# Patient Record
Sex: FEMALE | Race: WHITE | NOT HISPANIC OR LATINO | Employment: UNEMPLOYED | URBAN - NONMETROPOLITAN AREA
[De-identification: names, ages, dates, MRNs, and addresses within clinical notes are randomized per-mention and may not be internally consistent; named-entity substitution may affect disease eponyms.]

---

## 2017-02-13 ENCOUNTER — TELEPHONE (OUTPATIENT)
Dept: URGENT CARE | Facility: CLINIC | Age: 47
End: 2017-02-13

## 2017-02-13 NOTE — TELEPHONE ENCOUNTER
Pt called office and spoke with Lillian.  Wondered where pill for rash was.  Emmanuelle Ayana did not intend to call in pill.  However Emmanuelle spoke with pt this am and she is noting some improvement with Lotrimin AF cream. Emmanuelle agreed to call in Lamisil 250mg QD x 14 days to assist.  Pt was informed that pill may require precert and we would not do that since cream is helping.  She has gyn appt in one week for recheck.  I did rx for Emmanuelle since she has no computer access this am.

## 2017-02-20 ENCOUNTER — OFFICE VISIT (OUTPATIENT)
Dept: OBSTETRICS AND GYNECOLOGY | Facility: CLINIC | Age: 47
End: 2017-02-20

## 2017-02-20 VITALS
DIASTOLIC BLOOD PRESSURE: 70 MMHG | HEIGHT: 59 IN | SYSTOLIC BLOOD PRESSURE: 100 MMHG | WEIGHT: 87 LBS | BODY MASS INDEX: 17.54 KG/M2

## 2017-02-20 DIAGNOSIS — L98.9 PSORIASIS/LIKE DISORDERS: ICD-10-CM

## 2017-02-20 DIAGNOSIS — Z13.820 SCREENING FOR OSTEOPOROSIS: ICD-10-CM

## 2017-02-20 DIAGNOSIS — F17.200 SMOKER: ICD-10-CM

## 2017-02-20 DIAGNOSIS — Z12.39 SCREENING FOR BREAST CANCER: ICD-10-CM

## 2017-02-20 DIAGNOSIS — Z01.419 VISIT FOR GYNECOLOGIC EXAMINATION: Primary | ICD-10-CM

## 2017-02-20 PROCEDURE — 99386 PREV VISIT NEW AGE 40-64: CPT | Performed by: NURSE PRACTITIONER

## 2017-02-20 PROCEDURE — G0123 SCREEN CERV/VAG THIN LAYER: HCPCS | Performed by: NURSE PRACTITIONER

## 2017-02-20 RX ORDER — CLOBETASOL PROPIONATE 0.5 MG/G
CREAM TOPICAL 2 TIMES DAILY
Qty: 60 G | Refills: 0 | Status: SHIPPED | OUTPATIENT
Start: 2017-02-20 | End: 2018-03-01

## 2017-02-20 NOTE — PATIENT INSTRUCTIONS
Bone Health  Bones protect organs, store calcium, and anchor muscles. Good health habits, such as eating nutritious foods and exercising regularly, are important for maintaining healthy bones. They can also help to prevent a condition that causes bones to lose density and become weak and brittle (osteoporosis).  WHY IS BONE MASS IMPORTANT?  Bone mass refers to the amount of bone tissue that you have. The higher your bone mass, the stronger your bones. An important step toward having healthy bones throughout life is to have strong and dense bones during childhood. A young adult who has a high bone mass is more likely to have a high bone mass later in life. Bone mass at its greatest it is called peak bone mass.  A large decline in bone mass occurs in older adults. In women, it occurs about the time of menopause. During this time, it is important to practice good health habits, because if more bone is lost than what is replaced, the bones will become less healthy and more likely to break (fracture). If you find that you have a low bone mass, you may be able to prevent osteoporosis or further bone loss by changing your diet and lifestyle.  HOW CAN I FIND OUT IF MY BONE MASS IS LOW?  Bone mass can be measured with an X-ray test that is called a bone mineral density (BMD) test. This test is recommended for all women who are age 65 or older. It may also be recommended for men who are age 70 or older, or for people who are more likely to develop osteoporosis due to:  · Having bones that break easily.  · Having a long-term disease that weakens bones, such as kidney disease or rheumatoid arthritis.  · Having menopause earlier than normal.  · Taking medicine that weakens bones, such as steroids, thyroid hormones, or hormone treatment for breast cancer or prostate cancer.  · Smoking.  · Drinking three or more alcoholic drinks each day.  WHAT ARE THE NUTRITIONAL RECOMMENDATIONS FOR HEALTHY BONES?  To have healthy bones, you need  to get enough of the right minerals and vitamins. Most nutrition experts recommend getting these nutrients from the foods that you eat. Nutritional recommendations vary from person to person. Ask your health care provider what is healthy for you. Here are some general guidelines.  Calcium Recommendations  Calcium is the most important (essential) mineral for bone health. Most people can get enough calcium from their diet, but supplements may be recommended for people who are at risk for osteoporosis. Good sources of calcium include:  · Dairy products, such as low-fat or nonfat milk, cheese, and yogurt.  · Dark green leafy vegetables, such as bok sindhu and broccoli.  · Calcium-fortified foods, such as orange juice, cereal, bread, soy beverages, and tofu products.  · Nuts, such as almonds.  Follow these recommended amounts for daily calcium intake:  · Children, age 1-3: 700 mg.  · Children, age 4-8: 1,000 mg.  · Children, age 9-13: 1,300 mg.  · Teens, age 14-18: 1,300 mg.  · Adults, age 19-50: 1,000 mg.  · Adults, age 51-70:    Men: 1,000 mg.    Women: 1,200 mg.  · Adults, age 71 or older: 1,200 mg.  · Pregnant and breastfeeding females:    Teens: 1,300 mg.    Adults: 1,000 mg.  Vitamin D Recommendations  Vitamin D is the most essential vitamin for bone health. It helps the body to absorb calcium. Sunlight stimulates the skin to make vitamin D, so be sure to get enough sunlight. If you live in a cold climate or you do not get outside often, your health care provider may recommend that you take vitamin D supplements. Good sources of vitamin D in your diet include:  · Egg yolks.  · Saltwater fish.  · Milk and cereal fortified with vitamin D.  Follow these recommended amounts for daily vitamin D intake:  · Children and teens, age 1-18: 600 international units.  · Adults, age 50 or younger: 400-800 international units.  · Adults, age 51 or older: 800-1,000 international units.  Other Nutrients  Other nutrients for bone  health include:  · Phosphorus. This mineral is found in meat, poultry, dairy foods, nuts, and legumes. The recommended daily intake for adult men and adult women is 700 mg.  · Magnesium. This mineral is found in seeds, nuts, dark green vegetables, and legumes. The recommended daily intake for adult men is 400-420 mg. For adult women, it is 310-320 mg.  · Vitamin K. This vitamin is found in green leafy vegetables. The recommended daily intake is 120 mg for adult men and 90 mg for adult women.  WHAT TYPE OF PHYSICAL ACTIVITY IS BEST FOR BUILDING AND MAINTAINING HEALTHY BONES?  Weight-bearing and strength-building activities are important for building and maintaining peak bone mass. Weight-bearing activities cause muscles and bones to work against gravity. Strength-building activities increases muscle strength that supports bones. Weight-bearing and muscle-building activities include:  · Walking and hiking.  · Jogging and running.  · Dancing.  · Gym exercises.  · Lifting weights.  · Tennis and racquetball.  · Climbing stairs.  · Aerobics.  Adults should get at least 30 minutes of moderate physical activity on most days. Children should get at least 60 minutes of moderate physical activity on most days. Ask your health care provide what type of exercise is best for you.  WHERE CAN I FIND MORE INFORMATION?  For more information, check out the following websites:  · National Osteoporosis Foundation: http://nof.org/learn/basics  · National Institutes of Health: http://www.niams.nih.gov/Health_Info/Bone/Bone_Health/bone_health_for_life.asp     This information is not intended to replace advice given to you by your health care provider. Make sure you discuss any questions you have with your health care provider.     Document Released: 03/09/2005 Document Revised: 05/03/2016 Document Reviewed: 12/23/2015  [employer] Patient Education ©2016 [employer]

## 2017-02-20 NOTE — PROGRESS NOTES
Subjective   Adelita Jaeger is a 46 y.o. female is here today as a self referral.    HPI Comments: I'm here for a pap and physical. I also need to have a genital rash that extends up to my crack evaluated.    Gynecologic Exam   The patient's pertinent negatives include no pelvic pain or vaginal discharge. Associated symptoms include rash (genital area extending to top of my crack in the back). Pertinent negatives include no abdominal pain, back pain, chills, constipation, diarrhea, flank pain, headaches, nausea, sore throat or vomiting.       The following portions of the patient's history were reviewed and updated as appropriate: allergies, current medications, past family history, past social history, past surgical history and problem list.    Review of Systems   Constitutional: Negative for activity change, appetite change, chills and fatigue.   HENT: Negative for congestion, dental problem, ear pain, hearing loss, nosebleeds, rhinorrhea, sneezing, sore throat and voice change.    Eyes: Negative for discharge, redness, itching and visual disturbance.   Respiratory: Negative for apnea, cough, choking, shortness of breath and wheezing.    Cardiovascular: Negative for chest pain and palpitations.   Gastrointestinal: Negative for abdominal distention, abdominal pain, constipation, diarrhea, nausea and vomiting.   Endocrine: Negative for cold intolerance, heat intolerance and polyuria.   Genitourinary: Positive for dyspareunia (not very sexually active ). Negative for difficulty urinating, flank pain, genital sores, menstrual problem, pelvic pain, vaginal bleeding and vaginal discharge.   Musculoskeletal: Negative for arthralgias, back pain, myalgias and neck pain.   Skin: Positive for rash (genital area extending to top of my crack in the back). Negative for pallor.   Allergic/Immunologic: Negative for environmental allergies and food allergies.   Neurological: Negative for dizziness, tremors, seizures, numbness and  headaches.   Hematological: Negative for adenopathy. Does not bruise/bleed easily.   Psychiatric/Behavioral: Negative for agitation, decreased concentration, sleep disturbance and suicidal ideas. The patient is not nervous/anxious.        Objective   Physical Exam   Constitutional: She is oriented to person, place, and time. She appears well-developed and well-nourished. No distress.   HENT:   Head: Normocephalic and atraumatic.   Right Ear: External ear normal.   Left Ear: External ear normal.   Nose: Nose normal.   Mouth/Throat: Oropharynx is clear and moist.   Eyes: EOM are normal. Pupils are equal, round, and reactive to light.   Neck: Normal range of motion. No thyromegaly present.   Cardiovascular: Normal rate, regular rhythm and normal heart sounds.    No murmur heard.  Pulmonary/Chest: Effort normal and breath sounds normal. No respiratory distress. She has no wheezes. Right breast exhibits no inverted nipple and no mass. Left breast exhibits no inverted nipple and no mass.   Bilateral Implants   Abdominal: Soft. Bowel sounds are normal. There is no tenderness.   Genitourinary: Vagina normal and uterus normal. No breast tenderness. Pelvic exam was performed with patient supine. There is no rash or tenderness on the right labia. There is no rash or tenderness on the left labia. Cervix exhibits no motion tenderness. Right adnexum displays no mass and no tenderness. Left adnexum displays no mass and no tenderness. No bleeding in the vagina. No vaginal discharge found.   Genitourinary Comments: Urethra and urethral meatus normal  Bladder normal no prolapse  Perineum and rectum examined and intact without lesions  Bilateral groin area extending up to the top of her crack in the back is a psoriatic rash, itchy, red and scaling  Vagina orifice is shortened due to menopause  Bimanual elicits tenderness due to short vagina and orifice   Musculoskeletal: Normal range of motion.   Lymphadenopathy:        Right: No  inguinal adenopathy present.        Left: No inguinal adenopathy present.   Neurological: She is alert and oriented to person, place, and time. She has normal reflexes.   Skin: Skin is warm and dry.   Psychiatric: She has a normal mood and affect. Her behavior is normal. Judgment and thought content normal.   Nursing note and vitals reviewed.        Assessment/Plan   Adelita was seen today for gynecologic exam.    Diagnoses and all orders for this visit:    Visit for gynecologic examination    Normal gyn exam. Pt's PCP is Dr Rajan Rodriguez whom does her labs and other Rx's  -     Liquid-based Pap Smear, Screening    Screening for breast cancer  -     Mammo Screening Digital Tomosynthesis Bilateral With CAD; Future    Screening for osteoporosis  -     DEXA Bone Density Axial; Future    Smoker    Pt is encouraged to stop smoking but she is not ready to.    BMI less than 19,adult    Encouraged pt to eat more protein and to exercise to build muscle.    Psoriasis/like disorders    Pt has been having this rash now for 5 weeks. She has been given several antifungals that have not worked. She needs to see a dermatologist for evaluation.   -     clobetasol (TEMOVATE) 0.05 % cream; Apply  topically 2 (Two) Times a Day. Apply to affected areas  -     Ambulatory Referral to Dermatology

## 2017-02-22 LAB
GEN CATEG CVX/VAG CYTO-IMP: NORMAL
LAB AP CASE REPORT: NORMAL
LAB AP GYN ADDITIONAL INFORMATION: NORMAL
Lab: NORMAL
PATH INTERP SPEC-IMP: NORMAL
STAT OF ADQ CVX/VAG CYTO-IMP: NORMAL

## 2017-03-17 ENCOUNTER — HOSPITAL ENCOUNTER (OUTPATIENT)
Dept: BONE DENSITY | Facility: HOSPITAL | Age: 47
Discharge: HOME | End: 2017-03-17

## 2017-03-17 ENCOUNTER — TELEPHONE (OUTPATIENT)
Dept: OBSTETRICS AND GYNECOLOGY | Facility: CLINIC | Age: 47
End: 2017-03-17

## 2017-03-17 ENCOUNTER — HOSPITAL ENCOUNTER (OUTPATIENT)
Dept: MAMMOGRAPHY | Facility: HOSPITAL | Age: 47
Discharge: HOME | End: 2017-03-17
Admitting: NURSE PRACTITIONER

## 2017-03-17 DIAGNOSIS — Z13.820 SCREENING FOR OSTEOPOROSIS: ICD-10-CM

## 2017-03-17 DIAGNOSIS — Z12.39 SCREENING FOR BREAST CANCER: ICD-10-CM

## 2017-03-17 PROCEDURE — 77063 BREAST TOMOSYNTHESIS BI: CPT

## 2017-03-17 PROCEDURE — 77080 DXA BONE DENSITY AXIAL: CPT

## 2017-03-17 PROCEDURE — G0202 SCR MAMMO BI INCL CAD: HCPCS

## 2017-04-05 ENCOUNTER — TELEPHONE (OUTPATIENT)
Dept: OBSTETRICS AND GYNECOLOGY | Facility: CLINIC | Age: 47
End: 2017-04-05

## 2017-04-11 ENCOUNTER — TELEPHONE (OUTPATIENT)
Dept: OBSTETRICS AND GYNECOLOGY | Facility: CLINIC | Age: 47
End: 2017-04-11

## 2017-04-11 NOTE — TELEPHONE ENCOUNTER
----- Message from Mary R Carrell, APRN sent at 3/30/2017  5:47 PM CDT -----  Notify pt of her normal mammogram      Left message on patients voice mail to return my call    PP

## 2017-04-17 ENCOUNTER — TELEPHONE (OUTPATIENT)
Dept: OBSTETRICS AND GYNECOLOGY | Facility: CLINIC | Age: 47
End: 2017-04-17

## 2017-04-17 NOTE — TELEPHONE ENCOUNTER
----- Message from Mary R Carrell, APRN sent at 3/30/2017  5:47 PM CDT -----  Notify pt of her normal mammogram    Tried to reach patient, got a message that states mailbox is full and cannot accept messages at this time.  Will try again later.  PP

## 2017-04-18 ENCOUNTER — TELEPHONE (OUTPATIENT)
Dept: OBSTETRICS AND GYNECOLOGY | Facility: CLINIC | Age: 47
End: 2017-04-18

## 2017-04-18 NOTE — TELEPHONE ENCOUNTER
----- Message from Mary R Carrell, APRN sent at 3/30/2017  5:47 PM CDT -----  Notify pt of her normal mammogram      Tried to reach patient, got a message that states mailbox is full and cannot accept messages at this time.    PP

## 2017-04-19 ENCOUNTER — TELEPHONE (OUTPATIENT)
Dept: OBSTETRICS AND GYNECOLOGY | Facility: CLINIC | Age: 47
End: 2017-04-19

## 2017-04-25 ENCOUNTER — TELEPHONE (OUTPATIENT)
Dept: OBSTETRICS AND GYNECOLOGY | Facility: CLINIC | Age: 47
End: 2017-04-25

## 2018-03-01 ENCOUNTER — OFFICE VISIT (OUTPATIENT)
Dept: OBSTETRICS AND GYNECOLOGY | Facility: CLINIC | Age: 48
End: 2018-03-01

## 2018-03-01 VITALS
HEIGHT: 59 IN | DIASTOLIC BLOOD PRESSURE: 72 MMHG | BODY MASS INDEX: 18.35 KG/M2 | WEIGHT: 91 LBS | SYSTOLIC BLOOD PRESSURE: 104 MMHG

## 2018-03-01 DIAGNOSIS — Z12.39 SCREENING FOR MALIGNANT NEOPLASM OF BREAST: ICD-10-CM

## 2018-03-01 DIAGNOSIS — Z01.419 WELL WOMAN EXAM WITH ROUTINE GYNECOLOGICAL EXAM: Primary | ICD-10-CM

## 2018-03-01 DIAGNOSIS — Z12.4 CERVICAL CANCER SCREENING: ICD-10-CM

## 2018-03-01 PROCEDURE — 99396 PREV VISIT EST AGE 40-64: CPT | Performed by: NURSE PRACTITIONER

## 2018-03-01 PROCEDURE — G0123 SCREEN CERV/VAG THIN LAYER: HCPCS | Performed by: NURSE PRACTITIONER

## 2018-03-01 RX ORDER — POTASSIUM CHLORIDE 750 MG/1
20 TABLET, EXTENDED RELEASE ORAL 2 TIMES DAILY
Status: ON HOLD | COMMUNITY
Start: 2018-02-16 | End: 2019-07-10

## 2018-03-01 RX ORDER — ASPIRIN 81 MG/1
81 TABLET, CHEWABLE ORAL DAILY
COMMUNITY
End: 2019-07-13 | Stop reason: HOSPADM

## 2018-03-01 RX ORDER — GEMFIBROZIL 600 MG/1
TABLET, FILM COATED ORAL
COMMUNITY
Start: 2018-01-26 | End: 2019-03-04

## 2018-03-01 RX ORDER — AZELASTINE HYDROCHLORIDE 0.5 MG/ML
SOLUTION/ DROPS OPHTHALMIC
COMMUNITY
Start: 2018-02-16 | End: 2019-03-04

## 2018-03-01 NOTE — PROGRESS NOTES
Subjective   Adelita Jaeger is a 47 y.o. female  YOB: 1970    Est PT is here today for yearly visit.  Pt states that she is doing good with no c/o  Pt does need order for Mammo today as well      Chief Complaint   Patient presents with   • Gynecologic Exam     Here for annual exam, pap smear. LPS 2-20-17 WNL Needs mammogram for BIC. No gyn complaints.        HPI    The following portions of the patient's history were reviewed and updated as appropriate: allergies, current medications, past family history, past medical history, past social history, past surgical history and problem list.    Allergies   Allergen Reactions   • Penicillins Unknown (See Comments)     As a child       Past Medical History:   Diagnosis Date   • Acid reflux    • Hyperlipidemia    • Hypertension    • Potassium (K) deficiency        Family History   Problem Relation Age of Onset   • Breast cancer Maternal Aunt    • Breast cancer Maternal Aunt    • Breast cancer Maternal Aunt    • Breast cancer Cousin    • Ovarian cancer Neg Hx    • Uterine cancer Neg Hx    • Colon cancer Neg Hx        Social History     Social History   • Marital status: Single     Spouse name: N/A   • Number of children: N/A   • Years of education: N/A     Occupational History   • Not on file.     Social History Main Topics   • Smoking status: Current Every Day Smoker     Packs/day: 0.50     Types: Cigarettes   • Smokeless tobacco: Never Used   • Alcohol use Yes      Comment: occasional   • Drug use: No   • Sexual activity: Yes     Partners: Male     Birth control/ protection: Post-menopausal      Comment: 12 years     Other Topics Concern   • Not on file     Social History Narrative         Current Outpatient Prescriptions:   •  aspirin 81 MG chewable tablet, Chew 81 mg Daily., Disp: , Rfl:   •  azelastine (OPTIVAR) 0.05 % ophthalmic solution, , Disp: , Rfl:   •  gemfibrozil (LOPID) 600 MG tablet, , Disp: , Rfl:   •  hydrochlorothiazide (HYDRODIURIL) 25 MG  tablet, Take 25 mg by mouth Daily. Takes half of a pill daily, Disp: , Rfl:   •  KLOR-CON 10 MEQ CR tablet, , Disp: , Rfl:   •  KRILL OIL PO, Take 1 tablet/day by mouth Daily., Disp: , Rfl:   •  Lansoprazole (PREVACID PO), Take 1 tablet/day by mouth Daily., Disp: , Rfl:   •  Multiple Vitamins-Minerals (MULTIVITAMIN ADULT PO), Take 1 tablet/day by mouth Daily., Disp: , Rfl:   •  Wheat Dextrin (BENEFIBER PO), Take 1 tablet/day by mouth Daily., Disp: , Rfl:     Menstrual History:  OB History      Para Term  AB Living    2 1 1 0 1 1    SAB TAB Ectopic Multiple Live Births    1 0 0 0 1           No LMP recorded. Patient is postmenopausal.    Sexual History:         Could not be calculated    Past Surgical History:   Procedure Laterality Date   • AUGMENTATION MAMMAPLASTY     • CARPAL TUNNEL RELEASE     •  SECTION     • CHOLECYSTECTOMY     • DILATATION AND CURETTAGE     • ENDOMETRIAL BIOPSY         Review of Systems   Constitutional: Negative for activity change, appetite change, chills, diaphoresis, fatigue, fever and unexpected weight change.   HENT: Negative for congestion, dental problem, drooling, ear discharge, ear pain, facial swelling, hearing loss, mouth sores, nosebleeds, postnasal drip, rhinorrhea, sinus pain, sinus pressure, sneezing, sore throat, tinnitus, trouble swallowing and voice change.    Eyes: Negative for photophobia, pain, discharge, redness, itching and visual disturbance.   Respiratory: Negative for apnea, cough, choking, chest tightness, shortness of breath, wheezing and stridor.    Cardiovascular: Negative for chest pain, palpitations and leg swelling.   Gastrointestinal: Negative for abdominal distention, abdominal pain, anal bleeding, blood in stool, constipation, diarrhea, nausea, rectal pain and vomiting.   Endocrine: Negative for cold intolerance, heat intolerance, polydipsia, polyphagia and polyuria.   Genitourinary: Negative for decreased urine volume, difficulty  urinating, dyspareunia, dysuria, enuresis, flank pain, frequency, genital sores, hematuria, menstrual problem, pelvic pain, urgency, vaginal bleeding, vaginal discharge and vaginal pain.   Musculoskeletal: Negative for arthralgias, back pain, gait problem, joint swelling, myalgias, neck pain and neck stiffness.   Skin: Negative for color change, pallor, rash and wound.   Allergic/Immunologic: Negative for environmental allergies, food allergies and immunocompromised state.   Neurological: Negative for dizziness, tremors, seizures, syncope, facial asymmetry, speech difficulty, weakness, light-headedness, numbness and headaches.   Hematological: Negative for adenopathy. Does not bruise/bleed easily.   Psychiatric/Behavioral: Negative for agitation, behavioral problems, confusion, decreased concentration, dysphoric mood, hallucinations, self-injury, sleep disturbance and suicidal ideas. The patient is not nervous/anxious and is not hyperactive.        Objective   Physical Exam   Constitutional: She is oriented to person, place, and time. She appears well-developed and well-nourished. No distress.   HENT:   Head: Normocephalic.   Right Ear: External ear normal.   Left Ear: External ear normal.   Nose: Nose normal.   Mouth/Throat: Oropharynx is clear and moist.   Eyes: Conjunctivae are normal. Right eye exhibits no discharge. Left eye exhibits no discharge. No scleral icterus.   Neck: Normal range of motion. Neck supple. Carotid bruit is not present. No tracheal deviation present. No thyromegaly present.   Cardiovascular: Normal rate, regular rhythm, normal heart sounds and intact distal pulses.    No murmur heard.  Pulmonary/Chest: Effort normal and breath sounds normal. No respiratory distress. She has no wheezes. Right breast exhibits no inverted nipple, no mass, no nipple discharge, no skin change and no tenderness. Left breast exhibits no inverted nipple, no mass, no nipple discharge, no skin change and no  tenderness. Breasts are symmetrical. There is no breast swelling.   Abdominal: Soft. She exhibits no distension and no mass. There is no tenderness. There is no guarding. No hernia. Hernia confirmed negative in the right inguinal area and confirmed negative in the left inguinal area.   Genitourinary: Rectum normal, vagina normal and uterus normal. Rectal exam shows no mass. No breast tenderness, discharge or bleeding. Pelvic exam was performed with patient supine. There is no rash, tenderness, lesion or injury on the right labia. There is no rash, tenderness, lesion or injury on the left labia. Uterus is not enlarged, not fixed and not tender. Cervix exhibits no motion tenderness, no discharge and no friability. Right adnexum displays no mass, no tenderness and no fullness. Left adnexum displays no mass, no tenderness and no fullness. No erythema, tenderness or bleeding in the vagina. No foreign body in the vagina. No signs of injury around the vagina. No vaginal discharge found.   Genitourinary Comments:   BSU normal  Urethral meatus  Normal  Perineum  Normal   Musculoskeletal: Normal range of motion. She exhibits no edema or tenderness.   Lymphadenopathy:        Head (right side): No submental, no submandibular, no tonsillar, no preauricular, no posterior auricular and no occipital adenopathy present.        Head (left side): No submental, no submandibular, no tonsillar, no preauricular, no posterior auricular and no occipital adenopathy present.     She has no cervical adenopathy.        Right cervical: No superficial cervical, no deep cervical and no posterior cervical adenopathy present.       Left cervical: No superficial cervical, no deep cervical and no posterior cervical adenopathy present.     She has no axillary adenopathy.        Right: No inguinal adenopathy present.        Left: No inguinal adenopathy present.   Neurological: She is alert and oriented to person, place, and time. Coordination normal.  "  Skin: Skin is warm and dry. No bruising and no rash noted. She is not diaphoretic. No erythema.   Psychiatric: She has a normal mood and affect. Her behavior is normal. Judgment and thought content normal.   Nursing note and vitals reviewed.        Vitals:    03/01/18 1111   BP: 104/72   BP Location: Left arm   Patient Position: Sitting   Cuff Size: Adult   Weight: 41.3 kg (91 lb)   Height: 149.9 cm (59\")       Adelita was seen today for gynecologic exam.    Diagnoses and all orders for this visit:    Well woman exam with routine gynecological exam  Comments:  Normal well woman exam.  Thin prep done.  Mammogram ordered.    Orders:  -     Liquid-based Pap Smear, Screening    Cervical cancer screening  Comments:  Thin prep pap smear done.  Orders:  -     Liquid-based Pap Smear, Screening    Screening for malignant neoplasm of breast  Comments:  Mammogram ordered.    Orders:  -     Mammo Screening Digital Tomosynthesis Bilateral With CAD; Future          Body mass index is 18.38 kg/(m^2).     Non-Smoker    MyChart Instructions Given       "

## 2018-03-10 LAB
GEN CATEG CVX/VAG CYTO-IMP: NORMAL
LAB AP CASE REPORT: NORMAL
LAB AP GYN ADDITIONAL INFORMATION: NORMAL
LAB AP GYN OTHER FINDINGS: NORMAL
Lab: NORMAL
PATH INTERP SPEC-IMP: NORMAL
STAT OF ADQ CVX/VAG CYTO-IMP: NORMAL

## 2018-04-03 ENCOUNTER — HOSPITAL ENCOUNTER (OUTPATIENT)
Dept: MAMMOGRAPHY | Facility: HOSPITAL | Age: 48
Discharge: HOME | End: 2018-04-03
Admitting: NURSE PRACTITIONER

## 2018-04-03 DIAGNOSIS — R92.8 ABNORMALITY OF RIGHT BREAST ON SCREENING MAMMOGRAM: Primary | ICD-10-CM

## 2018-04-03 DIAGNOSIS — Z12.39 SCREENING FOR MALIGNANT NEOPLASM OF BREAST: ICD-10-CM

## 2018-04-03 PROCEDURE — 77067 SCR MAMMO BI INCL CAD: CPT

## 2018-04-03 PROCEDURE — 77063 BREAST TOMOSYNTHESIS BI: CPT

## 2018-04-05 ENCOUNTER — HOSPITAL ENCOUNTER (OUTPATIENT)
Dept: ULTRASOUND IMAGING | Facility: HOSPITAL | Age: 48
Discharge: HOME | End: 2018-04-05

## 2018-04-05 ENCOUNTER — HOSPITAL ENCOUNTER (OUTPATIENT)
Dept: MAMMOGRAPHY | Facility: HOSPITAL | Age: 48
Discharge: HOME | End: 2018-04-05
Admitting: NURSE PRACTITIONER

## 2018-04-05 DIAGNOSIS — R92.8 ABNORMALITY OF RIGHT BREAST ON SCREENING MAMMOGRAM: ICD-10-CM

## 2018-04-05 PROCEDURE — G0279 TOMOSYNTHESIS, MAMMO: HCPCS

## 2018-04-05 PROCEDURE — 77065 DX MAMMO INCL CAD UNI: CPT

## 2018-11-16 PROCEDURE — 88108 CYTOPATH CONCENTRATE TECH: CPT | Performed by: GENERAL PRACTICE

## 2018-11-19 ENCOUNTER — LAB REQUISITION (OUTPATIENT)
Dept: LAB | Facility: HOSPITAL | Age: 48
End: 2018-11-19

## 2018-11-19 DIAGNOSIS — J18.9 PNEUMONIA: ICD-10-CM

## 2018-11-20 PROCEDURE — 88305 TISSUE EXAM BY PATHOLOGIST: CPT | Performed by: INTERNAL MEDICINE

## 2018-11-20 PROCEDURE — 88104 CYTOPATH FL NONGYN SMEARS: CPT | Performed by: INTERNAL MEDICINE

## 2018-11-21 ENCOUNTER — LAB REQUISITION (OUTPATIENT)
Dept: LAB | Facility: HOSPITAL | Age: 48
End: 2018-11-21

## 2018-11-21 DIAGNOSIS — J18.9 PNEUMONIA: ICD-10-CM

## 2018-11-21 LAB
CYTO UR: NORMAL
LAB AP CASE REPORT: NORMAL
PATH REPORT.FINAL DX SPEC: NORMAL
PATH REPORT.GROSS SPEC: NORMAL

## 2018-12-18 PROCEDURE — 88305 TISSUE EXAM BY PATHOLOGIST: CPT | Performed by: GENERAL PRACTICE

## 2018-12-19 ENCOUNTER — LAB REQUISITION (OUTPATIENT)
Dept: LAB | Facility: HOSPITAL | Age: 48
End: 2018-12-19

## 2018-12-19 DIAGNOSIS — Z00.00 ENCOUNTER FOR GENERAL ADULT MEDICAL EXAMINATION WITHOUT ABNORMAL FINDINGS: ICD-10-CM

## 2018-12-23 LAB
CYTO UR: NORMAL
LAB AP CASE REPORT: NORMAL
LAB AP CLINICAL INFORMATION: NORMAL
PATH REPORT.FINAL DX SPEC: NORMAL
PATH REPORT.GROSS SPEC: NORMAL

## 2019-03-04 ENCOUNTER — OFFICE VISIT (OUTPATIENT)
Dept: OBSTETRICS AND GYNECOLOGY | Facility: CLINIC | Age: 49
End: 2019-03-04

## 2019-03-04 VITALS
HEIGHT: 59 IN | BODY MASS INDEX: 19.76 KG/M2 | SYSTOLIC BLOOD PRESSURE: 130 MMHG | WEIGHT: 98 LBS | DIASTOLIC BLOOD PRESSURE: 86 MMHG

## 2019-03-04 DIAGNOSIS — Z01.419 WELL WOMAN EXAM WITH ROUTINE GYNECOLOGICAL EXAM: Primary | ICD-10-CM

## 2019-03-04 DIAGNOSIS — Z12.4 CERVICAL CANCER SCREENING: ICD-10-CM

## 2019-03-04 DIAGNOSIS — Z12.39 ENCOUNTER FOR SCREENING FOR MALIGNANT NEOPLASM OF BREAST: ICD-10-CM

## 2019-03-04 PROBLEM — E88.09 HYPOALBUMINEMIA: Status: ACTIVE | Noted: 2018-11-29

## 2019-03-04 PROBLEM — E83.42 HYPOMAGNESEMIA: Status: ACTIVE | Noted: 2018-11-29

## 2019-03-04 PROBLEM — E87.6 HYPOKALEMIA: Status: ACTIVE | Noted: 2018-06-21

## 2019-03-04 PROBLEM — F17.200 SMOKES TOBACCO DAILY: Status: ACTIVE | Noted: 2018-11-29

## 2019-03-04 PROBLEM — K21.9 GASTROESOPHAGEAL REFLUX DISEASE: Status: ACTIVE | Noted: 2018-11-29

## 2019-03-04 PROCEDURE — 99396 PREV VISIT EST AGE 40-64: CPT | Performed by: NURSE PRACTITIONER

## 2019-03-04 PROCEDURE — G0123 SCREEN CERV/VAG THIN LAYER: HCPCS | Performed by: NURSE PRACTITIONER

## 2019-03-04 RX ORDER — THIAMINE HCL 100 MG
1 TABLET ORAL EVERY 12 HOURS SCHEDULED
COMMUNITY
End: 2021-02-10 | Stop reason: HOSPADM

## 2019-03-04 NOTE — PROGRESS NOTES
Subjective   Adelita Jaeger is a 48 y.o. female  YOB: 1970        Chief Complaint   Patient presents with   • Gynecologic Exam     Patient voices no complaints.       Gynecologic Exam   The patient's pertinent negatives include no genital itching, genital lesions, genital odor, genital rash, missed menses, pelvic pain, vaginal bleeding or vaginal discharge. Pertinent negatives include no abdominal pain, back pain, constipation, diarrhea, dysuria, fever, frequency, hematuria, nausea, rash, sore throat, urgency or vomiting.       The following portions of the patient's history were reviewed and updated as appropriate: allergies, current medications, past family history, past medical history, past social history, past surgical history and problem list.    Allergies   Allergen Reactions   • Penicillins Unknown (See Comments)     As a child       Past Medical History:   Diagnosis Date   • Hyperlipidemia    • Hypertension    • Potassium (K) deficiency        Family History   Problem Relation Age of Onset   • Breast cancer Maternal Aunt    • Breast cancer Maternal Aunt    • Breast cancer Maternal Aunt    • Breast cancer Cousin    • Ovarian cancer Neg Hx    • Uterine cancer Neg Hx    • Colon cancer Neg Hx        Social History     Socioeconomic History   • Marital status: Single     Spouse name: Not on file   • Number of children: Not on file   • Years of education: Not on file   • Highest education level: Not on file   Social Needs   • Financial resource strain: Not on file   • Food insecurity - worry: Not on file   • Food insecurity - inability: Not on file   • Transportation needs - medical: Not on file   • Transportation needs - non-medical: Not on file   Occupational History   • Not on file   Tobacco Use   • Smoking status: Current Every Day Smoker     Packs/day: 0.50     Types: Cigarettes   • Smokeless tobacco: Never Used   Substance and Sexual Activity   • Alcohol use: Yes     Comment: occasional    • Drug use: No   • Sexual activity: Yes     Partners: Male     Birth control/protection: Post-menopausal     Comment: 12 years   Other Topics Concern   • Not on file   Social History Narrative   • Not on file         Current Outpatient Medications:   •  aspirin 81 MG chewable tablet, Chew 81 mg Daily., Disp: , Rfl:   •  Calcium Carb-Cholecalciferol (CALCIUM 600+D3) 600-200 MG-UNIT tablet, Every 12 (Twelve) Hours., Disp: , Rfl:   •  hydrochlorothiazide (HYDRODIURIL) 25 MG tablet, Take 25 mg by mouth Daily. Takes half of a pill daily, Disp: , Rfl:   •  KLOR-CON 10 MEQ CR tablet, , Disp: , Rfl:   •  Magnesium 500 MG tablet, Every 12 (Twelve) Hours., Disp: , Rfl:   •  Multiple Vitamins-Minerals (MULTIVITAMIN ADULT PO), Take 1 tablet/day by mouth Daily., Disp: , Rfl:     No LMP recorded. Patient is postmenopausal.    Sexual History:         Could not be calculated    Past Surgical History:   Procedure Laterality Date   • AUGMENTATION MAMMAPLASTY     • CARPAL TUNNEL RELEASE     •  SECTION     • CHOLECYSTECTOMY     • DILATATION AND CURETTAGE     • ENDOMETRIAL BIOPSY     • HERNIA REPAIR     • OTHER SURGICAL HISTORY      TIF procedure.        Review of Systems   Constitutional: Negative for activity change, appetite change, fatigue, fever, unexpected weight gain and unexpected weight loss.   HENT: Negative for congestion, ear pain, hearing loss, nosebleeds, rhinorrhea, sore throat, tinnitus and trouble swallowing.    Eyes: Negative for blurred vision, pain, discharge, itching and visual disturbance.   Respiratory: Negative for apnea, chest tightness, shortness of breath and wheezing.    Cardiovascular: Negative for chest pain and leg swelling.   Gastrointestinal: Negative for abdominal pain, blood in stool, constipation, diarrhea, nausea, vomiting and GERD.   Endocrine: Negative for heat intolerance, polydipsia and polyuria.   Genitourinary: Negative for urinary incontinence, decreased libido, difficulty  urinating, dyspareunia, dysuria, frequency, genital sores, hematuria, menstrual problem, missed menses, pelvic pain, urgency, vaginal discharge, vaginal pain and breast lump.   Musculoskeletal: Negative for arthralgias, back pain, joint swelling and myalgias.   Skin: Negative for color change, rash and skin lesions.   Allergic/Immunologic: Negative for environmental allergies, food allergies and immunocompromised state.   Neurological: Negative for dizziness, tremors, seizures, syncope, facial asymmetry, numbness and headache.   Hematological: Negative for adenopathy. Does not bruise/bleed easily.   Psychiatric/Behavioral: Negative for agitation, hallucinations, sleep disturbance, suicidal ideas and depressed mood. The patient is not nervous/anxious.        Objective   Physical Exam   Constitutional: She is oriented to person, place, and time. She appears well-developed and well-nourished. No distress.   HENT:   Head: Normocephalic.   Right Ear: External ear normal.   Left Ear: External ear normal.   Nose: Nose normal.   Mouth/Throat: Oropharynx is clear and moist.   Eyes: Conjunctivae are normal. Right eye exhibits no discharge. Left eye exhibits no discharge. No scleral icterus.   Neck: Normal range of motion. Neck supple. Carotid bruit is not present. No tracheal deviation present. No thyromegaly present.   Cardiovascular: Normal rate, regular rhythm, normal heart sounds and intact distal pulses.   No murmur heard.  Pulmonary/Chest: Effort normal and breath sounds normal. No respiratory distress. She has no wheezes. Right breast exhibits no inverted nipple, no mass, no nipple discharge, no skin change and no tenderness. Left breast exhibits no inverted nipple, no mass, no nipple discharge, no skin change and no tenderness. Breasts are symmetrical. There is no breast swelling.   Abdominal: Soft. She exhibits no distension and no mass. There is no tenderness. There is no guarding. No hernia. Hernia confirmed  negative in the right inguinal area and confirmed negative in the left inguinal area.   Genitourinary: Rectum normal, vagina normal and uterus normal. Rectal exam shows no mass. No breast tenderness, discharge or bleeding. Pelvic exam was performed with patient supine. There is no rash, tenderness, lesion or injury on the right labia. There is no rash, tenderness, lesion or injury on the left labia. Uterus is not enlarged, not fixed and not tender. Cervix exhibits no motion tenderness, no discharge and no friability. Right adnexum displays no mass, no tenderness and no fullness. Left adnexum displays no mass, no tenderness and no fullness. No erythema, tenderness or bleeding in the vagina. No foreign body in the vagina. No signs of injury around the vagina. No vaginal discharge found.   Genitourinary Comments:   BSU normal  Urethral meatus  Normal  Perineum  Normal   Musculoskeletal: Normal range of motion. She exhibits no edema or tenderness.   Lymphadenopathy:        Head (right side): No submental, no submandibular, no tonsillar, no preauricular, no posterior auricular and no occipital adenopathy present.        Head (left side): No submental, no submandibular, no tonsillar, no preauricular, no posterior auricular and no occipital adenopathy present.     She has no cervical adenopathy.        Right cervical: No superficial cervical, no deep cervical and no posterior cervical adenopathy present.       Left cervical: No superficial cervical, no deep cervical and no posterior cervical adenopathy present.     She has no axillary adenopathy.        Right: No inguinal adenopathy present.        Left: No inguinal adenopathy present.   Neurological: She is alert and oriented to person, place, and time. Coordination normal.   Skin: Skin is warm and dry. No bruising and no rash noted. She is not diaphoretic. No erythema.   Psychiatric: She has a normal mood and affect. Her behavior is normal. Judgment and thought content  "normal.   Nursing note and vitals reviewed.        Vitals:    03/04/19 1020   BP: 130/86   Weight: 44.5 kg (98 lb)   Height: 149.9 cm (59\")       Adelita was seen today for gynecologic exam.    Diagnoses and all orders for this visit:    Well woman exam with routine gynecological exam  Comments:  Normal well woman exam.  Thin prep pap smear done.  Mammogram ordered.     Cervical cancer screening  Comments:  Thin prep pap smear done.    Encounter for screening for malignant neoplasm of breast  Comments:  Mammogram ordered.   Orders:  -     Mammo Screening Digital Tomosynthesis Bilateral With CAD; Future          Patient's Body mass index is 19.79 kg/m². BMI is below normal parameters. Recommendations include: none (medical contraindication).             Non-Smoker    MyChart Instructions Given       "

## 2019-03-05 LAB
GEN CATEG CVX/VAG CYTO-IMP: NORMAL
LAB AP CASE REPORT: NORMAL
LAB AP GYN ADDITIONAL INFORMATION: NORMAL
LAB AP GYN OTHER FINDINGS: NORMAL
PATH INTERP SPEC-IMP: NORMAL
STAT OF ADQ CVX/VAG CYTO-IMP: NORMAL

## 2019-03-08 ENCOUNTER — HOSPITAL ENCOUNTER (OUTPATIENT)
Dept: MAMMOGRAPHY | Facility: HOSPITAL | Age: 49
Discharge: HOME | End: 2019-03-08
Admitting: NURSE PRACTITIONER

## 2019-03-08 DIAGNOSIS — Z12.39 ENCOUNTER FOR SCREENING FOR MALIGNANT NEOPLASM OF BREAST: ICD-10-CM

## 2019-03-08 PROCEDURE — 77063 BREAST TOMOSYNTHESIS BI: CPT

## 2019-03-08 PROCEDURE — 77067 SCR MAMMO BI INCL CAD: CPT

## 2019-03-11 DIAGNOSIS — R92.8 ABNORMALITY OF LEFT BREAST ON SCREENING MAMMOGRAM: Primary | ICD-10-CM

## 2019-03-15 ENCOUNTER — HOSPITAL ENCOUNTER (OUTPATIENT)
Dept: MAMMOGRAPHY | Facility: HOSPITAL | Age: 49
Discharge: HOME | End: 2019-03-15
Admitting: NURSE PRACTITIONER

## 2019-03-15 ENCOUNTER — HOSPITAL ENCOUNTER (OUTPATIENT)
Dept: ULTRASOUND IMAGING | Facility: HOSPITAL | Age: 49
End: 2019-03-15

## 2019-03-15 PROCEDURE — G0279 TOMOSYNTHESIS, MAMMO: HCPCS

## 2019-03-15 PROCEDURE — 77065 DX MAMMO INCL CAD UNI: CPT

## 2019-03-15 NOTE — PROGRESS NOTES
Please let patient know that her additional imaging was benign.  Repeat bilateral screening mammogram 3/2020

## 2019-06-17 ENCOUNTER — OFFICE VISIT (OUTPATIENT)
Dept: OTOLARYNGOLOGY | Facility: CLINIC | Age: 49
End: 2019-06-17

## 2019-06-17 VITALS
DIASTOLIC BLOOD PRESSURE: 82 MMHG | HEART RATE: 82 BPM | SYSTOLIC BLOOD PRESSURE: 122 MMHG | HEIGHT: 59 IN | TEMPERATURE: 99 F | BODY MASS INDEX: 18.62 KG/M2 | WEIGHT: 92.38 LBS

## 2019-06-17 DIAGNOSIS — J34.89 LESION OF NASAL SEPTUM: Primary | ICD-10-CM

## 2019-06-17 PROCEDURE — 99214 OFFICE O/P EST MOD 30 MIN: CPT | Performed by: NURSE PRACTITIONER

## 2019-06-17 RX ORDER — BUSPIRONE HYDROCHLORIDE 5 MG/1
5 TABLET ORAL 3 TIMES DAILY PRN
Refills: 1 | COMMUNITY
Start: 2019-04-25 | End: 2021-02-09

## 2019-06-17 RX ORDER — CITALOPRAM HYDROBROMIDE 20 MG
TABLET ORAL
Status: ON HOLD | COMMUNITY
End: 2019-07-10

## 2019-06-17 NOTE — PATIENT INSTRUCTIONS
Medical vs surgical options discussed  Possible granuloma vs papilloma vs malignancy  Followup to discuss excision if not resolved

## 2019-06-17 NOTE — PROGRESS NOTES
YOB: 1970  Location: [facility]  Location Address: [address] [place] [address], [facility], [address] [place] [address]  Location Phone: [phone]    Chief Complaint   Patient presents with   • nasal lesion       History of Present Illness  Adelita Jaeger is a 49 y.o. female.  Adelita Jaeger is here for follow up of ENT complaints. The patient has had problems with nasal lesion  The symptoms are not localized to a particular location. The patient has had moderate symptoms. The symptoms have been present for the last several months The symptoms are aggravated by  no identifiable factors. The symptoms are improved by no identifiable factors.       Past Medical History:   Diagnosis Date   • COPD (chronic obstructive pulmonary disease) (CMS/HCC)    • GERD (gastroesophageal reflux disease)    • Hyperlipidemia    • Hypertension    • Potassium (K) deficiency        Past Surgical History:   Procedure Laterality Date   • AUGMENTATION MAMMAPLASTY     • CARPAL TUNNEL RELEASE     •  SECTION     • CHOLECYSTECTOMY     • DILATATION AND CURETTAGE     • ENDOMETRIAL BIOPSY     • HERNIA REPAIR     • OTHER SURGICAL HISTORY      TIF procedure.        Outpatient Medications Marked as Taking for the 19 encounter (Office Visit) with Bianka Garcia APRN   Medication Sig Dispense Refill   • aspirin 81 MG chewable tablet Chew 81 mg Daily.     • busPIRone (BUSPAR) 5 MG tablet   1   • Calcium Carb-Cholecalciferol (CALCIUM 600+D3) 600-200 MG-UNIT tablet Every 12 (Twelve) Hours.     • citalopram (CELEXA) 20 MG tablet Celexa 20 mg tablet   Take 1 tablet every day by oral route.     • Flaxseed, Linseed, (FLAXSEED OIL PO) Take  by mouth.     • hydrochlorothiazide (HYDRODIURIL) 25 MG tablet Take 25 mg by mouth Daily. Takes half of a pill daily     • KLOR-CON 10 MEQ CR tablet      • Magnesium 500 MG tablet Every 12 (Twelve) Hours.     • Multiple Vitamins-Minerals (MULTIVITAMIN ADULT PO) Take 1 tablet/day by mouth Daily.          Penicillins    Family History   Problem Relation Age of Onset   • Breast cancer Maternal Aunt    • Breast cancer Maternal Aunt    • Breast cancer Maternal Aunt    • Breast cancer Cousin    • Ovarian cancer Neg Hx    • Uterine cancer Neg Hx    • Colon cancer Neg Hx        Social History     Socioeconomic History   • Marital status: Single     Spouse name: Not on file   • Number of children: Not on file   • Years of education: Not on file   • Highest education level: Not on file   Tobacco Use   • Smoking status: Current Every Day Smoker     Packs/day: 0.50     Types: Cigarettes   • Smokeless tobacco: Never Used   Substance and Sexual Activity   • Alcohol use: Yes     Comment: occasional   • Drug use: No   • Sexual activity: Yes     Partners: Male     Birth control/protection: Post-menopausal     Comment: 12 years       Review of Systems   Constitutional: Negative.    HENT:        SEE HPI   Eyes: Negative.    Respiratory: Negative.    Cardiovascular: Negative.    Gastrointestinal: Negative.    Endocrine: Negative.    Genitourinary: Negative.    Musculoskeletal: Negative.    Skin: Negative.    Allergic/Immunologic: Negative.    Neurological: Negative.    Hematological: Negative.    Psychiatric/Behavioral: Negative.        Vitals:    06/17/19 1445   BP: 122/82   Pulse: 82   Temp: 99 °F (37.2 °C)       Body mass index is 18.66 kg/m².    Objective     Physical Exam  CONSTITUTIONAL: well nourished, alert, oriented, in no acute distress     COMMUNICATION AND VOICE: able to communicate normally, normal voice quality    HEAD: normocephalic, no lesions, atraumatic, no tenderness, no masses     FACE: appearance normal, no lesions, no tenderness, no deformities, facial motion symmetric    SALIVARY GLANDS: parotid glands with no tenderness, no swelling, no masses, submandibular glands with normal size, nontender    EYES: ocular motility normal, eyelids normal, orbits normal, no proptosis, conjunctiva normal , pupils equal,  round     EARS:  Hearing: response to conversational voice normal bilaterally   External Ears: auricles without lesions  Otoscopic: tympanic membrane appearance normal, no lesions, no perforation, normal mobility, no fluid    NOSE:  External Nose: structure normal, no tenderness on palpation, no nasal discharge, no lesions, no evidence of trauma, nostrils patent   Intranasal Exam: nasal mucosa normal, vestibule within normal limits, inferior turbinate normal, nasal septum with lesion to anteriorly approx 3 mm beefy red    ORAL:  Lips: upper and lower lips without lesion   Teeth: dentition within normal limits for age   Gums: gingivae healthy   Oral Mucosa: oral mucosa normal, no mucosal lesions   Floor of Mouth: Warthin’s duct patent, mucosa normal  Tongue: lingual mucosa normal without lesions, normal tongue mobility   Palate: soft and hard palates with normal mucosa and structure  Oropharynx: oropharyngeal mucosa normal    NECK: neck appearance normal, no mass,  noted without erythema or tenderness    LYMPH NODES: no lymphadenopathy    CHEST/RESPIRATORY: respiratory effort normal    CARDIOVASCULAR:  extremities without cyanosis or edema      NEUROLOGIC/PSYCHIATRIC: oriented to time, place and person, mood normal, affect appropriate, CN II-XII intact grossly    Assessment/Plan   Adelita was seen today for nasal lesion.    Diagnoses and all orders for this visit:    Lesion of nasal septum    Other orders  -     mupirocin (BACTROBAN) 2 % ointment; Apply intranasally bid  -     mupirocin (BACTROBAN) 2 % nasal ointment; Apply intranasally twice daily      * Surgery not found *  No orders of the defined types were placed in this encounter.    Return in about 6 weeks (around 7/29/2019).       Patient Instructions   Medical vs surgical options discussed  Possible granuloma vs papilloma vs malignancy  Followup to discuss excision if not resolved

## 2019-07-09 ENCOUNTER — ANESTHESIA (OUTPATIENT)
Dept: PERIOP | Facility: HOSPITAL | Age: 49
End: 2019-07-09

## 2019-07-09 ENCOUNTER — APPOINTMENT (OUTPATIENT)
Dept: GENERAL RADIOLOGY | Facility: HOSPITAL | Age: 49
End: 2019-07-09

## 2019-07-09 ENCOUNTER — HOSPITAL ENCOUNTER (INPATIENT)
Facility: HOSPITAL | Age: 49
LOS: 4 days | Discharge: HOME HEALTH | End: 2019-07-13
Attending: EMERGENCY MEDICINE | Admitting: ORTHOPAEDIC SURGERY

## 2019-07-09 ENCOUNTER — ANESTHESIA EVENT (OUTPATIENT)
Dept: PERIOP | Facility: HOSPITAL | Age: 49
End: 2019-07-09

## 2019-07-09 DIAGNOSIS — Z78.9 DECREASED ACTIVITIES OF DAILY LIVING (ADL): ICD-10-CM

## 2019-07-09 DIAGNOSIS — Z74.09 IMPAIRED FUNCTIONAL MOBILITY, BALANCE, GAIT, AND ENDURANCE: ICD-10-CM

## 2019-07-09 DIAGNOSIS — S72.001A CLOSED FRACTURE OF RIGHT HIP, INITIAL ENCOUNTER (HCC): Primary | ICD-10-CM

## 2019-07-09 DIAGNOSIS — E87.6 HYPOKALEMIA: ICD-10-CM

## 2019-07-09 PROBLEM — E87.1 HYPONATREMIA: Status: ACTIVE | Noted: 2019-07-09

## 2019-07-09 PROBLEM — Z72.0 TOBACCO ABUSE: Status: ACTIVE | Noted: 2019-07-09

## 2019-07-09 PROBLEM — G89.11 ACUTE PAIN DUE TO INJURY: Status: ACTIVE | Noted: 2019-07-09

## 2019-07-09 LAB
ABO GROUP BLD: NORMAL
ALBUMIN SERPL-MCNC: 3.6 G/DL (ref 3.5–5)
ALBUMIN/GLOB SERPL: 1.2 G/DL (ref 1.1–2.5)
ALP SERPL-CCNC: 152 U/L (ref 24–120)
ALT SERPL W P-5'-P-CCNC: 36 U/L (ref 0–54)
ANION GAP SERPL CALCULATED.3IONS-SCNC: 9 MMOL/L (ref 4–13)
APTT PPP: 32 SECONDS (ref 24.1–35)
AST SERPL-CCNC: 77 U/L (ref 7–45)
BASOPHILS # BLD MANUAL: 0.13 10*3/MM3 (ref 0–0.2)
BASOPHILS NFR BLD AUTO: 1 % (ref 0–2)
BILIRUB SERPL-MCNC: 0.6 MG/DL (ref 0.1–1)
BLD GP AB SCN SERPL QL: NEGATIVE
BUN BLD-MCNC: 9 MG/DL (ref 5–21)
BUN/CREAT SERPL: 17 (ref 7–25)
CALCIUM SPEC-SCNC: 8.8 MG/DL (ref 8.4–10.4)
CHLORIDE SERPL-SCNC: 92 MMOL/L (ref 98–110)
CO2 SERPL-SCNC: 29 MMOL/L (ref 24–31)
CREAT BLD-MCNC: 0.53 MG/DL (ref 0.5–1.4)
DEPRECATED RDW RBC AUTO: 41.5 FL (ref 40–54)
EOSINOPHIL # BLD MANUAL: 0.13 10*3/MM3 (ref 0–0.7)
EOSINOPHIL NFR BLD MANUAL: 1 % (ref 0–4)
ERYTHROCYTE [DISTWIDTH] IN BLOOD BY AUTOMATED COUNT: 13.2 % (ref 12–15)
GFR SERPL CREATININE-BSD FRML MDRD: 122 ML/MIN/1.73
GLOBULIN UR ELPH-MCNC: 3 GM/DL
GLUCOSE BLD-MCNC: 92 MG/DL (ref 70–100)
HCT VFR BLD AUTO: 33.2 % (ref 37–47)
HGB BLD-MCNC: 11.9 G/DL (ref 12–16)
INR PPP: 0.96 (ref 0.91–1.09)
LYMPHOCYTES # BLD MANUAL: 1.79 10*3/MM3 (ref 0.72–4.86)
LYMPHOCYTES NFR BLD MANUAL: 13.3 % (ref 15–45)
LYMPHOCYTES NFR BLD MANUAL: 5.1 % (ref 4–12)
MCH RBC QN AUTO: 31.2 PG (ref 28–32)
MCHC RBC AUTO-ENTMCNC: 35.8 G/DL (ref 33–36)
MCV RBC AUTO: 86.9 FL (ref 82–98)
MONOCYTES # BLD AUTO: 0.69 10*3/MM3 (ref 0.19–1.3)
NEUTROPHILS # BLD AUTO: 10.74 10*3/MM3 (ref 1.87–8.4)
NEUTROPHILS NFR BLD MANUAL: 79.6 % (ref 39–78)
PLAT MORPH BLD: NORMAL
PLATELET # BLD AUTO: 325 10*3/MM3 (ref 130–400)
PMV BLD AUTO: 10.3 FL (ref 6–12)
POTASSIUM BLD-SCNC: 2.6 MMOL/L (ref 3.5–5.3)
PROT SERPL-MCNC: 6.6 G/DL (ref 6.3–8.7)
PROTHROMBIN TIME: 13.1 SECONDS (ref 11.9–14.6)
RBC # BLD AUTO: 3.82 10*6/MM3 (ref 4.2–5.4)
RBC MORPH BLD: NORMAL
RH BLD: POSITIVE
SODIUM BLD-SCNC: 130 MMOL/L (ref 135–145)
T&S EXPIRATION DATE: NORMAL
WBC MORPH BLD: NORMAL
WBC NRBC COR # BLD: 13.49 10*3/MM3 (ref 4.8–10.8)

## 2019-07-09 PROCEDURE — 86850 RBC ANTIBODY SCREEN: CPT | Performed by: EMERGENCY MEDICINE

## 2019-07-09 PROCEDURE — 71045 X-RAY EXAM CHEST 1 VIEW: CPT

## 2019-07-09 PROCEDURE — 85610 PROTHROMBIN TIME: CPT | Performed by: EMERGENCY MEDICINE

## 2019-07-09 PROCEDURE — 25010000003 POTASSIUM CHLORIDE 10 MEQ/100ML SOLUTION: Performed by: EMERGENCY MEDICINE

## 2019-07-09 PROCEDURE — 25810000003 SODIUM CHLORIDE 0.9 % WITH KCL 20 MEQ 20-0.9 MEQ/L-% SOLUTION: Performed by: NURSE PRACTITIONER

## 2019-07-09 PROCEDURE — 86901 BLOOD TYPING SEROLOGIC RH(D): CPT | Performed by: EMERGENCY MEDICINE

## 2019-07-09 PROCEDURE — 85007 BL SMEAR W/DIFF WBC COUNT: CPT | Performed by: EMERGENCY MEDICINE

## 2019-07-09 PROCEDURE — 93005 ELECTROCARDIOGRAM TRACING: CPT | Performed by: EMERGENCY MEDICINE

## 2019-07-09 PROCEDURE — 85730 THROMBOPLASTIN TIME PARTIAL: CPT | Performed by: EMERGENCY MEDICINE

## 2019-07-09 PROCEDURE — 25010000002 ONDANSETRON PER 1 MG: Performed by: EMERGENCY MEDICINE

## 2019-07-09 PROCEDURE — 94760 N-INVAS EAR/PLS OXIMETRY 1: CPT

## 2019-07-09 PROCEDURE — 93010 ELECTROCARDIOGRAM REPORT: CPT | Performed by: INTERNAL MEDICINE

## 2019-07-09 PROCEDURE — 86900 BLOOD TYPING SEROLOGIC ABO: CPT | Performed by: EMERGENCY MEDICINE

## 2019-07-09 PROCEDURE — 73502 X-RAY EXAM HIP UNI 2-3 VIEWS: CPT

## 2019-07-09 PROCEDURE — 99284 EMERGENCY DEPT VISIT MOD MDM: CPT

## 2019-07-09 PROCEDURE — 94640 AIRWAY INHALATION TREATMENT: CPT

## 2019-07-09 PROCEDURE — 94799 UNLISTED PULMONARY SVC/PX: CPT

## 2019-07-09 PROCEDURE — 80053 COMPREHEN METABOLIC PANEL: CPT | Performed by: EMERGENCY MEDICINE

## 2019-07-09 PROCEDURE — 85025 COMPLETE CBC W/AUTO DIFF WBC: CPT | Performed by: EMERGENCY MEDICINE

## 2019-07-09 PROCEDURE — 25010000002 FENTANYL CITRATE (PF) 100 MCG/2ML SOLUTION: Performed by: EMERGENCY MEDICINE

## 2019-07-09 RX ORDER — NALOXONE HCL 0.4 MG/ML
0.4 VIAL (ML) INJECTION
Status: DISCONTINUED | OUTPATIENT
Start: 2019-07-09 | End: 2019-07-13 | Stop reason: HOSPADM

## 2019-07-09 RX ORDER — HYDROMORPHONE HYDROCHLORIDE 1 MG/ML
0.5 INJECTION, SOLUTION INTRAMUSCULAR; INTRAVENOUS; SUBCUTANEOUS
Status: DISCONTINUED | OUTPATIENT
Start: 2019-07-09 | End: 2019-07-10 | Stop reason: SDUPTHER

## 2019-07-09 RX ORDER — NICOTINE 21 MG/24HR
1 PATCH, TRANSDERMAL 24 HOURS TRANSDERMAL
Status: DISCONTINUED | OUTPATIENT
Start: 2019-07-10 | End: 2019-07-13 | Stop reason: HOSPADM

## 2019-07-09 RX ORDER — CITALOPRAM 20 MG/1
20 TABLET ORAL DAILY
Status: DISCONTINUED | OUTPATIENT
Start: 2019-07-10 | End: 2019-07-13 | Stop reason: HOSPADM

## 2019-07-09 RX ORDER — LORAZEPAM 2 MG/ML
0.5 INJECTION INTRAMUSCULAR
Status: DISCONTINUED | OUTPATIENT
Start: 2019-07-09 | End: 2019-07-13 | Stop reason: HOSPADM

## 2019-07-09 RX ORDER — SODIUM CHLORIDE 0.9 % (FLUSH) 0.9 %
3 SYRINGE (ML) INJECTION EVERY 12 HOURS SCHEDULED
Status: DISCONTINUED | OUTPATIENT
Start: 2019-07-09 | End: 2019-07-10 | Stop reason: SDUPTHER

## 2019-07-09 RX ORDER — POTASSIUM CHLORIDE 750 MG/1
20 CAPSULE, EXTENDED RELEASE ORAL 2 TIMES DAILY WITH MEALS
Status: DISCONTINUED | OUTPATIENT
Start: 2019-07-09 | End: 2019-07-13 | Stop reason: HOSPADM

## 2019-07-09 RX ORDER — LORAZEPAM 2 MG/ML
1 INJECTION INTRAMUSCULAR
Status: DISCONTINUED | OUTPATIENT
Start: 2019-07-09 | End: 2019-07-13 | Stop reason: HOSPADM

## 2019-07-09 RX ORDER — FENTANYL CITRATE 50 UG/ML
25 INJECTION, SOLUTION INTRAMUSCULAR; INTRAVENOUS ONCE
Status: COMPLETED | OUTPATIENT
Start: 2019-07-09 | End: 2019-07-09

## 2019-07-09 RX ORDER — ACETAMINOPHEN 325 MG/1
650 TABLET ORAL EVERY 4 HOURS PRN
Status: DISCONTINUED | OUTPATIENT
Start: 2019-07-09 | End: 2019-07-10 | Stop reason: SDUPTHER

## 2019-07-09 RX ORDER — LORAZEPAM 0.5 MG/1
0.5 TABLET ORAL
Status: DISCONTINUED | OUTPATIENT
Start: 2019-07-09 | End: 2019-07-13 | Stop reason: HOSPADM

## 2019-07-09 RX ORDER — SODIUM CHLORIDE AND POTASSIUM CHLORIDE 150; 900 MG/100ML; MG/100ML
75 INJECTION, SOLUTION INTRAVENOUS CONTINUOUS
Status: DISCONTINUED | OUTPATIENT
Start: 2019-07-09 | End: 2019-07-12

## 2019-07-09 RX ORDER — ONDANSETRON 4 MG/1
4 TABLET, FILM COATED ORAL EVERY 6 HOURS PRN
Status: DISCONTINUED | OUTPATIENT
Start: 2019-07-09 | End: 2019-07-10 | Stop reason: SDUPTHER

## 2019-07-09 RX ORDER — POTASSIUM CHLORIDE 7.45 MG/ML
10 INJECTION INTRAVENOUS ONCE
Status: COMPLETED | OUTPATIENT
Start: 2019-07-09 | End: 2019-07-09

## 2019-07-09 RX ORDER — IPRATROPIUM BROMIDE AND ALBUTEROL SULFATE 2.5; .5 MG/3ML; MG/3ML
3 SOLUTION RESPIRATORY (INHALATION)
Status: DISCONTINUED | OUTPATIENT
Start: 2019-07-09 | End: 2019-07-12

## 2019-07-09 RX ORDER — SODIUM CHLORIDE 0.9 % (FLUSH) 0.9 %
3-10 SYRINGE (ML) INJECTION AS NEEDED
Status: DISCONTINUED | OUTPATIENT
Start: 2019-07-09 | End: 2019-07-10 | Stop reason: SDUPTHER

## 2019-07-09 RX ORDER — OXYCODONE AND ACETAMINOPHEN 10; 325 MG/1; MG/1
1 TABLET ORAL EVERY 4 HOURS PRN
Status: DISCONTINUED | OUTPATIENT
Start: 2019-07-09 | End: 2019-07-10 | Stop reason: SDUPTHER

## 2019-07-09 RX ORDER — ONDANSETRON 2 MG/ML
4 INJECTION INTRAMUSCULAR; INTRAVENOUS EVERY 6 HOURS PRN
Status: DISCONTINUED | OUTPATIENT
Start: 2019-07-09 | End: 2019-07-10 | Stop reason: SDUPTHER

## 2019-07-09 RX ORDER — BUSPIRONE HYDROCHLORIDE 5 MG/1
5 TABLET ORAL 3 TIMES DAILY
Status: DISCONTINUED | OUTPATIENT
Start: 2019-07-09 | End: 2019-07-13 | Stop reason: HOSPADM

## 2019-07-09 RX ORDER — LORAZEPAM 1 MG/1
1 TABLET ORAL
Status: DISCONTINUED | OUTPATIENT
Start: 2019-07-09 | End: 2019-07-13 | Stop reason: HOSPADM

## 2019-07-09 RX ORDER — POTASSIUM CHLORIDE 750 MG/1
40 CAPSULE, EXTENDED RELEASE ORAL
Status: COMPLETED | OUTPATIENT
Start: 2019-07-09 | End: 2019-07-10

## 2019-07-09 RX ORDER — ONDANSETRON 2 MG/ML
4 INJECTION INTRAMUSCULAR; INTRAVENOUS ONCE
Status: COMPLETED | OUTPATIENT
Start: 2019-07-09 | End: 2019-07-09

## 2019-07-09 RX ADMIN — OXYCODONE HYDROCHLORIDE AND ACETAMINOPHEN 1 TABLET: 10; 325 TABLET ORAL at 21:55

## 2019-07-09 RX ADMIN — POTASSIUM CHLORIDE 40 MEQ: 750 CAPSULE, EXTENDED RELEASE ORAL at 22:58

## 2019-07-09 RX ADMIN — POTASSIUM CHLORIDE 10 MEQ: 10 INJECTION, SOLUTION INTRAVENOUS at 19:16

## 2019-07-09 RX ADMIN — BUSPIRONE HYDROCHLORIDE 5 MG: 5 TABLET ORAL at 22:58

## 2019-07-09 RX ADMIN — ONDANSETRON HYDROCHLORIDE 4 MG: 2 SOLUTION INTRAMUSCULAR; INTRAVENOUS at 18:19

## 2019-07-09 RX ADMIN — MUPIROCIN: 20 OINTMENT TOPICAL at 22:59

## 2019-07-09 RX ADMIN — IPRATROPIUM BROMIDE AND ALBUTEROL SULFATE 3 ML: 2.5; .5 SOLUTION RESPIRATORY (INHALATION) at 23:27

## 2019-07-09 RX ADMIN — POTASSIUM CHLORIDE AND SODIUM CHLORIDE 100 ML/HR: 900; 150 INJECTION, SOLUTION INTRAVENOUS at 22:58

## 2019-07-09 RX ADMIN — FENTANYL CITRATE 25 MCG: 50 INJECTION, SOLUTION INTRAMUSCULAR; INTRAVENOUS at 18:19

## 2019-07-09 NOTE — ED PROVIDER NOTES
Subjective   And fell in the morning landing on her right hip has been ambulating on it went to the orthopedic clinic was told that she is got a fracture and she needs to go to the ER        Hip Injury   Location:  Rt  Severity:  Moderate  Onset quality:  Sudden  Timing:  Constant  Progression:  Worsening  Chronicity:  New  Associated symptoms: no abdominal pain, no chest pain, no congestion, no cough, no diarrhea, no fever, no headaches, no loss of consciousness, no myalgias, no rhinorrhea, no shortness of breath, no sore throat and no wheezing        Review of Systems   Constitutional: Negative.  Negative for fever.   HENT: Negative.  Negative for congestion, rhinorrhea and sore throat.    Eyes: Negative.    Respiratory: Negative.  Negative for cough, shortness of breath and wheezing.    Cardiovascular: Negative.  Negative for chest pain.   Gastrointestinal: Negative.  Negative for abdominal pain and diarrhea.   Musculoskeletal: Negative.  Negative for back pain, myalgias and neck pain.   Skin: Negative.    Neurological: Negative.  Negative for loss of consciousness and headaches.   All other systems reviewed and are negative.      Past Medical History:   Diagnosis Date   • COPD (chronic obstructive pulmonary disease) (CMS/HCC)    • GERD (gastroesophageal reflux disease)    • Hyperlipidemia    • Hypertension    • Potassium (K) deficiency        Allergies   Allergen Reactions   • Penicillins Unknown (See Comments)     As a child       Past Surgical History:   Procedure Laterality Date   • AUGMENTATION MAMMAPLASTY     • CARPAL TUNNEL RELEASE     •  SECTION     • CHOLECYSTECTOMY     • DILATATION AND CURETTAGE     • ENDOMETRIAL BIOPSY     • HERNIA REPAIR     • OTHER SURGICAL HISTORY      TIF procedure.        Family History   Problem Relation Age of Onset   • Breast cancer Maternal Aunt    • Breast cancer Maternal Aunt    • Breast cancer Maternal Aunt    • Breast cancer Cousin    • Ovarian cancer Neg Hx     • Uterine cancer Neg Hx    • Colon cancer Neg Hx        Social History     Socioeconomic History   • Marital status: Single     Spouse name: Not on file   • Number of children: Not on file   • Years of education: Not on file   • Highest education level: Not on file   Tobacco Use   • Smoking status: Current Every Day Smoker     Packs/day: 0.50     Types: Cigarettes   • Smokeless tobacco: Never Used   Substance and Sexual Activity   • Alcohol use: Yes     Comment: occasional   • Drug use: No   • Sexual activity: Yes     Partners: Male     Birth control/protection: Post-menopausal     Comment: 12 years           Objective   Physical Exam   Constitutional: She is oriented to person, place, and time. She appears well-developed and well-nourished.   HENT:   Head: Normocephalic and atraumatic.   Eyes: Conjunctivae and EOM are normal. Pupils are equal, round, and reactive to light.   Neck: Normal range of motion. Neck supple.   Cardiovascular: Normal rate, regular rhythm, normal heart sounds and intact distal pulses.   Pulmonary/Chest: Effort normal and breath sounds normal.   Abdominal: Soft. Bowel sounds are normal.   Musculoskeletal: Normal range of motion.        Right hip: She exhibits decreased strength and tenderness.   Neurological: She is alert and oriented to person, place, and time. She has normal reflexes.   Skin: Skin is warm and dry.   Psychiatric: She has a normal mood and affect.   Nursing note and vitals reviewed.      Procedures           ED Course  ED Course as of Jul 09 1818 Tue Jul 09, 2019 1817 Turned over to Evelyn   [TS]      ED Course User Index  [TS] Trent Parker MD                  [facility]      Final diagnoses:   Closed fracture of right hip, initial encounter (CMS/[facility], located within [facility])            Trent Parker MD  07/09/19 1818

## 2019-07-10 ENCOUNTER — APPOINTMENT (OUTPATIENT)
Dept: GENERAL RADIOLOGY | Facility: HOSPITAL | Age: 49
End: 2019-07-10
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
## 2019-07-10 LAB
ALBUMIN SERPL-MCNC: 2.8 G/DL (ref 3.5–5)
ALBUMIN/GLOB SERPL: 1 G/DL (ref 1.1–2.5)
ALP SERPL-CCNC: 123 U/L (ref 24–120)
ALT SERPL W P-5'-P-CCNC: 31 U/L (ref 0–54)
ANION GAP SERPL CALCULATED.3IONS-SCNC: 4 MMOL/L (ref 4–13)
ARTICHOKE IGE QN: <60 MG/DL (ref 0–99)
AST SERPL-CCNC: 52 U/L (ref 7–45)
BILIRUB SERPL-MCNC: 0.6 MG/DL (ref 0.1–1)
BILIRUB UR QL STRIP: NEGATIVE
BUN BLD-MCNC: 6 MG/DL (ref 5–21)
BUN/CREAT SERPL: 10.3 (ref 7–25)
CALCIUM SPEC-SCNC: 8.3 MG/DL (ref 8.4–10.4)
CHLORIDE SERPL-SCNC: 104 MMOL/L (ref 98–110)
CHOLEST SERPL-MCNC: 182 MG/DL (ref 130–200)
CLARITY UR: CLEAR
CO2 SERPL-SCNC: 27 MMOL/L (ref 24–31)
COLOR UR: YELLOW
CREAT BLD-MCNC: 0.58 MG/DL (ref 0.5–1.4)
DEPRECATED RDW RBC AUTO: 43.6 FL (ref 40–54)
ERYTHROCYTE [DISTWIDTH] IN BLOOD BY AUTOMATED COUNT: 13.2 % (ref 12–15)
GFR SERPL CREATININE-BSD FRML MDRD: 110 ML/MIN/1.73
GLOBULIN UR ELPH-MCNC: 2.7 GM/DL
GLUCOSE BLD-MCNC: 99 MG/DL (ref 70–100)
GLUCOSE UR STRIP-MCNC: NEGATIVE MG/DL
HBA1C MFR BLD: 5.8 %
HCT VFR BLD AUTO: 29.6 % (ref 37–47)
HDLC SERPL-MCNC: 37 MG/DL
HGB BLD-MCNC: 10.3 G/DL (ref 12–16)
HGB UR QL STRIP.AUTO: NEGATIVE
KETONES UR QL STRIP: NEGATIVE
LDLC/HDLC SERPL: ABNORMAL {RATIO}
LEUKOCYTE ESTERASE UR QL STRIP.AUTO: NEGATIVE
MCH RBC QN AUTO: 31.1 PG (ref 28–32)
MCHC RBC AUTO-ENTMCNC: 34.8 G/DL (ref 33–36)
MCV RBC AUTO: 89.4 FL (ref 82–98)
NITRITE UR QL STRIP: NEGATIVE
PH UR STRIP.AUTO: 6.5 [PH] (ref 5–8)
PLATELET # BLD AUTO: 284 10*3/MM3 (ref 130–400)
PMV BLD AUTO: 10.3 FL (ref 6–12)
POTASSIUM BLD-SCNC: 3.8 MMOL/L (ref 3.5–5.3)
PROT SERPL-MCNC: 5.5 G/DL (ref 6.3–8.7)
PROT UR QL STRIP: NEGATIVE
RBC # BLD AUTO: 3.31 10*6/MM3 (ref 4.2–5.4)
SODIUM BLD-SCNC: 135 MMOL/L (ref 135–145)
SP GR UR STRIP: 1.01 (ref 1–1.03)
TRIGL SERPL-MCNC: 841 MG/DL (ref 0–149)
UROBILINOGEN UR QL STRIP: NORMAL
WBC NRBC COR # BLD: 7.75 10*3/MM3 (ref 4.8–10.8)

## 2019-07-10 PROCEDURE — 80061 LIPID PANEL: CPT | Performed by: NURSE PRACTITIONER

## 2019-07-10 PROCEDURE — 25010000002 MIDAZOLAM PER 1 MG: Performed by: ANESTHESIOLOGY

## 2019-07-10 PROCEDURE — 73502 X-RAY EXAM HIP UNI 2-3 VIEWS: CPT

## 2019-07-10 PROCEDURE — 94799 UNLISTED PULMONARY SVC/PX: CPT

## 2019-07-10 PROCEDURE — C1713 ANCHOR/SCREW BN/BN,TIS/BN: HCPCS | Performed by: ORTHOPAEDIC SURGERY

## 2019-07-10 PROCEDURE — C1769 GUIDE WIRE: HCPCS | Performed by: ORTHOPAEDIC SURGERY

## 2019-07-10 PROCEDURE — 81003 URINALYSIS AUTO W/O SCOPE: CPT | Performed by: NURSE PRACTITIONER

## 2019-07-10 PROCEDURE — 25010000002 ONDANSETRON PER 1 MG: Performed by: NURSE ANESTHETIST, CERTIFIED REGISTERED

## 2019-07-10 PROCEDURE — 83036 HEMOGLOBIN GLYCOSYLATED A1C: CPT | Performed by: NURSE PRACTITIONER

## 2019-07-10 PROCEDURE — 85027 COMPLETE CBC AUTOMATED: CPT | Performed by: NURSE PRACTITIONER

## 2019-07-10 PROCEDURE — 25010000002 DEXAMETHASONE PER 1 MG: Performed by: ANESTHESIOLOGY

## 2019-07-10 PROCEDURE — 25010000002 FENTANYL CITRATE (PF) 100 MCG/2ML SOLUTION: Performed by: NURSE ANESTHETIST, CERTIFIED REGISTERED

## 2019-07-10 PROCEDURE — 25810000003 SODIUM CHLORIDE 0.9 % WITH KCL 20 MEQ 20-0.9 MEQ/L-% SOLUTION: Performed by: NURSE PRACTITIONER

## 2019-07-10 PROCEDURE — 25010000002 PROPOFOL 10 MG/ML EMULSION: Performed by: NURSE ANESTHETIST, CERTIFIED REGISTERED

## 2019-07-10 PROCEDURE — 76000 FLUOROSCOPY <1 HR PHYS/QHP: CPT

## 2019-07-10 PROCEDURE — 0QH634Z INSERTION OF INTERNAL FIXATION DEVICE INTO RIGHT UPPER FEMUR, PERCUTANEOUS APPROACH: ICD-10-PCS | Performed by: ORTHOPAEDIC SURGERY

## 2019-07-10 PROCEDURE — 80053 COMPREHEN METABOLIC PANEL: CPT | Performed by: NURSE PRACTITIONER

## 2019-07-10 PROCEDURE — 25010000002 ONDANSETRON PER 1 MG: Performed by: ORTHOPAEDIC SURGERY

## 2019-07-10 PROCEDURE — 25010000002 FENTANYL CITRATE (PF) 100 MCG/2ML SOLUTION: Performed by: ANESTHESIOLOGY

## 2019-07-10 PROCEDURE — 25010000002 SUCCINYLCHOLINE PER 20 MG: Performed by: NURSE ANESTHETIST, CERTIFIED REGISTERED

## 2019-07-10 PROCEDURE — 94760 N-INVAS EAR/PLS OXIMETRY 1: CPT

## 2019-07-10 PROCEDURE — 25010000002 NEOSTIGMINE PER 0.5 MG: Performed by: NURSE ANESTHETIST, CERTIFIED REGISTERED

## 2019-07-10 PROCEDURE — 25010000002 ROPIVACAINE PER 1 MG: Performed by: ANESTHESIOLOGY

## 2019-07-10 DEVICE — SCRW CANN 16THRD 6.5X75MM: Type: IMPLANTABLE DEVICE | Status: FUNCTIONAL

## 2019-07-10 DEVICE — SCRW CANN 16THRD 6.5X70MM: Type: IMPLANTABLE DEVICE | Status: FUNCTIONAL

## 2019-07-10 DEVICE — SCRW CANN 16THRD 6.5X80MM: Type: IMPLANTABLE DEVICE | Status: FUNCTIONAL

## 2019-07-10 RX ORDER — DOCUSATE SODIUM 100 MG/1
100 CAPSULE, LIQUID FILLED ORAL 2 TIMES DAILY PRN
Status: DISCONTINUED | OUTPATIENT
Start: 2019-07-10 | End: 2019-07-13 | Stop reason: HOSPADM

## 2019-07-10 RX ORDER — ACETAMINOPHEN 500 MG
1000 TABLET ORAL ONCE
Status: COMPLETED | OUTPATIENT
Start: 2019-07-10 | End: 2019-07-10

## 2019-07-10 RX ORDER — HYDROCHLOROTHIAZIDE 25 MG/1
12.5 TABLET ORAL DAILY
COMMUNITY
End: 2019-07-13 | Stop reason: HOSPADM

## 2019-07-10 RX ORDER — OXYCODONE AND ACETAMINOPHEN 10; 325 MG/1; MG/1
1 TABLET ORAL EVERY 4 HOURS PRN
Qty: 60 TABLET | Refills: 0 | Status: SHIPPED | OUTPATIENT
Start: 2019-07-10 | End: 2019-07-19

## 2019-07-10 RX ORDER — ROPIVACAINE HYDROCHLORIDE 2 MG/ML
INJECTION, SOLUTION EPIDURAL; INFILTRATION; PERINEURAL
Status: COMPLETED | OUTPATIENT
Start: 2019-07-10 | End: 2019-07-10

## 2019-07-10 RX ORDER — ACETAMINOPHEN 160 MG/5ML
650 SOLUTION ORAL EVERY 4 HOURS PRN
Status: DISCONTINUED | OUTPATIENT
Start: 2019-07-10 | End: 2019-07-13 | Stop reason: HOSPADM

## 2019-07-10 RX ORDER — LIDOCAINE HYDROCHLORIDE 20 MG/ML
INJECTION, SOLUTION INFILTRATION; PERINEURAL AS NEEDED
Status: DISCONTINUED | OUTPATIENT
Start: 2019-07-10 | End: 2019-07-10 | Stop reason: SURG

## 2019-07-10 RX ORDER — SODIUM CHLORIDE 0.9 % (FLUSH) 0.9 %
3 SYRINGE (ML) INJECTION EVERY 12 HOURS SCHEDULED
Status: DISCONTINUED | OUTPATIENT
Start: 2019-07-10 | End: 2019-07-10 | Stop reason: HOSPADM

## 2019-07-10 RX ORDER — MIDAZOLAM HYDROCHLORIDE 1 MG/ML
1 INJECTION INTRAMUSCULAR; INTRAVENOUS
Status: DISCONTINUED | OUTPATIENT
Start: 2019-07-10 | End: 2019-07-10 | Stop reason: HOSPADM

## 2019-07-10 RX ORDER — MORPHINE SULFATE 4 MG/ML
4 INJECTION, SOLUTION INTRAMUSCULAR; INTRAVENOUS
Status: DISCONTINUED | OUTPATIENT
Start: 2019-07-10 | End: 2019-07-12

## 2019-07-10 RX ORDER — OXYCODONE AND ACETAMINOPHEN 10; 325 MG/1; MG/1
1 TABLET ORAL ONCE AS NEEDED
Status: DISCONTINUED | OUTPATIENT
Start: 2019-07-10 | End: 2019-07-10 | Stop reason: HOSPADM

## 2019-07-10 RX ORDER — SODIUM CHLORIDE 0.9 % (FLUSH) 0.9 %
3-10 SYRINGE (ML) INJECTION AS NEEDED
Status: DISCONTINUED | OUTPATIENT
Start: 2019-07-10 | End: 2019-07-13 | Stop reason: HOSPADM

## 2019-07-10 RX ORDER — POTASSIUM CHLORIDE 750 MG/1
20 CAPSULE, EXTENDED RELEASE ORAL 2 TIMES DAILY WITH MEALS
Status: DISCONTINUED | OUTPATIENT
Start: 2019-07-10 | End: 2019-07-10

## 2019-07-10 RX ORDER — CLINDAMYCIN PHOSPHATE 900 MG/50ML
900 INJECTION INTRAVENOUS ONCE
Status: COMPLETED | OUTPATIENT
Start: 2019-07-10 | End: 2019-07-10

## 2019-07-10 RX ORDER — DIPHENHYDRAMINE HCL 25 MG
25 CAPSULE ORAL EVERY 6 HOURS PRN
Status: DISCONTINUED | OUTPATIENT
Start: 2019-07-10 | End: 2019-07-13 | Stop reason: HOSPADM

## 2019-07-10 RX ORDER — DEXAMETHASONE SODIUM PHOSPHATE 4 MG/ML
4 INJECTION, SOLUTION INTRA-ARTICULAR; INTRALESIONAL; INTRAMUSCULAR; INTRAVENOUS; SOFT TISSUE ONCE AS NEEDED
Status: COMPLETED | OUTPATIENT
Start: 2019-07-10 | End: 2019-07-10

## 2019-07-10 RX ORDER — METOCLOPRAMIDE HYDROCHLORIDE 5 MG/ML
5 INJECTION INTRAMUSCULAR; INTRAVENOUS
Status: DISCONTINUED | OUTPATIENT
Start: 2019-07-10 | End: 2019-07-10 | Stop reason: HOSPADM

## 2019-07-10 RX ORDER — GLYCOPYRROLATE 0.2 MG/ML
INJECTION INTRAMUSCULAR; INTRAVENOUS AS NEEDED
Status: DISCONTINUED | OUTPATIENT
Start: 2019-07-10 | End: 2019-07-10 | Stop reason: SURG

## 2019-07-10 RX ORDER — PROPOFOL 10 MG/ML
VIAL (ML) INTRAVENOUS AS NEEDED
Status: DISCONTINUED | OUTPATIENT
Start: 2019-07-10 | End: 2019-07-10 | Stop reason: SURG

## 2019-07-10 RX ORDER — BISACODYL 5 MG/1
10 TABLET, DELAYED RELEASE ORAL DAILY PRN
Status: DISCONTINUED | OUTPATIENT
Start: 2019-07-10 | End: 2019-07-13 | Stop reason: HOSPADM

## 2019-07-10 RX ORDER — ACETAMINOPHEN 325 MG/1
650 TABLET ORAL EVERY 4 HOURS PRN
Status: DISCONTINUED | OUTPATIENT
Start: 2019-07-10 | End: 2019-07-13 | Stop reason: HOSPADM

## 2019-07-10 RX ORDER — FENTANYL CITRATE 50 UG/ML
INJECTION, SOLUTION INTRAMUSCULAR; INTRAVENOUS AS NEEDED
Status: DISCONTINUED | OUTPATIENT
Start: 2019-07-10 | End: 2019-07-10 | Stop reason: SURG

## 2019-07-10 RX ORDER — FLUMAZENIL 0.1 MG/ML
0.2 INJECTION INTRAVENOUS AS NEEDED
Status: DISCONTINUED | OUTPATIENT
Start: 2019-07-10 | End: 2019-07-10 | Stop reason: HOSPADM

## 2019-07-10 RX ORDER — MIDAZOLAM HYDROCHLORIDE 1 MG/ML
2 INJECTION INTRAMUSCULAR; INTRAVENOUS
Status: DISCONTINUED | OUTPATIENT
Start: 2019-07-10 | End: 2019-07-10 | Stop reason: HOSPADM

## 2019-07-10 RX ORDER — MORPHINE SULFATE 2 MG/ML
2 INJECTION, SOLUTION INTRAMUSCULAR; INTRAVENOUS
Status: DISCONTINUED | OUTPATIENT
Start: 2019-07-10 | End: 2019-07-10 | Stop reason: HOSPADM

## 2019-07-10 RX ORDER — ACETAMINOPHEN 650 MG/1
650 SUPPOSITORY RECTAL EVERY 4 HOURS PRN
Status: DISCONTINUED | OUTPATIENT
Start: 2019-07-10 | End: 2019-07-13 | Stop reason: HOSPADM

## 2019-07-10 RX ORDER — POTASSIUM CHLORIDE 20 MEQ/1
20 TABLET, EXTENDED RELEASE ORAL 2 TIMES DAILY
Status: ON HOLD | COMMUNITY
End: 2021-02-10 | Stop reason: SDUPTHER

## 2019-07-10 RX ORDER — DIPHENHYDRAMINE HYDROCHLORIDE 50 MG/ML
25 INJECTION INTRAMUSCULAR; INTRAVENOUS EVERY 6 HOURS PRN
Status: DISCONTINUED | OUTPATIENT
Start: 2019-07-10 | End: 2019-07-13 | Stop reason: HOSPADM

## 2019-07-10 RX ORDER — SODIUM CHLORIDE 0.9 % (FLUSH) 0.9 %
3 SYRINGE (ML) INJECTION EVERY 12 HOURS SCHEDULED
Status: DISCONTINUED | OUTPATIENT
Start: 2019-07-10 | End: 2019-07-13 | Stop reason: HOSPADM

## 2019-07-10 RX ORDER — CALCIUM CARBONATE 200(500)MG
2 TABLET,CHEWABLE ORAL ONCE
Status: COMPLETED | OUTPATIENT
Start: 2019-07-10 | End: 2019-07-10

## 2019-07-10 RX ORDER — LABETALOL HYDROCHLORIDE 5 MG/ML
5 INJECTION, SOLUTION INTRAVENOUS
Status: DISCONTINUED | OUTPATIENT
Start: 2019-07-10 | End: 2019-07-10 | Stop reason: HOSPADM

## 2019-07-10 RX ORDER — NALOXONE HCL 0.4 MG/ML
0.1 VIAL (ML) INJECTION
Status: DISCONTINUED | OUTPATIENT
Start: 2019-07-10 | End: 2019-07-10 | Stop reason: SDUPTHER

## 2019-07-10 RX ORDER — ROCURONIUM BROMIDE 10 MG/ML
INJECTION, SOLUTION INTRAVENOUS AS NEEDED
Status: DISCONTINUED | OUTPATIENT
Start: 2019-07-10 | End: 2019-07-10 | Stop reason: SURG

## 2019-07-10 RX ORDER — OXYCODONE HYDROCHLORIDE 5 MG/1
10 TABLET ORAL EVERY 4 HOURS PRN
Status: DISCONTINUED | OUTPATIENT
Start: 2019-07-10 | End: 2019-07-13 | Stop reason: HOSPADM

## 2019-07-10 RX ORDER — FENTANYL CITRATE 50 UG/ML
25 INJECTION, SOLUTION INTRAMUSCULAR; INTRAVENOUS AS NEEDED
Status: DISCONTINUED | OUTPATIENT
Start: 2019-07-10 | End: 2019-07-10 | Stop reason: HOSPADM

## 2019-07-10 RX ORDER — ONDANSETRON 2 MG/ML
4 INJECTION INTRAMUSCULAR; INTRAVENOUS AS NEEDED
Status: DISCONTINUED | OUTPATIENT
Start: 2019-07-10 | End: 2019-07-10 | Stop reason: HOSPADM

## 2019-07-10 RX ORDER — NALOXONE HCL 0.4 MG/ML
0.04 VIAL (ML) INJECTION AS NEEDED
Status: DISCONTINUED | OUTPATIENT
Start: 2019-07-10 | End: 2019-07-10 | Stop reason: HOSPADM

## 2019-07-10 RX ORDER — ONDANSETRON 4 MG/1
4 TABLET, FILM COATED ORAL EVERY 6 HOURS PRN
Status: DISCONTINUED | OUTPATIENT
Start: 2019-07-10 | End: 2019-07-13 | Stop reason: HOSPADM

## 2019-07-10 RX ORDER — NALOXONE HCL 0.4 MG/ML
0.4 VIAL (ML) INJECTION
Status: DISCONTINUED | OUTPATIENT
Start: 2019-07-10 | End: 2019-07-10 | Stop reason: SDUPTHER

## 2019-07-10 RX ORDER — DOCUSATE SODIUM 100 MG/1
100 CAPSULE, LIQUID FILLED ORAL 2 TIMES DAILY
Qty: 60 CAPSULE | Refills: 1 | Status: SHIPPED | OUTPATIENT
Start: 2019-07-10 | End: 2020-06-09

## 2019-07-10 RX ORDER — ROPIVACAINE HYDROCHLORIDE 5 MG/ML
INJECTION, SOLUTION EPIDURAL; INFILTRATION; PERINEURAL
Status: COMPLETED | OUTPATIENT
Start: 2019-07-10 | End: 2019-07-10

## 2019-07-10 RX ORDER — ONDANSETRON 2 MG/ML
INJECTION INTRAMUSCULAR; INTRAVENOUS AS NEEDED
Status: DISCONTINUED | OUTPATIENT
Start: 2019-07-10 | End: 2019-07-10 | Stop reason: SURG

## 2019-07-10 RX ORDER — FAMOTIDINE 20 MG/1
20 TABLET, FILM COATED ORAL
Status: DISCONTINUED | OUTPATIENT
Start: 2019-07-10 | End: 2019-07-13 | Stop reason: HOSPADM

## 2019-07-10 RX ORDER — HYDROMORPHONE HYDROCHLORIDE 1 MG/ML
0.5 INJECTION, SOLUTION INTRAMUSCULAR; INTRAVENOUS; SUBCUTANEOUS
Status: DISCONTINUED | OUTPATIENT
Start: 2019-07-10 | End: 2019-07-12

## 2019-07-10 RX ORDER — BISACODYL 10 MG
10 SUPPOSITORY, RECTAL RECTAL DAILY PRN
Status: DISCONTINUED | OUTPATIENT
Start: 2019-07-10 | End: 2019-07-13 | Stop reason: HOSPADM

## 2019-07-10 RX ORDER — MAGNESIUM HYDROXIDE 1200 MG/15ML
LIQUID ORAL AS NEEDED
Status: DISCONTINUED | OUTPATIENT
Start: 2019-07-10 | End: 2019-07-10 | Stop reason: HOSPADM

## 2019-07-10 RX ORDER — ONDANSETRON 4 MG/1
4 TABLET, FILM COATED ORAL EVERY 8 HOURS PRN
Qty: 20 TABLET | Refills: 0 | Status: SHIPPED | OUTPATIENT
Start: 2019-07-10 | End: 2019-10-31

## 2019-07-10 RX ORDER — SODIUM CHLORIDE, SODIUM LACTATE, POTASSIUM CHLORIDE, CALCIUM CHLORIDE 600; 310; 30; 20 MG/100ML; MG/100ML; MG/100ML; MG/100ML
100 INJECTION, SOLUTION INTRAVENOUS CONTINUOUS
Status: DISCONTINUED | OUTPATIENT
Start: 2019-07-10 | End: 2019-07-10 | Stop reason: SDUPTHER

## 2019-07-10 RX ORDER — CITALOPRAM 20 MG/1
20 TABLET ORAL DAILY
Status: ON HOLD | COMMUNITY
End: 2021-02-10

## 2019-07-10 RX ORDER — FENTANYL CITRATE 50 UG/ML
100 INJECTION, SOLUTION INTRAMUSCULAR; INTRAVENOUS ONCE
Status: COMPLETED | OUTPATIENT
Start: 2019-07-10 | End: 2019-07-10

## 2019-07-10 RX ORDER — HYDRALAZINE HYDROCHLORIDE 20 MG/ML
5 INJECTION INTRAMUSCULAR; INTRAVENOUS
Status: DISCONTINUED | OUTPATIENT
Start: 2019-07-10 | End: 2019-07-10 | Stop reason: HOSPADM

## 2019-07-10 RX ORDER — SUCCINYLCHOLINE CHLORIDE 20 MG/ML
INJECTION INTRAMUSCULAR; INTRAVENOUS AS NEEDED
Status: DISCONTINUED | OUTPATIENT
Start: 2019-07-10 | End: 2019-07-10 | Stop reason: SURG

## 2019-07-10 RX ORDER — SODIUM CHLORIDE 0.9 % (FLUSH) 0.9 %
1-10 SYRINGE (ML) INJECTION AS NEEDED
Status: DISCONTINUED | OUTPATIENT
Start: 2019-07-10 | End: 2019-07-10 | Stop reason: HOSPADM

## 2019-07-10 RX ORDER — IPRATROPIUM BROMIDE AND ALBUTEROL SULFATE 2.5; .5 MG/3ML; MG/3ML
3 SOLUTION RESPIRATORY (INHALATION) ONCE AS NEEDED
Status: DISCONTINUED | OUTPATIENT
Start: 2019-07-10 | End: 2019-07-10 | Stop reason: HOSPADM

## 2019-07-10 RX ORDER — CLINDAMYCIN PHOSPHATE 900 MG/50ML
900 INJECTION INTRAVENOUS EVERY 8 HOURS
Status: COMPLETED | OUTPATIENT
Start: 2019-07-10 | End: 2019-07-11

## 2019-07-10 RX ORDER — ONDANSETRON 2 MG/ML
4 INJECTION INTRAMUSCULAR; INTRAVENOUS EVERY 6 HOURS PRN
Status: DISCONTINUED | OUTPATIENT
Start: 2019-07-10 | End: 2019-07-13 | Stop reason: HOSPADM

## 2019-07-10 RX ADMIN — CLINDAMYCIN IN 5 PERCENT DEXTROSE 900 MG: 18 INJECTION, SOLUTION INTRAVENOUS at 20:02

## 2019-07-10 RX ADMIN — MUPIROCIN: 20 OINTMENT TOPICAL at 20:07

## 2019-07-10 RX ADMIN — SODIUM CHLORIDE, POTASSIUM CHLORIDE, SODIUM LACTATE AND CALCIUM CHLORIDE 100 ML/HR: 600; 310; 30; 20 INJECTION, SOLUTION INTRAVENOUS at 11:18

## 2019-07-10 RX ADMIN — FENTANYL CITRATE 100 MCG: 50 INJECTION, SOLUTION INTRAMUSCULAR; INTRAVENOUS at 11:20

## 2019-07-10 RX ADMIN — FAMOTIDINE 20 MG: 20 TABLET, FILM COATED ORAL at 20:45

## 2019-07-10 RX ADMIN — IPRATROPIUM BROMIDE AND ALBUTEROL SULFATE 3 ML: 2.5; .5 SOLUTION RESPIRATORY (INHALATION) at 06:21

## 2019-07-10 RX ADMIN — MIDAZOLAM 2 MG: 1 INJECTION INTRAMUSCULAR; INTRAVENOUS at 11:20

## 2019-07-10 RX ADMIN — IPRATROPIUM BROMIDE AND ALBUTEROL SULFATE 3 ML: 2.5; .5 SOLUTION RESPIRATORY (INHALATION) at 14:35

## 2019-07-10 RX ADMIN — GLYCOPYRROLATE 0.4 MG: 0.2 INJECTION, SOLUTION INTRAMUSCULAR; INTRAVENOUS at 12:57

## 2019-07-10 RX ADMIN — CALCIUM CARBONATE 2 TABLET: 500 TABLET, CHEWABLE ORAL at 20:45

## 2019-07-10 RX ADMIN — POTASSIUM CHLORIDE 20 MEQ: 750 CAPSULE, EXTENDED RELEASE ORAL at 08:56

## 2019-07-10 RX ADMIN — DEXAMETHASONE SODIUM PHOSPHATE 4 MG: 4 INJECTION, SOLUTION INTRAMUSCULAR; INTRAVENOUS at 11:18

## 2019-07-10 RX ADMIN — Medication 3 MG: at 12:57

## 2019-07-10 RX ADMIN — ROCURONIUM BROMIDE 25 MG: 10 INJECTION INTRAVENOUS at 12:17

## 2019-07-10 RX ADMIN — POTASSIUM CHLORIDE 40 MEQ: 750 CAPSULE, EXTENDED RELEASE ORAL at 03:03

## 2019-07-10 RX ADMIN — IPRATROPIUM BROMIDE AND ALBUTEROL SULFATE 3 ML: 2.5; .5 SOLUTION RESPIRATORY (INHALATION) at 10:36

## 2019-07-10 RX ADMIN — ONDANSETRON 4 MG: 2 INJECTION INTRAMUSCULAR; INTRAVENOUS at 17:10

## 2019-07-10 RX ADMIN — OXYCODONE HYDROCHLORIDE AND ACETAMINOPHEN 1 TABLET: 10; 325 TABLET ORAL at 03:02

## 2019-07-10 RX ADMIN — SUCCINYLCHOLINE CHLORIDE 100 MG: 20 INJECTION, SOLUTION INTRAMUSCULAR; INTRAVENOUS at 12:12

## 2019-07-10 RX ADMIN — FENTANYL CITRATE 100 MCG: 50 INJECTION, SOLUTION INTRAMUSCULAR; INTRAVENOUS at 12:12

## 2019-07-10 RX ADMIN — LIDOCAINE HYDROCHLORIDE 50 MG: 20 INJECTION, SOLUTION INFILTRATION; PERINEURAL at 12:12

## 2019-07-10 RX ADMIN — POTASSIUM CHLORIDE 20 MEQ: 750 CAPSULE, EXTENDED RELEASE ORAL at 18:33

## 2019-07-10 RX ADMIN — SODIUM CHLORIDE, PRESERVATIVE FREE 3 ML: 5 INJECTION INTRAVENOUS at 08:58

## 2019-07-10 RX ADMIN — ROPIVACAINE HYDROCHLORIDE 20 ML: 2 INJECTION, SOLUTION EPIDURAL; INFILTRATION at 11:24

## 2019-07-10 RX ADMIN — CLINDAMYCIN PHOSPHATE 900 MG: 18 INJECTION, SOLUTION INTRAVENOUS at 12:16

## 2019-07-10 RX ADMIN — ONDANSETRON HYDROCHLORIDE 4 MG: 2 SOLUTION INTRAMUSCULAR; INTRAVENOUS at 12:49

## 2019-07-10 RX ADMIN — ACETAMINOPHEN 1000 MG: 500 TABLET, FILM COATED ORAL at 11:18

## 2019-07-10 RX ADMIN — NICOTINE 1 PATCH: 14 PATCH, EXTENDED RELEASE TRANSDERMAL at 15:34

## 2019-07-10 RX ADMIN — POTASSIUM CHLORIDE AND SODIUM CHLORIDE 75 ML/HR: 900; 150 INJECTION, SOLUTION INTRAVENOUS at 15:35

## 2019-07-10 RX ADMIN — MUPIROCIN: 20 OINTMENT TOPICAL at 08:56

## 2019-07-10 RX ADMIN — IPRATROPIUM BROMIDE AND ALBUTEROL SULFATE 3 ML: 2.5; .5 SOLUTION RESPIRATORY (INHALATION) at 20:11

## 2019-07-10 RX ADMIN — BUSPIRONE HYDROCHLORIDE 5 MG: 5 TABLET ORAL at 15:34

## 2019-07-10 RX ADMIN — OXYCODONE HYDROCHLORIDE AND ACETAMINOPHEN 1 TABLET: 10; 325 TABLET ORAL at 07:38

## 2019-07-10 RX ADMIN — BUSPIRONE HYDROCHLORIDE 5 MG: 5 TABLET ORAL at 20:45

## 2019-07-10 RX ADMIN — ROPIVACAINE HYDROCHLORIDE 20 ML: 5 INJECTION, SOLUTION EPIDURAL; INFILTRATION; PERINEURAL at 11:24

## 2019-07-10 RX ADMIN — PROPOFOL 80 MG: 10 INJECTION, EMULSION INTRAVENOUS at 12:12

## 2019-07-10 RX ADMIN — SUGAMMADEX 100 MG: 100 INJECTION, SOLUTION INTRAVENOUS at 13:05

## 2019-07-10 RX ADMIN — ROCURONIUM BROMIDE 5 MG: 10 INJECTION INTRAVENOUS at 12:12

## 2019-07-10 NOTE — ANESTHESIA PROCEDURE NOTES
Peripheral Block    Pre-sedation assessment completed: 7/10/2019 11:19 AM    Patient reassessed immediately prior to procedure    Patient location during procedure: holding area  Start time: 7/10/2019 11:23 AM  Stop time: 7/10/2019 11:25 AM  Reason for block: procedure for pain, at surgeon's request, post-op pain management and Requested by Dr. Navarro  Performed by  Anesthesiologist: David Moreno MD  Preanesthetic Checklist  Completed: patient identified, site marked, surgical consent, pre-op evaluation, timeout performed, IV checked, risks and benefits discussed and monitors and equipment checked  Prep:  Pt Position: supine  Sterile barriers:gloves  Prep: ChloraPrep  Patient monitoring: blood pressure monitoring, continuous pulse oximetry and EKG  Procedure  Sedation:yes  Performed under: MAC  Guidance:ultrasound guided and fascial plane identified and local anesthetic infiltrated in iliaca compartment  ULTRASOUND INTERPRETATION. Using ultrasound guidance a 20 G gauge needle was placed in close proximity to the nerve, at which point, under ultrasound guidance anesthetic was injected in the area of the nerve and spread of the anesthesia was seen on ultrasound in close proximity thereto.  There were no abnormalities seen on ultrasound; a digital image was taken; and the patient tolerated the procedure with no complications. Images:still images obtained (picture printed and placed in patients chart)    Laterality:right  Block Type:fascia iliaca compartment  Injection Technique:single-shot  Needle Type:echogenic  Needle Gauge:20 G  Resistance on Injection: none    Medications Used: ropivacaine (NAROPIN) injection 0.5 %, 20 mL  ropivacaine (NAROPIN) 0.2% injection, 20 mL  Med admintered at 7/10/2019 11:24 AM      Post Assessment  Injection Assessment: negative aspiration for heme, no paresthesia on injection and incremental injection  Patient Tolerance:comfortable throughout block  Complications:no

## 2019-07-10 NOTE — PLAN OF CARE
Problem: Fall Risk (Adult)  Goal: Absence of Fall  Outcome: Ongoing (interventions implemented as appropriate)      Problem: Fracture Orthopaedic (Adult)  Goal: Signs and Symptoms of Listed Potential Problems Will be Absent, Minimized or Managed (Fracture Orthopaedic)  Outcome: Ongoing (interventions implemented as appropriate)      Problem: Patient Care Overview  Goal: Plan of Care Review  Outcome: Ongoing (interventions implemented as appropriate)   07/10/19 2627   Coping/Psychosocial   Plan of Care Reviewed With patient   Plan of Care Review   Progress improving   OTHER   Outcome Summary Pt Alert & O x4, VSS, BP running on low side, continuing to monitor. No complaints of pain post op, 1 incidence of nausea and vomiting, prn medication given, desired results achieved. Safety maintained, will continue to monitor.       Problem: Skin Injury Risk (Adult)  Goal: Skin Health and Integrity  Outcome: Ongoing (interventions implemented as appropriate)

## 2019-07-10 NOTE — PAYOR COMM NOTE
"FROM: UMAIR BENSON  PHONE: [phone]  FAX: [phone]    PENDING: [id number]    Adelita Jaeger (49 y.o. Female)     Date of Birth Social Security Number Address Home Phone MRN    1970  [address]  [place] [address] [phone] [id number]    Tenriism Marital Status          [facility] Single       Admission Date Admission Type Admitting Provider Attending Provider Department, Room/Bed    7/9/19 Emergency James Haynes MD Truong, Khai C, MD [facility] 3A, 359/1    Discharge Date Discharge Disposition Discharge Destination                       Attending Provider:  James Haynes MD    Allergies:  Penicillins    Isolation:  None   Infection:  None   Code Status:  CPR    Ht:  149.9 cm (59\")   Wt:  42.7 kg (94 lb 1.6 oz)    Admission Cmt:  None   Principal Problem:  Closed fracture of right hip (CMS/[facility]) [S72.001A]                 Active Insurance as of 7/9/2019     Primary Coverage     Payor Plan Insurance Group Employer/Plan Group    [facility] BL[employer] CR[employer] EMPL[employer]EE [id number]     Payor Plan Address Payor Plan Phone Number Payor Plan Fax Number Effective Dates    [address] [phone]  1/1/2015 - None Entered    [place] [id number]       Subscriber Name Subscriber Birth Date Member ID       ADELITA JAEGER 1970 [id number]                 Emergency Contacts      (Rel.) Home Phone Work Phone Mobile Phone    May,Howard (Significant Other) [phone] -- --               History & Physical      Chris Sequeira DO at 7/9/2019  7:45 PM              [facility] Medicine Services  HISTORY AND PHYSICAL    Date of Admission: 7/9/2019  Primary Care Physician: Rajan Rodriguez MD    Subjective     Chief Complaint: Severe right hip pain    History of Present Illness  Adelita Del Cid is a 49-year-old female with a past medical history of COPD with ongoing tobacco use, chronic hypokalemia, hypertension, hyperlipidemia and " gastroesophageal reflux disease.  Patient states today she tripped landing on her right side causing severe pain however she was still able to walk.  Due to pain she did seek evaluation at [facility] however they instructed her to seek evaluation at [facility] emergency department.  ER evaluation reveals acute nondisplaced right femoral fracture.  Dr. Navarro will follow.  Other findings sodium 130, potassium 2.6, mild transaminitis.  Patient states she does have a mixed drink daily.  She also reports chronic smoker's cough.  She has no chest pain or shortness of breathing.  She will reports pain is scored 25 on a scale of 1-10 with movement and consistently 6 or 7 on a scale of 1-10 at rest.  She has no other complaints.  Home medication review reveals chronic HCTZ use, will hold for now to hopefully improve potassium and sodium.  She is admitted for further evaluation treatment.    Review of Systems   A 10 point review of systems was completed, all negative except for those discussed in HPI    Past Medical History:   Past Medical History:   Diagnosis Date   • COPD (chronic obstructive pulmonary disease) (CMS/[facility])    • GERD (gastroesophageal reflux disease)    • Hyperlipidemia    • Hypertension    • Potassium (K) deficiency        Past Surgical History:   Past Surgical History:   Procedure Laterality Date   • AUGMENTATION MAMMAPLASTY     • CARPAL TUNNEL RELEASE     •  SECTION     • CHOLECYSTECTOMY     • DILATATION AND CURETTAGE     • ENDOMETRIAL BIOPSY     • HERNIA REPAIR     • OTHER SURGICAL HISTORY      TIF procedure.        Family History: family history includes Breast cancer in her cousin, maternal aunt, maternal aunt, and maternal aunt; Diabetes in her mother; Hypertension in her father and mother; Hypothyroidism in her mother.    Social History:  reports that she has been smoking cigarettes.  She has been smoking about 0.50 packs per day. She has never used smokeless tobacco.  "She reports that she drinks alcohol. She reports that she does not use drugs.    Code Status: Full, if unable speak for herself her  will speak for her      Allergies:  Allergies   Allergen Reactions   • Penicillins Unknown (See Comments)     As a child       Medications:  Prior to Admission medications    Medication Sig Start Date End Date Taking? Authorizing Provider   aspirin 81 MG chewable tablet Chew 81 mg Daily.    Marline Arguelles MD   busPIRone (BUSPAR) 5 MG tablet  4/25/19   Marline Arguelles MD   Calcium Carb-Cholecalciferol (CALCIUM 600+D3) 600-200 MG-UNIT tablet Every 12 (Twelve) Hours.    Marline Arguelles MD   citalopram (CELEXA) 20 MG tablet Celexa 20 mg tablet   Take 1 tablet every day by oral route.    Marline Arguelles MD   Flaxseed, Linseed, (FLAXSEED OIL PO) Take  by mouth.    Marline Arguelles MD   hydrochlorothiazide (HYDRODIURIL) 25 MG tablet Take 25 mg by mouth Daily. Takes half of a pill daily    Emergency, Nurse Epic, RN   VELASQUEZOR-CON 10 MEQ CR tablet  2/16/18   Marline Arguelles MD   Magnesium 500 MG tablet Every 12 (Twelve) Hours.    Marline Arguelles MD   Multiple Vitamins-Minerals (MULTIVITAMIN ADULT PO) Take 1 tablet/day by mouth Daily.    Marline Arguelles MD   mupirocin (BACTROBAN) 2 % nasal ointment Apply intranasally twice daily 6/17/19   Bianka Garcia APRN   mupirocin (BACTROBAN) 2 % ointment Apply intranasally bid 6/17/19   Bianka Garcia APRN       Objective     /98   Pulse 89   Temp 99.2 °F (37.3 °C) (Oral)   Resp 15   Ht 149.9 cm (59\")   Wt 40.8 kg (90 lb)   SpO2 100%   BMI 18.18 kg/m²    Physical Exam   Constitutional: She is oriented to person, place, and time. She appears well-developed and well-nourished. No distress.   HENT:   Head: Normocephalic and atraumatic.   Eyes: Conjunctivae and EOM are normal. Pupils are equal, round, and reactive to light. No scleral icterus.   Neck: Normal range of motion. Neck supple. No JVD " present. No tracheal deviation present.   Cardiovascular: Normal rate, regular rhythm, normal heart sounds and intact distal pulses. Exam reveals no gallop.   No murmur heard.  Pulmonary/Chest: Effort normal and breath sounds normal. No respiratory distress. She has no wheezes. She has no rales.   Abdominal: Soft. Bowel sounds are normal. She exhibits no distension. There is no tenderness. There is no guarding.   Musculoskeletal: She exhibits no edema.   Severe pain with movement   Neurological: She is alert and oriented to person, place, and time.   No obvious deficits noted.   Skin: Skin is warm and dry. No rash noted. She is not diaphoretic. No erythema. No pallor.   Psychiatric: She has a normal mood and affect. Her behavior is normal.   Vitals reviewed.      Pertinent Data:   Lab Results (last 72 hours)     Procedure Component Value Units Date/Time    Manual Differential [[id number]]  (Abnormal) Collected:  07/09/19 1816    Specimen:  Blood Updated:  07/09/19 1902     Neutrophil % 79.6 %      Lymphocyte % 13.3 %      Monocyte % 5.1 %      Eosinophil % 1.0 %      Basophil % 1.0 %      Neutrophils Absolute 10.74 10*3/mm3      Lymphocytes Absolute 1.79 10*3/mm3      Monocytes Absolute 0.69 10*3/mm3      Eosinophils Absolute 0.13 10*3/mm3      Basophils Absolute 0.13 10*3/mm3      RBC Morphology Normal     WBC Morphology Normal     Platelet Morphology Normal    CBC Auto Differential [[id number]]  (Abnormal) Collected:  07/09/19 1816    Specimen:  Blood Updated:  07/09/19 1902     WBC 13.49 10*3/mm3      RBC 3.82 10*6/mm3      Hemoglobin 11.9 g/dL      Hematocrit 33.2 %      MCV 86.9 fL      MCH 31.2 pg      MCHC 35.8 g/dL      RDW 13.2 %      RDW-SD 41.5 fl      MPV 10.3 fL      Platelets 325 10*3/mm3     Comprehensive Metabolic Panel [[id number]]  (Abnormal) Collected:  07/09/19 1816    Specimen:  Blood Updated:  07/09/19 1849     Glucose 92 mg/dL      BUN 9 mg/dL      Creatinine 0.53 mg/dL      Sodium 130 mmol/L       Potassium 2.6 mmol/L      Chloride 92 mmol/L      CO2 29.0 mmol/L      Calcium 8.8 mg/dL      Total Protein 6.6 g/dL      Albumin 3.60 g/dL      ALT (SGPT) 36 U/L      AST (SGOT) 77 U/L      Alkaline Phosphatase 152 U/L      Total Bilirubin 0.6 mg/dL      eGFR Non African Amer 122 mL/min/1.73      Globulin 3.0 gm/dL      A/G Ratio 1.2 g/dL      BUN/Creatinine Ratio 17.0     Anion Gap 9.0 mmol/L     Narrative:       GFR Normal >60  Chronic Kidney Disease <60  Kidney Failure <15    Protime-INR [[id number]]  (Normal) Collected:  07/09/19 1816    Specimen:  Blood Updated:  07/09/19 1841     Protime 13.1 Seconds      INR 0.96    aPTT [[id number]]  (Normal) Collected:  07/09/19 1816    Specimen:  Blood Updated:  07/09/19 1841     PTT 32.0 seconds         Imaging Results (last 24 hours)     Procedure Component Value Units Date/Time    XR Hip With or Without Pelvis 2 - 3 View Right [[id number]] Collected:  07/09/19 1819     Updated:  07/09/19 1823    Narrative:       EXAMINATION:  XR HIP W OR WO PELVIS 2-3 VIEW RIGHT-  7/9/2019 5:58 PM  CDT     HISTORY: The patient fell.     COMPARISON: No comparison study.     TECHNIQUE: AP and crosstable lateral views of the right hip were  supplemented with an AP image of the pelvis.     FINDINGS:  There is an acute subcapital fracture of the right femoral  neck with no significant displacement or angulation. The hip joint  spaces are well-maintained. No pelvic fracture is seen.       Impression:       Acute nondisplaced subcapital right femoral neck fracture  without displacement or angulation.              This report was finalized on 07/09/2019 18:20 by Dr. Marcos Boles MD.    XR Chest 1 View [[id number]] Collected:  07/09/19 1818     Updated:  07/09/19 1822    Narrative:       EXAMINATION:  XR CHEST 1 VW-  7/9/2019 5:57 PM CDT     HISTORY: Preoperative chest x-ray.     COMPARISON: No comparison study.     FINDINGS:  The lungs are expanded bilaterally and clear. There is  "old  granulomatous disease. The pleural spaces are clear. Heart size is  normal.  No acute bony abnormality is seen.       Impression:       No active disease is seen.        This report was finalized on 07/09/2019 18:18 by Dr. Marcos Boles MD.          I have personally reviewed and interpreted the radiology studies and ECG obtained at time of admission.     Assessment / Plan     Assessment:   Active Hospital Problems    Diagnosis   • Closed fracture of right hip (CMS/HCC)   • Hyponatremia   • Tobacco abuse   • Acute pain due to injury   • Hypokalemia       Plan:   1.  Admit as inpatient  2.  Dr. Navarro with orthopedics will follow, probable OR oral  3.  PRN CIWA protocol  4.  Home medications reviewed and restarted as appropriate  5.  Replace potassium  6.  Pain control  7.  Pulmonary toileting in preparation for surgery  8.  Labs in a.m.  9.  DVT prophylaxis with SCDs    I discussed the patient's findings and my recommendations with: Chris Sequeira DO  Time spent 40 minutes      GENEVIEVE Desai  07/09/19   9:00 PM     I personally evaluated and examined the patient in conjunction with GENEVIEVE Luna and agree with the assessment, treatment plan, and disposition of the patient as recorded by her. My history, exam, and further recommendations are:     Seen and discussed with her \"partner\" who might as well be her  because they have been together for so long.    She got up to go to the bathroom in the night and then went to throw something away in the trash can.  She got her foot stuck and fell backwards.  She immediately had discomfort in her right hip.  However, she did not believe that she had fractured her hip and continued to ambulate on this despite severe pain.  She finally decided to go to the [facility] and had plain films confirming a fracture.  They sent her to the emergency department to be admitted.    Dr. Navarro is a signed her case.  He plans to take her to the " operative suite tomorrow for surgical intervention.  Nothing by mouth after midnight.  Pain control in the interim.    She incidentally is found to be hyponatremic and hypokalemic.  She does take hydrochlorothiazide.  We will stop this medication and follow.    She has been educated on tobacco cessation.    Chris Sequeira DO  07/09/19  10:13 PM      Electronically signed by Chris Sequeira DO at 7/9/2019 10:15 PM          Emergency Department Notes      Trent Parker MD at 7/9/2019  5:45 PM          Subjective   And fell in the morning landing on her right hip has been ambulating on it went to the orthopedic clinic was told that she is got a fracture and she needs to go to the ER        Hip Injury   Location:  Rt  Severity:  Moderate  Onset quality:  Sudden  Timing:  Constant  Progression:  Worsening  Chronicity:  New  Associated symptoms: no abdominal pain, no chest pain, no congestion, no cough, no diarrhea, no fever, no headaches, no loss of consciousness, no myalgias, no rhinorrhea, no shortness of breath, no sore throat and no wheezing        Review of Systems   Constitutional: Negative.  Negative for fever.   HENT: Negative.  Negative for congestion, rhinorrhea and sore throat.    Eyes: Negative.    Respiratory: Negative.  Negative for cough, shortness of breath and wheezing.    Cardiovascular: Negative.  Negative for chest pain.   Gastrointestinal: Negative.  Negative for abdominal pain and diarrhea.   Musculoskeletal: Negative.  Negative for back pain, myalgias and neck pain.   Skin: Negative.    Neurological: Negative.  Negative for loss of consciousness and headaches.   All other systems reviewed and are negative.      Past Medical History:   Diagnosis Date   • COPD (chronic obstructive pulmonary disease) (CMS/[facility])    • GERD (gastroesophageal reflux disease)    • Hyperlipidemia    • Hypertension    • Potassium (K) deficiency        Allergies   Allergen Reactions   • Penicillins Unknown (See Comments)      As a child       Past Surgical History:   Procedure Laterality Date   • AUGMENTATION MAMMAPLASTY     • CARPAL TUNNEL RELEASE     •  SECTION     • CHOLECYSTECTOMY     • DILATATION AND CURETTAGE     • ENDOMETRIAL BIOPSY     • HERNIA REPAIR     • OTHER SURGICAL HISTORY      TIF procedure.        Family History   Problem Relation Age of Onset   • Breast cancer Maternal Aunt    • Breast cancer Maternal Aunt    • Breast cancer Maternal Aunt    • Breast cancer Cousin    • Ovarian cancer Neg Hx    • Uterine cancer Neg Hx    • Colon cancer Neg Hx        Social History     Socioeconomic History   • Marital status: Single     Spouse name: Not on file   • Number of children: Not on file   • Years of education: Not on file   • Highest education level: Not on file   Tobacco Use   • Smoking status: Current Every Day Smoker     Packs/day: 0.50     Types: Cigarettes   • Smokeless tobacco: Never Used   Substance and Sexual Activity   • Alcohol use: Yes     Comment: occasional   • Drug use: No   • Sexual activity: Yes     Partners: Male     Birth control/protection: Post-menopausal     Comment: 12 years           Objective   Physical Exam   Constitutional: She is oriented to person, place, and time. She appears well-developed and well-nourished.   HENT:   Head: Normocephalic and atraumatic.   Eyes: Conjunctivae and EOM are normal. Pupils are equal, round, and reactive to light.   Neck: Normal range of motion. Neck supple.   Cardiovascular: Normal rate, regular rhythm, normal heart sounds and intact distal pulses.   Pulmonary/Chest: Effort normal and breath sounds normal.   Abdominal: Soft. Bowel sounds are normal.   Musculoskeletal: Normal range of motion.        Right hip: She exhibits decreased strength and tenderness.   Neurological: She is alert and oriented to person, place, and time. She has normal reflexes.   Skin: Skin is warm and dry.   Psychiatric: She has a normal mood and affect.   Nursing note and vitals  reviewed.      Procedures          ED Course  ED Course as of  Turned over to Evelyn   [TS]      ED Course User Index  [TS] Trent Parker MD                  [facility]      Final diagnoses:   Closed fracture of right hip, initial encounter (CMS/[facility])            Trent Parker MD  19      Electronically signed by Trent Parker MD at 2019  6:18 PM     Nimesh Rivas MD at 2019  7:04 PM      Procedure Orders    1. ECG 12 Lead [[id number]] ordered by Nimesh Rivas MD at 19 191                Subjective   Patient was endorsed to me at shift change. Please see primary providers full note for H and P.       Patient with a mechanical fall around 0230 stating she was walking to her bathroom and her big toe got caught on a carpet causing her to fall to her right side. She states while she had pain at that time she still continued to walk on the leg. She then presented to the [facility] and was asked to come to the hospital for evaluation. She denies numbness, tingling, loss of sensation, head trauma or LOC, fevers or chills. She arrives in [facility].         Family, social and past history reviewed as below, prior documentation of H and Ps and other documentation are reviewed:    Past Medical History:  No date: COPD (chronic obstructive pulmonary disease) (CMS/[facility])  No date: GERD (gastroesophageal reflux disease)  No date: Hyperlipidemia  No date: Hypertension  No date: Potassium (K) deficiency    Past Surgical History:  : AUGMENTATION MAMMAPLASTY  No date: CARPAL TUNNEL RELEASE  No date:  SECTION  No date: CHOLECYSTECTOMY  No date: DILATATION AND CURETTAGE  No date: ENDOMETRIAL BIOPSY  No date: HERNIA REPAIR  No date: OTHER SURGICAL HISTORY      Comment:  TIF procedure.     Social History    Socioeconomic History      Marital status: Single      Spouse name: Not on file      Number of children: Not on file      Years of education: Not on file       Highest education level: Not on file    Tobacco Use      Smoking status: Current Every Day Smoker        Packs/day: 0.50        Types: Cigarettes      Smokeless tobacco: Never Used    Substance and Sexual Activity      Alcohol use: Yes        Comment: occasional      Drug use: No      Sexual activity: Yes        Partners: Male        Birth control/protection: Post-menopausal        Comment: 12 years      Family history: reviewed and noncontributory            Review of Systems    Past Medical History:   Diagnosis Date   • COPD (chronic obstructive pulmonary disease) (CMS/HCC)    • GERD (gastroesophageal reflux disease)    • Hyperlipidemia    • Hypertension    • Potassium (K) deficiency        Allergies   Allergen Reactions   • Penicillins Unknown (See Comments)     As a child       Past Surgical History:   Procedure Laterality Date   • AUGMENTATION MAMMAPLASTY     • CARPAL TUNNEL RELEASE     •  SECTION     • CHOLECYSTECTOMY     • DILATATION AND CURETTAGE     • ENDOMETRIAL BIOPSY     • HERNIA REPAIR     • OTHER SURGICAL HISTORY      TIF procedure.        Family History   Problem Relation Age of Onset   • Breast cancer Maternal Aunt    • Breast cancer Maternal Aunt    • Breast cancer Maternal Aunt    • Breast cancer Cousin    • Ovarian cancer Neg Hx    • Uterine cancer Neg Hx    • Colon cancer Neg Hx        Social History     Socioeconomic History   • Marital status: Single     Spouse name: Not on file   • Number of children: Not on file   • Years of education: Not on file   • Highest education level: Not on file   Tobacco Use   • Smoking status: Current Every Day Smoker     Packs/day: 0.50     Types: Cigarettes   • Smokeless tobacco: Never Used   Substance and Sexual Activity   • Alcohol use: Yes     Comment: occasional   • Drug use: No   • Sexual activity: Yes     Partners: Male     Birth control/protection: Post-menopausal     Comment: 12 years           Objective   Physical Exam    ECG 12  "Lead    Date/Time: 7/9/2019 5:58 PM  Performed by: Nimesh Rivas MD  Authorized by: Nimesh Rivas MD   Interpreted by physician  Rhythm: sinus rhythm  Rate: normal  BPM: 89  QRS axis: normal                  ED Course    Dr. Navarro aware of patient.       ED Course as of Jul 09 1944   Tue Jul 09, 2019   1817 Turned over to Evelyn   [TS]      ED Course User Index  [TS] Trent Parker MD                  [facility]      Final diagnoses:   Closed fracture of right hip, initial encounter (CMS/[facility])   Hypokalemia            Nimesh Rivas MD  07/09/19 1944       Nimesh Rivas MD  07/09/19 1958      Electronically signed by Nimesh Rivas MD at 7/9/2019  7:58 PM       ICU Vital Signs     Row Name 07/10/19 0829 07/10/19 0626 07/10/19 0621 07/10/19 0452 07/10/19 0029       Vitals    Temp  98.2 °F (36.8 °C)  --  --  98.7 °F (37.1 °C)  99.4 °F (37.4 °C)    Temp src  Oral  --  --  Oral  Oral    Pulse  84  82  85  87  92    Heart Rate Source  Monitor  Monitor  Monitor  Monitor  Monitor    Resp  17  16  16  16  16    Resp Rate Source  Visual  Visual  Visual  Visual  Visual    BP  96/59  --  --  131/78  130/93    Noninvasive MAP (mmHg)  68  --  --  --  --    BP Location  Right arm  --  --  Right arm  Right arm    BP Method  Automatic  --  --  Automatic  Automatic    Patient Position  Lying  --  --  Lying  Lying       Oxygen Therapy    SpO2  93 %  --  94 %  91 %  92 %    Pulse Oximetry Type  Intermittent  --  Intermittent  Intermittent  Intermittent    Device (Oxygen Therapy)  room air  room air  room air  room air  room air    Row Name 07/09/19 2335 07/09/19 2327 07/09/19 2100 07/09/19 1947 07/09/19 1816       Height and Weight    Height  --  --  149.9 cm (59\")  --  --    Weight  --  --  42.7 kg (94 lb 1.6 oz)  --  --    Weight Method  --  --  Bed scale  --  --    Ideal Body Weight (IBW) (kg)  --  --  43.57  --  --    BSA (Calculated - sq m)  --  --  1.34 sq meters  --  --    BMI (Calculated)  --  -- " " 19  --  --    Weight in (lb) to have BMI = 25  --  --  123.5  --  --       Vitals    Temp  --  --  99.5 °F (37.5 °C)  --  --    Temp src  --  --  Oral  --  --    Pulse  97  95  89  89  --    Heart Rate Source  Monitor  Monitor  Monitor  --  --    Resp  16  16  16  --  --    Resp Rate Source  Visual  Visual  Visual  --  --    BP  --  --  140/93  134/98  135/81    BP Location  --  --  Right arm  --  --    BP Method  --  --  Automatic  --  --    Patient Position  --  --  Lying  --  --       Oxygen Therapy    SpO2  95 %  96 %  98 %  100 %  100 %    Pulse Oximetry Type  Intermittent  Intermittent  Intermittent  --  --    Device (Oxygen Therapy)  room air  room air  room air  --  --    Row Name 07/09/19 1815 07/09/19 1814 07/09/19 1716             Height and Weight    Height  --  --  149.9 cm (59\")      Weight  --  --  40.8 kg (90 lb)      Ideal Body Weight (IBW) (kg)  --  --  43.57      BSA (Calculated - sq m)  --  --  1.31 sq meters      BMI (Calculated)  --  --  18.2      Weight in (lb) to have BMI = 25  --  --  123.5         Vitals    Temp  --  --  99.2 °F (37.3 °C)      Temp src  --  --  Oral      Pulse  --  --  90      Resp  --  --  15      BP  --  137/84  132/83         Oxygen Therapy    SpO2  100 %  100 %  96 %      Device (Oxygen Therapy)  --  --  room air          Hospital Medications (all)       Dose Frequency Start End    acetaminophen (TYLENOL) tablet 650 mg 650 mg Every 4 Hours PRN 7/9/2019     Sig - Route: Take 2 tablets by mouth Every 4 (Four) Hours As Needed for Mild Pain . - Oral    busPIRone (BUSPAR) tablet 5 mg 5 mg 3 Times Daily 7/9/2019     Sig - Route: Take 1 tablet by mouth 3 (Three) Times a Day. - Oral    citalopram (CeleXA) tablet 20 mg 20 mg Daily 7/10/2019     Sig - Route: Take 1 tablet by mouth Daily. - Oral    fentaNYL citrate (PF) (SUBLIMAZE) injection 25 mcg 25 mcg Once 7/9/2019 7/9/2019    Sig - Route: Infuse 0.5 mL into a venous catheter 1 (One) Time. - Intravenous    HYDROmorphone " "(DILAUDID) injection 0.5 mg 0.5 mg Every 2 Hours PRN 7/9/2019 7/19/2019    Sig - Route: Infuse 0.5 mL into a venous catheter Every 2 (Two) Hours As Needed for Severe Pain . - Intravenous    Linked Group 1:  \"And\" Linked Group Details        ipratropium-albuterol (DUO-NEB) nebulizer solution 3 mL 3 mL 4 Times Daily - RT 7/9/2019     Sig - Route: Take 3 mL by nebulization 4 (Four) Times a Day. - Nebulization    LORazepam (ATIVAN) injection 0.5 mg 0.5 mg Every 2 Hours PRN 7/9/2019 7/19/2019    Sig - Route: Infuse 0.25 mL into a venous catheter Every 2 (Two) Hours As Needed for Withdrawal (For CIWA-Ar 8-10). - Intravenous    Linked Group 2:  \"Or\" Linked Group Details        LORazepam (ATIVAN) injection 1 mg 1 mg Every 1 Hour PRN 7/9/2019 7/19/2019    Sig - Route: Infuse 0.5 mL into a venous catheter Every 1 (One) Hour As Needed for Withdrawal (For CIWA-Ar 11-15). - Intravenous    Linked Group 2:  \"Or\" Linked Group Details        LORazepam (ATIVAN) injection 1 mg 1 mg Every 15 Minutes PRN 7/9/2019 7/19/2019    Sig - Route: Infuse 0.5 mL into a venous catheter Every 15 (Fifteen) Minutes As Needed for Anxiety (For CIWA-Ar Greater Than 15.  Repeat Dose in 15 Minutes if CIWA-Ar Does Not Decrease). - Intravenous    Linked Group 2:  \"Or\" Linked Group Details        LORazepam (ATIVAN) injection 1 mg 1 mg Every 15 Minutes PRN 7/9/2019 7/19/2019    Sig - Route: Inject 0.5 mL into the appropriate muscle as directed by prescriber Every 15 (Fifteen) Minutes As Needed for Withdrawal (If Unable to Administer IV - For CIWA-Ar Greater Than 15.  Repeat Dose in 15 Minutes if CIWA-Ar Does Not Decrease). - Intramuscular    Linked Group 2:  \"Or\" Linked Group Details        LORazepam (ATIVAN) tablet 0.5 mg 0.5 mg Every 2 Hours PRN 7/9/2019 7/19/2019    Sig - Route: Take 1 tablet by mouth Every 2 (Two) Hours As Needed for Withdrawal (For CIWA-Ar 8-10). - Oral    Linked Group 2:  \"Or\" Linked Group Details        LORazepam (ATIVAN) tablet 1 mg 1 " "mg Every 1 Hour PRN 7/9/2019 7/19/2019    Sig - Route: Take 1 tablet by mouth Every 1 (One) Hour As Needed for Withdrawal (For CIWA-Ar 11-15). - Oral    Linked Group 2:  \"Or\" Linked Group Details        mupirocin (BACTROBAN) 2 % ointment  Every 12 Hours Scheduled 7/9/2019     Sig - Route: Apply  topically to the appropriate area as directed Every 12 (Twelve) Hours. - Topical    naloxone (NARCAN) injection 0.4 mg 0.4 mg Every 5 Minutes PRN 7/9/2019     Sig - Route: Infuse 1 mL into a venous catheter Every 5 (Five) Minutes As Needed for Respiratory Depression. - Intravenous    Linked Group 1:  \"And\" Linked Group Details        nicotine (NICODERM CQ) 14 MG/24HR patch 1 patch 1 patch Every 24 Hours Scheduled 7/10/2019     Sig - Route: Place 1 patch on the skin as directed by provider Daily. - Transdermal    ondansetron (ZOFRAN) injection 4 mg 4 mg Once 7/9/2019 7/9/2019    Sig - Route: Infuse 2 mL into a venous catheter 1 (One) Time. - Intravenous    ondansetron (ZOFRAN) injection 4 mg 4 mg Every 6 Hours PRN 7/9/2019     Sig - Route: Infuse 2 mL into a venous catheter Every 6 (Six) Hours As Needed for Nausea or Vomiting. - Intravenous    Linked Group 3:  \"Or\" Linked Group Details        ondansetron (ZOFRAN) tablet 4 mg 4 mg Every 6 Hours PRN 7/9/2019     Sig - Route: Take 1 tablet by mouth Every 6 (Six) Hours As Needed for Nausea or Vomiting. - Oral    Linked Group 3:  \"Or\" Linked Group Details        oxyCODONE-acetaminophen (PERCOCET)  MG per tablet 1 tablet 1 tablet Every 4 Hours PRN 7/9/2019 7/19/2019    Sig - Route: Take 1 tablet by mouth Every 4 (Four) Hours As Needed for Severe Pain . - Oral    potassium chloride (MICRO-K) CR capsule 20 mEq 20 mEq 2 Times Daily With Meals 7/9/2019     Sig - Route: Take 2 capsules by mouth 2 (Two) Times a Day With Meals. - Oral    potassium chloride (MICRO-K) CR capsule 40 mEq 40 mEq Every 3 Hours 7/9/2019 7/10/2019    Sig - Route: Take 4 capsules by mouth Every 3 (Three) " Hours. - Oral    potassium chloride 10 mEq in 100 mL IVPB 10 mEq Once 7/9/2019 7/9/2019    Sig - Route: Infuse 100 mL into a venous catheter 1 (One) Time. - Intravenous    sodium chloride 0.9 % flush 3 mL 3 mL Every 12 Hours Scheduled 7/9/2019     Sig - Route: Infuse 3 mL into a venous catheter Every 12 (Twelve) Hours. - Intravenous    sodium chloride 0.9 % flush 3-10 mL 3-10 mL As Needed 7/9/2019     Sig - Route: Infuse 3-10 mL into a venous catheter As Needed for Line Care. - Intravenous    sodium chloride 0.9 % with KCl 20 mEq/L infusion 75 mL/hr Continuous 7/9/2019     Sig - Route: Infuse 75 mL/hr into a venous catheter Continuous. - Intravenous    potassium chloride (MICRO-K) CR capsule 20 mEq (Discontinued) 20 mEq 2 Times Daily With Meals 7/10/2019 7/10/2019    Sig - Route: Take 2 capsules by mouth 2 (Two) Times a Day With Meals. - Oral            Lab Results (last 24 hours)     Procedure Component Value Units Date/Time    Lipid Panel [[id number]]  (Abnormal) Collected:  07/10/19 0531    Specimen:  Blood Updated:  07/10/19 0643     Total Cholesterol 182 mg/dL      Triglycerides 841 mg/dL      HDL Cholesterol 37 mg/dL      LDL Cholesterol  <30 mg/dL      LDL/HDL Ratio --     Comment: Unable to calculate       Hemoglobin A1c [[id number]] Collected:  07/10/19 0531    Specimen:  Blood Updated:  07/10/19 0626     Hemoglobin A1C 5.8 %     Narrative:       Less than 6.0           Non-Diabetic Range  6.0-7.0                 ADA Therapeutic Target  Greater than 7.0        Action Suggested    Comprehensive Metabolic Panel [[id number]]  (Abnormal) Collected:  07/10/19 0531    Specimen:  Blood Updated:  07/10/19 0611     Glucose 99 mg/dL      BUN 6 mg/dL      Creatinine 0.58 mg/dL      Sodium 135 mmol/L      Potassium 3.8 mmol/L      Chloride 104 mmol/L      CO2 27.0 mmol/L      Calcium 8.3 mg/dL      Total Protein 5.5 g/dL      Albumin 2.80 g/dL      ALT (SGPT) 31 U/L      AST (SGOT) 52 U/L      Alkaline Phosphatase 123 U/L       Total Bilirubin 0.6 mg/dL      eGFR Non African Amer 110 mL/min/1.73      Globulin 2.7 gm/dL      A/G Ratio 1.0 g/dL      BUN/Creatinine Ratio 10.3     Anion Gap 4.0 mmol/L     Narrative:       GFR Normal >60  Chronic Kidney Disease <60  Kidney Failure <15    CBC (No Diff) [[id number]]  (Abnormal) Collected:  07/10/19 0531    Specimen:  Blood Updated:  07/10/19 0548     WBC 7.75 10*3/mm3      RBC 3.31 10*6/mm3      Hemoglobin 10.3 g/dL      Hematocrit 29.6 %      MCV 89.4 fL      MCH 31.1 pg      MCHC 34.8 g/dL      RDW 13.2 %      RDW-SD 43.6 fl      MPV 10.3 fL      Platelets 284 10*3/mm3     Urinalysis With Microscopic If Indicated (No Culture) - Urine, Clean Catch [[id number]]  (Normal) Collected:  07/10/19 0253    Specimen:  Urine, Clean Catch Updated:  07/10/19 0314     Color, UA Yellow     Appearance, UA Clear     pH, UA 6.5     Specific Gravity, UA 1.012     Glucose, UA Negative     Ketones, UA Negative     Bilirubin, UA Negative     Blood, UA Negative     Protein, UA Negative     Leuk Esterase, UA Negative     Nitrite, UA Negative     Urobilinogen, UA 0.2 E.U./dL    Narrative:       Urine microscopic not indicated.    Manual Differential [[id number]]  (Abnormal) Collected:  07/09/19 1816    Specimen:  Blood Updated:  07/09/19 1902     Neutrophil % 79.6 %      Lymphocyte % 13.3 %      Monocyte % 5.1 %      Eosinophil % 1.0 %      Basophil % 1.0 %      Neutrophils Absolute 10.74 10*3/mm3      Lymphocytes Absolute 1.79 10*3/mm3      Monocytes Absolute 0.69 10*3/mm3      Eosinophils Absolute 0.13 10*3/mm3      Basophils Absolute 0.13 10*3/mm3      RBC Morphology Normal     WBC Morphology Normal     Platelet Morphology Normal    CBC & Differential [[id number]] Collected:  07/09/19 1816    Specimen:  Blood Updated:  07/09/19 1902    Narrative:       The following orders were created for panel order CBC & Differential.  Procedure                               Abnormality         Status                      ---------                               -----------         ------                     CBC Auto Differential[[id number]]        Abnormal            Final result                 Please view results for these tests on the individual orders.    CBC Auto Differential [[id number]]  (Abnormal) Collected:  07/09/19 1816    Specimen:  Blood Updated:  07/09/19 1902     WBC 13.49 10*3/mm3      RBC 3.82 10*6/mm3      Hemoglobin 11.9 g/dL      Hematocrit 33.2 %      MCV 86.9 fL      MCH 31.2 pg      MCHC 35.8 g/dL      RDW 13.2 %      RDW-SD 41.5 fl      MPV 10.3 fL      Platelets 325 10*3/mm3     Comprehensive Metabolic Panel [[id number]]  (Abnormal) Collected:  07/09/19 1816    Specimen:  Blood Updated:  07/09/19 1849     Glucose 92 mg/dL      BUN 9 mg/dL      Creatinine 0.53 mg/dL      Sodium 130 mmol/L      Potassium 2.6 mmol/L      Chloride 92 mmol/L      CO2 29.0 mmol/L      Calcium 8.8 mg/dL      Total Protein 6.6 g/dL      Albumin 3.60 g/dL      ALT (SGPT) 36 U/L      AST (SGOT) 77 U/L      Alkaline Phosphatase 152 U/L      Total Bilirubin 0.6 mg/dL      eGFR Non African Amer 122 mL/min/1.73      Globulin 3.0 gm/dL      A/G Ratio 1.2 g/dL      BUN/Creatinine Ratio 17.0     Anion Gap 9.0 mmol/L     Narrative:       GFR Normal >60  Chronic Kidney Disease <60  Kidney Failure <15    Protime-INR [[id number]]  (Normal) Collected:  07/09/19 1816    Specimen:  Blood Updated:  07/09/19 1841     Protime 13.1 Seconds      INR 0.96    aPTT [[id number]]  (Normal) Collected:  07/09/19 1816    Specimen:  Blood Updated:  07/09/19 1841     PTT 32.0 seconds         Imaging Results (last 24 hours)     Procedure Component Value Units Date/Time    XR Hip With or Without Pelvis 2 - 3 View Right [[id number]] Collected:  07/09/19 1819     Updated:  07/09/19 1823    Narrative:       EXAMINATION:  XR HIP W OR WO PELVIS 2-3 VIEW RIGHT-  7/9/2019 5:58 PM  CDT     HISTORY: The patient fell.     COMPARISON: No comparison study.     TECHNIQUE: AP and  crosstable lateral views of the right hip were  supplemented with an AP image of the pelvis.     FINDINGS:  There is an acute subcapital fracture of the right femoral  neck with no significant displacement or angulation. The hip joint  spaces are well-maintained. No pelvic fracture is seen.       Impression:       Acute nondisplaced subcapital right femoral neck fracture  without displacement or angulation.              This report was finalized on 07/09/2019 18:20 by Dr. Marcos Boles MD.    XR Chest 1 View [[id number]] Collected:  07/09/19 1818     Updated:  07/09/19 1822    Narrative:       EXAMINATION:  XR CHEST 1 VW-  7/9/2019 5:57 PM CDT     HISTORY: Preoperative chest x-ray.     COMPARISON: No comparison study.     FINDINGS:  The lungs are expanded bilaterally and clear. There is old  granulomatous disease. The pleural spaces are clear. Heart size is  normal.  No acute bony abnormality is seen.       Impression:       No active disease is seen.        This report was finalized on 07/09/2019 18:18 by Dr. Marcos Boles MD.        ECG/EMG Results (last 24 hours)     Procedure Component Value Units Date/Time    ECG 12 Lead [[id number]] Collected:  07/09/19 1758     Updated:  07/09/19 1758    ECG 12 Lead [[id number]] Resulted:  07/09/19 1958     Updated:  07/09/19 1958    Narrative:       Subjective Patient was endorsed to me at shift change. Please see primary   providers full note for H and P.       Patient with a mechanical fall around 0230 stating she was walking to her   bathroom and her big toe got caught on a carpet causing her to fall to her   right side. She states while she had pain at that time she still continued   to walk on the leg. She then presented to the [facility] and was asked to come to   the hospital for evaluation. She denies numbness, tingling, loss of   sensation, head trauma or LOC, fevers or chills. She arrives in NAD.         Family, social and past history reviewed as below, prior documentation  of   H and Ps and other documentation are reviewed:    Past Medical History:  No date: COPD (chronic obstructive pulmonary disease) (CMS/HCC)  No date: GERD (gastroesophageal reflux disease)  No date: Hyperlipidemia  No date: Hypertension  No date: Potassium (K) deficiency    Past Surgical History:  : AUGMENTATION MAMMAPLASTY  No date: CARPAL TUNNEL RELEASE  No date:  SECTION  No date: CHOLECYSTECTOMY  No date: DILATATION AND CURETTAGE  No date: ENDOMETRIAL BIOPSY  No date: HERNIA REPAIR  No date: OTHER SURGICAL HISTORY      Comment:  TIF procedure.     Social History    Socioeconomic History      Marital status: Single      Spouse name: Not on file      Number of children: Not on file      Years of education: Not on file      Highest education level: Not on file    Tobacco Use      Smoking status: Current Every Day Smoker        Packs/day: 0.50        Types: Cigarettes      Smokeless tobacco: Never Used    Substance and Sexual Activity      Alcohol use: Yes        Comment: occasional      Drug use: No      Sexual activity: Yes        Partners: Male        Birth control/protection: Post-menopausal        Comment: 12 years      Family history: reviewed and noncontributory            Review of Systems    Past Medical History:   Diagnosis Date   • COPD (chronic obstructive pulmonary disease) (CMS/HCC)    • GERD (gastroesophageal reflux disease)    • Hyperlipidemia    • Hypertension    • Potassium (K) deficiency        Allergies   Allergen Reactions   • Penicillins Unknown (See Comments)     As a child       Past Surgical History:   Procedure Laterality Date   • AUGMENTATION MAMMAPLASTY     • CARPAL TUNNEL RELEASE     •  SECTION     • CHOLECYSTECTOMY     • DILATATION AND CURETTAGE     • ENDOMETRIAL BIOPSY     • HERNIA REPAIR     • OTHER SURGICAL HISTORY      TIF procedure.        Family History   Problem Relation Age of Onset   • Breast cancer Maternal Aunt    • Breast cancer Maternal Aunt    •  Breast cancer Maternal Aunt    • Breast cancer Cousin    • Ovarian cancer Neg Hx    • Uterine cancer Neg Hx    • Colon cancer Neg Hx        Social History     Socioeconomic History   • Marital status: Single     Spouse name: Not on file   • Number of children: Not on file   • Years of education: Not on file   • Highest education level: Not on file   Tobacco Use   • Smoking status: Current Every Day Smoker     Packs/day: 0.50     Types: Cigarettes   • Smokeless tobacco: Never Used   Substance and Sexual Activity   • Alcohol use: Yes     Comment: occasional   • Drug use: No   • Sexual activity: Yes     Partners: Male     Birth control/protection: Post-menopausal     Comment: 12 years           Objective Physical Exam    ECG 12 Lead    Date/Time: 7/9/2019 5:58 PM  Performed by: Nimesh Rivas MD  Authorized by: Nimseh Rivas MD   Interpreted by physician  Rhythm: sinus rhythm  Rate: normal  BPM: 89  QRS axis: normal                   ED Course    Dr. Navarro aware of patient.       ED Course as of Jul 09 1944   Tue Jul 09, 2019   1817 Turned over to Evelyn   [TS]      ED Course User Index  [TS] Trent Parker MD              [facility]      Final diagnoses:   Closed fracture of right hip, initial encounter (CMS/[facility])   Hypokalemia            Nimesh Rivas MD  07/09/19 1944       Nimesh Rivas MD  07/09/19 1958      Impression:           SCANNED EKG [[id number]] Resulted:  07/09/19      Updated:  07/10/19 0716          Orders (last 24 hrs)     Start     Ordered    07/10/19 1018  XR Hip With or Without Pelvis 2 - 3 View Right  1 Time Imaging      07/10/19 1017    07/10/19 1018  FL C Arm During Surgery  1 Time Imaging      07/10/19 1018    07/10/19 0900  citalopram (CeleXA) tablet 20 mg  Daily      07/09/19 2035    07/10/19 0900  nicotine (NICODERM CQ) 14 MG/24HR patch 1 patch  Every 24 Hours Scheduled      07/09/19 2036    07/10/19 0830  potassium chloride (MICRO-K) CR capsule 20 mEq  2 Times Daily  With Meals,   Status:  Discontinued      07/10/19 0738    07/10/19 0600  Comprehensive Metabolic Panel  Morning Draw      07/09/19 2035    07/10/19 0600  CBC (No Diff)  Morning Draw      07/09/19 2035    07/10/19 0600  Hemoglobin A1c  Morning Draw      07/09/19 2035    07/10/19 0600  Lipid Panel  Morning Draw      07/09/19 2035    07/10/19 0302  Inpatient Case Management  Consult  Once     Provider:  (Not yet assigned)    07/10/19 0301    07/10/19 0001  NPO Diet  Diet Effective Midnight      07/09/19 2035    07/10/19 0000  Vital Signs  Every 4 Hours,   Status:  Canceled      07/09/19 2035 07/09/19 2244  Urinalysis With Microscopic If Indicated (No Culture) - Urine, Clean Catch  Once      07/09/19 2244 07/09/19 2200  Incentive Spirometry  Every 2 Hours While Awake      07/09/19 2035 07/09/19 2146  Insert Indwelling Urinary Catheter  Once      07/09/19 2147 07/09/19 2146  Assess Need for Indwelling Urinary Catheter - Follow Removal Protocol  Continuous     Comments:  Indwelling Urinary Catheter Removal Criteria  Discontinue Indwelling Urinary Catheter Unless One of the Following is Present  Urinary Retention or Obstruction  Chronic Urinary Catheter Use  End of Life  Critical Illness with Strict I/O   Tract or Abdominal Surgery  Stage 3/4 Sacral / Perineal Wound  Required Activity Restriction: Trauma  Required Activity Restriction: Spine Surgery  If Patient is Being Followed by Urology Contact Them PRIOR to Removal  Do Not Remove Indwelling Urinary Catheter Order is Present with a CLINICAL REASON to Maintain the Catheter. Provider is Required to Include a Clinical Reason to Maintain a Urinary Catheter    Chronic Urinary Catheter Use (Present on Admission)  Assess for Continued Need & Document Medical Necessity  If Infection is Suspected, Contact the Provider        07/09/19 2147 07/09/19 2146  Catheter Care  Every Shift      07/09/19 2147 07/09/19 2130  busPIRone (BUSPAR) tablet 5 mg   3 Times Daily      07/09/19 2035 07/09/19 2130  potassium chloride (MICRO-K) CR capsule 20 mEq  2 Times Daily With Meals      07/09/19 2035 07/09/19 2130  mupirocin (BACTROBAN) 2 % ointment  Every 12 Hours Scheduled      07/09/19 2035 07/09/19 2130  sodium chloride 0.9 % flush 3 mL  Every 12 Hours Scheduled      07/09/19 2035 07/09/19 2130  potassium chloride (MICRO-K) CR capsule 40 mEq  Every 3 Hours      07/09/19 2035 07/09/19 2130  sodium chloride 0.9 % with KCl 20 mEq/L infusion  Continuous      07/09/19 2035 07/09/19 2130  ipratropium-albuterol (DUO-NEB) nebulizer solution 3 mL  4 Times Daily - RT      07/09/19 2036 07/09/19 2036  LORazepam (ATIVAN) tablet 0.5 mg  Every 2 Hours PRN      07/09/19 2036 07/09/19 2036  LORazepam (ATIVAN) injection 0.5 mg  Every 2 Hours PRN      07/09/19 2036 07/09/19 2036  LORazepam (ATIVAN) tablet 1 mg  Every 1 Hour PRN      07/09/19 2036 07/09/19 2036  LORazepam (ATIVAN) injection 1 mg  Every 1 Hour PRN      07/09/19 2036 07/09/19 2036  LORazepam (ATIVAN) injection 1 mg  Every 15 Minutes PRN      07/09/19 2036 07/09/19 2036  LORazepam (ATIVAN) injection 1 mg  Every 15 Minutes PRN      07/09/19 2036 07/09/19 2036  Safety Precautions  Continuous      07/09/19 2036 07/09/19 2036  Continuous Pulse Oximetry  Continuous      07/09/19 2036 07/09/19 2036  Obtain Baseline Clinical [place] Withdrawal Assessment - Ar, Sedation Scale & Vital Signs  Once     Comments:  Follow CIWA Scoring Reference Guide    07/09/19 2036 07/09/19 2036  Clinical [place] Withdrawal Assessment (CIWA-Ar)  Per Hospital Policy      07/09/19 2036 07/09/19 2036  If CIWA Score Less Than 8 Monitor Every 4 Hours & Then Discontinue Assessment - Restart Scoring Assessment & Protocol If Symptoms Reappear  Continuous      07/09/19 2036 07/09/19 2036  Obtain Pre & Post Sedation Scores With Every Lorazepam Dose - Hold For POSS Greater Than 2 or RASS of -3 or Less   Continuous      07/09/19 2036 07/09/19 2036  Seizure Precautions  Continuous      07/09/19 2036 07/09/19 2036  Notify Provider - Withdrawal  Until Discontinued      07/09/19 2036 07/09/19 2036  Notify Provider of Abnormal Lab Results  Until Discontinued      07/09/19 2036 07/09/19 2036  Notify Provider - Vitals  Until Discontinued      07/09/19 2036 07/09/19 2034  ondansetron (ZOFRAN) tablet 4 mg  Every 6 Hours PRN      07/09/19 2035 07/09/19 2034  ondansetron (ZOFRAN) injection 4 mg  Every 6 Hours PRN      07/09/19 2035 07/09/19 2034  HYDROmorphone (DILAUDID) injection 0.5 mg  Every 2 Hours PRN      07/09/19 2035 07/09/19 2034  naloxone (NARCAN) injection 0.4 mg  Every 5 Minutes PRN      07/09/19 2035 07/09/19 2033  oxyCODONE-acetaminophen (PERCOCET)  MG per tablet 1 tablet  Every 4 Hours PRN      07/09/19 2035 07/09/19 2033  acetaminophen (TYLENOL) tablet 650 mg  Every 4 Hours PRN      07/09/19 2035 07/09/19 2033  Diet Regular  Diet Effective Now,   Status:  Canceled      07/09/19 2035 07/09/19 2033  Inpatient Orthopedic Surgery Consult  Once     Specialty:  Orthopedic Surgery  Provider:  Aramis Navarro MD    07/09/19 2035 07/09/19 2032  Place Sequential Compression Device  Once      07/09/19 2035 07/09/19 2032  Maintain Sequential Compression Device  Continuous      07/09/19 2035 07/09/19 2032  Cardiac Monitoring  Continuous      07/09/19 2035 07/09/19 2031  Code Status and Medical Interventions:  Continuous      07/09/19 2035 07/09/19 2031  Intake & Output  Every Shift      07/09/19 2035 07/09/19 2031  Weigh Patient  Once      07/09/19 2035 07/09/19 2031  Oxygen Therapy- Nasal Cannula; Titrate for SPO2: 90% - 95%  Continuous      07/09/19 2035 07/09/19 2031  Insert Peripheral IV  Once      07/09/19 2035 07/09/19 2031  Saline Lock & Maintain IV Access  Continuous      07/09/19 2035 07/09/19 2030  sodium chloride 0.9 % flush 3-10 mL  As  Needed      07/09/19 2035 07/09/19 1944  Inpatient Admission  Once      07/09/19 1944    07/09/19 1912  ECG 12 Lead  Once      07/09/19 1911 07/09/19 1900  NPO Diet  Diet Effective Now,   Status:  Canceled      07/09/19 1859 07/09/19 1852  potassium chloride 10 mEq in 100 mL IVPB  Once      07/09/19 1850    07/09/19 1834  Manual Differential  Once      07/09/19 1833    07/09/19 1750  ondansetron (ZOFRAN) injection 4 mg  Once      07/09/19 1748    07/09/19 1749  fentaNYL citrate (PF) (SUBLIMAZE) injection 25 mcg  Once      07/09/19 1748    07/09/19 1748  XR Chest 1 View  1 Time Imaging      07/09/19 1748    07/09/19 1748  XR Hip With or Without Pelvis 2 - 3 View Right  1 Time Imaging      07/09/19 1748    07/09/19 1747  CBC & Differential  Once      07/09/19 1748    07/09/19 1747  Comprehensive Metabolic Panel  Once      07/09/19 1748    07/09/19 1747  Protime-INR  Once      07/09/19 1748    07/09/19 1747  aPTT  Once      07/09/19 1748    07/09/19 1747  Type & Screen  Once      07/09/19 1748    07/09/19 1747  ECG 12 Lead  Once      07/09/19 1748    07/09/19 1747  CBC Auto Differential  PROCEDURE ONCE      07/09/19 1748    Unscheduled  Up With Assistance  As Needed      07/09/19 2035    --  SCANNED - TELEMETRY        07/09/19 0000    --  citalopram (CeleXA) 20 MG tablet  Daily      07/10/19 0259    --  hydrochlorothiazide (HYDRODIURIL) 12.5 MG tablet  Daily      07/10/19 0259    --  potassium chloride (K-DUR,KLOR-CON) 20 MEQ CR tablet  2 Times Daily      07/10/19 0259    --  SCANNED EKG      07/09/19 0000          Operative/Procedure Notes (last 24 hours) (Notes from 7/9/2019 10:35 AM through 7/10/2019 10:35 AM)     No notes of this type exist for this encounter.           Physician Progress Notes (last 24 hours) (Notes from 7/9/2019 10:35 AM through 7/10/2019 10:35 AM)      Olimpia Ramos APRN at 7/10/2019  7:28 AM              [facility] Medicine Services  INPATIENT  PROGRESS NOTE    Length of Stay: 1  Date of Admission: 7/9/2019  Primary Care Physician: Rajan Rodriguez MD    Subjective   Chief Complaint: right hip pain after fall  HPI   Lying in bed.  She got up to go to the bathroom at 230 yesterday morning and her big toe on her right foot got caught in the carpet and she fell onto the carpet landing on her right side.  She was able to get up and continue to walk.  However, she continued to have worsening pain throughout the day and went to [facility] and was advised to present to the emergency room.  X-ray noted nondisplaced right femoral neck fracture.    Lying in bed.  Dr. Navarro has seen this morning and plans for surgical intervention today.  Sodium 130 on admission and improved to 135.  She denies chest pain, palpitations or shortness of breath.  Reports right hip pain level 6 out of 10.  She denies nausea, vomiting or abdominal pain.  She does smoke half pack cigarettes per day.  Hold HCTZ due to hyponatremia.    Review of Systems   Constitutional: Positive for activity change (After fall). Negative for fatigue and fever.   HENT: Negative for congestion and trouble swallowing.    Eyes: Negative for photophobia and visual disturbance.   Respiratory: Negative for cough, shortness of breath and wheezing.    Cardiovascular: Negative for chest pain, palpitations and leg swelling.   Gastrointestinal: Negative for constipation, diarrhea, nausea and vomiting.   Endocrine: Negative for cold intolerance, heat intolerance and polyuria.   Genitourinary: Negative for dysuria and urgency.   Musculoskeletal: Positive for gait problem (After fall).   Skin: Negative for wound.   Allergic/Immunologic: Negative for immunocompromised state.   Neurological: Negative for weakness.   Hematological: Negative for adenopathy. Does not bruise/bleed easily.   Psychiatric/Behavioral: Negative for agitation, behavioral problems and confusion.      All pertinent negatives and  positives are as above. All other systems have been reviewed and are negative unless otherwise stated.     Objective    Temp:  [98.7 °F (37.1 °C)-99.5 °F (37.5 °C)] 98.7 °F (37.1 °C)  Heart Rate:  [82-97] 82  Resp:  [15-16] 16  BP: (130-140)/(78-98) 131/78  Physical Exam   Constitutional: She is oriented to person, place, and time. She appears well-developed and well-nourished.   Lying in bed no acute distress.   HENT:   Head: Normocephalic and atraumatic.   Eyes: EOM are normal. Pupils are equal, round, and reactive to light.   Neck: Normal range of motion. Neck supple.   Cardiovascular: Normal rate, regular rhythm, normal heart sounds and intact distal pulses. Exam reveals no gallop and no friction rub.   No murmur heard.  Normal sinus rhythm  on telemetry   Pulmonary/Chest: Effort normal and breath sounds normal. No respiratory distress. She has no wheezes. She has no rales.   No oxygen in use.   Abdominal: Soft. Bowel sounds are normal. She exhibits no distension. There is no tenderness.   Genitourinary:   Genitourinary Comments: Mcdowell catheter in place with clear urine return   Musculoskeletal: She exhibits tenderness (Right hip).   Right lower extremity mild external rotation   Neurological: She is alert and oriented to person, place, and time.   Skin: Skin is warm and dry.   Psychiatric: She has a normal mood and affect. Her behavior is normal. Judgment and thought content normal.     Results Review:  I have reviewed the labs, radiology results, and diagnostic studies.    Laboratory Data:   Results from last 7 days   Lab Units 07/10/19  0531 07/09/19  1816   WBC 10*3/mm3 7.75 13.49*   HEMOGLOBIN g/dL 10.3* 11.9*   HEMATOCRIT % 29.6* 33.2*   PLATELETS 10*3/mm3 284 325        Results from last 7 days   Lab Units 07/10/19  0531 07/09/19  1816   SODIUM mmol/L 135 130*   POTASSIUM mmol/L 3.8 2.6*   CHLORIDE mmol/L 104 92*   CO2 mmol/L 27.0 29.0   BUN mg/dL 6 9   CREATININE mg/dL 0.58 0.53   CALCIUM mg/dL  8.3* 8.8   BILIRUBIN mg/dL 0.6 0.6   ALK PHOS U/L 123* 152*   ALT (SGPT) U/L 31 36   AST (SGOT) U/L 52* 77*   GLUCOSE mg/dL 99 92        Imaging Results (all)     Procedure Component Value Units Date/Time    XR Hip With or Without Pelvis 2 - 3 View Right [[id number]] Collected:  07/09/19 1819     Updated:  07/09/19 1823    Narrative:       EXAMINATION:  XR HIP W OR WO PELVIS 2-3 VIEW RIGHT-  7/9/2019 5:58 PM  CDT     HISTORY: The patient fell.     COMPARISON: No comparison study.     TECHNIQUE: AP and crosstable lateral views of the right hip were  supplemented with an AP image of the pelvis.     FINDINGS:  There is an acute subcapital fracture of the right femoral  neck with no significant displacement or angulation. The hip joint  spaces are well-maintained. No pelvic fracture is seen.       Impression:       Acute nondisplaced subcapital right femoral neck fracture  without displacement or angulation.              This report was finalized on 07/09/2019 18:20 by Dr. Marcos Boles MD.    XR Chest 1 View [[id number]] Collected:  07/09/19 1818     Updated:  07/09/19 1822    Narrative:       EXAMINATION:  XR CHEST 1 VW-  7/9/2019 5:57 PM CDT     HISTORY: Preoperative chest x-ray.     COMPARISON: No comparison study.     FINDINGS:  The lungs are expanded bilaterally and clear. There is old  granulomatous disease. The pleural spaces are clear. Heart size is  normal.  No acute bony abnormality is seen.       Impression:       No active disease is seen.        This report was finalized on 07/09/2019 18:18 by Dr. Marcos Boles MD.        Intake/Output    Intake/Output Summary (Last 24 hours) at 7/10/2019 0741  Last data filed at 7/9/2019 2327  Gross per 24 hour   Intake 120 ml   Output --   Net 120 ml       Scheduled Meds    busPIRone 5 mg Oral TID   citalopram 20 mg Oral Daily   ipratropium-albuterol 3 mL Nebulization 4x Daily - RT   mupirocin  Topical Q12H   nicotine 1 patch Transdermal Q24H   potassium chloride 20 mEq  Oral BID With Meals   sodium chloride 3 mL Intravenous Q12H       I have reviewed the patient current medications.     Assessment/Plan     Active Hospital Problems    Diagnosis   • **Closed fracture of right hip (CMS/HCC)   • Fall   • Hyponatremia   • Tobacco abuse   • Acute pain due to injury   • Gastroesophageal reflux disease   • Hypokalemia     Plan:  1.  Acute nondisplaced subcapital right femoral neck fracture  2.  Nothing by mouth  3.  Orthopedic consult.  Dr. Navarro has seen this morning and plans for surgery today.  4.  IV fluids at 75 mL/h.  Sodium 130 on admission and improved to 135.  5.  Hold hydrochlorothiazide likely contributing to hyponatremia  6.  Physical therapy and Occupational Therapy consults postop.  7.  Social service consult for discharge planning.  Will need SNF.  8.  Incentive spirometry every 2-4 hours while awake  9.  SCD for deep vein thrombosis prophylaxis preop   10.  Percocet orally for pain.  Dilaudid IV for severe pain  11.  CBC, basic metabolic panel tomorrow to monitor hyponatremia    Her daughter Barbara Vinson will act as her surrogate decision-maker.  The above documentation resulted from a face-to-face encounter by me Olimpia VALLEJO, [facility].    Discharge Planning: I expect patient to be discharged to SNF in 2 days.    GENEVIEVE Timmons   07/10/19   7:41 AM        Electronically signed by Olimpia Ramos APRN at 7/10/2019  7:43 AM          Consult Notes (last 24 hours) (Notes from 2019 10:35 AM through 7/10/2019 10:35 AM)      Aramis Navarro MD at 7/10/2019  7:18 AM      Consult Orders    1. Inpatient Orthopedic Surgery Consult [[id number]] ordered by Geno Padilla APRN at 19                Orthopaedic Inpatient Consultation    NAME:  Adelita Jaeger   : 1970  MRN: [id number]    2019  5:26 PM    Requesting Physician: No ref. provider found    CHIEF COMPLAINT:  right hip pain    HISTORY OF PRESENT ILLNESS:   The patient is a 49 y.o.  female who experienced mechanical fall onto her right side yesterday morning and has had persistent right hip pain with difficulty weightbearing since that time.  She initially was evaluated a local urgent care facility where x-rays were obtained demonstrating a valgus impacted right femoral neck fracture.  As a result, she was sent to our emergency department for evaluation.  Pain is located in the right hip/groin, rated a 3/5, dull and constant, worse with movement, better with rest and medication.  There are no associated symptoms.      Past Medical History:    Past Medical History:   Diagnosis Date   • COPD (chronic obstructive pulmonary disease) (CMS/[facility])    • GERD (gastroesophageal reflux disease)    • Hyperlipidemia    • Hypertension    • Potassium (K) deficiency        Past Surgical History:    Past Surgical History:   Procedure Laterality Date   • AUGMENTATION MAMMAPLASTY     • CARPAL TUNNEL RELEASE     •  SECTION     • CHOLECYSTECTOMY     • DILATATION AND CURETTAGE     • ENDOMETRIAL BIOPSY     • HERNIA REPAIR     • OTHER SURGICAL HISTORY      TIF procedure.        Current Medications:   Prior to Admission medications    Medication Sig Start Date End Date Taking? Authorizing Provider   aspirin 81 MG chewable tablet Chew 81 mg Daily.   Yes Marline Arguelles MD   busPIRone (BUSPAR) 5 MG tablet 5 mg Every Night. 19  Yes Marline Arguelles MD   Calcium Carb-Cholecalciferol (CALCIUM 600+D3) 600-200 MG-UNIT tablet Take 1 tablet by mouth Every 12 (Twelve) Hours.   Yes Marline Arguelles MD   citalopram (CeleXA) 20 MG tablet Take 20 mg by mouth Daily.   Yes Marline Arguelles MD   Flaxseed, Linseed, (FLAXSEED OIL PO) Take 1,000 mg by mouth 2 (Two) Times a Day.   Yes Marline Arguelles MD   hydrochlorothiazide (HYDRODIURIL) 12.5 MG tablet Take 12.5 mg by mouth Daily.   Yes Marline Arguelles MD   Magnesium 500 MG tablet Take 1 tablet by mouth Every 12 (Twelve) Hours.   Yes  ProviderMarline MD   Multiple Vitamins-Minerals (MULTIVITAMIN ADULT PO) Take 1 tablet/day by mouth Daily.   Yes Marline Arguelles MD   mupirocin (BACTROBAN) 2 % nasal ointment Apply intranasally twice daily  Patient taking differently: 1 application into the nostril(s) as directed by provider 2 (Two) Times a Day. Apply intranasally twice daily 6/17/19  Yes Bianka Garcia APRN   potassium chloride (K-DUR,KLOR-CON) 20 MEQ CR tablet Take 20 mEq by mouth 2 (Two) Times a Day.   Yes Marline Arguelles MD   citalopram (CELEXA) 20 MG tablet Celexa 20 mg tablet   Take 1 tablet every day by oral route.  7/10/19  Marline Arguelles MD   hydrochlorothiazide (HYDRODIURIL) 25 MG tablet Take 25 mg by mouth Daily. Takes half of a pill daily  7/10/19  Emergency, Nurse MATTIE Busby 10 MEQ CR tablet 20 mEq 2 (Two) Times a Day. 2/16/18 7/10/19  Marline Arguelles MD   mupirocin (BACTROBAN) 2 % ointment Apply intranasally bid 6/17/19 7/10/19  Bianka Garcia APRN       Allergies:  Penicillins    Social History:   Social History     Socioeconomic History   • Marital status: Single     Spouse name: Not on file   • Number of children: Not on file   • Years of education: Not on file   • Highest education level: Not on file   Tobacco Use   • Smoking status: Current Every Day Smoker     Packs/day: 0.50     Types: Cigarettes   • Smokeless tobacco: Never Used   Substance and Sexual Activity   • Alcohol use: Yes     Comment: occasional   • Drug use: No   • Sexual activity: Yes     Partners: Male     Birth control/protection: Post-menopausal     Comment: 12 years       Family History:   Family History   Problem Relation Age of Onset   • Breast cancer Maternal Aunt    • Breast cancer Maternal Aunt    • Breast cancer Maternal Aunt    • Breast cancer Cousin    • Hypertension Father    • Diabetes Mother    • Hypertension Mother    • Hypothyroidism Mother    • Ovarian cancer Neg Hx    • Uterine cancer Neg Hx    • Colon cancer Neg  Hx        REVIEW OF SYSTEMS:  14 point review of systems has been reviewed from the patient's emergency room visit, reviewed with the patient on today's date with no new changes.    PHYSICAL EXAM:      Physical Examination:  Vitals:   Vitals:    07/10/19 0029 07/10/19 0452 07/10/19 0621 07/10/19 0626   BP: 130/93 131/78     BP Location: Right arm Right arm     Patient Position: Lying Lying     Pulse: 92 87 85 82   Resp: 16 16 16 16   Temp: 99.4 °F (37.4 °C) 98.7 °F (37.1 °C)     TempSrc: Oral Oral     SpO2: 92% 91% 94%    Weight:       Height:         General:  Appears stated age, no distress.  Orientation:  Alert and oriented to time, place, and person.  Mood and Affect:  Cooperative and pleasant.  Gait:  Resting comfortably in bed.  Cardiovascular:  Symmetric 1-2 plus pulses in upper and lower extremities.  Lymph:  No cervical or inguinal lymphadenopathy noted.  Sensation:  Grossly intact to light touch.  DTR:  Normal, no pathologic reflexes.  Coordination/balance:  Normal    Musculoskeletal:  Right upper extremity exam:  There is no tenderness to palpation about the shoulder, elbow, wrist or hand.  Unrestricted full function motion is present.  Stability is normal with provocative tests, 5/5 strength, and skin is normal.      Left upper extremity exam:  There is no tenderness to palpation about the shoulder, elbow, wrist or hand.  Unrestricted full function motion is present.  Stability is normal with provocative tests, 5/5 strength, and skin is normal.     Right lower extremity exam:  There is no tenderness to palpation about the knee, ankle or foot, but the patient does have discomfort about the right hip.  She tolerates limited active and passive range of motion through the right hip.  The overlying skin is intact and the surrounding muscle compartments are soft and compressible.  She does have discomfort with supine logroll, however.       Left lower extremity exam:  There is no tenderness to palpation about  the hip, knee, ankle or foot.  Unrestricted full function motion is present.  Stability is normal with provocative tests, 5/5 strength, and skin is normal.      DATA:    CBC with Differential:    Lab Results   Component Value Date    WBC 7.75 07/10/2019    RBC 3.31 (L) 07/10/2019    HGB 10.3 (L) 07/10/2019    HCT 29.6 (L) 07/10/2019     07/10/2019    MCV 89.4 07/10/2019    MCH 31.1 07/10/2019    MCHC 34.8 07/10/2019    RDW 13.2 07/10/2019    LYMPHOPCT 13.3 (L) 07/09/2019    MONOPCT 5.1 07/09/2019    EOSPCT 1.0 07/09/2019    BASOPCT 1.0 07/09/2019    EOSABS 0.13 07/09/2019    BASOSABS 0.13 07/09/2019     CMP:    Lab Results   Component Value Date     07/10/2019    K 3.8 07/10/2019     07/10/2019    CO2 27.0 07/10/2019    BUN 6 07/10/2019    CREATININE 0.58 07/10/2019    AGRATIO 1.0 (L) 07/10/2019    GLUCOSE 99 07/10/2019    CALCIUM 8.3 (L) 07/10/2019    BILITOT 0.6 07/10/2019    ALKPHOS 123 (H) 07/10/2019    AST 52 (H) 07/10/2019    ALT 31 07/10/2019     BMP:    Lab Results   Component Value Date     07/10/2019    K 3.8 07/10/2019     07/10/2019    CO2 27.0 07/10/2019    BUN 6 07/10/2019    CREATININE 0.58 07/10/2019    CALCIUM 8.3 (L) 07/10/2019    GLUCOSE 99 07/10/2019       ----------------------------------------------------------------------------------------------------------------------  I have reviewed the radiology images above and agree with the findings dictated below    Radiology:   Imaging Results (last 24 hours)     Procedure Component Value Units Date/Time    XR Hip With or Without Pelvis 2 - 3 View Right [[id number]] Collected:  07/09/19 1819     Updated:  07/09/19 1823    Narrative:       EXAMINATION:  XR HIP W OR WO PELVIS 2-3 VIEW RIGHT-  7/9/2019 5:58 PM  CDT     HISTORY: The patient fell.     COMPARISON: No comparison study.     TECHNIQUE: AP and crosstable lateral views of the right hip were  supplemented with an AP image of the pelvis.     FINDINGS:  There is an  acute subcapital fracture of the right femoral  neck with no significant displacement or angulation. The hip joint  spaces are well-maintained. No pelvic fracture is seen.       Impression:       Acute nondisplaced subcapital right femoral neck fracture  without displacement or angulation.              This report was finalized on 07/09/2019 18:20 by Dr. Marcos Boles MD.    XR Chest 1 View [[id number]] Collected:  07/09/19 1818     Updated:  07/09/19 1822    Narrative:       EXAMINATION:  XR CHEST 1 VW-  7/9/2019 5:57 PM CDT     HISTORY: Preoperative chest x-ray.     COMPARISON: No comparison study.     FINDINGS:  The lungs are expanded bilaterally and clear. There is old  granulomatous disease. The pleural spaces are clear. Heart size is  normal.  No acute bony abnormality is seen.       Impression:       No active disease is seen.        This report was finalized on 07/09/2019 18:18 by Dr. Marcos Boles MD.          ----------------------------------------------------------------------------------------------------------------------  Assessment: Valgus impacted right femoral neck fracture    Plan:  1) to OR for percutaneous screw fixation of right valgus impacted femoral neck fracture - we discussed the risks, benefits, and alternatives and the patient wishes to proceed  2) Admit postop for pain control, PT/OT  3) n.p.o. and strict bedrest presently    Electronically signed by Aramis Navarro MD on 7/10/2019 at 7:18 AM      Electronically signed by Aramis Navarro MD at 7/10/2019  7:21 AM

## 2019-07-10 NOTE — ED PROVIDER NOTES
Subjective   Patient was endorsed to me at shift change. Please see primary providers full note for H and P.       Patient with a mechanical fall around 0230 stating she was walking to her bathroom and her big toe got caught on a carpet causing her to fall to her right side. She states while she had pain at that time she still continued to walk on the leg. She then presented to the [facility] and was asked to come to the hospital for evaluation. She denies numbness, tingling, loss of sensation, head trauma or LOC, fevers or chills. She arrives in NAD.         Family, social and past history reviewed as below, prior documentation of H and Ps and other documentation are reviewed:    Past Medical History:  No date: COPD (chronic obstructive pulmonary disease) (CMS/[facility])  No date: GERD (gastroesophageal reflux disease)  No date: Hyperlipidemia  No date: Hypertension  No date: Potassium (K) deficiency    Past Surgical History:  : AUGMENTATION MAMMAPLASTY  No date: CARPAL TUNNEL RELEASE  No date:  SECTION  No date: CHOLECYSTECTOMY  No date: DILATATION AND CURETTAGE  No date: ENDOMETRIAL BIOPSY  No date: HERNIA REPAIR  No date: OTHER SURGICAL HISTORY      Comment:  TIF procedure.     Social History    Socioeconomic History      Marital status: Single      Spouse name: Not on file      Number of children: Not on file      Years of education: Not on file      Highest education level: Not on file    Tobacco Use      Smoking status: Current Every Day Smoker        Packs/day: 0.50        Types: Cigarettes      Smokeless tobacco: Never Used    Substance and Sexual Activity      Alcohol use: Yes        Comment: occasional      Drug use: No      Sexual activity: Yes        Partners: Male        Birth control/protection: Post-menopausal        Comment: 12 years      Family history: reviewed and noncontributory            Review of Systems    Past Medical History:   Diagnosis Date   • COPD (chronic obstructive pulmonary disease)  (CMS/[facility])    • GERD (gastroesophageal reflux disease)    • Hyperlipidemia    • Hypertension    • Potassium (K) deficiency        Allergies   Allergen Reactions   • Penicillins Unknown (See Comments)     As a child       Past Surgical History:   Procedure Laterality Date   • AUGMENTATION MAMMAPLASTY     • CARPAL TUNNEL RELEASE     •  SECTION     • CHOLECYSTECTOMY     • DILATATION AND CURETTAGE     • ENDOMETRIAL BIOPSY     • HERNIA REPAIR     • OTHER SURGICAL HISTORY      TIF procedure.        Family History   Problem Relation Age of Onset   • Breast cancer Maternal Aunt    • Breast cancer Maternal Aunt    • Breast cancer Maternal Aunt    • Breast cancer Cousin    • Ovarian cancer Neg Hx    • Uterine cancer Neg Hx    • Colon cancer Neg Hx        Social History     Socioeconomic History   • Marital status: Single     Spouse name: Not on file   • Number of children: Not on file   • Years of education: Not on file   • Highest education level: Not on file   Tobacco Use   • Smoking status: Current Every Day Smoker     Packs/day: 0.50     Types: Cigarettes   • Smokeless tobacco: Never Used   Substance and Sexual Activity   • Alcohol use: Yes     Comment: occasional   • Drug use: No   • Sexual activity: Yes     Partners: Male     Birth control/protection: Post-menopausal     Comment: 12 years           Objective   Physical Exam    ECG 12 Lead    Date/Time: 2019 5:58 PM  Performed by: Nimesh Rivas MD  Authorized by: Nimesh Rivas MD   Interpreted by physician  Rhythm: sinus rhythm  Rate: normal  BPM: 89  QRS axis: normal                   ED Course    Dr. Navarro aware of patient.       ED Course as of  Turned over to Evelyn   [TS]      ED Course User Index  [TS] Trent Parker MD                  [facility]      Final diagnoses:   Closed fracture of right hip, initial encounter (CMS/[facility])   Hypokalemia            Nimesh Rivas MD  19        Nimesh Rivas MD  07/09/19 1958

## 2019-07-10 NOTE — BRIEF OP NOTE
HIP CANNULATED SCREW PLACEMENT  Progress Note    Adelita Jaeger  7/10/2019    Pre-op Diagnosis:   Stable valgus impacted right femoral neck fracture       Post-Op Diagnosis Codes:   Stable valgus impacted right femoral neck fracture    Procedure/CPT® Codes:      Procedure(s):  RIGHT HIP CANNULATED SCREW PLACEMENT    Surgeon(s):  Aramis Navarro MD    Anesthesia: General    Staff:   Circulator: Zuleyka Fuentes RN  Scrub Person: Chris Wallace; Radha Florez  Vendor Representative: Wilmer Thurman    Estimated Blood Loss: < 20 mL    Urine Voided: * No values recorded between 7/10/2019 12:05 PM and 7/10/2019 12:54 PM *    Specimens:                None          Drains:   Urethral Catheter (Active)   Daily Indications Required activity restriction from trauma, surgery, (e.g. unstable spine, fracture, hemodynamics) 7/10/2019  3:00 AM   Site Assessment Clean;Skin intact 7/10/2019  3:00 AM   Collection Container Standard drainage bag 7/10/2019  3:00 AM   Securement Method Securing device 7/10/2019  3:00 AM   Catheter care complete Yes 7/10/2019  8:00 AM       Findings: Stable valgus impacted right femoral neck fracture    Complications: None      Aramis Navarro MD     Date: 7/10/2019  Time: 12:54 PM

## 2019-07-10 NOTE — PLAN OF CARE
Problem: Fall Risk (Adult)  Goal: Identify Related Risk Factors and Signs and Symptoms  Outcome: Outcome(s) achieved Date Met: 07/10/19    Goal: Absence of Fall  Outcome: Ongoing (interventions implemented as appropriate)      Problem: Fracture Orthopaedic (Adult)  Goal: Signs and Symptoms of Listed Potential Problems Will be Absent, Minimized or Managed (Fracture Orthopaedic)  Outcome: Ongoing (interventions implemented as appropriate)      Problem: Patient Care Overview  Goal: Plan of Care Review  Outcome: Ongoing (interventions implemented as appropriate)   07/10/19 0523   Coping/Psychosocial   Plan of Care Reviewed With patient   Plan of Care Review   Progress no change   OTHER   Outcome Summary Pt. admitted with right femur fx, Percocet given for pain with relief, VSS, F/C in place, potassium 2.6 and replaced with IV and PO K+, sx scheduled for 1630.        Problem: Skin Injury Risk (Adult)  Goal: Identify Related Risk Factors and Signs and Symptoms  Outcome: Outcome(s) achieved Date Met: 07/10/19    Goal: Skin Health and Integrity  Outcome: Ongoing (interventions implemented as appropriate)

## 2019-07-10 NOTE — ANESTHESIA PREPROCEDURE EVALUATION
Anesthesia Evaluation     Patient summary reviewed   no history of anesthetic complications:  NPO Solid Status: > 8 hours  NPO Liquid Status: > 8 hours           Airway   Mallampati: I  TM distance: >3 FB  Neck ROM: full  Dental          Pulmonary    (+) a smoker (0.5-1 ppd) Current Abstained day of surgery, COPD, decreased breath sounds (globally decreased),   (-) asthma, recent URI, sleep apnea  Cardiovascular - normal exam  Exercise tolerance: good (4-7 METS)    ECG reviewed  Rhythm: regular  Rate: normal    (+) hypertension, hyperlipidemia,   (-) pacemaker, past MI, angina, cardiac stents, CABG      Neuro/Psych  (-) seizures, TIA, CVA  GI/Hepatic/Renal/Endo    (+)  GERD,    (-) liver disease, no renal disease, diabetes, hypothyroidism, hyperthyroidism    Musculoskeletal     Abdominal    Substance History   (+) alcohol use (reports 1-2 mixed drinks daily),      OB/GYN          Other                        Anesthesia Plan    ASA 3     general with block     intravenous induction   Anesthetic plan, all risks, benefits, and alternatives have been provided, discussed and informed consent has been obtained with: patient.

## 2019-07-10 NOTE — CONSULTS
Orthopaedic Inpatient Consultation    NAME:  Adelita Jeager   : 1970  MRN: [id number]    2019  5:26 PM    Requesting Physician: No ref. provider found    CHIEF COMPLAINT:  right hip pain    HISTORY OF PRESENT ILLNESS:   The patient is a 49 y.o. female who experienced mechanical fall onto her right side yesterday morning and has had persistent right hip pain with difficulty weightbearing since that time.  She initially was evaluated a local urgent care facility where x-rays were obtained demonstrating a valgus impacted right femoral neck fracture.  As a result, she was sent to our emergency department for evaluation.  Pain is located in the right hip/groin, rated a 3/5, dull and constant, worse with movement, better with rest and medication.  There are no associated symptoms.      Past Medical History:    Past Medical History:   Diagnosis Date   • COPD (chronic obstructive pulmonary disease) (CMS/[facility])    • GERD (gastroesophageal reflux disease)    • Hyperlipidemia    • Hypertension    • Potassium (K) deficiency        Past Surgical History:    Past Surgical History:   Procedure Laterality Date   • AUGMENTATION MAMMAPLASTY     • CARPAL TUNNEL RELEASE     •  SECTION     • CHOLECYSTECTOMY     • DILATATION AND CURETTAGE     • ENDOMETRIAL BIOPSY     • HERNIA REPAIR     • OTHER SURGICAL HISTORY      TIF procedure.        Current Medications:   Prior to Admission medications    Medication Sig Start Date End Date Taking? Authorizing Provider   aspirin 81 MG chewable tablet Chew 81 mg Daily.   Yes Marline Arguelles MD   busPIRone (BUSPAR) 5 MG tablet 5 mg Every Night. 19  Yes Marline Arguelles MD   Calcium Carb-Cholecalciferol (CALCIUM 600+D3) 600-200 MG-UNIT tablet Take 1 tablet by mouth Every 12 (Twelve) Hours.   Yes Marline Arguelles MD   citalopram (CeleXA) 20 MG tablet Take 20 mg by mouth Daily.   Yes Marline Arguelles MD   Flaxseed, Linseed, (FLAXSEED OIL PO) Take 1,000 mg by  mouth 2 (Two) Times a Day.   Yes Marline Arguelles MD   hydrochlorothiazide (HYDRODIURIL) 12.5 MG tablet Take 12.5 mg by mouth Daily.   Yes Marline Arguelles MD   Magnesium 500 MG tablet Take 1 tablet by mouth Every 12 (Twelve) Hours.   Yes Marline Arguelles MD   Multiple Vitamins-Minerals (MULTIVITAMIN ADULT PO) Take 1 tablet/day by mouth Daily.   Yes Marline Arguelles MD   mupirocin (BACTROBAN) 2 % nasal ointment Apply intranasally twice daily  Patient taking differently: 1 application into the nostril(s) as directed by provider 2 (Two) Times a Day. Apply intranasally twice daily 6/17/19  Yes Bianka Garcia APRN   potassium chloride (K-DUR,KLOR-CON) 20 MEQ CR tablet Take 20 mEq by mouth 2 (Two) Times a Day.   Yes Marline Arguelles MD   citalopram (CELEXA) 20 MG tablet Celexa 20 mg tablet   Take 1 tablet every day by oral route.  7/10/19  Marline Arguelles MD   hydrochlorothiazide (HYDRODIURIL) 25 MG tablet Take 25 mg by mouth Daily. Takes half of a pill daily  7/10/19  Emergency, Nurse Kehinde, RN   MANUEL-CON 10 MEQ CR tablet 20 mEq 2 (Two) Times a Day. 2/16/18 7/10/19  Marline Arguelles MD   mupirocin (BACTROBAN) 2 % ointment Apply intranasally bid 6/17/19 7/10/19  Bianka Garcia APRN       Allergies:  Penicillins    Social History:   Social History     Socioeconomic History   • Marital status: Single     Spouse name: Not on file   • Number of children: Not on file   • Years of education: Not on file   • Highest education level: Not on file   Tobacco Use   • Smoking status: Current Every Day Smoker     Packs/day: 0.50     Types: Cigarettes   • Smokeless tobacco: Never Used   Substance and Sexual Activity   • Alcohol use: Yes     Comment: occasional   • Drug use: No   • Sexual activity: Yes     Partners: Male     Birth control/protection: Post-menopausal     Comment: 12 years       Family History:   Family History   Problem Relation Age of Onset   • Breast cancer Maternal Aunt    • Breast  cancer Maternal Aunt    • Breast cancer Maternal Aunt    • Breast cancer Cousin    • Hypertension Father    • Diabetes Mother    • Hypertension Mother    • Hypothyroidism Mother    • Ovarian cancer Neg Hx    • Uterine cancer Neg Hx    • Colon cancer Neg Hx        REVIEW OF SYSTEMS:  14 point review of systems has been reviewed from the patient's emergency room visit, reviewed with the patient on today's date with no new changes.    PHYSICAL EXAM:      Physical Examination:  Vitals:   Vitals:    07/10/19 0029 07/10/19 0452 07/10/19 0621 07/10/19 0626   BP: 130/93 131/78     BP Location: Right arm Right arm     Patient Position: Lying Lying     Pulse: 92 87 85 82   Resp: 16 16 16 16   Temp: 99.4 °F (37.4 °C) 98.7 °F (37.1 °C)     TempSrc: Oral Oral     SpO2: 92% 91% 94%    Weight:       Height:         General:  Appears stated age, no distress.  Orientation:  Alert and oriented to time, place, and person.  Mood and Affect:  Cooperative and pleasant.  Gait:  Resting comfortably in bed.  Cardiovascular:  Symmetric 1-2 plus pulses in upper and lower extremities.  Lymph:  No cervical or inguinal lymphadenopathy noted.  Sensation:  Grossly intact to light touch.  DTR:  Normal, no pathologic reflexes.  Coordination/balance:  Normal    Musculoskeletal:  Right upper extremity exam:  There is no tenderness to palpation about the shoulder, elbow, wrist or hand.  Unrestricted full function motion is present.  Stability is normal with provocative tests, 5/5 strength, and skin is normal.      Left upper extremity exam:  There is no tenderness to palpation about the shoulder, elbow, wrist or hand.  Unrestricted full function motion is present.  Stability is normal with provocative tests, 5/5 strength, and skin is normal.     Right lower extremity exam:  There is no tenderness to palpation about the knee, ankle or foot, but the patient does have discomfort about the right hip.  She tolerates limited active and passive range of  motion through the right hip.  The overlying skin is intact and the surrounding muscle compartments are soft and compressible.  She does have discomfort with supine logroll, however.       Left lower extremity exam:  There is no tenderness to palpation about the hip, knee, ankle or foot.  Unrestricted full function motion is present.  Stability is normal with provocative tests, 5/5 strength, and skin is normal.      DATA:    CBC with Differential:    Lab Results   Component Value Date    WBC 7.75 07/10/2019    RBC 3.31 (L) 07/10/2019    HGB 10.3 (L) 07/10/2019    HCT 29.6 (L) 07/10/2019     07/10/2019    MCV 89.4 07/10/2019    MCH 31.1 07/10/2019    MCHC 34.8 07/10/2019    RDW 13.2 07/10/2019    LYMPHOPCT 13.3 (L) 07/09/2019    MONOPCT 5.1 07/09/2019    EOSPCT 1.0 07/09/2019    BASOPCT 1.0 07/09/2019    EOSABS 0.13 07/09/2019    BASOSABS 0.13 07/09/2019     CMP:    Lab Results   Component Value Date     07/10/2019    K 3.8 07/10/2019     07/10/2019    CO2 27.0 07/10/2019    BUN 6 07/10/2019    CREATININE 0.58 07/10/2019    AGRATIO 1.0 (L) 07/10/2019    GLUCOSE 99 07/10/2019    CALCIUM 8.3 (L) 07/10/2019    BILITOT 0.6 07/10/2019    ALKPHOS 123 (H) 07/10/2019    AST 52 (H) 07/10/2019    ALT 31 07/10/2019     BMP:    Lab Results   Component Value Date     07/10/2019    K 3.8 07/10/2019     07/10/2019    CO2 27.0 07/10/2019    BUN 6 07/10/2019    CREATININE 0.58 07/10/2019    CALCIUM 8.3 (L) 07/10/2019    GLUCOSE 99 07/10/2019       ----------------------------------------------------------------------------------------------------------------------  I have reviewed the radiology images above and agree with the findings dictated below    Radiology:   Imaging Results (last 24 hours)     Procedure Component Value Units Date/Time    XR Hip With or Without Pelvis 2 - 3 View Right [[id number]] Collected:  07/09/19 1819     Updated:  07/09/19 1823    Narrative:       EXAMINATION:  XR HIP W OR WO  PELVIS 2-3 VIEW RIGHT-  7/9/2019 5:58 PM  CDT     HISTORY: The patient fell.     COMPARISON: No comparison study.     TECHNIQUE: AP and crosstable lateral views of the right hip were  supplemented with an AP image of the pelvis.     FINDINGS:  There is an acute subcapital fracture of the right femoral  neck with no significant displacement or angulation. The hip joint  spaces are well-maintained. No pelvic fracture is seen.       Impression:       Acute nondisplaced subcapital right femoral neck fracture  without displacement or angulation.              This report was finalized on 07/09/2019 18:20 by Dr. Marcos Boles MD.    XR Chest 1 View [[id number]] Collected:  07/09/19 1818     Updated:  07/09/19 1822    Narrative:       EXAMINATION:  XR CHEST 1 VW-  7/9/2019 5:57 PM CDT     HISTORY: Preoperative chest x-ray.     COMPARISON: No comparison study.     FINDINGS:  The lungs are expanded bilaterally and clear. There is old  granulomatous disease. The pleural spaces are clear. Heart size is  normal.  No acute bony abnormality is seen.       Impression:       No active disease is seen.        This report was finalized on 07/09/2019 18:18 by Dr. Marcos Boles MD.          ----------------------------------------------------------------------------------------------------------------------  Assessment: Valgus impacted right femoral neck fracture    Plan:  1) to OR for percutaneous screw fixation of right valgus impacted femoral neck fracture - we discussed the risks, benefits, and alternatives and the patient wishes to proceed  2) Admit postop for pain control, PT/OT  3) n.p.o. and strict bedrest presently    Electronically signed by Aramis Navarro MD on 7/10/2019 at 7:18 AM

## 2019-07-10 NOTE — PROGRESS NOTES
Discharge Planning Assessment  [facility]     Patient Name: Adelita Jaeger  MRN: [id number]  Today's Date: 7/10/2019    Admit Date: 7/9/2019    Discharge Needs Assessment     Row Name 07/10/19 1051       Living Environment    Lives With  significant other    Name(s) of Who Lives With Patient  ELISHA    Current Living Arrangements  home/apartment/condo    Primary Care Provided by  self    Provides Primary Care For  no one    Family Caregiver if Needed  significant other    Family Caregiver Names  ELISHA    Quality of Family Relationships  helpful;involved    Able to Return to Prior Arrangements  other (see comments)    Living Arrangement Comments  PT IS UNSURE OF DC PLAN. PT IS GOING FOR SURGERY LATER TODAY       Resource/Environmental Concerns    Resource/Environmental Concerns  none    Transportation Concerns  car, none       Transition Planning    Patient/Family Anticipates Transition to  home with family;home with help/services;inpatient rehabilitation facility;long term care facility    Patient/Family Anticipated Services at Transition  home health care;skilled nursing;rehabilitation services    Transportation Anticipated  family or friend will provide       Discharge Needs Assessment    Readmission Within the Last 30 Days  no previous admission in last 30 days    Concerns to be Addressed  discharge planning    Equipment Currently Used at Home  none    Anticipated Changes Related to Illness  inability to care for self    Equipment Needed After Discharge  other (see comments) UNKNOWN AT PRESENT TIME. PT GOING FOR SURGERY TODAY    Outpatient/Agency/Support Group Needs  homecare agency;skilled nursing facility;inpatient rehabilitation facility    Discharge Facility/Level of Care Needs  nursing facility, skilled;home with home health;rehabilitation facility    Discharge Coordination/Progress  SPOKE TO PT AND HER SIGNIFICANT OTHER (ELISHA) IN ROOM REGARDING DC PLANNING. PT STATES SHE IS GOING FOR SURGERY LATER TODAY AND IS  UNSURE WHAT SHE WILL NEED POST OP. BRIEFLY DISCUSSED REHAB/HH. PT WILL NEED POST OP FOLLOW UP AFTER THERAPY EVALUATES TO HELP DETERMINE APPROPRIATE DC PLAN. PT INSURANCE WILL REQUIRE A PREAUTH IF PLACEMENT DESIRED.         Discharge Plan    No documentation.       Destination      No service coordination in this encounter.      Durable Medical Equipment      No service coordination in this encounter.      Dialysis/Infusion      No service coordination in this encounter.      Home Medical Care      No service coordination in this encounter.      Therapy      No service coordination in this encounter.      Community Resources      No service coordination in this encounter.          Demographic Summary    No documentation.       Functional Status    No documentation.       Psychosocial    No documentation.       Abuse/Neglect    No documentation.       Legal    No documentation.       Substance Abuse    No documentation.       Patient Forms    No documentation.           ENA Mauro

## 2019-07-10 NOTE — DISCHARGE INSTRUCTIONS
Lower Extremity Post-op Instructions  Dr. BONNER      POST-OP CARE: Please follow these instructions closely!    IMPORTANT PHONE NUMBERS:  • For emergencies, please call 911  • You may reach Dr. Bonner or his medical assistant Di Cuevas or Barbara Denny at [phone], M-F 8:0am-5:00pm  • After 5pm or on the weekends, please call the answering service which can be reached from the number above   • Call immediately if you have any of the following symptoms:  - Elevated temperature above 101.5 degrees for more than 48 hours after surgery  - Persistent drainage  from wound  - Severe pain around surgical site  - Calf pain    Weight Bearing:   __X___ Touch-Toe Weight-bearing     Bathing:  If stated below, it is ok to remove your postop dressing and shower.  DO NOT SOAK the incision in water.  Pat the incision site dry after surgery  _X__ You may remove your dressing on the seventh day following surgery and shower on the 4th day following surgery; if you shower before this, please cover your incision and dressing thoroughly    Dressings: Do NOT remove dressing/splint unless unless told to do so. SOME DRAINAGE IS NORMAL!  • If you have a splint or cast, do NOT get wet!!    • DO NOT touch, remove, or apply ointment to the incision and/or steri strips  • Steri strips may fall off on their own  • Signs of infection that warrant a phone call to our clinical line:  o Excessive drainage or redness  o Red streaking coming away from the incision  o Increased pain  o Increased temperature above 101 degrees    Sutures:  If your physician uses sutures in your knee or ankle, they will dissolve on their own and will not need to be removed.  Black sutures occasionally used will need to be removed 10-14 days after surgery.    Elevate: Place 2 pillows under your ankle to get the incision area above the level of the heart to help in swelling (a recliner is not elevated!!!)    Ice: Ice your surgery site 5-6 times per day for 20 minutes  at a time with dressing in place. You should wait at least 30 minutes before icing again to avoid ice irritation. It may be difficult at first to ice the surgery area due to the amount of dressing, but continue to be diligent with icing.  Your dressings will be taken down at your first post-op appointment.     Range of motion:   - For the knee- It is important to gain gain full extension (knee straight) as soon as possible following surgery.         Ice to decrease swelling --> Knee fully straight --> Walk without a limp!!!  - NO PILLOWS UNDER THE KNEE, ONLY under the ankle  - For the foot/ankle- range of motion restrictions will be given to you at your first post-op appointment. Until that time, avoid any unnecessary range o f motion.  - Physical therapy- Your physical therapy status will be discussed with you at your first post-op appointment.   **Achieving range of motion goals and decreasing swelling/inflammation are the primary focus for the first two (2) weeks following surgery. **    Medications: You will be discharged with the appropriate medications following your surgery. Fill these at the pharmacy and take them as directed on the label.   Possible medications that will be prescribed are below.  You may or may not receive all of these. Occasionally, additional medications may be given with specific instructions.  Percocet/Lortab (oxycodone/hydrocodone with tylenol) - Pain Medication.  o Take one tablet every 4-6 hours. DO NOT EXCEED 4,000mg of Tylenol in 24 hours.        **Itching is not an allergy - take benadryl or an over the counter allergy medication (claritin, Zyrtec) if needed  **DO NOT MIX WITH ALCOHOL, DRIVE WHILE TAKING, OR TAKE EXTRA TYLENOL*   Zofran - Anti-nausea medication to help prevent nausea and vomiting after surgery.     Xarelto 10 mg - all patients with lower extremity surgery should take one 10 mg tablet daily for 21 days after surgery; after Xarelto, take one aspirin 325 mg tablet  every 12 hours (twice daily) for additional 3 weeks    DO NOT TAKE NSAID'S (ex. IBUPROFEN, MOTRIN, ALEVE, ETC) AFTER A BROKEN BONE HAS BEEN REPAIRED OR AFTER ACL SURGERY - THESE MEDICATIONS WILL SLOW THE HEALING PROCESS!!!    **If you are running low on pain medications, please notify us if you need a refill 24-48 hours prior to when you run out, so we can make arrangements to refill the prescription for you if we determine it is necessary**

## 2019-07-10 NOTE — OP NOTE
Patient Name: Connor  MRN: [id number]  : 1970    DATE of SURGERY: 7/10/2019    SURGEON: Aramis Navarro MD    ASSISTANT: NONE    PREOPERATIVE DIAGNOSIS: Acute traumatic nondisplaced subcapital fracture of the neck of the right femur, initial encounter for closed fracture    POSTOPERATIVE DIAGNOSIS: SAME    PROCEDURE PERFORMED: Percutaneous screw fixation of right minimally displaced subcapital femoral neck fracture      IMPLANTS: Synthes 6.5 cannulated screws    ANESTHESIA USED: General endotrachial anesthesia    OPERATIVE INDICATIONS: 49 y.o. female status post fall onto her right side with difficulty weightbearing thereafter with x-rays demonstrating a valgus impacted right femoral neck fracture.  Surgical indications include fracture displacement, stabilization of fracture, and mobilization of the patient.  Risks include, but are not limited to, anesthesia, bleeding, infection, pain, damage to local structures, need for further surgery, malunion, nonunion, fracture displacement, failure of hardware, intraoperative death.  Risks, benefits, and alternative were discussed and the patient wishes to proceed with surgery.    ESTIMATED BLOOD LOSS: < 20 mL    DRAINS: none     COMPLICATIONS: none    SPECIMENS: none    FINDINGS: see op note    PROCEDURE in DETAIL:  The patient was seen in the preoperative holding room, the informed consent was reviewed and signed, and the correct operative extremity marked with the patient’s agreement.  The patient was transported to the operating room, where a timeout was performed identifying the correct patient and operative site.  Perioperative antibiotics were administered prior to incision. C-arm images were utilized noting the fracture to remain minimally displaced and therefore perc screws were chosen.    After a sterile prep and drape, threaded 3.2 mm k-wires were placed in strategic fashion from the lateral aspect of the proximal femur into the femoral head in an  inverted triangle fashion with the inferior screw being placed centrally and the superior screws being placed anteriorly and posteriorly.  K-wires were measured and the path of the screws were drilled.  The appropriately sized partially threaded 6.5 cannulated screws were inserted gaining excellent purchase.    C-arm images verified all implants were well contained within the femoral head as an approach-withdraw technique was utilized.      The incisions were irrigated, closed in layers and the skin was sealed with a Dermabond adhesive skin dressing.  A sterile dressing was placed, the patient awakened from anesthesia, transported to the PACU in stable condition.      POSTOPERATIVE PLAN:  1) TTWB RLE x 6 weeks  2) DVT prophylaxis x 6 weeks  3) PT/OT    Electronically signed by Aramis Navarro MD on 7/10/2019 at 7:24 AM

## 2019-07-10 NOTE — PROGRESS NOTES
[facility] Medicine Services  INPATIENT PROGRESS NOTE    Length of Stay: 1  Date of Admission: 7/9/2019  Primary Care Physician: Rajan Rodriguez MD    Subjective   Chief Complaint: right hip pain after fall  HPI   Lying in bed.  She got up to go to the bathroom at 230 yesterday morning and her big toe on her right foot got caught in the carpet and she fell onto the carpet landing on her right side.  She was able to get up and continue to walk.  However, she continued to have worsening pain throughout the day and went to [facility] and was advised to present to the emergency room.  X-ray noted nondisplaced right femoral neck fracture.    Lying in bed.  Dr. Navarro has seen this morning and plans for surgical intervention today.  Sodium 130 on admission and improved to 135.  She denies chest pain, palpitations or shortness of breath.  Reports right hip pain level 6 out of 10.  She denies nausea, vomiting or abdominal pain.  She does smoke half pack cigarettes per day.  Hold HCTZ due to hyponatremia.    Review of Systems   Constitutional: Positive for activity change (After fall). Negative for fatigue and fever.   HENT: Negative for congestion and trouble swallowing.    Eyes: Negative for photophobia and visual disturbance.   Respiratory: Negative for cough, shortness of breath and wheezing.    Cardiovascular: Negative for chest pain, palpitations and leg swelling.   Gastrointestinal: Negative for constipation, diarrhea, nausea and vomiting.   Endocrine: Negative for cold intolerance, heat intolerance and polyuria.   Genitourinary: Negative for dysuria and urgency.   Musculoskeletal: Positive for gait problem (After fall).   Skin: Negative for wound.   Allergic/Immunologic: Negative for immunocompromised state.   Neurological: Negative for weakness.   Hematological: Negative for adenopathy. Does not bruise/bleed easily.   Psychiatric/Behavioral: Negative for agitation,  behavioral problems and confusion.      All pertinent negatives and positives are as above. All other systems have been reviewed and are negative unless otherwise stated.     Objective    Temp:  [98.7 °F (37.1 °C)-99.5 °F (37.5 °C)] 98.7 °F (37.1 °C)  Heart Rate:  [82-97] 82  Resp:  [15-16] 16  BP: (130-140)/(78-98) 131/78  Physical Exam   Constitutional: She is oriented to person, place, and time. She appears well-developed and well-nourished.   Lying in bed no acute distress.   HENT:   Head: Normocephalic and atraumatic.   Eyes: EOM are normal. Pupils are equal, round, and reactive to light.   Neck: Normal range of motion. Neck supple.   Cardiovascular: Normal rate, regular rhythm, normal heart sounds and intact distal pulses. Exam reveals no gallop and no friction rub.   No murmur heard.  Normal sinus rhythm  on telemetry   Pulmonary/Chest: Effort normal and breath sounds normal. No respiratory distress. She has no wheezes. She has no rales.   No oxygen in use.   Abdominal: Soft. Bowel sounds are normal. She exhibits no distension. There is no tenderness.   Genitourinary:   Genitourinary Comments: Mcdowell catheter in place with clear urine return   Musculoskeletal: She exhibits tenderness (Right hip).   Right lower extremity mild external rotation   Neurological: She is alert and oriented to person, place, and time.   Skin: Skin is warm and dry.   Psychiatric: She has a normal mood and affect. Her behavior is normal. Judgment and thought content normal.     Results Review:  I have reviewed the labs, radiology results, and diagnostic studies.    Laboratory Data:   Results from last 7 days   Lab Units 07/10/19  0531 07/09/19  1816   WBC 10*3/mm3 7.75 13.49*   HEMOGLOBIN g/dL 10.3* 11.9*   HEMATOCRIT % 29.6* 33.2*   PLATELETS 10*3/mm3 284 325        Results from last 7 days   Lab Units 07/10/19  0531 07/09/19  1816   SODIUM mmol/L 135 130*   POTASSIUM mmol/L 3.8 2.6*   CHLORIDE mmol/L 104 92*   CO2 mmol/L 27.0  29.0   BUN mg/dL 6 9   CREATININE mg/dL 0.58 0.53   CALCIUM mg/dL 8.3* 8.8   BILIRUBIN mg/dL 0.6 0.6   ALK PHOS U/L 123* 152*   ALT (SGPT) U/L 31 36   AST (SGOT) U/L 52* 77*   GLUCOSE mg/dL 99 92        Imaging Results (all)     Procedure Component Value Units Date/Time    XR Hip With or Without Pelvis 2 - 3 View Right [[id number]] Collected:  07/09/19 1819     Updated:  07/09/19 1823    Narrative:       EXAMINATION:  XR HIP W OR WO PELVIS 2-3 VIEW RIGHT-  7/9/2019 5:58 PM  CDT     HISTORY: The patient fell.     COMPARISON: No comparison study.     TECHNIQUE: AP and crosstable lateral views of the right hip were  supplemented with an AP image of the pelvis.     FINDINGS:  There is an acute subcapital fracture of the right femoral  neck with no significant displacement or angulation. The hip joint  spaces are well-maintained. No pelvic fracture is seen.       Impression:       Acute nondisplaced subcapital right femoral neck fracture  without displacement or angulation.              This report was finalized on 07/09/2019 18:20 by Dr. Marcos Boles MD.    XR Chest 1 View [[id number]] Collected:  07/09/19 1818     Updated:  07/09/19 1822    Narrative:       EXAMINATION:  XR CHEST 1 VW-  7/9/2019 5:57 PM CDT     HISTORY: Preoperative chest x-ray.     COMPARISON: No comparison study.     FINDINGS:  The lungs are expanded bilaterally and clear. There is old  granulomatous disease. The pleural spaces are clear. Heart size is  normal.  No acute bony abnormality is seen.       Impression:       No active disease is seen.        This report was finalized on 07/09/2019 18:18 by Dr. Marcos Boles MD.        Intake/Output    Intake/Output Summary (Last 24 hours) at 7/10/2019 0741  Last data filed at 7/9/2019 232  Gross per 24 hour   Intake 120 ml   Output --   Net 120 ml       Scheduled Meds    busPIRone 5 mg Oral TID   citalopram 20 mg Oral Daily   ipratropium-albuterol 3 mL Nebulization 4x Daily - RT   mupirocin  Topical  Q12H   nicotine 1 patch Transdermal Q24H   potassium chloride 20 mEq Oral BID With Meals   sodium chloride 3 mL Intravenous Q12H       I have reviewed the patient current medications.     Assessment/Plan     Active Hospital Problems    Diagnosis   • **Closed fracture of right hip (CMS/HCC)   • Fall   • Hyponatremia   • Tobacco abuse   • Acute pain due to injury   • Gastroesophageal reflux disease   • Hypokalemia     Plan:  1.  Acute nondisplaced subcapital right femoral neck fracture  2.  Nothing by mouth  3.  Orthopedic consult.  Dr. Navarro has seen this morning and plans for surgery today.  4.  IV fluids at 75 mL/h.  Sodium 130 on admission and improved to 135.  5.  Hold hydrochlorothiazide likely contributing to hyponatremia  6.  Physical therapy and Occupational Therapy consults postop.  7.  Social service consult for discharge planning.  Will need SNF.  8.  Incentive spirometry every 2-4 hours while awake  9.  SCD for deep vein thrombosis prophylaxis preop   10.  Percocet orally for pain.  Dilaudid IV for severe pain  11.  CBC, basic metabolic panel tomorrow to monitor hyponatremia    Her daughter Barbara Vinson will act as her surrogate decision-maker.  The above documentation resulted from a face-to-face encounter by me Olimpia VALLEJO, [facility].    Discharge Planning: I expect patient to be discharged to SNF in 2 days.    GENEVIEVE Timmons   07/10/19   7:41 AM    I personally evaluated and examined the patient in conjunction with GENEVIEVE Haynes and agree with the assessment, treatment plan, and disposition of the patient as recorded by her. My history, exam, and further recommendations are: I have reviewed and agree with the plans. Surjit.

## 2019-07-10 NOTE — H&P
[facility] Medicine Services  HISTORY AND PHYSICAL    Date of Admission: 2019  Primary Care Physician: Rajan Rodriguez MD    Subjective     Chief Complaint: Severe right hip pain    History of Present Illness  Adelita Del Cid is a 49-year-old female with a past medical history of COPD with ongoing tobacco use, chronic hypokalemia, hypertension, hyperlipidemia and gastroesophageal reflux disease.  Patient states today she tripped landing on her right side causing severe pain however she was still able to walk.  Due to pain she did seek evaluation at orthopedic [place] however they instructed her to seek evaluation at [facility] emergency department.  ER evaluation reveals acute nondisplaced right femoral fracture.  Dr. Navarro will follow.  Other findings sodium 130, potassium 2.6, mild transaminitis.  Patient states she does have a mixed drink daily.  She also reports chronic smoker's cough.  She has no chest pain or shortness of breathing.  She will reports pain is scored 25 on a scale of 1-10 with movement and consistently 6 or 7 on a scale of 1-10 at rest.  She has no other complaints.  Home medication review reveals chronic HCTZ use, will hold for now to hopefully improve potassium and sodium.  She is admitted for further evaluation treatment.    Review of Systems   A 10 point review of systems was completed, all negative except for those discussed in HPI    Past Medical History:   Past Medical History:   Diagnosis Date   • COPD (chronic obstructive pulmonary disease) (CMS/[facility])    • GERD (gastroesophageal reflux disease)    • Hyperlipidemia    • Hypertension    • Potassium (K) deficiency        Past Surgical History:   Past Surgical History:   Procedure Laterality Date   • AUGMENTATION MAMMAPLASTY     • CARPAL TUNNEL RELEASE     •  SECTION     • CHOLECYSTECTOMY     • DILATATION AND CURETTAGE     • ENDOMETRIAL BIOPSY     • HERNIA REPAIR     •  OTHER SURGICAL HISTORY      TIF procedure.        Family History: family history includes Breast cancer in her cousin, maternal aunt, maternal aunt, and maternal aunt; Diabetes in her mother; Hypertension in her father and mother; Hypothyroidism in her mother.    Social History:  reports that she has been smoking cigarettes.  She has been smoking about 0.50 packs per day. She has never used smokeless tobacco. She reports that she drinks alcohol. She reports that she does not use drugs.    Code Status: Full, if unable speak for herself her  will speak for her      Allergies:  Allergies   Allergen Reactions   • Penicillins Unknown (See Comments)     As a child       Medications:  Prior to Admission medications    Medication Sig Start Date End Date Taking? Authorizing Provider   aspirin 81 MG chewable tablet Chew 81 mg Daily.    Marline Arguelles MD   busPIRone (BUSPAR) 5 MG tablet  4/25/19   Marline Arguelles MD   Calcium Carb-Cholecalciferol (CALCIUM 600+D3) 600-200 MG-UNIT tablet Every 12 (Twelve) Hours.    Marline Arguelles MD   citalopram (CELEXA) 20 MG tablet Celexa 20 mg tablet   Take 1 tablet every day by oral route.    Marline Arguelles MD   Flaxseed, Linseed, (FLAXSEED OIL PO) Take  by mouth.    Marline Arguelles MD   hydrochlorothiazide (HYDRODIURIL) 25 MG tablet Take 25 mg by mouth Daily. Takes half of a pill daily    Emergency, Nurse Kehinde, MATTIE JACKSON-TANJA 10 MEQ CR tablet  2/16/18   Marline Arguelles MD   Magnesium 500 MG tablet Every 12 (Twelve) Hours.    Marline Arguelles MD   Multiple Vitamins-Minerals (MULTIVITAMIN ADULT PO) Take 1 tablet/day by mouth Daily.    Marline Arguelles MD   mupirocin (BACTROBAN) 2 % nasal ointment Apply intranasally twice daily 6/17/19   Bianka Garcia APRN   mupirocin (BACTROBAN) 2 % ointment Apply intranasally bid 6/17/19   Bianka Garcia APRN       Objective     /98   Pulse 89   Temp 99.2 °F (37.3 °C) (Oral)   Resp 15   Ht 149.9  "cm (59\")   Wt 40.8 kg (90 lb)   SpO2 100%   BMI 18.18 kg/m²   Physical Exam   Constitutional: She is oriented to person, place, and time. She appears well-developed and well-nourished. No distress.   HENT:   Head: Normocephalic and atraumatic.   Eyes: Conjunctivae and EOM are normal. Pupils are equal, round, and reactive to light. No scleral icterus.   Neck: Normal range of motion. Neck supple. No JVD present. No tracheal deviation present.   Cardiovascular: Normal rate, regular rhythm, normal heart sounds and intact distal pulses. Exam reveals no gallop.   No murmur heard.  Pulmonary/Chest: Effort normal and breath sounds normal. No respiratory distress. She has no wheezes. She has no rales.   Abdominal: Soft. Bowel sounds are normal. She exhibits no distension. There is no tenderness. There is no guarding.   Musculoskeletal: She exhibits no edema.   Severe pain with movement   Neurological: She is alert and oriented to person, place, and time.   No obvious deficits noted.   Skin: Skin is warm and dry. No rash noted. She is not diaphoretic. No erythema. No pallor.   Psychiatric: She has a normal mood and affect. Her behavior is normal.   Vitals reviewed.      Pertinent Data:   Lab Results (last 72 hours)     Procedure Component Value Units Date/Time    Manual Differential [[id number]]  (Abnormal) Collected:  07/09/19 1816    Specimen:  Blood Updated:  07/09/19 1902     Neutrophil % 79.6 %      Lymphocyte % 13.3 %      Monocyte % 5.1 %      Eosinophil % 1.0 %      Basophil % 1.0 %      Neutrophils Absolute 10.74 10*3/mm3      Lymphocytes Absolute 1.79 10*3/mm3      Monocytes Absolute 0.69 10*3/mm3      Eosinophils Absolute 0.13 10*3/mm3      Basophils Absolute 0.13 10*3/mm3      RBC Morphology Normal     WBC Morphology Normal     Platelet Morphology Normal    CBC Auto Differential [[id number]]  (Abnormal) Collected:  07/09/19 1816    Specimen:  Blood Updated:  07/09/19 1902     WBC 13.49 10*3/mm3      RBC 3.82 " 10*6/mm3      Hemoglobin 11.9 g/dL      Hematocrit 33.2 %      MCV 86.9 fL      MCH 31.2 pg      MCHC 35.8 g/dL      RDW 13.2 %      RDW-SD 41.5 fl      MPV 10.3 fL      Platelets 325 10*3/mm3     Comprehensive Metabolic Panel [[id number]]  (Abnormal) Collected:  07/09/19 1816    Specimen:  Blood Updated:  07/09/19 1849     Glucose 92 mg/dL      BUN 9 mg/dL      Creatinine 0.53 mg/dL      Sodium 130 mmol/L      Potassium 2.6 mmol/L      Chloride 92 mmol/L      CO2 29.0 mmol/L      Calcium 8.8 mg/dL      Total Protein 6.6 g/dL      Albumin 3.60 g/dL      ALT (SGPT) 36 U/L      AST (SGOT) 77 U/L      Alkaline Phosphatase 152 U/L      Total Bilirubin 0.6 mg/dL      eGFR Non African Amer 122 mL/min/1.73      Globulin 3.0 gm/dL      A/G Ratio 1.2 g/dL      BUN/Creatinine Ratio 17.0     Anion Gap 9.0 mmol/L     Narrative:       GFR Normal >60  Chronic Kidney Disease <60  Kidney Failure <15    Protime-INR [[id number]]  (Normal) Collected:  07/09/19 1816    Specimen:  Blood Updated:  07/09/19 1841     Protime 13.1 Seconds      INR 0.96    aPTT [[id number]]  (Normal) Collected:  07/09/19 1816    Specimen:  Blood Updated:  07/09/19 1841     PTT 32.0 seconds         Imaging Results (last 24 hours)     Procedure Component Value Units Date/Time    XR Hip With or Without Pelvis 2 - 3 View Right [[id number]] Collected:  07/09/19 1819     Updated:  07/09/19 1823    Narrative:       EXAMINATION:  XR HIP W OR WO PELVIS 2-3 VIEW RIGHT-  7/9/2019 5:58 PM  CDT     HISTORY: The patient fell.     COMPARISON: No comparison study.     TECHNIQUE: AP and crosstable lateral views of the right hip were  supplemented with an AP image of the pelvis.     FINDINGS:  There is an acute subcapital fracture of the right femoral  neck with no significant displacement or angulation. The hip joint  spaces are well-maintained. No pelvic fracture is seen.       Impression:       Acute nondisplaced subcapital right femoral neck fracture  without displacement or  "angulation.              This report was finalized on 07/09/2019 18:20 by Dr. Marcos Boles MD.    XR Chest 1 View [[id number]] Collected:  07/09/19 1818     Updated:  07/09/19 1822    Narrative:       EXAMINATION:  XR CHEST 1 VW-  7/9/2019 5:57 PM CDT     HISTORY: Preoperative chest x-ray.     COMPARISON: No comparison study.     FINDINGS:  The lungs are expanded bilaterally and clear. There is old  granulomatous disease. The pleural spaces are clear. Heart size is  normal.  No acute bony abnormality is seen.       Impression:       No active disease is seen.        This report was finalized on 07/09/2019 18:18 by Dr. Marcos Boles MD.          I have personally reviewed and interpreted the radiology studies and ECG obtained at time of admission.     Assessment / Plan     Assessment:   Active Hospital Problems    Diagnosis   • Closed fracture of right hip (CMS/HCC)   • Hyponatremia   • Tobacco abuse   • Acute pain due to injury   • Hypokalemia       Plan:   1.  Admit as inpatient  2.  Dr. Navarro with orthopedics will follow, probable OR oral  3.  PRN CIWA protocol  4.  Home medications reviewed and restarted as appropriate  5.  Replace potassium  6.  Pain control  7.  Pulmonary toileting in preparation for surgery  8.  Labs in a.m.  9.  DVT prophylaxis with SCDs    I discussed the patient's findings and my recommendations with: Chris Sequeira DO  Time spent 40 minutes      GENEVIEVE Desai  07/09/19   9:00 PM     I personally evaluated and examined the patient in conjunction with GENEVIEVE Luna and agree with the assessment, treatment plan, and disposition of the patient as recorded by her. My history, exam, and further recommendations are:     Seen and discussed with her \"partner\" who might as well be her  because they have been together for so long.    She got up to go to the bathroom in the night and then went to throw something away in the trash can.  She got her foot stuck and " fell backwards.  She immediately had discomfort in her right hip.  However, she did not believe that she had fractured her hip and continued to ambulate on this despite severe pain.  She finally decided to go to the [facility] and had plain films confirming a fracture.  They sent her to the emergency department to be admitted.    Dr. Navarro is a signed her case.  He plans to take her to the operative suite tomorrow for surgical intervention.  Nothing by mouth after midnight.  Pain control in the interim.    She incidentally is found to be hyponatremic and hypokalemic.  She does take hydrochlorothiazide.  We will stop this medication and follow.    She has been educated on tobacco cessation.    Chris Sequeira,   07/09/19  10:13 PM

## 2019-07-10 NOTE — ANESTHESIA PROCEDURE NOTES
Airway  Urgency: elective    Airway not difficult    General Information and Staff    Patient location during procedure: OR  CRNA: Eye, Gladis, CRNA    Indications and Patient Condition  Indications for airway management: airway protection    Preoxygenated: yes  Mask difficulty assessment: 1 - vent by mask    Final Airway Details  Final airway type: endotracheal airway      Successful airway: ETT  Cuffed: yes   Successful intubation technique: direct laryngoscopy  Facilitating devices/methods: intubating stylet  Endotracheal tube insertion site: oral  Blade: Geovanna  Blade size: 3.5.  ETT size (mm): 7.0  Cormack-Lehane Classification: grade I - full view of glottis  Placement verified by: chest auscultation and capnometry   Cuff volume (mL): 5  Measured from: lips  ETT to lips (cm): 21  Number of attempts at approach: 1

## 2019-07-10 NOTE — ANESTHESIA POSTPROCEDURE EVALUATION
Patient: Adelita Jaeger    Procedure Summary     Date:  07/10/19 Room / Location:   [facility] OR  /  [facility]    Anesthesia Start:  1208 Anesthesia Stop:  1312    Procedure:  RIGHT HIP CANNULATED SCREW PLACEMENT (Right Hip) Diagnosis:      Surgeon:  Aramis Navarro MD Provider:  Gladis Huber CRNA    Anesthesia Type:  general with block ASA Status:  3          Anesthesia Type: general with block  Last vitals  BP   101/63 (07/10/19 1359)   Temp   97.5 °F (36.4 °C) (07/10/19 1359)   Pulse   86 (07/10/19 1440)   Resp   16 (07/10/19 1440)     SpO2   98 % (07/10/19 1435)     Post Anesthesia Care and Evaluation    Patient location during evaluation: PACU  Level of consciousness: awake  Pain management: adequate  Airway patency: patent  Anesthetic complications: No anesthetic complications  PONV Status: none  Cardiovascular status: acceptable  Respiratory status: acceptable  Hydration status: acceptable

## 2019-07-11 LAB
ANION GAP SERPL CALCULATED.3IONS-SCNC: 4 MMOL/L (ref 4–13)
BUN BLD-MCNC: 4 MG/DL (ref 5–21)
BUN/CREAT SERPL: 7.5 (ref 7–25)
CALCIUM SPEC-SCNC: 9.1 MG/DL (ref 8.4–10.4)
CHLORIDE SERPL-SCNC: 104 MMOL/L (ref 98–110)
CO2 SERPL-SCNC: 27 MMOL/L (ref 24–31)
CREAT BLD-MCNC: 0.53 MG/DL (ref 0.5–1.4)
GFR SERPL CREATININE-BSD FRML MDRD: 122 ML/MIN/1.73
GLUCOSE BLD-MCNC: 121 MG/DL (ref 70–100)
HCT VFR BLD AUTO: 29.1 % (ref 37–47)
HGB BLD-MCNC: 9.8 G/DL (ref 12–16)
POTASSIUM BLD-SCNC: 4.7 MMOL/L (ref 3.5–5.3)
SODIUM BLD-SCNC: 135 MMOL/L (ref 135–145)

## 2019-07-11 PROCEDURE — 85014 HEMATOCRIT: CPT | Performed by: ORTHOPAEDIC SURGERY

## 2019-07-11 PROCEDURE — 97161 PT EVAL LOW COMPLEX 20 MIN: CPT

## 2019-07-11 PROCEDURE — 94760 N-INVAS EAR/PLS OXIMETRY 1: CPT

## 2019-07-11 PROCEDURE — 94799 UNLISTED PULMONARY SVC/PX: CPT

## 2019-07-11 PROCEDURE — 97165 OT EVAL LOW COMPLEX 30 MIN: CPT

## 2019-07-11 PROCEDURE — 80048 BASIC METABOLIC PNL TOTAL CA: CPT | Performed by: ORTHOPAEDIC SURGERY

## 2019-07-11 PROCEDURE — 25810000003 SODIUM CHLORIDE 0.9 % WITH KCL 20 MEQ 20-0.9 MEQ/L-% SOLUTION: Performed by: NURSE PRACTITIONER

## 2019-07-11 PROCEDURE — 85018 HEMOGLOBIN: CPT | Performed by: ORTHOPAEDIC SURGERY

## 2019-07-11 RX ORDER — RANITIDINE 150 MG/1
150 TABLET ORAL DAILY PRN
COMMUNITY
End: 2020-06-09

## 2019-07-11 RX ADMIN — RIVAROXABAN 10 MG: 10 TABLET, FILM COATED ORAL at 08:01

## 2019-07-11 RX ADMIN — IPRATROPIUM BROMIDE AND ALBUTEROL SULFATE 3 ML: 2.5; .5 SOLUTION RESPIRATORY (INHALATION) at 06:33

## 2019-07-11 RX ADMIN — FAMOTIDINE 20 MG: 20 TABLET, FILM COATED ORAL at 16:34

## 2019-07-11 RX ADMIN — OXYCODONE HYDROCHLORIDE 10 MG: 5 TABLET ORAL at 06:36

## 2019-07-11 RX ADMIN — CLINDAMYCIN IN 5 PERCENT DEXTROSE 900 MG: 18 INJECTION, SOLUTION INTRAVENOUS at 03:36

## 2019-07-11 RX ADMIN — POTASSIUM CHLORIDE 20 MEQ: 750 CAPSULE, EXTENDED RELEASE ORAL at 07:59

## 2019-07-11 RX ADMIN — POTASSIUM CHLORIDE 20 MEQ: 750 CAPSULE, EXTENDED RELEASE ORAL at 17:26

## 2019-07-11 RX ADMIN — POTASSIUM CHLORIDE AND SODIUM CHLORIDE 75 ML/HR: 900; 150 INJECTION, SOLUTION INTRAVENOUS at 21:31

## 2019-07-11 RX ADMIN — POTASSIUM CHLORIDE AND SODIUM CHLORIDE 75 ML/HR: 900; 150 INJECTION, SOLUTION INTRAVENOUS at 07:59

## 2019-07-11 RX ADMIN — OXYCODONE HYDROCHLORIDE 10 MG: 5 TABLET ORAL at 15:46

## 2019-07-11 RX ADMIN — IPRATROPIUM BROMIDE AND ALBUTEROL SULFATE 3 ML: 2.5; .5 SOLUTION RESPIRATORY (INHALATION) at 14:23

## 2019-07-11 RX ADMIN — MUPIROCIN: 20 OINTMENT TOPICAL at 21:58

## 2019-07-11 RX ADMIN — OXYCODONE HYDROCHLORIDE 10 MG: 5 TABLET ORAL at 01:34

## 2019-07-11 RX ADMIN — BUSPIRONE HYDROCHLORIDE 5 MG: 5 TABLET ORAL at 16:34

## 2019-07-11 RX ADMIN — OXYCODONE HYDROCHLORIDE 10 MG: 5 TABLET ORAL at 21:30

## 2019-07-11 RX ADMIN — BUSPIRONE HYDROCHLORIDE 5 MG: 5 TABLET ORAL at 21:31

## 2019-07-11 RX ADMIN — CITALOPRAM 20 MG: 20 TABLET, FILM COATED ORAL at 08:01

## 2019-07-11 RX ADMIN — SODIUM CHLORIDE, PRESERVATIVE FREE 3 ML: 5 INJECTION INTRAVENOUS at 08:04

## 2019-07-11 RX ADMIN — NICOTINE 1 PATCH: 14 PATCH, EXTENDED RELEASE TRANSDERMAL at 08:02

## 2019-07-11 RX ADMIN — FAMOTIDINE 20 MG: 20 TABLET, FILM COATED ORAL at 06:36

## 2019-07-11 RX ADMIN — MUPIROCIN: 20 OINTMENT TOPICAL at 08:03

## 2019-07-11 RX ADMIN — BUSPIRONE HYDROCHLORIDE 5 MG: 5 TABLET ORAL at 08:01

## 2019-07-11 NOTE — THERAPY EVALUATION
Acute Care - Occupational Therapy Initial Evaluation  [facility]     Patient Name: Adelita Jaeger  : 1970  MRN: [id number]  Today's Date: 2019  Onset of Illness/Injury or Date of Surgery: (P) 07/10/19  Date of Referral to OT: 07/10/19  Referring Physician: (P) Aramis Navarro MD    Admit Date: 2019       ICD-10-CM ICD-9-CM   1. Closed fracture of right hip, initial encounter (CMS/[facility]) S72.001A 820.8   2. Hypokalemia E87.6 276.8   3. Decreased activities of daily living (ADL) R68.89 780.99     Patient Active Problem List   Diagnosis   • Gastroesophageal reflux disease   • Hypokalemia   • Hypomagnesemia   • Hypoalbuminemia   • Smokes tobacco daily   • Closed fracture of right hip (CMS/[facility])   • Hyponatremia   • Tobacco abuse   • Acute pain due to injury   • Fall     Past Medical History:   Diagnosis Date   • COPD (chronic obstructive pulmonary disease) (CMS/[facility])    • GERD (gastroesophageal reflux disease)    • Hyperlipidemia    • Hypertension    • Potassium (K) deficiency      Past Surgical History:   Procedure Laterality Date   • AUGMENTATION MAMMAPLASTY     • CARPAL TUNNEL RELEASE     •  SECTION     • CHOLECYSTECTOMY     • DILATATION AND CURETTAGE     • ENDOMETRIAL BIOPSY     • HERNIA REPAIR     • HIP CANNULATED SCREW PLACEMENT Right 7/10/2019    Procedure: RIGHT HIP CANNULATED SCREW PLACEMENT;  Surgeon: Aramis Navarro MD;  Location: [facility];  Service: Orthopedics   • OTHER SURGICAL HISTORY      TIF procedure.           OT ASSESSMENT FLOWSHEET (last 12 hours)      Occupational Therapy Evaluation     Row Name 19 0815                   OT Evaluation Time/Intention    Subjective Information  complains of;pain;fatigue  -AC        Document Type  evaluation  -AC        Mode of Treatment  occupational therapy  -AC        Patient Effort  excellent  -AC           General Information    Patient Profile Reviewed?  yes  -AC        Onset of Illness/Injury or Date of Surgery  19  -AC         Referring Physician  Dr. Navarro  -        Patient Observations  alert;cooperative;agree to therapy  -        Patient/Family Observations  no family present  -        General Observations of Patient  lying in fowlers in bed, no apparent distress, pt very shaky with activity  -AC        Prior Level of Function  independent:;all household mobility;community mobility;gait;transfer;ADL's;home management;cooking;cleaning;driving;shopping  -        Equipment Currently Used at Home  grab bar;raised toilet  -        Pertinent History of Current Functional Problem  mechanical fall at home with R hip pain, tripped on carpet; Dx: acute traumatic nondisplaced subcapital fx on neck of R femur s/p percutaneous screw fixation of R femoral neck fx on 7/10/19  -        Existing Precautions/Restrictions  fall;partial weight bearing  (Significant)  TTWB RLE  -        Risks Reviewed  patient:;LOB;nausea/vomiting;dizziness;increased discomfort;change in vital signs;increased drainage;lines disloged  -        Benefits Reviewed  patient:;improve function;increase independence;increase strength;increase balance;decrease pain;decrease risk of DVT;improve skin integrity;increase knowledge  -        Barriers to Rehab  none identified  -           Relationship/Environment    Lives With  significant other;child(andrew), dependent  -           Resource/Environmental Concerns    Current Living Arrangements  home/apartment/condo  -           Home Main Entrance    Number of Stairs, Main Entrance  four  -AC        Stair Railings, Main Entrance  railings on both sides of stairs  -           Cognitive Assessment/Interventions    Additional Documentation  Cognitive Assessment/Intervention (Group)  -           Cognitive Assessment/Intervention- PT/OT    Orientation Status (Cognition)  oriented x 4  -AC        Follows Commands (Cognition)  WNL  -        Personal Safety Interventions  fall prevention program maintained;gait  belt;muscle strengthening facilitated;nonskid shoes/slippers when out of bed;supervised activity;toileting scheduled  -           Safety Issues, Functional Mobility    Impairments Affecting Function (Mobility)  balance;endurance/activity tolerance;pain  -           Mobility Assessment/Treatment    Extremity Weight-bearing Status  right lower extremity  (Significant)   -        Right Lower Extremity (Weight-bearing Status)  toe touch weight-bearing (TTWB)  (Significant)   -           Bed Mobility Assessment/Treatment    Bed Mobility Assessment/Treatment  scooting/bridging;supine-sit  -        Scooting/Bridging [place] (Bed Mobility)  conditional independence  -        Supine-Sit [place] (Bed Mobility)  conditional independence  -        Assistive Device (Bed Mobility)  head of bed elevated  -           Functional Mobility    Functional Mobility- Ind. Level  contact guard assist  -        Functional Mobility- Device  rolling walker  -        Functional Mobility-Maintain WBing  able to maintain weight bearing status  -        Functional Mobility- Comment  to door, back to chair, unsteady and shaky once standing and ambulating but no LOB  -           Transfer Assessment/Treatment    Transfer Assessment/Treatment  sit-stand transfer;stand-sit transfer  -        Maintains Weight-bearing Status (Transfers)  able to maintain  -           Sit-Stand Transfer    Sit-Stand Clinton (Transfers)  contact guard;verbal cues  -        Assistive Device (Sit-Stand Transfers)  walker, front-wheeled  -AC           Stand-Sit Transfer    Stand-Sit [place] (Transfers)  contact guard;verbal cues  -        Assistive Device (Stand-Sit Transfers)  walker, front-wheeled  -AC           ADL Assessment/Intervention    BADL Assessment/Intervention  lower body dressing  -           Lower Body Dressing Assessment/Training    Lower Body Dressing Clinton Level   don;shoes/slippers;socks;undergarment;conditional independence  -AC        Lower Body Dressing Position  edge of bed sitting  -AC           BADL Safety/Performance    Impairments, BADL Safety/Performance  balance;endurance/activity tolerance;pain  -AC           General ROM    GENERAL ROM COMMENTS  WFL AROM BUE  -AC           MMT (Manual Muscle Testing)    General MMT Comments  5/5 BUE  -           Motor Assessment/Interventions    Additional Documentation  Balance (Group)  -           Balance    Balance  static sitting balance;static standing balance;dynamic sitting balance;dynamic standing balance  -AC           Static Sitting Balance    Level of Immaculata (Unsupported Sitting, Static Balance)  independent  -AC        Sitting Position (Unsupported Sitting, Static Balance)  sitting on edge of bed  -AC           Dynamic Sitting Balance    Level of Immaculata, Reaches Outside Midline (Sitting, Dynamic Balance)  independent  -AC        Sitting Position, Reaches Outside Midline (Sitting, Dynamic Balance)  sitting on edge of bed  -AC           Static Standing Balance    Level of Immaculata (Supported Standing, Static Balance)  contact guard assist  -AC        Assistive Device Utilized (Supported Standing, Static Balance)  walker, rolling  -AC           Dynamic Standing Balance    Level of Immaculata, Reaches Outside Midline (Standing, Dynamic Balance)  contact guard assist  -AC        Assistive Device Utilized (Supported Standing, Dynamic Balance)  walker, rolling  -AC           Sensory Assessment/Intervention    Sensory General Assessment  no sensation deficits identified  -           Positioning and Restraints    Pre-Treatment Position  in bed  -AC        Post Treatment Position  chair  -AC        In Chair  reclined;call light within reach;encouraged to call for assist;legs elevated  -AC           Pain Assessment    Additional Documentation  Pain Scale: Numbers Pre/Post-Treatment (Group)  -AC            Pain Scale: Numbers Pre/Post-Treatment    Pain Scale: Numbers, Pretreatment  4/10  -AC        Pain Location - Side  Right  -AC        Pain Location  hip  -AC        Pain Intervention(s)  Medication (See MAR);Repositioned;Ambulation/increased activity  -AC           Wound 07/10/19 1232 Right hip incision    Wound - Properties Group Date first assessed: 07/10/19  -RM Time first assessed: 1232  -RM Side: Right  -RM Location: hip  -RM Type: incision  -RM       Plan of Care Review    Plan of Care Reviewed With  patient  -AC           Clinical Impression (OT)    Date of Referral to OT  07/10/19  -AC        OT Diagnosis  decreased adl  -AC        Prognosis (OT Eval)  good  -AC        Criteria for Skilled Therapeutic Interventions Met (OT Eval)  yes;treatment indicated  -AC        Rehab Potential (OT Eval)  good, to achieve stated therapy goals  -AC        Therapy Frequency (OT Eval)  5 times/wk  -AC        Predicted Duration of Therapy Intervention (Therapy Eval)  10 days  -AC        Care Plan Review (OT)  evaluation/treatment results reviewed;care plan/treatment goals reviewed;risks/benefits reviewed;current/potential barriers reviewed;patient/other agree to care plan  -AC        Anticipated Discharge Disposition (OT)  home with assist;home with home health  -AC           Planned OT Interventions    Planned Therapy Interventions (OT Eval)  BADL retraining;functional balance retraining;occupation/activity based interventions;patient/caregiver education/training;transfer/mobility retraining  -AC           OT Goals    Transfer Goal Selection (OT)  transfer, OT goal 1  -AC        Bathing Goal Selection (OT)  bathing, OT goal 1  -AC        Dressing Goal Selection (OT)  dressing, OT goal 1  -AC           Transfer Goal 1 (OT)    Activity/Assistive Device (Transfer Goal 1, OT)  tub;tub bench  -AC        Pollock Level/Cues Needed (Transfer Goal 1, OT)  conditional independence  -AC        Time Frame (Transfer Goal 1, OT)   long term goal (LTG);10 days  -AC        Progress/Outcome (Transfer Goal 1, OT)  goal ongoing  -AC           Bathing Goal 1 (OT)    Activity/Assistive Device (Bathing Goal 1, OT)  bathing skills, all;tub bench  -AC        Jay Level/Cues Needed (Bathing Goal 1, OT)  conditional independence  -AC        Time Frame (Bathing Goal 1, OT)  long term goal (LTG);10 days  -AC        Progress/Outcomes (Bathing Goal 1, OT)  goal ongoing  -AC           Dressing Goal 1 (OT)    Activity/Assistive Device (Dressing Goal 1, OT)  lower body dressing  -AC        Jay/Cues Needed (Dressing Goal 1, OT)  conditional independence  -AC        Time Frame (Dressing Goal 1, OT)  long term goal (LTG);10 days  -AC        Progress/Outcome (Dressing Goal 1, OT)  goal ongoing  -AC           Patient Education Goal (OT)    Activity (Patient Education Goal, OT)  home safety, DME recs  -AC        Jay/Cues/Accuracy (Memory Goal 2, OT)  demonstrates adequately;independent;verbalizes understanding  -AC        Time Frame (Patient Education Goal, OT)  long term goal (LTG);10 days  -AC        Progress/Outcome (Patient Education Goal, OT)  goal ongoing  -AC           Living Environment    Home Accessibility  stairs to enter home;tub/shower is not walk in glass doors on tub  -AC          User Key  (r) = Recorded By, (t) = Taken By, (c) = Cosigned By    Initials Name Effective Dates    Bill Barkley, OTR/L, CNT 04/09/19 -     Zuleyka Wright, RN 11/08/16 -          Occupational Therapy Education     Title: PT OT SLP Therapies (Done)     Topic: Occupational Therapy (Done)     Point: ADL training (Done)     Description: Instruct learner(s) on proper safety adaptation and remediation techniques during self care or transfers.   Instruct in proper use of assistive devices.    Learning Progress Summary           Patient Acceptance, E,TB, VU by  at 7/11/2019  9:16 AM    Comment:  DME recs, TTWB status, benefits of activity, home  safety                   Point: Precautions (Done)     Description: Instruct learner(s) on prescribed precautions during self-care and functional transfers.    Learning Progress Summary           Patient Acceptance, E,TB, VU by  at 7/11/2019  9:16 AM    Comment:  DME recs, TTWB status, benefits of activity, home safety                               User Key     Initials Effective Dates Name Provider Type Discipline     04/09/19 -  Bill Oliveira, OTR/L, CNT Occupational Therapist OT                  OT Recommendation and Plan  Outcome Summary/Treatment Plan (OT)  Anticipated Discharge Disposition (OT): home with assist, home with home health  Planned Therapy Interventions (OT Eval): BADL retraining, functional balance retraining, occupation/activity based interventions, patient/caregiver education/training, transfer/mobility retraining  Therapy Frequency (OT Eval): 5 times/wk  Plan of Care Review  Plan of Care Reviewed With: patient  Plan of Care Reviewed With: patient  Outcome Summary: OT eval completed.  Pt awake and alert.  Moving around well in bed.  Came to EOB Marsha with HOB elevated.  Transfers with CGA and rw.  She was shaky/tremulous immediately upon standing, stating she thought it was from the breathing txs.  Pt ambulated to door and back to chair.  Maintained TTWB status very well during eval.  Donned socks and underwear at EOB remaining seated throughout task.  Shifted weight R and L during dressing without difficulty.  Pt was educated on home safety and DME recs for home.  She has shower doors on her tub shower and will need to remove these and purchase/borrow tub bench in order to safely transfer and bathe in tub.  Pt would benefit from skilled OT to address knowledge deficit, balance, strength and overall independence with ADL prior to discharge home.  Anticipate discharge home with assist.  HH may be appropriate for discharge.    Outcome Measures     Row Name 07/11/19 0815             How much  help from another is currently needed...    Putting on and taking off regular lower body clothing?  3  -AC      Bathing (including washing, rinsing, and drying)  3  -AC      Toileting (which includes using toilet bed pan or urinal)  3  -AC      Putting on and taking off regular upper body clothing  4  -AC      Taking care of personal grooming (such as brushing teeth)  4  -AC      Eating meals  4  -AC      AM-PAC 6 Clicks Score (OT)  21  -AC         Functional Assessment    Outcome Measure Options  AM-PAC 6 Clicks Daily Activity (OT)  -AC        User Key  (r) = Recorded By, (t) = Taken By, (c) = Cosigned By    Initials Name Provider Type    Bill Barkley OTR/L, DENISSE Occupational Therapist          Time Calculation:   Time Calculation- OT     Row Name 07/11/19 0916             Time Calculation- OT    OT Start Time  0815  -      OT Stop Time  0917  -      OT Time Calculation (min)  62 min  -      OT Received On  07/11/19  -      OT Goal Re-Cert Due Date  07/21/19  -        User Key  (r) = Recorded By, (t) = Taken By, (c) = Cosigned By    Initials Name Provider Type    Bill Barkley OTR/L, DENISSE Occupational Therapist        Therapy Charges for Today     Code Description Service Date Service Provider Modifiers Qty    [id number] HC OT EVAL LOW COMPLEXITY 4 7/11/2019 Bill Oliveira OTR/L, DENISSE GO 1               BELIA Francis/L, CNT  7/11/2019

## 2019-07-11 NOTE — THERAPY EVALUATION
Acute Care - Physical Therapy Initial Evaluation  [facility]     Patient Name: Adelita Jaeger  : 1970  MRN: [id number]  Today's Date: 2019   Onset of Illness/Injury or Date of Surgery: 19  Date of Referral to PT: 07/10/19  Referring Physician: Aramis Navarro MD      Admit Date: 2019    Visit Dx:     ICD-10-CM ICD-9-CM   1. Closed fracture of right hip, initial encounter (CMS/[facility]) S72.001A 820.8   2. Hypokalemia E87.6 276.8   3. Decreased activities of daily living (ADL) R68.89 780.99   4. Impaired functional mobility, balance, gait, and endurance Z74.09 V49.89     Patient Active Problem List   Diagnosis   • Gastroesophageal reflux disease   • Hypokalemia   • Hypomagnesemia   • Hypoalbuminemia   • Smokes tobacco daily   • Closed fracture of right hip (CMS/[facility])   • Hyponatremia   • Tobacco abuse   • Acute pain due to injury   • Fall     Past Medical History:   Diagnosis Date   • COPD (chronic obstructive pulmonary disease) (CMS/[facility])    • GERD (gastroesophageal reflux disease)    • Hyperlipidemia    • Hypertension    • Potassium (K) deficiency      Past Surgical History:   Procedure Laterality Date   • AUGMENTATION MAMMAPLASTY     • CARPAL TUNNEL RELEASE     •  SECTION     • CHOLECYSTECTOMY     • DILATATION AND CURETTAGE     • ENDOMETRIAL BIOPSY     • HERNIA REPAIR     • HIP CANNULATED SCREW PLACEMENT Right 7/10/2019    Procedure: RIGHT HIP CANNULATED SCREW PLACEMENT;  Surgeon: Aramis Navarro MD;  Location: [facility];  Service: Orthopedics   • OTHER SURGICAL HISTORY      TIF procedure.         PT ASSESSMENT (last 12 hours)      Physical Therapy Evaluation     Row Name 19 09          PT Evaluation Time/Intention    Subjective Information  complains of;pain;fatigue  -MS (r) AL (t) MS (c)     Document Type  evaluation  -MS (r) AL (t) MS (c)     Mode of Treatment  concurrent therapy;physical therapy  -MS (r) AL (t) MS (c)     Row Name 19 09          General  Information    Patient Profile Reviewed?  yes  -MS (r) AL (t) MS (c)     Onset of Illness/Injury or Date of Surgery  07/09/19  -MS (r) AL (t) MS (c)     Referring Physician  Aramis Navarro MD  -MS (r) AL (t) MS (c)     Patient Observations  alert;cooperative;agree to therapy  -MS (r) AL (t) MS (c)     Patient/Family Observations  no family present  -MS (r) AL (t) MS (c)     General Observations of Patient  Pt laying in [place]'s position in bed on RA in no apparant distress  -MS (r) AL (t) MS (c)     Prior Level of Function  independent:;gait;transfer;bed mobility;driving;all household mobility;community mobility  -MS (r) AL (t) MS (c)     Equipment Currently Used at Home  grab bar;raised toilet  -MS (r) AL (t) MS (c)     Pertinent History of Current Functional Problem  mechanical fall at home with R hip pain, tripped on carpet; Dx: acute traumatic nondisplaced subcapital fx on neck of R femur s/p percutaneous screw fixation of R femoral neck fx on 7/10/19  -MS (r) AL (t) MS (c)     Existing Precautions/Restrictions  fall;right;partial weight bearing  -MS (r) AL (t) MS (c)     Risks Reviewed  patient:;LOB;nausea/vomiting;dizziness  -MS (r) AL (t) MS (c)     Benefits Reviewed  patient:;improve function;increase independence;increase balance;decrease pain;decrease risk of DVT;increase knowledge  -MS (r) AL (t) MS (c)     Barriers to Rehab  none identified  -MS (r) AL (t) MS (c)     Row Name 07/11/19 0900          Relationship/Environment    Primary Source of Support/Comfort  significant other  -MS (r) AL (t) MS (c)     Lives With  significant other;child(andrew), dependent  -MS (r) AL (t) MS (c)     Family Caregiver if Needed  significant other  -MS (r) AL (t) MS (c)     Row Name 07/11/19 0900          Resource/Environmental Concerns    Current Living Arrangements  home/apartment/condo  -MS (r) AL (t) MS (c)     Row Name 07/11/19 0900          Home Main Entrance    Number of Stairs, Main Entrance  four  -MS (r) AL (t)  MS (c)     Stair Railings, Main Entrance  railings on both sides of stairs  -MS (r) AL (t) MS (c)     Row Name 07/11/19 0900          Cognitive Assessment/Interventions    Additional Documentation  Cognitive Assessment/Intervention (Group)  -MS (r) AL (t) MS (c)     Row Name 07/11/19 0900          Cognitive Assessment/Intervention- PT/OT    Affect/Mental Status (Cognitive)  WNL  -MS (r) AL (t) MS (c)     Orientation Status (Cognition)  oriented x 4  -MS (r) AL (t) MS (c)     Follows Commands (Cognition)  WNL  -MS (r) AL (t) MS (c)     Cognitive Function (Cognitive)  WNL  -MS (r) AL (t) MS (c)     Personal Safety Interventions  fall prevention program maintained;gait belt;nonskid shoes/slippers when out of bed;supervised activity  -MS (r) AL (t) MS (c)     Row Name 07/11/19 0900          Safety Issues, Functional Mobility    Impairments Affecting Function (Mobility)  balance;endurance/activity tolerance;strength;pain  -MS (r) AL (t) MS (c)     Row Name 07/11/19 0900          Mobility Assessment/Treatment    Extremity Weight-bearing Status  right lower extremity  -MS (r) AL (t) MS (c)     Right Lower Extremity (Weight-bearing Status)  toe touch weight-bearing (TTWB)  -MS (r) AL (t) MS (c)     Row Name 07/11/19 0900          Bed Mobility Assessment/Treatment    Bed Mobility Assessment/Treatment  supine-sit  -MS (r) AL (t) MS (c)     Supine-Sit [place] (Bed Mobility)  conditional independence  -MS (r) AL (t) MS (c)     Assistive Device (Bed Mobility)  head of bed elevated;bed rails  -MS (r) AL (t) MS (c)     Row Name 07/11/19 0900          Transfer Assessment/Treatment    Transfer Assessment/Treatment  sit-stand transfer;stand-sit transfer  -MS (r) AL (t) MS (c)     Maintains Weight-bearing Status (Transfers)  able to maintain  -MS (r) AL (t) MS (c)     Sit-Stand [place] (Transfers)  contact guard;verbal cues  -MS (r) AL (t) MS (c)     Stand-Sit [place] (Transfers)  contact guard;verbal cues  -MS (r) AL  (t) MS (c)     Row Name 07/11/19 0900          Sit-Stand Transfer    Assistive Device (Sit-Stand Transfers)  walker, front-wheeled  -MS (r) AL (t) MS (c)     Row Name 07/11/19 0900          Stand-Sit Transfer    Assistive Device (Stand-Sit Transfers)  walker, front-wheeled  -MS (r) AL (t) MS (c)     Row Name 07/11/19 0900          Gait/Stairs Assessment/Training    [place] Level (Gait)  contact guard  -MS (r) AL (t) MS (c)     Assistive Device (Gait)  walker, front-wheeled  -MS (r) AL (t) MS (c)     Distance in Feet (Gait)  Pt ambulated 20ft in room with turning able to maintain R TTWB status with slow movement and shaky UE due to breathing treatment pt stated.   -MS (r) AL (t) MS (c)     Row Name 07/11/19 0900          General ROM    GENERAL ROM COMMENTS  B UE and L LE AROM WFL. R LE AROM limited due to pain but normal ankle DF  -MS (r) AL (t) MS (c)     Row Name 07/11/19 0900          MMT (Manual Muscle Testing)    General MMT Comments  B UE 5/5 and L LE 5/5. Did not resist R LE due to precautions but patient was able to move 50% AROM  -MS (r) AL (t) MS (c)     Row Name 07/11/19 0900          Motor Assessment/Intervention    Additional Documentation  Balance (Group)  -MS (r) AL (t) MS (c)     Row Name 07/11/19 0900          Balance    Balance  static sitting balance;static standing balance;dynamic sitting balance  -MS (r) AL (t) MS (c)     Row Name 07/11/19 0900          Static Sitting Balance    Level of [place] (Unsupported Sitting, Static Balance)  independent  -MS (r) AL (t) MS (c)     Sitting Position (Unsupported Sitting, Static Balance)  sitting on edge of bed  -MS (r) AL (t) MS (c)     Row Name 07/11/19 0900          Dynamic Sitting Balance    Level of [place], Reaches Outside Midline (Sitting, Dynamic Balance)  independent  -MS (r) AL (t) MS (c)     Sitting Position, Reaches Outside Midline (Sitting, Dynamic Balance)  sitting on edge of bed  -MS (r) AL (t) MS (c)     Row Name 07/11/19 0900           Static Standing Balance    Level of [place] (Supported Standing, Static Balance)  contact guard assist  -MS (r) AL (t) MS (c)     Time Able to Maintain Position (Supported Standing, Static Balance)  1 to 2 minutes  -MS (r) AL (t) MS (c)     Assistive Device Utilized (Supported Standing, Static Balance)  walker, rolling  -MS (r) AL (t) MS (c)     Comment (Supported Standing, Static Balance)  Pt presented with mike PADRON UE   -MS (r) AL (t) MS (c)     Row Name 07/11/19 0900          Sensory Assessment/Intervention    Sensory General Assessment  no sensation deficits identified  -MS (r) AL (t) MS (c)     [facility] Name 07/11/19 0900          Pain Assessment    Additional Documentation  Pain Scale: Numbers Pre/Post-Treatment (Group)  -MS (r) AL (t) MS (c)     Row Name 07/11/19 0900          Pain Scale: Numbers Pre/Post-Treatment    Pain Scale: Numbers, Pretreatment  4/10  -MS (r) AL (t) MS (c)     Pain Location - Side  Right  -MS (r) AL (t) MS (c)     Pain Location  hip  -MS (r) AL (t) MS (c)     Pain Intervention(s)  Medication (See MAR);Repositioned;Ambulation/increased activity;Rest  -MS (r) AL (t) MS (c)     Row Name             Wound 07/10/19 1232 Right hip incision    Wound - Properties Group Date first assessed: 07/10/19  -RM Time first assessed: 1232  -RM Side: Right  -RM Location: hip  -RM Type: incision  -RM    Row Name 07/11/19 0900          Plan of Care Review    Plan of Care Reviewed With  patient  -MS (r) AL (t) MS (c)     Row Name 07/11/19 0900          Physical Therapy Clinical Impression    Date of Referral to PT  07/10/19  -MS (r) AL (t) MS (c)     Patient/Family Goals Statement (PT Clinical Impression)  Pt stated to go home and take a shower  -MS (r) AL (t) MS (c)     Criteria for Skilled Interventions Met (PT Clinical Impression)  yes  -MS (r) AL (t) MS (c)     Pathology/Pathophysiology Noted (Describe Specifically for Each System)  musculoskeletal  -MS (r) AL (t) MS (c)     Impairments Found  (describe specific impairments)  aerobic capacity/endurance;gait, locomotion, and balance;muscle performance  -MS (r) AL (t) MS (c)     Rehab Potential (PT Clinical Summary)  good, to achieve stated therapy goals  -MS (r) AL (t) MS (c)     Predicted Duration of Therapy (PT)  until d/c  -MS (r) AL (t) MS (c)     Care Plan Review (PT)  evaluation/treatment results reviewed;risks/benefits reviewed;care plan/treatment goals reviewed;patient/other agree to care plan  -MS (r) AL (t) MS (c)     Row Name 07/11/19 0900          Physical Therapy Goals    Transfer Goal Selection (PT)  transfer, PT goal 1  -MS (r) AL (t) MS (c)     Gait Training Goal Selection (PT)  gait training, PT goal 1  -MS (r) AL (t) MS (c)     Stairs Goal Selection (PT)  stairs, PT goal 1  -MS (r) AL (t) MS (c)     Additional Documentation  Stairs Goal Selection (PT) (Row)  -MS (r) AL (t) MS (c)     Row Name 07/11/19 0900          Transfer Goal 1 (PT)    Activity/Assistive Device (Transfer Goal 1, PT)  sit-to-stand/stand-to-sit;bed-to-chair/chair-to-bed;walker, rolling  -MS (r) AL (t) MS (c)     [place] Level/Cues Needed (Transfer Goal 1, PT)  conditional independence  -MS (r) AL (t) MS (c)     Time Frame (Transfer Goal 1, PT)  long term goal (LTG);by discharge  -MS (r) AL (t) MS (c)     Progress/Outcome (Transfer Goal 1, PT)  goal ongoing  -MS (r) AL (t) MS (c)     Row Name 07/11/19 0900          Gait Training Goal 1 (PT)    Activity/Assistive Device (Gait Training Goal 1, PT)  gait (walking locomotion);assistive device use;backward stepping;decrease fall risk;forward stepping;improve balance and speed;increase endurance/gait distance;maintain weight bearing status;turning, right;turning, left;walker, rolling  -MS (r) AL (t) MS (c)     [place] Level (Gait Training Goal 1, PT)  supervision required  -MS (r) AL (t) MS (c)     Distance (Gait Goal 1, PT)  Pt will ambulated 50ft on even surfaces   -MS (r) AL (t) MS (c)     Time Frame (Gait Training  Goal 1, PT)  long term goal (LTG);by discharge  -MS (r) AL (t) MS (c)     Progress/Outcome (Gait Training Goal 1, PT)  goal ongoing  -MS (r) AL (t) MS (c)     Row Name 07/11/19 0900          Stairs Goal 1 (PT)    Activity/Assistive Device (Stairs Goal 1, PT)  stairs, all skills;ascending stairs;descending stairs;using handrail, right;using handrail, left;step-to-step;decrease fall risk;maintain weight bearing status  -MS (r) AL (t) MS (c)     [place] Level/Cues Needed (Stairs Goal 1, PT)  minimum assist (75% or more patient effort)  -MS (r) AL (t) MS (c)     Number of Stairs (Stairs Goal 1, PT)  4  -MS (r) AL (t) MS (c)     Time Frame (Stairs Goal 1, PT)  long term goal (LTG);by discharge  -MS (r) AL (t) MS (c)     Progress/Outcome (Stairs Goal 1, PT)  goal ongoing  -MS (r) AL (t) MS (c)     Row Name 07/11/19 0900          Positioning and Restraints    Pre-Treatment Position  in bed  -MS (r) AL (t) MS (c)     Post Treatment Position  chair  -MS (r) AL (t) MS (c)     In Chair  reclined;call light within reach;encouraged to call for assist;legs elevated  -MS (r) AL (t) MS (c)     Row Name 07/11/19 0900          Living Environment    Home Accessibility  stairs to enter home;tub/shower is not walk in  -MS (r) AL (t) MS (c)       User Key  (r) = Recorded By, (t) = Taken By, (c) = Cosigned By    Initials Name Provider Type    Tabitha Faust, PT, DPT, NCS Physical Therapist    Zuleyka Wright, RN Registered Nurse    Kalpesh Henriquez, PT Student PT Student        Physical Therapy Education     Title: PT OT SLP Therapies (In Progress)     Topic: Physical Therapy (In Progress)     Point: Mobility training (Done)     Learning Progress Summary           Patient Acceptance, E, VU by AL at 7/11/2019 10:37 AM    Comment:  PT educated patient on how to transfer with RW and manage R LE wbing precautions. Also educated on use of RW during gait and maintaining  R TTWB precautions                   Point: Precautions (Done)      Learning Progress Summary           Patient Acceptance, MARY VU by AL at 7/11/2019 10:36 AM    Comment:  PT educated patient on fall risk, R TTWB precautions and needing assist to get up                               User Key     Initials Effective Dates Name Provider Type Discipline    AL 04/24/19 -  Kalpesh Young, PT Student PT Student PT              PT Recommendation and Plan  Anticipated Discharge Disposition (PT): home with assist, home with home health  Planned Therapy Interventions (PT Eval): balance training, gait training, strengthening, stair training, transfer training, home exercise program  Therapy Frequency (PT Clinical Impression): 2 times/day  Outcome Summary/Treatment Plan (PT)  Anticipated Discharge Disposition (PT): home with assist, home with home health  Plan of Care Reviewed With: patient  Progress: improving  Outcome Summary: PT eval completed. Pt was Ox4 in no apparant distress in bed. She performed bed mobility independently and OOB mobility with CGA. She presented with shaky UE in standing with use of RW which she reported is from the breathing treatment possibly. She presented with decreased WBing on R LE, increased pain, decreased strength, and impaired balance in standing. She would benefit with continued skilled therapy to work on R LE strengthening and overall strengthening to reduce assist needed during mobility, to work on on balance in standing to increase confidence in standing and to reduce fall risk, and to work on endurance with gait and maintaining WBing precautions working on gait distance. PT recommends home with assist and home health for d/c pending she can ascend/descend 4 steps.    Outcome Measures     Row Name 07/11/19 0900 07/11/19 0815          How much help from another person do you currently need...    Turning from your back to your side while in flat bed without using bedrails?  4  -MS (r) AL (t) MS (c)  --     Moving from lying on back to sitting on the side of a flat  bed without bedrails?  4  -MS (r) AL (t) MS (c)  --     Moving to and from a bed to a chair (including a wheelchair)?  3  -MS (r) AL (t) MS (c)  --     Standing up from a chair using your arms (e.g., wheelchair, bedside chair)?  3  -MS (r) AL (t) MS (c)  --     Climbing 3-5 steps with a railing?  2  -MS (r) AL (t) MS (c)  --     To walk in hospital room?  3  -MS (r) AL (t) MS (c)  --     AM-PAC 6 Clicks Score (PT)  19  -MS (r) AL (t)  --        How much help from another is currently needed...    Putting on and taking off regular lower body clothing?  --  3  -AC     Bathing (including washing, rinsing, and drying)  --  3  -AC     Toileting (which includes using toilet bed pan or urinal)  --  3  -AC     Putting on and taking off regular upper body clothing  --  4  -AC     Taking care of personal grooming (such as brushing teeth)  --  4  -AC     Eating meals  --  4  -AC     AM-PAC 6 Clicks Score (OT)  --  21  -AC        Functional Assessment    Outcome Measure Options  AM-PAC 6 Clicks Basic Mobility (PT)  -MS (r) AL (t) MS (c)  AM-PAC 6 Clicks Daily Activity (OT)  -AC       User Key  (r) = Recorded By, (t) = Taken By, (c) = Cosigned By    Initials Name Provider Type    Bill Barkley, OTR/L, CNT Occupational Therapist    Tabitha Faust, PT, DPT, NCS Physical Therapist    Kalpesh Henriquez, PT Student PT Student         Time Calculation:   PT Charges     Row Name 07/11/19 1038             Time Calculation    Start Time  0812 includes chart review time  -MS (r) AL (t) MS (c)      Stop Time  0855  -MS (r) AL (t) MS (c)      Time Calculation (min)  43 min  -MS (r) AL (t)      PT Received On  07/11/19  -MS (r) AL (t) MS (c)      PT Goal Re-Cert Due Date  07/21/19  -MS (r) AL (t) MS (c)        User Key  (r) = Recorded By, (t) = Taken By, (c) = Cosigned By    Initials Name Provider Type    Tabitha Faust, PT, DPT, NCS Physical Therapist    Kalpesh Henriquez, PT Student PT Student        Therapy Charges for Today     Code  Description Service Date Service Provider Modifiers Qty    [id number] HC PT EVAL LOW COMPLEXITY 3 7/11/2019 Kalpesh Young, PT Student GP 1          PT G-Codes  Outcome Measure Options: AM-PAC 6 Clicks Basic Mobility (PT)  AM-PAC 6 Clicks Score (PT): 19  AM-PAC 6 Clicks Score (OT): 21      KALPESH Young, PT Student  7/11/2019

## 2019-07-11 NOTE — PLAN OF CARE
Problem: Fall Risk (Adult)  Goal: Absence of Fall  Outcome: Ongoing (interventions implemented as appropriate)      Problem: Fracture Orthopaedic (Adult)  Goal: Signs and Symptoms of Listed Potential Problems Will be Absent, Minimized or Managed (Fracture Orthopaedic)  Outcome: Ongoing (interventions implemented as appropriate)      Problem: Patient Care Overview  Goal: Plan of Care Review  Outcome: Ongoing (interventions implemented as appropriate)   07/11/19 1612   Coping/Psychosocial   Plan of Care Reviewed With patient   Plan of Care Review   Progress improving   OTHER   Outcome Summary Pt A & O x4, VSS, complains of pain relieved by prn pain medication, unaware of safety precautions and tries to be independent. Will continue to monitor       Problem: Skin Injury Risk (Adult)  Goal: Skin Health and Integrity  Outcome: Ongoing (interventions implemented as appropriate)

## 2019-07-11 NOTE — PROGRESS NOTES
Orthopedic Surgery Progress Note    Adelita Jaeger  7/11/2019      Subjective:     Systemic or Specific Complaints: Patient feels much better this a.m.  No complaints presently.  Patient does not want to go to a nursing facility.    Objective:     Patient Vitals for the past 24 hrs:   BP Temp Temp src Pulse Resp SpO2   07/11/19 0641 -- -- -- 99 16 --   07/11/19 0633 -- -- -- 107 16 95 %   07/11/19 0352 121/75 99.5 °F (37.5 °C) Oral 103 16 97 %   07/11/19 0012 117/74 99 °F (37.2 °C) Oral 107 16 97 %   07/10/19 2016 -- -- -- 106 -- 98 %   07/10/19 2011 -- -- -- 99 20 98 %   07/10/19 1932 117/71 98.7 °F (37.1 °C) Oral 109 16 97 %   07/10/19 1539 97/63 98.3 °F (36.8 °C) Oral 104 15 98 %   07/10/19 1440 -- -- -- 86 16 --   07/10/19 1435 -- -- -- 90 16 98 %   07/10/19 1359 101/63 97.5 °F (36.4 °C) Axillary 92 -- 94 %   07/10/19 1345 103/71 97.7 °F (36.5 °C) Temporal 91 16 96 %   07/10/19 1335 105/68 -- -- 92 16 96 %   07/10/19 1325 117/74 -- -- 99 16 95 %   07/10/19 1320 120/74 -- -- 97 16 98 %   07/10/19 1315 121/86 -- -- 103 16 100 %   07/10/19 1310 -- -- -- 99 16 100 %   07/10/19 1308 128/73 97.9 °F (36.6 °C) Temporal 102 14 100 %   07/10/19 1131 93/58 -- -- 93 20 98 %   07/10/19 1130 93/58 -- -- 91 20 98 %   07/10/19 1128 (!) 89/59 -- -- 91 20 --   07/10/19 1108 -- -- -- 92 16 95 %   07/10/19 1041 -- -- -- 86 16 --   07/10/19 1036 -- -- -- 85 16 95 %   07/10/19 0829 96/59 98.2 °F (36.8 °C) Oral 84 17 93 %       right lower  General: alert, appears stated age and cooperative   Wound: clean, dry, intact             Dressing: clean, dry, intact   Extremity: Distal NVI           DVT Exam: No evidence of DVT seen on physical exam.                   Data Review:  Lab Results (last 24 hours)     Procedure Component Value Units Date/Time    Basic Metabolic Panel [[id number]]  (Abnormal) Collected:  07/11/19 0606    Specimen:  Blood Updated:  07/11/19 0651     Glucose 121 mg/dL      BUN 4 mg/dL      Creatinine 0.53 mg/dL       Sodium 135 mmol/L      Potassium 4.7 mmol/L      Chloride 104 mmol/L      CO2 27.0 mmol/L      Calcium 9.1 mg/dL      eGFR Non African Amer 122 mL/min/1.73      BUN/Creatinine Ratio 7.5     Anion Gap 4.0 mmol/L     Narrative:       GFR Normal >60  Chronic Kidney Disease <60  Kidney Failure <15    Hemoglobin & Hematocrit, Blood [[id number]]  (Abnormal) Collected:  07/11/19 0606    Specimen:  Blood Updated:  07/11/19 0635     Hemoglobin 9.8 g/dL      Hematocrit 29.1 %     Lipid Panel [[id number]]  (Abnormal) Collected:  07/10/19 0531    Specimen:  Blood Updated:  07/10/19 1042     Total Cholesterol 182 mg/dL      Triglycerides 841 mg/dL      HDL Cholesterol 37 mg/dL      LDL Cholesterol  <60 mg/dL      Comment: Corrected result. Previous result was <30 mg/dL on 7/10/2019 at 0643 CDT.        LDL/HDL Ratio --     Comment: Unable to calculate           Imaging Results (last 24 hours)     Procedure Component Value Units Date/Time    XR Hip With or Without Pelvis 2 - 3 View Right [[id number]] Collected:  07/10/19 1338     Updated:  07/10/19 1342    Narrative:       EXAMINATION: XR HIP W OR WO PELVIS 2-3 VIEW RIGHT- 7/10/2019 1:38 PM CDT     HISTORY: Cannulated screw     FLUOROSCOPY TIME:  0.44 seconds     AIR KERMA: 2.37mGy     NUMBER OF IMAGES: 2     FINDINGS:  Multiple fluoroscopic images are submitted from the OR. A radiologist  was not present for the acquisition or intraoperative interpretation of  these images. Images demonstrate multiple screws through the femoral  head and neck. Please see the operative report for details pertaining to  this exam.  This report was finalized on 07/10/2019 13:39 by Dr. Corey Santos MD.    FL C Arm During Surgery [[id number]] Updated:  07/10/19 1257          Assessment:     POD# 1 status post percutaneous screw stabilization of a right valgus impacted femoral neck fracture    Plan:      1:  DVT prophylaxis, ICE, elevate  2:  Pain control  3:  Physical therapy/Occupational therapy  4:   Anticipate discharge today if pain well controlled  5:  Toe-touch weightbearing right lower extremity x6 weeks      Aramis Navarro MD

## 2019-07-11 NOTE — PROGRESS NOTES
[facility]  INPATIENT PROGRESS NOTE    Patient Name: Adelita Jaeger  Date of Admission: 7/9/2019  Today's Date: 07/11/19  Length of Stay: 2  Primary Care Physician: Rajan Rodriguez MD    Subjective   Chief Complaint: hip pain    HPI   Currently sitting up in bed this morning.  She is doing well.  She states the pain is tolerable right now.  She is eager to be able to be discharged home.  She does not want to go to a skilled nursing facility for rehab.  She is fixing to work with physical therapy now this morning and is hopeful that she will perform well.     Review of Systems   Constitutional: Positive for activity change and fatigue. Negative for appetite change, chills and fever.   HENT: Negative for hearing loss, nosebleeds, tinnitus and trouble swallowing.    Eyes: Negative for visual disturbance.   Respiratory: Negative for cough, chest tightness, shortness of breath and wheezing.    Cardiovascular: Negative for chest pain, palpitations and leg swelling.   Gastrointestinal: Negative for abdominal distention, abdominal pain, blood in stool, constipation, diarrhea, nausea and vomiting.   Endocrine: Negative for cold intolerance, heat intolerance, polydipsia, polyphagia and polyuria.   Genitourinary: Negative for decreased urine volume, difficulty urinating, dysuria, flank pain, frequency and hematuria.   Musculoskeletal: Positive for gait problem and myalgias. Negative for arthralgias and joint swelling.   Skin: Negative for rash.   Allergic/Immunologic: Negative for immunocompromised state.   Neurological: Positive for weakness. Negative for dizziness, syncope, light-headedness and headaches.   Hematological: Negative for adenopathy. Does not bruise/bleed easily.   Psychiatric/Behavioral: Negative for confusion and sleep disturbance. The patient is not nervous/anxious.       All pertinent negatives and positives are as above. All other systems have  been reviewed and are negative unless otherwise stated.     Objective    Temp:  [97.5 °F (36.4 °C)-99.5 °F (37.5 °C)] 99 °F (37.2 °C)  Heart Rate:  [] 102  Resp:  [14-20] 16  BP: ()/(58-86) 92/61     Intake/Output Summary (Last 24 hours) at 7/11/2019 1028  Last data filed at 7/11/2019 1013  Gross per 24 hour   Intake 3361 ml   Output 1750 ml   Net 1611 ml     Physical Exam   Constitutional: She is oriented to person, place, and time. She appears well-developed and well-nourished.   Sitting up in bed.  NAD.     HENT:   Head: Normocephalic and atraumatic.   Eyes: Conjunctivae and EOM are normal. Pupils are equal, round, and reactive to light.   Neck: Neck supple. No JVD present. No thyromegaly present.   Cardiovascular: Normal rate, regular rhythm, normal heart sounds and intact distal pulses. Exam reveals no gallop and no friction rub.   No murmur heard.  Sinus/sinus tach    Pulmonary/Chest: Effort normal and breath sounds normal. No respiratory distress. She has no wheezes. She has no rales. She exhibits no tenderness.   Room air.    Abdominal: Soft. Bowel sounds are normal. She exhibits no distension. There is no tenderness. There is no rebound and no guarding.   Genitourinary:   Genitourinary Comments: Voiding.    Musculoskeletal: Normal range of motion. She exhibits no edema, tenderness or deformity.   Lymphadenopathy:     She has no cervical adenopathy.   Neurological: She is alert and oriented to person, place, and time.   Skin: Skin is warm and dry. No rash noted.   Psychiatric: She has a normal mood and affect. Her behavior is normal. Judgment and thought content normal.   Nursing note and vitals reviewed.    Results Review:  I have reviewed the labs, radiology results, and diagnostic studies.    Laboratory Data:   Results from last 7 days   Lab Units 07/11/19  0606 07/10/19  0531 07/09/19  1816   WBC 10*3/mm3  --  7.75 13.49*   HEMOGLOBIN g/dL 9.8* 10.3* 11.9*   HEMATOCRIT % 29.1* 29.6*  33.2*   PLATELETS 10*3/mm3  --  284 325      Results from last 7 days   Lab Units 07/11/19  0606 07/10/19  0531 07/09/19  1816   SODIUM mmol/L 135 135 130*   POTASSIUM mmol/L 4.7 3.8 2.6*   CHLORIDE mmol/L 104 104 92*   CO2 mmol/L 27.0 27.0 29.0   BUN mg/dL 4* 6 9   CREATININE mg/dL 0.53 0.58 0.53   CALCIUM mg/dL 9.1 8.3* 8.8   BILIRUBIN mg/dL  --  0.6 0.6   ALK PHOS U/L  --  123* 152*   ALT (SGPT) U/L  --  31 36   AST (SGOT) U/L  --  52* 77*   GLUCOSE mg/dL 121* 99 92     Radiology Data:   Imaging Results (last 24 hours)     Procedure Component Value Units Date/Time    XR Hip With or Without Pelvis 2 - 3 View Right [[id number]] Collected:  07/10/19 1338     Updated:  07/11/19 0722    Narrative:       EXAMINATION: XR HIP W OR WO PELVIS 2-3 VIEW RIGHT- 7/10/2019 1:38 PM CDT     HISTORY: Cannulated screw     FLUOROSCOPY TIME:  0.44 seconds     AIR KERMA: 2.37mGy     NUMBER OF IMAGES: 2     FINDINGS:  Multiple fluoroscopic images are submitted from the OR. A radiologist  was not present for the acquisition or intraoperative interpretation of  these images. Images demonstrate multiple screws through the femoral  head and neck. Please see the operative report for details pertaining to  this exam.  This report was finalized on 07/10/2019 13:39 by Dr. Corey Santos MD.    FL C Arm During Surgery [[id number]] Collected:  07/10/19 1338     Updated:  07/11/19 0722    Narrative:       EXAMINATION: XR HIP W OR WO PELVIS 2-3 VIEW RIGHT- 7/10/2019 1:38 PM CDT     HISTORY: Cannulated screw     FLUOROSCOPY TIME:  0.44 seconds     AIR KERMA: 2.37mGy     NUMBER OF IMAGES: 2     FINDINGS:  Multiple fluoroscopic images are submitted from the OR. A radiologist  was not present for the acquisition or intraoperative interpretation of  these images. Images demonstrate multiple screws through the femoral  head and neck. Please see the operative report for details pertaining to  this exam.  This report was finalized on 07/10/2019 13:39 by   Corey Santos MD.        I have reviewed the patient's current medications.     Assessment/Plan     Active Hospital Problems    Diagnosis   • **Closed fracture of right hip (CMS/HCC)   • Fall   • Hyponatremia   • Tobacco abuse   • Acute pain due to injury   • Gastroesophageal reflux disease   • Hypokalemia     Plan:  1.  POD #1 status post percutaneous screw stabilization of a right valgus impacted femoral neck fracture   2.  PT/OT  3.  Toe-touch weight bearing RLE for 6 weeks  4.  Pain control with Oxycodone   5.  DVT prophylaxis with Xarelto   6.  CBC and BMP in AM  7.  Hemoglobin 9.8    Discharge Planning: I expect the patient to be discharged to home with home health in 1-2 days.    GENEVIEVE Mckeon   07/11/19   10:26 AM    I personally evaluated and examined the patient in conjunction with GENEVIEVE Dumont and agree with the assessment, treatment plan, and disposition of the patient as recorded by her. My history, exam, and further recommendations are: I have reviewed and agree with the plans. Surjit.

## 2019-07-11 NOTE — PLAN OF CARE
Problem: Patient Care Overview  Goal: Plan of Care Review  Outcome: Ongoing (interventions implemented as appropriate)  =   07/11/19 0917   Coping/Psychosocial   Plan of Care Reviewed With patient   Plan of Care Review   Progress no change   OTHER   Outcome Summary OT eval completed. Pt awake and alert. Moving around well in bed. Came to EOB Marsha with HOB elevated. Transfers with CGA and rw. She was shaky/tremulous immediately upon standing, stating she thought it was from the breathing txs. Pt ambulated to door and back to chair. Maintained TTWB status very well during eval. Donned socks and underwear at EOB remaining seated throughout task. Shifted weight R and L during dressing without difficulty. Pt was educated on home safety and DME recs for home. She has shower doors on her tub shower and will need to remove these and purchase/borrow tub bench in order to safely transfer and bathe in tub. Pt would benefit from skilled OT to address knowledge deficit, balance, strength and overall independence with ADL prior to discharge home. Anticipate discharge home with assist. HH may be appropriate for discharge.

## 2019-07-11 NOTE — PROGRESS NOTES
Continued Stay Note   Darrell     Patient Name: Adelita Jaeger  MRN: [id number]  Today's Date: 7/11/2019    Admit Date: 7/9/2019    Discharge Plan     Row Name 07/11/19 1547       Plan    Plan  Home    Patient/Family in Agreement with Plan  yes    Plan Comments  SW spoke to patient re home health care. SW explained benefits of home health as well. Patient says that she is not sure if she wants home health at this time. Patient would like to speak to Dr. Navarro in AM and will make decision tomorrow 7/12.                   RASHEED EspinosaW

## 2019-07-11 NOTE — PLAN OF CARE
Problem: Patient Care Overview  Goal: Plan of Care Review   07/11/19 0945   Coping/Psychosocial   Plan of Care Reviewed With patient   Plan of Care Review   Progress improving   OTHER   Outcome Summary PT john completed. Pt was Ox4 in no apparant distress in bed. She performed bed mobility independently and OOB mobility with CGA. She presented with shaky UE in standing with use of RW which she reported is from the breathing treatment possibly. She presented with decreased WBing on R LE, increased pain, decreased strength, and impaired balance in standing. She would benefit with continued skilled therapy to work on R LE strengthening and overall strengthening to reduce assist needed during mobility, to work on on balance in standing to increase confidence in standing and to reduce fall risk, and to work on endurance with gait and maintaining WBing precautions working on gait distance. PT recommends home with assist and home health for d/c pending she can ascend/descend 4 steps.

## 2019-07-11 NOTE — PLAN OF CARE
Problem: Fall Risk (Adult)  Goal: Absence of Fall  Outcome: Ongoing (interventions implemented as appropriate)      Problem: Fracture Orthopaedic (Adult)  Goal: Signs and Symptoms of Listed Potential Problems Will be Absent, Minimized or Managed (Fracture Orthopaedic)  Outcome: Ongoing (interventions implemented as appropriate)      Problem: Patient Care Overview  Goal: Plan of Care Review  Outcome: Ongoing (interventions implemented as appropriate)   07/11/19 0531   Coping/Psychosocial   Plan of Care Reviewed With patient   Plan of Care Review   Progress improving   OTHER   Outcome Summary A & O, c/o pain to R hip, prn PO pain meds given with relief, mepilex dressing to R hip dry and intact, c/o heartburn, notified Dr King, tums given and pepcid started, has not been up but will be TTWB when OOB, dhaliwal catheter in place to be D/C'd this am, no c/o numbness/tingling, on room air with continuous pulse ox in place, SCD to L leg       Problem: Skin Injury Risk (Adult)  Goal: Skin Health and Integrity  Outcome: Ongoing (interventions implemented as appropriate)

## 2019-07-12 LAB
ANION GAP SERPL CALCULATED.3IONS-SCNC: 2 MMOL/L (ref 4–13)
BUN BLD-MCNC: 3 MG/DL (ref 5–21)
BUN/CREAT SERPL: 5.3 (ref 7–25)
CALCIUM SPEC-SCNC: 8.8 MG/DL (ref 8.4–10.4)
CHLORIDE SERPL-SCNC: 105 MMOL/L (ref 98–110)
CO2 SERPL-SCNC: 28 MMOL/L (ref 24–31)
CREAT BLD-MCNC: 0.57 MG/DL (ref 0.5–1.4)
DEPRECATED RDW RBC AUTO: 46.9 FL (ref 40–54)
ERYTHROCYTE [DISTWIDTH] IN BLOOD BY AUTOMATED COUNT: 14 % (ref 12–15)
GFR SERPL CREATININE-BSD FRML MDRD: 112 ML/MIN/1.73
GLUCOSE BLD-MCNC: 97 MG/DL (ref 70–100)
HCT VFR BLD AUTO: 30.3 % (ref 37–47)
HGB BLD-MCNC: 10.1 G/DL (ref 12–16)
MCH RBC QN AUTO: 30.7 PG (ref 28–32)
MCHC RBC AUTO-ENTMCNC: 33.3 G/DL (ref 33–36)
MCV RBC AUTO: 92.1 FL (ref 82–98)
PLATELET # BLD AUTO: 259 10*3/MM3 (ref 130–400)
PMV BLD AUTO: 10.8 FL (ref 6–12)
POTASSIUM BLD-SCNC: 4.7 MMOL/L (ref 3.5–5.3)
RBC # BLD AUTO: 3.29 10*6/MM3 (ref 4.2–5.4)
SODIUM BLD-SCNC: 135 MMOL/L (ref 135–145)
WBC NRBC COR # BLD: 9.41 10*3/MM3 (ref 4.8–10.8)

## 2019-07-12 PROCEDURE — 80048 BASIC METABOLIC PNL TOTAL CA: CPT | Performed by: NURSE PRACTITIONER

## 2019-07-12 PROCEDURE — 85027 COMPLETE CBC AUTOMATED: CPT | Performed by: NURSE PRACTITIONER

## 2019-07-12 PROCEDURE — 97116 GAIT TRAINING THERAPY: CPT

## 2019-07-12 PROCEDURE — 97530 THERAPEUTIC ACTIVITIES: CPT

## 2019-07-12 PROCEDURE — 97110 THERAPEUTIC EXERCISES: CPT

## 2019-07-12 PROCEDURE — 97535 SELF CARE MNGMENT TRAINING: CPT

## 2019-07-12 RX ORDER — HYDROCODONE BITARTRATE AND ACETAMINOPHEN 7.5; 325 MG/1; MG/1
1 TABLET ORAL EVERY 4 HOURS PRN
Status: DISCONTINUED | OUTPATIENT
Start: 2019-07-12 | End: 2019-07-13 | Stop reason: HOSPADM

## 2019-07-12 RX ADMIN — DOCUSATE SODIUM 100 MG: 100 CAPSULE ORAL at 09:58

## 2019-07-12 RX ADMIN — NICOTINE 1 PATCH: 14 PATCH, EXTENDED RELEASE TRANSDERMAL at 09:59

## 2019-07-12 RX ADMIN — FAMOTIDINE 20 MG: 20 TABLET, FILM COATED ORAL at 17:18

## 2019-07-12 RX ADMIN — BUSPIRONE HYDROCHLORIDE 5 MG: 5 TABLET ORAL at 21:38

## 2019-07-12 RX ADMIN — OXYCODONE HYDROCHLORIDE 10 MG: 5 TABLET ORAL at 10:03

## 2019-07-12 RX ADMIN — HYDROCODONE BITARTRATE AND ACETAMINOPHEN 1 TABLET: 7.5; 325 TABLET ORAL at 18:17

## 2019-07-12 RX ADMIN — POTASSIUM CHLORIDE 20 MEQ: 750 CAPSULE, EXTENDED RELEASE ORAL at 17:18

## 2019-07-12 RX ADMIN — SODIUM CHLORIDE, PRESERVATIVE FREE 3 ML: 5 INJECTION INTRAVENOUS at 21:39

## 2019-07-12 RX ADMIN — MUPIROCIN: 20 OINTMENT TOPICAL at 21:38

## 2019-07-12 RX ADMIN — BUSPIRONE HYDROCHLORIDE 5 MG: 5 TABLET ORAL at 09:58

## 2019-07-12 RX ADMIN — POTASSIUM CHLORIDE 20 MEQ: 750 CAPSULE, EXTENDED RELEASE ORAL at 09:58

## 2019-07-12 RX ADMIN — SODIUM CHLORIDE, PRESERVATIVE FREE 3 ML: 5 INJECTION INTRAVENOUS at 09:59

## 2019-07-12 RX ADMIN — CITALOPRAM 20 MG: 20 TABLET, FILM COATED ORAL at 09:58

## 2019-07-12 RX ADMIN — FAMOTIDINE 20 MG: 20 TABLET, FILM COATED ORAL at 06:37

## 2019-07-12 RX ADMIN — BUSPIRONE HYDROCHLORIDE 5 MG: 5 TABLET ORAL at 17:18

## 2019-07-12 RX ADMIN — MUPIROCIN: 20 OINTMENT TOPICAL at 09:58

## 2019-07-12 RX ADMIN — RIVAROXABAN 10 MG: 10 TABLET, FILM COATED ORAL at 09:58

## 2019-07-12 NOTE — PLAN OF CARE
Problem: Patient Care Overview  Goal: Plan of Care Review   07/12/19 2676   OTHER   Outcome Summary PT tx completed. Pt ambulated a range of distances from 20-50ft on even surfaces with CGA. She attempted stairs this AM but was unable to perform standing up. The PM session worked on floor transfers and bumping up stairs so when she goes home she can get it. She performed bumping up stairs with supervision and floor to sitting with min A. PT discussed with patient that she will need a chair at the bottom and top of the stairs to sit on when transitioning from standing to floor and vice versa and having someone with her to supervise and possible help when going from floor to chair. She needed min A to get up into chair but stated she has a lower chair at home and her S/O Howard will be with her when she arrives home. PT recommends home with home health and assist for d/c if she has Howard with her when going home.

## 2019-07-12 NOTE — PROGRESS NOTES
Continued Stay Note   Darrell     Patient Name: Adelita Jaeger  MRN: [id number]  Today's Date: 7/12/2019    Admit Date: 7/9/2019    Discharge Plan     Row Name 07/12/19 0855       Plan    Plan Comments  Pt is planning on discharging home with boyfriend, Howard. PT recommends home with HH. Pt wishes to speak with MD in regards to needing HH. SW will follow and await decision.         Discharge Codes    No documentation.             Kaley Giles

## 2019-07-12 NOTE — THERAPY TREATMENT NOTE
Acute Care - Occupational Therapy Treatment Note  [facility]     Patient Name: Adelita Jaeger  : 1970  MRN: [id number]  Today's Date: 2019  Onset of Illness/Injury or Date of Surgery: 19  Date of Referral to OT: 07/10/19  Referring Physician: Aramis Navarro MD    Admit Date: 2019       ICD-10-CM ICD-9-CM   1. Closed fracture of right hip, initial encounter (CMS/Formerly [facility]) S72.001A 820.8   2. Hypokalemia E87.6 276.8   3. Decreased activities of daily living (ADL) R68.89 780.99   4. Impaired functional mobility, balance, gait, and endurance Z74.09 V49.89     Patient Active Problem List   Diagnosis   • Gastroesophageal reflux disease   • Hypokalemia   • Hypomagnesemia   • Hypoalbuminemia   • Smokes tobacco daily   • Closed fracture of right hip (CMS/Formerly [facility])   • Hyponatremia   • Tobacco abuse   • Acute pain due to injury   • Fall     Past Medical History:   Diagnosis Date   • COPD (chronic obstructive pulmonary disease) (CMS/Formerly [facility])    • GERD (gastroesophageal reflux disease)    • Hyperlipidemia    • Hypertension    • Potassium (K) deficiency      Past Surgical History:   Procedure Laterality Date   • AUGMENTATION MAMMAPLASTY     • CARPAL TUNNEL RELEASE     •  SECTION     • CHOLECYSTECTOMY     • DILATATION AND CURETTAGE     • ENDOMETRIAL BIOPSY     • HERNIA REPAIR     • HIP CANNULATED SCREW PLACEMENT Right 7/10/2019    Procedure: RIGHT HIP CANNULATED SCREW PLACEMENT;  Surgeon: Aramis Navarro MD;  Location: [facility];  Service: Orthopedics   • OTHER SURGICAL HISTORY      TIF procedure.        Therapy Treatment    Rehabilitation Treatment Summary     Row Name 19 0840 19 0813          Treatment Time/Intention    Discipline  occupational therapist  -AC  physical therapist  (Pended)   -AL     Patient/Family Observations  --  none present  (Pended)   -AL     Care Plan Review  evaluation/treatment results reviewed;care plan/treatment goals reviewed;risks/benefits  reviewed;current/potential barriers reviewed;patient/other agree to care plan  -AC  --     Patient Effort  good  -  --     Existing Precautions/Restrictions  fall;right;partial weight bearing TTWB RLE  -AC2  fall;right;partial weight bearing  (Pended)   -AL     Recorded by [AC] Bill Oliveira, OTR/L, [facility] 07/12/19 0857  [AC2] Bill Oliveira, OTR/L, [facility] 07/12/19 0932 [AL] Kalpesh Young, PT Student 07/12/19 0813     Row Name 07/12/19 0813             Cognitive Assessment/Intervention    Additional Documentation  Cognitive Assessment/Intervention (Group)  (Pended)   -AL      Recorded by [AL] Kalpesh oYung, PT Student 07/12/19 0813      Row Name 07/12/19 0840 07/12/19 0813          Cognitive Assessment/Intervention- PT/OT    Affect/Mental Status (Cognitive)  --  WNL  (Pended)   -AL     Orientation Status (Cognition)  --  oriented x 4  (Pended)   -AL     Follows Commands (Cognition)  --  WNL  (Pended)   -AL     Cognitive Function (Cognitive)  --  WNL  (Pended)   -AL     Personal Safety Interventions  fall prevention program maintained;gait belt;muscle strengthening facilitated;nonskid shoes/slippers when out of bed;supervised activity  -  fall prevention program maintained;gait belt;nonskid shoes/slippers when out of bed;supervised activity  (Pended)   -AL     Recorded by [AC] Bill Oliveira, OTR/L, [facility] 07/12/19 0932 [AL] Kalpesh Young, PT Student 07/12/19 0815     Row Name 07/12/19 0840 07/12/19 0813          Bed Mobility Assessment/Treatment    Comment (Bed Mobility)  up in chair  -  Pt in chair at start  (Pended)   -AL     Recorded by [AC] Bill Oliveira OTR/L, [facility] 07/12/19 0932 [AL] Kalpesh Young, PT Student 07/12/19 0815     Row Name 07/12/19 0840             Functional Mobility    Functional Mobility- Ind. Level  contact guard assist  -AC      Functional Mobility- Device  rolling walker  -      Functional Mobility-Maintain WBing  able to maintain weight bearing status  -      Functional Mobility- Comment  to BR back to  chair  -AC      Recorded by [AC] Bill Oliveira, OTR/L, [facility] 07/12/19 0932      Row Name 07/12/19 0840 07/12/19 0813          Transfer Assessment/Treatment    Transfer Assessment/Treatment  sit-stand transfer;stand-sit transfer;shower transfer;toilet transfer  -AC  sit-stand transfer;stand-sit transfer  (Pended)   -AL     Maintains Weight-bearing Status (Transfers)  able to maintain  -AC  able to maintain  (Pended)   -AL     Comment (Transfers)  --  Pt. attempted to use one hand on RW and one hand to push up from chair but needed min A. When she pushed off with both hands from armrest, she needed CGA  (Pended)   -AL     Recorded by [AC] Bill Oliveira, OTR/L, [facility] 07/12/19 0932 [AL] Kalpesh Young, PT Student 07/12/19 0815     Row Name 07/12/19 0840 07/12/19 0813          Sit-Stand Transfer    Sit-Stand Carson (Transfers)  contact guard;verbal cues  -AC  contact guard;1 person assist  (Pended)   -AL     Assistive Device (Sit-Stand Transfers)  walker, front-wheeled  -AC  walker, front-wheeled  (Pended)   -AL     Recorded by [AC] Bill Oliveira OTR/L, [facility] 07/12/19 0932 [AL] Kalpesh Young, PT Student 07/12/19 0815     Row Name 07/12/19 0840 07/12/19 0813          Stand-Sit Transfer    Stand-Sit [place] (Transfers)  contact guard;verbal cues  -AC  verbal cues;contact guard  (Pended)   -AL     Assistive Device (Stand-Sit Transfers)  walker, front-wheeled  -AC  walker, front-wheeled  (Pended)   -AL     Recorded by [AC] Bill Oliveira OTR/L, [facility] 07/12/19 0932 [AL] Kalpesh Young, PT Student 07/12/19 0815     Row Name 07/12/19 0813             Gait/Stairs Assessment/Training    Carson Level (Gait)  supervision;contact guard  (Pended)   -AL      Assistive Device (Gait)  walker, front-wheeled  (Pended)   -AL      Distance in Feet (Gait)  Pt. ambulated 4x20ft with decreased gait speed with reports of fatigue and breaks. Patient reported not getting breathing treatment and appeared less shaky today with gait.   (Pended)    -AL2      Pattern (Gait)  step-to  (Pended)   -AL2      Comment (Gait/Stairs)  Pt attempted stairs but was unable to ascend one step with B HR due to weakness and not being able to maintain WBing status.   (Pended)   -AL3      Recorded by [AL] Kalpesh Young, PT Student 07/12/19 0815  [AL2] Kalpesh Young, PT Student 07/12/19 0819  [AL3] Kalpesh Young, PT Student 07/12/19 0823      Row Name 07/12/19 0813             Motor Skills Assessment/Interventions    Additional Documentation  Therapeutic Exercise Interventions (Group);Therapeutic Exercise (Group)  (Pended)   -AL      Recorded by [AL] Kalpesh Young, PT Student 07/12/19 0823      Row Name 07/12/19 0813             Therapeutic Exercise    Lower Extremity (Therapeutic Exercise)  LAQ (long arc quad), right;marching while seated  (Pended)   -AL      Comment (Therapeutic Exercise)  Pt performed 2x10 bouts of R LAQ, marching, and hip abduction in sitting. Educated on trying to push self up with B UE on armrest to left butt from chair to help increase strength on UE to improve use of RW and help with stairs.   (Pended)   -AL      Recorded by [AL] Kalpesh Young PT Student 07/12/19 0825      Row Name 07/12/19 0813             Static Sitting Balance    Level of Wilkes (Unsupported Sitting, Static Balance)  independent  (Pended)   -AL      Sitting Position (Unsupported Sitting, Static Balance)  sitting edge of mat  (Pended)   -AL      Recorded by [AL] Kalpesh Young PT Student 07/12/19 0823      Row Name 07/12/19 0813             Static Standing Balance    Level of Wilkes (Supported Standing, Static Balance)  supervision  (Pended)   -AL      Time Able to Maintain Position (Supported Standing, Static Balance)  45 to 60 seconds  (Pended)   -AL      Assistive Device Utilized (Supported Standing, Static Balance)  walker, rolling  (Pended)   -AL      Recorded by [AL] Kalpesh Young PT Student 07/12/19 0823      Row Name 07/12/19 0813             Dynamic Standing Balance    Level of Wilkes, Reaches  Outside Midline (Standing, Dynamic Balance)  contact guard assist  (Pended)   -AL      Assistive Device Utilized (Supported Standing, Dynamic Balance)  walker, rolling  (Pended)   -AL      Recorded by [AL] Kalpesh Young, PT Student 07/12/19 0823      Row Name 07/12/19 0813             Positioning and Restraints    Pre-Treatment Position  sitting in chair/recliner  (Pended)   -AL      Post Treatment Position  chair  (Pended)   -AL      In Chair  sitting;call light within reach;encouraged to call for assist;legs elevated  (Pended)   -AL      Recorded by [AL] Kalpesh Young, PT Student 07/12/19 0823      Row Name 07/12/19 0840 07/12/19 0813          Pain Assessment    Additional Documentation  Pain Scale: Numbers Pre/Post-Treatment (Group)  -AC  Pain Scale: Numbers Pre/Post-Treatment (Group)  (Pended)   -AL     Recorded by [AC] Bill Oliveira, OTR/L, [facility] 07/12/19 0857 [AL] Kalpesh Young, PT Student 07/12/19 0825     Row Name 07/12/19 0840 07/12/19 0813          Pain Scale: Numbers Pre/Post-Treatment    Pain Scale: Numbers, Pretreatment  5/10  -AC  5/10  (Pended)   -AL     Pain Scale: Numbers, Post-Treatment  --  5/10  (Pended)   -AL     Pain Location - Side  Right  -AC  Right  (Pended)   -AL     Pain Location  hip  -AC  hip  (Pended)   -AL     Pain Intervention(s)  Medication (See MAR);Repositioned;Ambulation/increased activity  -AC  Repositioned;Ambulation/increased activity;Rest  (Pended)   -AL     Recorded by [AC] Bill Oliveira, OTR/L, [facility] 07/12/19 0857 [AL] Kalpesh Young, PT Student 07/12/19 0826     Row Name                Wound 07/10/19 1232 Right hip incision    Wound - Properties Group Date first assessed: 07/10/19 [RM] Time first assessed: 1232 [RM] Side: Right [RM] Location: hip [RM] Type: incision [RM] Recorded by:  [RM] Zuleyka Fuentes RN 07/10/19 1232    Row Name 07/12/19 0840 07/12/19 0813          Plan of Care Review    Plan of Care Reviewed With  patient  -AC  patient  (Pended)   -AL     Recorded by [AC] Bill Oliveira,  OTR/L, [facility] 07/12/19 0947 [AL] Kalpesh Young, PT Student 07/12/19 0826     Row Name 07/12/19 0840             Outcome Summary/Treatment Plan (OT)    Daily Summary of Progress (OT)  progress toward functional goals is good  -AC      Anticipated Equipment Needs at Discharge (OT)  tub bench;bedside commode  -AC      Anticipated Discharge Disposition (OT)  home with home health  -AC      Recorded by [AC] Bill Oliveira, OTR/L, [facility] 07/12/19 0947      Row Name 07/12/19 0813             Outcome Summary/Treatment Plan (PT)    Daily Summary of Progress (PT)  progress towards functional goals is fair  (Pended)   -AL      Anticipated Discharge Disposition (PT)  home with assist;home with home health  (Pended)   -AL      Recorded by [AL] Kalpesh Young, PT Student 07/12/19 0826        User Key  (r) = Recorded By, (t) = Taken By, (c) = Cosigned By    Initials Name Effective Dates Discipline    AC Bill Oliveira, OTR/L, [facility] 04/09/19 -  OT    RM Zuleyka Fuentes, RN 11/08/16 -  Nurse    Kalpesh Henriquez, PT Student 04/24/19 -  PT        Wound 07/10/19 1232 Right hip incision (Active)   Dressing Appearance dry;intact 7/11/2019  8:00 PM   Base dressing in place, unable to visualize 7/11/2019  8:00 PM   Drainage Amount none 7/11/2019  8:00 PM   Dressing Care, Wound foam 7/11/2019  8:00 PM       Occupational Therapy Education     Title: PT OT SLP Therapies (Done)     Topic: Occupational Therapy (Done)     Point: ADL training (Done)     Description: Instruct learner(s) on proper safety adaptation and remediation techniques during self care or transfers.   Instruct in proper use of assistive devices.    Learning Progress Summary           Patient Acceptance, E,TB, VU by  at 7/12/2019  9:47 AM    Comment:  safe transfers, WB status, DME recs    Acceptance, E,TB, VU by  at 7/11/2019  9:16 AM    Comment:  DME recs, TTWB status, benefits of activity, home safety                   Point: Precautions (Done)     Description: Instruct learner(s) on  prescribed precautions during self-care and functional transfers.    Learning Progress Summary           Patient Acceptance, E,TB, VU by  at 7/12/2019  9:47 AM    Comment:  safe transfers, WB status, DME recs    Acceptance, E,TB, VU by  at 7/11/2019  9:16 AM    Comment:  DME recs, TTWB status, benefits of activity, home safety                               User Key     Initials Effective Dates Name Provider Type Discipline     04/09/19 -  Bill Oliveira, OTR/L, CNT Occupational Therapist OT                OT Recommendation and Plan  Outcome Summary/Treatment Plan (OT)  Daily Summary of Progress (OT): progress toward functional goals is good  Anticipated Equipment Needs at Discharge (OT): tub bench, bedside commode  Anticipated Discharge Disposition (OT): home with home health  Planned Therapy Interventions (OT Eval): BADL retraining, functional balance retraining, occupation/activity based interventions, patient/caregiver education/training, transfer/mobility retraining  Therapy Frequency (OT Eval): 5 times/wk  Daily Summary of Progress (OT): progress toward functional goals is good  Plan of Care Review  Plan of Care Reviewed With: patient  Plan of Care Reviewed With: patient  Outcome Summary: OT tx completed.  Pt progressing well with OT goals.  SBA to CGA for all transfers and ambulation.  Jose bathing with tub bench, HH showerhead and grab bars, S to CGA with LB dressing and toileting, and CGA for standing grooming task at side sink.  Pt was educated on home safety and DME recs.  She is ready for discharge home with HH when stable.  Outcome Measures     Row Name 07/12/19 0947 07/12/19 0800 07/11/19 0900       How much help from another person do you currently need...    Turning from your back to your side while in flat bed without using bedrails?  --  4  (Pended)   -AL  4  -MS (r) AL (t) MS (c)    Moving from lying on back to sitting on the side of a flat bed without bedrails?  --  4  (Pended)   -AL  4   -MS (r) AL (t) MS (c)    Moving to and from a bed to a chair (including a wheelchair)?  --  3  (Pended)   -AL  3  -MS (r) AL (t) MS (c)    Standing up from a chair using your arms (e.g., wheelchair, bedside chair)?  --  3  (Pended)   -AL  3  -MS (r) AL (t) MS (c)    Climbing 3-5 steps with a railing?  --  2  (Pended)   -AL  2  -MS (r) AL (t) MS (c)    To walk in hospital room?  --  3  (Pended)   -AL  3  -MS (r) AL (t) MS (c)    AM-PAC 6 Clicks Score (PT)  --  19  (Pended)   -AC (r) AL (t)  19  -MS (r) AL (t)       How much help from another is currently needed...    Putting on and taking off regular lower body clothing?  3  -AC  --  --    Bathing (including washing, rinsing, and drying)  4  -AC  --  --    Toileting (which includes using toilet bed pan or urinal)  3  -AC  --  --    Putting on and taking off regular upper body clothing  4  -AC  --  --    Taking care of personal grooming (such as brushing teeth)  4  -AC  --  --    Eating meals  4  -AC  --  --    AM-PAC 6 Clicks Score (OT)  22  -AC  --  --       Functional Assessment    Outcome Measure Options  --  AM-PAC 6 Clicks Basic Mobility (PT)  (Pended)   -AL  AM-PAC 6 Clicks Basic Mobility (PT)  -MS (r) AL (t) MS (c)    Row Name 07/11/19 0815             How much help from another is currently needed...    Putting on and taking off regular lower body clothing?  3  -AC      Bathing (including washing, rinsing, and drying)  3  -AC      Toileting (which includes using toilet bed pan or urinal)  3  -AC      Putting on and taking off regular upper body clothing  4  -AC      Taking care of personal grooming (such as brushing teeth)  4  -AC      Eating meals  4  -AC      AM-PAC 6 Clicks Score (OT)  21  -AC         Functional Assessment    Outcome Measure Options  AM-PAC 6 Clicks Daily Activity (OT)  -AC        User Key  (r) = Recorded By, (t) = Taken By, (c) = Cosigned By    Initials Name Provider Type    Bill Barkley, OTR/L, CNT Occupational Therapist    MS  Tabitha Jennings, PT, DPT, NCS Physical Therapist    Kalpesh Henriquez, PT Student PT Student           Time Calculation:   Time Calculation- OT     Row Name 07/12/19 0948 07/12/19 0829          Time Calculation- OT    OT Start Time  0840  -AC  --     OT Stop Time  0948  -AC  --     OT Time Calculation (min)  68 min  -AC  --     Total Timed Code Minutes- OT  68 minute(s)  -AC  --     OT Received On  07/12/19  -  --        Timed Charges    25192 - Gait Training Minutes   --  17  (Pended)   -AL       User Key  (r) = Recorded By, (t) = Taken By, (c) = Cosigned By    Initials Name Provider Type    AC Bill Oliveira, OTR/L, CNT Occupational Therapist    Kalpesh Henriquez, PT Student PT Student        Therapy Charges for Today     Code Description Service Date Service Provider Modifiers Qty    67366065890 HC OT EVAL LOW COMPLEXITY 4 7/11/2019 Bill Oliveira OTR/L, CNT GO 1    69069728554 HC OT SELF CARE/MGMT/TRAIN EA 15 MIN 7/12/2019 Bill Oliveira OTR/L, CNT GO 5               Bill Oliveira OTR/L, CNT  7/12/2019

## 2019-07-12 NOTE — THERAPY TREATMENT NOTE
Acute Care - Physical Therapy Treatment Note  [facility]     Patient Name: Adelita Jaegre  : 1970  MRN: [id number]  Today's Date: 2019  Onset of Illness/Injury or Date of Surgery: 19  Date of Referral to PT: 07/10/19  Referring Physician: Aramis Navarro MD    Admit Date: 2019    Visit Dx:    ICD-10-CM ICD-9-CM   1. Closed fracture of right hip, initial encounter (CMS/[facility]) S72.001A 820.8   2. Hypokalemia E87.6 276.8   3. Decreased activities of daily living (ADL) R68.89 780.99   4. Impaired functional mobility, balance, gait, and endurance Z74.09 V49.89     Patient Active Problem List   Diagnosis   • Gastroesophageal reflux disease   • Hypokalemia   • Hypomagnesemia   • Hypoalbuminemia   • Smokes tobacco daily   • Closed fracture of right hip (CMS/[facility])   • Hyponatremia   • Tobacco abuse   • Acute pain due to injury   • Fall       Therapy Treatment    Rehabilitation Treatment Summary     Row Name 19 1300 19 0840 19 0813       Treatment Time/Intention    Discipline  physical therapist  -MS,AL,MS2  occupational therapist  -AC  physical therapist  -MS,AL,MS2    Patient/Family Observations  no family present  -MS,AL,MS2  --  none present  -MS,AL,MS2    Care Plan Review  --  evaluation/treatment results reviewed;care plan/treatment goals reviewed;risks/benefits reviewed;current/potential barriers reviewed;patient/other agree to care plan  -AC  --    Patient Effort  --  good  -AC  --    Existing Precautions/Restrictions  right;partial weight bearing;fall  -MS,AL,MS2  fall;right;partial weight bearing TTWB RLE  -AC2  fall;right;partial weight bearing  -MS,AL,MS2    Recorded by [MS,AL,MS2] Tabitha Jennings, PT, DPT, NCS (r) Kalpesh Young PT Student (t) Tabitha Jennings, PT, DPT, NCS (c) 19 1442 [AC] Bill Oliveira, OTR/L, CNT 19 0857  [AC2] Bill Oliveira, OTR/L, CNT 19 0932 [MS,AL,MS2] Tabitha Jennings, PT, DPT, NCS (r) Kalpesh Young, PT Student (t) Tabitha Jennings PT,  DPT, NCS (c) 07/12/19 1442    Row Name 07/12/19 1300 07/12/19 0813          Cognitive Assessment/Intervention    Additional Documentation  Cognitive Assessment/Intervention (Group)  -MS,AL,MS2  Cognitive Assessment/Intervention (Group)  -MS,AL,MS2     Recorded by [MS,AL,MS2] Tabitha Jennings R, PT, DPT, NCS (r) Kalpesh Young, PT Student (t) Tabitha Jennings R, PT, DPT, NCS (c) 07/12/19 1442 [MS,AL,MS2] Tabitha Jennings R, PT, DPT, NCS (r) Kalpesh Young, PT Student (t) Tabitha Jennings, PT, DPT, NCS (c) 07/12/19 1442     Row Name 07/12/19 1300 07/12/19 0840 07/12/19 0813       Cognitive Assessment/Intervention- PT/OT    Affect/Mental Status (Cognitive)  WNL  -MS,AL,MS2  --  WNL  -MS,AL,MS2    Orientation Status (Cognition)  oriented x 4  -MS,AL,MS2  --  oriented x 4  -MS,AL,MS2    Follows Commands (Cognition)  WNL  -MS,AL,MS2  --  WNL  -MS,AL,MS2    Cognitive Function (Cognitive)  WNL  -MS,AL,MS2  --  WNL  -MS,AL,MS2    Personal Safety Interventions  fall prevention program maintained;gait belt;nonskid shoes/slippers when out of bed;supervised activity  -MS,AL,MS2  fall prevention program maintained;gait belt;muscle strengthening facilitated;nonskid shoes/slippers when out of bed;supervised activity  -AC  fall prevention program maintained;gait belt;nonskid shoes/slippers when out of bed;supervised activity  -MS,AL,MS2    Recorded by [MS,AL,MS2] Tabitha Jennings R, PT, DPT, NCS (r) Kalpesh Young, PT Student (t) Tabitha Jennings, PT, DPT, NCS (c) 07/12/19 1442 [AC] Bill Oliveira, OTR/L, CNT 07/12/19 0932 [MS,AL,MS2] Tabitha Jennings R, PT, DPT, NCS (r) Kalpesh Young, PT Student (t) Tabitha Jennings, PT, DPT, NCS (c) 07/12/19 1442    Row Name 07/12/19 1300             Safety Issues, Functional Mobility    Impairments Affecting Function (Mobility)  endurance/activity tolerance;strength  -MS,AL,MS2      Recorded by [MS,AL,MS2] Tabitha Jennings, PT, DPT, NCS (r) Kalpesh Young, PT Student (t) Tabitha Jennings, PT, DPT, NCS (c) 07/12/19 1442      Row Name  07/12/19 0840 07/12/19 0813          Bed Mobility Assessment/Treatment    Comment (Bed Mobility)  up in chair  -AC  Pt in chair at start  -MS,AL,MS2     Recorded by [AC] Bill Oliveira, OTR/L, CNT 07/12/19 0932 [MS,AL,MS2] Tabitha Jennings, PT, DPT, NCS (r) Kalpesh Young, PT Student (t) Tabitha Jennings, PT, DPT, NCS (c) 07/12/19 1442     Row Name 07/12/19 0840             Functional Mobility    Functional Mobility- Ind. Level  contact guard assist  -AC      Functional Mobility- Device  rolling walker  -AC      Functional Mobility-Maintain WBing  able to maintain weight bearing status  -AC      Functional Mobility- Comment  to BR back to chair  -AC      Recorded by [AC] Bill Oliveira, OTR/L, [facility] 07/12/19 0932      Row Name 07/12/19 1300 07/12/19 0840 07/12/19 0813       Transfer Assessment/Treatment    Transfer Assessment/Treatment  sit-stand transfer;stand-sit transfer;toilet transfer;floor transfer  -MS,AL,MS2  sit-stand transfer;stand-sit transfer;shower transfer;toilet transfer  -AC  sit-stand transfer;stand-sit transfer  -MS,AL,MS2    Maintains Weight-bearing Status (Transfers)  --  able to maintain  -AC  able to maintain  -MS,AL,MS2    Comment (Transfers)  --  --  Pt. attempted to use one hand on RW and one hand to push up from chair but needed min A. When she pushed off with both hands from armrest, she needed CGA  -MS,AL,MS2    Recorded by [MS,AL,MS2] Tabitha Jennings, PT, DPT, NCS (r) Kalpesh Young, PT Student (t) Tabitha Jennings, PT, DPT, NCS (c) 07/12/19 1442 [AC] Bill Oliveira, OTR/L, CNT 07/12/19 0932 [MS,AL,MS2] Tabitha Jennings, PT, DPT, NCS (r) Kalpesh Young, PT Student (t) Tabitha Jennings, PT, DPT, NCS (c) 07/12/19 1442    Row Name 07/12/19 1300 07/12/19 0840 07/12/19 0813       Sit-Stand Transfer    Sit-Stand [place] (Transfers)  verbal cues;supervision  -MS,AL,MS2  contact guard;verbal cues  -AC  contact guard;1 person assist  -MS,AL,MS2    Assistive Device (Sit-Stand Transfers)  walker,  front-wheeled  -MS,AL,MS2  walker, front-wheeled  -AC  walker, front-wheeled  -MS,AL,MS2    Recorded by [MS,AL,MS2] Tabitha Jennings, PT, DPT, NCS (r) Kalpesh Young, PT Student (t) Tabitha Jennings, PT, DPT, NCS (c) 07/12/19 1442 [AC] Bill Oliveira N, OTR/L, CNT 07/12/19 0932 [MS,AL,MS2] Tabitha Jennings, PT, DPT, NCS (r) Kalpesh Young, PT Student (t) Tabitha Jennings, PT, DPT, NCS (c) 07/12/19 1442    Row Name 07/12/19 1300 07/12/19 0840 07/12/19 0813       Stand-Sit Transfer    Stand-Sit [place] (Transfers)  supervision;verbal cues  -MS,AL,MS2  contact guard;verbal cues  -AC  verbal cues;contact guard  -MS,AL,MS2    Assistive Device (Stand-Sit Transfers)  walker, front-wheeled  -MS,AL,MS2  walker, front-wheeled  -AC  walker, front-wheeled  -MS,AL,MS2    Recorded by [MS,AL,MS2] Tabitha Jennings, PT, DPT, NCS (r) Kalpesh Young, PT Student (t) Tabitha Jennings PT, DPT, NCS (c) 07/12/19 1442 [AC] Bill Oliveira, OTR/L, CNT 07/12/19 0932 [MS,AL,MS2] Tabitha Jennings, PT, DPT, NCS (r) Kalpesh Young, PT Student (t) Tabitha Jennings, PT, DPT, NCS (c) 07/12/19 1442    Row Name 07/12/19 1300             Toilet Transfer    Type (Toilet Transfer)  stand-sit;sit-stand  -MS,AL,MS2      [place] Level (Toilet Transfer)  supervision  -MS,AL,MS2      Assistive Device (Toilet Transfer)  walker, front-wheeled;grab bars/safety frame  -MS,AL,MS2      Recorded by [MS,AL,MS2] Tabitha Jennings, PT, DPT, NCS (r) Kalpesh Young, PT Student (t) Tabitha Jennings, PT, DPT, NCS (c) 07/12/19 1442      Row Name 07/12/19 1300             Floor Transfer    Type (Floor Transfer)  Bumping up to chair  -MS,AL,MS2      [place] Level (Floor Transfer)  minimum assist (75% patient effort)  -MS,AL,MS2      Recorded by [MS,AL,MS2] Tabitha Jennings, PT, DPT, NCS (r) Kalpesh Young, PT Student (t) Tabitha Jennings, PT, DPT, NCS (c) 07/12/19 1442      Row Name 07/12/19 1300 07/12/19 0813          Gait/Stairs Assessment/Training    [place] Level (Gait)  supervision   -MS,AL,MS2  supervision;contact guard  -MS,AL,MS2     Assistive Device (Gait)  walker, front-wheeled  -MS,AL,MS2  walker, front-wheeled  -MS,AL,MS2     Distance in Feet (Gait)  Pt ambulated 20, 35, and 50ft with increased pace compared to this morning but still decreased gait speed.   -MS,AL,MS2  Pt. ambulated 4x20ft with decreased gait speed with reports of fatigue and breaks. Patient reported not getting breathing treatment and appeared less shaky today with gait.   -MS,AL,MS2     Pattern (Gait)  --  step-to  -MS,AL,MS2     Negotiation (Stairs)  stairs independence  -MS,AL,MS2  --     [place] Level (Stairs)  supervision;verbal cues  -MS,AL,MS2  --     Handrail Location (Stairs)  none  -MS,AL,MS2  --     Number of Steps (Stairs)  3  -MS,AL,MS2  --     Ascending Technique (Stairs)  bumping  -MS,AL,MS2  --     Descending Technique (Stairs)  bumping  -MS,AL,MS2  --     Comment (Gait/Stairs)  Pt. performed bumping up steps keeping R LE elevated with ascending/descending.   -MS,AL,MS2  Pt attempted stairs but was unable to ascend one step with B HR due to weakness and not being able to maintain WBing status.   -MS,AL,MS2     Recorded by [MS,AL,MS2] Tabitha Jennings, PT, DPT, NCS (r) Kalpesh Young, PT Student (t) Tabitha Jnenings, PT, DPT, NCS (c) 07/12/19 1442 [MS,AL,MS2] Tabitha Jennings R, PT, DPT, NCS (r) Kalpesh Young, PT Student (t) Tabitha Jennings, PT, DPT, NCS (c) 07/12/19 1442     Row Name 07/12/19 0813             Motor Skills Assessment/Interventions    Additional Documentation  Therapeutic Exercise Interventions (Group);Therapeutic Exercise (Group)  -MS,AL,MS2      Recorded by [MS,AL,MS2] Tabitha Jennings R, PT, DPT, NCS (r) Kalpesh Young, PT Student (t) Tabitha Jennings, PT, DPT, NCS (c) 07/12/19 1442      Row Name 07/12/19 0813             Therapeutic Exercise    Lower Extremity (Therapeutic Exercise)  LAQ (long arc quad), right;marching while seated  -MS,AL,MS2      Comment (Therapeutic Exercise)  Pt performed 2x10 bouts  of R LAQ, marching, and hip abduction in sitting. Educated on trying to push self up with B UE on armrest to left butt from chair to help increase strength on UE to improve use of RW and help with stairs.   -MS,AL,MS2      Recorded by [MS,AL,MS2] Tabitha Jennings, PT, DPT, NCS (r) Kalpesh Young, PT Student (t) Tabitha Jennings, PT, DPT, NCS (c) 07/12/19 1442      Row Name 07/12/19 0813             Static Sitting Balance    Level of [place] (Unsupported Sitting, Static Balance)  independent  -MS,AL,MS2      Sitting Position (Unsupported Sitting, Static Balance)  sitting edge of mat  -MS,AL,MS2      Recorded by [MS,AL,MS2] Tabitha Jennings, PT, DPT, NCS (r) Kalpesh Young, PT Student (t) Tabitha Jennings, PT, DPT, NCS (c) 07/12/19 1442      Row Name 07/12/19 0813             Static Standing Balance    Level of [place] (Supported Standing, Static Balance)  supervision  -MS,AL,MS2      Time Able to Maintain Position (Supported Standing, Static Balance)  45 to 60 seconds  -MS,AL,MS2      Assistive Device Utilized (Supported Standing, Static Balance)  walker, rolling  -MS,AL,MS2      Recorded by [MS,AL,MS2] Tabitha Jennings, PT, DPT, NCS (r) Kalpesh Young, PT Student (t) Tabitha Jennings, PT, DPT, NCS (c) 07/12/19 1442      Row Name 07/12/19 0813             Dynamic Standing Balance    Level of [place], Reaches Outside Midline (Standing, Dynamic Balance)  contact guard assist  -MS,AL,MS2      Assistive Device Utilized (Supported Standing, Dynamic Balance)  walker, rolling  -MS,AL,MS2      Recorded by [MS,AL,MS2] Tabitha Jennings, PT, DPT, NCS (r) Kalpesh Young, PT Student (t) Tabitha Jennings, PT, DPT, NCS (c) 07/12/19 1442      Row Name 07/12/19 1300 07/12/19 0813          Positioning and Restraints    Pre-Treatment Position  sitting in chair/recliner  -MS,AL,MS2  sitting in chair/recliner  -MS,AL,MS2     Post Treatment Position  chair  -MS,AL,MS2  chair  -MS,AL,MS2     In Chair  sitting;call light within reach;encouraged to call  for assist;legs elevated  -MS,AL,MS2  sitting;call light within reach;encouraged to call for assist;legs elevated  -MS,AL,MS2     Recorded by [MS,AL,MS2] Tabitha Jennings, PT, DPT, NCS (r) Kalpesh Young, PT Student (t) Tabitha Jennings, PT, DPT, NCS (c) 07/12/19 1442 [MS,AL,MS2] Tabitha Jennings, PT, DPT, NCS (r) Kalpesh Young, PT Student (t) Tabitha Jennings, PT, DPT, NCS (c) 07/12/19 1442     Row Name 07/12/19 1300 07/12/19 0840 07/12/19 0813       Pain Assessment    Additional Documentation  Pain Scale: Numbers Pre/Post-Treatment (Group)  -MS,AL,MS2  Pain Scale: Numbers Pre/Post-Treatment (Group)  -AC  Pain Scale: Numbers Pre/Post-Treatment (Group)  -MS,AL,MS2    Recorded by [MS,AL,MS2] Tabitha Jennings, PT, DPT, NCS (r) Kalpesh Young, PT Student (t) Tabitha Jennings, PT, DPT, NCS (c) 07/12/19 1442 [AC] Bill Oliveira, OTR/L, CNT 07/12/19 0857 [MS,AL,MS2] Tabitha Jennings, PT, DPT, NCS (r) Kalpesh Young, PT Student (t) Tabitha Jennings, PT, DPT, NCS (c) 07/12/19 1442    Row Name 07/12/19 1300 07/12/19 0840 07/12/19 0813       Pain Scale: Numbers Pre/Post-Treatment    Pain Scale: Numbers, Pretreatment  0/10 - no pain  -MS,AL,MS2  5/10  -AC  5/10  -MS,AL,MS2    Pain Scale: Numbers, Post-Treatment  --  --  5/10  -MS,AL,MS2    Pain Location - Side  --  Right  -AC  Right  -MS,AL,MS2    Pain Location  --  hip  -AC  hip  -MS,AL,MS2    Pain Intervention(s)  --  Medication (See MAR);Repositioned;Ambulation/increased activity  -AC  Repositioned;Ambulation/increased activity;Rest  -MS,AL,MS2    Recorded by [MS,AL,MS2] Tabitha Jennings, PT, DPT, NCS (r) Kalpesh Young, PT Student (t) Tabitha Jennings, PT, DPT, NCS (c) 07/12/19 1442 [AC] Bill Oliveira, OTR/L, CNT 07/12/19 0857 [MS,AL,MS2] Tabitha Jennings, PT, DPT, NCS (r) Kalpesh Young, PT Student (t) Tabitha Jennings, PT, DPT, NCS (c) 07/12/19 1442    Row Name                Wound 07/10/19 1232 Right hip incision    Wound - Properties Group Date first assessed: 07/10/19 [RM] Time first assessed: 1232 [RM]  Side: Right [RM] Location: hip [RM] Type: incision [RM] Recorded by:  [RM] Zuleyka Fuentes RN 07/10/19 1232    Row Name 07/12/19 1300 07/12/19 0840 07/12/19 0813       Plan of Care Review    Plan of Care Reviewed With  patient  -MS,AL,MS2  patient  -AC  patient  -MS,AL,MS2    Recorded by [MS,AL,MS2] Tabitha Jennings, PT, DPT, NCS (r) Kalpesh Young, PT Student (t) Tabitha Jennings, PT, DPT, NCS (c) 07/12/19 1442 [AC] Bill Oliveira, OTR/L, CNT 07/12/19 0947 [MS,AL,MS2] Tabitha Jennings, PT, DPT, NCS (r) Kalpesh Young, PT Student (t) Tabitha Jennings, PT, DPT, NCS (c) 07/12/19 1442    Row Name 07/12/19 0840             Outcome Summary/Treatment Plan (OT)    Daily Summary of Progress (OT)  progress toward functional goals is good  -AC      Anticipated Equipment Needs at Discharge (OT)  tub bench;bedside commode  -AC      Anticipated Discharge Disposition (OT)  home with home health  -AC      Recorded by [AC] Bill Oliveira, OTR/L, CNT 07/12/19 0947      Row Name 07/12/19 1300 07/12/19 0813          Outcome Summary/Treatment Plan (PT)    Daily Summary of Progress (PT)  progress towards functional goals is fair  -MS,AL,MS2  progress towards functional goals is fair  -MS,AL,MS2     Anticipated Equipment Needs at Discharge (PT)  front wheeled walker  -MS,AL,MS2  front wheeled walker  -MS,AL,MS2     Anticipated Discharge Disposition (PT)  home with assist;home with home health  -MS,AL,MS2  home with assist;home with home health  -MS,AL,MS2     Recorded by [MS,AL,MS2] Tabitha Jennings, PT, DPT, NCS (r) Kalpesh Young, PT Student (t) Tabitha Jennings, PT, DPT, NCS (c) 07/12/19 1442 [MS,AL,MS2] Tabitha Jennings, PT, DPT, NCS (r) Kalpesh Young, PT Student (t) Tabitha Jennings, PT, DPT, NCS (c) 07/12/19 1442       User Key  (r) = Recorded By, (t) = Taken By, (c) = Cosigned By    Initials Name Effective Dates Discipline    AC Bill Oliveira, OTR/L, CNT 04/09/19 -  OT    MS Tabitha Jennings, PT, DPT, NCS 06/19/18 -  PT    Zuleyka Wright, RN  11/08/16 -  Nurse    Kalpesh Henriquez, PT Student 04/24/19 -  PT          Wound 07/10/19 1232 Right hip incision (Active)   Dressing Appearance dry;intact 7/12/2019  7:55 AM   Closure FRANCINE 7/12/2019  7:55 AM   Base dressing in place, unable to visualize 7/12/2019  7:55 AM   Drainage Amount none 7/12/2019  7:55 AM   Dressing Care, Wound foam 7/12/2019  7:55 AM           Physical Therapy Education     Title: PT OT SLP Therapies (Done)     Topic: Physical Therapy (Done)     Point: Mobility training (Done)     Learning Progress Summary           Patient Acceptance, E,TB,D, DELMY,DU by AL at 7/12/2019  2:00 PM    Comment:  PT educated patient on floor to standing transfer with bumping up to chair while maintaining R LE TTWB status. PT also educated patient on ascending/descending stairs by bumping.    MARY Crowder VU by AL at 7/12/2019  8:28 AM    Comment:  PT educated how to maneuver stairs and talked about alternative ways like scooting up backwards or using help to get into house    MARY Crowder VU by AL at 7/11/2019 10:37 AM    Comment:  PT educated patient on how to transfer with RW and manage R LE wbing precautions. Also educated on use of RW during gait and maintaining  R TTWB precautions                   Point: Home exercise program (Done)     Learning Progress Summary           Patient Acceptance, EDELMY by AL at 7/12/2019  8:27 AM    Comment:  PT educate patient in exercises to perform during the day and when she goes home to help strengthen L LE while TTWB status is active                   Point: Body mechanics (Done)     Learning Progress Summary           Patient Acceptance, E, VU by GERTRUDE at 7/12/2019  5:49 AM                   Point: Precautions (Done)     Learning Progress Summary           Patient Acceptance, EDELMY by AL at 7/11/2019 10:36 AM    Comment:  PT educated patient on fall risk, R TTWB precautions and needing assist to get up                               User Key     Initials Effective Dates Name  Provider Type Discipline     08/02/16 -  Hector Matos, RN Registered Nurse Nurse    AL 04/24/19 -  Kalpesh Young, PT Student PT Student PT                PT Recommendation and Plan  Anticipated Discharge Disposition (PT): home with assist, home with home health  Planned Therapy Interventions (PT Eval): balance training, gait training, strengthening, stair training, transfer training, home exercise program  Therapy Frequency (PT Clinical Impression): 2 times/day  Outcome Summary/Treatment Plan (PT)  Daily Summary of Progress (PT): progress towards functional goals is fair  Anticipated Equipment Needs at Discharge (PT): front wheeled walker  Anticipated Discharge Disposition (PT): home with assist, home with home health  Plan of Care Reviewed With: patient  Progress: improving  Outcome Summary: PT tx completed. Pt ambulated a range of distances from 20-50ft on even surfaces with CGA. She attempted stairs this AM but was unable to perform standing up. The PM session worked on floor transfers and bumping up stairs so when she goes home she can get it. She performed bumping up stairs with supervision and floor to sitting with min A. PT discussed with patient that she will need a chair at the bottom and top of the stairs to sit on when transitioning from standing to floor and vice versa and having someone with her to supervise and possible help when going from floor to chair. She needed min A to get up into chair but stated she has a lower chair at home and her S/O Howard will be with her when she arrives home. PT recommends home with home health and assist for d/c if she has Howard with her when going home.   Outcome Measures     Row Name 07/12/19 1300 07/12/19 0947 07/12/19 0800       How much help from another person do you currently need...    Turning from your back to your side while in flat bed without using bedrails?  4  -MS (r) AL (t) MS (c)  --  4  -MS (r) AL (t) MS (c)    Moving from lying on back to sitting on the  side of a flat bed without bedrails?  4  -MS (r) AL (t) MS (c)  --  4  -MS (r) AL (t) MS (c)    Moving to and from a bed to a chair (including a wheelchair)?  3  -MS (r) AL (t) MS (c)  --  3  -MS (r) AL (t) MS (c)    Standing up from a chair using your arms (e.g., wheelchair, bedside chair)?  3  -MS (r) AL (t) MS (c)  --  3  -MS (r) AL (t) MS (c)    Climbing 3-5 steps with a railing?  3  -MS (r) AL (t) MS (c)  --  2  -MS (r) AL (t) MS (c)    To walk in hospital room?  3  -MS (r) AL (t) MS (c)  --  3  -MS (r) AL (t) MS (c)    AM-[facility] 6 Clicks Score (PT)  20  -MS (r) AL (t)  --  19  -MS (r) AL (t)       How much help from another is currently needed...    Putting on and taking off regular lower body clothing?  --  3  -AC  --    Bathing (including washing, rinsing, and drying)  --  4  -AC  --    Toileting (which includes using toilet bed pan or urinal)  --  3  -AC  --    Putting on and taking off regular upper body clothing  --  4  -AC  --    Taking care of personal grooming (such as brushing teeth)  --  4  -AC  --    Eating meals  --  4  -AC  --    AM-PAC 6 Clicks Score (OT)  --  22  -AC  --       Functional Assessment    Outcome Measure Options  AM-PAC 6 Clicks Basic Mobility (PT)  -MS (r) AL (t) MS (c)  --  AM-PAC 6 Clicks Basic Mobility (PT)  -MS (r) AL (t) MS (c)    Row Name 07/11/19 0900 07/11/19 0815          How much help from another person do you currently need...    Turning from your back to your side while in flat bed without using bedrails?  4  -MS (r) AL (t) MS (c)  --     Moving from lying on back to sitting on the side of a flat bed without bedrails?  4  -MS (r) AL (t) MS (c)  --     Moving to and from a bed to a chair (including a wheelchair)?  3  -MS (r) AL (t) MS (c)  --     Standing up from a chair using your arms (e.g., wheelchair, bedside chair)?  3  -MS (r) AL (t) MS (c)  --     Climbing 3-5 steps with a railing?  2  -MS (r) AL (t) MS (c)  --     To walk in hospital room?  3  -MS (r) AL (t) MS (c)   --     AM-PAC 6 Clicks Score (PT)  19  -MS (r) AL (t)  --        How much help from another is currently needed...    Putting on and taking off regular lower body clothing?  --  3  -AC     Bathing (including washing, rinsing, and drying)  --  3  -AC     Toileting (which includes using toilet bed pan or urinal)  --  3  -AC     Putting on and taking off regular upper body clothing  --  4  -AC     Taking care of personal grooming (such as brushing teeth)  --  4  -AC     Eating meals  --  4  -AC     AM-PAC 6 Clicks Score (OT)  --  21  -AC        Functional Assessment    Outcome Measure Options  AM-PAC 6 Clicks Basic Mobility (PT)  -MS (r) AL (t) MS (c)  AM-PAC 6 Clicks Daily Activity (OT)  -AC       User Key  (r) = Recorded By, (t) = Taken By, (c) = Cosigned By    Initials Name Provider Type    AC Bill Oliveira, OTR/L, CNT Occupational Therapist    Tabitha Faust, PT, DPT, NCS Physical Therapist    Kalpesh Henriquez, PT Student PT Student         Time Calculation:   PT Charges     Row Name 07/12/19 1401 07/12/19 0829          Time Calculation    Start Time  1318  -MS (r) AL (t) MS (c)  0744  -MS (r) AL (t) MS (c)     Stop Time  1347  -MS (r) AL (t) MS (c)  0811  -MS (r) AL (t) MS (c)     Time Calculation (min)  29 min  -MS (r) AL (t)  27 min  -MS (r) AL (t)     PT Received On  07/12/19  -MS (r) AL (t) MS (c)  --     PT Goal Re-Cert Due Date  07/22/19  -MS (r) AL (t) MS (c)  --        Time Calculation- PT    Total Timed Code Minutes- PT  29 minute(s)  -MS (r) AL (t) MS (c)  27 minute(s)  -MS (r) AL (t) MS (c)        Timed Charges    06589 - PT Therapeutic Exercise Minutes  --  10  -MS (r) AL (t) MS (c)     59229 - Gait Training Minutes   13  -MS (r) AL (t) MS (c)  17  -MS (r) AL (t) MS (c)     28041 - PT Therapeutic Activity Minutes  16  -MS (r) AL (t) MS (c)  --       User Key  (r) = Recorded By, (t) = Taken By, (c) = Cosigned By    Initials Name Provider Type    Tabitha Faust R, PT, DPT, NCS Physical Therapist     Kalpesh Henriquez, PT Student PT Student        Therapy Charges for Today     Code Description Service Date Service Provider Modifiers Qty    03056638018 HC PT EVAL LOW COMPLEXITY 3 7/11/2019 Kalpesh Young, PT Student GP 1    26829876783 HC PT THER PROC EA 15 MIN 7/12/2019 Kalpesh Young, PT Student GP 1    36268477546 HC GAIT TRAINING EA 15 MIN 7/12/2019 Kalpesh Young, PT Student GP 1    [id number] HC GAIT TRAINING EA 15 MIN 7/12/2019 Kalpesh Young, PT Student GP 1    [id number] HC PT THERAPEUTIC ACT EA 15 MIN 7/12/2019 Kalpesh Young, PT Student GP 1          PT G-Codes  Outcome Measure Options: AM-PAC 6 Clicks Basic Mobility (PT)  AM-PAC 6 Clicks Score (PT): 20  AM-PAC 6 Clicks Score (OT): 22    KALPESH Young PT Student  7/12/2019

## 2019-07-12 NOTE — PLAN OF CARE
Problem: Fall Risk (Adult)  Goal: Absence of Fall  Outcome: Ongoing (interventions implemented as appropriate)      Problem: Fracture Orthopaedic (Adult)  Goal: Signs and Symptoms of Listed Potential Problems Will be Absent, Minimized or Managed (Fracture Orthopaedic)  Outcome: Ongoing (interventions implemented as appropriate)      Problem: Patient Care Overview  Goal: Plan of Care Review  Outcome: Ongoing (interventions implemented as appropriate)   07/12/19 0550   Coping/Psychosocial   Plan of Care Reviewed With patient   Plan of Care Review   Progress improving   OTHER   Outcome Summary A & O, c/o pain to R hip, prn PO pain meds given with relief, mepilex dressing to R hip dry and intact, up with assistance and wx, TTWB when OOB, safety maintained, urinating without difficulty, no c/o numbness/tingling, SCDs in place, does not want to go to SNF at D/C, possible home with home health soon       Problem: Skin Injury Risk (Adult)  Goal: Skin Health and Integrity  Outcome: Ongoing (interventions implemented as appropriate)

## 2019-07-12 NOTE — PLAN OF CARE
Problem: Fall Risk (Adult)  Goal: Absence of Fall  Outcome: Ongoing (interventions implemented as appropriate)      Problem: Fracture Orthopaedic (Adult)  Goal: Signs and Symptoms of Listed Potential Problems Will be Absent, Minimized or Managed (Fracture Orthopaedic)  Outcome: Ongoing (interventions implemented as appropriate)      Problem: Patient Care Overview  Goal: Plan of Care Review  Outcome: Ongoing (interventions implemented as appropriate)   07/12/19 1255   Coping/Psychosocial   Plan of Care Reviewed With patient   Plan of Care Review   Progress improving   OTHER   Outcome Summary I began caring for this patient around 1500. A&O X4. Right hip HERBIE. PPP. No c/o pain while I have cared for the patient. Will continue to monitor.      Goal: Individualization and Mutuality  Outcome: Ongoing (interventions implemented as appropriate)    Goal: Discharge Needs Assessment  Outcome: Ongoing (interventions implemented as appropriate)    Goal: Interprofessional Rounds/Family Conf  Outcome: Ongoing (interventions implemented as appropriate)      Problem: Skin Injury Risk (Adult)  Goal: Skin Health and Integrity  Outcome: Ongoing (interventions implemented as appropriate)

## 2019-07-12 NOTE — PROGRESS NOTES
[facility]  INPATIENT PROGRESS NOTE    Patient Name: Adelita Jaeger  Date of Admission: 7/9/2019  Today's Date: 07/12/19  Length of Stay: 3  Primary Care Physician: Rajan Rodriguez MD    Subjective   Chief Complaint: hip pain    HPI   Currently sitting up in the chair doing physical therapy this morning.  Doing well maintaining her toe-touch weightbearing status.  States her pain is controlled and trying to go longer without waiting too long for pain medication.  She is adamant to go home with home health states that her boyfriend and her daughter will be there to help her.    Review of Systems   Constitutional: Positive for activity change. Negative for appetite change.   HENT: Negative for hearing loss, nosebleeds, tinnitus and trouble swallowing.    Eyes: Negative for visual disturbance.   Respiratory: Negative for chest tightness, shortness of breath and wheezing.    Cardiovascular: Negative for palpitations and leg swelling.   Gastrointestinal: Negative for abdominal distention, blood in stool, constipation and diarrhea.   Endocrine: Negative for cold intolerance, heat intolerance, polydipsia, polyphagia and polyuria.   Genitourinary: Negative for decreased urine volume, difficulty urinating, dysuria, flank pain, frequency and hematuria.   Musculoskeletal: Positive for gait problem.   Allergic/Immunologic: Negative for immunocompromised state.   Neurological: Negative for dizziness, syncope and light-headedness.   Hematological: Negative for adenopathy. Does not bruise/bleed easily.   Psychiatric/Behavioral: Negative for confusion and sleep disturbance. The patient is not nervous/anxious.       All pertinent negatives and positives are as above. All other systems have been reviewed and are negative unless otherwise stated.     Objective    Temp:  [99.4 °F (37.4 °C)-99.6 °F (37.6 °C)] 99.6 °F (37.6 °C)  Heart Rate:  [] 105  Resp:  [16] 16  BP:  (101-107)/(66-71) 105/71     Intake/Output Summary (Last 24 hours) at 7/12/2019 1410  Last data filed at 7/12/2019 0600  Gross per 24 hour   Intake 1000 ml   Output 1600 ml   Net -600 ml     Physical Exam   Constitutional: She is oriented to person, place, and time. She appears well-developed and well-nourished.   Sitting up in chair.  NAD.     HENT:   Head: Normocephalic and atraumatic.   Eyes: Conjunctivae and EOM are normal. Pupils are equal, round, and reactive to light.   Neck: Neck supple. No JVD present. No thyromegaly present.   Cardiovascular: Normal rate, regular rhythm, normal heart sounds and intact distal pulses. Exam reveals no gallop and no friction rub.   No murmur heard.  Sinus/sinus tach    Pulmonary/Chest: Effort normal and breath sounds normal. No respiratory distress. She has no wheezes. She has no rales. She exhibits no tenderness.   Room air.    Abdominal: Soft. Bowel sounds are normal. She exhibits no distension. There is no tenderness. There is no rebound and no guarding.   Genitourinary:   Genitourinary Comments: Voiding.    Musculoskeletal: Normal range of motion. She exhibits no edema, tenderness or deformity.   Lymphadenopathy:     She has no cervical adenopathy.   Neurological: She is alert and oriented to person, place, and time.   Skin: Skin is warm and dry. No rash noted.   Psychiatric: She has a normal mood and affect. Her behavior is normal. Judgment and thought content normal.   Nursing note and vitals reviewed.    Results Review:  I have reviewed the labs, radiology results, and diagnostic studies.    Laboratory Data:   Results from last 7 days   Lab Units 07/12/19  0554 07/11/19  0606 07/10/19  0531 07/09/19  1816   WBC 10*3/mm3 9.41  --  7.75 13.49*   HEMOGLOBIN g/dL 10.1* 9.8* 10.3* 11.9*   HEMATOCRIT % 30.3* 29.1* 29.6* 33.2*   PLATELETS 10*3/mm3 259  --  284 325      Results from last 7 days   Lab Units 07/12/19  0554 07/11/19  0606 07/10/19  0531 07/09/19  1816    SODIUM mmol/L 135 135 135 130*   POTASSIUM mmol/L 4.7 4.7 3.8 2.6*   CHLORIDE mmol/L 105 104 104 92*   CO2 mmol/L 28.0 27.0 27.0 29.0   BUN mg/dL 3* 4* 6 9   CREATININE mg/dL 0.57 0.53 0.58 0.53   CALCIUM mg/dL 8.8 9.1 8.3* 8.8   BILIRUBIN mg/dL  --   --  0.6 0.6   ALK PHOS U/L  --   --  123* 152*   ALT (SGPT) U/L  --   --  31 36   AST (SGOT) U/L  --   --  52* 77*   GLUCOSE mg/dL 97 121* 99 92     Radiology Data:   Imaging Results (last 24 hours)     ** No results found for the last 24 hours. **        I have reviewed the patient's current medications.     Assessment/Plan     Active Hospital Problems    Diagnosis   • **Closed fracture of right hip (CMS/HCC)   • Fall   • Hyponatremia   • Tobacco abuse   • Acute pain due to injury   • Gastroesophageal reflux disease   • Hypokalemia     Plan:  1.  POD #2 status post percutaneous screw stabilization of a right valgus impacted femoral neck fracture   2.  PT/OT  3.  Toe-touch weight bearing RLE for 6 weeks  4.  Pain control, discontinued IV morphine and Dilaudid.  Added oral Norco for moderate pain with addition to already prescribed oxycodone by orthopedics.  5.  DVT prophylaxis with Xarelto   6.  CBC and BMP in AM  7.  Hemoglobin 10.1, stable since surgery    Discharge Planning: I expect the patient to be discharged to home with home health in 1 day.    GENEVIEVE Mckeon   07/12/19   2:10 PM      I personally evaluated and examined the patient in conjunction with GENEVIEVE Dumont and agree with the assessment, treatment plan, and disposition of the patient as recorded by her. My history, exam, and further recommendations are: I have reviewed and agree with the plans. Surjit.

## 2019-07-12 NOTE — PLAN OF CARE
Problem: Patient Care Overview  Goal: Plan of Care Review  Outcome: Ongoing (interventions implemented as appropriate)   07/12/19 2733   Coping/Psychosocial   Plan of Care Reviewed With patient   Plan of Care Review   Progress no change   OTHER   Outcome Summary OT tx completed. Pt progressing well with OT goals. SBA to CGA for all transfers and ambulation. Jose bathing with tub bench, HH showerhead and grab bars, S to CGA with LB dressing and toileting, and CGA for standing grooming task at side sink. Pt was educated on home safety and DME recs. She is ready for discharge home with HH when stable.

## 2019-07-13 VITALS
WEIGHT: 94.1 LBS | HEART RATE: 95 BPM | HEIGHT: 59 IN | TEMPERATURE: 98.4 F | RESPIRATION RATE: 15 BRPM | SYSTOLIC BLOOD PRESSURE: 114 MMHG | BODY MASS INDEX: 18.97 KG/M2 | OXYGEN SATURATION: 98 % | DIASTOLIC BLOOD PRESSURE: 67 MMHG

## 2019-07-13 LAB
HCT VFR BLD AUTO: 35.2 % (ref 37–47)
HGB BLD-MCNC: 11.8 G/DL (ref 12–16)

## 2019-07-13 PROCEDURE — 97116 GAIT TRAINING THERAPY: CPT

## 2019-07-13 PROCEDURE — 85018 HEMOGLOBIN: CPT | Performed by: ORTHOPAEDIC SURGERY

## 2019-07-13 PROCEDURE — 85014 HEMATOCRIT: CPT | Performed by: ORTHOPAEDIC SURGERY

## 2019-07-13 RX ADMIN — FAMOTIDINE 20 MG: 20 TABLET, FILM COATED ORAL at 06:42

## 2019-07-13 RX ADMIN — MUPIROCIN: 20 OINTMENT TOPICAL at 08:55

## 2019-07-13 RX ADMIN — HYDROCODONE BITARTRATE AND ACETAMINOPHEN 1 TABLET: 7.5; 325 TABLET ORAL at 01:49

## 2019-07-13 RX ADMIN — NICOTINE 1 PATCH: 14 PATCH, EXTENDED RELEASE TRANSDERMAL at 08:59

## 2019-07-13 RX ADMIN — BUSPIRONE HYDROCHLORIDE 5 MG: 5 TABLET ORAL at 08:58

## 2019-07-13 RX ADMIN — HYDROCODONE BITARTRATE AND ACETAMINOPHEN 1 TABLET: 7.5; 325 TABLET ORAL at 08:58

## 2019-07-13 RX ADMIN — POTASSIUM CHLORIDE 20 MEQ: 750 CAPSULE, EXTENDED RELEASE ORAL at 08:59

## 2019-07-13 RX ADMIN — CITALOPRAM 20 MG: 20 TABLET, FILM COATED ORAL at 08:58

## 2019-07-13 RX ADMIN — SODIUM CHLORIDE, PRESERVATIVE FREE 3 ML: 5 INJECTION INTRAVENOUS at 08:59

## 2019-07-13 RX ADMIN — RIVAROXABAN 10 MG: 10 TABLET, FILM COATED ORAL at 08:58

## 2019-07-13 NOTE — PLAN OF CARE
Problem: Patient Care Overview  Goal: Plan of Care Review  Outcome: Ongoing (interventions implemented as appropriate)   07/13/19 1140   Coping/Psychosocial   Plan of Care Reviewed With patient   OTHER   Outcome Summary Pt. up in chair upon arival. She requires supervision for sit to stand. She is careful to follow her TTWB RLE while amb in the [place] 40' x2 w/ RWX requiring only supervision. In the rehab room, pt. practiced stairs. She was able to maintain weight bearing precautions, distributing her weight through her B UE. Pt. asc/desc 3 steps w/ SBA/CGA. Pt. is D/C today to home w/ home health. She will benefit from continued strengthening and gait training.

## 2019-07-13 NOTE — THERAPY TREATMENT NOTE
Acute Care - Physical Therapy Treatment Note  [facility]     Patient Name: Adelita Jaeger  : 1970  MRN: [id number]  Today's Date: 2019  Onset of Illness/Injury or Date of Surgery: 19  Date of Referral to PT: 07/10/19  Referring Physician: Aramis Navarro MD    Admit Date: 2019    Visit Dx:    ICD-10-CM ICD-9-CM   1. Closed fracture of right hip, initial encounter (CMS/[facility]) S72.001A 820.8   2. Hypokalemia E87.6 276.8   3. Decreased activities of daily living (ADL) R68.89 780.99   4. Impaired functional mobility, balance, gait, and endurance Z74.09 V49.89     Patient Active Problem List   Diagnosis   • Gastroesophageal reflux disease   • Hypokalemia   • Hypomagnesemia   • Hypoalbuminemia   • Smokes tobacco daily   • Closed fracture of right hip (CMS/[facility])   • Hyponatremia   • Tobacco abuse   • Acute pain due to injury   • Fall       Therapy Treatment    Rehabilitation Treatment Summary     Row Name 19 1140             Treatment Time/Intention    Discipline  physical therapy assistant  -AF      Document Type  therapy note (daily note)  -AF      Subjective Information  no complaints  -AF2      Mode of Treatment  physical therapy  -AF3      Existing Precautions/Restrictions  right;partial weight bearing;fall  -AF      Recorded by [AF] Sarahi Perez, PTA 19 1140  [AF2] Sarahi Perez, PTA 19 1213  [AF3] Sarahi Perez, PTA 19 1300      Row Name 19 1140             Bed Mobility Assessment/Treatment    Comment (Bed Mobility)  pt. up in chair  -AF      Recorded by [AF] Sarahi Perez, PTA 19 1213      Row Name 19 1140             Sit-Stand Transfer    Sit-Stand [place] (Transfers)  verbal cues;supervision  -AF      Assistive Device (Sit-Stand Transfers)  walker, front-wheeled  -AF      Recorded by [AF] Sarahi Perez, PTA 19 1213      Row Name 19 1140             Stand-Sit Transfer    Stand-Sit [place] (Transfers)  verbal  cues;supervision  -AF      Assistive Device (Stand-Sit Transfers)  walker, front-wheeled  -AF      Recorded by [AF] Sarahi Perez, PTA 07/13/19 1213      Row Name 07/13/19 1140             Gait/Stairs Assessment/Training    [place]arlan Level (Gait)  supervision  -AF      Assistive Device (Gait)  walker, front-wheeled  -AF      Distance in Feet (Gait)  40 x 2  -AF      [place] Level (Stairs)  verbal cues;contact guard;stand by assist  -AF      Handrail Location (Stairs)  both sides  -AF      Number of Steps (Stairs)  3  -AF      Ascending Technique (Stairs)  step-to-step  -AF      Descending Technique (Stairs)  step-to-step  -AF      Recorded by [AF] Sarahi Perez, PTA 07/13/19 1216      Row Name 07/13/19 1140             Positioning and Restraints    Pre-Treatment Position  sitting in chair/recliner  -AF      Post Treatment Position  chair  -AF      In Chair  sitting;call light within reach  -AF      Recorded by [AF] Sarahi Perez, LOVE 07/13/19 1216      Row Name 07/13/19 1140             Pain Scale: Numbers Pre/Post-Treatment    Pain Scale: Numbers, Pretreatment  0/10 - no pain  -AF      Pain Scale: Numbers, Post-Treatment  0/10 - no pain  -AF      Recorded by [AF] Sarahi Perez, PTA 07/13/19 1216      Row Name                Wound 07/10/19 1232 Right hip incision    Wound - Properties Group Date first assessed: 07/10/19 [RM] Time first assessed: 1232 [RM] Side: Right [RM] Location: hip [RM] Type: incision [RM] Recorded by:  [RM] Zuleyka Fuentes RN 07/10/19 1232      User Key  (r) = Recorded By, (t) = Taken By, (c) = Cosigned By    Initials Name Effective Dates Discipline    Zuleyka Wright RN 11/08/16 -  Nurse    AF Sarahi Perez, [facility] 02/11/19 -  PT          Wound 07/10/19 1232 Right hip incision (Active)   Dressing Appearance open to air 7/13/2019  8:55 AM   Drainage Amount none 7/13/2019  8:55 AM   Dressing Care, Wound transparent film 7/12/2019  8:16 PM           Physical Therapy  Education     Title: PT OT SLP Therapies (Done)     Topic: Physical Therapy (Done)     Point: Mobility training (Done)     Learning Progress Summary           Patient Acceptance, E, NR,DU by CHRIS at 7/13/2019 11:40 AM    Comment:  stair training    Acceptance, E,TB,DELMY GOYAL DU by AL at 7/12/2019  2:00 PM    Comment:  PT educated patient on floor to standing transfer with bumping up to chair while maintaining R LE TTWB status. PT also educated patient on ascending/descending stairs by bumping.    MARY Crowder VU by AL at 7/12/2019  8:28 AM    Comment:  PT educated how to maneuver stairs and talked about alternative ways like scooting up backwards or using help to get into house    MARY Crowder VU by AL at 7/11/2019 10:37 AM    Comment:  PT educated patient on how to transfer with RW and manage R LE wbing precautions. Also educated on use of RW during gait and maintaining  R TTWB precautions                   Point: Home exercise program (Done)     Learning Progress Summary           Patient AcceptanceMARY VU by AL at 7/12/2019  8:27 AM    Comment:  PT educate patient in exercises to perform during the day and when she goes home to help strengthen L LE while TTWB status is active                   Point: Body mechanics (Done)     Learning Progress Summary           Patient Acceptance, EDELMY by GERTRUDE at 7/12/2019  5:49 AM                   Point: Precautions (Done)     Learning Progress Summary           Patient Acceptance, EDELMY by AL at 7/11/2019 10:36 AM    Comment:  PT educated patient on fall risk, R TTWB precautions and needing assist to get up                               User Key     Initials Effective Dates Name Provider Type Discipline    GERTRUDE 08/02/16 -  Hector Matos RN Registered Nurse Nurse    CHRIS 02/11/19 -  Sarahi Perez PTA Physical Therapy Assistant PT    AL 04/24/19 -  aKlpesh Young, PT Student PT Student PT                PT Recommendation and Plan     Plan of Care Reviewed With: patient  Outcome  Summary: Pt. up in chair upon arival. She requires supervision for sit to stand. She is careful to follow her TTWB RLE while amb in the [place] 40' x2 w/ RWX requiring only supervision. In the rehab room, pt. practiced stairs. She was able to maintain weight bearing precautions, distributing her weight through her B UE. Pt. asc/desc 3 steps w/ SBA/CGA. Pt. is D/C today to home w/ home health. She will benefit from continued strengthening and gait training.   Outcome Measures     Row Name 07/13/19 1140 07/12/19 1300 07/12/19 0947       How much help from another person do you currently need...    Turning from your back to your side while in flat bed without using bedrails?  4  -AF  4  -MS (r) AL (t) MS (c)  --    Moving from lying on back to sitting on the side of a flat bed without bedrails?  4  -AF  4  -MS (r) AL (t) MS (c)  --    Moving to and from a bed to a chair (including a wheelchair)?  4  -AF  3  -MS (r) AL (t) MS (c)  --    Standing up from a chair using your arms (e.g., wheelchair, bedside chair)?  4  -AF  3  -MS (r) AL (t) MS (c)  --    Climbing 3-5 steps with a railing?  3  -AF  3  -MS (r) AL (t) MS (c)  --    To walk in hospital room?  4  -AF  3  -MS (r) AL (t) MS (c)  --    AM-PAC 6 Clicks Score (PT)  23  -AF  20  -MS (r) AL (t)  --       How much help from another is currently needed...    Putting on and taking off regular lower body clothing?  --  --  3  -AC    Bathing (including washing, rinsing, and drying)  --  --  4  -AC    Toileting (which includes using toilet bed pan or urinal)  --  --  3  -AC    Putting on and taking off regular upper body clothing  --  --  4  -AC    Taking care of personal grooming (such as brushing teeth)  --  --  4  -AC    Eating meals  --  --  4  -AC    AM-PAC 6 Clicks Score (OT)  --  --  22  -AC       Functional Assessment    Outcome Measure Options  AM-PAC 6 Clicks Basic Mobility (PT)  -AF  AM-PAC 6 Clicks Basic Mobility (PT)  -MS (r) AL (t) MS (aries)  --    Row Name 07/12/19  0800 07/11/19 0900 07/11/19 0815       How much help from another person do you currently need...    Turning from your back to your side while in flat bed without using bedrails?  4  -MS (r) AL (t) MS (c)  4  -MS (r) AL (t) MS (c)  --    Moving from lying on back to sitting on the side of a flat bed without bedrails?  4  -MS (r) AL (t) MS (c)  4  -MS (r) AL (t) MS (c)  --    Moving to and from a bed to a chair (including a wheelchair)?  3  -MS (r) AL (t) MS (c)  3  -MS (r) AL (t) MS (c)  --    Standing up from a chair using your arms (e.g., wheelchair, bedside chair)?  3  -MS (r) AL (t) MS (c)  3  -MS (r) AL (t) MS (c)  --    Climbing 3-5 steps with a railing?  2  -MS (r) AL (t) MS (c)  2  -MS (r) AL (t) MS (c)  --    To walk in hospital room?  3  -MS (r) AL (t) MS (c)  3  -MS (r) AL (t) MS (c)  --    AM-[facility] 6 Clicks Score (PT)  19  -MS (r) AL (t)  19  -MS (r) AL (t)  --       How much help from another is currently needed...    Putting on and taking off regular lower body clothing?  --  --  3  -AC    Bathing (including washing, rinsing, and drying)  --  --  3  -AC    Toileting (which includes using toilet bed pan or urinal)  --  --  3  -AC    Putting on and taking off regular upper body clothing  --  --  4  -AC    Taking care of personal grooming (such as brushing teeth)  --  --  4  -AC    Eating meals  --  --  4  -AC    AM-PAC 6 Clicks Score (OT)  --  --  21  -AC       Functional Assessment    Outcome Measure Options  AM-PAC 6 Clicks Basic Mobility (PT)  -MS (r) AL (t) MS (c)  AM-PAC 6 Clicks Basic Mobility (PT)  -MS (r) AL (t) MS (c)  AM-PAC 6 Clicks Daily Activity (OT)  -AC      User Key  (r) = Recorded By, (t) = Taken By, (c) = Cosigned By    Initials Name Provider Type    AC Bill Oliveira, OTR/L, CNT Occupational Therapist    Tabitha Faust, PT, DPT, NCS Physical Therapist    Sarahi Smart, PTA Physical Therapy Assistant    AL Hector, Aj, PT Student PT Student         Time Calculation:   PT Charges      Row Name 07/13/19 1308             Time Calculation    Start Time  1140  -AF      Stop Time  1205  -AF      Time Calculation (min)  25 min  -AF      PT Received On  07/13/19  -AF      PT Goal Re-Cert Due Date  07/22/19  -AF         Timed Charges    79266 - Gait Training Minutes   25  -AF        User Key  (r) = Recorded By, (t) = Taken By, (c) = Cosigned By    Initials Name Provider Type    AF Snyder, Sarahi, PTA Physical Therapy Assistant        Therapy Charges for Today     Code Description Service Date Service Provider Modifiers Qty    [id number] HC GAIT TRAINING EA 15 MIN 7/13/2019 Sarahi Perez, PTA GP 2          PT G-Codes  Outcome Measure Options: AM-PAC 6 Clicks Basic Mobility (PT)  AM-PAC 6 Clicks Score (PT): 23  AM-PAC 6 Clicks Score (OT): 22    Sarahi Ana, PTA  7/13/2019

## 2019-07-13 NOTE — PLAN OF CARE
Problem: Fall Risk (Adult)  Goal: Absence of Fall  Outcome: Ongoing (interventions implemented as appropriate)      Problem: Fracture Orthopaedic (Adult)  Goal: Signs and Symptoms of Listed Potential Problems Will be Absent, Minimized or Managed (Fracture Orthopaedic)  Outcome: Ongoing (interventions implemented as appropriate)      Problem: Patient Care Overview  Goal: Plan of Care Review  Outcome: Ongoing (interventions implemented as appropriate)   07/13/19 1300   Coping/Psychosocial   Plan of Care Reviewed With patient   Plan of Care Review   Progress improving   OTHER   Outcome Summary Pt up in chair this shift. AAO x4. Minimal c/o pain, pain well controlled with prn meds. Up with assist x1 et walker. TTWB RLE. Incision healing well. No s/sx infection. VSS. Will cont to monitor.        Problem: Skin Injury Risk (Adult)  Goal: Skin Health and Integrity  Outcome: Ongoing (interventions implemented as appropriate)

## 2019-07-13 NOTE — PAYOR COMM NOTE
"[id number]  [place] 7-13-19      Adelita Jaeger (49 y.o. Female)     Date of Birth Social Security Number Address Home Phone MRN    1970  [address]  [place] [address] [phone] [id number]    [facility] [facility] Status          [facility] Single       Admission Date Admission Type Admitting Provider Attending Provider Department, Room/Bed    7/9/19 Emergency James Haynes MD  [facility] 3A, 359/1    Discharge Date Discharge Disposition Discharge Destination        7/13/2019 Home or Self Care              Attending Provider:  (none)   Allergies:  Penicillins    Isolation:  None   Infection:  None   Code Status:  CPR    Ht:  149.9 cm (59\")   Wt:  42.7 kg (94 lb 1.6 oz)    Admission Cmt:  None   Principal Problem:  Closed fracture of right hip (CMS/HCC) [S72.001A]                 Active Insurance as of 7/9/2019     Primary Coverage     Payor Plan Insurance Group Employer/Plan Group    [facility] BLUE [employer] [id number]     Payor Plan Address Payor Plan Phone Number Payor Plan Fax Number Effective Dates    [address] [phone]  1/1/2015 - None Entered    [facility] [id number]       Subscriber Name Subscriber Birth Date Member ID       ADELITA JAEGER 1970 [id number]                 Emergency Contacts      (Rel.) Home Phone Work Phone Mobile Phone    MayHoward (Significant Other) [phone] -- --               Discharge Summary      Nain Casillas APRN at 7/13/2019  9:37 AM              [facility]  DISCHARGE SUMMARY       Date of Admission: 7/9/2019  Date of Discharge:  7/13/2019  Primary Care Physician: Rajan Rodriguez MD    Presenting Problem/History of Present Illness:  Closed fracture of right hip, initial encounter (CMS/HCC) [S72.001A]     Final Discharge Diagnoses:  Active Hospital Problems    Diagnosis   • **Closed fracture of right hip (CMS/HCC)   • Fall   • Hyponatremia   • " Tobacco abuse   • Acute pain due to injury   • Gastroesophageal reflux disease   • Hypokalemia     Consults:   1.  Dr. Navarro, Orthopedics    Procedures Performed:   1.  Right hip cannulated screw placement per Dr. Navarro on 7/10/19    Pertinent Test Results:   Lab Results (last 72 hours)     Procedure Component Value Units Date/Time    Hemoglobin & Hematocrit, Blood [[id number]]  (Abnormal) Collected:  07/13/19 0702    Specimen:  Blood Updated:  07/13/19 0726     Hemoglobin 11.8 g/dL      Hematocrit 35.2 %     Basic Metabolic Panel [[id number]]  (Abnormal) Collected:  07/12/19 0554    Specimen:  Blood Updated:  07/12/19 0702     Glucose 97 mg/dL      BUN 3 mg/dL      Creatinine 0.57 mg/dL      Sodium 135 mmol/L      Potassium 4.7 mmol/L      Chloride 105 mmol/L      CO2 28.0 mmol/L      Calcium 8.8 mg/dL      eGFR Non African Amer 112 mL/min/1.73      BUN/Creatinine Ratio 5.3     Anion Gap 2.0 mmol/L     Narrative:       GFR Normal >60  Chronic Kidney Disease <60  Kidney Failure <15    CBC (No Diff) [[id number]]  (Abnormal) Collected:  07/12/19 0554    Specimen:  Blood Updated:  07/12/19 0643     WBC 9.41 10*3/mm3      RBC 3.29 10*6/mm3      Hemoglobin 10.1 g/dL      Hematocrit 30.3 %      MCV 92.1 fL      MCH 30.7 pg      MCHC 33.3 g/dL      RDW 14.0 %      RDW-SD 46.9 fl      MPV 10.8 fL      Platelets 259 10*3/mm3     Basic Metabolic Panel [[id number]]  (Abnormal) Collected:  07/11/19 0606    Specimen:  Blood Updated:  07/11/19 0651     Glucose 121 mg/dL      BUN 4 mg/dL      Creatinine 0.53 mg/dL      Sodium 135 mmol/L      Potassium 4.7 mmol/L      Chloride 104 mmol/L      CO2 27.0 mmol/L      Calcium 9.1 mg/dL      eGFR Non African Amer 122 mL/min/1.73      BUN/Creatinine Ratio 7.5     Anion Gap 4.0 mmol/L     Narrative:       GFR Normal >60  Chronic Kidney Disease <60  Kidney Failure <15    Hemoglobin & Hematocrit, Blood [[id number]]  (Abnormal) Collected:  07/11/19 0606    Specimen:  Blood Updated:  07/11/19 0635      Hemoglobin 9.8 g/dL      Hematocrit 29.1 %         Imaging Results (all)     Procedure Component Value Units Date/Time    XR Hip With or Without Pelvis 2 - 3 View Right [[id number]] Collected:  07/10/19 1338     Updated:  07/11/19 0722    Narrative:       EXAMINATION: XR HIP W OR WO PELVIS 2-3 VIEW RIGHT- 7/10/2019 1:38 PM CDT     HISTORY: Cannulated screw     FLUOROSCOPY TIME:  0.44 seconds     AIR KERMA: 2.37mGy     NUMBER OF IMAGES: 2     FINDINGS:  Multiple fluoroscopic images are submitted from the OR. A radiologist  was not present for the acquisition or intraoperative interpretation of  these images. Images demonstrate multiple screws through the femoral  head and neck. Please see the operative report for details pertaining to  this exam.  This report was finalized on 07/10/2019 13:39 by Dr. Coery Santos MD.    FL C Arm During Surgery [[id number]] Collected:  07/10/19 1338     Updated:  07/11/19 0722    Narrative:       EXAMINATION: XR HIP W OR WO PELVIS 2-3 VIEW RIGHT- 7/10/2019 1:38 PM CDT     HISTORY: Cannulated screw     FLUOROSCOPY TIME:  0.44 seconds     AIR KERMA: 2.37mGy     NUMBER OF IMAGES: 2     FINDINGS:  Multiple fluoroscopic images are submitted from the OR. A radiologist  was not present for the acquisition or intraoperative interpretation of  these images. Images demonstrate multiple screws through the femoral  head and neck. Please see the operative report for details pertaining to  this exam.  This report was finalized on 07/10/2019 13:39 by Dr. Corey Santos MD.    XR Hip With or Without Pelvis 2 - 3 View Right [[id number]] Collected:  07/09/19 1819     Updated:  07/09/19 1823    Narrative:       EXAMINATION:  XR HIP W OR WO PELVIS 2-3 VIEW RIGHT-  7/9/2019 5:58 PM  CDT     HISTORY: The patient fell.     COMPARISON: No comparison study.     TECHNIQUE: AP and crosstable lateral views of the right hip were  supplemented with an AP image of the pelvis.     FINDINGS:  There is an acute  subcapital fracture of the right femoral  neck with no significant displacement or angulation. The hip joint  spaces are well-maintained. No pelvic fracture is seen.       Impression:       Acute nondisplaced subcapital right femoral neck fracture  without displacement or angulation.              This report was finalized on 07/09/2019 18:20 by Dr. Marcos Boles MD.    XR Chest 1 View [[id number]] Collected:  07/09/19 1818     Updated:  07/09/19 1822    Narrative:       EXAMINATION:  XR CHEST 1 VW-  7/9/2019 5:57 PM CDT     HISTORY: Preoperative chest x-ray.     COMPARISON: No comparison study.     FINDINGS:  The lungs are expanded bilaterally and clear. There is old  granulomatous disease. The pleural spaces are clear. Heart size is  normal.  No acute bony abnormality is seen.       Impression:       No active disease is seen.        This report was finalized on 07/09/2019 18:18 by Dr. Marcos Boles MD.        History of Present Illness on Day of Discharge:   Currently sitting up in the chair today.  She states that she is doing well and is still wanting to go home with home health.  She denies any complaints.      Hospital Course:  The patient is a 49 y.o. female who presented to [facility] with severe right hip pain.  The patient has a past medical history significant for COPD with ongoing tobacco abuse, chronic hypokalemia, hypertension, hyperlipidemia and GERD.  She stated on admission that she tripped and landed on her right side causing her to have severe pain.  She was then unable to walk so she presented to our facility for evaluation.  In the emergency department she was found to have an acute nondisplaced right femoral fracture.  She was admitted for further evaluations.  Orthopedics were consulted and took the patient to surgery on 7/10/19 for a right cannulated screw placement.  She also was found to have hyponatremia on chronic HCTZ this was held during her admission and her blood pressure  "is still stable without it.  She had participated with physical and occupational therapy and has done really well.  They felt that she was safe for discharge home with home health.  She is doing well and will be discharged home today with home health.  She also will be prescribed a walker as well.  She denies any complaints.  Doing well on the Norco.  She will follow up with orthopedics in 2 weeks and remain toe touch weightbearing for the next 6 weeks.     Condition on Discharge:  Stable    Physical Exam on Discharge:  /67 (BP Location: Left arm, Patient Position: Sitting)   Pulse 95   Temp 98.4 °F (36.9 °C) (Oral)   Resp 15   Ht 149.9 cm (59\")   Wt 42.7 kg (94 lb 1.6 oz)   SpO2 98%   Breastfeeding? No   BMI 19.01 kg/m²    Physical Exam   Constitutional: She is oriented to person, place, and time. She appears well-developed and well-nourished.   Sitting up in the chair.  NAD.    HENT:   Head: Normocephalic and atraumatic.   Eyes: Conjunctivae and EOM are normal. Pupils are equal, round, and reactive to light.   Neck: Neck supple. No JVD present. No thyromegaly present.   Cardiovascular: Normal rate, regular rhythm, normal heart sounds and intact distal pulses. Exam reveals no gallop and no friction rub.   No murmur heard.  Pulmonary/Chest: Effort normal and breath sounds normal. No respiratory distress. She has no wheezes. She has no rales. She exhibits no tenderness.   Abdominal: Soft. Bowel sounds are normal. She exhibits no distension. There is no tenderness. There is no rebound and no guarding.   Genitourinary:   Genitourinary Comments: Voiding.   Musculoskeletal: Normal range of motion. She exhibits no edema, tenderness or deformity.   Incision doing well    Lymphadenopathy:     She has no cervical adenopathy.   Neurological: She is alert and oriented to person, place, and time.   Skin: Skin is warm and dry. No rash noted.   Psychiatric: She has a normal mood and affect. Her behavior is normal. " Judgment and thought content normal.   Nursing note and vitals reviewed.    Discharge Disposition:  Home or Self Care    Discharge Medications:     Discharge Medications      New Medications      Instructions Start Date   docusate sodium 100 MG capsule  Commonly known as:  COLACE   100 mg, Oral, 2 Times Daily      ondansetron 4 MG tablet  Commonly known as:  ZOFRAN   4 mg, Oral, Every 8 Hours PRN      oxyCODONE-acetaminophen  MG per tablet  Commonly known as:  PERCOCET   1 tablet, Oral, Every 4 Hours PRN      rivaroxaban 10 MG tablet  Commonly known as:  XARELTO   10 mg, Oral, Daily         Continue These Medications      Instructions Start Date   busPIRone 5 MG tablet  Commonly known as:  BUSPAR   5 mg, Oral, 3 Times Daily PRN      CALCIUM 600+D3 600-200 MG-UNIT tablet  Generic drug:  Calcium Carb-Cholecalciferol   1 tablet, Oral, Every 12 Hours Scheduled      citalopram 20 MG tablet  Commonly known as:  CeleXA   20 mg, Oral, Daily      FLAXSEED OIL PO   1,000 mg, Oral, 2 Times Daily      Magnesium 500 MG tablet   1 tablet, Oral, Every 12 Hours Scheduled      MULTIVITAMIN ADULT PO   1 tablet, Oral, Daily      mupirocin 2 % ointment  Commonly known as:  BACTROBAN   1 application, Topical, 2 Times Daily, Apply intranasally.       potassium chloride 20 MEQ CR tablet  Commonly known as:  K-DUR,KLOR-CON   20 mEq, Oral, 2 Times Daily      raNITIdine 150 MG tablet  Commonly known as:  ZANTAC   150 mg, Oral, Daily PRN         Stop These Medications    aspirin 81 MG chewable tablet     hydrochlorothiazide 25 MG tablet  Commonly known as:  HYDRODIURIL          Discharge Diet:   Diet Instructions     Diet: Regular, Cardiac      Discharge Diet:   Regular  Cardiac           Activity at Discharge:   Activity Instructions     Up WIth Assist      Toe touch weight bearing    Additional Activity Instructions:      Toe touch weight bearing for 6 weeks.  Use walker when out of bed.                  Discharge Care  Plan/Instructions:   1.  Follow up with Ortho in 2 weeks     Follow-up Appointments:   Future Appointments   Date Time Provider Department Center   7/30/2019  9:45 AM Warren Gan MD [facility] None     Test Results Pending at Discharge: None    GENEVIEVE Mckeon  07/13/19  12:20 PM    Time: 35 minutes           Electronically signed by Nain Casillas APRN at 7/13/2019 12:25 PM

## 2019-07-13 NOTE — DISCHARGE PLACEMENT REQUEST
"Adelita Jaeger (49 y.o. Female)     Date of Birth Social Security Number Address Home Phone MRN    1970  [address]  [place] [address] [phone] [id number]    Rastafarian Marital Status          [facility] Single       Admission Date Admission Type Admitting Provider Attending Provider Department, Room/Bed    7/9/19 Emergency James Haynes MD Truong, Khai C, MD [facility] 3A, 359/1    Discharge Date Discharge Disposition Discharge Destination                       Attending Provider:  James Haynes MD    Allergies:  Penicillins    Isolation:  None   Infection:  None   Code Status:  CPR    Ht:  149.9 cm (59\")   Wt:  42.7 kg (94 lb 1.6 oz)    Admission Cmt:  None   Principal Problem:  Closed fracture of right hip (CMS/HCC) [S72.001A]                 Active Insurance as of 7/9/2019     Primary Coverage     Payor Plan Insurance Group Employer/Plan Group    [facility] [employer] [employer] EMPL[employer] [id number]     Payor Plan Address Payor Plan Phone Number Payor Plan Fax Number Effective Dates    [address] [phone]  1/1/2015 - None Entered    [facility] [id number]       Subscriber Name Subscriber Birth Date Member ID       ADELITA JAEGER 1970 [id number]                 Emergency Contacts      (Rel.) Home Phone Work Phone Mobile Phone    Howard Szymanski (Significant Other) [phone] -- --               History & Physical      Chris Sequeira DO at 7/9/2019  7:45 PM              [facility] Medicine Services  HISTORY AND PHYSICAL    Date of Admission: 7/9/2019  Primary Care Physician: Rajan Rodriguez MD    Subjective     Chief Complaint: Severe right hip pain    History of Present Illness  Adelita Del Cid is a 49-year-old female with a past medical history of COPD with ongoing tobacco use, chronic hypokalemia, hypertension, hyperlipidemia and gastroesophageal reflux disease.  Patient states today she tripped landing on her right " Noted     side causing severe pain however she was still able to walk.  Due to pain she did seek evaluation at [facility] [place] however they instructed her to seek evaluation at [facility] emergency department.  ER evaluation reveals acute nondisplaced right femoral fracture.  Dr. Navarro will follow.  Other findings sodium 130, potassium 2.6, mild transaminitis.  Patient states she does have a mixed drink daily.  She also reports chronic smoker's cough.  She has no chest pain or shortness of breathing.  She will reports pain is scored 25 on a scale of 1-10 with movement and consistently 6 or 7 on a scale of 1-10 at rest.  She has no other complaints.  Home medication review reveals chronic HCTZ use, will hold for now to hopefully improve potassium and sodium.  She is admitted for further evaluation treatment.    Review of Systems   A 10 point review of systems was completed, all negative except for those discussed in HPI    Past Medical History:   Past Medical History:   Diagnosis Date   • COPD (chronic obstructive pulmonary disease) (CMS/HCC)    • GERD (gastroesophageal reflux disease)    • Hyperlipidemia    • Hypertension    • Potassium (K) deficiency        Past Surgical History:   Past Surgical History:   Procedure Laterality Date   • AUGMENTATION MAMMAPLASTY     • CARPAL TUNNEL RELEASE     •  SECTION     • CHOLECYSTECTOMY     • DILATATION AND CURETTAGE     • ENDOMETRIAL BIOPSY     • HERNIA REPAIR     • OTHER SURGICAL HISTORY      TIF procedure.        Family History: family history includes Breast cancer in her cousin, maternal aunt, maternal aunt, and maternal aunt; Diabetes in her mother; Hypertension in her father and mother; Hypothyroidism in her mother.    Social History:  reports that she has been smoking cigarettes.  She has been smoking about 0.50 packs per day. She has never used smokeless tobacco. She reports that she drinks alcohol. She reports that she does not use drugs.    Code  "Status: Full, if unable speak for herself her  will speak for her      Allergies:  Allergies   Allergen Reactions   • Penicillins Unknown (See Comments)     As a child       Medications:  Prior to Admission medications    Medication Sig Start Date End Date Taking? Authorizing Provider   aspirin 81 MG chewable tablet Chew 81 mg Daily.    Marline Arguelles MD   busPIRone (BUSPAR) 5 MG tablet  4/25/19   Marline Arguelles MD   Calcium Carb-Cholecalciferol (CALCIUM 600+D3) 600-200 MG-UNIT tablet Every 12 (Twelve) Hours.    Marline Arguelles MD   citalopram (CELEXA) 20 MG tablet Celexa 20 mg tablet   Take 1 tablet every day by oral route.    Marline Arguelles MD   Flaxseed, Linseed, (FLAXSEED OIL PO) Take  by mouth.    Marline Arguelles MD   hydrochlorothiazide (HYDRODIURIL) 25 MG tablet Take 25 mg by mouth Daily. Takes half of a pill daily    Emergency, Nurse Epic, RN   VELASQUEZOR-CON 10 MEQ CR tablet  2/16/18   Marline Arguelles MD   Magnesium 500 MG tablet Every 12 (Twelve) Hours.    Marline Arguelles MD   Multiple Vitamins-Minerals (MULTIVITAMIN ADULT PO) Take 1 tablet/day by mouth Daily.    Marline Arguelles MD   mupirocin (BACTROBAN) 2 % nasal ointment Apply intranasally twice daily 6/17/19   Bianka Garcia APRN   mupirocin (BACTROBAN) 2 % ointment Apply intranasally bid 6/17/19   Bianka Garcia APRN       Objective     /98   Pulse 89   Temp 99.2 °F (37.3 °C) (Oral)   Resp 15   Ht 149.9 cm (59\")   Wt 40.8 kg (90 lb)   SpO2 100%   BMI 18.18 kg/m²    Physical Exam   Constitutional: She is oriented to person, place, and time. She appears well-developed and well-nourished. No distress.   HENT:   Head: Normocephalic and atraumatic.   Eyes: Conjunctivae and EOM are normal. Pupils are equal, round, and reactive to light. No scleral icterus.   Neck: Normal range of motion. Neck supple. No JVD present. No tracheal deviation present.   Cardiovascular: Normal rate, regular rhythm, " normal heart sounds and intact distal pulses. Exam reveals no gallop.   No murmur heard.  Pulmonary/Chest: Effort normal and breath sounds normal. No respiratory distress. She has no wheezes. She has no rales.   Abdominal: Soft. Bowel sounds are normal. She exhibits no distension. There is no tenderness. There is no guarding.   Musculoskeletal: She exhibits no edema.   Severe pain with movement   Neurological: She is alert and oriented to person, place, and time.   No obvious deficits noted.   Skin: Skin is warm and dry. No rash noted. She is not diaphoretic. No erythema. No pallor.   Psychiatric: She has a normal mood and affect. Her behavior is normal.   Vitals reviewed.      Pertinent Data:   Lab Results (last 72 hours)     Procedure Component Value Units Date/Time    Manual Differential [[id number]]  (Abnormal) Collected:  07/09/19 1816    Specimen:  Blood Updated:  07/09/19 1902     Neutrophil % 79.6 %      Lymphocyte % 13.3 %      Monocyte % 5.1 %      Eosinophil % 1.0 %      Basophil % 1.0 %      Neutrophils Absolute 10.74 10*3/mm3      Lymphocytes Absolute 1.79 10*3/mm3      Monocytes Absolute 0.69 10*3/mm3      Eosinophils Absolute 0.13 10*3/mm3      Basophils Absolute 0.13 10*3/mm3      RBC Morphology Normal     WBC Morphology Normal     Platelet Morphology Normal    CBC Auto Differential [[id number]]  (Abnormal) Collected:  07/09/19 1816    Specimen:  Blood Updated:  07/09/19 1902     WBC 13.49 10*3/mm3      RBC 3.82 10*6/mm3      Hemoglobin 11.9 g/dL      Hematocrit 33.2 %      MCV 86.9 fL      MCH 31.2 pg      MCHC 35.8 g/dL      RDW 13.2 %      RDW-SD 41.5 fl      MPV 10.3 fL      Platelets 325 10*3/mm3     Comprehensive Metabolic Panel [[id number]]  (Abnormal) Collected:  07/09/19 1816    Specimen:  Blood Updated:  07/09/19 1849     Glucose 92 mg/dL      BUN 9 mg/dL      Creatinine 0.53 mg/dL      Sodium 130 mmol/L      Potassium 2.6 mmol/L      Chloride 92 mmol/L      CO2 29.0 mmol/L      Calcium 8.8  mg/dL      Total Protein 6.6 g/dL      Albumin 3.60 g/dL      ALT (SGPT) 36 U/L      AST (SGOT) 77 U/L      Alkaline Phosphatase 152 U/L      Total Bilirubin 0.6 mg/dL      eGFR Non African Amer 122 mL/min/1.73      Globulin 3.0 gm/dL      A/G Ratio 1.2 g/dL      BUN/Creatinine Ratio 17.0     Anion Gap 9.0 mmol/L     Narrative:       GFR Normal >60  Chronic Kidney Disease <60  Kidney Failure <15    Protime-INR [[id number]]  (Normal) Collected:  07/09/19 1816    Specimen:  Blood Updated:  07/09/19 1841     Protime 13.1 Seconds      INR 0.96    aPTT [[id number]]  (Normal) Collected:  07/09/19 1816    Specimen:  Blood Updated:  07/09/19 1841     PTT 32.0 seconds         Imaging Results (last 24 hours)     Procedure Component Value Units Date/Time    XR Hip With or Without Pelvis 2 - 3 View Right [[id number]] Collected:  07/09/19 1819     Updated:  07/09/19 1823    Narrative:       EXAMINATION:  XR HIP W OR WO PELVIS 2-3 VIEW RIGHT-  7/9/2019 5:58 PM  CDT     HISTORY: The patient fell.     COMPARISON: No comparison study.     TECHNIQUE: AP and crosstable lateral views of the right hip were  supplemented with an AP image of the pelvis.     FINDINGS:  There is an acute subcapital fracture of the right femoral  neck with no significant displacement or angulation. The hip joint  spaces are well-maintained. No pelvic fracture is seen.       Impression:       Acute nondisplaced subcapital right femoral neck fracture  without displacement or angulation.              This report was finalized on 07/09/2019 18:20 by Dr. Marcos Boles MD.    XR Chest 1 View [[id number]] Collected:  07/09/19 1818     Updated:  07/09/19 1822    Narrative:       EXAMINATION:  XR CHEST 1 VW-  7/9/2019 5:57 PM CDT     HISTORY: Preoperative chest x-ray.     COMPARISON: No comparison study.     FINDINGS:  The lungs are expanded bilaterally and clear. There is old  granulomatous disease. The pleural spaces are clear. Heart size is  normal.  No acute bony  "abnormality is seen.       Impression:       No active disease is seen.        This report was finalized on 07/09/2019 18:18 by Dr. Marcos Boles MD.          I have personally reviewed and interpreted the radiology studies and ECG obtained at time of admission.     Assessment / Plan     Assessment:   Active Hospital Problems    Diagnosis   • Closed fracture of right hip (CMS/HCC)   • Hyponatremia   • Tobacco abuse   • Acute pain due to injury   • Hypokalemia       Plan:   1.  Admit as inpatient  2.  Dr. Navarro with orthopedics will follow, probable OR oral  3.  PRN CIWA protocol  4.  Home medications reviewed and restarted as appropriate  5.  Replace potassium  6.  Pain control  7.  Pulmonary toileting in preparation for surgery  8.  Labs in a.m.  9.  DVT prophylaxis with SCDs    I discussed the patient's findings and my recommendations with: Chris Sequeira,   Time spent 40 minutes      GENEVIEVE Desai  07/09/19   9:00 PM     I personally evaluated and examined the patient in conjunction with GENEVIEVE Luna and agree with the assessment, treatment plan, and disposition of the patient as recorded by her. My history, exam, and further recommendations are:     Seen and discussed with her \"partner\" who might as well be her  because they have been together for so long.    She got up to go to the bathroom in the night and then went to throw something away in the trash can.  She got her foot stuck and fell backwards.  She immediately had discomfort in her right hip.  However, she did not believe that she had fractured her hip and continued to ambulate on this despite severe pain.  She finally decided to go to the [facility] and had plain films confirming a fracture.  They sent her to the emergency department to be admitted.    Dr. Navarro is a signed her case.  He plans to take her to the operative suite tomorrow for surgical intervention.  Nothing by mouth after midnight.  Pain " control in the interim.    She incidentally is found to be hyponatremic and hypokalemic.  She does take hydrochlorothiazide.  We will stop this medication and follow.    She has been educated on tobacco cessation.    Chris Sequeira DO  07/09/19  10:13 PM      Electronically signed by Chris Sequeira DO at 7/9/2019 10:15 PM          Physician Progress Notes (most recent note)      James Haynes MD at 7/12/2019  2:09 PM              [facility] Medicine Services  INPATIENT PROGRESS NOTE    Patient Name: Adelita Jaeger  Date of Admission: 7/9/2019  Today's Date: 07/12/19  Length of Stay: 3  Primary Care Physician: Rajan Rodriguez MD    Subjective   Chief Complaint: hip pain    HPI   Currently sitting up in the chair doing physical therapy this morning.  Doing well maintaining her toe-touch weightbearing status.  States her pain is controlled and trying to go longer without waiting too long for pain medication.  She is adamant to go home with home health states that her boyfriend and her daughter will be there to help her.    Review of Systems   Constitutional: Positive for activity change. Negative for appetite change.   HENT: Negative for hearing loss, nosebleeds, tinnitus and trouble swallowing.    Eyes: Negative for visual disturbance.   Respiratory: Negative for chest tightness, shortness of breath and wheezing.    Cardiovascular: Negative for palpitations and leg swelling.   Gastrointestinal: Negative for abdominal distention, blood in stool, constipation and diarrhea.   Endocrine: Negative for cold intolerance, heat intolerance, polydipsia, polyphagia and polyuria.   Genitourinary: Negative for decreased urine volume, difficulty urinating, dysuria, flank pain, frequency and hematuria.   Musculoskeletal: Positive for gait problem.   Allergic/Immunologic: Negative for immunocompromised state.   Neurological: Negative for dizziness, syncope and light-headedness.    Hematological: Negative for adenopathy. Does not bruise/bleed easily.   Psychiatric/Behavioral: Negative for confusion and sleep disturbance. The patient is not nervous/anxious.       All pertinent negatives and positives are as above. All other systems have been reviewed and are negative unless otherwise stated.     Objective    Temp:  [99.4 °F (37.4 °C)-99.6 °F (37.6 °C)] 99.6 °F (37.6 °C)  Heart Rate:  [] 105  Resp:  [16] 16  BP: (101-107)/(66-71) 105/71     Intake/Output Summary (Last 24 hours) at 7/12/2019 1410  Last data filed at 7/12/2019 0600  Gross per 24 hour   Intake 1000 ml   Output 1600 ml   Net -600 ml     Physical Exam   Constitutional: She is oriented to person, place, and time. She appears well-developed and well-nourished.   Sitting up in chair.  NAD.     HENT:   Head: Normocephalic and atraumatic.   Eyes: Conjunctivae and EOM are normal. Pupils are equal, round, and reactive to light.   Neck: Neck supple. No JVD present. No thyromegaly present.   Cardiovascular: Normal rate, regular rhythm, normal heart sounds and intact distal pulses. Exam reveals no gallop and no friction rub.   No murmur heard.  Sinus/sinus tach    Pulmonary/Chest: Effort normal and breath sounds normal. No respiratory distress. She has no wheezes. She has no rales. She exhibits no tenderness.   Room air.    Abdominal: Soft. Bowel sounds are normal. She exhibits no distension. There is no tenderness. There is no rebound and no guarding.   Genitourinary:   Genitourinary Comments: Voiding.    Musculoskeletal: Normal range of motion. She exhibits no edema, tenderness or deformity.   Lymphadenopathy:     She has no cervical adenopathy.   Neurological: She is alert and oriented to person, place, and time.   Skin: Skin is warm and dry. No rash noted.   Psychiatric: She has a normal mood and affect. Her behavior is normal. Judgment and thought content normal.   Nursing note and vitals reviewed.    Results Review:  I have  reviewed the labs, radiology results, and diagnostic studies.    Laboratory Data:   Results from last 7 days   Lab Units 07/12/19  0554 07/11/19  0606 07/10/19  0531 07/09/19  1816   WBC 10*3/mm3 9.41  --  7.75 13.49*   HEMOGLOBIN g/dL 10.1* 9.8* 10.3* 11.9*   HEMATOCRIT % 30.3* 29.1* 29.6* 33.2*   PLATELETS 10*3/mm3 259  --  284 325      Results from last 7 days   Lab Units 07/12/19  0554 07/11/19  0606 07/10/19  0531 07/09/19  1816   SODIUM mmol/L 135 135 135 130*   POTASSIUM mmol/L 4.7 4.7 3.8 2.6*   CHLORIDE mmol/L 105 104 104 92*   CO2 mmol/L 28.0 27.0 27.0 29.0   BUN mg/dL 3* 4* 6 9   CREATININE mg/dL 0.57 0.53 0.58 0.53   CALCIUM mg/dL 8.8 9.1 8.3* 8.8   BILIRUBIN mg/dL  --   --  0.6 0.6   ALK PHOS U/L  --   --  123* 152*   ALT (SGPT) U/L  --   --  31 36   AST (SGOT) U/L  --   --  52* 77*   GLUCOSE mg/dL 97 121* 99 92     Radiology Data:   Imaging Results (last 24 hours)     ** No results found for the last 24 hours. **        I have reviewed the patient's current medications.     Assessment/Plan     Active Hospital Problems    Diagnosis   • **Closed fracture of right hip (CMS/HCC)   • Fall   • Hyponatremia   • Tobacco abuse   • Acute pain due to injury   • Gastroesophageal reflux disease   • Hypokalemia     Plan:  1.  POD #2 status post percutaneous screw stabilization of a right valgus impacted femoral neck fracture   2.  PT/OT  3.  Toe-touch weight bearing RLE for 6 weeks  4.  Pain control, discontinued IV morphine and Dilaudid.  Added oral Norco for moderate pain with addition to already prescribed oxycodone by orthopedics.  5.  DVT prophylaxis with Xarelto   6.  CBC and BMP in AM  7.  Hemoglobin 10.1, stable since surgery    Discharge Planning: I expect the patient to be discharged to home with home health in 1 day.    GENEVIEVE Mckeon   07/12/19   2:10 PM      I personally evaluated and examined the patient in conjunction with GENEVIEVE Dumont and agree with the assessment, treatment plan, and  disposition of the patient as recorded by her. My history, exam, and further recommendations are: I have reviewed and agree with the plans. Kt.       Electronically signed by James Haynes MD at 2019  4:21 PM          Consult Notes (most recent note)      Aramis aNvarro MD at 7/10/2019  7:18 AM      Consult Orders    1. Inpatient Orthopedic Surgery Consult [[id number]] ordered by Geno Padilla APRN at 19                Orthopaedic Inpatient Consultation    NAME:  Adelita Jaeger   : 1970  MRN: [id number]    2019  5:26 PM    Requesting Physician: No ref. provider found    CHIEF COMPLAINT:  right hip pain    HISTORY OF PRESENT ILLNESS:   The patient is a 49 y.o. female who experienced mechanical fall onto her right side yesterday morning and has had persistent right hip pain with difficulty weightbearing since that time.  She initially was evaluated a local urgent care facility where x-rays were obtained demonstrating a valgus impacted right femoral neck fracture.  As a result, she was sent to our emergency department for evaluation.  Pain is located in the right hip/groin, rated a 3/5, dull and constant, worse with movement, better with rest and medication.  There are no associated symptoms.      Past Medical History:    Past Medical History:   Diagnosis Date   • COPD (chronic obstructive pulmonary disease) (CMS/[facility])    • GERD (gastroesophageal reflux disease)    • Hyperlipidemia    • Hypertension    • Potassium (K) deficiency        Past Surgical History:    Past Surgical History:   Procedure Laterality Date   • AUGMENTATION MAMMAPLASTY     • CARPAL TUNNEL RELEASE     •  SECTION     • CHOLECYSTECTOMY     • DILATATION AND CURETTAGE     • ENDOMETRIAL BIOPSY     • HERNIA REPAIR     • OTHER SURGICAL HISTORY      TIF procedure.        Current Medications:   Prior to Admission medications    Medication Sig Start Date End Date Taking? Authorizing Provider   aspirin 81 MG  chewable tablet Chew 81 mg Daily.   Yes Marline Arguelles MD   busPIRone (BUSPAR) 5 MG tablet 5 mg Every Night. 4/25/19  Yes Marline Arguelles MD   Calcium Carb-Cholecalciferol (CALCIUM 600+D3) 600-200 MG-UNIT tablet Take 1 tablet by mouth Every 12 (Twelve) Hours.   Yes Marline Arguelles MD   citalopram (CeleXA) 20 MG tablet Take 20 mg by mouth Daily.   Yes Marline Arguelles MD   Flaxseed, Linseed, (FLAXSEED OIL PO) Take 1,000 mg by mouth 2 (Two) Times a Day.   Yes Marline Arguelles MD   hydrochlorothiazide (HYDRODIURIL) 12.5 MG tablet Take 12.5 mg by mouth Daily.   Yes Marline Arguelles MD   Magnesium 500 MG tablet Take 1 tablet by mouth Every 12 (Twelve) Hours.   Yes Marline Arguelles MD   Multiple Vitamins-Minerals (MULTIVITAMIN ADULT PO) Take 1 tablet/day by mouth Daily.   Yes Marline Arguelles MD   mupirocin (BACTROBAN) 2 % nasal ointment Apply intranasally twice daily  Patient taking differently: 1 application into the nostril(s) as directed by provider 2 (Two) Times a Day. Apply intranasally twice daily 6/17/19  Yes Bianka Garcia APRN   potassium chloride (K-DUR,KLOR-CON) 20 MEQ CR tablet Take 20 mEq by mouth 2 (Two) Times a Day.   Yes Marline Arguelles MD   citalopram (CELEXA) 20 MG tablet Celexa 20 mg tablet   Take 1 tablet every day by oral route.  7/10/19  Marline Arguelles MD   hydrochlorothiazide (HYDRODIURIL) 25 MG tablet Take 25 mg by mouth Daily. Takes half of a pill daily  7/10/19  Emergency, Nurse Kehinde, RN   VELASQUEZOR-CON 10 MEQ CR tablet 20 mEq 2 (Two) Times a Day. 2/16/18 7/10/19  Marline Arguelles MD   mupirocin (BACTROBAN) 2 % ointment Apply intranasally bid 6/17/19 7/10/19  Bianka Garcia APRN       Allergies:  Penicillins    Social History:   Social History     Socioeconomic History   • Marital status: Single     Spouse name: Not on file   • Number of children: Not on file   • Years of education: Not on file   • Highest education level: Not on file    Tobacco Use   • Smoking status: Current Every Day Smoker     Packs/day: 0.50     Types: Cigarettes   • Smokeless tobacco: Never Used   Substance and Sexual Activity   • Alcohol use: Yes     Comment: occasional   • Drug use: No   • Sexual activity: Yes     Partners: Male     Birth control/protection: Post-menopausal     Comment: 12 years       Family History:   Family History   Problem Relation Age of Onset   • Breast cancer Maternal Aunt    • Breast cancer Maternal Aunt    • Breast cancer Maternal Aunt    • Breast cancer Cousin    • Hypertension Father    • Diabetes Mother    • Hypertension Mother    • Hypothyroidism Mother    • Ovarian cancer Neg Hx    • Uterine cancer Neg Hx    • Colon cancer Neg Hx        REVIEW OF SYSTEMS:  14 point review of systems has been reviewed from the patient's emergency room visit, reviewed with the patient on today's date with no new changes.    PHYSICAL EXAM:      Physical Examination:  Vitals:   Vitals:    07/10/19 0029 07/10/19 0452 07/10/19 0621 07/10/19 0626   BP: 130/93 131/78     BP Location: Right arm Right arm     Patient Position: Lying Lying     Pulse: 92 87 85 82   Resp: 16 16 16 16   Temp: 99.4 °F (37.4 °C) 98.7 °F (37.1 °C)     TempSrc: Oral Oral     SpO2: 92% 91% 94%    Weight:       Height:         General:  Appears stated age, no distress.  Orientation:  Alert and oriented to time, place, and person.  Mood and Affect:  Cooperative and pleasant.  Gait:  Resting comfortably in bed.  Cardiovascular:  Symmetric 1-2 plus pulses in upper and lower extremities.  Lymph:  No cervical or inguinal lymphadenopathy noted.  Sensation:  Grossly intact to light touch.  DTR:  Normal, no pathologic reflexes.  Coordination/balance:  Normal    Musculoskeletal:  Right upper extremity exam:  There is no tenderness to palpation about the shoulder, elbow, wrist or hand.  Unrestricted full function motion is present.  Stability is normal with provocative tests, 5/5 strength, and skin is  normal.      Left upper extremity exam:  There is no tenderness to palpation about the shoulder, elbow, wrist or hand.  Unrestricted full function motion is present.  Stability is normal with provocative tests, 5/5 strength, and skin is normal.     Right lower extremity exam:  There is no tenderness to palpation about the knee, ankle or foot, but the patient does have discomfort about the right hip.  She tolerates limited active and passive range of motion through the right hip.  The overlying skin is intact and the surrounding muscle compartments are soft and compressible.  She does have discomfort with supine logroll, however.       Left lower extremity exam:  There is no tenderness to palpation about the hip, knee, ankle or foot.  Unrestricted full function motion is present.  Stability is normal with provocative tests, 5/5 strength, and skin is normal.      DATA:    CBC with Differential:    Lab Results   Component Value Date    WBC 7.75 07/10/2019    RBC 3.31 (L) 07/10/2019    HGB 10.3 (L) 07/10/2019    HCT 29.6 (L) 07/10/2019     07/10/2019    MCV 89.4 07/10/2019    MCH 31.1 07/10/2019    MCHC 34.8 07/10/2019    RDW 13.2 07/10/2019    LYMPHOPCT 13.3 (L) 07/09/2019    MONOPCT 5.1 07/09/2019    EOSPCT 1.0 07/09/2019    BASOPCT 1.0 07/09/2019    EOSABS 0.13 07/09/2019    BASOSABS 0.13 07/09/2019     CMP:    Lab Results   Component Value Date     07/10/2019    K 3.8 07/10/2019     07/10/2019    CO2 27.0 07/10/2019    BUN 6 07/10/2019    CREATININE 0.58 07/10/2019    AGRATIO 1.0 (L) 07/10/2019    GLUCOSE 99 07/10/2019    CALCIUM 8.3 (L) 07/10/2019    BILITOT 0.6 07/10/2019    ALKPHOS 123 (H) 07/10/2019    AST 52 (H) 07/10/2019    ALT 31 07/10/2019     BMP:    Lab Results   Component Value Date     07/10/2019    K 3.8 07/10/2019     07/10/2019    CO2 27.0 07/10/2019    BUN 6 07/10/2019    CREATININE 0.58 07/10/2019    CALCIUM 8.3 (L) 07/10/2019    GLUCOSE 99 07/10/2019        ----------------------------------------------------------------------------------------------------------------------  I have reviewed the radiology images above and agree with the findings dictated below    Radiology:   Imaging Results (last 24 hours)     Procedure Component Value Units Date/Time    XR Hip With or Without Pelvis 2 - 3 View Right [[id number]] Collected:  07/09/19 1819     Updated:  07/09/19 1823    Narrative:       EXAMINATION:  XR HIP W OR WO PELVIS 2-3 VIEW RIGHT-  7/9/2019 5:58 PM  CDT     HISTORY: The patient fell.     COMPARISON: No comparison study.     TECHNIQUE: AP and crosstable lateral views of the right hip were  supplemented with an AP image of the pelvis.     FINDINGS:  There is an acute subcapital fracture of the right femoral  neck with no significant displacement or angulation. The hip joint  spaces are well-maintained. No pelvic fracture is seen.       Impression:       Acute nondisplaced subcapital right femoral neck fracture  without displacement or angulation.              This report was finalized on 07/09/2019 18:20 by Dr. Marcos Boles MD.    XR Chest 1 View [[id number]] Collected:  07/09/19 1818     Updated:  07/09/19 1822    Narrative:       EXAMINATION:  XR CHEST 1 VW-  7/9/2019 5:57 PM CDT     HISTORY: Preoperative chest x-ray.     COMPARISON: No comparison study.     FINDINGS:  The lungs are expanded bilaterally and clear. There is old  granulomatous disease. The pleural spaces are clear. Heart size is  normal.  No acute bony abnormality is seen.       Impression:       No active disease is seen.        This report was finalized on 07/09/2019 18:18 by Dr. Marcos Boles MD.          ----------------------------------------------------------------------------------------------------------------------  Assessment: Valgus impacted right femoral neck fracture    Plan:  1) to OR for percutaneous screw fixation of right valgus impacted femoral neck fracture - we discussed  the risks, benefits, and alternatives and the patient wishes to proceed  2) Admit postop for pain control, PT/OT  3) n.p.o. and strict bedrest presently    Electronically signed by Aramis Navarro MD on 7/10/2019 at 7:18 AM      Electronically signed by Aramis Navarro MD at 7/10/2019  7:21 AM          Physical Therapy Notes (most recent note)      Kalpesh Young, PT Student at 2019  2:43 PM  Version 1 of 1         Acute Care - Physical Therapy Treatment Note   [place]     Patient Name: Adelita Jaeger  : 1970  MRN: [id number]  Today's Date: 2019  Onset of Illness/Injury or Date of Surgery: 19  Date of Referral to PT: 07/10/19  Referring Physician: Aramis Navarro MD    Admit Date: 2019    Visit Dx:    ICD-10-CM ICD-9-CM   1. Closed fracture of right hip, initial encounter (CMS/[facility]) S72.001A 820.8   2. Hypokalemia E87.6 276.8   3. Decreased activities of daily living (ADL) R68.89 780.99   4. Impaired functional mobility, balance, gait, and endurance Z74.09 V49.89     Patient Active Problem List   Diagnosis   • Gastroesophageal reflux disease   • Hypokalemia   • Hypomagnesemia   • Hypoalbuminemia   • Smokes tobacco daily   • Closed fracture of right hip (CMS/[facility])   • Hyponatremia   • Tobacco abuse   • Acute pain due to injury   • Fall       Therapy Treatment    Rehabilitation Treatment Summary     Row Name 19 1300 19 0840 19 0813       Treatment Time/Intention    Discipline  physical therapist  -MS,AL,MS2  occupational therapist  -AC  physical therapist  -MS,AL,MS2    Patient/Family Observations  no family present  -MS,AL,MS2  --  none present  -MS,AL,MS2    Care Plan Review  --  evaluation/treatment results reviewed;care plan/treatment goals reviewed;risks/benefits reviewed;current/potential barriers reviewed;patient/other agree to care plan  -AC  --    Patient Effort  --  good  -AC  --    Existing Precautions/Restrictions  right;partial weight bearing;fall  -MS,AL,MS2   fall;right;partial weight bearing TTWB RLE  -AC2  fall;right;partial weight bearing  -MS,AL,MS2    Recorded by [MS,AL,MS2] Tabitha Jennings, PT, DPT, NCS (r) Kalpesh Young, PT Student (t) Tabitha Jennings, PT, DPT, NCS (c) 07/12/19 1442 [AC] Bill Oliveira, OTR/L, CNT 07/12/19 0857  [AC2] Bill Oliveira, OTR/L, CNT 07/12/19 0932 [MS,AL,MS2] Tabitha Jennings, PT, DPT, NCS (r) Kalpesh Young, PT Student (t) Tabitha Jennings, PT, DPT, NCS (c) 07/12/19 1442    Row Name 07/12/19 1300 07/12/19 0813          Cognitive Assessment/Intervention    Additional Documentation  Cognitive Assessment/Intervention (Group)  -MS,AL,MS2  Cognitive Assessment/Intervention (Group)  -MS,AL,MS2     Recorded by [MS,AL,MS2] Tabitha Jennings, PT, DPT, NCS (r) Kalpesh Young, PT Student (t) Tabitha Jennings, PT, DPT, NCS (c) 07/12/19 1442 [MS,AL,MS2] Tabitha Jennings, PT, DPT, NCS (r) Kalpesh Young, PT Student (t) Tabitha Jennings, PT, DPT, NCS (c) 07/12/19 1442     Row Name 07/12/19 1300 07/12/19 0840 07/12/19 0813       Cognitive Assessment/Intervention- PT/OT    Affect/Mental Status (Cognitive)  WNL  -MS,AL,MS2  --  WNL  -MS,AL,MS2    Orientation Status (Cognition)  oriented x 4  -MS,AL,MS2  --  oriented x 4  -MS,AL,MS2    Follows Commands (Cognition)  WNL  -MS,AL,MS2  --  WNL  -MS,AL,MS2    Cognitive Function (Cognitive)  WNL  -MS,AL,MS2  --  WNL  -MS,AL,MS2    Personal Safety Interventions  fall prevention program maintained;gait belt;nonskid shoes/slippers when out of bed;supervised activity  -MS,AL,MS2  fall prevention program maintained;gait belt;muscle strengthening facilitated;nonskid shoes/slippers when out of bed;supervised activity  -AC  fall prevention program maintained;gait belt;nonskid shoes/slippers when out of bed;supervised activity  -MS,AL,MS2    Recorded by [MS,AL,MS2] Tabitha Jennings, PT, DPT, NCS (r) Kalpesh Young, PT Student (t) Tabitha Jennings, PT, DPT, NCS (c) 07/12/19 1442 [AC] Bill Oliveira, OTR/L, CNT 07/12/19 0932 [MS,AL,MS2] Dick  Tabitha GRAY, PT, DPT, NCS (r) Kalpesh Young, PT Student (t) Tabitha Jennings, PT, DPT, NCS (c) 07/12/19 1442    Row Name 07/12/19 1300             Safety Issues, Functional Mobility    Impairments Affecting Function (Mobility)  endurance/activity tolerance;strength  -MS,AL,MS2      Recorded by [MS,AL,MS2] Tabitha Jennings, PT, DPT, NCS (r) Kalpesh Young, PT Student (t) Tabitha Jennings, PT, DPT, NCS (c) 07/12/19 1442      Row Name 07/12/19 0840 07/12/19 0813          Bed Mobility Assessment/Treatment    Comment (Bed Mobility)  up in chair  -AC  Pt in chair at start  -MS,AL,MS2     Recorded by [AC] Bill Oliveira, OTR/L, [facility] 07/12/19 0932 [MS,AL,MS2] Tabitha Jennings, PT, DPT, NCS (r) Kalpesh Young, PT Student (t) Tabitha Jennings, PT, DPT, NCS (c) 07/12/19 1442     Row Name 07/12/19 0840             Functional Mobility    Functional Mobility- Ind. Level  contact guard assist  -AC      Functional Mobility- Device  rolling walker  -AC      Functional Mobility-Maintain WBing  able to maintain weight bearing status  -AC      Functional Mobility- Comment  to BR back to chair  -AC      Recorded by [AC] Bill Oliveira, OTR/L, CNT 07/12/19 0932      Row Name 07/12/19 1300 07/12/19 0840 07/12/19 0813       Transfer Assessment/Treatment    Transfer Assessment/Treatment  sit-stand transfer;stand-sit transfer;toilet transfer;floor transfer  -MS,AL,MS2  sit-stand transfer;stand-sit transfer;shower transfer;toilet transfer  -AC  sit-stand transfer;stand-sit transfer  -MS,AL,MS2    Maintains Weight-bearing Status (Transfers)  --  able to maintain  -AC  able to maintain  -MS,AL,MS2    Comment (Transfers)  --  --  Pt. attempted to use one hand on RW and one hand to push up from chair but needed min A. When she pushed off with both hands from armrest, she needed CGA  -MS,AL,MS2    Recorded by [MS,AL,MS2] Tabitha Jennings, PT, DPT, NCS (r) Kalpesh Young, PT Student (t) Tabitha Jennings, PT, DPT, NCS (c) 07/12/19 1442 [AC] Bill Oliveira, OTR/L, CNT  07/12/19 0932 [MS,AL,MS2] aTbitha Jennings, PT, DPT, NCS (r) Kalpesh Young, PT Student (t) Tabitha Jennings, PT, DPT, NCS (c) 07/12/19 1442    Row Name 07/12/19 1300 07/12/19 0840 07/12/19 0813       Sit-Stand Transfer    Sit-Stand [place] (Transfers)  verbal cues;supervision  -MS,AL,MS2  contact guard;verbal cues  -AC  contact guard;1 person assist  -MS,AL,MS2    Assistive Device (Sit-Stand Transfers)  walker, front-wheeled  -MS,AL,MS2  walker, front-wheeled  -AC  walker, front-wheeled  -MS,AL,MS2    Recorded by [MS,AL,MS2] Tabitha Jennings, PT, DPT, NCS (r) Kalpesh Young, PT Student (t) Tabitha Jennings, PT, DPT, NCS (c) 07/12/19 1442 [AC] Bill Oliveira, OTR/L, CNT 07/12/19 0932 [MS,AL,MS2] Tabitha Jennings, PT, DPT, NCS (r) Kalpesh Young, PT Student (t) Tabitha Jennings, PT, DPT, NCS (c) 07/12/19 1442    Row Name 07/12/19 1300 07/12/19 0840 07/12/19 0813       Stand-Sit Transfer    Stand-Sit [place] (Transfers)  supervision;verbal cues  -MS,AL,MS2  contact guard;verbal cues  -AC  verbal cues;contact guard  -MS,AL,MS2    Assistive Device (Stand-Sit Transfers)  walker, front-wheeled  -MS,AL,MS2  walker, front-wheeled  -AC  walker, front-wheeled  -MS,AL,MS2    Recorded by [MS,AL,MS2] Tabitha Jennings, PT, DPT, NCS (r) Kalpesh Young, PT Student (t) Tabitha Jennings, PT, DPT, NCS (c) 07/12/19 1442 [AC] Bill Oliveira, OTR/L, CNT 07/12/19 0932 [MS,AL,MS2] Tabitha Jennings, PT, DPT, NCS (r) Kalpesh Young, PT Student (t) Tabitha Jennings, PT, DPT, NCS (c) 07/12/19 1442    Row Name 07/12/19 1300             Toilet Transfer    Type (Toilet Transfer)  stand-sit;sit-stand  -MS,AL,MS2      [place] Level (Toilet Transfer)  supervision  -MS,AL,MS2      Assistive Device (Toilet Transfer)  walker, front-wheeled;grab bars/safety frame  -MS,AL,MS2      Recorded by [MS,AL,MS2] Tabitha Jennings, PT, DPT, NCS (r) Kalpesh Young, PT Student (t) Tabitha Jennings, PT, DPT, NCS (c) 07/12/19 1442      Row Name 07/12/19 1300             Floor Transfer     Type (Floor Transfer)  Bumping up to chair  -MS,AL,MS2      [place] Level (Floor Transfer)  minimum assist (75% patient effort)  -MS,AL,MS2      Recorded by [MS,AL,MS2] Tabitha Jennings, PT, DPT, NCS (r) Kalpesh Young PT Student (t) Tabitha Jennnigs, PT, DPT, NCS (c) 07/12/19 1442      Row Name 07/12/19 1300 07/12/19 0813          Gait/Stairs Assessment/Training    [place] Level (Gait)  supervision  -MS,AL,MS2  supervision;contact guard  -MS,AL,MS2     Assistive Device (Gait)  walker, front-wheeled  -MS,AL,MS2  walker, front-wheeled  -MS,AL,MS2     Distance in Feet (Gait)  Pt ambulated 20, 35, and 50ft with increased pace compared to this morning but still decreased gait speed.   -MS,AL,MS2  Pt. ambulated 4x20ft with decreased gait speed with reports of fatigue and breaks. Patient reported not getting breathing treatment and appeared less shaky today with gait.   -MS,AL,MS2     Pattern (Gait)  --  step-to  -MS,AL,MS2     Negotiation (Stairs)  stairs independence  -MS,AL,MS2  --     [place] Level (Stairs)  supervision;verbal cues  -MS,AL,MS2  --     Handrail Location (Stairs)  none  -MS,AL,MS2  --     Number of Steps (Stairs)  3  -MS,AL,MS2  --     Ascending Technique (Stairs)  bumping  -MS,AL,MS2  --     Descending Technique (Stairs)  bumping  -MS,AL,MS2  --     Comment (Gait/Stairs)  Pt. performed bumping up steps keeping R LE elevated with ascending/descending.   -MS,AL,MS2  Pt attempted stairs but was unable to ascend one step with B HR due to weakness and not being able to maintain WBing status.   -MS,AL,MS2     Recorded by [MS,AL,MS2] Tabitha Jennings, PT, DPT, NCS (r) Kalpesh Young, PT Student (t) Tabitha Jennings, PT, DPT, NCS (c) 07/12/19 1442 [MS,AL,MS2] Tabitha Jennings, PT, DPT, NCS (r) Hector, Aj, PT Student (t) Tabitha Jennings, PT, DPT, NCS (c) 07/12/19 1442     Row Name 07/12/19 0813             Motor Skills Assessment/Interventions    Additional Documentation  Therapeutic Exercise Interventions  (Group);Therapeutic Exercise (Group)  -MS,AL,MS2      Recorded by [MS,AL,MS2] Tabitha Jennings, PT, DPT, NCS (r) Kalpesh Young, PT Student (t) Tabitha Jennings, PT, DPT, NCS (c) 07/12/19 1442      Row Name 07/12/19 0813             Therapeutic Exercise    Lower Extremity (Therapeutic Exercise)  LAQ (long arc quad), right;marching while seated  -MS,AL,MS2      Comment (Therapeutic Exercise)  Pt performed 2x10 bouts of R LAQ, marching, and hip abduction in sitting. Educated on trying to push self up with B UE on armrest to left butt from chair to help increase strength on UE to improve use of RW and help with stairs.   -MS,AL,MS2      Recorded by [MS,AL,MS2] Tabitha Jennings, PT, DPT, NCS (r) Kalpesh Young, PT Student (t) Tabitha Jennings PT, DPT, NCS (c) 07/12/19 1442      Row Name 07/12/19 0813             Static Sitting Balance    Level of [place] (Unsupported Sitting, Static Balance)  independent  -MS,AL,MS2      Sitting Position (Unsupported Sitting, Static Balance)  sitting edge of mat  -MS,AL,MS2      Recorded by [MS,AL,MS2] Tabitha Jennings, PT, DPT, NCS (r) Kalpesh Young PT Student (t) Tabitha Jennings PT, DPT, NCS (c) 07/12/19 1442      Row Name 07/12/19 0813             Static Standing Balance    Level of [place] (Supported Standing, Static Balance)  supervision  -MS,AL,MS2      Time Able to Maintain Position (Supported Standing, Static Balance)  45 to 60 seconds  -MS,AL,MS2      Assistive Device Utilized (Supported Standing, Static Balance)  walker, rolling  -MS,AL,MS2      Recorded by [MS,AL,MS2] Tabitha Jennings, PT, DPT, NCS (r) Kalpesh Young PT Student (t) Tabitha Jennings PT, DPT, NCS (c) 07/12/19 1442      Row Name 07/12/19 0813             Dynamic Standing Balance    Level of [place], Reaches Outside Midline (Standing, Dynamic Balance)  contact guard assist  -MS,AL,MS2      Assistive Device Utilized (Supported Standing, Dynamic Balance)  walker, rolling  -TALHA VICK,MS2      Recorded by [TALHA VICK,MS2] Dick,  Tabitha GRAY, PT, DPT, NCS (r) Kalpesh Young, PT Student (t) Tabitha Jennings, PT, DPT, NCS (c) 07/12/19 1442      Row Name 07/12/19 1300 07/12/19 0813          Positioning and Restraints    Pre-Treatment Position  sitting in chair/recliner  -MS,AL,MS2  sitting in chair/recliner  -MS,AL,MS2     Post Treatment Position  chair  -MS,AL,MS2  chair  -MS,AL,MS2     In Chair  sitting;call light within reach;encouraged to call for assist;legs elevated  -MS,AL,MS2  sitting;call light within reach;encouraged to call for assist;legs elevated  -MS,AL,MS2     Recorded by [MS,AL,MS2] Tabitha Jennings, PT, DPT, NCS (r) Kalpesh Young, PT Student (t) Tabitha Jennings PT, DPT, NCS (c) 07/12/19 1442 [MS,AL,MS2] Tabitha Jennings, PT, DPT, NCS (r) Kalpesh Young, PT Student (t) Tabitha Jennings, PT, DPT, NCS (c) 07/12/19 1442     Row Name 07/12/19 1300 07/12/19 0840 07/12/19 0813       Pain Assessment    Additional Documentation  Pain Scale: Numbers Pre/Post-Treatment (Group)  -MS,AL,MS2  Pain Scale: Numbers Pre/Post-Treatment (Group)  -AC  Pain Scale: Numbers Pre/Post-Treatment (Group)  -MS,AL,MS2    Recorded by [MS,AL,MS2] Tabitha Jennings, PT, DPT, NCS (r) Kalpesh Young, PT Student (t) Tabitha Jennings, PT, DPT, NCS (c) 07/12/19 1442 [AC] Bill Oliveira N, OTR/L, CNT 07/12/19 0857 [MS,AL,MS2] Tabitha Jennings, PT, DPT, NCS (r) Kalpesh Young, PT Student (t) Tabitha Jennings, PT, DPT, NCS (c) 07/12/19 1442    Row Name 07/12/19 1300 07/12/19 0840 07/12/19 0813       Pain Scale: Numbers Pre/Post-Treatment    Pain Scale: Numbers, Pretreatment  0/10 - no pain  -MS,AL,MS2  5/10  -AC  5/10  -MS,AL,MS2    Pain Scale: Numbers, Post-Treatment  --  --  5/10  -MS,AL,MS2    Pain Location - Side  --  Right  -AC  Right  -MS,AL,MS2    Pain Location  --  hip  -AC  hip  -MS,AL,MS2    Pain Intervention(s)  --  Medication (See MAR);Repositioned;Ambulation/increased activity  -AC  Repositioned;Ambulation/increased activity;Rest  -MS,AL,MS2    Recorded by [MS,AL,MS2] Tabitha Jennings, PT,  DPT, NCS (r) Kalpesh Young, PT Student (t) Tabitha Jennings, PT, DPT, NCS (c) 07/12/19 1442 [AC] Bill Oliveira, OTR/L, CNT 07/12/19 0857 [MS,AL,MS2] Tabitha Jennings, PT, DPT, NCS (r) Kalpesh Young, PT Student (t) Tabitha Jennings, PT, DPT, NCS (c) 07/12/19 1442    Row Name                Wound 07/10/19 1232 Right hip incision    Wound - Properties Group Date first assessed: 07/10/19 [RM] Time first assessed: 1232 [RM] Side: Right [RM] Location: hip [RM] Type: incision [RM] Recorded by:  [] Zuleyka Fuentes RN 07/10/19 1232    Row Name 07/12/19 1300 07/12/19 0840 07/12/19 0813       Plan of Care Review    Plan of Care Reviewed With  patient  -MS,AL,MS2  patient  -AC  patient  -MS,AL,MS2    Recorded by [MS,AL,MS2] Tabitha Jennings, PT, DPT, NCS (r) Kalpesh Young, PT Student (t) Tabitha Jennings, PT, DPT, NCS (c) 07/12/19 1442 [AC] Bill Oliveira, OTR/L, CNT 07/12/19 0947 [MS,AL,MS2] Tabitha Jennings, PT, DPT, NCS (r) Kalpesh Young, PT Student (t) Tabitha Jennings, PT, DPT, NCS (c) 07/12/19 1442    Row Name 07/12/19 0840             Outcome Summary/Treatment Plan (OT)    Daily Summary of Progress (OT)  progress toward functional goals is good  -AC      Anticipated Equipment Needs at Discharge (OT)  tub bench;bedside commode  -AC      Anticipated Discharge Disposition (OT)  home with home health  -AC      Recorded by [AC] Bill Oliveira, OTR/L, CNT 07/12/19 0947      Row Name 07/12/19 1300 07/12/19 0813          Outcome Summary/Treatment Plan (PT)    Daily Summary of Progress (PT)  progress towards functional goals is fair  -MS,AL,MS2  progress towards functional goals is fair  -MS,AL,MS2     Anticipated Equipment Needs at Discharge (PT)  front wheeled walker  -MS,AL,MS2  front wheeled walker  -MS,AL,MS2     Anticipated Discharge Disposition (PT)  home with assist;home with home health  -MS,AL,MS2  home with assist;home with home health  -MS,AL,MS2     Recorded by [MS,AL,MS2] Tabitha Jennings, PT, DPT, NCS (r) Kalpesh Young, PT Student (t)  Tabitha Jennings, PT, DPT, NCS (c) 07/12/19 1442 [MS,AL,MS2] Tabitha Jennings, PT, DPT, NCS (r) Kalpesh Young, PT Student (t) Tabitha Jennings, PT, DPT, NCS (c) 07/12/19 1442       User Key  (r) = Recorded By, (t) = Taken By, (c) = Cosigned By    Initials Name Effective Dates Discipline    AC Bill Oliveira, OTR/L, CNT 04/09/19 -  OT    MS Tabitha Jennings, PT, DPT, NCS 06/19/18 -  PT    Zuleyka Wrihgt RN 11/08/16 -  Nurse    Kalpesh Henriquez, PT Student 04/24/19 -  PT          Wound 07/10/19 1232 Right hip incision (Active)   Dressing Appearance dry;intact 7/12/2019  7:55 AM   Closure FRANCINE 7/12/2019  7:55 AM   Base dressing in place, unable to visualize 7/12/2019  7:55 AM   Drainage Amount none 7/12/2019  7:55 AM   Dressing Care, Wound foam 7/12/2019  7:55 AM           Physical Therapy Education     Title: PT OT SLP Therapies (Done)     Topic: Physical Therapy (Done)     Point: Mobility training (Done)     Learning Progress Summary           Patient AcceptanceMARY,JAY JAY SHAFFER VU, DU by AL at 7/12/2019  2:00 PM    Comment:  PT educated patient on floor to standing transfer with bumping up to chair while maintaining R LE TTWB status. PT also educated patient on ascending/descending stairs by bumping.    MARY Crowder VU by AL at 7/12/2019  8:28 AM    Comment:  PT educated how to maneuver stairs and talked about alternative ways like scooting up backwards or using help to get into house    MARY Crowder VU by AL at 7/11/2019 10:37 AM    Comment:  PT educated patient on how to transfer with RW and manage R LE wbing precautions. Also educated on use of RW during gait and maintaining  R TTWB precautions                   Point: Home exercise program (Done)     Learning Progress Summary           Patient AcceptanceMARY VU by AL at 7/12/2019  8:27 AM    Comment:  PT educate patient in exercises to perform during the day and when she goes home to help strengthen L LE while TTWB status is active                   Point: Body mechanics  (Done)     Learning Progress Summary           Patient Acceptance, E, DELMY by GERTRUDE at 7/12/2019  5:49 AM                   Point: Precautions (Done)     Learning Progress Summary           Patient Acceptance, E, VU by AL at 7/11/2019 10:36 AM    Comment:  PT educated patient on fall risk, R TTWB precautions and needing assist to get up                               User Key     Initials Effective Dates Name Provider Type Discipline     08/02/16 -  Hector Matos, RN Registered Nurse Nurse    AL 04/24/19 -  Kalpesh Young, PT Student PT Student PT                PT Recommendation and Plan  Anticipated Discharge Disposition (PT): home with assist, home with home health  Planned Therapy Interventions (PT Eval): balance training, gait training, strengthening, stair training, transfer training, home exercise program  Therapy Frequency (PT Clinical Impression): 2 times/day  Outcome Summary/Treatment Plan (PT)  Daily Summary of Progress (PT): progress towards functional goals is fair  Anticipated Equipment Needs at Discharge (PT): front wheeled walker  Anticipated Discharge Disposition (PT): home with assist, home with home health  Plan of Care Reviewed With: patient  Progress: improving  Outcome Summary: PT tx completed. Pt ambulated a range of distances from 20-50ft on even surfaces with CGA. She attempted stairs this AM but was unable to perform standing up. The PM session worked on floor transfers and bumping up stairs so when she goes home she can get it. She performed bumping up stairs with supervision and floor to sitting with min A. PT discussed with patient that she will need a chair at the bottom and top of the stairs to sit on when transitioning from standing to floor and vice versa and having someone with her to supervise and possible help when going from floor to chair. She needed min A to get up into chair but stated she has a lower chair at home and her S/O Howard will be with her when she arrives home. PT recommends  home with home health and assist for d/c if she has Howard with her when going home.   Outcome Measures     Row Name 07/12/19 1300 07/12/19 0947 07/12/19 0800       How much help from another person do you currently need...    Turning from your back to your side while in flat bed without using bedrails?  4  -MS (r) AL (t) MS (c)  --  4  -MS (r) AL (t) MS (c)    Moving from lying on back to sitting on the side of a flat bed without bedrails?  4  -MS (r) AL (t) MS (c)  --  4  -MS (r) AL (t) MS (c)    Moving to and from a bed to a chair (including a wheelchair)?  3  -MS (r) AL (t) MS (c)  --  3  -MS (r) AL (t) MS (c)    Standing up from a chair using your arms (e.g., wheelchair, bedside chair)?  3  -MS (r) AL (t) MS (c)  --  3  -MS (r) AL (t) MS (c)    Climbing 3-5 steps with a railing?  3  -MS (r) AL (t) MS (c)  --  2  -MS (r) AL (t) MS (c)    To walk in hospital room?  3  -MS (r) AL (t) MS (c)  --  3  -MS (r) AL (t) MS (c)    AM-[facility] 6 Clicks Score (PT)  20  -MS (r) AL (t)  --  19  -MS (r) AL (t)       How much help from another is currently needed...    Putting on and taking off regular lower body clothing?  --  3  -AC  --    Bathing (including washing, rinsing, and drying)  --  4  -AC  --    Toileting (which includes using toilet bed pan or urinal)  --  3  -AC  --    Putting on and taking off regular upper body clothing  --  4  -AC  --    Taking care of personal grooming (such as brushing teeth)  --  4  -AC  --    Eating meals  --  4  -AC  --    AM-PAC 6 Clicks Score (OT)  --  22  -AC  --       Functional Assessment    Outcome Measure Options  AM-PAC 6 Clicks Basic Mobility (PT)  -MS (r) AL (t) MS (c)  --  AM-PAC 6 Clicks Basic Mobility (PT)  -MS (r) AL (t) MS (c)    Row Name 07/11/19 0900 07/11/19 0815          How much help from another person do you currently need...    Turning from your back to your side while in flat bed without using bedrails?  4  -MS (r) AL (t) MS (c)  --     Moving from lying on back to  sitting on the side of a flat bed without bedrails?  4  -MS (r) AL (t) MS (c)  --     Moving to and from a bed to a chair (including a wheelchair)?  3  -MS (r) AL (t) MS (c)  --     Standing up from a chair using your arms (e.g., wheelchair, bedside chair)?  3  -MS (r) AL (t) MS (c)  --     Climbing 3-5 steps with a railing?  2  -MS (r) AL (t) MS (c)  --     To walk in hospital room?  3  -MS (r) AL (t) MS (c)  --     AM-PAC 6 Clicks Score (PT)  19  -MS (r) AL (t)  --        How much help from another is currently needed...    Putting on and taking off regular lower body clothing?  --  3  -AC     Bathing (including washing, rinsing, and drying)  --  3  -AC     Toileting (which includes using toilet bed pan or urinal)  --  3  -AC     Putting on and taking off regular upper body clothing  --  4  -AC     Taking care of personal grooming (such as brushing teeth)  --  4  -AC     Eating meals  --  4  -AC     AM-PAC 6 Clicks Score (OT)  --  21  -AC        Functional Assessment    Outcome Measure Options  AM-PAC 6 Clicks Basic Mobility (PT)  -MS (r) AL (t) MS (c)  AM-PAC 6 Clicks Daily Activity (OT)  -AC       User Key  (r) = Recorded By, (t) = Taken By, (c) = Cosigned By    Initials Name Provider Type    AC Bill Oliveira, OTR/L, CNT Occupational Therapist    Tabitha Faust, PT, DPT, NCS Physical Therapist    Kalpesh Henriquez, PT Student PT Student         Time Calculation:   PT Charges     Row Name 07/12/19 1401 07/12/19 0829          Time Calculation    Start Time  1318  -MS (r) AL (t) MS (c)  0744  -MS (r) AL (t) MS (c)     Stop Time  1347  -MS (r) AL (t) MS (c)  0811  -MS (r) AL (t) MS (c)     Time Calculation (min)  29 min  -MS (r) AL (t)  27 min  -MS (r) AL (t)     PT Received On  07/12/19  -MS (r) AL (t) MS (c)  --     PT Goal Re-Cert Due Date  07/22/19  -MS (r) AL (t) MS (c)  --        Time Calculation- PT    Total Timed Code Minutes- PT  29 minute(s)  -MS (r) AL (t) MS (c)  27 minute(s)  -MS (r) AL (t) MS (c)         Timed Charges    09740 - PT Therapeutic Exercise Minutes  --  10  -MS (r) AL (t) MS (c)     26330 - Gait Training Minutes   13  -MS (r) AL (t) MS (c)  17  -MS (r) AL (t) MS (c)     22208 - PT Therapeutic Activity Minutes  16  -MS (r) AL (t) MS (c)  --       User Key  (r) = Recorded By, (t) = Taken By, (c) = Cosigned By    Initials Name Provider Type    MS Tabitha Jennings, PT, DPT, NCS Physical Therapist    AL Kalpesh Young, PT Student PT Student        Therapy Charges for Today     Code Description Service Date Service Provider Modifiers Qty    88130001578 HC PT EVAL LOW COMPLEXITY 3 2019 Kalpesh Young, PT Student GP 1    81831985328 HC PT THER PROC EA 15 MIN 2019 Kalpesh Young, PT Student GP 1    12894997299 HC GAIT TRAINING EA 15 MIN 2019 Kalpesh Young, PT Student GP 1    55998896914 HC GAIT TRAINING EA 15 MIN 2019 Kalpesh Young, PT Student GP 1    63522776987 HC PT THERAPEUTIC ACT EA 15 MIN 2019 Kalpesh Young, PT Student GP 1          PT G-Codes  Outcome Measure Options: AM-PAC 6 Clicks Basic Mobility (PT)  AM-PAC 6 Clicks Score (PT): 20  AM-PAC 6 Clicks Score (OT): 22    AJ Hector PT Student  2019         Electronically signed by Tabitha Jennings, PT, DPT, NCS at 2019  2:44 PM          Occupational Therapy Notes (most recent note)      Bill Oliveira, OTR/L, CNT at 2019  9:50 AM          Acute Care - Occupational Therapy Treatment Note  [facility]     Patient Name: Adelita Jaeger  : 1970  MRN: [id number]  Today's Date: 2019  Onset of Illness/Injury or Date of Surgery: 19  Date of Referral to OT: 07/10/19  Referring Physician: Aramis Navarro MD    Admit Date: 2019       ICD-10-CM ICD-9-CM   1. Closed fracture of right hip, initial encounter (CMS/[facility]) S72.001A 820.8   2. Hypokalemia E87.6 276.8   3. Decreased activities of daily living (ADL) R68.89 780.99   4. Impaired functional mobility, balance, gait, and endurance Z74.09 V49.89     Patient Active Problem List   Diagnosis   •  Gastroesophageal reflux disease   • Hypokalemia   • Hypomagnesemia   • Hypoalbuminemia   • Smokes tobacco daily   • Closed fracture of right hip (CMS/HCC)   • Hyponatremia   • Tobacco abuse   • Acute pain due to injury   • Fall     Past Medical History:   Diagnosis Date   • COPD (chronic obstructive pulmonary disease) (CMS/HCC)    • GERD (gastroesophageal reflux disease)    • Hyperlipidemia    • Hypertension    • Potassium (K) deficiency      Past Surgical History:   Procedure Laterality Date   • AUGMENTATION MAMMAPLASTY     • CARPAL TUNNEL RELEASE     •  SECTION     • CHOLECYSTECTOMY     • DILATATION AND CURETTAGE     • ENDOMETRIAL BIOPSY     • HERNIA REPAIR     • HIP CANNULATED SCREW PLACEMENT Right 7/10/2019    Procedure: RIGHT HIP CANNULATED SCREW PLACEMENT;  Surgeon: Aramis Navarro MD;  Location: [facility];  Service: Orthopedics   • OTHER SURGICAL HISTORY      TIF procedure.        Therapy Treatment    Rehabilitation Treatment Summary     Row Name 19          Treatment Time/Intention    Discipline  occupational therapist  -AC  physical therapist  (Pended)   -AL     Patient/Family Observations  --  none present  (Pended)   -AL     Care Plan Review  evaluation/treatment results reviewed;care plan/treatment goals reviewed;risks/benefits reviewed;current/potential barriers reviewed;patient/other agree to care plan  -AC  --     Patient Effort  good  -AC  --     Existing Precautions/Restrictions  fall;right;partial weight bearing TTWB RLE  -AC2  fall;right;partial weight bearing  (Pended)   -AL     Recorded by [AC] Bill Oliveira, OTR/L, [facility] 19 0857  [AC2] Bill Oliveira, OTR/L, CNT 19 0932 [AL] Kalpesh Young, PT Student 19     Row Name 19             Cognitive Assessment/Intervention    Additional Documentation  Cognitive Assessment/Intervention (Group)  (Pended)   -AL      Recorded by [AL] Kalpesh Young, PT Student 19      Row Name  07/12/19 0840 07/12/19 0813          Cognitive Assessment/Intervention- PT/OT    Affect/Mental Status (Cognitive)  --  WNL  (Pended)   -AL     Orientation Status (Cognition)  --  oriented x 4  (Pended)   -AL     Follows Commands (Cognition)  --  WNL  (Pended)   -AL     Cognitive Function (Cognitive)  --  WNL  (Pended)   -AL     Personal Safety Interventions  fall prevention program maintained;gait belt;muscle strengthening facilitated;nonskid shoes/slippers when out of bed;supervised activity  -AC  fall prevention program maintained;gait belt;nonskid shoes/slippers when out of bed;supervised activity  (Pended)   -AL     Recorded by [AC] Bill Oliveira OTR/L, [facility] 07/12/19 0932 [AL] Kalpesh Young, PT Student 07/12/19 0815     Row Name 07/12/19 0840 07/12/19 0813          Bed Mobility Assessment/Treatment    Comment (Bed Mobility)  up in chair  -AC  Pt in chair at start  (Pended)   -AL     Recorded by [AC] Bill Oliveira OTR/L, [facility] 07/12/19 0932 [AL] Kalpesh Young, PT Student 07/12/19 0815     Row Name 07/12/19 0840             Functional Mobility    Functional Mobility- Ind. Level  contact guard assist  -AC      Functional Mobility- Device  rolling walker  -AC      Functional Mobility-Maintain WBing  able to maintain weight bearing status  -AC      Functional Mobility- Comment  to BR back to chair  -AC      Recorded by [AC] Bill Oliveira OTR/L, [facility] 07/12/19 0932      Row Name 07/12/19 0840 07/12/19 0813          Transfer Assessment/Treatment    Transfer Assessment/Treatment  sit-stand transfer;stand-sit transfer;shower transfer;toilet transfer  -AC  sit-stand transfer;stand-sit transfer  (Pended)   -AL     Maintains Weight-bearing Status (Transfers)  able to maintain  -AC  able to maintain  (Pended)   -AL     Comment (Transfers)  --  Pt. attempted to use one hand on RW and one hand to push up from chair but needed min A. When she pushed off with both hands from armrest, she needed CGA  (Pended)   -AL     Recorded by  [AC] Bill Oliveira OTR/L, [facility] 07/12/19 0932 [AL] Kalpesh Young, PT Student 07/12/19 0815     Row Name 07/12/19 0840 07/12/19 0813          Sit-Stand Transfer    Sit-Stand [place] (Transfers)  contact guard;verbal cues  -AC  contact guard;1 person assist  (Pended)   -AL     Assistive Device (Sit-Stand Transfers)  walker, front-wheeled  -AC  walker, front-wheeled  (Pended)   -AL     Recorded by [AC] Bill Oliveira, OTR/L, [facility] 07/12/19 0932 [AL] Kalpesh Young, PT Student 07/12/19 0815     Row Name 07/12/19 0840 07/12/19 0813          Stand-Sit Transfer    Stand-Sit [place] (Transfers)  contact guard;verbal cues  -AC  verbal cues;contact guard  (Pended)   -AL     Assistive Device (Stand-Sit Transfers)  walker, front-wheeled  -AC  walker, front-wheeled  (Pended)   -AL     Recorded by [AC] Bill Oliveira, OTR/L, [facility] 07/12/19 0932 [AL] Kalpesh Young, PT Student 07/12/19 0815     Row Name 07/12/19 0813             Gait/Stairs Assessment/Training    [place] Level (Gait)  supervision;contact guard  (Pended)   -AL      Assistive Device (Gait)  walker, front-wheeled  (Pended)   -AL      Distance in Feet (Gait)  Pt. ambulated 4x20ft with decreased gait speed with reports of fatigue and breaks. Patient reported not getting breathing treatment and appeared less shaky today with gait.   (Pended)   -AL2      Pattern (Gait)  step-to  (Pended)   -AL2      Comment (Gait/Stairs)  Pt attempted stairs but was unable to ascend one step with B HR due to weakness and not being able to maintain WBing status.   (Pended)   -AL3      Recorded by [AL] Kalpesh Young, PT Student 07/12/19 0815  [AL2] Kalpesh Young, PT Student 07/12/19 0819  [AL3] Kalpesh Young, PT Student 07/12/19 0823      Row Name 07/12/19 0813             Motor Skills Assessment/Interventions    Additional Documentation  Therapeutic Exercise Interventions (Group);Therapeutic Exercise (Group)  (Pended)   -AL      Recorded by [AL] Kalpesh Young, PT Student 07/12/19 0823      Row Name 07/12/19 0813              Therapeutic Exercise    Lower Extremity (Therapeutic Exercise)  LAQ (long arc quad), right;marching while seated  (Pended)   -AL      Comment (Therapeutic Exercise)  Pt performed 2x10 bouts of R LAQ, marching, and hip abduction in sitting. Educated on trying to push self up with B UE on armrest to left butt from chair to help increase strength on UE to improve use of RW and help with stairs.   (Pended)   -AL      Recorded by [AL] Kalpesh Young, PT Student 07/12/19 0825      Row Name 07/12/19 0813             Static Sitting Balance    Level of [place] (Unsupported Sitting, Static Balance)  independent  (Pended)   -AL      Sitting Position (Unsupported Sitting, Static Balance)  sitting edge of mat  (Pended)   -AL      Recorded by [AL] Kalpesh Young, PT Student 07/12/19 0823      Row Name 07/12/19 0813             Static Standing Balance    Level of [place] (Supported Standing, Static Balance)  supervision  (Pended)   -AL      Time Able to Maintain Position (Supported Standing, Static Balance)  45 to 60 seconds  (Pended)   -AL      Assistive Device Utilized (Supported Standing, Static Balance)  walker, rolling  (Pended)   -AL      Recorded by [AL] Kalpesh Young, PT Student 07/12/19 0823      Row Name 07/12/19 0813             Dynamic Standing Balance    Level of [place], Reaches Outside Midline (Standing, Dynamic Balance)  contact guard assist  (Pended)   -AL      Assistive Device Utilized (Supported Standing, Dynamic Balance)  walker, rolling  (Pended)   -AL      Recorded by [AL] Kalpesh Young, PT Student 07/12/19 0823      Row Name 07/12/19 0813             Positioning and Restraints    Pre-Treatment Position  sitting in chair/recliner  (Pended)   -AL      Post Treatment Position  chair  (Pended)   -AL      In Chair  sitting;call light within reach;encouraged to call for assist;legs elevated  (Pended)   -AL      Recorded by [AL] Kalpesh Young, PT Student 07/12/19 0823      Row Name 07/12/19 0840 07/12/19 0813          Pain  Assessment    Additional Documentation  Pain Scale: Numbers Pre/Post-Treatment (Group)  -AC  Pain Scale: Numbers Pre/Post-Treatment (Group)  (Pended)   -AL     Recorded by [AC] Bill Oliveira OTR/L, [facility] 07/12/19 0857 [AL] Kalpesh Young, PT Student 07/12/19 0825     Row Name 07/12/19 0840 07/12/19 0813          Pain Scale: Numbers Pre/Post-Treatment    Pain Scale: Numbers, Pretreatment  5/10  -AC  5/10  (Pended)   -AL     Pain Scale: Numbers, Post-Treatment  --  5/10  (Pended)   -AL     Pain Location - Side  Right  -AC  Right  (Pended)   -AL     Pain Location  hip  -AC  hip  (Pended)   -AL     Pain Intervention(s)  Medication (See MAR);Repositioned;Ambulation/increased activity  -AC  Repositioned;Ambulation/increased activity;Rest  (Pended)   -AL     Recorded by [AC] Bill Oliveira OTR/L, [facility] 07/12/19 0857 [AL] Kalpesh Young, PT Student 07/12/19 0826     Row Name                Wound 07/10/19 1232 Right hip incision    Wound - Properties Group Date first assessed: 07/10/19 [RM] Time first assessed: 1232 [RM] Side: Right [RM] Location: hip [RM] Type: incision [RM] Recorded by:  [RM] Zuleyka Fuentes RN 07/10/19 1232    Row Name 07/12/19 0840 07/12/19 0813          Plan of Care Review    Plan of Care Reviewed With  patient  -AC  patient  (Pended)   -AL     Recorded by [AC] iBll Oliveira OTR/L, [facility] 07/12/19 0947 [AL] Kalpesh Young, PT Student 07/12/19 0826     Row Name 07/12/19 0840             Outcome Summary/Treatment Plan (OT)    Daily Summary of Progress (OT)  progress toward functional goals is good  -AC      Anticipated Equipment Needs at Discharge (OT)  tub bench;bedside commode  -AC      Anticipated Discharge Disposition (OT)  home with home health  -AC      Recorded by [AC] Bill Oliveira, OTR/L, [facility] 07/12/19 0947      Row Name 07/12/19 0813             Outcome Summary/Treatment Plan (PT)    Daily Summary of Progress (PT)  progress towards functional goals is fair  (Pended)   -AL      Anticipated Discharge  Disposition (PT)  home with assist;home with home health  (Pended)   -AL      Recorded by [AL] Kalpesh Young, PT Student 07/12/19 0859        User Key  (r) = Recorded By, (t) = Taken By, (c) = Cosigned By    Initials Name Effective Dates Discipline     Bill Oliveira OTR/L, DENISSE 04/09/19 -  OT    Zuleyka Wright RN 11/08/16 -  Nurse    Kalpesh Henriquez, PT Student 04/24/19 -  PT        Wound 07/10/19 1232 Right hip incision (Active)   Dressing Appearance dry;intact 7/11/2019  8:00 PM   Base dressing in place, unable to visualize 7/11/2019  8:00 PM   Drainage Amount none 7/11/2019  8:00 PM   Dressing Care, Wound foam 7/11/2019  8:00 PM       Occupational Therapy Education     Title: PT OT SLP Therapies (Done)     Topic: Occupational Therapy (Done)     Point: ADL training (Done)     Description: Instruct learner(s) on proper safety adaptation and remediation techniques during self care or transfers.   Instruct in proper use of assistive devices.    Learning Progress Summary           Patient Acceptance, E,TB, VU by  at 7/12/2019  9:47 AM    Comment:  safe transfers, WB status, DME recs    Acceptance, E,TB, VU by  at 7/11/2019  9:16 AM    Comment:  DME recs, TTWB status, benefits of activity, home safety                   Point: Precautions (Done)     Description: Instruct learner(s) on prescribed precautions during self-care and functional transfers.    Learning Progress Summary           Patient Acceptance, E,TB, VU by  at 7/12/2019  9:47 AM    Comment:  safe transfers, WB status, DME recs    Acceptance, E,TB, VU by  at 7/11/2019  9:16 AM    Comment:  DME recs, TTWB status, benefits of activity, home safety                               User Key     Initials Effective Dates Name Provider Type Discipline     04/09/19 -  Bill Oliveira OTR/L, DENISSE Occupational Therapist OT                OT Recommendation and Plan  Outcome Summary/Treatment Plan (OT)  Daily Summary of Progress (OT): progress toward functional  goals is good  Anticipated Equipment Needs at Discharge (OT): tub bench, bedside commode  Anticipated Discharge Disposition (OT): home with home health  Planned Therapy Interventions (OT Eval): BADL retraining, functional balance retraining, occupation/activity based interventions, patient/caregiver education/training, transfer/mobility retraining  Therapy Frequency (OT Eval): 5 times/wk  Daily Summary of Progress (OT): progress toward functional goals is good  Plan of Care Review  Plan of Care Reviewed With: patient  Plan of Care Reviewed With: patient  Outcome Summary: OT tx completed.  Pt progressing well with OT goals.  SBA to CGA for all transfers and ambulation.  Jose bathing with tub bench, HH showerhead and grab bars, S to CGA with LB dressing and toileting, and CGA for standing grooming task at side sink.  Pt was educated on home safety and DME recs.  She is ready for discharge home with HH when stable.  Outcome Measures     Row Name 07/12/19 0947 07/12/19 0800 07/11/19 0900       How much help from another person do you currently need...    Turning from your back to your side while in flat bed without using bedrails?  --  4  (Pended)   -AL  4  -MS (r) AL (t) MS (c)    Moving from lying on back to sitting on the side of a flat bed without bedrails?  --  4  (Pended)   -AL  4  -MS (r) AL (t) MS (c)    Moving to and from a bed to a chair (including a wheelchair)?  --  3  (Pended)   -AL  3  -MS (r) AL (t) MS (c)    Standing up from a chair using your arms (e.g., wheelchair, bedside chair)?  --  3  (Pended)   -AL  3  -MS (r) AL (t) MS (c)    Climbing 3-5 steps with a railing?  --  2  (Pended)   -AL  2  -MS (r) AL (t) MS (c)    To walk in hospital room?  --  3  (Pended)   -AL  3  -MS (r) AL (t) MS (c)    AM-PAC 6 Clicks Score (PT)  --  19  (Pended)   -AC (r) AL (t)  19  -MS (r) AL (t)       How much help from another is currently needed...    Putting on and taking off regular lower body clothing?  3  -AC  --  --     Bathing (including washing, rinsing, and drying)  4  -AC  --  --    Toileting (which includes using toilet bed pan or urinal)  3  -AC  --  --    Putting on and taking off regular upper body clothing  4  -AC  --  --    Taking care of personal grooming (such as brushing teeth)  4  -AC  --  --    Eating meals  4  -AC  --  --    AM-PAC 6 Clicks Score (OT)  22  -AC  --  --       Functional Assessment    Outcome Measure Options  --  AM-PAC 6 Clicks Basic Mobility (PT)  (Pended)   -AL  AM-PAC 6 Clicks Basic Mobility (PT)  -MS (r) AL (t) MS (c)    Row Name 07/11/19 0815             How much help from another is currently needed...    Putting on and taking off regular lower body clothing?  3  -AC      Bathing (including washing, rinsing, and drying)  3  -AC      Toileting (which includes using toilet bed pan or urinal)  3  -AC      Putting on and taking off regular upper body clothing  4  -AC      Taking care of personal grooming (such as brushing teeth)  4  -AC      Eating meals  4  -AC      AM-PAC 6 Clicks Score (OT)  21  -AC         Functional Assessment    Outcome Measure Options  AM-PAC 6 Clicks Daily Activity (OT)  -AC        User Key  (r) = Recorded By, (t) = Taken By, (c) = Cosigned By    Initials Name Provider Type    Bill Barkley, OTR/L, DENISSE Occupational Therapist    Tabitha Faust, PT, DPT, NCS Physical Therapist    Kalpesh Henriquez, PT Student PT Student           Time Calculation:   Time Calculation- OT     Row Name 07/12/19 0948 07/12/19 0829          Time Calculation- OT    OT Start Time  0840  -  --     OT Stop Time  0948  -  --     OT Time Calculation (min)  68 min  -  --     Total Timed Code Minutes- OT  68 minute(s)  -  --     OT Received On  07/12/19  -  --        Timed Charges    15146 - Gait Training Minutes   --  17  (Pended)   -AL       User Key  (r) = Recorded By, (t) = Taken By, (c) = Cosigned By    Initials Name Provider Type    Bill Barkley, OTR/L, DENISSE Occupational  Therapist    Kalpesh Henriquez, PT Student PT Student        Therapy Charges for Today     Code Description Service Date Service Provider Modifiers Qty    76744135300 HC OT EVAL LOW COMPLEXITY 4 2019 Bill Oliveira OTR/L, DENISSE GO 1    87995942385 HC OT SELF CARE/MGMT/TRAIN EA 15 MIN 2019 Bill Oliveira OTR/LDENISSE GO 5               Bill N. Roxanne, OTR/L, CNT  2019    Electronically signed by Bill Oliveira OTR/L, CNT at 2019  9:50 AM           [facility]  [address] [place] [id number]  Phone:  [phone]  Fax:   Date: 2019      Ambulatory Referral to Home Health     Patient:  Adelita Jaeger MRN:  [id number]    [address]  [facility] [address] :  1970  SSN:    Phone: [phone] Sex:  F      INS[employer]ANCE [employer] # SUBSCRIBER ID   Primary:    OLGA SAN [id number]      Referring Provider Information:  GURINDER ELLIS Phone: [phone] Fax:       Referral Information:   # Visits:  1 Referral Type: Home Health [42]   Urgency:  Routine Referral Reason: Specialty Services Required   Start Date: 2019 End Date:  To be determined by Insurer   Diagnosis: Closed fracture of right hip, initial encounter (CMS/[facility]) (S72.001A [ICD-10-CM] 820.8 [ICD-9-CM])  Decreased activities of daily living (ADL) (R68.89 [ICD-10-CM] 780.99 [ICD-9-CM])  Impaired functional mobility, balance, gait, and endurance (Z74.09 [ICD-10-CM] V49.89 [ICD-9-CM])      Refer to Dept:   Refer to Provider:   Refer to Facility:       Face to Face Visit Date: 2019  Follow-up Provider for Plan of Care? I treated the patient in an acute care facility and will not continue treatment after discharge.  Follow-up Provider: INA BRYSON [[id number]]  Reason/Clinical Findings: hip fx  Describe mobility limitations that make leaving home difficult: hip fx  Nursing/Therapeutic Services Requested: Physical Therapy  Nursing/Therapeutic  Services Requested: Occupational Therapy  PT orders: Strengthening  Occupational orders: Strengthening  Occupational orders: Activities of daily living  Frequency: 1 Week 1     This document serves as a request of services and does not constitute Insurance authorization or approval of services.  To determine eligibility, please contact the members Insurance carrier to verify and review coverage.     If you have medical questions regarding this request for services. Please contact [address] at [phone] between the hours of 8:00am - 5:00pm (Mon-Fri).       Authorizing Provider:Nain Casillas APRN  Authorizing Provider's NPI: [id number]  Order Entered By: Nain Casillas APRN 7/12/2019  4:09 PM     Electronically signed by: Nain Casillas APRN 7/12/2019  4:09 PM

## 2019-07-13 NOTE — DISCHARGE SUMMARY
[facility] Medicine Services  DISCHARGE SUMMARY       Date of Admission: 7/9/2019  Date of Discharge:  7/13/2019  Primary Care Physician: Rajan Rodriguez MD    Presenting Problem/History of Present Illness:  Closed fracture of right hip, initial encounter (CMS/[facility]) [S72.001A]     Final Discharge Diagnoses:  Active Hospital Problems    Diagnosis   • **Closed fracture of right hip (CMS/[facility])   • Fall   • Hyponatremia   • Tobacco abuse   • Acute pain due to injury   • Gastroesophageal reflux disease   • Hypokalemia     Consults:   1.  Dr. Navarro, Orthopedics    Procedures Performed:   1.  Right hip cannulated screw placement per Dr. Naavrro on 7/10/19    Pertinent Test Results:   Lab Results (last 72 hours)     Procedure Component Value Units Date/Time    Hemoglobin & Hematocrit, Blood [[id number]]  (Abnormal) Collected:  07/13/19 0702    Specimen:  Blood Updated:  07/13/19 0726     Hemoglobin 11.8 g/dL      Hematocrit 35.2 %     Basic Metabolic Panel [[id number]]  (Abnormal) Collected:  07/12/19 0554    Specimen:  Blood Updated:  07/12/19 0702     Glucose 97 mg/dL      BUN 3 mg/dL      Creatinine 0.57 mg/dL      Sodium 135 mmol/L      Potassium 4.7 mmol/L      Chloride 105 mmol/L      CO2 28.0 mmol/L      Calcium 8.8 mg/dL      eGFR Non African Amer 112 mL/min/1.73      BUN/Creatinine Ratio 5.3     Anion Gap 2.0 mmol/L     Narrative:       GFR Normal >60  Chronic Kidney Disease <60  Kidney Failure <15    CBC (No Diff) [[id number]]  (Abnormal) Collected:  07/12/19 0554    Specimen:  Blood Updated:  07/12/19 0643     WBC 9.41 10*3/mm3      RBC 3.29 10*6/mm3      Hemoglobin 10.1 g/dL      Hematocrit 30.3 %      MCV 92.1 fL      MCH 30.7 pg      MCHC 33.3 g/dL      RDW 14.0 %      RDW-SD 46.9 fl      MPV 10.8 fL      Platelets 259 10*3/mm3     Basic Metabolic Panel [[id number]]  (Abnormal) Collected:  07/11/19 0606    Specimen:  Blood Updated:  07/11/19 0651     Glucose 121 mg/dL      BUN  4 mg/dL      Creatinine 0.53 mg/dL      Sodium 135 mmol/L      Potassium 4.7 mmol/L      Chloride 104 mmol/L      CO2 27.0 mmol/L      Calcium 9.1 mg/dL      eGFR Non African Amer 122 mL/min/1.73      BUN/Creatinine Ratio 7.5     Anion Gap 4.0 mmol/L     Narrative:       GFR Normal >60  Chronic Kidney Disease <60  Kidney Failure <15    Hemoglobin & Hematocrit, Blood [[id number]]  (Abnormal) Collected:  07/11/19 0606    Specimen:  Blood Updated:  07/11/19 0635     Hemoglobin 9.8 g/dL      Hematocrit 29.1 %         Imaging Results (all)     Procedure Component Value Units Date/Time    XR Hip With or Without Pelvis 2 - 3 View Right [[id number]] Collected:  07/10/19 1338     Updated:  07/11/19 0722    Narrative:       EXAMINATION: XR HIP W OR WO PELVIS 2-3 VIEW RIGHT- 7/10/2019 1:38 PM CDT     HISTORY: Cannulated screw     FLUOROSCOPY TIME:  0.44 seconds     AIR KERMA: 2.37mGy     NUMBER OF IMAGES: 2     FINDINGS:  Multiple fluoroscopic images are submitted from the OR. A radiologist  was not present for the acquisition or intraoperative interpretation of  these images. Images demonstrate multiple screws through the femoral  head and neck. Please see the operative report for details pertaining to  this exam.  This report was finalized on 07/10/2019 13:39 by Dr. Corey Santos MD.    FL C Arm During Surgery [[id number]] Collected:  07/10/19 1338     Updated:  07/11/19 0722    Narrative:       EXAMINATION: XR HIP W OR WO PELVIS 2-3 VIEW RIGHT- 7/10/2019 1:38 PM CDT     HISTORY: Cannulated screw     FLUOROSCOPY TIME:  0.44 seconds     AIR KERMA: 2.37mGy     NUMBER OF IMAGES: 2     FINDINGS:  Multiple fluoroscopic images are submitted from the OR. A radiologist  was not present for the acquisition or intraoperative interpretation of  these images. Images demonstrate multiple screws through the femoral  head and neck. Please see the operative report for details pertaining to  this exam.  This report was finalized on 07/10/2019  13:39 by Dr. Corey Santos MD.    XR Hip With or Without Pelvis 2 - 3 View Right [[id number]] Collected:  07/09/19 1819     Updated:  07/09/19 1823    Narrative:       EXAMINATION:  XR HIP W OR WO PELVIS 2-3 VIEW RIGHT-  7/9/2019 5:58 PM  CDT     HISTORY: The patient fell.     COMPARISON: No comparison study.     TECHNIQUE: AP and crosstable lateral views of the right hip were  supplemented with an AP image of the pelvis.     FINDINGS:  There is an acute subcapital fracture of the right femoral  neck with no significant displacement or angulation. The hip joint  spaces are well-maintained. No pelvic fracture is seen.       Impression:       Acute nondisplaced subcapital right femoral neck fracture  without displacement or angulation.              This report was finalized on 07/09/2019 18:20 by Dr. Marcos Boles MD.    XR Chest 1 View [[id number]] Collected:  07/09/19 1818     Updated:  07/09/19 1822    Narrative:       EXAMINATION:  XR CHEST 1 VW-  7/9/2019 5:57 PM CDT     HISTORY: Preoperative chest x-ray.     COMPARISON: No comparison study.     FINDINGS:  The lungs are expanded bilaterally and clear. There is old  granulomatous disease. The pleural spaces are clear. Heart size is  normal.  No acute bony abnormality is seen.       Impression:       No active disease is seen.        This report was finalized on 07/09/2019 18:18 by Dr. Marcos Boles MD.        History of Present Illness on Day of Discharge:   Currently sitting up in the chair today.  She states that she is doing well and is still wanting to go home with home health.  She denies any complaints.      Hospital Course:  The patient is a 49 y.o. female who presented to [facility] with severe right hip pain.  The patient has a past medical history significant for COPD with ongoing tobacco abuse, chronic hypokalemia, hypertension, hyperlipidemia and GERD.  She stated on admission that she tripped and landed on her right side causing her to  "have severe pain.  She was then unable to walk so she presented to our facility for evaluation.  In the emergency department she was found to have an acute nondisplaced right femoral fracture.  She was admitted for further evaluations.  Orthopedics were consulted and took the patient to surgery on 7/10/19 for a right cannulated screw placement.  She also was found to have hyponatremia on chronic HCTZ this was held during her admission and her blood pressure is still stable without it.  She had participated with physical and occupational therapy and has done really well.  They felt that she was safe for discharge home with home health.  She is doing well and will be discharged home today with home health.  She also will be prescribed a walker as well.  She denies any complaints.  Doing well on the Norco.  She will follow up with orthopedics in 2 weeks and remain toe touch weightbearing for the next 6 weeks.     Condition on Discharge:  Stable    Physical Exam on Discharge:  /67 (BP Location: Left arm, Patient Position: Sitting)   Pulse 95   Temp 98.4 °F (36.9 °C) (Oral)   Resp 15   Ht 149.9 cm (59\")   Wt 42.7 kg (94 lb 1.6 oz)   SpO2 98%   Breastfeeding? No   BMI 19.01 kg/m²   Physical Exam   Constitutional: She is oriented to person, place, and time. She appears well-developed and well-nourished.   Sitting up in the chair.  NAD.    HENT:   Head: Normocephalic and atraumatic.   Eyes: Conjunctivae and EOM are normal. Pupils are equal, round, and reactive to light.   Neck: Neck supple. No JVD present. No thyromegaly present.   Cardiovascular: Normal rate, regular rhythm, normal heart sounds and intact distal pulses. Exam reveals no gallop and no friction rub.   No murmur heard.  Pulmonary/Chest: Effort normal and breath sounds normal. No respiratory distress. She has no wheezes. She has no rales. She exhibits no tenderness.   Abdominal: Soft. Bowel sounds are normal. She exhibits no distension. There is " no tenderness. There is no rebound and no guarding.   Genitourinary:   Genitourinary Comments: Voiding.   Musculoskeletal: Normal range of motion. She exhibits no edema, tenderness or deformity.   Incision doing well    Lymphadenopathy:     She has no cervical adenopathy.   Neurological: She is alert and oriented to person, place, and time.   Skin: Skin is warm and dry. No rash noted.   Psychiatric: She has a normal mood and affect. Her behavior is normal. Judgment and thought content normal.   Nursing note and vitals reviewed.    Discharge Disposition:  Home or Self Care    Discharge Medications:     Discharge Medications      New Medications      Instructions Start Date   docusate sodium 100 MG capsule  Commonly known as:  COLACE   100 mg, Oral, 2 Times Daily      ondansetron 4 MG tablet  Commonly known as:  ZOFRAN   4 mg, Oral, Every 8 Hours PRN      oxyCODONE-acetaminophen  MG per tablet  Commonly known as:  PERCOCET   1 tablet, Oral, Every 4 Hours PRN      rivaroxaban 10 MG tablet  Commonly known as:  XARELTO   10 mg, Oral, Daily         Continue These Medications      Instructions Start Date   busPIRone 5 MG tablet  Commonly known as:  BUSPAR   5 mg, Oral, 3 Times Daily PRN      CALCIUM 600+D3 600-200 MG-UNIT tablet  Generic drug:  Calcium Carb-Cholecalciferol   1 tablet, Oral, Every 12 Hours Scheduled      citalopram 20 MG tablet  Commonly known as:  CeleXA   20 mg, Oral, Daily      FLAXSEED OIL PO   1,000 mg, Oral, 2 Times Daily      Magnesium 500 MG tablet   1 tablet, Oral, Every 12 Hours Scheduled      MULTIVITAMIN ADULT PO   1 tablet, Oral, Daily      mupirocin 2 % ointment  Commonly known as:  BACTROBAN   1 application, Topical, 2 Times Daily, Apply intranasally.       potassium chloride 20 MEQ CR tablet  Commonly known as:  K-DUR,KLOR-CON   20 mEq, Oral, 2 Times Daily      raNITIdine 150 MG tablet  Commonly known as:  ZANTAC   150 mg, Oral, Daily PRN         Stop These Medications    aspirin 81 MG  chewable tablet     hydrochlorothiazide 25 MG tablet  Commonly known as:  HYDRODIURIL          Discharge Diet:   Diet Instructions     Diet: Regular, Cardiac      Discharge Diet:   Regular  Cardiac           Activity at Discharge:   Activity Instructions     Up WIth Assist      Toe touch weight bearing    Additional Activity Instructions:      Toe touch weight bearing for 6 weeks.  Use walker when out of bed.                  Discharge Care Plan/Instructions:   1.  Follow up with Ortho in 2 weeks     Follow-up Appointments:   Future Appointments   Date Time Provider Department Center   7/30/2019  9:45 AM Warren Gan MD [facility] None     Test Results Pending at Discharge: None    GENEVIEVE Mckeon  07/13/19  12:20 PM    Time: 35 minutes     I personally evaluated and examined the patient in conjunction with GENEVIEVE Dumont and agree with the assessment, treatment plan, and disposition of the patient as recorded by her. My history, exam, and further recommendations are: I have reviewed and agree with the plans. Ozzy Haynes MD  07/13/19  3:37 PM

## 2019-07-13 NOTE — PLAN OF CARE
Problem: Fall Risk (Adult)  Goal: Absence of Fall  Outcome: Ongoing (interventions implemented as appropriate)      Problem: Fracture Orthopaedic (Adult)  Goal: Signs and Symptoms of Listed Potential Problems Will be Absent, Minimized or Managed (Fracture Orthopaedic)  Outcome: Ongoing (interventions implemented as appropriate)      Problem: Patient Care Overview  Goal: Plan of Care Review  Outcome: Ongoing (interventions implemented as appropriate)   07/13/19 0522   Coping/Psychosocial   Plan of Care Reviewed With patient   Plan of Care Review   Progress improving   OTHER   Outcome Summary Medicated pt for c/o R hip pain with prn pain med, relief noted. Incision HERBIE, C/D/I. VSS. PPP Up x 1 assist,use of walker. Patient wants a walker for home before she is discharge from hospital. Safety maintained.      Goal: Individualization and Mutuality  Outcome: Ongoing (interventions implemented as appropriate)      Problem: Skin Injury Risk (Adult)  Goal: Skin Health and Integrity  Outcome: Ongoing (interventions implemented as appropriate)

## 2019-07-13 NOTE — THERAPY DISCHARGE NOTE
Acute Care - Occupational Therapy Discharge Summary  [facility]     Patient Name: Adelita Jaeger  : 1970  MRN: [id number]    Today's Date: 2019  Onset of Illness/Injury or Date of Surgery: 19    Date of Referral to OT: 07/10/19  Referring Physician: Aramis Navarro MD      Admit Date: 2019        OT Recommendation and Plan    Visit Dx:    ICD-10-CM ICD-9-CM   1. Closed fracture of right hip, initial encounter (CMS/[facility]) S72.001A 820.8   2. Hypokalemia E87.6 276.8   3. Decreased activities of daily living (ADL) R68.89 780.99   4. Impaired functional mobility, balance, gait, and endurance Z74.09 V49.89         Time Calculation- OT     Row Name 19 1308             Timed Charges    51557 - Gait Training Minutes   25  -AF        User Key  (r) = Recorded By, (t) = Taken By, (c) = Cosigned By    Initials Name Provider Type    AF Mercer, Sarahi, PTA Physical Therapy Assistant            Rehab Goal Summary     Row Name 19 1600             Occupational Therapy Goals    Transfer Goal Selection (OT)  transfer, OT goal 1  -TR      Bathing Goal Selection (OT)  bathing, OT goal 1  -TR      Dressing Goal Selection (OT)  dressing, OT goal 1  -TR         Transfer Goal 1 (OT)    Activity/Assistive Device (Transfer Goal 1, OT)  tub;tub bench  -TR      [place] Level/Cues Needed (Transfer Goal 1, OT)  conditional independence  -TR      Time Frame (Transfer Goal 1, OT)  long term goal (LTG);10 days  -TR      Progress/Outcome (Transfer Goal 1, OT)  goal not met  -TR         Bathing Goal 1 (OT)    Activity/Assistive Device (Bathing Goal 1, OT)  bathing skills, all;tub bench  -TR      [place] Level/Cues Needed (Bathing Goal 1, OT)  conditional independence  -TR      Time Frame (Bathing Goal 1, OT)  long term goal (LTG);10 days  -TR      Progress/Outcomes (Bathing Goal 1, OT)  goal not met  -TR         Dressing Goal 1 (OT)    Activity/Assistive Device (Dressing Goal 1, OT)  lower body dressing   -TR      [place]/Cues Needed (Dressing Goal 1, OT)  conditional independence  -TR      Time Frame (Dressing Goal 1, OT)  long term goal (LTG);10 days  -TR      Progress/Outcome (Dressing Goal 1, OT)  goal not met  -TR         Patient Education Goal (OT)    Activity (Patient Education Goal, OT)  home safety, DME recs  -TR      [place]/Cues/Accuracy (Memory Goal 2, OT)  demonstrates adequately;independent;verbalizes understanding  -TR      Time Frame (Patient Education Goal, OT)  long term goal (LTG);10 days  -TR      Progress/Outcome (Patient Education Goal, OT)  goal not met  -TR        User Key  (r) = Recorded By, (t) = Taken By, (c) = Cosigned By    Initials Name Provider Type Discipline    TR Gladis Bowling, OTR/L Occupational Therapist OT          Outcome Measures     Row Name 07/13/19 1140 07/12/19 1300 07/12/19 0947       How much help from another person do you currently need...    Turning from your back to your side while in flat bed without using bedrails?  4  -AF  4  -MS (r) AL (t) MS (c)  --    Moving from lying on back to sitting on the side of a flat bed without bedrails?  4  -AF  4  -MS (r) AL (t) MS (c)  --    Moving to and from a bed to a chair (including a wheelchair)?  4  -AF  3  -MS (r) AL (t) MS (c)  --    Standing up from a chair using your arms (e.g., wheelchair, bedside chair)?  4  -AF  3  -MS (r) AL (t) MS (c)  --    Climbing 3-5 steps with a railing?  3  -AF  3  -MS (r) AL (t) MS (c)  --    To walk in hospital room?  4  -AF  3  -MS (r) AL (t) MS (c)  --    AM-PAC 6 Clicks Score (PT)  23  -AF  20  -MS (r) AL (t)  --       How much help from another is currently needed...    Putting on and taking off regular lower body clothing?  --  --  3  -AC    Bathing (including washing, rinsing, and drying)  --  --  4  -AC    Toileting (which includes using toilet bed pan or urinal)  --  --  3  -AC    Putting on and taking off regular upper body clothing  --  --  4  -AC    Taking care of  personal grooming (such as brushing teeth)  --  --  4  -AC    Eating meals  --  --  4  -AC    AM-PAC 6 Clicks Score (OT)  --  --  22  -AC       Functional Assessment    Outcome Measure Options  AM-PAC 6 Clicks Basic Mobility (PT)  -AF  AM-[facility] 6 Clicks Basic Mobility (PT)  -MS (r) AL (t) MS (c)  --    Row Name 07/12/19 0800 07/11/19 0900 07/11/19 0815       How much help from another person do you currently need...    Turning from your back to your side while in flat bed without using bedrails?  4  -MS (r) AL (t) MS (c)  4  -MS (r) AL (t) MS (c)  --    Moving from lying on back to sitting on the side of a flat bed without bedrails?  4  -MS (r) AL (t) MS (c)  4  -MS (r) AL (t) MS (c)  --    Moving to and from a bed to a chair (including a wheelchair)?  3  -MS (r) AL (t) MS (c)  3  -MS (r) AL (t) MS (c)  --    Standing up from a chair using your arms (e.g., wheelchair, bedside chair)?  3  -MS (r) AL (t) MS (c)  3  -MS (r) AL (t) MS (c)  --    Climbing 3-5 steps with a railing?  2  -MS (r) AL (t) MS (c)  2  -MS (r) AL (t) MS (c)  --    To walk in hospital room?  3  -MS (r) AL (t) MS (c)  3  -MS (r) AL (t) MS (c)  --    AM-PAC 6 Clicks Score (PT)  19  -MS (r) AL (t)  19  -MS (r) AL (t)  --       How much help from another is currently needed...    Putting on and taking off regular lower body clothing?  --  --  3  -AC    Bathing (including washing, rinsing, and drying)  --  --  3  -AC    Toileting (which includes using toilet bed pan or urinal)  --  --  3  -AC    Putting on and taking off regular upper body clothing  --  --  4  -AC    Taking care of personal grooming (such as brushing teeth)  --  --  4  -AC    Eating meals  --  --  4  -AC    AM-PAC 6 Clicks Score (OT)  --  --  21  -AC       Functional Assessment    Outcome Measure Options  AM-PAC 6 Clicks Basic Mobility (PT)  -MS (r) AL (t) MS (c)  AM-PAC 6 Clicks Basic Mobility (PT)  -MS (r) AL (t) MS (c)  AM-PAC 6 Clicks Daily Activity (OT)  -AC      User Key  (r) =  Recorded By, (t) = Taken By, (c) = Cosigned By    Initials Name Provider Type    Bill Barkley, OTR/L, CNT Occupational Therapist    Tabitha Faust, PT, DPT, NCS Physical Therapist    Sarahi Smart, PTA Physical Therapy Assistant    TALHA Young, Kalpesh, PT Student PT Student          Therapy Suggested Charges     Code   Minutes Charges    None                 OT Discharge Summary  Anticipated Discharge Disposition (OT): home  Reason for Discharge: Discharge from facility  Outcomes Achieved: Refer to plan of care for updates on goals achieved  Discharge Destination: Home      Gladis Bowling, OTR/L  7/13/2019

## 2019-07-13 NOTE — PROGRESS NOTES
Continued Stay Note  [facility]     Patient Name: Adelita Jaeger  MRN: [id number]  Today's Date: 7/13/2019    Admit Date: 7/9/2019    Discharge Plan     Row Name 07/13/19 0919       Plan    Final Discharge Disposition Code  06 - home with home health care    Row Name 07/13/19 0916       Plan    Final Note  Pt is being discharged home today with  and walker orders. Pt has chosen [facility] and [facility] to deliver walker. [facility] states that they will deliver walker this AM. PATTIE spoke with Pawel from [facility] in regards to referral and she is aware. PATTIE has faxed appropriate information to both providers. SW will follow.          Discharge Codes    No documentation.             Kaley Giles

## 2019-07-14 ENCOUNTER — READMISSION MANAGEMENT (OUTPATIENT)
Dept: CALL CENTER | Facility: HOSPITAL | Age: 49
End: 2019-07-14

## 2019-07-14 NOTE — OUTREACH NOTE
Prep Survey      Responses   Facility patient discharged from?  [place]   Is patient eligible?  Yes   Discharge diagnosis  Closed fracture of right hip, fall , hyponatremia, tobacco abuse, GERD, Hypokalemia, s/p right hip cannulated screw placement   Does the patient have one of the following disease processes/diagnoses(primary or secondary)?  General Surgery   Does the patient have Home health ordered?  Yes   What is the Home health agency?   [facility]    Is there a DME ordered?  Yes   What DME was ordered?  [facility] for walker   Comments regarding appointments  Pt to schedule follow up appointments   Prep survey completed?  Yes          Ivette Park RN

## 2019-07-15 ENCOUNTER — READMISSION MANAGEMENT (OUTPATIENT)
Dept: CALL CENTER | Facility: HOSPITAL | Age: 49
End: 2019-07-15

## 2019-07-15 NOTE — THERAPY DISCHARGE NOTE
Acute Care - Physical Therapy Discharge Summary  [facility]       Patient Name: Adelita Jaeger  : 1970  MRN: [id number]    Today's Date: 7/15/2019  Onset of Illness/Injury or Date of Surgery: 19    Date of Referral to PT: 07/10/19  Referring Physician: Aramis Navarro MD      Admit Date: 2019      PT Recommendation and Plan    Visit Dx:    ICD-10-CM ICD-9-CM   1. Closed fracture of right hip, initial encounter (CMS/[facility]) S72.001A 820.8   2. Hypokalemia E87.6 276.8   3. Decreased activities of daily living (ADL) R68.89 780.99   4. Impaired functional mobility, balance, gait, and endurance Z74.09 V49.89       Outcome Measures     Row Name 19 1140             How much help from another person do you currently need...    Turning from your back to your side while in flat bed without using bedrails?  4  -AF      Moving from lying on back to sitting on the side of a flat bed without bedrails?  4  -AF      Moving to and from a bed to a chair (including a wheelchair)?  4  -AF      Standing up from a chair using your arms (e.g., wheelchair, bedside chair)?  4  -AF      Climbing 3-5 steps with a railing?  3  -AF      To walk in hospital room?  4  -AF      AM-PAC 6 Clicks Score (PT)  23  -AF         Functional Assessment    Outcome Measure Options  AM-PAC 6 Clicks Basic Mobility (PT)  -AF        User Key  (r) = Recorded By, (t) = Taken By, (c) = Cosigned By    Initials Name Provider Type    AF Cord, Sarahi, PTA Physical Therapy Assistant              Rehab Goal Summary     Row Name 07/15/19 1601             Physical Therapy Goals    Transfer Goal Selection (PT)  transfer, PT goal 1  -KJ      Gait Training Goal Selection (PT)  gait training, PT goal 1  -KJ      Stairs Goal Selection (PT)  stairs, PT goal 1  -KJ         Transfer Goal 1 (PT)    Activity/Assistive Device (Transfer Goal 1, PT)  sit-to-stand/stand-to-sit;bed-to-chair/chair-to-bed;walker, rolling  -KJ      [place] Level/Cues Needed  (Transfer Goal 1, PT)  conditional independence  -KJ      Time Frame (Transfer Goal 1, PT)  long term goal (LTG);by discharge  -KJ      Progress/Outcome (Transfer Goal 1, PT)  goal not met  -KJ         Gait Training Goal 1 (PT)    Activity/Assistive Device (Gait Training Goal 1, PT)  gait (walking locomotion);assistive device use;backward stepping;decrease fall risk;forward stepping;improve balance and speed;increase endurance/gait distance;maintain weight bearing status;turning, right;turning, left;walker, rolling  -KJ      [place] Level (Gait Training Goal 1, PT)  supervision required  -KJ      Distance (Gait Goal 1, PT)  Pt will ambulated 50ft on even surfaces   -KJ      Time Frame (Gait Training Goal 1, PT)  long term goal (LTG);by discharge  -KJ      Progress/Outcome (Gait Training Goal 1, PT)  goal not met  -KJ         Stairs Goal 1 (PT)    Activity/Assistive Device (Stairs Goal 1, PT)  stairs, all skills;ascending stairs;descending stairs;using handrail, right;using handrail, left;step-to-step;decrease fall risk;maintain weight bearing status  -KJ      [place] Level/Cues Needed (Stairs Goal 1, PT)  minimum assist (75% or more patient effort)  -KJ      Number of Stairs (Stairs Goal 1, PT)  4  -KJ      Time Frame (Stairs Goal 1, PT)  long term goal (LTG);by discharge  -KJ      Progress/Outcome (Stairs Goal 1, PT)  goal met  -KJ        User Key  (r) = Recorded By, (t) = Taken By, (c) = Cosigned By    Initials Name Provider Type Discipline    Reyna Hercules PTA Physical Therapy Assistant PT              PT Discharge Summary  Anticipated Discharge Disposition (PT): home  Reason for Discharge: Discharge from facility  Outcomes Achieved: Refer to plan of care for updates on goals achieved  Discharge Destination: Home      Reyna Avelar PTA   7/15/2019

## 2019-07-15 NOTE — OUTREACH NOTE
General Surgery Week 1 Survey      Responses   Facility patient discharged from?  [facility]   Does the patient have one of the following disease processes/diagnoses(primary or secondary)?  General Surgery   Is there a successful TCM telephone encounter documented?  No   Week 1 attempt successful?  No   Unsuccessful attempts  Attempt 1          Ifeoma Fisher RN

## 2019-07-17 ENCOUNTER — READMISSION MANAGEMENT (OUTPATIENT)
Dept: CALL CENTER | Facility: HOSPITAL | Age: 49
End: 2019-07-17

## 2019-07-17 NOTE — OUTREACH NOTE
General Surgery Week 1 Survey      Responses   Facility patient discharged from?  [facility]   Does the patient have one of the following disease processes/diagnoses(primary or secondary)?  General Surgery   Is there a successful TCM telephone encounter documented?  No   Week 1 attempt successful?  No   Unsuccessful attempts  Attempt 2          Althea Keller RN

## 2019-07-21 ENCOUNTER — READMISSION MANAGEMENT (OUTPATIENT)
Dept: CALL CENTER | Facility: HOSPITAL | Age: 49
End: 2019-07-21

## 2019-07-21 NOTE — OUTREACH NOTE
General Surgery Week 2 Survey      Responses   Facility patient discharged from?  [facility]   Does the patient have one of the following disease processes/diagnoses(primary or secondary)?  General Surgery   Week 2 attempt successful?  No   Unsuccessful attempts  Attempt 1          Lora Edmondson RN

## 2019-07-23 ENCOUNTER — READMISSION MANAGEMENT (OUTPATIENT)
Dept: CALL CENTER | Facility: HOSPITAL | Age: 49
End: 2019-07-23

## 2019-07-23 NOTE — OUTREACH NOTE
General Surgery Week 2 Survey      Responses   Facility patient discharged from?  [facility]   Does the patient have one of the following disease processes/diagnoses(primary or secondary)?  General Surgery   Week 2 attempt successful?  Yes   Call start time  1006   Call end time  1010   Is patient permission given to speak with other caregiver?  Yes   List who call center can speak with  SO   Meds reviewed with patient/caregiver?  Yes   Is the patient having any side effects they believe may be caused by any medication additions or changes?  No   Does the patient have all medications related to this admission filled (includes all antibiotics, pain medications, etc.)  Yes   Is the patient taking all medications as directed (includes completed medication regime)?  Yes   Does the patient have a follow up appointment scheduled with their surgeon?  Yes   Has the patient kept scheduled appointments due by today?  N/A   What is the Home health agency?   [facility]  decided the pt didn't need  services, so she is doing therapy on own.    Has home health visited the patient within 72 hours of discharge?  Yes   DME comments  using walker.    Psychosocial issues?  No   Comments  using walker for ambulation appetite is slowly returning. Slept better  last night.   What is the patient's perception of their health status since discharge?  Improving   Nursing interventions  Nurse provided patient education   Is the patient /caregiver able to teach back basic post-op care?  Practice 'cough and deep breath', Drive as instructed by MD in discharge instructions, No tub bath, swimming, or hot tub until instructed by MD   Is the patient/caregiver able to teach back signs and symptoms of incisional infection?  Pus or odor from incision, Fever, Increased redness, swelling or pain at the incisonal site   Is the patient/caregiver able to teach back steps to recovery at home?  Rest and rebuild strength, gradually increase activity, Eat a  well-balance diet, Practice good oral hygiene   Is the patient/caregiver able to teach back the hierarchy of who to call/visit for symptoms/problems? PCP, Specialist, Home health nurse, Urgent Care, ED, 911  Yes   Week 2 call completed?  Yes          Jodi Hinojosa, RN

## 2019-07-29 NOTE — PROGRESS NOTES
YOB: 1970  Location: [facility]  Location Address: [address] [place] [address], [facility], [address] [place] [address]  Location Phone: [phone]    Chief Complaint   Patient presents with   • Follow-up     nasal lesion       History of Present Illness  Adelita Jaeger is a 49 y.o. female.  Adelita Jaeger is here for follow up of ENT complaints. The patient has had problems with left nasal lesion  The symptoms have been present for the last several months The symptoms are aggravated by  no identifiable factors. The symptoms are improved by no identifiable factors. Patient states she has occasional epistaxis which has been improved with the use of Bactroban. Patient also has nasal congestion, clear nasal drainage and postnasal drainage. She denies sinus pressure, fever, headache and nasal pain.   She is a lot better     Past Medical History:   Diagnosis Date   • COPD (chronic obstructive pulmonary disease) (CMS/HCC)    • GERD (gastroesophageal reflux disease)    • Hyperlipidemia    • Hypertension    • Potassium (K) deficiency        Past Surgical History:   Procedure Laterality Date   • AUGMENTATION MAMMAPLASTY     • CARPAL TUNNEL RELEASE     •  SECTION     • CHOLECYSTECTOMY     • DILATATION AND CURETTAGE     • ENDOMETRIAL BIOPSY     • HERNIA REPAIR     • HIP CANNULATED SCREW PLACEMENT Right 7/10/2019    Procedure: RIGHT HIP CANNULATED SCREW PLACEMENT;  Surgeon: Aramis Navarro MD;  Location: [facility];  Service: Orthopedics   • OTHER SURGICAL HISTORY      TIF procedure.        Outpatient Medications Marked as Taking for the 19 encounter (Office Visit) with Warren Gan MD   Medication Sig Dispense Refill   • busPIRone (BUSPAR) 5 MG tablet Take 5 mg by mouth 3 (Three) Times a Day As Needed (anxiety).  1   • Calcium Carb-Cholecalciferol (CALCIUM 600+D3) 600-200 MG-UNIT tablet Take 1 tablet by mouth Every 12 (Twelve) Hours.     • citalopram (CeleXA) 20 MG tablet Take 20 mg by mouth Daily.      • docusate sodium (COLACE) 100 MG capsule Take 1 capsule by mouth 2 (Two) Times a Day. 60 capsule 1   • Flaxseed, Linseed, (FLAXSEED OIL PO) Take 1,000 mg by mouth 2 (Two) Times a Day.     • Magnesium 500 MG tablet Take 1 tablet by mouth Every 12 (Twelve) Hours.     • Multiple Vitamins-Minerals (MULTIVITAMIN ADULT PO) Take 1 tablet by mouth Daily.     • mupirocin (BACTROBAN) 2 % ointment Apply 1 application topically to the appropriate area as directed 2 (Two) Times a Day. Apply intranasally.     • ondansetron (ZOFRAN) 4 MG tablet Take 1 tablet by mouth Every 8 (Eight) Hours As Needed for Nausea or Vomiting. 20 tablet 0   • oxyCODONE-acetaminophen (PERCOCET)  MG per tablet Take 1 tablet by mouth Every 6 (Six) Hours As Needed for Moderate Pain .     • potassium chloride (K-DUR,KLOR-CON) 20 MEQ CR tablet Take 20 mEq by mouth 2 (Two) Times a Day.     • raNITIdine (ZANTAC) 150 MG tablet Take 150 mg by mouth Daily As Needed for Heartburn or Indigestion.     • rivaroxaban (XARELTO) 10 MG tablet Take 1 tablet by mouth Daily for 21 days. 21 tablet 0       Penicillins    Family History   Problem Relation Age of Onset   • Breast cancer Maternal Aunt    • Breast cancer Maternal Aunt    • Breast cancer Maternal Aunt    • Breast cancer Cousin    • Hypertension Father    • Diabetes Mother    • Hypertension Mother    • Hypothyroidism Mother    • Ovarian cancer Neg Hx    • Uterine cancer Neg Hx    • Colon cancer Neg Hx        Social History     Socioeconomic History   • Marital status: Single     Spouse name: Not on file   • Number of children: Not on file   • Years of education: Not on file   • Highest education level: Not on file   Tobacco Use   • Smoking status: Current Every Day Smoker     Packs/day: 0.50     Types: Cigarettes   • Smokeless tobacco: Never Used   Substance and Sexual Activity   • Alcohol use: Yes     Comment: occasional   • Drug use: No   • Sexual activity: Yes     Partners: Male     Birth  control/protection: Post-menopausal     Comment: 12 years       Review of Systems   Constitutional: Negative.    HENT: Positive for congestion, postnasal drip and rhinorrhea.         Epistaxis, left nostril lesion   Eyes: Negative.    Respiratory: Negative.    Cardiovascular: Negative.    Gastrointestinal: Negative.    Endocrine: Negative.    Genitourinary: Negative.    Musculoskeletal: Negative.    Skin: Negative.    Allergic/Immunologic: Negative.    Neurological: Negative.    Hematological: Negative.    Psychiatric/Behavioral: Negative.        Vitals:    07/30/19 0958   Temp: 98.2 °F (36.8 °C)       Body mass index is 18.99 kg/m².    Objective     Physical Exam  CONSTITUTIONAL: Thin, alert, oriented, in no acute distress     COMMUNICATION AND VOICE: able to communicate normally, normal voice quality    HEAD: normocephalic, no lesions, atraumatic, no tenderness, no masses     FACE: appearance normal, no lesions, no tenderness, no deformities, facial motion symmetric    SALIVARY GLANDS: parotid glands with no tenderness, no swelling, no masses, submandibular glands with normal size, nontender    EYES: ocular motility normal, eyelids normal, orbits normal, no proptosis, conjunctiva normal , pupils equal, round     EARS:  Hearing: response to conversational voice normal bilaterally   External Ears: auricles without lesions  Otoscopic: tympanic membrane appearance normal, no lesions, no perforation, normal mobility, no fluid    NOSE:  External Nose: structure normal, no tenderness on palpation, no nasal discharge, no lesions, no evidence of trauma, nostrils patent   Intranasal Exam: nasal mucosa normal, vestibule within normal limits, inferior turbinate normal, nasal septum midline   Nasopharynx:   Rigid nasal endoscopy demonstrated a less than 1 mm area of granulation the left superior septum without any evidence of recent active bleeding.  Remainder of both nasal cavities were normal in appearance without lesion,  polyposis or other abnormalities.    ORAL:  Lips: upper and lower lips without lesion   Teeth: dentition within normal limits for age   Gums: gingivae healthy   Oral Mucosa: oral mucosa normal, no mucosal lesions   Floor of Mouth: Warthin’s duct patent, mucosa normal  Tongue: lingual mucosa normal without lesions, normal tongue mobility   Palate: soft and hard palates with normal mucosa and structure  Oropharynx: oropharyngeal mucosa normal    HYPOPHARYNX:   LARYNX:    NECK: neck appearance normal, no mass,  noted without erythema or tenderness    THYROID: no overt thyromegaly, no tenderness, nodules or mass present on palpation, position midline     LYMPH NODES: no lymphadenopathy    CHEST/RESPIRATORY: respiratory effort normal,     CARDIOVASCULAR:extremities without cyanosis or edema      NEUROLOGIC/PSYCHIATRIC: oriented to time, place and person, mood normal, affect appropriate, CN II-XII intact grossly    Assessment/Plan   Adelita was seen today for follow-up.    Diagnoses and all orders for this visit:    Anterior epistaxis    Tobacco abuse      * Surgery not found *  No orders of the defined types were placed in this encounter.    Return in about 3 months (around 10/30/2019).       Patient Instructions         IF YOU SMOKE OR USE TOBACCO PLEASE READ THE FOLLOWING:  Why is smoking bad for me?  Smoking increases the risk of heart disease, lung disease, vascular disease, stroke, and cancer. If you smoke, STOP!    If you would like more information on quitting smoking, please visit the [employer] website: [email or web address]   This link will provide additional resources including the QUIT line and the Beat the Pack support groups.     For more information:  Quit Now [place]  1-800-QUIT-NOW  https://[place].quitlogix.org/en-[place]/   Continue mupirocin daily  Call or return for problems  Follow-up in 2 to 3 months and consider silver nitrate cauterization for failure of  granulation to resolve

## 2019-07-30 ENCOUNTER — OFFICE VISIT (OUTPATIENT)
Dept: OTOLARYNGOLOGY | Facility: CLINIC | Age: 49
End: 2019-07-30

## 2019-07-30 ENCOUNTER — READMISSION MANAGEMENT (OUTPATIENT)
Dept: CALL CENTER | Facility: HOSPITAL | Age: 49
End: 2019-07-30

## 2019-07-30 VITALS — HEIGHT: 59 IN | TEMPERATURE: 98.2 F | WEIGHT: 94 LBS | BODY MASS INDEX: 18.95 KG/M2

## 2019-07-30 DIAGNOSIS — R04.0 ANTERIOR EPISTAXIS: Primary | ICD-10-CM

## 2019-07-30 DIAGNOSIS — Z72.0 TOBACCO ABUSE: ICD-10-CM

## 2019-07-30 PROCEDURE — 99213 OFFICE O/P EST LOW 20 MIN: CPT | Performed by: OTOLARYNGOLOGY

## 2019-07-30 RX ORDER — OXYCODONE AND ACETAMINOPHEN 10; 325 MG/1; MG/1
1 TABLET ORAL EVERY 6 HOURS PRN
COMMUNITY
End: 2020-06-09

## 2019-07-30 NOTE — OUTREACH NOTE
General Surgery Week 3 Survey      Responses   Facility patient discharged from?  [facility]   Does the patient have one of the following disease processes/diagnoses(primary or secondary)?  General Surgery   Week 3 attempt successful?  Yes   Call start time  1746   Call end time  1749   Discharge diagnosis  Closed fracture of right hip, fall , hyponatremia, tobacco abuse, GERD, Hypokalemia, s/p right hip cannulated screw placement   Meds reviewed with patient/caregiver?  Yes   Is the patient having any side effects they believe may be caused by any medication additions or changes?  No   Does the patient have all medications related to this admission filled (includes all antibiotics, pain medications, etc.)  Yes   Is the patient taking all medications as directed (includes completed medication regime)?  Yes   Does the patient have a follow up appointment scheduled with their surgeon?  Yes   Has the patient kept scheduled appointments due by today?  N/A   Psychosocial issues?  No   Did the patient receive a copy of their discharge instructions?  Yes   Nursing interventions  Reviewed instructions with patient   What is the patient's perception of their health status since discharge?  Improving   Nursing interventions  Nurse provided patient education   Is the patient /caregiver able to teach back basic post-op care?  Practice 'cough and deep breath', Drive as instructed by MD in discharge instructions, No tub bath, swimming, or hot tub until instructed by MD, Continue use of incentive spirometry at least 1 week post discharge   Is the patient/caregiver able to teach back signs and symptoms of incisional infection?  Increased redness, swelling or pain at the incisonal site, Increased drainage or bleeding, Incisional warmth, Pus or odor from incision, Fever   Is the patient/caregiver able to teach back steps to recovery at home?  Set small, achievable goals for return to baseline health, Rest and rebuild strength, gradually  increase activity, Weigh daily, Eat a well-balance diet, Make a list of questions for surgeon's appointment   Is the patient/caregiver able to teach back the hierarchy of who to call/visit for symptoms/problems? PCP, Specialist, Home health nurse, Urgent Care, ED, 911  Yes   Additional teach back comments  She is healed and doing well.  No issues at this time.   Week 3 call completed?  Yes   Revoked  No further contact(revokes)-requires comment          Gris Romo RN

## 2019-07-30 NOTE — PATIENT INSTRUCTIONS
IF YOU SMOKE OR USE TOBACCO PLEASE READ THE FOLLOWING:  Why is smoking bad for me?  Smoking increases the risk of heart disease, lung disease, vascular disease, stroke, and cancer. If you smoke, STOP!    If you would like more information on quitting smoking, please visit the [employer] website: [email or web address]   This link will provide additional resources including the QUIT line and the Beat the Pack support groups.     For more information:  Quit Now [place]  1-800-QUIT-NOW  https://[place].quitlogix.org/en-[place]/   Continue mupirocin daily  Call or return for problems  Follow-up in 2 to 3 months and consider silver nitrate cauterization for failure of granulation to resolve

## 2019-10-30 NOTE — PROGRESS NOTES
YOB: 1970  Location: [facility]  Location Address: [address] [place] [address], [facility], [address] [place] [address]  Location Phone: [phone]    Chief Complaint   Patient presents with   • Follow-up       History of Present Illness  Adelita Jaeger is a 49 y.o. female.  Adelita Jaeger is here for follow up of ENT complaints. The patient has had problems with epistaxis.  The symptoms are localized to the left side. The patient has had mild and variable symptoms. The symptoms have been present for the last several months. The symptoms are aggravated by  nasal dryness. The symptoms are improved by using bactroban in the nose.       Past Medical History:   Diagnosis Date   • COPD (chronic obstructive pulmonary disease) (CMS/HCC)    • GERD (gastroesophageal reflux disease)    • Hyperlipidemia    • Hypertension    • Potassium (K) deficiency        Past Surgical History:   Procedure Laterality Date   • AUGMENTATION MAMMAPLASTY     • CARPAL TUNNEL RELEASE     •  SECTION     • CHOLECYSTECTOMY     • DILATATION AND CURETTAGE     • ENDOMETRIAL BIOPSY     • HERNIA REPAIR     • HIP CANNULATED SCREW PLACEMENT Right 7/10/2019    Procedure: RIGHT HIP CANNULATED SCREW PLACEMENT;  Surgeon: Aramis Navarro MD;  Location: [facility];  Service: Orthopedics   • OTHER SURGICAL HISTORY      TIF procedure.        Outpatient Medications Marked as Taking for the 10/31/19 encounter (Office Visit) with Warren Gan MD   Medication Sig Dispense Refill   • aspirin 81 MG EC tablet Take 81 mg by mouth Daily.     • busPIRone (BUSPAR) 5 MG tablet Take 5 mg by mouth 3 (Three) Times a Day As Needed (anxiety).  1   • Calcium Carb-Cholecalciferol (CALCIUM 600+D3) 600-200 MG-UNIT tablet Take 1 tablet by mouth Every 12 (Twelve) Hours.     • citalopram (CeleXA) 20 MG tablet Take 20 mg by mouth Daily.     • Flaxseed, Linseed, (FLAXSEED OIL PO) Take 1,000 mg by mouth 2 (Two) Times a Day.     • hydroCHLOROthiazide (HYDRODIURIL) 25 MG  tablet TAKE 1 2 (ONE HALF) TABLET BY MOUTH ONCE DAILY  3   • Magnesium 500 MG tablet Take 1 tablet by mouth Every 12 (Twelve) Hours.     • Multiple Vitamins-Minerals (MULTIVITAMIN ADULT PO) Take 1 tablet by mouth Daily.     • mupirocin (BACTROBAN) 2 % ointment Apply 1 application topically to the appropriate area as directed 2 (Two) Times a Day. Apply intranasally.     • oxyCODONE-acetaminophen (PERCOCET)  MG per tablet Take 1 tablet by mouth Every 6 (Six) Hours As Needed for Moderate Pain .     • potassium chloride (K-DUR,KLOR-CON) 20 MEQ CR tablet Take 20 mEq by mouth 2 (Two) Times a Day.     • raNITIdine (ZANTAC) 150 MG tablet Take 150 mg by mouth Daily As Needed for Heartburn or Indigestion.         Penicillins    Family History   Problem Relation Age of Onset   • Breast cancer Maternal Aunt    • Breast cancer Maternal Aunt    • Breast cancer Maternal Aunt    • Breast cancer Cousin    • Hypertension Father    • Diabetes Mother    • Hypertension Mother    • Hypothyroidism Mother    • Ovarian cancer Neg Hx    • Uterine cancer Neg Hx    • Colon cancer Neg Hx        Social History     Socioeconomic History   • Marital status: Single     Spouse name: Not on file   • Number of children: Not on file   • Years of education: Not on file   • Highest education level: Not on file   Tobacco Use   • Smoking status: Current Every Day Smoker     Packs/day: 0.50     Types: Cigarettes   • Smokeless tobacco: Never Used   Substance and Sexual Activity   • Alcohol use: Yes     Comment: rarely   • Drug use: No   • Sexual activity: Yes     Partners: Male     Birth control/protection: Post-menopausal     Comment: 12 years       Review of Systems   Constitutional: Negative for activity change, appetite change, chills, diaphoresis, fatigue, fever and unexpected weight change.   HENT: Positive for nosebleeds. Negative for congestion, dental problem, drooling, ear discharge, ear pain, facial swelling, hearing loss, mouth sores,  postnasal drip, rhinorrhea, sinus pressure, sneezing, sore throat, tinnitus, trouble swallowing and voice change.    Eyes: Negative.    Respiratory: Negative.    Cardiovascular: Negative.    Gastrointestinal: Negative.    Endocrine: Negative.    Skin: Negative.    Allergic/Immunologic: Negative for environmental allergies, food allergies and immunocompromised state.   Neurological: Negative.    Hematological: Negative.    Psychiatric/Behavioral: Negative.        Vitals:    10/31/19 0954   BP: 108/67   Pulse: 92   Temp: 98.1 °F (36.7 °C)       Body mass index is 18.54 kg/m².    Objective     Physical Exam  CONSTITUTIONAL: well nourished, alert, oriented, in no acute distress     COMMUNICATION AND VOICE: able to communicate normally, normal voice quality    HEAD: normocephalic, no lesions, atraumatic, no tenderness, no masses     FACE: appearance normal, no lesions, no tenderness, no deformities, facial motion symmetric    EYES: ocular motility normal, eyelids normal, orbits normal, no proptosis, conjunctiva normal , pupils equal, round     EARS:  Hearing: response to conversational voice normal bilaterally   External Ears: auricles without lesions  Otoscopic: tympanic membrane appearance normal, no lesions, no perforation, normal mobility, no fluid    NOSE:  External Nose: structure normal, no tenderness on palpation, no nasal discharge, no lesions, no evidence of trauma, nostrils patent   Intranasal Exam: nasal mucosa normal, vestibule within normal limits, inferior turbinate normal, nasal septum midline with left superior/anterior 1 mm pyogenic granuloma     ORAL:  Lips: upper and lower lips without lesion   Teeth: dentition within normal limits for age   Gums: gingivae healthy   Oral Mucosa: oral mucosa normal, no mucosal lesions   Floor of Mouth: Warthin’s duct patent, mucosa normal  Tongue: lingual mucosa normal without lesions, normal tongue mobility   Palate: soft and hard palates with normal mucosa and  structure  Oropharynx: oropharyngeal mucosa normal    NECK: neck appearance normal    CHEST/RESPIRATORY: respiratory effort normal, normal breath sounds     CARDIOVASCULAR: rate and rhythm normal, extremities without cyanosis or edema      NEUROLOGIC/PSYCHIATRIC: oriented to time, place and person, mood normal, affect appropriate, CN II-XII intact grossly    Assessment/Plan   Adelita was seen today for follow-up.    Diagnoses and all orders for this visit:    Anterior epistaxis    Tobacco abuse      * Surgery not found *  No orders of the defined types were placed in this encounter.    Return if symptoms worsen or fail to improve.       Patient Instructions   If nosebleeds worsen call for appointment, otherwise continue nasal ointment and humidification.    Nosebleed Fact Sheet    Nosebleeds are a common problem that we see in our clinic and can occur in any age group.  They can range from spotty bleeding to episodes of uncontrolled profuse bleeding.  Multiple medical conditions can contribute to nosebleeds and fortunately most of these can be controlled to limit and/or eliminate these bleeding episodes.    Most commonly bleeding occurs in the anterior or front part of the nose on the septum, or center wall of the nose.  This is because this area has several blood vessels vulnerable to both the drying effect of breathing, and to finger trauma of nose picking.  When this area become dry, the lining of the nose in this area can crack and cause the blood vessels underneath to bleed.    The following condition can contribute to and cause nosebleeds:  1. High Blood Pressure - When the blood pressure is high, the increased pressure can cause blood to be more easily pushed through a damaged blood vessel.  If your blood pressure is uncontrolled over 140 systolic, you may need to consult your primary-care physician to help limit your nosebleeds.  2. Low Platelets and Blood Clotting Factors - Certain medical conditions such as  cancer and bleeding disorders can interfere with the body’s ability to form blood clots.  This interferes with the body’s own ability to stop nosebleeds.  3. Medications - Aspirin and aspirin type products such as nonsteroidal anti-inflammatory medications can interfere with body’s ability to form blood clots.  Nonsteroidal anti-inflammatory medications include medicines like ibuprofen (Motrin), naprosyn (Aleve), and Goody’s and BC powder.  Several cold and flu remedies also include aspirin.  If there’s a question, please ask you doctor or pharmacist.  Recently, it has been shown that some herbal medications, such as high doses of Vitamin E, can also interfere with the body’s ability to form clots.  Often times, these types of medications need to be discontinued to help with nosebleed prevention.  4. Dryness - The nose needs to stay nice and moist to try to prevent any kind of cracking or injury to the nasal lining and underlying blood vessels.  The worst time of year for nosebleeds is in the wintertime when the humidity is low.  Occasionally, a nasal deviation can cause abnormal airflow that dries out an area on the septum and cause a nosebleed.  5. Trauma -One of the most common reasons for nosebleeds is trauma caused by nose picking or external injury.  If an area in your nose is irritated, scratching or picking it can cause it to easily bleed.  Recommendations for Preventing Nosebleeds:  1. Keep well hydrated by drinking at least six to eight glasses of water a day.  2. Increase the moisture of the nose by placing Vaseline in the front of the nose twice a day and irrigating the nose with nasal saline spray often (every 1-2 hrs).  3. Hold on taking aspirin or aspirin type products.  4. If you have high blood pressure, make sure this is well controlled.  5. Avoid nose picking and other forms of nasal trauma.  What to Do if Your Nose Bleeds:  1. Spray Afrin or a similar 12 hr nasal decongestant into the side that  is bleeding.  2. With your thumb and forefinger, grasp the fleshy part of the nose and hold firm pressure.  If the bleeding is on the front part of the nose, it should make it stop.  Hold this pressure for 5 minutes on the clock then release.  If the nose is still bleeding, repeat.  If the nose is still bleeding after trying this 2-3 times, you may need to go to the emergency room for evaluation.  3. Call your doctor or go to the emergency room if you cannot get the bleeding to stop or if you feel dizzy or lightheaded after a large bleed.      For more information:  [employer]uit [employer] [place]  1-800-QUIT-NOW  https://[email or web address].[email or web address]/en-[place]/

## 2019-10-31 ENCOUNTER — OFFICE VISIT (OUTPATIENT)
Dept: OTOLARYNGOLOGY | Facility: CLINIC | Age: 49
End: 2019-10-31

## 2019-10-31 VITALS
SYSTOLIC BLOOD PRESSURE: 108 MMHG | BODY MASS INDEX: 18.51 KG/M2 | WEIGHT: 91.8 LBS | HEART RATE: 92 BPM | DIASTOLIC BLOOD PRESSURE: 67 MMHG | TEMPERATURE: 98.1 F | HEIGHT: 59 IN

## 2019-10-31 DIAGNOSIS — Z72.0 TOBACCO ABUSE: ICD-10-CM

## 2019-10-31 DIAGNOSIS — R04.0 ANTERIOR EPISTAXIS: Primary | ICD-10-CM

## 2019-10-31 PROCEDURE — 99213 OFFICE O/P EST LOW 20 MIN: CPT | Performed by: PHYSICIAN ASSISTANT

## 2019-10-31 RX ORDER — HYDROCHLOROTHIAZIDE 25 MG/1
TABLET ORAL
Refills: 3 | COMMUNITY
Start: 2019-08-27 | End: 2021-02-10 | Stop reason: HOSPADM

## 2019-10-31 RX ORDER — ASPIRIN 81 MG/1
81 TABLET ORAL DAILY
COMMUNITY
End: 2021-02-09

## 2019-10-31 NOTE — PATIENT INSTRUCTIONS
If nosebleeds worsen call for appointment, otherwise continue nasal ointment and humidification.    Nosebleed Fact Sheet    Nosebleeds are a common problem that we see in our clinic and can occur in any age group.  They can range from spotty bleeding to episodes of uncontrolled profuse bleeding.  Multiple medical conditions can contribute to nosebleeds and fortunately most of these can be controlled to limit and/or eliminate these bleeding episodes.    Most commonly bleeding occurs in the anterior or front part of the nose on the septum, or center wall of the nose.  This is because this area has several blood vessels vulnerable to both the drying effect of breathing, and to finger trauma of nose picking.  When this area become dry, the lining of the nose in this area can crack and cause the blood vessels underneath to bleed.    The following condition can contribute to and cause nosebleeds:  1. High Blood Pressure - When the blood pressure is high, the increased pressure can cause blood to be more easily pushed through a damaged blood vessel.  If your blood pressure is uncontrolled over 140 systolic, you may need to consult your primary-care physician to help limit your nosebleeds.  2. Low Platelets and Blood Clotting Factors - Certain medical conditions such as cancer and bleeding disorders can interfere with the body’s ability to form blood clots.  This interferes with the body’s own ability to stop nosebleeds.  3. Medications - Aspirin and aspirin type products such as nonsteroidal anti-inflammatory medications can interfere with body’s ability to form blood clots.  Nonsteroidal anti-inflammatory medications include medicines like ibuprofen (Motrin), naprosyn (Aleve), and Goody’s and BC powder.  Several cold and flu remedies also include aspirin.  If there’s a question, please ask you doctor or pharmacist.  Recently, it has been shown that some herbal medications, such as high doses of Vitamin E, can also  interfere with the body’s ability to form clots.  Often times, these types of medications need to be discontinued to help with nosebleed prevention.  4. Dryness - The nose needs to stay nice and moist to try to prevent any kind of cracking or injury to the nasal lining and underlying blood vessels.  The worst time of year for nosebleeds is in the wintertime when the humidity is low.  Occasionally, a nasal deviation can cause abnormal airflow that dries out an area on the septum and cause a nosebleed.  5. Trauma -One of the most common reasons for nosebleeds is trauma caused by nose picking or external injury.  If an area in your nose is irritated, scratching or picking it can cause it to easily bleed.  Recommendations for Preventing Nosebleeds:  1. Keep well hydrated by drinking at least six to eight glasses of water a day.  2. Increase the moisture of the nose by placing Vaseline in the front of the nose twice a day and irrigating the nose with nasal saline spray often (every 1-2 hrs).  3. Hold on taking aspirin or aspirin type products.  4. If you have high blood pressure, make sure this is well controlled.  5. Avoid nose picking and other forms of nasal trauma.  What to Do if Your Nose Bleeds:  1. Spray Afrin or a similar 12 hr nasal decongestant into the side that is bleeding.  2. With your thumb and forefinger, grasp the fleshy part of the nose and hold firm pressure.  If the bleeding is on the front part of the nose, it should make it stop.  Hold this pressure for 5 minutes on the clock then release.  If the nose is still bleeding, repeat.  If the nose is still bleeding after trying this 2-3 times, you may need to go to the emergency room for evaluation.  3. Call your doctor or go to the emergency room if you cannot get the bleeding to stop or if you feel dizzy or lightheaded after a large bleed.      For more information:  [facility]  1-800-QUIT-NOW  https://[email or web address].[email or web address]/en-[place]/

## 2020-03-27 ENCOUNTER — TELEPHONE (OUTPATIENT)
Dept: OBSTETRICS AND GYNECOLOGY | Facility: CLINIC | Age: 50
End: 2020-03-27

## 2020-03-27 DIAGNOSIS — Z12.31 ENCOUNTER FOR SCREENING MAMMOGRAM FOR MALIGNANT NEOPLASM OF BREAST: Primary | ICD-10-CM

## 2020-03-27 NOTE — TELEPHONE ENCOUNTER
Pt calls - has annual appt 6/9/2020.  Requesting mammogram order be placed to be done at [facility] in June.  Order entered.

## 2020-06-09 ENCOUNTER — OFFICE VISIT (OUTPATIENT)
Dept: OBSTETRICS AND GYNECOLOGY | Facility: CLINIC | Age: 50
End: 2020-06-09

## 2020-06-09 ENCOUNTER — HOSPITAL ENCOUNTER (OUTPATIENT)
Dept: MAMMOGRAPHY | Facility: HOSPITAL | Age: 50
Discharge: HOME | End: 2020-06-09
Admitting: NURSE PRACTITIONER

## 2020-06-09 VITALS
HEIGHT: 59 IN | BODY MASS INDEX: 18.55 KG/M2 | WEIGHT: 92 LBS | DIASTOLIC BLOOD PRESSURE: 68 MMHG | SYSTOLIC BLOOD PRESSURE: 102 MMHG

## 2020-06-09 DIAGNOSIS — N95.2 VAGINAL ATROPHY: ICD-10-CM

## 2020-06-09 DIAGNOSIS — Z80.3 FAMILY HISTORY OF BREAST CANCER: ICD-10-CM

## 2020-06-09 DIAGNOSIS — Z12.4 CERVICAL CANCER SCREENING: ICD-10-CM

## 2020-06-09 DIAGNOSIS — Z01.419 WELL WOMAN EXAM WITH ROUTINE GYNECOLOGICAL EXAM: Primary | ICD-10-CM

## 2020-06-09 DIAGNOSIS — M85.80 OSTEOPENIA, UNSPECIFIED LOCATION: ICD-10-CM

## 2020-06-09 PROCEDURE — 87624 HPV HI-RISK TYP POOLED RSLT: CPT | Performed by: NURSE PRACTITIONER

## 2020-06-09 PROCEDURE — 77063 BREAST TOMOSYNTHESIS BI: CPT

## 2020-06-09 PROCEDURE — 77067 SCR MAMMO BI INCL CAD: CPT

## 2020-06-09 PROCEDURE — G0123 SCREEN CERV/VAG THIN LAYER: HCPCS | Performed by: NURSE PRACTITIONER

## 2020-06-09 PROCEDURE — 99396 PREV VISIT EST AGE 40-64: CPT | Performed by: NURSE PRACTITIONER

## 2020-06-09 NOTE — PROGRESS NOTES
Subjective   Adelita Jaeger is a 50 y.o. female  YOB: 1970        Chief Complaint   Patient presents with   • Gynecologic Exam     Patient here for yearly exam. Last exam was [place] 03[place] and was normal.  Patient had mammo done 06/09/20 at [facility] and was normal. Patient has c/o of her legs and feet hurting all the time.        Gynecologic Exam   The patient's pertinent negatives include no pelvic pain. Pertinent negatives include no abdominal pain, back pain, constipation, diarrhea, dysuria, fever, frequency, hematuria, nausea, rash, sore throat, urgency or vomiting.       The following portions of the patient's history were reviewed and updated as appropriate: allergies, current medications, past family history, past medical history, past social history, past surgical history and problem list.    Allergies   Allergen Reactions   • Penicillins Unknown (See Comments)     Childhood reaction       Past Medical History:   Diagnosis Date   • GERD (gastroesophageal reflux disease)    • Hyperlipidemia    • Hypertension    • Potassium (K) deficiency        Family History   Problem Relation Age of Onset   • Breast cancer Maternal Aunt    • Breast cancer Maternal Aunt    • Breast cancer Maternal Aunt    • Breast cancer Cousin    • Hypertension Father    • Diabetes Mother    • Hypertension Mother    • Hypothyroidism Mother    • Ovarian cancer Neg Hx    • Uterine cancer Neg Hx    • Colon cancer Neg Hx        Social History     Socioeconomic History   • Marital status: Single     Spouse name: Not on file   • Number of children: Not on file   • Years of education: Not on file   • Highest education level: Not on file   Tobacco Use   • Smoking status: Current Every Day Smoker     Packs/day: 0.50     Types: Cigarettes   • Smokeless tobacco: Never Used   Substance and Sexual Activity   • Alcohol use: Yes     Comment: rarely   • Drug use: No   • Sexual activity: Yes     Partners: Male     Birth control/protection:  Post-menopausal     Comment: 12 years         Current Outpatient Medications:   •  aspirin 81 MG EC tablet, Take 81 mg by mouth Daily., Disp: , Rfl:   •  busPIRone (BUSPAR) 5 MG tablet, Take 5 mg by mouth 3 (Three) Times a Day As Needed (anxiety)., Disp: , Rfl: 1  •  Calcium Carb-Cholecalciferol (CALCIUM 600+D3) 600-200 MG-UNIT tablet, Take 1 tablet by mouth Every 12 (Twelve) Hours., Disp: , Rfl:   •  citalopram (CeleXA) 20 MG tablet, Take 20 mg by mouth Daily., Disp: , Rfl:   •  Flaxseed, Linseed, (FLAXSEED OIL PO), Take 1,000 mg by mouth 2 (Two) Times a Day., Disp: , Rfl:   •  hydroCHLOROthiazide (HYDRODIURIL) 25 MG tablet, TAKE 1 2 (ONE HALF) TABLET BY MOUTH ONCE DAILY, Disp: , Rfl: 3  •  Magnesium 500 MG tablet, Take 1 tablet by mouth Every 12 (Twelve) Hours., Disp: , Rfl:   •  Multiple Vitamins-Minerals (MULTIVITAMIN ADULT PO), Take 1 tablet by mouth Daily., Disp: , Rfl:   •  potassium chloride (K-DUR,KLOR-CON) 20 MEQ CR tablet, Take 20 mEq by mouth 2 (Two) Times a Day., Disp: , Rfl:     No LMP recorded. Patient is postmenopausal.    Sexual History:         Could not be calculated    Past Surgical History:   Procedure Laterality Date   • AUGMENTATION MAMMAPLASTY     • CARPAL TUNNEL RELEASE     •  SECTION     • CHOLECYSTECTOMY     • DILATATION AND CURETTAGE     • ENDOMETRIAL BIOPSY     • GYNECOLOGIC CRYOSURGERY     • HERNIA REPAIR     • HIP CANNULATED SCREW PLACEMENT Right 7/10/2019    Procedure: RIGHT HIP CANNULATED SCREW PLACEMENT;  Surgeon: Aramis Navarro MD;  Location: [facility];  Service: Orthopedics   • OTHER SURGICAL HISTORY      TIF procedure.        Review of Systems   Constitutional: Negative for activity change, appetite change, fatigue, fever, unexpected weight gain and unexpected weight loss.   HENT: Negative for congestion, ear pain, hearing loss, nosebleeds, rhinorrhea, sore throat, tinnitus and trouble swallowing.    Eyes: Negative for blurred vision, pain, discharge, itching and  visual disturbance.   Respiratory: Negative for apnea, chest tightness, shortness of breath and wheezing.    Cardiovascular: Negative for chest pain and leg swelling.   Gastrointestinal: Negative for abdominal pain, blood in stool, constipation, diarrhea, nausea, vomiting and GERD.   Endocrine: Negative for heat intolerance, polydipsia and polyuria.   Genitourinary: Positive for vaginal pain. Negative for breast lump, decreased libido, difficulty urinating, dyspareunia, dysuria, frequency, genital sores, hematuria, menstrual problem, pelvic pain, urgency and urinary incontinence.   Musculoskeletal: Negative for arthralgias, back pain, joint swelling and myalgias.   Skin: Negative for color change, rash and skin lesions.   Allergic/Immunologic: Negative for environmental allergies, food allergies and immunocompromised state.   Neurological: Negative for dizziness, tremors, seizures, syncope, facial asymmetry, numbness and headache.   Hematological: Negative for adenopathy. Does not bruise/bleed easily.   Psychiatric/Behavioral: Negative for agitation, hallucinations, sleep disturbance, suicidal ideas and depressed mood. The patient is not nervous/anxious.        Objective   Physical Exam   Constitutional: She is oriented to person, place, and time. She appears well-developed and well-nourished. No distress.   HENT:   Head: Normocephalic.   Right Ear: External ear normal.   Left Ear: External ear normal.   Nose: Nose normal.   Mouth/Throat: Oropharynx is clear and moist.   Eyes: Conjunctivae are normal. Right eye exhibits no discharge. Left eye exhibits no discharge. No scleral icterus.   Neck: Normal range of motion. Neck supple. Carotid bruit is not present. No tracheal deviation present. No thyromegaly present.   Cardiovascular: Normal rate, regular rhythm, normal heart sounds and intact distal pulses.   No murmur heard.  Pulmonary/Chest: Effort normal and breath sounds normal. No respiratory distress. She has no  wheezes. Right breast exhibits no inverted nipple, no mass, no nipple discharge, no skin change and no tenderness. Left breast exhibits no inverted nipple, no mass, no nipple discharge, no skin change and no tenderness. No breast swelling, tenderness, discharge or bleeding. Breasts are symmetrical.   Abdominal: Soft. She exhibits no distension and no mass. There is no tenderness. There is no guarding. No hernia. Hernia confirmed negative in the right inguinal area and confirmed negative in the left inguinal area.   Genitourinary: Rectum normal and uterus normal. Rectal exam shows no mass. No breast swelling, tenderness, discharge or bleeding. Pelvic exam was performed with patient supine. There is no rash, tenderness, lesion or injury on the right labia. There is no rash, tenderness, lesion or injury on the left labia. Uterus is not enlarged, not fixed and not tender. Cervix exhibits no motion tenderness, no discharge and no friability. Right adnexum displays no mass, no tenderness and no fullness. Left adnexum displays no mass, no tenderness and no fullness. There is tenderness in the vagina. No erythema or bleeding in the vagina. No foreign body in the vagina. No signs of injury around the vagina. No vaginal discharge found.   Genitourinary Comments:   BSU normal  Urethral meatus  Normal  Perineum  Normal   Musculoskeletal: Normal range of motion. She exhibits no edema or tenderness.   Lymphadenopathy:        Head (right side): No submental, no submandibular, no tonsillar, no preauricular, no posterior auricular and no occipital adenopathy present.        Head (left side): No submental, no submandibular, no tonsillar, no preauricular, no posterior auricular and no occipital adenopathy present.     She has no cervical adenopathy.        Right cervical: No superficial cervical, no deep cervical and no posterior cervical adenopathy present.       Left cervical: No superficial cervical, no deep cervical and no  "posterior cervical adenopathy present.     She has no axillary adenopathy.        Right: No inguinal adenopathy present.        Left: No inguinal adenopathy present.   Neurological: She is alert and oriented to person, place, and time. Coordination normal.   Skin: Skin is warm and dry. No bruising and no rash noted. She is not diaphoretic. No erythema.   Psychiatric: She has a normal mood and affect. Her behavior is normal. Judgment and thought content normal.   Nursing note and vitals reviewed.        Vitals:    06/09/20 1019   BP: 102/68   Weight: 41.7 kg (92 lb)   Height: 149.9 cm (59\")       Adelita was seen today for gynecologic exam.    Diagnoses and all orders for this visit:    Well woman exam with routine gynecological exam  Comments:  Normal well woman exam.  Thin prep pap smear done.  Mammogram done this morning and was normal.   Orders:  -     Liquid-based Pap Smear, Screening    Cervical cancer screening  Comments:  Thin prep pap smear done.   Orders:  -     Liquid-based Pap Smear, Screening    Family history of breast cancer  Comments:  Family history of breast cancer in 3 maternal aunts and 2 maternal first cousins.  Referral to genetics made.  Orders:  -     Ambulatory Referral to Genetics    Osteopenia, unspecified location  Comments:  Patient had a bone density done in 2017 that showed osteopenia.  Repeat bone density ordered.  Orders:  -     DEXA Bone Density Axial; Future    Vaginal atrophy  Comments:  Patient has significant vaginal atrophy.  Reports dryness and irritation.  Has not been sexually active in ~4 years.          Patient's Body mass index is 18.58 kg/m². BMI is within normal parameters. No follow-up required..             Non-Smoker    MyChart Instructions Given       "

## 2020-06-12 LAB
GEN CATEG CVX/VAG CYTO-IMP: NORMAL
HPV I/H RISK 4 DNA CVX QL PROBE+SIG AMP: NOT DETECTED
LAB AP CASE REPORT: NORMAL
LAB AP GYN ADDITIONAL INFORMATION: NORMAL
LAB AP GYN OTHER FINDINGS: NORMAL
PATH INTERP SPEC-IMP: NORMAL
STAT OF ADQ CVX/VAG CYTO-IMP: NORMAL

## 2020-06-18 ENCOUNTER — TRANSCRIBE ORDERS (OUTPATIENT)
Dept: ADMINISTRATIVE | Facility: HOSPITAL | Age: 50
End: 2020-06-18

## 2020-06-18 DIAGNOSIS — R74.8 ELEVATED LIVER ENZYMES: Primary | ICD-10-CM

## 2020-06-22 ENCOUNTER — HOSPITAL ENCOUNTER (OUTPATIENT)
Dept: BONE DENSITY | Facility: HOSPITAL | Age: 50
Discharge: HOME | End: 2020-06-22
Admitting: NURSE PRACTITIONER

## 2020-06-22 ENCOUNTER — HOSPITAL ENCOUNTER (OUTPATIENT)
Dept: ULTRASOUND IMAGING | Facility: HOSPITAL | Age: 50
Discharge: HOME | End: 2020-06-22

## 2020-06-22 DIAGNOSIS — M85.80 OSTEOPENIA, UNSPECIFIED LOCATION: ICD-10-CM

## 2020-06-22 PROCEDURE — 77080 DXA BONE DENSITY AXIAL: CPT

## 2020-06-22 PROCEDURE — 76705 ECHO EXAM OF ABDOMEN: CPT

## 2020-07-13 ENCOUNTER — TELEPHONE (OUTPATIENT)
Dept: OBSTETRICS AND GYNECOLOGY | Facility: CLINIC | Age: 50
End: 2020-07-13

## 2020-08-11 DIAGNOSIS — M81.0 OSTEOPOROSIS WITHOUT CURRENT PATHOLOGICAL FRACTURE, UNSPECIFIED OSTEOPOROSIS TYPE: Primary | ICD-10-CM

## 2020-08-11 DIAGNOSIS — M85.80 OSTEOPENIA, UNSPECIFIED LOCATION: ICD-10-CM

## 2021-02-09 ENCOUNTER — HOSPITAL ENCOUNTER (INPATIENT)
Facility: HOSPITAL | Age: 51
LOS: 1 days | Discharge: HOME | End: 2021-02-10
Attending: EMERGENCY MEDICINE | Admitting: INTERNAL MEDICINE

## 2021-02-09 DIAGNOSIS — R64 CACHEXIA (HCC): ICD-10-CM

## 2021-02-09 DIAGNOSIS — E87.6 HYPOKALEMIA: Primary | ICD-10-CM

## 2021-02-09 PROBLEM — H40.52X0: Status: ACTIVE | Noted: 2021-02-09

## 2021-02-09 PROBLEM — R63.4 WEIGHT LOSS: Status: ACTIVE | Noted: 2021-02-09

## 2021-02-09 LAB
ALBUMIN SERPL-MCNC: 3.1 G/DL (ref 3.5–5.2)
ALBUMIN/GLOB SERPL: 1 G/DL
ALP SERPL-CCNC: 162 U/L (ref 39–117)
ALT SERPL W P-5'-P-CCNC: 19 U/L (ref 1–33)
AMPHET+METHAMPHET UR QL: NEGATIVE
AMPHETAMINES UR QL: NEGATIVE
ANION GAP SERPL CALCULATED.3IONS-SCNC: 12 MMOL/L (ref 5–15)
AST SERPL-CCNC: 48 U/L (ref 1–32)
BACTERIA UR QL AUTO: ABNORMAL /HPF
BARBITURATES UR QL SCN: NEGATIVE
BASOPHILS # BLD AUTO: 0.04 10*3/MM3 (ref 0–0.2)
BASOPHILS NFR BLD AUTO: 0.5 % (ref 0–1.5)
BENZODIAZ UR QL SCN: NEGATIVE
BILIRUB SERPL-MCNC: 0.4 MG/DL (ref 0–1.2)
BILIRUB UR QL STRIP: NEGATIVE
BUN SERPL-MCNC: 8 MG/DL (ref 6–20)
BUN/CREAT SERPL: 12.9 (ref 7–25)
BUPRENORPHINE SERPL-MCNC: NEGATIVE NG/ML
CALCIUM SPEC-SCNC: 8.7 MG/DL (ref 8.6–10.5)
CANNABINOIDS SERPL QL: NEGATIVE
CHLORIDE SERPL-SCNC: 91 MMOL/L (ref 98–107)
CLARITY UR: CLEAR
CO2 SERPL-SCNC: 29 MMOL/L (ref 22–29)
COCAINE UR QL: NEGATIVE
COLOR UR: YELLOW
CREAT SERPL-MCNC: 0.62 MG/DL (ref 0.57–1)
DEPRECATED RDW RBC AUTO: 40 FL (ref 37–54)
EOSINOPHIL # BLD AUTO: 0.18 10*3/MM3 (ref 0–0.4)
EOSINOPHIL NFR BLD AUTO: 2.4 % (ref 0.3–6.2)
ERYTHROCYTE [DISTWIDTH] IN BLOOD BY AUTOMATED COUNT: 14.1 % (ref 12.3–15.4)
GFR SERPL CREATININE-BSD FRML MDRD: 101 ML/MIN/1.73
GLOBULIN UR ELPH-MCNC: 3 GM/DL
GLUCOSE SERPL-MCNC: 123 MG/DL (ref 65–99)
GLUCOSE UR STRIP-MCNC: NEGATIVE MG/DL
HCT VFR BLD AUTO: 39.3 % (ref 34–46.6)
HGB BLD-MCNC: 14.1 G/DL (ref 12–15.9)
HGB UR QL STRIP.AUTO: NEGATIVE
HYALINE CASTS UR QL AUTO: ABNORMAL /LPF
IMM GRANULOCYTES # BLD AUTO: 0.03 10*3/MM3 (ref 0–0.05)
IMM GRANULOCYTES NFR BLD AUTO: 0.4 % (ref 0–0.5)
KETONES UR QL STRIP: NEGATIVE
LEUKOCYTE ESTERASE UR QL STRIP.AUTO: ABNORMAL
LYMPHOCYTES # BLD AUTO: 1.83 10*3/MM3 (ref 0.7–3.1)
LYMPHOCYTES NFR BLD AUTO: 24.4 % (ref 19.6–45.3)
MAGNESIUM SERPL-MCNC: 1.7 MG/DL (ref 1.6–2.6)
MCH RBC QN AUTO: 29 PG (ref 26.6–33)
MCHC RBC AUTO-ENTMCNC: 35.9 G/DL (ref 31.5–35.7)
MCV RBC AUTO: 80.9 FL (ref 79–97)
METHADONE UR QL SCN: NEGATIVE
MONOCYTES # BLD AUTO: 0.75 10*3/MM3 (ref 0.1–0.9)
MONOCYTES NFR BLD AUTO: 10 % (ref 5–12)
NEUTROPHILS NFR BLD AUTO: 4.68 10*3/MM3 (ref 1.7–7)
NEUTROPHILS NFR BLD AUTO: 62.3 % (ref 42.7–76)
NITRITE UR QL STRIP: NEGATIVE
NRBC BLD AUTO-RTO: 0 /100 WBC (ref 0–0.2)
OPIATES UR QL: NEGATIVE
OXYCODONE UR QL SCN: NEGATIVE
PCP UR QL SCN: NEGATIVE
PH UR STRIP.AUTO: 8.5 [PH] (ref 5–8)
PLATELET # BLD AUTO: 308 10*3/MM3 (ref 140–450)
PMV BLD AUTO: 10.2 FL (ref 6–12)
POTASSIUM SERPL-SCNC: 1.9 MMOL/L (ref 3.5–5.2)
POTASSIUM SERPL-SCNC: 2.2 MMOL/L (ref 3.5–5.2)
PROPOXYPH UR QL: NEGATIVE
PROT SERPL-MCNC: 6.1 G/DL (ref 6–8.5)
PROT UR QL STRIP: NEGATIVE
RBC # BLD AUTO: 4.86 10*6/MM3 (ref 3.77–5.28)
RBC # UR: ABNORMAL /HPF
REF LAB TEST METHOD: ABNORMAL
SARS-COV-2 RDRP RESP QL NAA+PROBE: NORMAL
SODIUM SERPL-SCNC: 132 MMOL/L (ref 136–145)
SP GR UR STRIP: 1.01 (ref 1–1.03)
SQUAMOUS #/AREA URNS HPF: ABNORMAL /HPF
T4 FREE SERPL-MCNC: 1.09 NG/DL (ref 0.93–1.7)
TRICYCLICS UR QL SCN: NEGATIVE
TSH SERPL DL<=0.05 MIU/L-ACNC: 2.03 UIU/ML (ref 0.27–4.2)
UROBILINOGEN UR QL STRIP: ABNORMAL
WBC # BLD AUTO: 7.51 10*3/MM3 (ref 3.4–10.8)
WBC UR QL AUTO: ABNORMAL /HPF

## 2021-02-09 PROCEDURE — 87635 SARS-COV-2 COVID-19 AMP PRB: CPT | Performed by: EMERGENCY MEDICINE

## 2021-02-09 PROCEDURE — 80053 COMPREHEN METABOLIC PANEL: CPT | Performed by: EMERGENCY MEDICINE

## 2021-02-09 PROCEDURE — 25010000003 POTASSIUM CHLORIDE 10 MEQ/100ML SOLUTION: Performed by: INTERNAL MEDICINE

## 2021-02-09 PROCEDURE — 93010 ELECTROCARDIOGRAM REPORT: CPT | Performed by: EMERGENCY MEDICINE

## 2021-02-09 PROCEDURE — 25810000003 SODIUM CHLORIDE 0.9 % WITH KCL 20 MEQ 20-0.9 MEQ/L-% SOLUTION: Performed by: INTERNAL MEDICINE

## 2021-02-09 PROCEDURE — 80306 DRUG TEST PRSMV INSTRMNT: CPT | Performed by: EMERGENCY MEDICINE

## 2021-02-09 PROCEDURE — 84443 ASSAY THYROID STIM HORMONE: CPT | Performed by: INTERNAL MEDICINE

## 2021-02-09 PROCEDURE — 83735 ASSAY OF MAGNESIUM: CPT | Performed by: EMERGENCY MEDICINE

## 2021-02-09 PROCEDURE — 36415 COLL VENOUS BLD VENIPUNCTURE: CPT

## 2021-02-09 PROCEDURE — 84439 ASSAY OF FREE THYROXINE: CPT | Performed by: INTERNAL MEDICINE

## 2021-02-09 PROCEDURE — 85025 COMPLETE CBC W/AUTO DIFF WBC: CPT | Performed by: EMERGENCY MEDICINE

## 2021-02-09 PROCEDURE — 81001 URINALYSIS AUTO W/SCOPE: CPT | Performed by: EMERGENCY MEDICINE

## 2021-02-09 PROCEDURE — 99284 EMERGENCY DEPT VISIT MOD MDM: CPT

## 2021-02-09 PROCEDURE — 84132 ASSAY OF SERUM POTASSIUM: CPT | Performed by: INTERNAL MEDICINE

## 2021-02-09 PROCEDURE — 25010000003 POTASSIUM CHLORIDE 10 MEQ/100ML SOLUTION: Performed by: EMERGENCY MEDICINE

## 2021-02-09 PROCEDURE — 93005 ELECTROCARDIOGRAM TRACING: CPT | Performed by: EMERGENCY MEDICINE

## 2021-02-09 PROCEDURE — 25010000002 MAGNESIUM SULFATE 2 GM/50ML SOLUTION: Performed by: INTERNAL MEDICINE

## 2021-02-09 RX ORDER — ATROPINE SULFATE 10 MG/ML
1 SOLUTION/ DROPS OPHTHALMIC 3 TIMES DAILY
Status: ON HOLD | COMMUNITY
End: 2022-03-22

## 2021-02-09 RX ORDER — ACETAMINOPHEN 160 MG/5ML
650 SOLUTION ORAL EVERY 4 HOURS PRN
Status: DISCONTINUED | OUTPATIENT
Start: 2021-02-09 | End: 2021-02-10 | Stop reason: HOSPADM

## 2021-02-09 RX ORDER — ACETAZOLAMIDE 250 MG/1
250 TABLET ORAL 3 TIMES DAILY
Status: ON HOLD | COMMUNITY
End: 2022-03-22

## 2021-02-09 RX ORDER — ACETAZOLAMIDE 250 MG/1
250 TABLET ORAL 3 TIMES DAILY
Status: DISCONTINUED | OUTPATIENT
Start: 2021-02-09 | End: 2021-02-10 | Stop reason: HOSPADM

## 2021-02-09 RX ORDER — POLYMYXIN B SULFATE AND TRIMETHOPRIM 1; 10000 MG/ML; [USP'U]/ML
1 SOLUTION OPHTHALMIC EVERY 4 HOURS
Status: ON HOLD | COMMUNITY
End: 2022-03-22

## 2021-02-09 RX ORDER — ONDANSETRON 2 MG/ML
4 INJECTION INTRAMUSCULAR; INTRAVENOUS EVERY 6 HOURS PRN
Status: DISCONTINUED | OUTPATIENT
Start: 2021-02-09 | End: 2021-02-10 | Stop reason: HOSPADM

## 2021-02-09 RX ORDER — DOXYCYCLINE HYCLATE 100 MG/1
100 CAPSULE ORAL 2 TIMES DAILY
Status: ON HOLD | COMMUNITY
End: 2022-03-22

## 2021-02-09 RX ORDER — POTASSIUM CHLORIDE 750 MG/1
40 CAPSULE, EXTENDED RELEASE ORAL ONCE
Status: COMPLETED | OUTPATIENT
Start: 2021-02-09 | End: 2021-02-09

## 2021-02-09 RX ORDER — POTASSIUM CHLORIDE 750 MG/1
40 CAPSULE, EXTENDED RELEASE ORAL 2 TIMES DAILY WITH MEALS
Status: DISCONTINUED | OUTPATIENT
Start: 2021-02-09 | End: 2021-02-09

## 2021-02-09 RX ORDER — ACETAMINOPHEN 650 MG/1
650 SUPPOSITORY RECTAL EVERY 4 HOURS PRN
Status: DISCONTINUED | OUTPATIENT
Start: 2021-02-09 | End: 2021-02-10 | Stop reason: HOSPADM

## 2021-02-09 RX ORDER — MAGNESIUM SULFATE HEPTAHYDRATE 40 MG/ML
2 INJECTION, SOLUTION INTRAVENOUS ONCE
Status: COMPLETED | OUTPATIENT
Start: 2021-02-09 | End: 2021-02-09

## 2021-02-09 RX ORDER — SODIUM CHLORIDE AND POTASSIUM CHLORIDE 150; 900 MG/100ML; MG/100ML
75 INJECTION, SOLUTION INTRAVENOUS CONTINUOUS
Status: DISCONTINUED | OUTPATIENT
Start: 2021-02-09 | End: 2021-02-10 | Stop reason: HOSPADM

## 2021-02-09 RX ORDER — POTASSIUM CHLORIDE 7.45 MG/ML
10 INJECTION INTRAVENOUS
Status: COMPLETED | OUTPATIENT
Start: 2021-02-09 | End: 2021-02-09

## 2021-02-09 RX ORDER — POTASSIUM CHLORIDE 750 MG/1
40 CAPSULE, EXTENDED RELEASE ORAL 2 TIMES DAILY WITH MEALS
Status: DISCONTINUED | OUTPATIENT
Start: 2021-02-09 | End: 2021-02-10 | Stop reason: HOSPADM

## 2021-02-09 RX ORDER — CITALOPRAM 20 MG/1
20 TABLET ORAL DAILY
Status: DISCONTINUED | OUTPATIENT
Start: 2021-02-09 | End: 2021-02-09

## 2021-02-09 RX ORDER — SODIUM CHLORIDE 0.9 % (FLUSH) 0.9 %
10 SYRINGE (ML) INJECTION EVERY 12 HOURS SCHEDULED
Status: DISCONTINUED | OUTPATIENT
Start: 2021-02-09 | End: 2021-02-10 | Stop reason: HOSPADM

## 2021-02-09 RX ORDER — ACETAMINOPHEN 325 MG/1
650 TABLET ORAL EVERY 4 HOURS PRN
Status: DISCONTINUED | OUTPATIENT
Start: 2021-02-09 | End: 2021-02-10 | Stop reason: HOSPADM

## 2021-02-09 RX ORDER — SODIUM CHLORIDE 0.9 % (FLUSH) 0.9 %
10 SYRINGE (ML) INJECTION AS NEEDED
Status: DISCONTINUED | OUTPATIENT
Start: 2021-02-09 | End: 2021-02-10 | Stop reason: HOSPADM

## 2021-02-09 RX ORDER — POTASSIUM CHLORIDE 7.45 MG/ML
10 INJECTION INTRAVENOUS
Status: DISPENSED | OUTPATIENT
Start: 2021-02-09 | End: 2021-02-09

## 2021-02-09 RX ORDER — BRIMONIDINE TARTRATE AND TIMOLOL MALEATE 2; 5 MG/ML; MG/ML
1 SOLUTION OPHTHALMIC EVERY 12 HOURS
Status: ON HOLD | COMMUNITY
End: 2022-03-22

## 2021-02-09 RX ORDER — NICOTINE 21 MG/24HR
1 PATCH, TRANSDERMAL 24 HOURS TRANSDERMAL
Status: DISCONTINUED | OUTPATIENT
Start: 2021-02-09 | End: 2021-02-10 | Stop reason: HOSPADM

## 2021-02-09 RX ADMIN — POTASSIUM CHLORIDE 10 MEQ: 7.46 INJECTION, SOLUTION INTRAVENOUS at 12:12

## 2021-02-09 RX ADMIN — MAGNESIUM SULFATE HEPTAHYDRATE 2 G: 40 INJECTION, SOLUTION INTRAVENOUS at 13:01

## 2021-02-09 RX ADMIN — POTASSIUM CHLORIDE 10 MEQ: 7.46 INJECTION, SOLUTION INTRAVENOUS at 18:01

## 2021-02-09 RX ADMIN — MAGNESIUM GLUCONATE 500 MG ORAL TABLET 800 MG: 500 TABLET ORAL at 21:39

## 2021-02-09 RX ADMIN — POTASSIUM CHLORIDE AND SODIUM CHLORIDE 75 ML/HR: 900; 150 INJECTION, SOLUTION INTRAVENOUS at 14:58

## 2021-02-09 RX ADMIN — POTASSIUM CHLORIDE 10 MEQ: 7.46 INJECTION, SOLUTION INTRAVENOUS at 19:26

## 2021-02-09 RX ADMIN — POTASSIUM CHLORIDE 10 MEQ: 7.46 INJECTION, SOLUTION INTRAVENOUS at 21:39

## 2021-02-09 RX ADMIN — NICOTINE 1 PATCH: 14 PATCH, EXTENDED RELEASE TRANSDERMAL at 18:05

## 2021-02-09 RX ADMIN — POTASSIUM CHLORIDE AND SODIUM CHLORIDE 75 ML/HR: 900; 150 INJECTION, SOLUTION INTRAVENOUS at 13:10

## 2021-02-09 RX ADMIN — POTASSIUM CHLORIDE 40 MEQ: 750 CAPSULE, EXTENDED RELEASE ORAL at 18:01

## 2021-02-09 RX ADMIN — SODIUM CHLORIDE, PRESERVATIVE FREE 10 ML: 5 INJECTION INTRAVENOUS at 22:56

## 2021-02-09 RX ADMIN — POTASSIUM CHLORIDE 10 MEQ: 7.46 INJECTION, SOLUTION INTRAVENOUS at 22:56

## 2021-02-09 NOTE — H&P
[facility]  HISTORY AND PHYSICAL    Date of Admission: 2/9/2021  Primary Care Physician: Delta Dyson MD    Subjective     Chief Complaint: Abnormal labs.     History of Present Illness  This is a 50 year old female patient who lives in [place], [place].  She sees Dr. Delta Dyson for primary care.  She presents to the emergency department today after being notified by him that she had a low sodium and potassium on outpatient labs.  She has a history of recurrent issues with electrolytes.  She was in the hospital in January at [facility] secondary to hypokalemia that was worse after episodes of vomiting.  She has not had any vomiting recently.  No diarrhea.  She is chronically on HCTZ and has been on acetazolamide 3 times daily since December.  This is likely driving worsening of her electrolytes.    She states that she had some problems with scratching her left eye secondary to a foreign body sensation in late November after Thanksgiving.  She then developed drainage and redness of her left eye.  She was evaluated at an ophthalmology clinic in [facility].  She was diagnosed with a bacterial infection and has unfortunately developed secondary glaucoma due to the inflammation.  She is still taking several eyedrops each day and has also been on oral doxycycline.  She was started on acetazolamide in early December to try and help with pressure.    She has also been losing weight unintentionally.  She has always been a thin person.  However, she has had a poor appetite and has not been eating like she should recently.  She attributes this to her acetazolamide intake.  However, she also admits to problems with depression.  She lost her significant other last year secondary to alcoholism.  Her 15-year-old daughter has moved out in the last year to go live with her father instead of her.  She does state that she stopped her Celexa about 4 months ago and  feels that she is doing fine without it.  She claims that she is up-to-date on colonoscopy and mammogram.    Review of Systems   Otherwise complete ROS reviewed and negative except as mentioned in the HPI.    Past Medical History:   Past Medical History:   Diagnosis Date   • GERD (gastroesophageal reflux disease)    • Hyperlipidemia    • Hypertension    • Potassium (K) deficiency      Past Surgical History:  Past Surgical History:   Procedure Laterality Date   • AUGMENTATION MAMMAPLASTY     • CARPAL TUNNEL RELEASE     •  SECTION     • CHOLECYSTECTOMY     • DILATATION AND CURETTAGE     • ENDOMETRIAL BIOPSY     • GYNECOLOGIC CRYOSURGERY     • HERNIA REPAIR     • HIP CANNULATED SCREW PLACEMENT Right 7/10/2019    Procedure: RIGHT HIP CANNULATED SCREW PLACEMENT;  Surgeon: Aramis Navarro MD;  Location: [facility];  Service: Orthopedics   • OTHER SURGICAL HISTORY      TIF procedure.      Social History:  reports that she has been smoking cigarettes. She has been smoking about 0.50 packs per day. She has never used smokeless tobacco. She reports current alcohol use. She reports current drug use. Drug: Marijuana.    Family History: family history includes Breast cancer in her cousin, maternal aunt, maternal aunt, and maternal aunt; Diabetes in her mother; Hypertension in her father and mother; Hypothyroidism in her mother.     Allergies:  Allergies   Allergen Reactions   • Penicillins Unknown (See Comments)     Childhood reaction     Medications:  Prior to Admission medications    Medication Sig Start Date End Date Taking? Authorizing Provider   acetaZOLAMIDE (DIAMOX) 250 MG tablet Take 250 mg by mouth 3 (Three) Times a Day.   Yes Marline Arguelles MD   Calcium Carb-Cholecalciferol (CALCIUM 600+D3) 600-200 MG-UNIT tablet Take 1 tablet by mouth Every 12 (Twelve) Hours.   Yes Marline Arguelles MD   hydroCHLOROthiazide (HYDRODIURIL) 25 MG tablet TAKE 1 2 (ONE HALF) TABLET BY MOUTH ONCE DAILY 19  Yes  "Marline Arguelles MD   Magnesium 500 MG tablet Take 1 tablet by mouth Every 12 (Twelve) Hours.   Yes Marline Arguelles MD   Multiple Vitamins-Minerals (MULTIVITAMIN ADULT PO) Take 1 tablet by mouth Daily.   Yes Marline Arguelles MD   potassium chloride (K-DUR,KLOR-CON) 20 MEQ CR tablet Take 20 mEq by mouth 2 (Two) Times a Day.   Yes Marline Arguelles MD   citalopram (CeleXA) 20 MG tablet Take 20 mg by mouth Daily.    Marline Arguelles MD   aspirin 81 MG EC tablet Take 81 mg by mouth Daily.  2/9/21  Marline Arguelles MD   busPIRone (BUSPAR) 5 MG tablet Take 5 mg by mouth 3 (Three) Times a Day As Needed (anxiety). 4/25/19 2/9/21  Marline Arguelles MD   Flaxseed, Linseed, (FLAXSEED OIL PO) Take 1,000 mg by mouth 2 (Two) Times a Day.  2/9/21  Marline Arguelles MD     Objective     Vital Signs: /75   Pulse 87   Temp 98.2 °F (36.8 °C) (Oral)   Resp 15   Ht 149.9 cm (59\")   Wt 32.7 kg (72 lb)   SpO2 97%   BMI 14.54 kg/m²   Physical Exam  Constitutional:       Appearance: She is well-developed.      Comments: Up in bed.  No distress.  Discussed with her nurse, Wili.   HENT:      Head: Normocephalic and atraumatic.   Eyes:      Conjunctiva/sclera: Conjunctivae normal.      Comments: Left eye with anterior chamber changes with purulent changes. No drainage.    Neck:      Musculoskeletal: Neck supple.      Vascular: No JVD.   Cardiovascular:      Rate and Rhythm: Normal rate and regular rhythm.      Heart sounds: Normal heart sounds. No murmur. No friction rub. No gallop.    Pulmonary:      Effort: Pulmonary effort is normal. No respiratory distress.      Breath sounds: Normal breath sounds. No wheezing or rales.   Chest:      Chest wall: No tenderness.   Abdominal:      General: Bowel sounds are normal. There is no distension.      Palpations: Abdomen is soft.      Tenderness: There is no abdominal tenderness. There is no guarding or rebound.   Musculoskeletal: Normal range of " motion.         General: No tenderness or deformity.   Skin:     General: Skin is warm and dry.      Findings: No rash.   Neurological:      Mental Status: She is alert and oriented to person, place, and time.      Cranial Nerves: No cranial nerve deficit.      Motor: No abnormal muscle tone.      Deep Tendon Reflexes: Reflexes normal.   Psychiatric:         Behavior: Behavior normal.         Thought Content: Thought content normal.         Judgment: Judgment normal.        Results Reviewed:  Lab Results (last 24 hours)     Procedure Component Value Units Date/Time    Urine Drug Screen - Urine, Clean Catch [[id number]]  (Normal) Collected: 02/09/21 1124    Specimen: Urine, Clean Catch Updated: 02/09/21 1158     THC, Screen, Urine Negative     Phencyclidine (PCP), Urine Negative     Cocaine Screen, Urine Negative     Methamphetamine, Ur Negative     Opiate Screen Negative     Amphetamine Screen, Urine Negative     Benzodiazepine Screen, Urine Negative     Tricyclic Antidepressants Screen Negative     Methadone Screen, Urine Negative     Barbiturates Screen, Urine Negative     Oxycodone Screen, Urine Negative     Propoxyphene Screen Negative     Buprenorphine, Screen, Urine Negative    Narrative:      Cutoff For Drugs Screened:    Amphetamines               500 ng/ml  Barbiturates               200 ng/ml  Benzodiazepines            150 ng/ml  Cocaine                    150 ng/ml  Methadone                  200 ng/ml  Opiates                    100 ng/ml  Phencyclidine               25 ng/ml  THC                            50 ng/ml  Methamphetamine            500 ng/ml  Tricyclic Antidepressants  300 ng/ml  Oxycodone                  100 ng/ml  Propoxyphene               300 ng/ml  Buprenorphine               10 ng/ml    The normal value for all drugs tested is negative. This report includes unconfirmed screening results, with the cutoff values listed, to be used for medical treatment purposes only.  Unconfirmed results  must not be used for non-medical purposes such as employment or legal testing.  Clinical consideration should be applied to any drug of abuse test, particularly when unconfirmed results are used.      Urinalysis With Culture If Indicated - Urine, Clean Catch [[id number]]  (Abnormal) Collected: 02/09/21 1124    Specimen: Urine, Clean Catch Updated: 02/09/21 1153     Color, UA Yellow     Appearance, UA Clear     pH, UA 8.5     Specific Gravity, UA 1.008     Glucose, UA Negative     Ketones, UA Negative     Bilirubin, UA Negative     Blood, UA Negative     Protein, UA Negative     Leuk Esterase, UA Trace     Nitrite, UA Negative     Urobilinogen, UA 0.2 E.U./dL    Urinalysis, Microscopic Only - Urine, Clean Catch [[id number]]  (Abnormal) Collected: 02/09/21 1124    Specimen: Urine, Clean Catch Updated: 02/09/21 1153     RBC, UA 0-2 /HPF      WBC, UA 0-2 /HPF      Bacteria, UA None Seen /HPF      Squamous Epithelial Cells, UA 0-2 /HPF      Hyaline Casts, UA 0-2 /LPF      Methodology Automated Microscopy    COVID-19, [facility] IN-HOUSE,NASAL Swab (NO TRANSPORT MEDIA) 2 HR TAT - Swab, Nasopharynx [[id number]]  (Normal) Collected: 02/09/21 1026    Specimen: Swab from Nasopharynx Updated: 02/09/21 1118     COVID19 Presumptive Negative    Narrative:      Fact sheet for providers: https://www.fda.gov/media/516359/download     Fact sheet for patients: https://www.fda.gov/media/186880/download    Test performed by PCR.  If inconsistent with clinical signs and symptoms patient should be tested with different authorized molecular test.    Comprehensive Metabolic Panel [[id number]]  (Abnormal) Collected: 02/09/21 1025    Specimen: Blood Updated: 02/09/21 1111     Glucose 123 mg/dL      BUN 8 mg/dL      Creatinine 0.62 mg/dL      Sodium 132 mmol/L      Potassium 2.2 mmol/L      Chloride 91 mmol/L      CO2 29.0 mmol/L      Calcium 8.7 mg/dL      Total Protein 6.1 g/dL      Albumin 3.10 g/dL      ALT (SGPT) 19 U/L      AST (SGOT) 48 U/L       Alkaline Phosphatase 162 U/L      Total Bilirubin 0.4 mg/dL      eGFR Non African Amer 101 mL/min/1.73      Globulin 3.0 gm/dL      A/G Ratio 1.0 g/dL      BUN/Creatinine Ratio 12.9     Anion Gap 12.0 mmol/L     Narrative:      GFR Normal >60  Chronic Kidney Disease <60  Kidney Failure <15      Magnesium [[id number]]  (Normal) Collected: 02/09/21 1025    Specimen: Blood Updated: 02/09/21 1049     Magnesium 1.7 mg/dL     CBC & Differential [[id number]]  (Abnormal) Collected: 02/09/21 1025    Specimen: Blood Updated: 02/09/21 1037    Narrative:      The following orders were created for panel order CBC & Differential.  Procedure                               Abnormality         Status                     ---------                               -----------         ------                     CBC Auto Differential[[id number]]        Abnormal            Final result                 Please view results for these tests on the individual orders.    CBC Auto Differential [[id number]]  (Abnormal) Collected: 02/09/21 1025    Specimen: Blood Updated: 02/09/21 1037     WBC 7.51 10*3/mm3      RBC 4.86 10*6/mm3      Hemoglobin 14.1 g/dL      Hematocrit 39.3 %      MCV 80.9 fL      MCH 29.0 pg      MCHC 35.9 g/dL      RDW 14.1 %      RDW-SD 40.0 fl      MPV 10.2 fL      Platelets 308 10*3/mm3      Neutrophil % 62.3 %      Lymphocyte % 24.4 %      Monocyte % 10.0 %      Eosinophil % 2.4 %      Basophil % 0.5 %      Immature Grans % 0.4 %      Neutrophils, Absolute 4.68 10*3/mm3      Lymphocytes, Absolute 1.83 10*3/mm3      Monocytes, Absolute 0.75 10*3/mm3      Eosinophils, Absolute 0.18 10*3/mm3      Basophils, Absolute 0.04 10*3/mm3      Immature Grans, Absolute 0.03 10*3/mm3      nRBC 0.0 /100 WBC         I have personally reviewed and interpreted the radiology studies and ECG obtained at time of admission.     Assessment / Plan     Assessment:   Active Hospital Problems    Diagnosis   • **Hypokalemia   • Hyponatremia   • Body mass  index (BMI) less than 15   • Secondary glaucoma of left eye   • Weight loss   • Tobacco abuse     Plan:   The patient will be admitted to my service here at [facility].  She presents to the emergency department at the behest of her primary care provider.  She had recent outpatient labs that showed hyponatremia and hypokalemia.  She has had problems with these issues in the past as well.  Her potassium at this point time is 2.2.  She is being admitted for telemetry monitoring and aggressive replacement.    Discontinue HCTZ.  She has been on this chronically and has chronically had problems with hyponatremia and hypokalemia.  This certainly could contribute.  Her blood pressure does also not seem to need this either.  Unfortunately, she needs to continue her acetazolamide at this point for her glaucoma.  This could certainly be exacerbating her electrolyte abnormalities as well.  Continue oral potassium and magnesium supplementation.  Repeat potassium level at 1700.    Continue her eyedrops and chronic antibiotic therapy for her secondary glaucoma.  This is being managed by ophthalmology in [facility].    Defer unintentional weight loss issues to primary care.  Nutrition consult.  She did mention that she was previously on thyroid replacement.  Check TSH and free T4.    Offered a nicotine patch and counseled for tobacco cessation.    SCDs for DVT prophylaxis.    Code Status: Full.      I discussed the patient's findings and my recommendations with the patient.    Estimated length of stay is at least overnight.     Patient seen and examined by me on 02/09/21 at 15:01 CST.     Electronically signed by Chris Sequeira DO, 02/09/21, 16:16 CST.

## 2021-02-09 NOTE — ED PROVIDER NOTES
Subjective   Patient was sent by primary MD because electrolytes abnormal patient came in with a complaint of weakness.  She is cachectic she said that she may have lost some weight she is not sure how much.      Abnormal Lab  Location:  Normal labs  Severity:  Moderate  Onset quality:  Gradual  Timing:  Constant  Progression:  Worsening  Chronicity:  New  Associated symptoms: no abdominal pain, no chest pain, no congestion, no cough, no diarrhea, no fever, no headaches, no loss of consciousness, no myalgias, no rash, no sore throat and no vomiting        Review of Systems   Constitutional: Negative.  Negative for fever.   HENT: Negative.  Negative for congestion and sore throat.    Eyes: Negative.    Respiratory: Negative.  Negative for cough.    Cardiovascular: Negative.  Negative for chest pain.   Gastrointestinal: Negative.  Negative for abdominal pain, diarrhea and vomiting.   Musculoskeletal: Negative.  Negative for back pain, myalgias and neck pain.   Skin: Negative.  Negative for rash.   Neurological: Negative.  Negative for loss of consciousness and headaches.   All other systems reviewed and are negative.      Past Medical History:   Diagnosis Date   • GERD (gastroesophageal reflux disease)    • Hyperlipidemia    • Hypertension    • Potassium (K) deficiency        Allergies   Allergen Reactions   • Penicillins Unknown (See Comments)     Childhood reaction       Past Surgical History:   Procedure Laterality Date   • AUGMENTATION MAMMAPLASTY     • CARPAL TUNNEL RELEASE     •  SECTION     • CHOLECYSTECTOMY     • DILATATION AND CURETTAGE     • ENDOMETRIAL BIOPSY     • GYNECOLOGIC CRYOSURGERY     • HERNIA REPAIR     • HIP CANNULATED SCREW PLACEMENT Right 7/10/2019    Procedure: RIGHT HIP CANNULATED SCREW PLACEMENT;  Surgeon: Aramis Navarro MD;  Location: [facility] OR;  Service: Orthopedics   • OTHER SURGICAL HISTORY      TIF procedure.        Family History   Problem Relation Age of Onset   • Breast  cancer Maternal Aunt    • Breast cancer Maternal Aunt    • Breast cancer Maternal Aunt    • Breast cancer Cousin    • Hypertension Father    • Diabetes Mother    • Hypertension Mother    • Hypothyroidism Mother    • Ovarian cancer Neg Hx    • Uterine cancer Neg Hx    • Colon cancer Neg Hx        Social History     Socioeconomic History   • Marital status: Single     Spouse name: Not on file   • Number of children: Not on file   • Years of education: Not on file   • Highest education level: Not on file   Tobacco Use   • Smoking status: Current Every Day Smoker     Packs/day: 0.50     Types: Cigarettes   • Smokeless tobacco: Never Used   Substance and Sexual Activity   • Alcohol use: Yes     Comment: rarely   • Drug use: Yes     Types: Marijuana   • Sexual activity: Yes     Partners: Male     Birth control/protection: Post-menopausal     Comment: 12 years           Objective   Physical Exam  Vitals signs and nursing note reviewed. Exam conducted with a chaperone present.   Constitutional:       General: She is awake.      Appearance: She is underweight. She is not ill-appearing.   HENT:      Head: Normocephalic and atraumatic.   Eyes:      General: Lids are normal.      Conjunctiva/sclera: Conjunctivae normal.      Pupils: Pupils are equal, round, and reactive to light.   Neck:      Musculoskeletal: Full passive range of motion without pain, normal range of motion and neck supple.   Cardiovascular:      Rate and Rhythm: Normal rate and regular rhythm.      Chest Wall: PMI is not displaced.      Pulses: Normal pulses.      Heart sounds: Normal heart sounds.   Pulmonary:      Effort: Pulmonary effort is normal.      Breath sounds: Normal breath sounds. No decreased breath sounds.   Abdominal:      General: Abdomen is flat. Bowel sounds are normal.      Palpations: Abdomen is soft.      Tenderness: There is no abdominal tenderness.   Musculoskeletal: Normal range of motion.   Skin:     General: Skin is warm and dry.       Capillary Refill: Capillary refill takes less than 2 seconds.      Comments: Multiple skin lesions   Neurological:      General: No focal deficit present.      Mental Status: She is alert and oriented to person, place, and time.      Cranial Nerves: Cranial nerves are intact.      Motor: Motor function is intact.      Deep Tendon Reflexes: Reflexes are normal and symmetric.   Psychiatric:         Behavior: Behavior is cooperative.         Procedures           ED Course  ED Course as of Feb 09 1241   Tue Feb 09, 2021   1126 Moderate baseline artifact otherwise normal sinus rhythm    [TS]   1234 Patient with hypokalemia    [TS]   1234 IV potassium has been initiated will be admitted to the hospital service EKG does not show any acute changes    [TS]   1235 The EKG is not A. fib or atrial flutter there is a lot of baseline artifact and you can see P waves.  There is no evidence of any EKG changes of hypokalemia except for ST depression high lateral leads    [TS]   1235 We will repeat an EKG    [TS]      ED Course User Index  [TS] Trent Parker MD                                           [facility]    Final diagnoses:   Hypokalemia   Cachexia (CMS/HCC)            Trent Parker MD  02/09/21 1241

## 2021-02-10 VITALS
TEMPERATURE: 98.2 F | DIASTOLIC BLOOD PRESSURE: 59 MMHG | HEART RATE: 92 BPM | WEIGHT: 72.4 LBS | BODY MASS INDEX: 14.6 KG/M2 | RESPIRATION RATE: 18 BRPM | SYSTOLIC BLOOD PRESSURE: 83 MMHG | OXYGEN SATURATION: 100 % | HEIGHT: 59 IN

## 2021-02-10 PROBLEM — E83.39 HYPOPHOSPHATEMIA: Status: ACTIVE | Noted: 2021-02-10

## 2021-02-10 LAB
ANION GAP SERPL CALCULATED.3IONS-SCNC: 9 MMOL/L (ref 5–15)
BUN SERPL-MCNC: 7 MG/DL (ref 6–20)
BUN/CREAT SERPL: 13.2 (ref 7–25)
CALCIUM SPEC-SCNC: 7.6 MG/DL (ref 8.6–10.5)
CHLORIDE SERPL-SCNC: 106 MMOL/L (ref 98–107)
CO2 SERPL-SCNC: 19 MMOL/L (ref 22–29)
CREAT SERPL-MCNC: 0.53 MG/DL (ref 0.57–1)
GFR SERPL CREATININE-BSD FRML MDRD: 121 ML/MIN/1.73
GLUCOSE SERPL-MCNC: 107 MG/DL (ref 65–99)
MAGNESIUM SERPL-MCNC: 1.9 MG/DL (ref 1.6–2.6)
PHOSPHATE SERPL-MCNC: 1.9 MG/DL (ref 2.5–4.5)
PHOSPHATE SERPL-MCNC: 3.9 MG/DL (ref 2.5–4.5)
POTASSIUM SERPL-SCNC: 3.4 MMOL/L (ref 3.5–5.2)
POTASSIUM SERPL-SCNC: 4 MMOL/L (ref 3.5–5.2)
QT INTERVAL: 368 MS
QT INTERVAL: 380 MS
QTC INTERVAL: 437 MS
QTC INTERVAL: 454 MS
SODIUM SERPL-SCNC: 134 MMOL/L (ref 136–145)

## 2021-02-10 PROCEDURE — 84132 ASSAY OF SERUM POTASSIUM: CPT | Performed by: NURSE PRACTITIONER

## 2021-02-10 PROCEDURE — 25810000003 SODIUM CHLORIDE 0.9 % WITH KCL 20 MEQ 20-0.9 MEQ/L-% SOLUTION: Performed by: INTERNAL MEDICINE

## 2021-02-10 PROCEDURE — 36415 COLL VENOUS BLD VENIPUNCTURE: CPT | Performed by: INTERNAL MEDICINE

## 2021-02-10 PROCEDURE — 84100 ASSAY OF PHOSPHORUS: CPT | Performed by: NURSE PRACTITIONER

## 2021-02-10 PROCEDURE — 80048 BASIC METABOLIC PNL TOTAL CA: CPT | Performed by: INTERNAL MEDICINE

## 2021-02-10 PROCEDURE — 84100 ASSAY OF PHOSPHORUS: CPT | Performed by: INTERNAL MEDICINE

## 2021-02-10 PROCEDURE — 83735 ASSAY OF MAGNESIUM: CPT | Performed by: INTERNAL MEDICINE

## 2021-02-10 RX ORDER — POTASSIUM CHLORIDE 20 MEQ/1
40 TABLET, EXTENDED RELEASE ORAL 2 TIMES DAILY
Qty: 120 TABLET | Refills: 0 | Status: SHIPPED | OUTPATIENT
Start: 2021-02-10 | End: 2021-03-12

## 2021-02-10 RX ADMIN — NICOTINE 1 PATCH: 14 PATCH, EXTENDED RELEASE TRANSDERMAL at 08:58

## 2021-02-10 RX ADMIN — POTASSIUM CHLORIDE AND SODIUM CHLORIDE 75 ML/HR: 900; 150 INJECTION, SOLUTION INTRAVENOUS at 12:04

## 2021-02-10 RX ADMIN — ACETAZOLAMIDE 250 MG: 250 TABLET ORAL at 08:58

## 2021-02-10 RX ADMIN — POTASSIUM CHLORIDE AND SODIUM CHLORIDE 75 ML/HR: 900; 150 INJECTION, SOLUTION INTRAVENOUS at 00:12

## 2021-02-10 RX ADMIN — MAGNESIUM GLUCONATE 500 MG ORAL TABLET 800 MG: 500 TABLET ORAL at 08:58

## 2021-02-10 RX ADMIN — POTASSIUM PHOSPHATE, MONOBASIC AND POTASSIUM PHOSPHATE, DIBASIC 21 MMOL: 224; 236 INJECTION, SOLUTION, CONCENTRATE INTRAVENOUS at 08:54

## 2021-02-10 RX ADMIN — POTASSIUM CHLORIDE 40 MEQ: 750 CAPSULE, EXTENDED RELEASE ORAL at 08:58

## 2021-02-10 NOTE — PLAN OF CARE
Goal Outcome Evaluation:  Plan of Care Reviewed With: patient  Progress: no change  Outcome Summary: Admitted from ER with hypokalemia. Potassium on admit 2.2, redraw shows a potassium of 1.9. Currently replacing potassium. Safety maintained. Will cont to monitor and call MD with any changes.

## 2021-02-10 NOTE — DISCHARGE SUMMARY
[facility] Medicine Services  DISCHARGE SUMMARY       Date of Admission: 2/9/2021  Date of Discharge:  2/10/2021  Primary Care Physician: Delta Dyson MD    Presenting Problem/History of Present Illness:  Abnormal labs.     Final Discharge Diagnoses:  Active Hospital Problems    Diagnosis   • **Hypokalemia   • Hypophosphatemia   • Body mass index (BMI) less than 15   • Secondary glaucoma of left eye   • Weight loss   • Tobacco abuse   • Hyponatremia     Consults: None.    Procedures Performed: None.    Pertinent Test Results:   Lab Results (last 72 hours)     Procedure Component Value Units Date/Time    Basic Metabolic Panel [[id number]]  (Abnormal) Collected: 02/10/21 0459    Specimen: Blood Updated: 02/10/21 0552     Glucose 107 mg/dL      BUN 7 mg/dL      Creatinine 0.53 mg/dL      Sodium 134 mmol/L      Potassium 3.4 mmol/L      Comment: Slight hemolysis detected by analyzer. Results may be affected.        Chloride 106 mmol/L      CO2 19.0 mmol/L      Calcium 7.6 mg/dL      eGFR Non African Amer 121 mL/min/1.73      BUN/Creatinine Ratio 13.2     Anion Gap 9.0 mmol/L     Narrative:      GFR Normal >60  Chronic Kidney Disease <60  Kidney Failure <15      Phosphorus [[id number]]  (Abnormal) Collected: 02/10/21 0459    Specimen: Blood Updated: 02/10/21 0551     Phosphorus 1.9 mg/dL     Magnesium [[id number]]  (Normal) Collected: 02/10/21 0459    Specimen: Blood Updated: 02/10/21 0537     Magnesium 1.9 mg/dL     Potassium [[id number]]  (Abnormal) Collected: 02/09/21 1640    Specimen: Blood Updated: 02/09/21 1703     Potassium 1.9 mmol/L     T4, Free [[id number]]  (Normal) Collected: 02/09/21 1025    Specimen: Blood Updated: 02/09/21 1656     Free T4 1.09 ng/dL     Narrative:      Results may be falsely increased if patient taking Biotin.      TSH [[id number]]  (Normal) Collected: 02/09/21 1025    Specimen: Blood Updated: 02/09/21 1656     TSH 2.030 uIU/mL     Urine Drug Screen  - Urine, Clean Catch [[id number]]  (Normal) Collected: 02/09/21 1124    Specimen: Urine, Clean Catch Updated: 02/09/21 1158     THC, Screen, Urine Negative     Phencyclidine (PCP), Urine Negative     Cocaine Screen, Urine Negative     Methamphetamine, Ur Negative     Opiate Screen Negative     Amphetamine Screen, Urine Negative     Benzodiazepine Screen, Urine Negative     Tricyclic Antidepressants Screen Negative     Methadone Screen, Urine Negative     Barbiturates Screen, Urine Negative     Oxycodone Screen, Urine Negative     Propoxyphene Screen Negative     Buprenorphine, Screen, Urine Negative    Narrative:      Cutoff For Drugs Screened:    Amphetamines               500 ng/ml  Barbiturates               200 ng/ml  Benzodiazepines            150 ng/ml  Cocaine                    150 ng/ml  Methadone                  200 ng/ml  Opiates                    100 ng/ml  Phencyclidine               25 ng/ml  THC                            50 ng/ml  Methamphetamine            500 ng/ml  Tricyclic Antidepressants  300 ng/ml  Oxycodone                  100 ng/ml  Propoxyphene               300 ng/ml  Buprenorphine               10 ng/ml    The normal value for all drugs tested is negative. This report includes unconfirmed screening results, with the cutoff values listed, to be used for medical treatment purposes only.  Unconfirmed results must not be used for non-medical purposes such as employment or legal testing.  Clinical consideration should be applied to any drug of abuse test, particularly when unconfirmed results are used.      Urinalysis With Culture If Indicated - Urine, Clean Catch [[id number]]  (Abnormal) Collected: 02/09/21 1124    Specimen: Urine, Clean Catch Updated: 02/09/21 1153     Color, UA Yellow     Appearance, UA Clear     pH, UA 8.5     Specific Gravity, UA 1.008     Glucose, UA Negative     Ketones, UA Negative     Bilirubin, UA Negative     Blood, UA Negative     Protein, UA Negative     Leuk  Esterase, UA Trace     Nitrite, UA Negative     Urobilinogen, UA 0.2 E.U./dL    Urinalysis, Microscopic Only - Urine, Clean Catch [[id number]]  (Abnormal) Collected: 02/09/21 1124    Specimen: Urine, Clean Catch Updated: 02/09/21 1153     RBC, UA 0-2 /HPF      WBC, UA 0-2 /HPF      Bacteria, UA None Seen /HPF      Squamous Epithelial Cells, UA 0-2 /HPF      Hyaline Casts, UA 0-2 /LPF      Methodology Automated Microscopy    COVID-19, [facility]OTT IN-HOUSE,NASAL Swab (NO TRANSPORT MEDIA) 2 HR TAT - Swab, Nasopharynx [[id number]]  (Normal) Collected: 02/09/21 1026    Specimen: Swab from Nasopharynx Updated: 02/09/21 1118     COVID19 Presumptive Negative    Narrative:      Fact sheet for providers: https://www.fda.gov/media/842385/download     Fact sheet for patients: https://www.fda.gov/media/526179/download    Test performed by PCR.  If inconsistent with clinical signs and symptoms patient should be tested with different authorized molecular test.    Comprehensive Metabolic Panel [[id number]]  (Abnormal) Collected: 02/09/21 1025    Specimen: Blood Updated: 02/09/21 1111     Glucose 123 mg/dL      BUN 8 mg/dL      Creatinine 0.62 mg/dL      Sodium 132 mmol/L      Potassium 2.2 mmol/L      Chloride 91 mmol/L      CO2 29.0 mmol/L      Calcium 8.7 mg/dL      Total Protein 6.1 g/dL      Albumin 3.10 g/dL      ALT (SGPT) 19 U/L      AST (SGOT) 48 U/L      Alkaline Phosphatase 162 U/L      Total Bilirubin 0.4 mg/dL      eGFR Non African Amer 101 mL/min/1.73      Globulin 3.0 gm/dL      A/G Ratio 1.0 g/dL      BUN/Creatinine Ratio 12.9     Anion Gap 12.0 mmol/L     Narrative:      GFR Normal >60  Chronic Kidney Disease <60  Kidney Failure <15      Magnesium [[id number]]  (Normal) Collected: 02/09/21 1025    Specimen: Blood Updated: 02/09/21 1049     Magnesium 1.7 mg/dL     CBC & Differential [[id number]]  (Abnormal) Collected: 02/09/21 1025    Specimen: Blood Updated: 02/09/21 1037    Narrative:      The following orders were created  for panel order CBC & Differential.  Procedure                               Abnormality         Status                     ---------                               -----------         ------                     CBC Auto Differential[[id number]]        Abnormal            Final result                 Please view results for these tests on the individual orders.    CBC Auto Differential [[id number]]  (Abnormal) Collected: 02/09/21 1025    Specimen: Blood Updated: 02/09/21 1037     WBC 7.51 10*3/mm3      RBC 4.86 10*6/mm3      Hemoglobin 14.1 g/dL      Hematocrit 39.3 %      MCV 80.9 fL      MCH 29.0 pg      MCHC 35.9 g/dL      RDW 14.1 %      RDW-SD 40.0 fl      MPV 10.2 fL      Platelets 308 10*3/mm3      Neutrophil % 62.3 %      Lymphocyte % 24.4 %      Monocyte % 10.0 %      Eosinophil % 2.4 %      Basophil % 0.5 %      Immature Grans % 0.4 %      Neutrophils, Absolute 4.68 10*3/mm3      Lymphocytes, Absolute 1.83 10*3/mm3      Monocytes, Absolute 0.75 10*3/mm3      Eosinophils, Absolute 0.18 10*3/mm3      Basophils, Absolute 0.04 10*3/mm3      Immature Grans, Absolute 0.03 10*3/mm3      nRBC 0.0 /100 WBC         Chief Complaint on Day of Discharge: Overall, patient reports feeling well.  She denies shortness of breath, chest pain or pressure.  She is tolerating a diet.  She is eager for discharge home.  She is to follow up with Dr. Dyson within one week.  Will need repeat BMP on Monday 2/15.    Hospital Course:  The patient is a 50 y.o. female who presented to [facility] with abnormal labs.  She has a past medical history significant for essential hypertension, hyperlipidemia, and gastroesophageal reflux disease.  She follows with Dr. Delta Dyson for primary care.  Of note, she has a history of recurrent issues with electrolytes.  She was hospitalized in January at [facility] secondary to hypokalemia that was worse after episodes of vomiting.  She does take HCTZ chronically and has been on  acetazolamide 3 times daily since December.  Patient presented on 2/9 after being notified by Dr. Dyson that she had a low sodium and potassium on outpatient labs.  She denied any recent vomiting or diarrhea.  Patient also has had ongoing difficulty with her left eye since November after Thanksgiving.  She is being evaluated at ophthalmology clinic in [facility].  She was diagnosed with a bacterial infection and has unfortunately developed secondary glaucoma due to the inflammation.  She is still taking several eyedrops each day and has also been on oral doxycycline.  She was started on acetazolamide in early December to try and help with pressure.  Upon evaluation in the emergency department, potassium was noted to be 2.2 and sodium 132.  She was started on aggressive replacement therapy.  She was admitted to the hospitalist service for further evaluation and management.    She was started on gentle IV fluid.  Sodium has improved to 134.  Hypokalemia and hypophosphatemia have resolved.  She will be discharged with instructions to take 40 mEq of potassium chloride twice daily (previously on 20 mEq twice daily as outpatient.)  HCTZ has been discontinued as it is likely contributing to problems with hyponatremia and hypokalemia. Blood pressure stable.  She does need to be continued on acetazolamide at this point for her glaucoma.  Acetazolamide could be exacerbating her electrolyte abnormalities as well.  She has been continued on her eyedrops and chronic antibiotic therapy for secondary glaucoma.  She is to follow-up with ophthalmology in [facility].  She also notes unintentional weight loss.  Patient states she has had a poor appetite and has not been eating well.  She attributes this to her acetazolamide intake.  She also admits to depression.  She lost her significant other last year secondary to alcoholism.  Also reported that her 15-year-old daughter has moved out in the last year to live with  "her father instead.  Reports that she stopped taking her Celexa about 4 months ago and feels that she is doing fine without it.  She reports that she is up-to-date on age-appropriate screenings such as colonoscopy and mammogram.  She will need close follow-up with her primary care provider regarding unintentional weight loss.  TSH and free T4 within normal limits.  She was counseled on the importance of tobacco cessation.  Overall, patient reports feeling well.  She denies shortness of breath, chest pain or pressure.  She is tolerating a diet.  She is eager for discharge home.  She is to follow up with Dr. Dyson within one week.  Will need repeat BMP on Monday 2/15.    Condition on Discharge:  Medically stable.    Physical Exam on Discharge:  BP (!) 83/59 (BP Location: Right arm, Patient Position: Sitting) Comment: reported to nurse  Pulse 92   Temp 98.2 °F (36.8 °C) (Oral)   Resp 18   Ht 149.9 cm (59\")   Wt 32.8 kg (72 lb 6.4 oz)   SpO2 100%   BMI 14.62 kg/m²   Physical Exam  Constitutional:       Appearance: She is well-developed and underweight.      Comments: Sitting up in bed.  No acute distress.  Tolerating room air.  Discussed with her nurse, Radha.  Patient is keeping her left eye closed as she does have ongoing bacterial infection with secondary glaucoma.   HENT:      Head: Normocephalic and atraumatic.   Eyes:      General: No scleral icterus.     Conjunctiva/sclera: Conjunctivae normal.      Pupils: Pupils are equal, round, and reactive to light.   Neck:      Musculoskeletal: Neck supple.      Vascular: No JVD.   Cardiovascular:      Rate and Rhythm: Normal rate and regular rhythm.      Heart sounds: Normal heart sounds. No murmur. No friction rub. No gallop.       Comments: Sinus 82-94  Pulmonary:      Effort: Pulmonary effort is normal.      Breath sounds: Normal breath sounds. No wheezing or rales.   Abdominal:      General: Bowel sounds are normal. There is no distension.      Palpations: " Abdomen is soft. There is no mass.      Tenderness: There is no abdominal tenderness. There is no guarding or rebound.   Musculoskeletal: Normal range of motion.   Lymphadenopathy:      Cervical: No cervical adenopathy.   Skin:     General: Skin is warm and dry.   Neurological:      Mental Status: She is alert and oriented to person, place, and time.      Cranial Nerves: No cranial nerve deficit.   Psychiatric:         Behavior: Behavior normal.       Discharge Disposition:    Home    Discharge Medications:     Discharge Medications      New Medications      Instructions Start Date   magnesium oxide 400 (241.3 Mg) MG tablet tablet  Commonly known as: MAGOX   800 mg, Oral, 2 Times Daily         Changes to Medications      Instructions Start Date   potassium chloride 20 MEQ CR tablet  Commonly known as: K-DUR,KLOR-CON  What changed: how much to take   40 mEq, Oral, 2 Times Daily         Continue These Medications      Instructions Start Date   acetaZOLAMIDE 250 MG tablet  Commonly known as: DIAMOX   250 mg, Oral, 3 Times Daily      atropine 1 % ophthalmic solution   1 drop, Left Eye, 3 Times Daily      brimonidine-timolol 0.2-0.5 % ophthalmic solution  Commonly known as: COMBIGAN   1 drop, Left Eye, Every 12 Hours      Calcium 600+D3 600-200 MG-UNIT tablet  Generic drug: Calcium Carb-Cholecalciferol   1 tablet, Oral, Every 12 Hours Scheduled      doxycycline 100 MG capsule  Commonly known as: VIBRAMYCIN   100 mg, Oral, 2 Times Daily      multivitamin with minerals tablet tablet   1 tablet, Oral, Daily      trimethoprim-polymyxin b 51167-9.1 UNIT/ML-% ophthalmic solution  Commonly known as: POLYTRIM   1 drop, Left Eye, Every 4 Hours         Stop These Medications    hydroCHLOROthiazide 25 MG tablet  Commonly known as: HYDRODIURIL     Magnesium 500 MG tablet            Discharge Diet:   Diet Instructions     Diet: Regular      Discharge Diet: Regular          Activity at Discharge:   Activity Instructions     Activity  as Tolerated            Discharge Care Plan/Instructions:   1.  Follow up with Dr. Dyson within one week.  Will need repeat BMP on Monday 2/15.  2.  HCTZ has been discontinued as it is likely contributing to problems with hyponatremia and hypokalemia.  3.  She will need close follow-up with her primary care provider regarding unintentional weight loss.  4.  Tobacco cessation counseling.    Test Results Pending at Discharge: None.    Electronically signed by GENEVIEVE Ramires, 02/10/21, 13:04 CST.    Time: 35 minutes.    I personally evaluated and examined the patient in conjunction with GENEVIEVE Rabago and agree with the assessment, treatment plan, and disposition of the patient as recorded by her. My history, exam, and further recommendations are:     Discussed with her nurse, Radha.    The patient has had her electrolytes repleted.  We wish to continue her off HCTZ as her blood pressure does not need this and it is only likely exacerbating her electrolyte problems.  She does require staying on acetazolamide for now due to her glaucoma issues. She needs to follow-up with Dr. Dyson soon for this issue and also her problems with unintentional weight loss.    Electronically signed by Chris Sequeira DO, 2/10/2021, 13:57 CST.

## 2021-02-10 NOTE — PLAN OF CARE
Goal Outcome Evaluation:  Plan of Care Reviewed With: patient  Progress: no change  Outcome Summary: VSS, BP soft; no c/o of pain; 4 runs of IV potassium infused, awaiting AM labs to recheck; Ns w/ 20 K infusing @75 mL/hr; sinus 85-94 on tele; up adlib to bathroom; safety maintained

## 2021-02-10 NOTE — NURSING NOTE
Potassium 3.4 this Am, phosphorus critical at 1.9 this AM and BP in 70s systolic; MD paged; pt placed in trendelenburg position, BP increased to 92 systolic; she is asymptomatic; waiting on return call from MD for critical lab

## 2021-02-10 NOTE — PROGRESS NOTES
Malnutrition Severity Assessment    Patient Name:  Adelita Jaeger  YOB: 1970  MRN: [id number]  Admit Date:  2/9/2021    Patient meets criteria for : Severe Malnutrition    Comments:      Malnutrition Severity Assessment  Malnutrition Type: Chronic Disease - Related Malnutrition     Malnutrition Type (last 8 hours)      Malnutrition Severity Assessment     Row Name 02/10/21 1338       Malnutrition Severity Assessment    Malnutrition Type  Chronic Disease - Related Malnutrition    Row Name 02/10/21 1338       Insufficient Energy Intake     Insufficient Energy Intake Findings  Severe    Insufficient Energy Intake   < or equal to 50% of est. energy requirement for > or equal to 5d)    Row Name 02/10/21 1338       Unintentional Weight Loss     Unintentional Weight Loss Findings  Severe    Unintentional Weight Loss   Weight loss greater than 10% in six months 21.7% over 8 months    Row Name 02/10/21 1338       Criteria Met (Must meet criteria for severity in at least 2 of these categories: M Wasting, Fat Loss, Fluid, Secondary Signs, Wt. Status, Intake)    Patient meets criteria for   Severe Malnutrition          Electronically signed by:  HAMILTON Benitez RDN  02/10/21 13:41 CST

## 2022-03-21 ENCOUNTER — ANESTHESIA EVENT (OUTPATIENT)
Dept: PERIOP | Facility: HOSPITAL | Age: 52
End: 2022-03-21

## 2022-03-21 ENCOUNTER — HOSPITAL ENCOUNTER (INPATIENT)
Facility: HOSPITAL | Age: 52
LOS: 7 days | Discharge: HOME | End: 2022-03-28
Attending: FAMILY MEDICINE | Admitting: FAMILY MEDICINE

## 2022-03-21 ENCOUNTER — APPOINTMENT (OUTPATIENT)
Dept: GENERAL RADIOLOGY | Facility: HOSPITAL | Age: 52
End: 2022-03-21

## 2022-03-21 ENCOUNTER — ANESTHESIA (OUTPATIENT)
Dept: PERIOP | Facility: HOSPITAL | Age: 52
End: 2022-03-21

## 2022-03-21 DIAGNOSIS — S72.002A CLOSED FRACTURE OF LEFT HIP, INITIAL ENCOUNTER: Primary | ICD-10-CM

## 2022-03-21 DIAGNOSIS — M80.052A: Chronic | ICD-10-CM

## 2022-03-21 DIAGNOSIS — Z74.09 IMPAIRED MOBILITY: ICD-10-CM

## 2022-03-21 DIAGNOSIS — Z78.9 DECREASED ACTIVITIES OF DAILY LIVING (ADL): ICD-10-CM

## 2022-03-21 PROBLEM — S72.012A CLOSED SUBCAPITAL FRACTURE OF FEMUR, LEFT, INITIAL ENCOUNTER (HCC): Status: ACTIVE | Noted: 2022-03-21

## 2022-03-21 PROBLEM — M80.80XA OSTEOPOROSIS WITH FRACTURE: Chronic | Status: ACTIVE | Noted: 2022-03-21

## 2022-03-21 PROBLEM — S72.009A HIP FRACTURE: Status: ACTIVE | Noted: 2022-03-21

## 2022-03-21 LAB
ALBUMIN SERPL-MCNC: 3.2 G/DL (ref 3.5–5.2)
ALBUMIN/GLOB SERPL: 1.2 G/DL
ALP SERPL-CCNC: 178 U/L (ref 39–117)
ALT SERPL W P-5'-P-CCNC: 16 U/L (ref 1–33)
ANION GAP SERPL CALCULATED.3IONS-SCNC: 9 MMOL/L (ref 5–15)
AST SERPL-CCNC: 25 U/L (ref 1–32)
BASOPHILS # BLD AUTO: 0.03 10*3/MM3 (ref 0–0.2)
BASOPHILS NFR BLD AUTO: 0.3 % (ref 0–1.5)
BILIRUB SERPL-MCNC: 0.5 MG/DL (ref 0–1.2)
BUN SERPL-MCNC: 12 MG/DL (ref 6–20)
BUN/CREAT SERPL: 22.2 (ref 7–25)
CALCIUM SPEC-SCNC: 8.7 MG/DL (ref 8.6–10.5)
CHLORIDE SERPL-SCNC: 100 MMOL/L (ref 98–107)
CK SERPL-CCNC: 47 U/L (ref 20–180)
CO2 SERPL-SCNC: 27 MMOL/L (ref 22–29)
CREAT SERPL-MCNC: 0.54 MG/DL (ref 0.57–1)
DEPRECATED RDW RBC AUTO: 51.2 FL (ref 37–54)
EGFRCR SERPLBLD CKD-EPI 2021: 110.2 ML/MIN/1.73
EOSINOPHIL # BLD AUTO: 0.04 10*3/MM3 (ref 0–0.4)
EOSINOPHIL NFR BLD AUTO: 0.4 % (ref 0.3–6.2)
ERYTHROCYTE [DISTWIDTH] IN BLOOD BY AUTOMATED COUNT: 14.9 % (ref 12.3–15.4)
GLOBULIN UR ELPH-MCNC: 2.7 GM/DL
GLUCOSE SERPL-MCNC: 107 MG/DL (ref 65–99)
HCT VFR BLD AUTO: 44.2 % (ref 34–46.6)
HGB BLD-MCNC: 14 G/DL (ref 12–15.9)
IMM GRANULOCYTES # BLD AUTO: 0.05 10*3/MM3 (ref 0–0.05)
IMM GRANULOCYTES NFR BLD AUTO: 0.5 % (ref 0–0.5)
INR PPP: 0.96 (ref 0.91–1.09)
LYMPHOCYTES # BLD AUTO: 1.36 10*3/MM3 (ref 0.7–3.1)
LYMPHOCYTES NFR BLD AUTO: 13.8 % (ref 19.6–45.3)
MCH RBC QN AUTO: 30 PG (ref 26.6–33)
MCHC RBC AUTO-ENTMCNC: 31.7 G/DL (ref 31.5–35.7)
MCV RBC AUTO: 94.6 FL (ref 79–97)
MONOCYTES # BLD AUTO: 0.68 10*3/MM3 (ref 0.1–0.9)
MONOCYTES NFR BLD AUTO: 6.9 % (ref 5–12)
NEUTROPHILS NFR BLD AUTO: 7.73 10*3/MM3 (ref 1.7–7)
NEUTROPHILS NFR BLD AUTO: 78.1 % (ref 42.7–76)
NRBC BLD AUTO-RTO: 0 /100 WBC (ref 0–0.2)
PLATELET # BLD AUTO: 333 10*3/MM3 (ref 140–450)
PMV BLD AUTO: 10 FL (ref 6–12)
POTASSIUM SERPL-SCNC: 3.7 MMOL/L (ref 3.5–5.2)
PROT SERPL-MCNC: 5.9 G/DL (ref 6–8.5)
PROTHROMBIN TIME: 12.4 SECONDS (ref 11.9–14.6)
RBC # BLD AUTO: 4.67 10*6/MM3 (ref 3.77–5.28)
SARS-COV-2 RNA PNL SPEC NAA+PROBE: NOT DETECTED
SODIUM SERPL-SCNC: 136 MMOL/L (ref 136–145)
WBC NRBC COR # BLD: 9.89 10*3/MM3 (ref 3.4–10.8)

## 2022-03-21 PROCEDURE — 93005 ELECTROCARDIOGRAM TRACING: CPT | Performed by: NURSE PRACTITIONER

## 2022-03-21 PROCEDURE — C1713 ANCHOR/SCREW BN/BN,TIS/BN: HCPCS | Performed by: ORTHOPAEDIC SURGERY

## 2022-03-21 PROCEDURE — 85610 PROTHROMBIN TIME: CPT | Performed by: NURSE PRACTITIONER

## 2022-03-21 PROCEDURE — 0 HYDROMORPHONE 1 MG/ML SOLUTION: Performed by: NURSE PRACTITIONER

## 2022-03-21 PROCEDURE — 25010000002 PROPOFOL 10 MG/ML EMULSION: Performed by: NURSE ANESTHETIST, CERTIFIED REGISTERED

## 2022-03-21 PROCEDURE — C1769 GUIDE WIRE: HCPCS | Performed by: ORTHOPAEDIC SURGERY

## 2022-03-21 PROCEDURE — 99285 EMERGENCY DEPT VISIT HI MDM: CPT

## 2022-03-21 PROCEDURE — 73552 X-RAY EXAM OF FEMUR 2/>: CPT

## 2022-03-21 PROCEDURE — 0SHD34Z INSERTION OF INTERNAL FIXATION DEVICE INTO LEFT KNEE JOINT, PERCUTANEOUS APPROACH: ICD-10-PCS | Performed by: ORTHOPAEDIC SURGERY

## 2022-03-21 PROCEDURE — 25010000002 FENTANYL CITRATE (PF) 100 MCG/2ML SOLUTION: Performed by: NURSE ANESTHETIST, CERTIFIED REGISTERED

## 2022-03-21 PROCEDURE — 25010000002 ONDANSETRON PER 1 MG: Performed by: NURSE ANESTHETIST, CERTIFIED REGISTERED

## 2022-03-21 PROCEDURE — 93010 ELECTROCARDIOGRAM REPORT: CPT | Performed by: INTERNAL MEDICINE

## 2022-03-21 PROCEDURE — 73502 X-RAY EXAM HIP UNI 2-3 VIEWS: CPT

## 2022-03-21 PROCEDURE — 94799 UNLISTED PULMONARY SVC/PX: CPT

## 2022-03-21 PROCEDURE — 76000 FLUOROSCOPY <1 HR PHYS/QHP: CPT

## 2022-03-21 PROCEDURE — 80053 COMPREHEN METABOLIC PANEL: CPT | Performed by: NURSE PRACTITIONER

## 2022-03-21 PROCEDURE — 0 HYDROMORPHONE 1 MG/ML SOLUTION: Performed by: STUDENT IN AN ORGANIZED HEALTH CARE EDUCATION/TRAINING PROGRAM

## 2022-03-21 PROCEDURE — 82550 ASSAY OF CK (CPK): CPT | Performed by: NURSE PRACTITIONER

## 2022-03-21 PROCEDURE — 82607 VITAMIN B-12: CPT | Performed by: STUDENT IN AN ORGANIZED HEALTH CARE EDUCATION/TRAINING PROGRAM

## 2022-03-21 PROCEDURE — 25010000002 DEXAMETHASONE PER 1 MG: Performed by: NURSE ANESTHETIST, CERTIFIED REGISTERED

## 2022-03-21 PROCEDURE — 36415 COLL VENOUS BLD VENIPUNCTURE: CPT

## 2022-03-21 PROCEDURE — 82746 ASSAY OF FOLIC ACID SERUM: CPT | Performed by: STUDENT IN AN ORGANIZED HEALTH CARE EDUCATION/TRAINING PROGRAM

## 2022-03-21 PROCEDURE — 87635 SARS-COV-2 COVID-19 AMP PRB: CPT | Performed by: NURSE PRACTITIONER

## 2022-03-21 PROCEDURE — 85025 COMPLETE CBC W/AUTO DIFF WBC: CPT | Performed by: NURSE PRACTITIONER

## 2022-03-21 PROCEDURE — 25010000002 ONDANSETRON PER 1 MG: Performed by: NURSE PRACTITIONER

## 2022-03-21 PROCEDURE — 25010000002 ONDANSETRON PER 1 MG: Performed by: STUDENT IN AN ORGANIZED HEALTH CARE EDUCATION/TRAINING PROGRAM

## 2022-03-21 PROCEDURE — 71045 X-RAY EXAM CHEST 1 VIEW: CPT

## 2022-03-21 DEVICE — WASHR BONE 13MM: Type: IMPLANTABLE DEVICE | Site: HIP | Status: FUNCTIONAL

## 2022-03-21 DEVICE — SCRW CANN 16THRD 6.5X75MM: Type: IMPLANTABLE DEVICE | Site: HIP | Status: FUNCTIONAL

## 2022-03-21 DEVICE — IMPLANTABLE DEVICE: Type: IMPLANTABLE DEVICE | Site: HIP | Status: FUNCTIONAL

## 2022-03-21 RX ORDER — HYDROCODONE BITARTRATE AND ACETAMINOPHEN 7.5; 325 MG/1; MG/1
2 TABLET ORAL EVERY 4 HOURS PRN
Status: DISCONTINUED | OUTPATIENT
Start: 2022-03-21 | End: 2022-03-28 | Stop reason: HOSPADM

## 2022-03-21 RX ORDER — PHENYLEPHRINE HCL IN 0.9% NACL 1 MG/10 ML
SYRINGE (ML) INTRAVENOUS AS NEEDED
Status: DISCONTINUED | OUTPATIENT
Start: 2022-03-21 | End: 2022-03-21 | Stop reason: SURG

## 2022-03-21 RX ORDER — CLINDAMYCIN PHOSPHATE 900 MG/50ML
900 INJECTION INTRAVENOUS EVERY 8 HOURS
Status: COMPLETED | OUTPATIENT
Start: 2022-03-22 | End: 2022-03-22

## 2022-03-21 RX ORDER — SODIUM CHLORIDE, SODIUM LACTATE, POTASSIUM CHLORIDE, CALCIUM CHLORIDE 600; 310; 30; 20 MG/100ML; MG/100ML; MG/100ML; MG/100ML
100 INJECTION, SOLUTION INTRAVENOUS CONTINUOUS
Status: DISCONTINUED | OUTPATIENT
Start: 2022-03-21 | End: 2022-03-22 | Stop reason: HOSPADM

## 2022-03-21 RX ORDER — MAGNESIUM HYDROXIDE 1200 MG/15ML
LIQUID ORAL AS NEEDED
Status: DISCONTINUED | OUTPATIENT
Start: 2022-03-21 | End: 2022-03-21 | Stop reason: HOSPADM

## 2022-03-21 RX ORDER — DEXAMETHASONE SODIUM PHOSPHATE 4 MG/ML
INJECTION, SOLUTION INTRA-ARTICULAR; INTRALESIONAL; INTRAMUSCULAR; INTRAVENOUS; SOFT TISSUE AS NEEDED
Status: DISCONTINUED | OUTPATIENT
Start: 2022-03-21 | End: 2022-03-21 | Stop reason: SURG

## 2022-03-21 RX ORDER — DROPERIDOL 2.5 MG/ML
0.62 INJECTION, SOLUTION INTRAMUSCULAR; INTRAVENOUS ONCE AS NEEDED
Status: DISCONTINUED | OUTPATIENT
Start: 2022-03-21 | End: 2022-03-21 | Stop reason: HOSPADM

## 2022-03-21 RX ORDER — FENTANYL CITRATE 50 UG/ML
INJECTION, SOLUTION INTRAMUSCULAR; INTRAVENOUS AS NEEDED
Status: DISCONTINUED | OUTPATIENT
Start: 2022-03-21 | End: 2022-03-21 | Stop reason: SURG

## 2022-03-21 RX ORDER — PROPOFOL 10 MG/ML
VIAL (ML) INTRAVENOUS AS NEEDED
Status: DISCONTINUED | OUTPATIENT
Start: 2022-03-21 | End: 2022-03-21 | Stop reason: SURG

## 2022-03-21 RX ORDER — ROCURONIUM BROMIDE 10 MG/ML
INJECTION, SOLUTION INTRAVENOUS AS NEEDED
Status: DISCONTINUED | OUTPATIENT
Start: 2022-03-21 | End: 2022-03-21 | Stop reason: SURG

## 2022-03-21 RX ORDER — ONDANSETRON 2 MG/ML
4 INJECTION INTRAMUSCULAR; INTRAVENOUS EVERY 6 HOURS PRN
Status: DISCONTINUED | OUTPATIENT
Start: 2022-03-21 | End: 2022-03-28 | Stop reason: HOSPADM

## 2022-03-21 RX ORDER — DIAZEPAM 5 MG/1
5 TABLET ORAL EVERY 6 HOURS PRN
Status: DISCONTINUED | OUTPATIENT
Start: 2022-03-21 | End: 2022-03-28 | Stop reason: HOSPADM

## 2022-03-21 RX ORDER — ONDANSETRON 4 MG/1
4 TABLET, FILM COATED ORAL EVERY 6 HOURS PRN
Status: DISCONTINUED | OUTPATIENT
Start: 2022-03-21 | End: 2022-03-28 | Stop reason: HOSPADM

## 2022-03-21 RX ORDER — SODIUM CHLORIDE, SODIUM LACTATE, POTASSIUM CHLORIDE, CALCIUM CHLORIDE 600; 310; 30; 20 MG/100ML; MG/100ML; MG/100ML; MG/100ML
100 INJECTION, SOLUTION INTRAVENOUS CONTINUOUS
Status: DISCONTINUED | OUTPATIENT
Start: 2022-03-21 | End: 2022-03-26

## 2022-03-21 RX ORDER — FAMOTIDINE 20 MG/1
40 TABLET, FILM COATED ORAL DAILY
Status: DISCONTINUED | OUTPATIENT
Start: 2022-03-21 | End: 2022-03-28 | Stop reason: HOSPADM

## 2022-03-21 RX ORDER — ONDANSETRON 2 MG/ML
INJECTION INTRAMUSCULAR; INTRAVENOUS AS NEEDED
Status: DISCONTINUED | OUTPATIENT
Start: 2022-03-21 | End: 2022-03-21 | Stop reason: SURG

## 2022-03-21 RX ORDER — PROMETHAZINE HYDROCHLORIDE 25 MG/1
12.5 TABLET ORAL EVERY 6 HOURS PRN
Status: DISCONTINUED | OUTPATIENT
Start: 2022-03-21 | End: 2022-03-28 | Stop reason: HOSPADM

## 2022-03-21 RX ORDER — PROMETHAZINE HYDROCHLORIDE 12.5 MG/1
12.5 SUPPOSITORY RECTAL EVERY 6 HOURS PRN
Status: DISCONTINUED | OUTPATIENT
Start: 2022-03-21 | End: 2022-03-28 | Stop reason: HOSPADM

## 2022-03-21 RX ORDER — NALOXONE HCL 0.4 MG/ML
0.4 VIAL (ML) INJECTION
Status: DISCONTINUED | OUTPATIENT
Start: 2022-03-21 | End: 2022-03-28 | Stop reason: HOSPADM

## 2022-03-21 RX ORDER — SODIUM CHLORIDE 0.9 % (FLUSH) 0.9 %
3-10 SYRINGE (ML) INJECTION AS NEEDED
Status: DISCONTINUED | OUTPATIENT
Start: 2022-03-21 | End: 2022-03-21 | Stop reason: HOSPADM

## 2022-03-21 RX ORDER — ACETAMINOPHEN 325 MG/1
650 TABLET ORAL EVERY 4 HOURS PRN
Status: DISCONTINUED | OUTPATIENT
Start: 2022-03-21 | End: 2022-03-21 | Stop reason: SDUPTHER

## 2022-03-21 RX ORDER — DOCUSATE SODIUM 100 MG/1
100 CAPSULE, LIQUID FILLED ORAL 2 TIMES DAILY PRN
Status: DISCONTINUED | OUTPATIENT
Start: 2022-03-21 | End: 2022-03-28 | Stop reason: HOSPADM

## 2022-03-21 RX ORDER — HYDROMORPHONE HYDROCHLORIDE 1 MG/ML
0.5 INJECTION, SOLUTION INTRAMUSCULAR; INTRAVENOUS; SUBCUTANEOUS
Status: DISCONTINUED | OUTPATIENT
Start: 2022-03-21 | End: 2022-03-21 | Stop reason: HOSPADM

## 2022-03-21 RX ORDER — SODIUM CHLORIDE 0.9 % (FLUSH) 0.9 %
10 SYRINGE (ML) INJECTION EVERY 12 HOURS SCHEDULED
Status: DISCONTINUED | OUTPATIENT
Start: 2022-03-21 | End: 2022-03-28 | Stop reason: HOSPADM

## 2022-03-21 RX ORDER — ONDANSETRON 2 MG/ML
4 INJECTION INTRAMUSCULAR; INTRAVENOUS AS NEEDED
Status: DISCONTINUED | OUTPATIENT
Start: 2022-03-21 | End: 2022-03-21 | Stop reason: HOSPADM

## 2022-03-21 RX ORDER — ACETAMINOPHEN 650 MG/1
650 SUPPOSITORY RECTAL EVERY 4 HOURS PRN
Status: DISCONTINUED | OUTPATIENT
Start: 2022-03-21 | End: 2022-03-28 | Stop reason: HOSPADM

## 2022-03-21 RX ORDER — SODIUM CHLORIDE 0.9 % (FLUSH) 0.9 %
10 SYRINGE (ML) INJECTION AS NEEDED
Status: DISCONTINUED | OUTPATIENT
Start: 2022-03-21 | End: 2022-03-28 | Stop reason: HOSPADM

## 2022-03-21 RX ORDER — NALOXONE HCL 0.4 MG/ML
0.04 VIAL (ML) INJECTION AS NEEDED
Status: DISCONTINUED | OUTPATIENT
Start: 2022-03-21 | End: 2022-03-21 | Stop reason: HOSPADM

## 2022-03-21 RX ORDER — NALOXONE HCL 0.4 MG/ML
0.4 VIAL (ML) INJECTION
Status: DISCONTINUED | OUTPATIENT
Start: 2022-03-21 | End: 2022-03-27 | Stop reason: ALTCHOICE

## 2022-03-21 RX ORDER — CLINDAMYCIN PHOSPHATE 900 MG/50ML
900 INJECTION INTRAVENOUS
Status: COMPLETED | OUTPATIENT
Start: 2022-03-21 | End: 2022-03-21

## 2022-03-21 RX ORDER — ACETAMINOPHEN 325 MG/1
650 TABLET ORAL EVERY 4 HOURS PRN
Status: DISCONTINUED | OUTPATIENT
Start: 2022-03-21 | End: 2022-03-28 | Stop reason: HOSPADM

## 2022-03-21 RX ORDER — POLYETHYLENE GLYCOL 3350 17 G/17G
17 POWDER, FOR SOLUTION ORAL 2 TIMES DAILY
Status: DISCONTINUED | OUTPATIENT
Start: 2022-03-21 | End: 2022-03-28 | Stop reason: HOSPADM

## 2022-03-21 RX ORDER — SODIUM CHLORIDE 0.9 % (FLUSH) 0.9 %
1-10 SYRINGE (ML) INJECTION AS NEEDED
Status: DISCONTINUED | OUTPATIENT
Start: 2022-03-21 | End: 2022-03-28 | Stop reason: HOSPADM

## 2022-03-21 RX ORDER — NALOXONE HCL 0.4 MG/ML
0.1 VIAL (ML) INJECTION
Status: DISCONTINUED | OUTPATIENT
Start: 2022-03-21 | End: 2022-03-21 | Stop reason: SDUPTHER

## 2022-03-21 RX ORDER — OXYCODONE AND ACETAMINOPHEN 10; 325 MG/1; MG/1
1 TABLET ORAL ONCE AS NEEDED
Status: DISCONTINUED | OUTPATIENT
Start: 2022-03-21 | End: 2022-03-21 | Stop reason: HOSPADM

## 2022-03-21 RX ORDER — OXYCODONE HYDROCHLORIDE 5 MG/1
5 TABLET ORAL EVERY 6 HOURS PRN
Status: DISCONTINUED | OUTPATIENT
Start: 2022-03-21 | End: 2022-03-28 | Stop reason: HOSPADM

## 2022-03-21 RX ORDER — FLUMAZENIL 0.1 MG/ML
0.2 INJECTION INTRAVENOUS AS NEEDED
Status: DISCONTINUED | OUTPATIENT
Start: 2022-03-21 | End: 2022-03-21 | Stop reason: HOSPADM

## 2022-03-21 RX ORDER — SODIUM CHLORIDE 0.9 % (FLUSH) 0.9 %
3 SYRINGE (ML) INJECTION EVERY 12 HOURS SCHEDULED
Status: DISCONTINUED | OUTPATIENT
Start: 2022-03-21 | End: 2022-03-21 | Stop reason: HOSPADM

## 2022-03-21 RX ORDER — LIDOCAINE HYDROCHLORIDE 20 MG/ML
INJECTION, SOLUTION EPIDURAL; INFILTRATION; INTRACAUDAL; PERINEURAL AS NEEDED
Status: DISCONTINUED | OUTPATIENT
Start: 2022-03-21 | End: 2022-03-21 | Stop reason: SURG

## 2022-03-21 RX ORDER — ONDANSETRON 4 MG/1
4 TABLET, FILM COATED ORAL EVERY 6 HOURS PRN
Status: DISCONTINUED | OUTPATIENT
Start: 2022-03-21 | End: 2022-03-21

## 2022-03-21 RX ORDER — ACETAZOLAMIDE 250 MG/1
250 TABLET ORAL 3 TIMES DAILY
Status: DISCONTINUED | OUTPATIENT
Start: 2022-03-21 | End: 2022-03-28 | Stop reason: HOSPADM

## 2022-03-21 RX ORDER — FENTANYL CITRATE 50 UG/ML
25 INJECTION, SOLUTION INTRAMUSCULAR; INTRAVENOUS
Status: DISCONTINUED | OUTPATIENT
Start: 2022-03-21 | End: 2022-03-21 | Stop reason: HOSPADM

## 2022-03-21 RX ORDER — ONDANSETRON 2 MG/ML
4 INJECTION INTRAMUSCULAR; INTRAVENOUS ONCE
Status: COMPLETED | OUTPATIENT
Start: 2022-03-21 | End: 2022-03-21

## 2022-03-21 RX ORDER — LABETALOL HYDROCHLORIDE 5 MG/ML
5 INJECTION, SOLUTION INTRAVENOUS
Status: DISCONTINUED | OUTPATIENT
Start: 2022-03-21 | End: 2022-03-21 | Stop reason: HOSPADM

## 2022-03-21 RX ORDER — CALCIUM CARBONATE 200(500)MG
1 TABLET,CHEWABLE ORAL 2 TIMES DAILY PRN
Status: DISCONTINUED | OUTPATIENT
Start: 2022-03-21 | End: 2022-03-28 | Stop reason: HOSPADM

## 2022-03-21 RX ORDER — LANOLIN ALCOHOL/MO/W.PET/CERES
6 CREAM (GRAM) TOPICAL NIGHTLY PRN
Status: DISCONTINUED | OUTPATIENT
Start: 2022-03-21 | End: 2022-03-28 | Stop reason: HOSPADM

## 2022-03-21 RX ORDER — LIDOCAINE HYDROCHLORIDE 10 MG/ML
0.5 INJECTION, SOLUTION EPIDURAL; INFILTRATION; INTRACAUDAL; PERINEURAL ONCE AS NEEDED
Status: DISCONTINUED | OUTPATIENT
Start: 2022-03-21 | End: 2022-03-21 | Stop reason: HOSPADM

## 2022-03-21 RX ORDER — HYDROCODONE BITARTRATE AND ACETAMINOPHEN 7.5; 325 MG/1; MG/1
1 TABLET ORAL EVERY 4 HOURS PRN
Status: DISCONTINUED | OUTPATIENT
Start: 2022-03-21 | End: 2022-03-28 | Stop reason: HOSPADM

## 2022-03-21 RX ORDER — ONDANSETRON 2 MG/ML
4 INJECTION INTRAMUSCULAR; INTRAVENOUS EVERY 6 HOURS PRN
Status: DISCONTINUED | OUTPATIENT
Start: 2022-03-21 | End: 2022-03-21

## 2022-03-21 RX ADMIN — ONDANSETRON 4 MG: 2 INJECTION INTRAMUSCULAR; INTRAVENOUS at 18:05

## 2022-03-21 RX ADMIN — Medication 10 ML: at 19:58

## 2022-03-21 RX ADMIN — HYDROMORPHONE HYDROCHLORIDE 0.5 MG: 1 INJECTION, SOLUTION INTRAMUSCULAR; INTRAVENOUS; SUBCUTANEOUS at 14:29

## 2022-03-21 RX ADMIN — Medication 100 MCG: at 17:47

## 2022-03-21 RX ADMIN — ROCURONIUM BROMIDE 50 MG: 10 SOLUTION INTRAVENOUS at 17:42

## 2022-03-21 RX ADMIN — ONDANSETRON 4 MG: 2 INJECTION INTRAMUSCULAR; INTRAVENOUS at 08:32

## 2022-03-21 RX ADMIN — CLINDAMYCIN IN 5 PERCENT DEXTROSE 900 MG: 18 INJECTION, SOLUTION INTRAVENOUS at 17:45

## 2022-03-21 RX ADMIN — SODIUM CHLORIDE, POTASSIUM CHLORIDE, SODIUM LACTATE AND CALCIUM CHLORIDE 100 ML/HR: 600; 310; 30; 20 INJECTION, SOLUTION INTRAVENOUS at 17:08

## 2022-03-21 RX ADMIN — DEXAMETHASONE SODIUM PHOSPHATE 4 MG: 4 INJECTION, SOLUTION INTRA-ARTICULAR; INTRALESIONAL; INTRAMUSCULAR; INTRAVENOUS; SOFT TISSUE at 18:05

## 2022-03-21 RX ADMIN — PROPOFOL 150 MG: 10 INJECTION, EMULSION INTRAVENOUS at 17:42

## 2022-03-21 RX ADMIN — SODIUM CHLORIDE, POTASSIUM CHLORIDE, SODIUM LACTATE AND CALCIUM CHLORIDE 100 ML/HR: 600; 310; 30; 20 INJECTION, SOLUTION INTRAVENOUS at 19:58

## 2022-03-21 RX ADMIN — SODIUM CHLORIDE, POTASSIUM CHLORIDE, SODIUM LACTATE AND CALCIUM CHLORIDE 1000 ML: 600; 310; 30; 20 INJECTION, SOLUTION INTRAVENOUS at 08:34

## 2022-03-21 RX ADMIN — FAMOTIDINE 40 MG: 20 TABLET, FILM COATED ORAL at 20:27

## 2022-03-21 RX ADMIN — HYDROMORPHONE HYDROCHLORIDE 1 MG: 1 INJECTION, SOLUTION INTRAMUSCULAR; INTRAVENOUS; SUBCUTANEOUS at 08:32

## 2022-03-21 RX ADMIN — ONDANSETRON 4 MG: 2 INJECTION INTRAMUSCULAR; INTRAVENOUS at 14:29

## 2022-03-21 RX ADMIN — LIDOCAINE HYDROCHLORIDE 60 MG: 20 INJECTION, SOLUTION EPIDURAL; INFILTRATION; INTRACAUDAL; PERINEURAL at 17:42

## 2022-03-21 RX ADMIN — HYDROCODONE BITARTRATE AND ACETAMINOPHEN 1 TABLET: 7.5; 325 TABLET ORAL at 19:58

## 2022-03-21 RX ADMIN — SUGAMMADEX 200 MG: 100 INJECTION, SOLUTION INTRAVENOUS at 18:15

## 2022-03-21 RX ADMIN — ACETAZOLAMIDE 250 MG: 250 TABLET ORAL at 20:27

## 2022-03-21 RX ADMIN — FENTANYL CITRATE 100 MCG: 50 INJECTION, SOLUTION INTRAMUSCULAR; INTRAVENOUS at 17:42

## 2022-03-21 NOTE — ANESTHESIA PROCEDURE NOTES
Airway  Urgency: elective    Date/Time: 3/21/2022 5:44 PM  Airway not difficult    General Information and Staff    Patient location during procedure: OR  CRNA: Gladis Huber CRNA    Indications and Patient Condition  Indications for airway management: airway protection    Preoxygenated: yes  Mask difficulty assessment: 1 - vent by mask    Final Airway Details  Final airway type: endotracheal airway      Successful airway: ETT  Cuffed: yes   Successful intubation technique: direct laryngoscopy  Endotracheal tube insertion site: oral  Blade: Geovanna  Blade size: 3.5  ETT size (mm): 7.0  Cormack-Lehane Classification: grade I - full view of glottis  Placement verified by: chest auscultation and capnometry   Cuff volume (mL): 5  Measured from: lips  ETT/EBT  to lips (cm): 23  Number of attempts at approach: 1  Assessment: lips, teeth, and gum same as pre-op and atraumatic intubation

## 2022-03-21 NOTE — ANESTHESIA PREPROCEDURE EVALUATION
Anesthesia Evaluation     Patient summary reviewed   no history of anesthetic complications:  NPO Solid Status: > 8 hours  NPO Liquid Status: > 8 hours           Airway   Mallampati: II  TM distance: >3 FB  Neck ROM: full  Dental    (+) poor dentition and upper dentures        Pulmonary    (+) a smoker Current,   Cardiovascular     (+) hypertension, hyperlipidemia,       Neuro/Psych- negative ROS  GI/Hepatic/Renal/Endo    (+)  GERD,      Musculoskeletal (-) negative ROS    Abdominal    Substance History   (+) alcohol use,      OB/GYN          Other - negative ROS                       Anesthesia Plan    ASA 3     general     intravenous induction     Anesthetic plan, all risks, benefits, and alternatives have been provided, discussed and informed consent has been obtained with: patient.        CODE STATUS:    Code Status (Patient has no pulse and is not breathing): CPR (Attempt to Resuscitate)  Medical Interventions (Patient has pulse or is breathing): Full Support

## 2022-03-22 LAB
25(OH)D3 SERPL-MCNC: 43.6 NG/ML (ref 30–100)
ANION GAP SERPL CALCULATED.3IONS-SCNC: 8 MMOL/L (ref 5–15)
BUN SERPL-MCNC: 11 MG/DL (ref 6–20)
BUN/CREAT SERPL: 17.5 (ref 7–25)
CALCIUM SPEC-SCNC: 8.3 MG/DL (ref 8.6–10.5)
CHLORIDE SERPL-SCNC: 102 MMOL/L (ref 98–107)
CO2 SERPL-SCNC: 27 MMOL/L (ref 22–29)
CREAT SERPL-MCNC: 0.63 MG/DL (ref 0.57–1)
DEPRECATED RDW RBC AUTO: 55 FL (ref 37–54)
EGFRCR SERPLBLD CKD-EPI 2021: 106.2 ML/MIN/1.73
ERYTHROCYTE [DISTWIDTH] IN BLOOD BY AUTOMATED COUNT: 15 % (ref 12.3–15.4)
FOLATE SERPL-MCNC: 7.17 NG/ML (ref 4.78–24.2)
GLUCOSE SERPL-MCNC: 87 MG/DL (ref 65–99)
HCT VFR BLD AUTO: 40 % (ref 34–46.6)
HGB BLD-MCNC: 12.2 G/DL (ref 12–15.9)
IRON 24H UR-MRATE: 30 MCG/DL (ref 37–145)
IRON SATN MFR SERPL: 13 % (ref 20–50)
MCH RBC QN AUTO: 30.3 PG (ref 26.6–33)
MCHC RBC AUTO-ENTMCNC: 30.5 G/DL (ref 31.5–35.7)
MCV RBC AUTO: 99.3 FL (ref 79–97)
PLATELET # BLD AUTO: 266 10*3/MM3 (ref 140–450)
PMV BLD AUTO: 10.5 FL (ref 6–12)
POTASSIUM SERPL-SCNC: 3.5 MMOL/L (ref 3.5–5.2)
QT INTERVAL: 380 MS
QTC INTERVAL: 482 MS
RBC # BLD AUTO: 4.03 10*6/MM3 (ref 3.77–5.28)
SODIUM SERPL-SCNC: 137 MMOL/L (ref 136–145)
TIBC SERPL-MCNC: 238 MCG/DL (ref 298–536)
TRANSFERRIN SERPL-MCNC: 160 MG/DL (ref 200–360)
VIT B12 BLD-MCNC: 383 PG/ML (ref 211–946)
WBC NRBC COR # BLD: 9.14 10*3/MM3 (ref 3.4–10.8)

## 2022-03-22 PROCEDURE — 97161 PT EVAL LOW COMPLEX 20 MIN: CPT | Performed by: PHYSICAL THERAPIST

## 2022-03-22 PROCEDURE — 83540 ASSAY OF IRON: CPT | Performed by: STUDENT IN AN ORGANIZED HEALTH CARE EDUCATION/TRAINING PROGRAM

## 2022-03-22 PROCEDURE — 80048 BASIC METABOLIC PNL TOTAL CA: CPT | Performed by: STUDENT IN AN ORGANIZED HEALTH CARE EDUCATION/TRAINING PROGRAM

## 2022-03-22 PROCEDURE — 97165 OT EVAL LOW COMPLEX 30 MIN: CPT

## 2022-03-22 PROCEDURE — 97116 GAIT TRAINING THERAPY: CPT

## 2022-03-22 PROCEDURE — 85027 COMPLETE CBC AUTOMATED: CPT | Performed by: STUDENT IN AN ORGANIZED HEALTH CARE EDUCATION/TRAINING PROGRAM

## 2022-03-22 PROCEDURE — 84466 ASSAY OF TRANSFERRIN: CPT | Performed by: STUDENT IN AN ORGANIZED HEALTH CARE EDUCATION/TRAINING PROGRAM

## 2022-03-22 PROCEDURE — 25010000002 ENOXAPARIN PER 10 MG: Performed by: ORTHOPAEDIC SURGERY

## 2022-03-22 PROCEDURE — 82306 VITAMIN D 25 HYDROXY: CPT | Performed by: STUDENT IN AN ORGANIZED HEALTH CARE EDUCATION/TRAINING PROGRAM

## 2022-03-22 PROCEDURE — 97110 THERAPEUTIC EXERCISES: CPT

## 2022-03-22 RX ORDER — LATANOPROST 50 UG/ML
1 SOLUTION/ DROPS OPHTHALMIC NIGHTLY
COMMUNITY

## 2022-03-22 RX ORDER — TIMOLOL MALEATE 5 MG/ML
1 SOLUTION/ DROPS OPHTHALMIC 2 TIMES DAILY
COMMUNITY

## 2022-03-22 RX ORDER — ALENDRONATE SODIUM 10 MG/1
10 TABLET ORAL
COMMUNITY

## 2022-03-22 RX ORDER — ASPIRIN 81 MG/1
81 TABLET ORAL DAILY
COMMUNITY

## 2022-03-22 RX ORDER — CITALOPRAM 10 MG/1
10 TABLET ORAL DAILY
COMMUNITY

## 2022-03-22 RX ORDER — CITALOPRAM 20 MG/1
20 TABLET ORAL DAILY
COMMUNITY

## 2022-03-22 RX ORDER — POTASSIUM CHLORIDE 20 MEQ/1
20 TABLET, EXTENDED RELEASE ORAL 3 TIMES DAILY
COMMUNITY

## 2022-03-22 RX ORDER — BRIMONIDINE TARTRATE 0.15 %
1 DROPS OPHTHALMIC (EYE) 2 TIMES DAILY
COMMUNITY
End: 2022-03-28 | Stop reason: HOSPADM

## 2022-03-22 RX ORDER — MAGNESIUM GLUCONATE 27 MG(500)
27 TABLET ORAL 2 TIMES DAILY
COMMUNITY

## 2022-03-22 RX ADMIN — POLYETHYLENE GLYCOL 3350 17 G: 17 POWDER, FOR SOLUTION ORAL at 21:05

## 2022-03-22 RX ADMIN — POLYETHYLENE GLYCOL 3350 17 G: 17 POWDER, FOR SOLUTION ORAL at 00:35

## 2022-03-22 RX ADMIN — FAMOTIDINE 40 MG: 20 TABLET, FILM COATED ORAL at 10:05

## 2022-03-22 RX ADMIN — Medication 10 ML: at 21:05

## 2022-03-22 RX ADMIN — ACETAZOLAMIDE 250 MG: 250 TABLET ORAL at 10:05

## 2022-03-22 RX ADMIN — CLINDAMYCIN IN 5 PERCENT DEXTROSE 900 MG: 18 INJECTION, SOLUTION INTRAVENOUS at 00:36

## 2022-03-22 RX ADMIN — HYDROCODONE BITARTRATE AND ACETAMINOPHEN 1 TABLET: 7.5; 325 TABLET ORAL at 21:23

## 2022-03-22 RX ADMIN — HYDROCODONE BITARTRATE AND ACETAMINOPHEN 1 TABLET: 7.5; 325 TABLET ORAL at 10:15

## 2022-03-22 RX ADMIN — SODIUM CHLORIDE, POTASSIUM CHLORIDE, SODIUM LACTATE AND CALCIUM CHLORIDE 100 ML/HR: 600; 310; 30; 20 INJECTION, SOLUTION INTRAVENOUS at 05:43

## 2022-03-22 RX ADMIN — HYDROCODONE BITARTRATE AND ACETAMINOPHEN 1 TABLET: 7.5; 325 TABLET ORAL at 00:36

## 2022-03-22 RX ADMIN — ACETAZOLAMIDE 250 MG: 250 TABLET ORAL at 21:05

## 2022-03-22 RX ADMIN — CLINDAMYCIN IN 5 PERCENT DEXTROSE 900 MG: 18 INJECTION, SOLUTION INTRAVENOUS at 10:05

## 2022-03-22 RX ADMIN — ACETAZOLAMIDE 250 MG: 250 TABLET ORAL at 16:52

## 2022-03-22 RX ADMIN — POLYETHYLENE GLYCOL 3350 17 G: 17 POWDER, FOR SOLUTION ORAL at 10:05

## 2022-03-22 RX ADMIN — ENOXAPARIN SODIUM 40 MG: 40 INJECTION SUBCUTANEOUS at 10:05

## 2022-03-22 NOTE — ANESTHESIA POSTPROCEDURE EVALUATION
"Patient: Adelita Jaeger    Procedure Summary     Date: 03/21/22 Room / Location:  [facility] OR  /  [facility] OR    Anesthesia Start: 1738 Anesthesia Stop: 1831    Procedure: HIP CANNULATED SCREW PLACEMENT (Left Hip) Diagnosis:       Closed subcapital fracture of femur, left, initial encounter ([facility])      (Subcapital R FNFx)    Surgeons: Kushal Huerta MD Provider: Gladis Huber CRNA    Anesthesia Type: general ASA Status: 3          Anesthesia Type: general    Vitals  Vitals Value Taken Time   /90 03/21/22 1915   Temp 97.5 °F (36.4 °C) 03/21/22 1915   Pulse 100 03/21/22 1928   Resp 18 03/21/22 1925   SpO2 98 % 03/21/22 1928   Vitals shown include unvalidated device data.        Post Anesthesia Care and Evaluation    Patient location during evaluation: PACU  Patient participation: complete - patient participated  Level of consciousness: awake and alert  Pain management: adequate  Airway patency: patent  Anesthetic complications: No anesthetic complications  PONV Status: none  Cardiovascular status: acceptable and hemodynamically stable  Respiratory status: acceptable  Hydration status: acceptable    Comments: Blood pressure (!) 86/61, pulse 89, temperature 98.2 °F (36.8 °C), temperature source Oral, resp. rate 18, height 149.9 cm (59\"), weight 40.8 kg (90 lb), SpO2 97 %, not currently breastfeeding.    Patient discharged from PACU based upon Sanjeev score. Please see RN notes for further details      "

## 2022-03-23 PROCEDURE — 97116 GAIT TRAINING THERAPY: CPT

## 2022-03-23 PROCEDURE — 97535 SELF CARE MNGMENT TRAINING: CPT

## 2022-03-23 PROCEDURE — 25010000002 ONDANSETRON PER 1 MG: Performed by: ORTHOPAEDIC SURGERY

## 2022-03-23 PROCEDURE — 97530 THERAPEUTIC ACTIVITIES: CPT

## 2022-03-23 PROCEDURE — 25010000002 ENOXAPARIN PER 10 MG: Performed by: FAMILY MEDICINE

## 2022-03-23 PROCEDURE — 97110 THERAPEUTIC EXERCISES: CPT

## 2022-03-23 RX ORDER — PANTOPRAZOLE SODIUM 40 MG/1
40 TABLET, DELAYED RELEASE ORAL 2 TIMES DAILY PRN
Status: DISCONTINUED | OUTPATIENT
Start: 2022-03-23 | End: 2022-03-28 | Stop reason: HOSPADM

## 2022-03-23 RX ADMIN — ENOXAPARIN SODIUM 30 MG: 30 INJECTION SUBCUTANEOUS at 09:47

## 2022-03-23 RX ADMIN — POLYETHYLENE GLYCOL 3350 17 G: 17 POWDER, FOR SOLUTION ORAL at 21:37

## 2022-03-23 RX ADMIN — POLYETHYLENE GLYCOL 3350 17 G: 17 POWDER, FOR SOLUTION ORAL at 09:46

## 2022-03-23 RX ADMIN — Medication 10 ML: at 09:47

## 2022-03-23 RX ADMIN — PANTOPRAZOLE SODIUM 40 MG: 40 TABLET, DELAYED RELEASE ORAL at 16:58

## 2022-03-23 RX ADMIN — ONDANSETRON HYDROCHLORIDE 4 MG: 2 SOLUTION INTRAMUSCULAR; INTRAVENOUS at 11:57

## 2022-03-23 RX ADMIN — FAMOTIDINE 40 MG: 20 TABLET, FILM COATED ORAL at 09:46

## 2022-03-23 RX ADMIN — ACETAZOLAMIDE 250 MG: 250 TABLET ORAL at 09:46

## 2022-03-23 RX ADMIN — ACETAZOLAMIDE 250 MG: 250 TABLET ORAL at 21:36

## 2022-03-23 RX ADMIN — Medication 10 ML: at 21:36

## 2022-03-23 RX ADMIN — ACETAZOLAMIDE 250 MG: 250 TABLET ORAL at 16:09

## 2022-03-24 LAB — 25(OH)D3 SERPL-MCNC: 35.3 NG/ML (ref 30–100)

## 2022-03-24 PROCEDURE — 97535 SELF CARE MNGMENT TRAINING: CPT

## 2022-03-24 PROCEDURE — 97116 GAIT TRAINING THERAPY: CPT

## 2022-03-24 PROCEDURE — 82306 VITAMIN D 25 HYDROXY: CPT | Performed by: STUDENT IN AN ORGANIZED HEALTH CARE EDUCATION/TRAINING PROGRAM

## 2022-03-24 PROCEDURE — 25010000002 ENOXAPARIN PER 10 MG: Performed by: FAMILY MEDICINE

## 2022-03-24 PROCEDURE — 97110 THERAPEUTIC EXERCISES: CPT

## 2022-03-24 PROCEDURE — 25010000002 ONDANSETRON PER 1 MG: Performed by: ORTHOPAEDIC SURGERY

## 2022-03-24 RX ADMIN — Medication 10 ML: at 09:06

## 2022-03-24 RX ADMIN — ACETAZOLAMIDE 250 MG: 250 TABLET ORAL at 09:05

## 2022-03-24 RX ADMIN — POLYETHYLENE GLYCOL 3350 17 G: 17 POWDER, FOR SOLUTION ORAL at 09:05

## 2022-03-24 RX ADMIN — Medication 10 ML: at 09:05

## 2022-03-24 RX ADMIN — Medication 10 ML: at 22:33

## 2022-03-24 RX ADMIN — HYDROCODONE BITARTRATE AND ACETAMINOPHEN 1 TABLET: 7.5; 325 TABLET ORAL at 00:42

## 2022-03-24 RX ADMIN — ACETAZOLAMIDE 250 MG: 250 TABLET ORAL at 16:04

## 2022-03-24 RX ADMIN — ACETAZOLAMIDE 250 MG: 250 TABLET ORAL at 22:32

## 2022-03-24 RX ADMIN — FAMOTIDINE 40 MG: 20 TABLET, FILM COATED ORAL at 09:05

## 2022-03-24 RX ADMIN — ONDANSETRON HYDROCHLORIDE 4 MG: 2 SOLUTION INTRAMUSCULAR; INTRAVENOUS at 00:41

## 2022-03-24 RX ADMIN — ENOXAPARIN SODIUM 30 MG: 30 INJECTION SUBCUTANEOUS at 09:06

## 2022-03-25 ENCOUNTER — HOME HEALTH ADMISSION (OUTPATIENT)
Dept: HOME HEALTH SERVICES | Facility: HOME HEALTHCARE | Age: 52
End: 2022-03-25

## 2022-03-25 PROCEDURE — 97116 GAIT TRAINING THERAPY: CPT

## 2022-03-25 PROCEDURE — 25010000002 ENOXAPARIN PER 10 MG: Performed by: FAMILY MEDICINE

## 2022-03-25 RX ADMIN — ACETAZOLAMIDE 250 MG: 250 TABLET ORAL at 17:10

## 2022-03-25 RX ADMIN — ENOXAPARIN SODIUM 30 MG: 30 INJECTION SUBCUTANEOUS at 09:24

## 2022-03-25 RX ADMIN — Medication 10 ML: at 09:25

## 2022-03-25 RX ADMIN — Medication 10 ML: at 20:08

## 2022-03-25 RX ADMIN — FAMOTIDINE 40 MG: 20 TABLET, FILM COATED ORAL at 09:24

## 2022-03-25 RX ADMIN — ACETAZOLAMIDE 250 MG: 250 TABLET ORAL at 09:24

## 2022-03-25 RX ADMIN — ACETAZOLAMIDE 250 MG: 250 TABLET ORAL at 20:08

## 2022-03-26 PROCEDURE — 97116 GAIT TRAINING THERAPY: CPT

## 2022-03-26 PROCEDURE — 25010000002 ENOXAPARIN PER 10 MG: Performed by: FAMILY MEDICINE

## 2022-03-26 PROCEDURE — 25010000002 ONDANSETRON PER 1 MG: Performed by: ORTHOPAEDIC SURGERY

## 2022-03-26 RX ADMIN — ACETAZOLAMIDE 250 MG: 250 TABLET ORAL at 17:22

## 2022-03-26 RX ADMIN — Medication 10 ML: at 09:00

## 2022-03-26 RX ADMIN — HYDROCODONE BITARTRATE AND ACETAMINOPHEN 1 TABLET: 7.5; 325 TABLET ORAL at 08:30

## 2022-03-26 RX ADMIN — Medication 10 ML: at 21:48

## 2022-03-26 RX ADMIN — ACETAZOLAMIDE 250 MG: 250 TABLET ORAL at 08:19

## 2022-03-26 RX ADMIN — ONDANSETRON HYDROCHLORIDE 4 MG: 2 SOLUTION INTRAMUSCULAR; INTRAVENOUS at 07:11

## 2022-03-26 RX ADMIN — ACETAZOLAMIDE 250 MG: 250 TABLET ORAL at 21:48

## 2022-03-26 RX ADMIN — Medication 10 ML: at 08:18

## 2022-03-26 RX ADMIN — ENOXAPARIN SODIUM 30 MG: 30 INJECTION SUBCUTANEOUS at 08:18

## 2022-03-26 RX ADMIN — FAMOTIDINE 40 MG: 20 TABLET, FILM COATED ORAL at 08:19

## 2022-03-27 PROCEDURE — 97110 THERAPEUTIC EXERCISES: CPT

## 2022-03-27 PROCEDURE — 25010000002 ENOXAPARIN PER 10 MG: Performed by: FAMILY MEDICINE

## 2022-03-27 PROCEDURE — 97116 GAIT TRAINING THERAPY: CPT

## 2022-03-27 RX ADMIN — ACETAZOLAMIDE 250 MG: 250 TABLET ORAL at 09:04

## 2022-03-27 RX ADMIN — ENOXAPARIN SODIUM 30 MG: 30 INJECTION SUBCUTANEOUS at 09:04

## 2022-03-27 RX ADMIN — FAMOTIDINE 40 MG: 20 TABLET, FILM COATED ORAL at 09:04

## 2022-03-27 RX ADMIN — ACETAZOLAMIDE 250 MG: 250 TABLET ORAL at 15:21

## 2022-03-27 RX ADMIN — ACETAZOLAMIDE 250 MG: 250 TABLET ORAL at 20:08

## 2022-03-27 RX ADMIN — Medication 10 ML: at 09:05

## 2022-03-27 RX ADMIN — Medication 10 ML: at 20:08

## 2022-03-28 ENCOUNTER — READMISSION MANAGEMENT (OUTPATIENT)
Dept: CALL CENTER | Facility: HOSPITAL | Age: 52
End: 2022-03-28

## 2022-03-28 VITALS
DIASTOLIC BLOOD PRESSURE: 76 MMHG | OXYGEN SATURATION: 96 % | SYSTOLIC BLOOD PRESSURE: 92 MMHG | BODY MASS INDEX: 18.14 KG/M2 | TEMPERATURE: 98.9 F | HEIGHT: 59 IN | RESPIRATION RATE: 16 BRPM | HEART RATE: 112 BPM | WEIGHT: 90 LBS

## 2022-03-28 PROCEDURE — 97116 GAIT TRAINING THERAPY: CPT

## 2022-03-28 PROCEDURE — 25010000002 ENOXAPARIN PER 10 MG: Performed by: FAMILY MEDICINE

## 2022-03-28 RX ADMIN — FAMOTIDINE 40 MG: 20 TABLET, FILM COATED ORAL at 08:21

## 2022-03-28 RX ADMIN — ENOXAPARIN SODIUM 30 MG: 30 INJECTION SUBCUTANEOUS at 08:21

## 2022-03-28 RX ADMIN — Medication 10 ML: at 08:22

## 2022-03-29 NOTE — OUTREACH NOTE
Prep Survey    Flowsheet Row Responses   [facility] facility patient discharged from? [place]   Is LACE score < 7 ? No   Emergency Room discharge w/ pulse ox? No   Eligibility Readm Mgmt   Discharge diagnosis left hip pinning   Does the patient have one of the following disease processes/diagnoses(primary or secondary)? General Surgery   Does the patient have Home health ordered? No   Is there a DME ordered? No   Prep survey completed? Yes          HARMAN HOWARD - Registered Nurse

## 2022-03-31 ENCOUNTER — READMISSION MANAGEMENT (OUTPATIENT)
Dept: CALL CENTER | Facility: HOSPITAL | Age: 52
End: 2022-03-31

## 2022-03-31 NOTE — OUTREACH NOTE
General Surgery Week 1 Survey    Flowsheet Row Responses   [facility] facility patient discharged from? [place]   Does the patient have one of the following disease processes/diagnoses(primary or secondary)? General Surgery   Week 1 attempt successful? No  [Left message]   Unsuccessful attempts Attempt 1   Discharge diagnosis left hip pinning          JOSE HOWARD - Registered Nurse

## 2022-04-05 ENCOUNTER — READMISSION MANAGEMENT (OUTPATIENT)
Dept: CALL CENTER | Facility: HOSPITAL | Age: 52
End: 2022-04-05

## 2022-04-05 NOTE — OUTREACH NOTE
General Surgery Week 1 Survey    Flowsheet Row Responses   [facility] facility patient discharged from? [place]   Does the patient have one of the following disease processes/diagnoses(primary or secondary)? General Surgery   Week 1 attempt successful? No   Unsuccessful attempts Attempt 2          JOSE Ridley Registered Nurse

## 2022-11-22 ENCOUNTER — APPOINTMENT (OUTPATIENT)
Dept: GENERAL RADIOLOGY | Facility: HOSPITAL | Age: 52
End: 2022-11-22

## 2022-11-22 ENCOUNTER — HOSPITAL ENCOUNTER (EMERGENCY)
Facility: HOSPITAL | Age: 52
Discharge: HOME | End: 2022-11-22
Admitting: EMERGENCY MEDICINE

## 2022-11-22 VITALS
BODY MASS INDEX: 15.32 KG/M2 | WEIGHT: 76 LBS | RESPIRATION RATE: 17 BRPM | TEMPERATURE: 98.4 F | DIASTOLIC BLOOD PRESSURE: 66 MMHG | HEART RATE: 88 BPM | OXYGEN SATURATION: 97 % | HEIGHT: 59 IN | SYSTOLIC BLOOD PRESSURE: 114 MMHG
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
## 2022-11-22 DIAGNOSIS — S52.571A OTHER CLOSED INTRA-ARTICULAR FRACTURE OF DISTAL END OF RIGHT RADIUS, INITIAL ENCOUNTER: Primary | ICD-10-CM

## 2022-11-22 PROCEDURE — 73130 X-RAY EXAM OF HAND: CPT

## 2022-11-22 PROCEDURE — 73110 X-RAY EXAM OF WRIST: CPT

## 2022-11-22 PROCEDURE — 99283 EMERGENCY DEPT VISIT LOW MDM: CPT

## 2022-11-22 RX ORDER — ACETAMINOPHEN 500 MG
1000 TABLET ORAL ONCE
Status: COMPLETED | OUTPATIENT
Start: 2022-11-22 | End: 2022-11-22

## 2022-11-22 RX ADMIN — ACETAMINOPHEN 1000 MG: 500 TABLET ORAL at 14:09

## 2022-11-22 NOTE — DISCHARGE INSTRUCTIONS
Please rest, ice, and elevate your hand is much as possible over the night.  You will need to go to the orthopedic [place] tomorrow morning at 8:30 AM for further evaluation and treatment.  Should you develop any new or worsening symptoms please return to the ER for further evaluation.

## 2022-11-22 NOTE — ED PROVIDER NOTES
"Subjective   History of Present Illness    Patient is a 52-year-old female presenting to ED with right wrist pain.  PMH significant for right hand dominance, osteoporosis, hypertension, Frequent falls.  Patient states that she is currently working with her neurologist due to frequent falls and 10 days ago she was in her kitchen when she had a mechanical trip and fall.  Patient describes that she was trying to hold a dish that she did not want a break and because of this she fell landing on her right hand.  Patient states that that time she has had pain on her right scaphoid bone which radiates towards her right palmar wrist region and now she feels muscle tension going up her arm.  Patient denies use of any medications for her symptoms but describes that when she goes to move the arm she feels \"something is wiggling around in there.\"  Patient presents at this time for imaging and evaluation.  Patient reiterated that the fall was a normal fall for her with no preceding symptoms and no other injury sustained.    Records reviewed show patient was last seen in the ED on 3/21/2022 and admitted for closed fracture left hip, decreased ADLs, pathological fracture of the left hip due to osteoporosis.    Patient was recently seen outpatient at the neurology office on 11/11/2022 for recurrent falls.    Review of Systems   Constitutional: Negative.    HENT: Negative.    Eyes: Negative.    Respiratory: Negative.    Cardiovascular: Negative.    Gastrointestinal: Negative.    Genitourinary: Negative.    Musculoskeletal: Positive for arthralgias (right wrist) and joint swelling (right wrist). Negative for back pain, gait problem and neck pain.   Skin: Negative.  Negative for wound.   Neurological: Negative.  Negative for weakness and numbness.   Psychiatric/Behavioral: Negative.    All other systems reviewed and are negative.      Past Medical History:   Diagnosis Date   • GERD (gastroesophageal reflux disease)    • Hyperlipidemia  "   • Hypertension    • Potassium (K) deficiency        Allergies   Allergen Reactions   • Penicillins Unknown (See Comments)     Childhood reaction       Past Surgical History:   Procedure Laterality Date   • AUGMENTATION MAMMAPLASTY     • CARPAL TUNNEL RELEASE     •  SECTION     • CHOLECYSTECTOMY     • DILATATION AND CURETTAGE     • ENDOMETRIAL BIOPSY     • GYNECOLOGIC CRYOSURGERY     • HERNIA REPAIR     • HIP CANNULATED SCREW PLACEMENT Right 7/10/2019    Procedure: RIGHT HIP CANNULATED SCREW PLACEMENT;  Surgeon: Aramis Navarro MD;  Location:  [facility] OR;  Service: Orthopedics   • HIP CANNULATED SCREW PLACEMENT Left 3/21/2022    Procedure: HIP CANNULATED SCREW PLACEMENT;  Surgeon: Kushal Huerta MD;  Location:  [facility] OR;  Service: Orthopedics;  Laterality: Left;   • OTHER SURGICAL HISTORY      TIF procedure.        Family History   Problem Relation Age of Onset   • Breast cancer Maternal Aunt    • Breast cancer Maternal Aunt    • Breast cancer Maternal Aunt    • Breast cancer Cousin    • Hypertension Father    • Diabetes Mother    • Hypertension Mother    • Hypothyroidism Mother    • Ovarian cancer Neg Hx    • Uterine cancer Neg Hx    • Colon cancer Neg Hx        Social History     Socioeconomic History   • Marital status: Single   Tobacco Use   • Smoking status: Every Day     Packs/day: 0.50     Types: Cigarettes   • Smokeless tobacco: Never   Substance and Sexual Activity   • Alcohol use: Yes     Comment: rarely   • Drug use: Yes     Types: Marijuana   • Sexual activity: Yes     Partners: Male     Birth control/protection: Post-menopausal     Comment: 12 years           Objective   Physical Exam  Vitals and nursing note reviewed.   Constitutional:       General: She is not in acute distress.     Appearance: Normal appearance. She is well-developed and well-groomed. She is cachectic. She is ill-appearing (chronically ill appearing).   HENT:      Head: Normocephalic and atraumatic.       Mouth/Throat:      Mouth: Mucous membranes are moist.   Eyes:      Conjunctiva/sclera: Conjunctivae normal.      Pupils: Pupils are equal, round, and reactive to light.   Cardiovascular:      Rate and Rhythm: Regular rhythm. Tachycardia present.      Pulses:           Radial pulses are 2+ on the right side and 2+ on the left side.   Pulmonary:      Effort: Pulmonary effort is normal.      Breath sounds: Normal breath sounds.   Abdominal:      Palpations: Abdomen is soft.   Musculoskeletal:         General: Swelling and tenderness present.      Cervical back: Normal range of motion and neck supple.      Comments: Tenderness to palpitation of the right palmar wrist with swelling, tenderness overlying the right scaphoid bone, and decreased range of motion secondary to pain.  Distal to this there are no abnormalities on the metacarpals or fingers.  Proximal to the wrist there is no further bony tenderness in the forearm, elbow, or proximal upper extremity.  Bilateral extremities are neurovascular intact distally.  Numerous areas of healing ecchymosis scattered to the bilateral upper extremities.   Skin:     Findings: Bruising (as described in MSK section) present.   Neurological:      Mental Status: She is alert and oriented to person, place, and time.      Sensory: No sensory deficit.      Motor: No weakness.      Gait: Gait normal.   Psychiatric:         Mood and Affect: Mood normal.         Behavior: Behavior normal. Behavior is cooperative.         Procedures           ED Course                                           MDM  Number of Diagnoses or Management Options     Amount and/or Complexity of Data Reviewed  Tests in the radiology section of CPT®: reviewed and ordered  Tests in the medicine section of CPT®: ordered and reviewed  Decide to obtain previous medical records or to obtain history from someone other than the patient: yes  Review and summarize past medical records: yes    Patient Progress  Patient  progress: improved      Patient is a 52-year-old female presenting to ED with right wrist pain.  PMH significant for right hand dominance, osteoporosis, hypertension, Frequent falls. Right wrist x-ray showed: Acute closed slightly angulated transverse fracture of the distal right radial metaphysis with mild impaction, moderate circumferential soft tissue swelling, no carpal bone fracture, no ulnar fracture.  Case was discussed with sandra Hernandez, request patient be placed in a splint and follow-up in the orthopedic [place] office tomorrow morning at 8:30 AM.  Patient initially declined any medications however was amenable to a dose of Tylenol for which she had improvement of her pain.  Splint sugar-tong was placed to right upper extremity by [place] EMT after which her right upper extremity remained neurovascular intact distally.  Advised need to present to the orthopedic [place] tomorrow morning at 8:30 AM for further care and treatment.  Discussed RICE treatment, strict return precautions, need for immediate return to ED should she develop any new or worsening symptoms.  Patient is appreciative with no further questions concerns, needs at this time and is stable for discharge.    Final diagnoses:   Other closed intra-articular fracture of distal end of right radius, initial encounter   Fall, initial encounter       ED Disposition  ED Disposition     ED Disposition   Discharge    Condition   Stable    Comment   --             Karmen Salazar, APRN  [address] [address]  [phone]    Schedule an appointment as soon as possible for a visit in 2 days      Rk Mccauley MD  [address] [place] [address]  [phone]      PLEASE GO TO THE OFFICE TOMORROW MORNING AT 8:30 AM         Medication List      No changes were made to your prescriptions during this visit.          Antoni Whitley PA-C  11/22/22 3412

## 2023-06-06 ENCOUNTER — HOSPITAL ENCOUNTER (OUTPATIENT)
Dept: GENERAL RADIOLOGY | Facility: HOSPITAL | Age: 53
Discharge: HOME | End: 2023-06-06
Payer: MEDICAID

## 2023-06-06 PROCEDURE — 73110 X-RAY EXAM OF WRIST: CPT

## 2023-06-06 PROCEDURE — 73090 X-RAY EXAM OF FOREARM: CPT

## 2023-06-08 ENCOUNTER — HOSPITAL ENCOUNTER (EMERGENCY)
Facility: HOSPITAL | Age: 53
Discharge: HOME | End: 2023-06-08
Payer: MEDICAID

## 2023-06-08 ENCOUNTER — APPOINTMENT (OUTPATIENT)
Dept: CT IMAGING | Facility: HOSPITAL | Age: 53
End: 2023-06-08
Payer: MEDICAID

## 2023-06-08 VITALS
HEIGHT: 59 IN | DIASTOLIC BLOOD PRESSURE: 78 MMHG | OXYGEN SATURATION: 98 % | RESPIRATION RATE: 20 BRPM | TEMPERATURE: 98.1 F | HEART RATE: 92 BPM | BODY MASS INDEX: 15.47 KG/M2 | WEIGHT: 76.72 LBS | SYSTOLIC BLOOD PRESSURE: 132 MMHG

## 2023-06-08 DIAGNOSIS — M25.551 BILATERAL HIP PAIN: ICD-10-CM
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
## 2023-06-08 DIAGNOSIS — M25.552 BILATERAL HIP PAIN: ICD-10-CM

## 2023-06-08 PROCEDURE — 72192 CT PELVIS W/O DYE: CPT

## 2023-06-08 PROCEDURE — 72131 CT LUMBAR SPINE W/O DYE: CPT

## 2023-06-08 PROCEDURE — 99284 EMERGENCY DEPT VISIT MOD MDM: CPT

## 2023-06-08 RX ORDER — ACETAMINOPHEN 500 MG
1000 TABLET ORAL ONCE
Status: COMPLETED | OUTPATIENT
Start: 2023-06-08 | End: 2023-06-08

## 2023-06-08 RX ORDER — OXYCODONE AND ACETAMINOPHEN 7.5; 325 MG/1; MG/1
1 TABLET ORAL EVERY 6 HOURS PRN
Qty: 10 TABLET | Refills: 0 | Status: SHIPPED | OUTPATIENT
Start: 2023-06-08

## 2023-06-08 RX ADMIN — ACETAMINOPHEN 1000 MG: 500 TABLET, FILM COATED ORAL at 17:33

## 2023-06-08 NOTE — DISCHARGE INSTRUCTIONS
It was very nice to meet you, Adelita. Thank you for allowing us to take care of you today at [facility].    Your evaluation today did not show any emergent findings or have any emergent indications for admission to the hospital.     Please understand that an ER evaluation is just the start of your evaluation. We will do what we can, but we are often unable to fully figure out what is causing your symptoms from one evaluation. Thus, our primary goal is to determine whether you need to be evaluated in the hospital or if it is safe for you to go home and see other doctors such as a primary care physician or a specialist on an outpatient basis.     Like we discussed, it is VERY IMPORTANT that you follow up with your primary care doctor (call them to set up an appointment) within the next few days or as soon as possible so that you can be re-evaluated for improvement in your symptoms or for any other questions.     A copy of your results should be included in your paperwork. If you were prescribed any medications, please take them as directed or call us back with any questions.    Please return to the emergency room within 12-48 hours if you experience any new or worsening symptoms.

## 2023-06-08 NOTE — ED PROVIDER NOTES
Subjective   History of Present Illness  Patient is a 53-year-old female who presents emergency department after a fall.  She states that she fell when she was coming to the house last night.  She states that she was pulling her walker instead of pushing it and lost her balance and fell on her bottom.  She was trying to avoid falling on her arm, she states that she broke her left arm on Monday.  She states that she has an appointment with orthopedics tomorrow.  She denies hitting her head or losing consciousness.  She denies any loss of bowel or bladder.  She denies any loss of sensation when wiping.  She states that her bilateral hips, tailbone, lumbar area is hurting her.  States that it is hard for her to walk.    Review of Systems   Constitutional: Negative.    HENT: Negative.     Eyes: Negative.    Respiratory: Negative.     Cardiovascular: Negative.    Gastrointestinal: Negative.    Endocrine: Negative.    Genitourinary: Negative.    Musculoskeletal:  Positive for back pain.        Bilateral hip pain, tailbone pain, lower back pain   Skin: Negative.    Allergic/Immunologic: Negative.    Neurological: Negative.    Hematological: Negative.    Psychiatric/Behavioral: Negative.       Past Medical History:   Diagnosis Date    GERD (gastroesophageal reflux disease)     Glaucoma     Hyperlipidemia     Hypertension     Potassium (K) deficiency        Allergies   Allergen Reactions    Penicillins Unknown (See Comments)     Childhood reaction       Past Surgical History:   Procedure Laterality Date    AUGMENTATION MAMMAPLASTY      CARPAL TUNNEL RELEASE       SECTION      CHOLECYSTECTOMY      CORNEAL TRANSPLANT Left     DILATATION AND CURETTAGE      ENDOMETRIAL BIOPSY      GYNECOLOGIC CRYOSURGERY      HERNIA REPAIR      HIP CANNULATED SCREW PLACEMENT Right 07/10/2019    Procedure: RIGHT HIP CANNULATED SCREW PLACEMENT;  Surgeon: Aramis Navarro MD;  Location: [facility] OR;  Service: Orthopedics    HIP  CANNULATED SCREW PLACEMENT Left 03/21/2022    Procedure: HIP CANNULATED SCREW PLACEMENT;  Surgeon: Kushal Huerta MD;  Location: [facility] OR;  Service: Orthopedics;  Laterality: Left;    OTHER SURGICAL HISTORY      TIF procedure.        Family History   Problem Relation Age of Onset    Breast cancer Maternal Aunt     Breast cancer Maternal Aunt     Breast cancer Maternal Aunt     Breast cancer Cousin     Hypertension Father     Diabetes Mother     Hypertension Mother     Hypothyroidism Mother     Ovarian cancer Neg Hx     Uterine cancer Neg Hx     Colon cancer Neg Hx        Social History     Socioeconomic History    Marital status: Single   Tobacco Use    Smoking status: Every Day     Packs/day: 0.50     Types: Cigarettes    Smokeless tobacco: Never   Vaping Use    Vaping Use: Never used   Substance and Sexual Activity    Alcohol use: Yes     Comment: rarely    Drug use: Yes     Types: Marijuana    Sexual activity: Yes     Partners: Male     Birth control/protection: Post-menopausal     Comment: 12 years           Objective   Physical Exam  Vitals and nursing note reviewed.   Constitutional:       General: She is not in acute distress.     Appearance: Normal appearance. She is normal weight. She is not ill-appearing or toxic-appearing.   HENT:      Head: Normocephalic.      Nose: Nose normal.   Cardiovascular:      Rate and Rhythm: Normal rate and regular rhythm.      Pulses: Normal pulses.           Dorsalis pedis pulses are 2+ on the right side and 2+ on the left side.      Heart sounds: Normal heart sounds.   Pulmonary:      Effort: Pulmonary effort is normal.      Breath sounds: Normal breath sounds.   Abdominal:      General: Abdomen is flat. Bowel sounds are normal.      Palpations: Abdomen is soft.   Musculoskeletal:      Cervical back: Normal range of motion and neck supple. No tenderness.      Thoracic back: No tenderness.      Lumbar back: Tenderness present.      Right hip: Tenderness present.       Left hip: Tenderness present.   Skin:     General: Skin is warm and dry.      Findings: No bruising or erythema.   Neurological:      General: No focal deficit present.      Mental Status: She is alert and oriented to person, place, and time. Mental status is at baseline.      GCS: GCS eye subscore is 4. GCS verbal subscore is 5. GCS motor subscore is 6.      Sensory: Sensation is intact.      Comments:  strength strong bilaterally, dorsiflexion/plantarflexion strong bilaterally.   Psychiatric:         Mood and Affect: Mood normal.         Behavior: Behavior normal.         Thought Content: Thought content normal.         Judgment: Judgment normal.       Procedures           ED Course                                           Medical Decision Making  Patient is a 53-year-old female who presents emergency department after a fall.  She states that she fell when she was coming to the house last night.  She states that she was pulling her walker instead of pushing it and lost her balance and fell on her bottom.  She was trying to avoid falling on her arm, she states that she broke her left arm on Monday.  She states that she has an appointment with orthopedics tomorrow.  She denies hitting her head or losing consciousness.  She denies any loss of bowel or bladder.  She denies any loss of sensation when wiping.  She states that her bilateral hips, tailbone, lumbar area is hurting her.  States that it is hard for her to walk.    Patient was non-toxic and not-ill appearing on arrival. Vital signs stable.     Patient's presentation raises suspicion for differentials including, but not limited to, fracture, dislocation, sprain.    Given this, imaging studies were ordered including CT pelvis without contrast and CT lumbar spine without contrast.    Adelita was given Tylenol for symptomatic relief.    Imaging was reviewed by radiologist.  CT pelvis without contrast reveals questionable buckle type fracture of the  sacrococcygeal junction based on the sagittal reconstruction. No additional acute regional fracture identified of the pelvis or hips. Surgical screws within the bilateral femoral heads from repair of previous subcapital femoral neck fractures.  CT lumbar spine without contrast reveals mild superior endplate compression of L2 is of indeterminate age.    On re-evaluation, patient remained hemodynamically stable. I had a discussion regarding the workup, results so far, and my impression so far. I said that there could be a questionable buckle fracture on the CT scan.  I told her that there was no obvious hip or pelvis fracture on the CT scan that was read by radiologist. I also informed her that there could have been an old lumbar spine compression fracture, but nothing acutely seen on the CT scan per radiologist. She states that she has an Orthopedic [place] appointment tomorrow morning.  Encouraged her to keep this appointment.  Case was discussed with Dr. Parker.    I discussed all of the imaging results with the patient during this visit in the emergency department. I answered all the questions regarding the emergency department evaluation, diagnosis, and treatment plan. We talked about how crucial it is for the patient to follow up by calling her primary care provider as soon as possible to schedule an appointment (by calling their office) for within the next few days or as soon as possible so that the symptoms can be reassessed to see if they have improved or to answer any additional questions. I also provided the patient with advice on returning safely and urged the patient to visit the emergency department right away if any worsening or new symptoms appeared within 12 to 48 hours. The patient and accompanying family members confirmed verbally that they understood and agreed with the discharge instructions.     The patient verbalized understanding of the discharge instructions and agreed with them.  Dr. Parker  prescribed her Percocet for pain as needed.  LSO brace was placed before discharge. She has appointment with orthopedics tomorrow.    Adelita was discharged in stable condition.    Signed by:   GENEVIEVE Carrera 6/8/2023 20:08 CDT     Dragon disclaimer:  Part of this note may be an electronic transcription/translation of spoken language to printed text using the Dragon Dictation System.    Problems Addressed:  Bilateral hip pain: complicated acute illness or injury  Fall, initial encounter: complicated acute illness or injury    Amount and/or Complexity of Data Reviewed  Radiology: ordered.    Risk  OTC drugs.        Final diagnoses:   Fall, initial encounter   Bilateral hip pain       ED Disposition  ED Disposition       ED Disposition   Discharge    Condition   Stable    Comment   --               Provider, No Known  [place] [id number] [phone]    Schedule an appointment as soon as possible for a visit in 1 day      [facility] Emergency Department  [address] [place] [id number] [phone]  Go to   If symptoms worsen    [facility] [place]  Keep existing appointment for tomorrow.             Medication List        New Prescriptions      oxyCODONE-acetaminophen 7.5-325 MG per tablet  Commonly known as: PERCOCET  Take 1 tablet by mouth Every 6 (Six) Hours As Needed for Moderate Pain.               Where to Get Your Medications        These medications were sent to [facility] [address] [place] [address] - [address] - [phone] [facility] [phone]   [facility] [address]      Phone: [phone]   oxyCODONE-acetaminophen 7.5-325 MG per tablet            Teodora Tillman APRN  06/08/23 2019

## 2023-08-18 ENCOUNTER — OFFICE VISIT (OUTPATIENT)
Dept: INTERNAL MEDICINE | Age: 53
End: 2023-08-18

## 2023-08-18 VITALS
SYSTOLIC BLOOD PRESSURE: 90 MMHG | BODY MASS INDEX: 15.92 KG/M2 | DIASTOLIC BLOOD PRESSURE: 64 MMHG | OXYGEN SATURATION: 99 % | WEIGHT: 79 LBS | HEART RATE: 97 BPM | HEIGHT: 59 IN

## 2023-08-18 DIAGNOSIS — M19.90 ARTHRITIS: ICD-10-CM

## 2023-08-18 DIAGNOSIS — F32.89 OTHER DEPRESSION: ICD-10-CM

## 2023-08-18 DIAGNOSIS — E83.42 HYPOMAGNESEMIA: ICD-10-CM

## 2023-08-18 DIAGNOSIS — Z00.00 ROUTINE HEALTH MAINTENANCE: Primary | ICD-10-CM

## 2023-08-18 DIAGNOSIS — G47.411 CATAPLEXY: ICD-10-CM

## 2023-08-18 DIAGNOSIS — E07.9 THYROID DISEASE: ICD-10-CM

## 2023-08-18 DIAGNOSIS — Z11.59 NEED FOR HEPATITIS C SCREENING TEST: ICD-10-CM

## 2023-08-18 DIAGNOSIS — Z87.81 HISTORY OF FRACTURE: ICD-10-CM

## 2023-08-18 DIAGNOSIS — M25.50 ARTHRALGIA, UNSPECIFIED JOINT: ICD-10-CM

## 2023-08-18 DIAGNOSIS — L30.9 DERMATITIS: ICD-10-CM

## 2023-08-18 DIAGNOSIS — E55.9 VITAMIN D DEFICIENCY: ICD-10-CM

## 2023-08-18 DIAGNOSIS — R74.8 ELEVATED LIVER ENZYMES: ICD-10-CM

## 2023-08-18 DIAGNOSIS — Z11.4 SCREENING FOR HIV (HUMAN IMMUNODEFICIENCY VIRUS): ICD-10-CM

## 2023-08-18 DIAGNOSIS — Q78.0 OSTEOGENESIS IMPERFECTA: ICD-10-CM

## 2023-08-18 DIAGNOSIS — F41.9 ANXIETY DISORDER, UNSPECIFIED TYPE: ICD-10-CM

## 2023-08-18 DIAGNOSIS — E78.5 HYPERLIPIDEMIA, UNSPECIFIED HYPERLIPIDEMIA TYPE: ICD-10-CM

## 2023-08-18 DIAGNOSIS — L29.9 PRURITUS: ICD-10-CM

## 2023-08-18 DIAGNOSIS — R93.89 ABNORMAL MRI: ICD-10-CM

## 2023-08-18 DIAGNOSIS — R79.89 ABNORMAL SERUM THYROXINE (T4) LEVEL: Primary | ICD-10-CM

## 2023-08-18 LAB
25(OH)D3 SERPL-MCNC: 45.2 NG/ML
ALBUMIN SERPL-MCNC: 3 G/DL (ref 3.5–5.2)
ALP SERPL-CCNC: 158 U/L (ref 35–104)
ALT SERPL-CCNC: 10 U/L (ref 5–33)
ANION GAP SERPL CALCULATED.3IONS-SCNC: 10 MMOL/L (ref 7–19)
AST SERPL-CCNC: 21 U/L (ref 5–32)
BASOPHILS # BLD: 0.1 K/UL (ref 0–0.2)
BASOPHILS NFR BLD: 0.8 % (ref 0–1)
BILIRUB SERPL-MCNC: <0.2 MG/DL (ref 0.2–1.2)
BUN SERPL-MCNC: 15 MG/DL (ref 6–20)
CALCIUM SERPL-MCNC: 8.8 MG/DL (ref 8.6–10)
CHLORIDE SERPL-SCNC: 101 MMOL/L (ref 98–111)
CHOLEST SERPL-MCNC: 127 MG/DL (ref 160–199)
CO2 SERPL-SCNC: 28 MMOL/L (ref 22–29)
CREAT SERPL-MCNC: 0.7 MG/DL (ref 0.5–0.9)
CRP SERPL HS-MCNC: <0.3 MG/DL (ref 0–0.5)
EOSINOPHIL # BLD: 0.5 K/UL (ref 0–0.6)
EOSINOPHIL NFR BLD: 5.3 % (ref 0–5)
ERYTHROCYTE [DISTWIDTH] IN BLOOD BY AUTOMATED COUNT: 13.6 % (ref 11.5–14.5)
ERYTHROCYTE [SEDIMENTATION RATE] IN BLOOD BY WESTERGREN METHOD: 15 MM/HR (ref 0–25)
GLUCOSE SERPL-MCNC: 91 MG/DL (ref 74–109)
HBA1C MFR BLD: 5.5 % (ref 4–6)
HCT VFR BLD AUTO: 36.9 % (ref 37–47)
HCV AB SERPL QL IA: NORMAL
HDLC SERPL-MCNC: 69 MG/DL (ref 65–121)
HGB BLD-MCNC: 11.4 G/DL (ref 12–16)
HIV-1 P24 AG: NORMAL
HIV1+2 AB SERPLBLD QL IA.RAPID: NORMAL
IMM GRANULOCYTES # BLD: 0.1 K/UL
LDLC SERPL CALC-MCNC: 33 MG/DL
LYMPHOCYTES # BLD: 3.2 K/UL (ref 1.1–4.5)
LYMPHOCYTES NFR BLD: 31.4 % (ref 20–40)
MAGNESIUM SERPL-MCNC: 1.7 MG/DL (ref 1.6–2.6)
MCH RBC QN AUTO: 29.3 PG (ref 27–31)
MCHC RBC AUTO-ENTMCNC: 30.9 G/DL (ref 33–37)
MCV RBC AUTO: 94.9 FL (ref 81–99)
MONOCYTES # BLD: 0.8 K/UL (ref 0–0.9)
MONOCYTES NFR BLD: 8.2 % (ref 0–10)
NEUTROPHILS # BLD: 5.4 K/UL (ref 1.5–7.5)
NEUTS SEG NFR BLD: 53.6 % (ref 50–65)
PLATELET # BLD AUTO: 458 K/UL (ref 130–400)
PMV BLD AUTO: 10 FL (ref 9.4–12.3)
POTASSIUM SERPL-SCNC: 4.8 MMOL/L (ref 3.5–5)
PROT SERPL-MCNC: 6.1 G/DL (ref 6.6–8.7)
RBC # BLD AUTO: 3.89 M/UL (ref 4.2–5.4)
RHEUMATOID FACT SER NEPH-ACNC: <10 IU/ML
SODIUM SERPL-SCNC: 139 MMOL/L (ref 136–145)
T4 FREE SERPL-MCNC: 0.72 NG/DL (ref 0.93–1.7)
TRIGL SERPL-MCNC: 126 MG/DL (ref 0–149)
TSH SERPL DL<=0.005 MIU/L-ACNC: 1.57 UIU/ML (ref 0.27–4.2)
WBC # BLD AUTO: 10.1 K/UL (ref 4.8–10.8)

## 2023-08-18 RX ORDER — CITALOPRAM 40 MG/1
40 TABLET ORAL DAILY
Qty: 30 TABLET | Refills: 2 | Status: SHIPPED | OUTPATIENT
Start: 2023-08-18

## 2023-08-18 RX ORDER — THIAMINE HCL 100 MG
1 TABLET ORAL 2 TIMES DAILY
COMMUNITY

## 2023-08-18 RX ORDER — POTASSIUM CHLORIDE 1500 MG/1
20 TABLET, EXTENDED RELEASE ORAL 2 TIMES DAILY
Qty: 60 TABLET | Refills: 3 | Status: SHIPPED | OUTPATIENT
Start: 2023-08-18

## 2023-08-18 RX ORDER — PHENOL 1.4 %
1 AEROSOL, SPRAY (ML) MUCOUS MEMBRANE DAILY
COMMUNITY

## 2023-08-18 RX ORDER — CITALOPRAM 40 MG/1
40 TABLET ORAL DAILY
COMMUNITY
End: 2023-08-18 | Stop reason: SDUPTHER

## 2023-08-18 RX ORDER — BRIMONIDINE TARTRATE AND TIMOLOL MALEATE 2; 5 MG/ML; MG/ML
1 SOLUTION OPHTHALMIC DAILY
COMMUNITY

## 2023-08-18 RX ORDER — POTASSIUM CHLORIDE 1500 MG/1
20 TABLET, EXTENDED RELEASE ORAL 2 TIMES DAILY
COMMUNITY
End: 2023-08-18 | Stop reason: SDUPTHER

## 2023-08-18 RX ORDER — SULFACETAMIDE/PREDNISOLONE 10 %-0.2 %
1 SUSPENSION, DROPS(FINAL DOSAGE FORM)(ML) OPHTHALMIC (EYE) 2 TIMES DAILY
COMMUNITY

## 2023-08-18 SDOH — ECONOMIC STABILITY: FOOD INSECURITY: WITHIN THE PAST 12 MONTHS, THE FOOD YOU BOUGHT JUST DIDN'T LAST AND YOU DIDN'T HAVE MONEY TO GET MORE.: NEVER TRUE

## 2023-08-18 SDOH — ECONOMIC STABILITY: INCOME INSECURITY: HOW HARD IS IT FOR YOU TO PAY FOR THE VERY BASICS LIKE FOOD, HOUSING, MEDICAL CARE, AND HEATING?: NOT HARD AT ALL

## 2023-08-18 SDOH — ECONOMIC STABILITY: HOUSING INSECURITY
IN THE LAST 12 MONTHS, WAS THERE A TIME WHEN YOU DID NOT HAVE A STEADY PLACE TO SLEEP OR SLEPT IN A SHELTER (INCLUDING NOW)?: NO

## 2023-08-18 SDOH — ECONOMIC STABILITY: FOOD INSECURITY: WITHIN THE PAST 12 MONTHS, YOU WORRIED THAT YOUR FOOD WOULD RUN OUT BEFORE YOU GOT MONEY TO BUY MORE.: NEVER TRUE

## 2023-08-18 ASSESSMENT — PATIENT HEALTH QUESTIONNAIRE - PHQ9
SUM OF ALL RESPONSES TO PHQ QUESTIONS 1-9: 2
2. FEELING DOWN, DEPRESSED OR HOPELESS: 1
SUM OF ALL RESPONSES TO PHQ QUESTIONS 1-9: 2
SUM OF ALL RESPONSES TO PHQ9 QUESTIONS 1 & 2: 2
1. LITTLE INTEREST OR PLEASURE IN DOING THINGS: 1
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
## 2023-08-20 LAB — CCP IGG SERPL-ACNC: 9 UNITS (ref 0–19)

## 2023-08-21 DIAGNOSIS — R74.8 ELEVATED SERUM TRYPTASE: Primary | ICD-10-CM

## 2023-08-21 LAB — TRYPTASE SERPL-MCNC: 14.4 UG/L

## 2023-08-26 ENCOUNTER — TELEPHONE (OUTPATIENT)
Dept: INTERNAL MEDICINE | Age: 53
End: 2023-08-26

## 2023-08-26 ASSESSMENT — ENCOUNTER SYMPTOMS
SHORTNESS OF BREATH: 0
CHEST TIGHTNESS: 0
TROUBLE SWALLOWING: 0
ANAL BLEEDING: 0
BACK PAIN: 0
VOMITING: 0
RHINORRHEA: 0
ABDOMINAL PAIN: 0
CONSTIPATION: 0
NAUSEA: 0
CHOKING: 0
EYE ITCHING: 0
DIARRHEA: 0
PHOTOPHOBIA: 0
FACIAL SWELLING: 0
EYE REDNESS: 0
WHEEZING: 0
BLOOD IN STOOL: 0
COLOR CHANGE: 0
EYE DISCHARGE: 0
RECTAL PAIN: 0
ABDOMINAL DISTENTION: 0
EYE PAIN: 0
SINUS PRESSURE: 0
VOICE CHANGE: 0
COUGH: 0
SORE THROAT: 0

## 2023-08-26 NOTE — TELEPHONE ENCOUNTER
Get her mammogram, bone density from this at [facility] to her chart. She also thinks that she had a colonoscopy done in around 2018 or 2019. See if we can locate those results.

## 2023-09-10 ENCOUNTER — TELEPHONE (OUTPATIENT)
Dept: INTERNAL MEDICINE | Age: 53
End: 2023-09-10

## 2023-09-11 DIAGNOSIS — R74.8 ELEVATED LIVER ENZYMES: ICD-10-CM

## 2023-09-11 DIAGNOSIS — R79.89 ABNORMAL SERUM THYROXINE (T4) LEVEL: ICD-10-CM

## 2023-09-11 LAB
ALBUMIN SERPL-MCNC: 3 G/DL (ref 3.5–5.2)
ALP SERPL-CCNC: 201 U/L (ref 35–104)
ALT SERPL-CCNC: 9 U/L (ref 5–33)
ANION GAP SERPL CALCULATED.3IONS-SCNC: 11 MMOL/L (ref 7–19)
AST SERPL-CCNC: 20 U/L (ref 5–32)
BILIRUB SERPL-MCNC: <0.2 MG/DL (ref 0.2–1.2)
BUN SERPL-MCNC: 11 MG/DL (ref 6–20)
CALCIUM SERPL-MCNC: 9 MG/DL (ref 8.6–10)
CHLORIDE SERPL-SCNC: 102 MMOL/L (ref 98–111)
CO2 SERPL-SCNC: 26 MMOL/L (ref 22–29)
CREAT SERPL-MCNC: 0.5 MG/DL (ref 0.5–0.9)
GLUCOSE SERPL-MCNC: 105 MG/DL (ref 74–109)
POTASSIUM SERPL-SCNC: 4.9 MMOL/L (ref 3.5–5)
PROT SERPL-MCNC: 6 G/DL (ref 6.6–8.7)
SODIUM SERPL-SCNC: 139 MMOL/L (ref 136–145)
T4 FREE SERPL-MCNC: 0.79 NG/DL (ref 0.93–1.7)

## 2023-09-18 ENCOUNTER — OFFICE VISIT (OUTPATIENT)
Dept: INTERNAL MEDICINE | Age: 53
End: 2023-09-18
Payer: MEDICAID

## 2023-09-18 VITALS
WEIGHT: 80.4 LBS | HEIGHT: 59 IN | DIASTOLIC BLOOD PRESSURE: 68 MMHG | OXYGEN SATURATION: 98 % | BODY MASS INDEX: 16.21 KG/M2 | SYSTOLIC BLOOD PRESSURE: 118 MMHG | RESPIRATION RATE: 20 BRPM | HEART RATE: 112 BPM

## 2023-09-18 DIAGNOSIS — Z23 NEEDS FLU SHOT: ICD-10-CM

## 2023-09-18 DIAGNOSIS — M81.0 OSTEOPOROSIS, UNSPECIFIED OSTEOPOROSIS TYPE, UNSPECIFIED PATHOLOGICAL FRACTURE PRESENCE: Primary | ICD-10-CM

## 2023-09-18 DIAGNOSIS — Z23 NEED FOR SHINGLES VACCINE: ICD-10-CM

## 2023-09-18 DIAGNOSIS — R74.8 ELEVATED ALKALINE PHOSPHATASE LEVEL: ICD-10-CM

## 2023-09-18 DIAGNOSIS — L30.9 DERMATITIS: ICD-10-CM

## 2023-09-18 DIAGNOSIS — F32.89 OTHER DEPRESSION: ICD-10-CM

## 2023-09-18 DIAGNOSIS — Z12.31 ENCOUNTER FOR SCREENING MAMMOGRAM FOR MALIGNANT NEOPLASM OF BREAST: ICD-10-CM

## 2023-09-18 DIAGNOSIS — E55.9 VITAMIN D DEFICIENCY: ICD-10-CM

## 2023-09-18 DIAGNOSIS — Z23 NEED FOR VACCINATION AGAINST STREPTOCOCCUS PNEUMONIAE: ICD-10-CM

## 2023-09-18 PROCEDURE — 90471 IMMUNIZATION ADMIN: CPT | Performed by: INTERNAL MEDICINE

## 2023-09-18 PROCEDURE — 99214 OFFICE O/P EST MOD 30 MIN: CPT | Performed by: INTERNAL MEDICINE

## 2023-09-18 PROCEDURE — 90674 CCIIV4 VAC NO PRSV 0.5 ML IM: CPT | Performed by: INTERNAL MEDICINE

## 2023-09-18 PROCEDURE — 90472 IMMUNIZATION ADMIN EACH ADD: CPT | Performed by: INTERNAL MEDICINE

## 2023-09-18 PROCEDURE — 90677 PCV20 VACCINE IM: CPT | Performed by: INTERNAL MEDICINE

## 2023-09-18 RX ORDER — ZOSTER VACCINE RECOMBINANT, ADJUVANTED 50 MCG/0.5
0.5 KIT INTRAMUSCULAR SEE ADMIN INSTRUCTIONS
Qty: 0.5 ML | Refills: 1 | Status: SHIPPED | OUTPATIENT
Start: 2023-09-18 | End: 2024-03-16

## 2023-09-18 NOTE — PROGRESS NOTES
possibility of osteogenesis imperfecta. Rheumatology referral is currently pending. 2.  Prescription for shingles vaccine given to patient    3. Vitamin D deficiency: Continue vitamin D supplementation    4. Dermatitis: Improving    5. Flu and pneumonia vaccines given today    6. Elevated alkaline phosphatase: Repeat labs in about a month    7. Depression: Doing well with Celexa    8. Mammogram ordered    Augustine Chavira was seen today for 1 month follow-up. Diagnoses and all orders for this visit:    Osteoporosis, unspecified osteoporosis type, unspecified pathological fracture presence  -     External Referral To Orthopedic Surgery  -     Comprehensive Metabolic Panel; Future  -     DEXA BONE DENSITY AXIAL SKELETON; Future    Elevated alkaline phosphatase level  -     Comprehensive Metabolic Panel; Future    Encounter for screening mammogram for malignant neoplasm of breast  -     CHITRA DIGITAL SCREEN W OR WO CAD BILATERAL; Future    Need for shingles vaccine    Vitamin D deficiency    Dermatitis    Needs flu shot    Need for vaccination against Streptococcus pneumoniae    Other depression    Other orders  -     zoster recombinant adjuvanted vaccine (SHINGRIX) 50 MCG/0.5ML SUSR injection; Inject 0.5 mLs into the muscle See Admin Instructions 1 dose now and repeat in 2-6 months  -     Influenza, FLUCELVAX, (age 10 mo+), IM, Preservative Free, 0.5 mL  -     Pneumococcal, PCV20, PREVNAR 20, (age 25 yrs+), IM, PF          Return in about 6 weeks (around 10/30/2023). Orders Placed This Encounter   Procedures    CHITRA DIGITAL SCREEN W OR WO CAD BILATERAL     Standing Status:   Future     Standing Expiration Date:   11/18/2024     Order Specific Question:   Reason for exam:     Answer:   screening for breast cancer     Order Specific Question:   Does this patient have implants? Answer:   No     Order Specific Question:   Does this patient have a personal history of breast cancer?      Answer:   No     Order Specific

## 2023-09-23 ASSESSMENT — ENCOUNTER SYMPTOMS
FACIAL SWELLING: 0
DIARRHEA: 0
VOMITING: 0
COLOR CHANGE: 0
WHEEZING: 0
RECTAL PAIN: 0
COUGH: 0
EYE PAIN: 0
EYE REDNESS: 0
VOICE CHANGE: 0
BLOOD IN STOOL: 0
CHOKING: 0
ABDOMINAL PAIN: 0
CONSTIPATION: 0
EYE DISCHARGE: 0
EYE ITCHING: 0
SHORTNESS OF BREATH: 0
RHINORRHEA: 0
SINUS PRESSURE: 0
ABDOMINAL DISTENTION: 0
BACK PAIN: 0
ANAL BLEEDING: 0
TROUBLE SWALLOWING: 0
PHOTOPHOBIA: 0
SORE THROAT: 0
CHEST TIGHTNESS: 0
NAUSEA: 0

## 2023-10-12 ENCOUNTER — HOSPITAL ENCOUNTER (OUTPATIENT)
Dept: WOMENS IMAGING | Age: 53
Discharge: HOME | End: 2023-10-12
Attending: INTERNAL MEDICINE
Payer: MEDICAID

## 2023-10-12 DIAGNOSIS — M81.0 OSTEOPOROSIS, UNSPECIFIED OSTEOPOROSIS TYPE, UNSPECIFIED PATHOLOGICAL FRACTURE PRESENCE: ICD-10-CM

## 2023-10-12 DIAGNOSIS — Z12.31 ENCOUNTER FOR SCREENING MAMMOGRAM FOR MALIGNANT NEOPLASM OF BREAST: ICD-10-CM

## 2023-10-12 PROCEDURE — 77063 BREAST TOMOSYNTHESIS BI: CPT

## 2023-10-12 PROCEDURE — 77080 DXA BONE DENSITY AXIAL: CPT

## 2023-10-25 DIAGNOSIS — M81.0 OSTEOPOROSIS, UNSPECIFIED OSTEOPOROSIS TYPE, UNSPECIFIED PATHOLOGICAL FRACTURE PRESENCE: ICD-10-CM

## 2023-10-25 DIAGNOSIS — R74.8 ELEVATED ALKALINE PHOSPHATASE LEVEL: ICD-10-CM

## 2023-10-25 LAB
ALBUMIN SERPL-MCNC: 3 G/DL (ref 3.5–5.2)
ALP SERPL-CCNC: 255 U/L (ref 35–104)
ALT SERPL-CCNC: 9 U/L (ref 5–33)
ANION GAP SERPL CALCULATED.3IONS-SCNC: 8 MMOL/L (ref 7–19)
AST SERPL-CCNC: 21 U/L (ref 5–32)
BILIRUB SERPL-MCNC: <0.2 MG/DL (ref 0.2–1.2)
BUN SERPL-MCNC: 10 MG/DL (ref 6–20)
CALCIUM SERPL-MCNC: 9 MG/DL (ref 8.6–10)
CHLORIDE SERPL-SCNC: 101 MMOL/L (ref 98–111)
CO2 SERPL-SCNC: 29 MMOL/L (ref 22–29)
CREAT SERPL-MCNC: 0.6 MG/DL (ref 0.5–0.9)
GLUCOSE SERPL-MCNC: 96 MG/DL (ref 74–109)
POTASSIUM SERPL-SCNC: 4.2 MMOL/L (ref 3.5–5)
PROT SERPL-MCNC: 6.2 G/DL (ref 6.6–8.7)
SODIUM SERPL-SCNC: 138 MMOL/L (ref 136–145)

## 2023-11-02 ENCOUNTER — OFFICE VISIT (OUTPATIENT)
Dept: INTERNAL MEDICINE | Age: 53
End: 2023-11-02
Payer: MEDICAID

## 2023-11-02 VITALS
RESPIRATION RATE: 20 BRPM | OXYGEN SATURATION: 96 % | HEART RATE: 112 BPM | SYSTOLIC BLOOD PRESSURE: 110 MMHG | DIASTOLIC BLOOD PRESSURE: 70 MMHG | HEIGHT: 59 IN | WEIGHT: 84.2 LBS | BODY MASS INDEX: 16.97 KG/M2

## 2023-11-02 DIAGNOSIS — R89.9 ABNORMAL LABORATORY TEST: ICD-10-CM

## 2023-11-02 DIAGNOSIS — L30.9 DERMATITIS: ICD-10-CM

## 2023-11-02 DIAGNOSIS — R74.8 ELEVATED ALKALINE PHOSPHATASE LEVEL: Primary | ICD-10-CM

## 2023-11-02 DIAGNOSIS — E55.9 VITAMIN D DEFICIENCY: ICD-10-CM

## 2023-11-02 DIAGNOSIS — G47.411 CATAPLEXY: ICD-10-CM

## 2023-11-02 DIAGNOSIS — L97.929 ULCER OF LEFT LOWER EXTREMITY, UNSPECIFIED ULCER STAGE (HCC): ICD-10-CM

## 2023-11-02 PROCEDURE — 99214 OFFICE O/P EST MOD 30 MIN: CPT | Performed by: INTERNAL MEDICINE

## 2023-11-02 RX ORDER — DOXYCYCLINE HYCLATE 100 MG
100 TABLET ORAL 2 TIMES DAILY
Qty: 20 TABLET | Refills: 0 | Status: SHIPPED | OUTPATIENT
Start: 2023-11-02 | End: 2023-11-12

## 2023-11-02 NOTE — PROGRESS NOTES
Chief Complaint   Patient presents with    Follow-up       HPI: Sly Timmons is a 52 y.o. female is here for follow-up of an elevated alkaline phosphatase level. Her alkaline phosphatase remains elevated. She did agree to get an ultrasound of her liver today. Her Alkaline Phosphatase has increased from 201 to 255. She has had multiple fractures in the past.  She does have osteoporosis. However, she has had fractures almost all of her life. I am concerned about an underlying disorder such as osteogenesis imperfecta. The elevated alkaline phosphatase could be secondary to the multiple fractures that she has sustained. However, we do need to rule out underlying liver pathology. She states that at one point, she was told that she has something wrong with her parathyroids. We will check a PTH with her lab work today. We will also check a PTH related peptide for further evaluation of possible hyperparathyroidism. She states that she still tends to scratch at her skin at times. However the itching is about 75% better. She has not had any beba colored stools. She has not noticed any jaundice or abdominal pain. She has not had any nausea or vomiting. She does have an ulcer to the back part of her left leg. The ulcer is healing somewhat. However, it is still red and inflamed. She does have a history of depression, her depression is stable on her current dose of Celexa.     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    Osteoporosis     Patella fracture     Radial fracture     Shoulder fracture     x2      Past Surgical History:   Procedure Laterality Date    BREAST ENHANCEMENT SURGERY Bilateral      SECTION      CHOLECYSTECTOMY      CORNEAL TRANSPLANT      HERNIA REPAIR      HIP SURGERY

## 2023-11-06 DIAGNOSIS — R92.8 ABNORMAL SCREENING MAMMOGRAM: Primary | ICD-10-CM

## 2023-11-10 DIAGNOSIS — F32.89 OTHER DEPRESSION: Primary | ICD-10-CM

## 2023-11-10 RX ORDER — CITALOPRAM 40 MG/1
40 TABLET ORAL DAILY
Qty: 30 TABLET | Refills: 5 | Status: SHIPPED | OUTPATIENT
Start: 2023-11-10

## 2023-11-11 SDOH — ECONOMIC STABILITY: FOOD INSECURITY: WITHIN THE PAST 12 MONTHS, YOU WORRIED THAT YOUR FOOD WOULD RUN OUT BEFORE YOU GOT MONEY TO BUY MORE.: NEVER TRUE

## 2023-11-11 SDOH — ECONOMIC STABILITY: FOOD INSECURITY: WITHIN THE PAST 12 MONTHS, THE FOOD YOU BOUGHT JUST DIDN'T LAST AND YOU DIDN'T HAVE MONEY TO GET MORE.: NEVER TRUE

## 2023-11-11 SDOH — ECONOMIC STABILITY: INCOME INSECURITY: HOW HARD IS IT FOR YOU TO PAY FOR THE VERY BASICS LIKE FOOD, HOUSING, MEDICAL CARE, AND HEATING?: NOT HARD AT ALL

## 2023-11-11 ASSESSMENT — PATIENT HEALTH QUESTIONNAIRE - PHQ9
SUM OF ALL RESPONSES TO PHQ9 QUESTIONS 1 & 2: 0
SUM OF ALL RESPONSES TO PHQ QUESTIONS 1-9: 0
5. POOR APPETITE OR OVEREATING: 0
10. IF YOU CHECKED OFF ANY PROBLEMS, HOW DIFFICULT HAVE THESE PROBLEMS MADE IT FOR YOU TO DO YOUR WORK, TAKE CARE OF THINGS AT HOME, OR GET ALONG WITH OTHER PEOPLE: 0
1. LITTLE INTEREST OR PLEASURE IN DOING THINGS: 0
SUM OF ALL RESPONSES TO PHQ QUESTIONS 1-9: 0
7. TROUBLE CONCENTRATING ON THINGS, SUCH AS READING THE NEWSPAPER OR WATCHING TELEVISION: 0
SUM OF ALL RESPONSES TO PHQ QUESTIONS 1-9: 0
6. FEELING BAD ABOUT YOURSELF - OR THAT YOU ARE A FAILURE OR HAVE LET YOURSELF OR YOUR FAMILY DOWN: 0
SUM OF ALL RESPONSES TO PHQ QUESTIONS 1-9: 0
3. TROUBLE FALLING OR STAYING ASLEEP: 0
9. THOUGHTS THAT YOU WOULD BE BETTER OFF DEAD, OR OF HURTING YOURSELF: 0
8. MOVING OR SPEAKING SO SLOWLY THAT OTHER PEOPLE COULD HAVE NOTICED. OR THE OPPOSITE, BEING SO FIGETY OR RESTLESS THAT YOU HAVE BEEN MOVING AROUND A LOT MORE THAN USUAL: 0
4. FEELING TIRED OR HAVING LITTLE ENERGY: 0
2. FEELING DOWN, DEPRESSED OR HOPELESS: 0

## 2023-11-11 ASSESSMENT — ENCOUNTER SYMPTOMS
RHINORRHEA: 0
RECTAL PAIN: 0
VOMITING: 0
EYE DISCHARGE: 0
SORE THROAT: 0
COLOR CHANGE: 0
CONSTIPATION: 0
SINUS PRESSURE: 0
BLOOD IN STOOL: 0
EYE PAIN: 0
SHORTNESS OF BREATH: 0
ABDOMINAL DISTENTION: 0
NAUSEA: 0
FACIAL SWELLING: 0
CHOKING: 0
EYE REDNESS: 0
COUGH: 0
PHOTOPHOBIA: 0
EYE ITCHING: 0
DIARRHEA: 0
WHEEZING: 0
VOICE CHANGE: 0
ANAL BLEEDING: 0
TROUBLE SWALLOWING: 0
BACK PAIN: 0
CHEST TIGHTNESS: 0
ABDOMINAL PAIN: 0

## 2023-11-11 ASSESSMENT — COLUMBIA-SUICIDE SEVERITY RATING SCALE - C-SSRS
7. DID THIS OCCUR IN THE LAST THREE MONTHS: NO
5. HAVE YOU STARTED TO WORK OUT OR WORKED OUT THE DETAILS OF HOW TO KILL YOURSELF? DO YOU INTEND TO CARRY OUT THIS PLAN?: NO
4. HAVE YOU HAD THESE THOUGHTS AND HAD SOME INTENTION OF ACTING ON THEM?: NO
3. HAVE YOU BEEN THINKING ABOUT HOW YOU MIGHT KILL YOURSELF?: NO

## 2023-12-12 ENCOUNTER — OFFICE VISIT (OUTPATIENT)
Dept: NEUROLOGY | Age: 53
End: 2023-12-12
Payer: MEDICAID

## 2023-12-12 VITALS
WEIGHT: 89 LBS | SYSTOLIC BLOOD PRESSURE: 127 MMHG | HEIGHT: 59 IN | BODY MASS INDEX: 17.94 KG/M2 | DIASTOLIC BLOOD PRESSURE: 77 MMHG | HEART RATE: 94 BPM

## 2023-12-12 DIAGNOSIS — H54.3 VISUAL IMPAIRMENT IN BOTH EYES: ICD-10-CM

## 2023-12-12 DIAGNOSIS — G47.10 HYPERSOMNIA: Primary | ICD-10-CM

## 2023-12-12 DIAGNOSIS — R29.6 RECURRENT FALLS: ICD-10-CM

## 2023-12-12 DIAGNOSIS — R60.9 PERIPHERAL EDEMA: ICD-10-CM

## 2023-12-12 DIAGNOSIS — R20.0 NUMBNESS AND TINGLING: ICD-10-CM

## 2023-12-12 DIAGNOSIS — R20.2 NUMBNESS AND TINGLING: ICD-10-CM

## 2023-12-12 DIAGNOSIS — G47.419 SLEEP ATTACK: ICD-10-CM

## 2023-12-12 PROCEDURE — 99203 OFFICE O/P NEW LOW 30 MIN: CPT | Performed by: PSYCHIATRY & NEUROLOGY

## 2023-12-12 RX ORDER — GLUC/MSM/COLGN2/HYAL/ANTIARTH3 375-375-20
TABLET ORAL
COMMUNITY

## 2023-12-12 NOTE — PROGRESS NOTES
[facility]  [address] [address], [address] [facility]  Phone [phone]  [phone]     [facility] PATIENT VISIT        Patient: Roman Orta  :  1970  Age:  52 y.o. MRN:  [id number]  Account #:  [de-identified]  Today:  23    Referring Provider: Martha Wong MD  [address] [facility]  [address]  Consulting Provider: Dewey Elena M.D.    [address]:  Chief Complaint   Patient presents with    New Patient     Establishing care for cataplexy       History Source: History obtained from the patient. PCP: Martha Wong MD    HISTORY OF PRESENT ILLNESS:  Roman Orta is a 52y.o. year old woman with excessive daytime drowsiness who was referred for evaluation and treatment. She has difficulty initiating and maintaining sleep. She gets 8 hours of sleep a night and often more, up to 12. She snores and wakens herself snoring. She wakens gasping and short of breath. She has difficulty waking even to alarms. She dreams and often will have unpleasant dreams but not necessarily nightmares. She says that she cannot move for several minutes often after waking. She feels like she dreams when not sleep. She has sudden sleep attacks and will fall and cannot get up. She says she is is asleep for minutes. She has had recurrent syncope that occurs only when standing and walking. She has had several falls and has had numerous injuries. She ambulates with a walker. She had an MRI head last year that was OK. She has had extensive blood tests. She has not had any cardiac work up.       PAST MEDICAL HITORY:    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    Osteoporosis     Patella fracture     Radial fracture     Shoulder fracture     x2

## 2023-12-12 NOTE — PATIENT INSTRUCTIONS
INSTRUCTIONS:  Will arrange a nighttime sleep study followed by a daytime multiple sleep latency test to assess for narcolepsy or the like

## 2023-12-14 ENCOUNTER — HOSPITAL ENCOUNTER (OUTPATIENT)
Dept: ULTRASOUND IMAGING | Age: 53
Discharge: HOME | End: 2023-12-14
Payer: MEDICAID

## 2023-12-14 DIAGNOSIS — R74.8 ELEVATED ALKALINE PHOSPHATASE LEVEL: ICD-10-CM

## 2023-12-14 PROCEDURE — 76705 ECHO EXAM OF ABDOMEN: CPT

## 2024-01-24 DIAGNOSIS — R74.8 ELEVATED ALKALINE PHOSPHATASE LEVEL: ICD-10-CM

## 2024-01-24 LAB
ALBUMIN SERPL-MCNC: 3.2 G/DL (ref 3.5–5.2)
ALP SERPL-CCNC: 185 U/L (ref 35–104)
ALT SERPL-CCNC: 8 U/L (ref 5–33)
ANION GAP SERPL CALCULATED.3IONS-SCNC: 11 MMOL/L (ref 7–19)
AST SERPL-CCNC: 19 U/L (ref 5–32)
BASOPHILS # BLD: 0.1 K/UL (ref 0–0.2)
BASOPHILS NFR BLD: 0.5 % (ref 0–1)
BILIRUB SERPL-MCNC: <0.2 MG/DL (ref 0.2–1.2)
BUN SERPL-MCNC: 10 MG/DL (ref 6–20)
CALCIUM SERPL-MCNC: 8.9 MG/DL (ref 8.6–10)
CHLORIDE SERPL-SCNC: 100 MMOL/L (ref 98–111)
CO2 SERPL-SCNC: 27 MMOL/L (ref 22–29)
CREAT SERPL-MCNC: 0.6 MG/DL (ref 0.5–0.9)
EOSINOPHIL # BLD: 0.3 K/UL (ref 0–0.6)
EOSINOPHIL NFR BLD: 2.7 % (ref 0–5)
ERYTHROCYTE [DISTWIDTH] IN BLOOD BY AUTOMATED COUNT: 13.2 % (ref 11.5–14.5)
GLUCOSE SERPL-MCNC: 82 MG/DL (ref 74–109)
HCT VFR BLD AUTO: 34.4 % (ref 37–47)
HGB BLD-MCNC: 10.6 G/DL (ref 12–16)
IMM GRANULOCYTES # BLD: 0 K/UL
LYMPHOCYTES # BLD: 2.1 K/UL (ref 1.1–4.5)
LYMPHOCYTES NFR BLD: 19.6 % (ref 20–40)
Lab: NORMAL
MCH RBC QN AUTO: 28.5 PG (ref 27–31)
MCHC RBC AUTO-ENTMCNC: 30.8 G/DL (ref 33–37)
MCV RBC AUTO: 92.5 FL (ref 81–99)
MONOCYTES # BLD: 0.9 K/UL (ref 0–0.9)
MONOCYTES NFR BLD: 7.9 % (ref 0–10)
NEUTROPHILS # BLD: 7.5 K/UL (ref 1.5–7.5)
NEUTS SEG NFR BLD: 68.9 % (ref 50–65)
PLATELET # BLD AUTO: 387 K/UL (ref 130–400)
PMV BLD AUTO: 9.8 FL (ref 9.4–12.3)
POTASSIUM SERPL-SCNC: 3.8 MMOL/L (ref 3.5–5)
PROT SERPL-MCNC: 5.9 G/DL (ref 6.6–8.7)
PTH-INTACT SERPL-MCNC: 57.8 PG/ML (ref 15–65)
RBC # BLD AUTO: 3.72 M/UL (ref 4.2–5.4)
REPORT: NORMAL
SODIUM SERPL-SCNC: 138 MMOL/L (ref 136–145)
THIS TEST SENT TO: NORMAL
WBC # BLD AUTO: 10.8 K/UL (ref 4.8–10.8)

## 2024-01-27 LAB — TRYPTASE SERPL-MCNC: 12.8 UG/L

## 2024-01-29 LAB — MISCELLANEOUS LAB TEST ORDER: NORMAL

## 2024-01-31 LAB — PTH RELATED PROT SERPL-SCNC: 4 PMOL/L (ref 0–3.4)

## 2024-02-01 ENCOUNTER — HOSPITAL ENCOUNTER (OUTPATIENT)
Dept: WOMENS IMAGING | Age: 54
Discharge: HOME | End: 2024-02-01
Attending: INTERNAL MEDICINE
Payer: MEDICAID

## 2024-02-01 ENCOUNTER — OFFICE VISIT (OUTPATIENT)
Dept: INTERNAL MEDICINE | Age: 54
End: 2024-02-01
Payer: MEDICAID

## 2024-02-01 VITALS
SYSTOLIC BLOOD PRESSURE: 118 MMHG | BODY MASS INDEX: 16.97 KG/M2 | HEART RATE: 93 BPM | OXYGEN SATURATION: 95 % | WEIGHT: 84 LBS | DIASTOLIC BLOOD PRESSURE: 72 MMHG

## 2024-02-01 DIAGNOSIS — I73.9 PVD (PERIPHERAL VASCULAR DISEASE) (HCC): ICD-10-CM

## 2024-02-01 DIAGNOSIS — E21.3 HYPERPARATHYROIDISM (HCC): ICD-10-CM

## 2024-02-01 DIAGNOSIS — R92.8 ABNORMAL SCREENING MAMMOGRAM: ICD-10-CM

## 2024-02-01 DIAGNOSIS — R53.83 OTHER FATIGUE: ICD-10-CM

## 2024-02-01 DIAGNOSIS — L97.922 CHRONIC ULCER OF LEFT LEG WITH FAT LAYER EXPOSED (HCC): Primary | ICD-10-CM

## 2024-02-01 DIAGNOSIS — L29.9 PRURITIC CONDITION: ICD-10-CM

## 2024-02-01 DIAGNOSIS — L97.522 CHRONIC ULCER OF LEFT FOOT, WITH FAT LAYER EXPOSED (HCC): ICD-10-CM

## 2024-02-01 DIAGNOSIS — L97.922 CHRONIC ULCER OF LEFT LEG, WITH FAT LAYER EXPOSED (HCC): ICD-10-CM

## 2024-02-01 DIAGNOSIS — Z23 NEED FOR SHINGLES VACCINE: ICD-10-CM

## 2024-02-01 DIAGNOSIS — G47.9 SLEEP DISORDER: ICD-10-CM

## 2024-02-01 DIAGNOSIS — Z87.891 SMOKING HISTORY: ICD-10-CM

## 2024-02-01 PROCEDURE — 77065 DX MAMMO INCL CAD UNI: CPT

## 2024-02-01 PROCEDURE — 99214 OFFICE O/P EST MOD 30 MIN: CPT | Performed by: INTERNAL MEDICINE

## 2024-02-01 RX ORDER — CLINDAMYCIN HYDROCHLORIDE 300 MG/1
300 CAPSULE ORAL 3 TIMES DAILY
Qty: 30 CAPSULE | Refills: 0 | Status: SHIPPED | OUTPATIENT
Start: 2024-02-01 | End: 2024-02-11

## 2024-02-01 RX ORDER — ZOSTER VACCINE RECOMBINANT, ADJUVANTED 50 MCG/0.5
0.5 KIT INTRAMUSCULAR SEE ADMIN INSTRUCTIONS
Qty: 0.5 ML | Refills: 0 | Status: SHIPPED | OUTPATIENT
Start: 2024-02-01 | End: 2024-02-06

## 2024-02-01 ASSESSMENT — PATIENT HEALTH QUESTIONNAIRE - PHQ9
SUM OF ALL RESPONSES TO PHQ QUESTIONS 1-9: 0
5. POOR APPETITE OR OVEREATING: 0
SUM OF ALL RESPONSES TO PHQ QUESTIONS 1-9: 0
6. FEELING BAD ABOUT YOURSELF - OR THAT YOU ARE A FAILURE OR HAVE LET YOURSELF OR YOUR FAMILY DOWN: 0
3. TROUBLE FALLING OR STAYING ASLEEP: 0
7. TROUBLE CONCENTRATING ON THINGS, SUCH AS READING THE NEWSPAPER OR WATCHING TELEVISION: 0
2. FEELING DOWN, DEPRESSED OR HOPELESS: 0
8. MOVING OR SPEAKING SO SLOWLY THAT OTHER PEOPLE COULD HAVE NOTICED. OR THE OPPOSITE, BEING SO FIGETY OR RESTLESS THAT YOU HAVE BEEN MOVING AROUND A LOT MORE THAN USUAL: 0
4. FEELING TIRED OR HAVING LITTLE ENERGY: 0
SUM OF ALL RESPONSES TO PHQ9 QUESTIONS 1 & 2: 0
10. IF YOU CHECKED OFF ANY PROBLEMS, HOW DIFFICULT HAVE THESE PROBLEMS MADE IT FOR YOU TO DO YOUR WORK, TAKE CARE OF THINGS AT HOME, OR GET ALONG WITH OTHER PEOPLE: 0
9. THOUGHTS THAT YOU WOULD BE BETTER OFF DEAD, OR OF HURTING YOURSELF: 0
SUM OF ALL RESPONSES TO PHQ QUESTIONS 1-9: 0
1. LITTLE INTEREST OR PLEASURE IN DOING THINGS: 0
SUM OF ALL RESPONSES TO PHQ QUESTIONS 1-9: 0

## 2024-02-06 ENCOUNTER — HOSPITAL ENCOUNTER (OUTPATIENT)
Dept: WOUND CARE | Age: 54
Discharge: HOME | End: 2024-02-06
Payer: MEDICAID

## 2024-02-06 VITALS
HEART RATE: 105 BPM | DIASTOLIC BLOOD PRESSURE: 69 MMHG | WEIGHT: 84 LBS | SYSTOLIC BLOOD PRESSURE: 106 MMHG | RESPIRATION RATE: 20 BRPM | BODY MASS INDEX: 16.93 KG/M2 | HEIGHT: 59 IN | TEMPERATURE: 98.2 F

## 2024-02-06 DIAGNOSIS — I89.0 LYMPHEDEMA: ICD-10-CM

## 2024-02-06 DIAGNOSIS — I73.9 PVD (PERIPHERAL VASCULAR DISEASE) (HCC): ICD-10-CM

## 2024-02-06 DIAGNOSIS — L97.522 CHRONIC ULCER OF LEFT FOOT, WITH FAT LAYER EXPOSED (HCC): Primary | ICD-10-CM

## 2024-02-06 DIAGNOSIS — L97.922 CHRONIC ULCER OF LEFT LEG, WITH FAT LAYER EXPOSED (HCC): ICD-10-CM

## 2024-02-06 PROCEDURE — 99214 OFFICE O/P EST MOD 30 MIN: CPT

## 2024-02-06 PROCEDURE — 99215 OFFICE O/P EST HI 40 MIN: CPT | Performed by: NURSE PRACTITIONER

## 2024-02-06 PROCEDURE — 97597 DBRDMT OPN WND 1ST 20 CM/<: CPT | Performed by: NURSE PRACTITIONER

## 2024-02-06 PROCEDURE — 97597 DBRDMT OPN WND 1ST 20 CM/<: CPT

## 2024-02-06 RX ORDER — GENTAMICIN SULFATE 1 MG/G
OINTMENT TOPICAL ONCE
OUTPATIENT
Start: 2024-02-06 | End: 2024-02-06

## 2024-02-06 RX ORDER — LIDOCAINE HYDROCHLORIDE 40 MG/ML
SOLUTION TOPICAL ONCE
OUTPATIENT
Start: 2024-02-06 | End: 2024-02-06

## 2024-02-06 RX ORDER — LIDOCAINE HYDROCHLORIDE 20 MG/ML
JELLY TOPICAL ONCE
OUTPATIENT
Start: 2024-02-06 | End: 2024-02-06

## 2024-02-06 RX ORDER — CLOBETASOL PROPIONATE 0.5 MG/G
OINTMENT TOPICAL ONCE
OUTPATIENT
Start: 2024-02-06 | End: 2024-02-06

## 2024-02-06 RX ORDER — TRIAMCINOLONE ACETONIDE 1 MG/G
OINTMENT TOPICAL ONCE
OUTPATIENT
Start: 2024-02-06 | End: 2024-02-06

## 2024-02-06 RX ORDER — BETAMETHASONE DIPROPIONATE 0.5 MG/G
CREAM TOPICAL ONCE
OUTPATIENT
Start: 2024-02-06 | End: 2024-02-06

## 2024-02-06 RX ORDER — IBUPROFEN 200 MG
TABLET ORAL ONCE
OUTPATIENT
Start: 2024-02-06 | End: 2024-02-06

## 2024-02-06 RX ORDER — LIDOCAINE 50 MG/G
OINTMENT TOPICAL ONCE
OUTPATIENT
Start: 2024-02-06 | End: 2024-02-06

## 2024-02-06 RX ORDER — BACITRACIN ZINC AND POLYMYXIN B SULFATE 500; 1000 [USP'U]/G; [USP'U]/G
OINTMENT TOPICAL ONCE
OUTPATIENT
Start: 2024-02-06 | End: 2024-02-06

## 2024-02-06 RX ORDER — SODIUM CHLOR/HYPOCHLOROUS ACID 0.033 %
SOLUTION, IRRIGATION IRRIGATION ONCE
OUTPATIENT
Start: 2024-02-06 | End: 2024-02-06

## 2024-02-06 RX ORDER — IBUPROFEN 600 MG/1
600 TABLET ORAL EVERY 6 HOURS PRN
COMMUNITY

## 2024-02-06 RX ORDER — GINSENG 100 MG
CAPSULE ORAL ONCE
OUTPATIENT
Start: 2024-02-06 | End: 2024-02-06

## 2024-02-06 RX ORDER — LIDOCAINE 40 MG/G
CREAM TOPICAL ONCE
OUTPATIENT
Start: 2024-02-06 | End: 2024-02-06

## 2024-02-06 RX ORDER — LIDOCAINE HYDROCHLORIDE 20 MG/ML
JELLY TOPICAL ONCE
Status: DISCONTINUED | OUTPATIENT
Start: 2024-02-06 | End: 2024-02-08 | Stop reason: HOSPADM

## 2024-02-06 ASSESSMENT — VISUAL ACUITY: OU: 1

## 2024-02-06 NOTE — DISCHARGE INSTRUCTIONS
[facility]   Physician Orders and Discharge Instructions  [address] [place] [address]  [address]  [place] [address]  Telephone: [phone]      FAX [phone]    NAME:  Vonda Lutz  YOB: 1970  MEDICAL RECORD NUMBER:  [id number]  DATE:  2/6/2024    Discharge condition: Stable    Discharge to: Home    Left via:public transportation    Accompanied by:  self    ECF/HHA: [facility]    Dressing Orders:  Left foot and leg Wound:   Wash with soap and water. Apply hydrodera blue to wound bed then apply Calamine Coflex Unnaboot from toes to knee. Change once weekly .      Compression Therapy is an important part of your program of care. Compression makes it easier for your body to pump the blood from your legs back to your heart, helping alleviate a condition called chronic venous insufficiency. Chronic venous insufficiency is an underlying cause of your injury, and managing it properly will help you to heal.      Keep the bandage in place at all times unless you experience foot pain or numbness or coolness of the toes. Do not attempt to remove and reapply the bandage system.  Give it time.  Your bandage may feel tight at first. This is normal. Your bandage will become more comfortable as swelling goes down.  Be active, as suggested by your healthcare provider. Daily walks or mild exercise encourages better blood flow to aid healing.  Put your feet up when sitting for long periods of time.  Keep the bandage dry. Wet compression bandages are more likely to slip down and/or cause damage to good skin.    PRECAUTIONS For your safety, compression therapy should be used under the supervision of a healthcare professional. Contact your care provider if you experience any of the following:  Pain that does not go away with elevation.  Discoloration or numbness of the toes.  The bandage slipping out of place  Fluid leaking through the bandage.    Treatment Orders:  Protein

## 2024-02-06 NOTE — HOME CARE
Pressure Injury Full thickness 02/06/24 1047   Number of days: 0       Right Leg Measurements: (ALL measurements are in cm)  Right Leg Edema Point of Measurement  Great toe to forefoot: 12 cm  Heel to ankle: 10 cm  Heel to calf: 32  Leg circumference: 30 cm  Ankle circumference: 21 cm  Foot circumference: 21 cm    Left Leg Measurements: (ALL measurements are in cm)  Left Leg Edema Point of Measurement  Great toe to forefoot: 12 cm  Heel to ankle: 10 cm  Heel to calf: 32  Leg circumference: 32 cm  Ankle circumference: 21 cm  Foot circumference: 21 cm  Compression Therapy: Compression ordered, Unna boot    Right Lower Leg Popliteal to floor 35 cm  Left Lower Leg Popliteal to floor 35 cm    Supplies Requested :     Medicare Requirements  Patient must have a qualifying Active Venus Ulcer if ordering Bilateral Compression Wounds MUST be present on both legs for Medicare Coverage.  The patient can Not be on home health or have had a Medicare part A stay in the past 24 hours.    Patient Wound(s) Debrided: [x] Yes if yes please add date 2/6/24  [] No    Debribement Type: Excisional/Sharp and Mechanical     Patient currently being seen by Home Health: [] Yes   [x] No     Compression Type: Circaid Juxtalite, Original, 30-40 mm/Hg, BILATERAL lower legs     Provider Information:      PROVIDER'S NAME: Sylvia SEAMAN    NPI: [id number]

## 2024-02-06 NOTE — PROGRESS NOTES
Varenicline  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    Osteoporosis     Patella fracture     Radial fracture     Shoulder fracture     x2       Past Surgical History:   Procedure Laterality Date    BREAST ENHANCEMENT SURGERY Bilateral      SECTION      CHOLECYSTECTOMY      CORNEAL TRANSPLANT      HERNIA REPAIR      HIP SURGERY      history of osteoporosis;hip surgery x 2    MOUTH SURGERY      OTHER SURGICAL HISTORY      TIPS    TUBAL LIGATION       Family History   Problem Relation Age of Onset    Thyroid Disease Mother         ? cancer    Osteoporosis Mother     Kidney Disease Mother     Diabetes Mother     Prostate Cancer Father     Diabetes Maternal Grandmother     Alcohol Abuse Maternal Grandfather     No Known Problems Paternal Grandmother     No Known Problems Paternal Grandfather     Breast Cancer Other         three aunts, some cousing     Social History     Tobacco Use    Smoking status: Every Day     Current packs/day: 0.50     Average packs/day: 0.5 packs/day for 41.1 years (20.5 ttl pk-yrs)     Types: Cigarettes     Start date:      Passive exposure: Current    Smokeless tobacco: Never   Substance Use Topics    Alcohol use: Yes         Review of Systems    Review of Systems   Cardiovascular:  Positive for leg swelling.   Skin:  Positive for wound.   All other systems reviewed and are negative.      All other review of systems are negative.    Physical Exam    /69   Pulse (!) 105   Temp 98.2 °F (36.8 °C) (Temporal)   Resp 20   Ht 1.5 m (4' 11.06\")   Wt 38.1 kg (84 lb)   BMI 16.93 kg/m²     Physical Exam  Vitals reviewed.   Constitutional:       Appearance: Normal appearance. She is normal weight.   HENT:      Head: Normocephalic and atraumatic.      Right Ear: External

## 2024-02-06 NOTE — PLAN OF CARE
Problem: Pain  Goal: Verbalizes/displays adequate comfort level or baseline comfort level  Outcome: Progressing     Problem: Wound:  Goal: Will show signs of wound healing; wound closure and no evidence of infection  Description: Will show signs of wound healing; wound closure and no evidence of infection  Outcome: Progressing     Problem: Venous:  Goal: Signs of wound healing will improve  Description: Signs of wound healing will improve  Outcome: Progressing     Problem: Compression therapy:  Goal: Will be free from complications associated with compression therapy  Description: Will be free from complications associated with compression therapy  Outcome: Progressing

## 2024-02-06 NOTE — PLAN OF CARE
Unna Boot Application   Below Knee    NAME:  Vonda Lutz  YOB: 1970  MEDICAL RECORD NUMBER:  [id number]  DATE:  2/6/2024    Unna boot: Applied moisturizing agent to dry skin as needed.   Appied primary and secondary dressing as ordered.  Applied Unna roll from toes to knee overlapping each time.   Applied ace wrap or coban from toes to below the knee.   Instructed patient/caregiver to keep dressing dry and intact. DO NOT REMOVE DRESSING.   Instructed pt/family/caregiver to report excessive draining, loose bandage, wet dressing, severe pain or tingling in toes.  Applied Unna Boot dressing below the knee to left lower leg.    Unna Boot(s) were applied per  Guidelines.     Electronically signed by Yary Rodas RN on 2/6/2024 at 12:52 PM

## 2024-02-06 NOTE — PLAN OF CARE
Problem: Smoking cessation:  Goal: Ability to formulate a plan to maintain a tobacco-free life will be supported  Description: Ability to formulate a plan to maintain a tobacco-free life will be supported  Outcome: Progressing

## 2024-02-07 DIAGNOSIS — E87.6 HYPOKALEMIA: Primary | ICD-10-CM

## 2024-02-08 RX ORDER — POTASSIUM CHLORIDE 1500 MG/1
20 TABLET, EXTENDED RELEASE ORAL 2 TIMES DAILY
Qty: 60 TABLET | Refills: 1 | Status: SHIPPED | OUTPATIENT
Start: 2024-02-08

## 2024-02-10 ENCOUNTER — TELEPHONE (OUTPATIENT)
Dept: INTERNAL MEDICINE | Age: 54
End: 2024-02-10

## 2024-02-10 SDOH — ECONOMIC STABILITY: INCOME INSECURITY: HOW HARD IS IT FOR YOU TO PAY FOR THE VERY BASICS LIKE FOOD, HOUSING, MEDICAL CARE, AND HEATING?: NOT HARD AT ALL

## 2024-02-10 SDOH — ECONOMIC STABILITY: FOOD INSECURITY: WITHIN THE PAST 12 MONTHS, YOU WORRIED THAT YOUR FOOD WOULD RUN OUT BEFORE YOU GOT MONEY TO BUY MORE.: NEVER TRUE

## 2024-02-10 SDOH — ECONOMIC STABILITY: FOOD INSECURITY: WITHIN THE PAST 12 MONTHS, THE FOOD YOU BOUGHT JUST DIDN'T LAST AND YOU DIDN'T HAVE MONEY TO GET MORE.: NEVER TRUE

## 2024-02-10 ASSESSMENT — ENCOUNTER SYMPTOMS
EYE ITCHING: 0
CHOKING: 0
NAUSEA: 0
PHOTOPHOBIA: 0
CONSTIPATION: 0
TROUBLE SWALLOWING: 0
ABDOMINAL PAIN: 0
EYE PAIN: 0
SORE THROAT: 0
VOMITING: 0
DIARRHEA: 0
FACIAL SWELLING: 0
RHINORRHEA: 0
EYE DISCHARGE: 0
BLOOD IN STOOL: 0
RECTAL PAIN: 0
SHORTNESS OF BREATH: 0
VOICE CHANGE: 0
CHEST TIGHTNESS: 0
ANAL BLEEDING: 0
EYE REDNESS: 0
BACK PAIN: 0
ABDOMINAL DISTENTION: 0
WHEEZING: 0
SINUS PRESSURE: 0
COUGH: 0
COLOR CHANGE: 0

## 2024-02-10 NOTE — TELEPHONE ENCOUNTER
Parathyroid releasing hormone being a little bit elevated, we can always refer her to ENT if she would like.  However, we can always wait and see what she does when she gets her labs checked in 6 months.  Her PTH itself was normal.

## 2024-02-10 NOTE — PROGRESS NOTES
zoster recombinant adjuvanted vaccine (SHINGRIX) 50 MCG/0.5ML SUSR injection; Inject 0.5 mLs into the muscle See Admin Instructions 1 dose now and repeat in 2-6 months          Return in about 4 weeks (around 2/29/2024), or wound check.     Orders Placed This Encounter   Procedures    CT lung screen [Initial/Annual]     Age: 53 y.o.   Smoking History:   Social History    Tobacco Use      Smoking status: Every Day        Packs/day: 0.50        Years: 0.5 packs/day for 41.1 years (20.5 ttl pk-yrs)        Types: Cigarettes        Start date: 1983        Passive exposure: Current      Smokeless tobacco: Never    Alcohol use: Yes    Drug use: Never    Pack years: 0  Last CT lung screen: No previous lung cancer screening exam     Standing Status:   Future     Standing Expiration Date:   2/1/2025     Order Specific Question:   Is there documentation of shared decision making?     Answer:   Yes     Order Specific Question:   Does the patient show any signs or symptoms of lung cancer?     Answer:   No     Order Specific Question:   Is this the first (baseline) CT or an annual exam?     Answer:   Baseline [1]     Order Specific Question:   Is this a low dose CT or a routine CT?     Answer:   Low Dose CT [1]     Order Specific Question:   Smoking Status?     Answer:   Every Day [1]     Order Specific Question:   Smoking packs per day?     Answer:   0.5     Order Specific Question:   Years smoking?     Answer:   38    PTH, Intact     Standing Status:   Future     Standing Expiration Date:   2/1/2025    PTH-Related Peptide     Standing Status:   Future     Standing Expiration Date:   2/1/2025    Comprehensive Metabolic Panel     Standing Status:   Future     Standing Expiration Date:   2/1/2025    CBC with Auto Differential     Standing Status:   Future     Standing Expiration Date:   2/1/2025    TSH     Standing Status:   Future     Standing Expiration Date:   2/1/2025    Tryptase     Standing Status:   Future     Standing

## 2024-02-13 ENCOUNTER — HOSPITAL ENCOUNTER (OUTPATIENT)
Dept: WOUND CARE | Age: 54
Discharge: HOME | End: 2024-02-13
Payer: MEDICAID

## 2024-02-13 VITALS
DIASTOLIC BLOOD PRESSURE: 81 MMHG | RESPIRATION RATE: 20 BRPM | HEIGHT: 59 IN | BODY MASS INDEX: 16.93 KG/M2 | SYSTOLIC BLOOD PRESSURE: 126 MMHG | WEIGHT: 84 LBS | TEMPERATURE: 98.2 F | HEART RATE: 113 BPM

## 2024-02-13 DIAGNOSIS — L97.522 CHRONIC ULCER OF LEFT FOOT, WITH FAT LAYER EXPOSED (HCC): Primary | ICD-10-CM

## 2024-02-13 DIAGNOSIS — L97.922 CHRONIC ULCER OF LEFT LEG, WITH FAT LAYER EXPOSED (HCC): ICD-10-CM

## 2024-02-13 DIAGNOSIS — I73.9 PVD (PERIPHERAL VASCULAR DISEASE) (HCC): ICD-10-CM

## 2024-02-13 PROCEDURE — 97597 DBRDMT OPN WND 1ST 20 CM/<: CPT | Performed by: SURGERY

## 2024-02-13 PROCEDURE — 97597 DBRDMT OPN WND 1ST 20 CM/<: CPT

## 2024-02-13 RX ORDER — TRIAMCINOLONE ACETONIDE 1 MG/G
OINTMENT TOPICAL ONCE
OUTPATIENT
Start: 2024-02-13 | End: 2024-02-13

## 2024-02-13 RX ORDER — LIDOCAINE HYDROCHLORIDE 40 MG/ML
SOLUTION TOPICAL ONCE
OUTPATIENT
Start: 2024-02-13 | End: 2024-02-13

## 2024-02-13 RX ORDER — SODIUM CHLOR/HYPOCHLOROUS ACID 0.033 %
SOLUTION, IRRIGATION IRRIGATION ONCE
OUTPATIENT
Start: 2024-02-13 | End: 2024-02-13

## 2024-02-13 RX ORDER — LIDOCAINE 40 MG/G
CREAM TOPICAL ONCE
OUTPATIENT
Start: 2024-02-13 | End: 2024-02-13

## 2024-02-13 RX ORDER — GINSENG 100 MG
CAPSULE ORAL ONCE
OUTPATIENT
Start: 2024-02-13 | End: 2024-02-13

## 2024-02-13 RX ORDER — LIDOCAINE HYDROCHLORIDE 20 MG/ML
JELLY TOPICAL ONCE
OUTPATIENT
Start: 2024-02-13 | End: 2024-02-13

## 2024-02-13 RX ORDER — CLOBETASOL PROPIONATE 0.5 MG/G
OINTMENT TOPICAL ONCE
OUTPATIENT
Start: 2024-02-13 | End: 2024-02-13

## 2024-02-13 RX ORDER — GENTAMICIN SULFATE 1 MG/G
OINTMENT TOPICAL ONCE
OUTPATIENT
Start: 2024-02-13 | End: 2024-02-13

## 2024-02-13 RX ORDER — BETAMETHASONE DIPROPIONATE 0.5 MG/G
CREAM TOPICAL ONCE
OUTPATIENT
Start: 2024-02-13 | End: 2024-02-13

## 2024-02-13 RX ORDER — LIDOCAINE 50 MG/G
OINTMENT TOPICAL ONCE
OUTPATIENT
Start: 2024-02-13 | End: 2024-02-13

## 2024-02-13 RX ORDER — LIDOCAINE HYDROCHLORIDE 20 MG/ML
JELLY TOPICAL ONCE
Status: COMPLETED | OUTPATIENT
Start: 2024-02-13 | End: 2024-02-13

## 2024-02-13 RX ORDER — IBUPROFEN 200 MG
TABLET ORAL ONCE
OUTPATIENT
Start: 2024-02-13 | End: 2024-02-13

## 2024-02-13 RX ORDER — BACITRACIN ZINC AND POLYMYXIN B SULFATE 500; 1000 [USP'U]/G; [USP'U]/G
OINTMENT TOPICAL ONCE
OUTPATIENT
Start: 2024-02-13 | End: 2024-02-13

## 2024-02-13 RX ADMIN — LIDOCAINE HYDROCHLORIDE: 20 JELLY TOPICAL at 14:51

## 2024-02-13 NOTE — PLAN OF CARE
Problem: Wound:  Goal: Will show signs of wound healing; wound closure and no evidence of infection  Description: Will show signs of wound healing; wound closure and no evidence of infection  Outcome: Progressing     Problem: Venous:  Goal: Signs of wound healing will improve  Description: Signs of wound healing will improve  Outcome: Progressing     Problem: Smoking cessation:  Goal: Ability to formulate a plan to maintain a tobacco-free life will be supported  Description: Ability to formulate a plan to maintain a tobacco-free life will be supported  Outcome: Progressing     Problem: Compression therapy:  Goal: Will be free from complications associated with compression therapy  Description: Will be free from complications associated with compression therapy  Outcome: Progressing     Problem: Weight control:  Goal: Ability to maintain an optimal weight for height and age will be supported  Description: Ability to maintain an optimal weight for height and age will be supported  Outcome: Progressing     Problem: Falls - Risk of:  Goal: Will remain free from falls  Description: Will remain free from falls  Outcome: Progressing

## 2024-02-13 NOTE — PROGRESS NOTES
Unna Boot Application   Below Knee    NAME:  Vonda Lutz  YOB: 1970  MEDICAL RECORD NUMBER:  [id number]  DATE:  2/13/2024    Unna boot: Applied moisturizing agent to dry skin as needed.   Appied primary and secondary dressing as ordered.  Applied Unna roll from toes to knee overlapping each time.   Applied ace wrap or coban from toes to below the knee.   Instructed patient/caregiver to keep dressing dry and intact. DO NOT REMOVE DRESSING.   Instructed pt/family/caregiver to report excessive draining, loose bandage, wet dressing, severe pain or tingling in toes.  Applied Unna Boot dressing below the knee to left lower leg.    Unna Boot(s) were applied per  Guidelines.     Electronically signed by Anh Castellon RN on 2/13/2024 at 3:30 PM     
and exudate      Pre Debridement Measurements:  Are located in the Wound/Ulcer Documentation Flow Sheet    Wound/Ulcer #: 1 and 2    Percent of Wound(s)/Ulcer(s) Debrided: 100%    Total Surface Area Debrided:  3.3 sq cm       Diabetic/Pressure/Non Pressure Ulcers only:  Ulcer: Non-Pressure ulcer, fat layer exposed             Post Debridement Measurements:    Wound/Ulcer Descriptions are Pre Debridement --EXCEPT MEASUREMENTS    Wound 02/06/24 Foot Left;Dorsal wound 2- left foot venous (Active)   Wound Image   02/13/24 1455   Wound Etiology Venous 02/13/24 1455   Dressing Status Old drainage noted 02/13/24 1455   Wound Cleansed Soap and water 02/13/24 1455   Dressing/Treatment Oral packing;Other (comment) 02/06/24 1130   Wound Length (cm) 0.2 cm 02/13/24 1455   Wound Width (cm) 1 cm 02/13/24 1455   Wound Depth (cm) 0.1 cm 02/13/24 1455   Wound Surface Area (cm^2) 0.2 cm^2 02/13/24 1455   Change in Wound Size % (l*w) 60 02/13/24 1455   Wound Volume (cm^3) 0.02 cm^3 02/13/24 1455   Wound Healing % 60 02/13/24 1455   Post-Procedure Length (cm) 0.2 cm 02/13/24 1459   Post-Procedure Width (cm) 1 cm 02/13/24 1459   Post-Procedure Depth (cm) 0.1 cm 02/13/24 1459   Post-Procedure Surface Area (cm^2) 0.2 cm^2 02/13/24 1459   Post-Procedure Volume (cm^3) 0.02 cm^3 02/13/24 1459   Distance Tunneling (cm) 0 cm 02/13/24 1455   Tunneling Position ___ O'Clock 0 02/13/24 1455   Undermining Starts ___ O'Clock 0 02/13/24 1455   Undermining Ends___ O'Clock 0 02/13/24 1455   Undermining Maxium Distance (cm) 0 02/13/24 1455   Wound Assessment Slough;Pink/red 02/13/24 1455   Drainage Amount Moderate (25-50%) 02/13/24 1455   Drainage Description Serosanguinous 02/13/24 1455   Odor None 02/13/24 1455   Anjali-wound Assessment Intact 02/13/24 1455   Margins Defined edges 02/13/24 1455   Wound Thickness Description not for Pressure Injury Full thickness 02/13/24 1455   Number of days: 7       Wound 02/06/24 Pretibial Left wound 1-left leg wound 
symmetrical

## 2024-02-13 NOTE — PATIENT INSTRUCTIONS
[facility]ound Care and Hyperbaric Oxygen Therapy   Physician Orders and Discharge Instructions  [address] [place] [address]  [address]  [place] [address]  Telephone: [phone]      FAX [phone]    NAME:  Vonda Lutz  YOB: 1970  MEDICAL RECORD NUMBER:  [id number]  DATE:  2/13/2024    Discharge condition: Stable    Discharge to: Home    Left via:Private automobile    Accompanied by:      ECF/H[facility]: [facility]     Dressing Orders: Left foot and leg Wound:   Xeroform to open areas  Coflex Unnaboot, Keep clean and Dry  Elevate feet to level or above heart 3-4 times daily and as needed to for 30 minutes to reduce swelling  Remove wraps and call office if- Wraps get wet, Cause increased pain, Slide down or you are unable to make your next scheduled appointment   Multi Vitamin, High Protein diet as tolerated    [facility] follow up visit ____________1 week_________________  (Please note your next appointment above and if you are unable to keep, kindly give a 24 hour notice. Thank you.)    If you experience any of the following, please call the Wound Care Center during business hours:    * Increase in Pain  * Temperature over 101  * Increase in drainage from your wound  * Drainage with a foul odor  * Bleeding  * Increase in swelling  * Need for compression bandage changes due to slippage, breakthrough drainage.    If you need medical attention outside of the business hours of the Wound Care Centers please contact your PCP or go to the nearest emergency room.

## 2024-02-20 ENCOUNTER — HOSPITAL ENCOUNTER (OUTPATIENT)
Dept: WOUND CARE | Age: 54
Discharge: HOME | End: 2024-02-20
Payer: MEDICAID

## 2024-02-20 VITALS
BODY MASS INDEX: 16.93 KG/M2 | SYSTOLIC BLOOD PRESSURE: 126 MMHG | HEART RATE: 113 BPM | DIASTOLIC BLOOD PRESSURE: 81 MMHG | WEIGHT: 84 LBS | RESPIRATION RATE: 20 BRPM | TEMPERATURE: 98.2 F | HEIGHT: 59 IN

## 2024-02-20 DIAGNOSIS — L97.922 CHRONIC ULCER OF LEFT LEG, WITH FAT LAYER EXPOSED (HCC): ICD-10-CM

## 2024-02-20 DIAGNOSIS — I73.9 PVD (PERIPHERAL VASCULAR DISEASE) (HCC): ICD-10-CM

## 2024-02-20 DIAGNOSIS — I89.0 LYMPHEDEMA: Primary | ICD-10-CM

## 2024-02-20 DIAGNOSIS — L97.522 CHRONIC ULCER OF LEFT FOOT, WITH FAT LAYER EXPOSED (HCC): ICD-10-CM

## 2024-02-20 PROCEDURE — 97597 DBRDMT OPN WND 1ST 20 CM/<: CPT | Performed by: NURSE PRACTITIONER

## 2024-02-20 PROCEDURE — 97597 DBRDMT OPN WND 1ST 20 CM/<: CPT

## 2024-02-20 RX ORDER — GENTAMICIN SULFATE 1 MG/G
OINTMENT TOPICAL ONCE
OUTPATIENT
Start: 2024-02-20 | End: 2024-02-20

## 2024-02-20 RX ORDER — BETAMETHASONE DIPROPIONATE 0.5 MG/G
CREAM TOPICAL ONCE
OUTPATIENT
Start: 2024-02-20 | End: 2024-02-20

## 2024-02-20 RX ORDER — LIDOCAINE 50 MG/G
OINTMENT TOPICAL ONCE
OUTPATIENT
Start: 2024-02-20 | End: 2024-02-20

## 2024-02-20 RX ORDER — BACITRACIN ZINC AND POLYMYXIN B SULFATE 500; 1000 [USP'U]/G; [USP'U]/G
OINTMENT TOPICAL ONCE
OUTPATIENT
Start: 2024-02-20 | End: 2024-02-20

## 2024-02-20 RX ORDER — IBUPROFEN 200 MG
TABLET ORAL ONCE
OUTPATIENT
Start: 2024-02-20 | End: 2024-02-20

## 2024-02-20 RX ORDER — TRIAMCINOLONE ACETONIDE 1 MG/G
OINTMENT TOPICAL ONCE
OUTPATIENT
Start: 2024-02-20 | End: 2024-02-20

## 2024-02-20 RX ORDER — LIDOCAINE HYDROCHLORIDE 20 MG/ML
JELLY TOPICAL ONCE
Status: DISCONTINUED | OUTPATIENT
Start: 2024-02-20 | End: 2024-02-22 | Stop reason: HOSPADM

## 2024-02-20 RX ORDER — SODIUM CHLOR/HYPOCHLOROUS ACID 0.033 %
SOLUTION, IRRIGATION IRRIGATION ONCE
OUTPATIENT
Start: 2024-02-20 | End: 2024-02-20

## 2024-02-20 RX ORDER — LIDOCAINE 40 MG/G
CREAM TOPICAL ONCE
OUTPATIENT
Start: 2024-02-20 | End: 2024-02-20

## 2024-02-20 RX ORDER — LIDOCAINE HYDROCHLORIDE 40 MG/ML
SOLUTION TOPICAL ONCE
OUTPATIENT
Start: 2024-02-20 | End: 2024-02-20

## 2024-02-20 RX ORDER — LIDOCAINE HYDROCHLORIDE 20 MG/ML
JELLY TOPICAL ONCE
OUTPATIENT
Start: 2024-02-20 | End: 2024-02-20

## 2024-02-20 RX ORDER — GINSENG 100 MG
CAPSULE ORAL ONCE
OUTPATIENT
Start: 2024-02-20 | End: 2024-02-20

## 2024-02-20 RX ORDER — CLOBETASOL PROPIONATE 0.5 MG/G
OINTMENT TOPICAL ONCE
OUTPATIENT
Start: 2024-02-20 | End: 2024-02-20

## 2024-02-20 NOTE — DISCHARGE INSTRUCTIONS
[facility] and Hyperbaric Oxygen Therapy   Physician Orders and Discharge Instructions  [address] [place] [address]  [address]  [place] [address]  Telephone: [phone]      FAX [phone]    NAME:  Vonda Lutz  YOB: 1970  MEDICAL RECORD NUMBER:  [id number]  DATE:  2/20/2024    Discharge condition: Stable    Discharge to: Home    Left via:Private automobile    Accompanied by:  self    ECF/HHA: Halo-compression stockings    Dressing Orders:  Left foot and left leg Wounds:   Wash with soap and water. Apply adaptic to wound bed then apply Optilock the wrap Calamine Coflex Unnaboot from toes to knee. Change once weekly .      Compression Therapy is an important part of your program of care. Compression makes it easier for your body to pump the blood from your legs back to your heart, helping alleviate a condition called chronic venous insufficiency. Chronic venous insufficiency is an underlying cause of your injury, and managing it properly will help you to heal.      Keep the bandage in place at all times unless you experience foot pain or numbness or coolness of the toes. Do not attempt to remove and reapply the bandage system.  Give it time.  Your bandage may feel tight at first. This is normal. Your bandage will become more comfortable as swelling goes down.  Be active, as suggested by your healthcare provider. Daily walks or mild exercise encourages better blood flow to aid healing.  Put your feet up when sitting for long periods of time.  Keep the bandage dry. Wet compression bandages are more likely to slip down and/or cause damage to good skin.    PRECAUTIONS For your safety, compression therapy should be used under the supervision of a healthcare professional. Contact your care provider if you experience any of the following:  Pain that does not go away with elevation.  Discoloration or numbness of the toes.  The bandage slipping out of place  Fluid leaking through the

## 2024-02-20 NOTE — PROGRESS NOTES
[facility]   Progress Note and Procedure Note      Vonda Lutz  MEDICAL RECORD NUMBER:  [id number]  AGE: 53 y.o.   GENDER: female  : 1970  EPISODE DATE:  2024    Subjective:     Chief Complaint   Patient presents with    Wound Check     Left leg wound      HISTORY of PRESENT ILLNESS HPI     Vonda Lutz is a 53 y.o. female who presents today for wound/ulcer evaluation.   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 cancer    Osteoporosis Mother     Kidney Disease Mother     Diabetes Mother     Prostate Cancer Father     Diabetes Maternal Grandmother     Alcohol Abuse Maternal Grandfather     No Known Problems Paternal Grandmother     No Known Problems Paternal Grandfather     Breast Cancer Other         three aunts, some cousing       SOCIAL HISTORY    Social History     Tobacco Use    Smoking status: Every Day     Current packs/day: 0.50     Average packs/day: 0.5 packs/day for 41.1 years (20.6 ttl pk-yrs)     Types: Cigarettes     Start date:

## 2024-02-20 NOTE — PLAN OF CARE
Unna Boot Application   Below Knee    NAME:  Vonda Lutz  YOB: 1970  MEDICAL RECORD NUMBER:  [id number]  DATE:  2/20/2024    Unna boot: Applied moisturizing agent to dry skin as needed.   Appied primary and secondary dressing as ordered.  Applied Unna roll from toes to knee overlapping each time.   Applied ace wrap or coban from toes to below the knee.   Instructed patient/caregiver to keep dressing dry and intact. DO NOT REMOVE DRESSING.   Instructed pt/family/caregiver to report excessive draining, loose bandage, wet dressing, severe pain or tingling in toes.  Applied Unna Boot dressing below the knee to left lower leg.    Unna Boot(s) were applied per  Guidelines.     Electronically signed by Yary Rodas RN on 2/20/2024 at 11:55 AM

## 2024-02-20 NOTE — DISCHARGE INSTR - COC
1543  No intake/output data recorded.    Safety Concerns:     { SEN Safety Concerns:356957921}    Impairments/Disabilities:      { SEN Impairments/Disabilities:958508337}    Nutrition Therapy:  Current Nutrition Therapy:   { SEN Diet List:593351766}    Routes of Feeding: {[facility] DME Other Feedings:556502406}  Liquids: {Slp liquid thickness:93288}  Daily Fluid Restriction: {[facility] DME Yes amt example:380012284}  Last Modified Barium Swallow with Video (Video Swallowing Test): {Done Not Done Date:}    Treatments at the Time of Hospital Discharge:   Respiratory Treatments: ***  Oxygen Therapy:  {Therapy; copd oxygen:91661}  Ventilator:    {[facility] Vent List:832361474}    Rehab Therapies: {THERAPEUTIC INTERVENTION:3260248924}  Weight Bearing Status/Restrictions: {[facility] Weight Bearin}  Other Medical Equipment (for information only, NOT a DME order):  {EQUIPMENT:370299166}  Other Treatments: ***    Patient's personal belongings (please select all that are sent with patient):  {[facility] DME Belongings:673794365}    RN SIGNATURE:  {Esignature:014683355}    CASE MANAGEMENT/SOCIAL WORK SECTION    Inpatient Status Date: ***    Readmission Risk Assessment Score:  Readmission Risk              Risk of Unplanned Readmission:  0           Discharging to Facility/ Agency   Name:   Address:  Phone:  Fax:    Dialysis Facility (if applicable)   Name:  Address:  Dialysis Schedule:  Phone:  Fax:    / signature: {Esignature:199763926}    PHYSICIAN SECTION    Prognosis: {Prognosis:9590951266}    Condition at Discharge: { Patient Condition:178645722}    Rehab Potential (if transferring to Rehab): {Prognosis:2118926904}    Recommended Labs or Other Treatments After Discharge: ***    Physician Certification: I certify the above information and transfer of Vonda Lutz  is necessary for the continuing treatment of the diagnosis listed and that she requires {Admit to Appropriate Level of Care:02907} for

## 2024-02-20 NOTE — PATIENT INSTRUCTIONS
[facility] Wound Care and Hyperbaric Oxygen Therapy   Physician Orders and Discharge Instructions  [address] [place] [address]  [address]  [place] [address]  Telephone: [phone]      FAX [phone]    NAME:  Vonda Lutz  YOB: 1970  MEDICAL RECORD NUMBER:  [id number]  DATE:  2/20/2024    Discharge condition: Stable    Discharge to: Home    Left via:Private automobile    Accompanied by:  self    ECF/HHA: Halo-compression stockings    Dressing Orders:  Left foot and left leg Wounds:   Wash with soap and water. Apply adaptic to wound bed then apply Optilock the wrap Calamine Coflex Unnaboot from toes to knee. Change once weekly .      Compression Therapy is an important part of your program of care. Compression makes it easier for your body to pump the blood from your legs back to your heart, helping alleviate a condition called chronic venous insufficiency. Chronic venous insufficiency is an underlying cause of your injury, and managing it properly will help you to heal.      Keep the bandage in place at all times unless you experience foot pain or numbness or coolness of the toes. Do not attempt to remove and reapply the bandage system.  Give it time.  Your bandage may feel tight at first. This is normal. Your bandage will become more comfortable as swelling goes down.  Be active, as suggested by your healthcare provider. Daily walks or mild exercise encourages better blood flow to aid healing.  Put your feet up when sitting for long periods of time.  Keep the bandage dry. Wet compression bandages are more likely to slip down and/or cause damage to good skin.    PRECAUTIONS For your safety, compression therapy should be used under the supervision of a healthcare professional. Contact your care provider if you experience any of the following:  Pain that does not go away with elevation.  Discoloration or numbness of the toes.  The bandage slipping out of place  Fluid leaking through the

## 2024-02-20 NOTE — PLAN OF CARE
Problem: Pain  Goal: Verbalizes/displays adequate comfort level or baseline comfort level  Outcome: Progressing     Problem: Wound:  Goal: Will show signs of wound healing; wound closure and no evidence of infection  Description: Will show signs of wound healing; wound closure and no evidence of infection  Outcome: Progressing     Problem: Venous:  Goal: Signs of wound healing will improve  Description: Signs of wound healing will improve  Outcome: Progressing

## 2024-02-26 ENCOUNTER — HOSPITAL ENCOUNTER (OUTPATIENT)
Dept: WOUND CARE | Age: 54
Discharge: HOME | End: 2024-02-26
Payer: MEDICAID

## 2024-02-26 VITALS
HEART RATE: 103 BPM | SYSTOLIC BLOOD PRESSURE: 119 MMHG | RESPIRATION RATE: 20 BRPM | DIASTOLIC BLOOD PRESSURE: 71 MMHG | WEIGHT: 84 LBS | BODY MASS INDEX: 16.93 KG/M2 | TEMPERATURE: 98.2 F | HEIGHT: 59 IN

## 2024-02-26 DIAGNOSIS — L97.922 CHRONIC ULCER OF LEFT LEG, WITH FAT LAYER EXPOSED (HCC): ICD-10-CM

## 2024-02-26 DIAGNOSIS — L97.522 CHRONIC ULCER OF LEFT FOOT, WITH FAT LAYER EXPOSED (HCC): Primary | ICD-10-CM

## 2024-02-26 DIAGNOSIS — I73.9 PVD (PERIPHERAL VASCULAR DISEASE) (HCC): ICD-10-CM

## 2024-02-26 PROCEDURE — 97597 DBRDMT OPN WND 1ST 20 CM/<: CPT | Performed by: SURGERY

## 2024-02-26 PROCEDURE — 97597 DBRDMT OPN WND 1ST 20 CM/<: CPT

## 2024-02-26 RX ORDER — BACITRACIN ZINC AND POLYMYXIN B SULFATE 500; 1000 [USP'U]/G; [USP'U]/G
OINTMENT TOPICAL ONCE
OUTPATIENT
Start: 2024-02-26 | End: 2024-02-26

## 2024-02-26 RX ORDER — LIDOCAINE HYDROCHLORIDE 20 MG/ML
JELLY TOPICAL ONCE
Status: COMPLETED | OUTPATIENT
Start: 2024-02-26 | End: 2024-02-26

## 2024-02-26 RX ORDER — CLOBETASOL PROPIONATE 0.5 MG/G
OINTMENT TOPICAL ONCE
OUTPATIENT
Start: 2024-02-26 | End: 2024-02-26

## 2024-02-26 RX ORDER — BETAMETHASONE DIPROPIONATE 0.5 MG/G
CREAM TOPICAL ONCE
OUTPATIENT
Start: 2024-02-26 | End: 2024-02-26

## 2024-02-26 RX ORDER — TRIAMCINOLONE ACETONIDE 1 MG/G
OINTMENT TOPICAL ONCE
OUTPATIENT
Start: 2024-02-26 | End: 2024-02-26

## 2024-02-26 RX ORDER — SODIUM CHLOR/HYPOCHLOROUS ACID 0.033 %
SOLUTION, IRRIGATION IRRIGATION ONCE
OUTPATIENT
Start: 2024-02-26 | End: 2024-02-26

## 2024-02-26 RX ORDER — IBUPROFEN 200 MG
TABLET ORAL ONCE
OUTPATIENT
Start: 2024-02-26 | End: 2024-02-26

## 2024-02-26 RX ORDER — LIDOCAINE HYDROCHLORIDE 20 MG/ML
JELLY TOPICAL ONCE
OUTPATIENT
Start: 2024-02-26 | End: 2024-02-26

## 2024-02-26 RX ORDER — GINSENG 100 MG
CAPSULE ORAL ONCE
OUTPATIENT
Start: 2024-02-26 | End: 2024-02-26

## 2024-02-26 RX ORDER — LIDOCAINE HYDROCHLORIDE 40 MG/ML
SOLUTION TOPICAL ONCE
OUTPATIENT
Start: 2024-02-26 | End: 2024-02-26

## 2024-02-26 RX ORDER — LIDOCAINE 50 MG/G
OINTMENT TOPICAL ONCE
OUTPATIENT
Start: 2024-02-26 | End: 2024-02-26

## 2024-02-26 RX ORDER — GENTAMICIN SULFATE 1 MG/G
OINTMENT TOPICAL ONCE
OUTPATIENT
Start: 2024-02-26 | End: 2024-02-26

## 2024-02-26 RX ORDER — LIDOCAINE 40 MG/G
CREAM TOPICAL ONCE
OUTPATIENT
Start: 2024-02-26 | End: 2024-02-26

## 2024-02-26 RX ADMIN — LIDOCAINE HYDROCHLORIDE: 20 JELLY TOPICAL at 15:54

## 2024-02-26 ASSESSMENT — PAIN DESCRIPTION - DESCRIPTORS: DESCRIPTORS: ACHING;BURNING

## 2024-02-26 ASSESSMENT — PAIN DESCRIPTION - PAIN TYPE: TYPE: ACUTE PAIN

## 2024-02-26 ASSESSMENT — PAIN DESCRIPTION - LOCATION: LOCATION: LEG

## 2024-02-26 ASSESSMENT — PAIN - FUNCTIONAL ASSESSMENT: PAIN_FUNCTIONAL_ASSESSMENT: ACTIVITIES ARE NOT PREVENTED

## 2024-02-26 ASSESSMENT — PAIN DESCRIPTION - ORIENTATION: ORIENTATION: LEFT

## 2024-02-26 ASSESSMENT — PAIN DESCRIPTION - FREQUENCY: FREQUENCY: INTERMITTENT

## 2024-02-26 ASSESSMENT — PAIN DESCRIPTION - ONSET: ONSET: AWAKENED FROM SLEEP

## 2024-02-26 ASSESSMENT — PAIN SCALES - GENERAL: PAINLEVEL_OUTOF10: 7

## 2024-02-26 NOTE — PROGRESS NOTES
Debridement Measurements:  Are located in the Wound/Ulcer Documentation Flow Sheet    Wound/Ulcer #: 1 and 2    Percent of Wound(s)/Ulcer(s) Debrided: 100%    Total Surface Area Debrided:  3.2 sq cm       Diabetic/Pressure/Non Pressure Ulcers only:  Ulcer: Non-Pressure ulcer, fat layer exposed             Post Debridement Measurements:    Wound/Ulcer Descriptions are Pre Debridement --EXCEPT MEASUREMENTS    Wound 02/06/24 Foot Left;Dorsal wound 2- left foot venous (Active)   Wound Image   02/26/24 1552   Wound Etiology Venous 02/26/24 1552   Dressing Status Old drainage noted 02/26/24 1552   Wound Cleansed Soap and water 02/26/24 1552   Dressing/Treatment Non adherent 02/20/24 1108   Wound Length (cm) 0.2 cm 02/26/24 1552   Wound Width (cm) 0.6 cm 02/26/24 1552   Wound Depth (cm) 0.1 cm 02/26/24 1552   Wound Surface Area (cm^2) 0.12 cm^2 02/26/24 1552   Change in Wound Size % (l*w) 76 02/26/24 1552   Wound Volume (cm^3) 0.012 cm^3 02/26/24 1552   Wound Healing % 76 02/26/24 1552   Post-Procedure Length (cm) 0.2 cm 02/26/24 1557   Post-Procedure Width (cm) 0.6 cm 02/26/24 1557   Post-Procedure Depth (cm) 0.1 cm 02/26/24 1557   Post-Procedure Surface Area (cm^2) 0.12 cm^2 02/26/24 1557   Post-Procedure Volume (cm^3) 0.012 cm^3 02/26/24 1557   Distance Tunneling (cm) 0 cm 02/13/24 1455   Tunneling Position ___ O'Clock 0 02/13/24 1455   Undermining Starts ___ O'Clock 0 02/13/24 1455   Undermining Ends___ O'Clock 0 02/13/24 1455   Undermining Maxium Distance (cm) 0 02/13/24 1455   Wound Assessment Slough;[place]/red 02/26/24 1552   Drainage Amount Moderate (25-50%) 02/26/24 1552   Drainage Description Serosanguinous 02/26/24 1552   Odor None 02/26/24 1552   Anjali-wound Assessment Intact 02/26/24 1552   Margins Defined edges 02/26/24 1552   Wound Thickness Description not for Pressure Injury Full thickness 02/26/24 1552   Number of days: 20       Wound 02/06/24 Pretibial Left wound 1-left leg wound venous (Active)   Wound 
Patient Refused

## 2024-02-26 NOTE — PATIENT INSTRUCTIONS
[facility] Wound Care and Hyperbaric Oxygen Therapy   Physician Orders and Discharge Instructions  [address] [place] [address]  [address]  [place] [address]  Telephone: [phone]      FAX [phone]    NAME:  Vonda Lutz  YOB: 1970  MEDICAL RECORD NUMBER:  [id number]  DATE:  2/26/2024    Discharge condition: Stable    Discharge to: Home    Left via:Private automobile    Accompanied by: Self     ECF/HHA: Halo-compression stockings     Dressing Orders: Left Lower Ext Ulcers  Xeroform to open areas  Coflex Unnaboot, Keep clean and Dry  Elevate feet to level or above heart 3-4 times daily and as needed to for 30 minutes to reduce swelling  Remove wraps and call office if- Wraps get wet, Cause increased pain, Slide down or you are unable to make your next scheduled appointment   Multi Vitamin, High Protein diet as tolerated    [facility] follow up visit _____________1 week________________  (Please note your next appointment above and if you are unable to keep, kindly give a 24 hour notice. Thank you.)    If you experience any of the following, please call the Wound Care Center during business hours:    * Increase in Pain  * Temperature over 101  * Increase in drainage from your wound  * Drainage with a foul odor  * Bleeding  * Increase in swelling  * Need for compression bandage changes due to slippage, breakthrough drainage.    If you need medical attention outside of the business hours of the Wound Care Centers please contact your PCP or go to the nearest emergency room.

## 2024-02-26 NOTE — PLAN OF CARE
Unna Boot Application   Below Knee    NAME:  Vonda Lutz  YOB: 1970  MEDICAL RECORD NUMBER:  [id number]  DATE:  2/26/2024    Unna boot: Applied moisturizing agent to dry skin as needed.   Appied primary and secondary dressing as ordered.  Applied Unna roll from toes to knee overlapping each time.   Applied ace wrap or coban from toes to below the knee.   Secured with tape and/or metal clips covered with tape.   Instructed patient/caregiver to keep dressing dry and intact. DO NOT REMOVE DRESSING.   Instructed pt/family/caregiver to report excessive draining, loose bandage, wet dressing, severe pain or tingling in toes.  Applied Unna Boot dressing below the knee to left lower leg.    Unna Boot(s) were applied per  Guidelines.     Electronically signed by Kelli Aj RN on 2/26/2024 at 4:20 PM

## 2024-03-01 ENCOUNTER — OFFICE VISIT (OUTPATIENT)
Dept: INTERNAL MEDICINE | Age: 54
End: 2024-03-01
Payer: MEDICAID

## 2024-03-01 VITALS
BODY MASS INDEX: 17.94 KG/M2 | HEART RATE: 111 BPM | HEIGHT: 59 IN | SYSTOLIC BLOOD PRESSURE: 121 MMHG | DIASTOLIC BLOOD PRESSURE: 80 MMHG | WEIGHT: 89 LBS | OXYGEN SATURATION: 99 %

## 2024-03-01 DIAGNOSIS — G47.9 SLEEP DISORDER: ICD-10-CM

## 2024-03-01 DIAGNOSIS — F32.89 OTHER DEPRESSION: ICD-10-CM

## 2024-03-01 DIAGNOSIS — L97.922 CHRONIC ULCER OF LEFT LEG, WITH FAT LAYER EXPOSED (HCC): Primary | ICD-10-CM

## 2024-03-01 DIAGNOSIS — L97.522 CHRONIC ULCER OF LEFT FOOT, WITH FAT LAYER EXPOSED (HCC): ICD-10-CM

## 2024-03-01 PROCEDURE — 99213 OFFICE O/P EST LOW 20 MIN: CPT | Performed by: INTERNAL MEDICINE

## 2024-03-01 ASSESSMENT — ENCOUNTER SYMPTOMS
CHOKING: 0
BACK PAIN: 0
WHEEZING: 0
SINUS PRESSURE: 0
DIARRHEA: 0
ANAL BLEEDING: 0
RHINORRHEA: 0
VOICE CHANGE: 0
TROUBLE SWALLOWING: 0
EYE REDNESS: 0
SHORTNESS OF BREATH: 0
VOMITING: 0
SORE THROAT: 0
EYE DISCHARGE: 0
COUGH: 0
FACIAL SWELLING: 0
PHOTOPHOBIA: 0
EYE ITCHING: 0
CONSTIPATION: 0
ABDOMINAL DISTENTION: 0
RECTAL PAIN: 0
NAUSEA: 0
ABDOMINAL PAIN: 0
EYE PAIN: 0
COLOR CHANGE: 0
BLOOD IN STOOL: 0
CHEST TIGHTNESS: 0

## 2024-03-01 NOTE — PROGRESS NOTES
Chief Complaint   Patient presents with    Follow-up     4 week follow up,no new concerns        HPI: Vonda Lutz is a 53 y.o. female is here for follow-up of an ulcer to her left lower extremity.  Since her last visit, she has been going to wound care.  At this time, the wound is wrapped.  She states that the wound is improving.  She denies any complaints of fever or chills.  The pruritus has resolved.  She feels like her depression is stable on her current dose of Celexa.  She really has no major concerns or complaints.  She states that she feels good.  She did have to reschedule her sleep study.    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    Osteoporosis     Patella fracture     Radial fracture     Shoulder fracture     x2      Past Surgical History:   Procedure Laterality Date    BREAST ENHANCEMENT SURGERY Bilateral      SECTION      CHOLECYSTECTOMY      CORNEAL TRANSPLANT      HERNIA REPAIR      HIP SURGERY      history of osteoporosis;hip surgery x 2    MOUTH SURGERY      OTHER SURGICAL HISTORY      TIPS    TUBAL LIGATION        Social History     Socioeconomic History    Marital status: Single     Spouse name: None    Number of children: None    Years of education: None    Highest education level: None   Tobacco Use    Smoking status: Every Day     Current packs/day: 0.50     Average packs/day: 0.5 packs/day for 41.2 years (20.6 ttl pk-yrs)     Types: Cigarettes     Start date:      Passive exposure: Current    Smokeless tobacco: Never   Substance and Sexual Activity    Alcohol use: Yes    Drug use: Never     Social Determinants of Health     Financial Resource Strain: Low Risk  (2/10/2024)    Overall Financial Resource Strain (CARDIA)     Difficulty of Paying Living Expenses: Not hard

## 2024-03-05 ENCOUNTER — HOSPITAL ENCOUNTER (OUTPATIENT)
Dept: WOUND CARE | Age: 54
Discharge: HOME | End: 2024-03-05
Payer: MEDICAID

## 2024-03-05 VITALS
SYSTOLIC BLOOD PRESSURE: 115 MMHG | HEART RATE: 108 BPM | TEMPERATURE: 98.4 F | RESPIRATION RATE: 20 BRPM | DIASTOLIC BLOOD PRESSURE: 73 MMHG

## 2024-03-05 DIAGNOSIS — L97.522 CHRONIC ULCER OF LEFT FOOT, WITH FAT LAYER EXPOSED (HCC): Primary | ICD-10-CM

## 2024-03-05 DIAGNOSIS — L97.922 CHRONIC ULCER OF LEFT LEG, WITH FAT LAYER EXPOSED (HCC): ICD-10-CM

## 2024-03-05 DIAGNOSIS — I73.9 PVD (PERIPHERAL VASCULAR DISEASE) (HCC): ICD-10-CM

## 2024-03-05 PROCEDURE — 97597 DBRDMT OPN WND 1ST 20 CM/<: CPT | Performed by: SURGERY

## 2024-03-05 PROCEDURE — 97597 DBRDMT OPN WND 1ST 20 CM/<: CPT

## 2024-03-05 RX ORDER — LIDOCAINE 50 MG/G
OINTMENT TOPICAL ONCE
OUTPATIENT
Start: 2024-03-05 | End: 2024-03-05

## 2024-03-05 RX ORDER — IBUPROFEN 200 MG
TABLET ORAL ONCE
OUTPATIENT
Start: 2024-03-05 | End: 2024-03-05

## 2024-03-05 RX ORDER — TRIAMCINOLONE ACETONIDE 1 MG/G
OINTMENT TOPICAL ONCE
OUTPATIENT
Start: 2024-03-05 | End: 2024-03-05

## 2024-03-05 RX ORDER — GENTAMICIN SULFATE 1 MG/G
OINTMENT TOPICAL ONCE
OUTPATIENT
Start: 2024-03-05 | End: 2024-03-05

## 2024-03-05 RX ORDER — CLOBETASOL PROPIONATE 0.5 MG/G
OINTMENT TOPICAL ONCE
OUTPATIENT
Start: 2024-03-05 | End: 2024-03-05

## 2024-03-05 RX ORDER — LIDOCAINE HYDROCHLORIDE 20 MG/ML
JELLY TOPICAL ONCE
OUTPATIENT
Start: 2024-03-05 | End: 2024-03-05

## 2024-03-05 RX ORDER — LIDOCAINE HYDROCHLORIDE 40 MG/ML
SOLUTION TOPICAL ONCE
OUTPATIENT
Start: 2024-03-05 | End: 2024-03-05

## 2024-03-05 RX ORDER — LIDOCAINE 40 MG/G
CREAM TOPICAL ONCE
OUTPATIENT
Start: 2024-03-05 | End: 2024-03-05

## 2024-03-05 RX ORDER — SODIUM CHLOR/HYPOCHLOROUS ACID 0.033 %
SOLUTION, IRRIGATION IRRIGATION ONCE
OUTPATIENT
Start: 2024-03-05 | End: 2024-03-05

## 2024-03-05 RX ORDER — BACITRACIN ZINC AND POLYMYXIN B SULFATE 500; 1000 [USP'U]/G; [USP'U]/G
OINTMENT TOPICAL ONCE
OUTPATIENT
Start: 2024-03-05 | End: 2024-03-05

## 2024-03-05 RX ORDER — LIDOCAINE HYDROCHLORIDE 20 MG/ML
JELLY TOPICAL ONCE
Status: COMPLETED | OUTPATIENT
Start: 2024-03-05 | End: 2024-03-05

## 2024-03-05 RX ORDER — GINSENG 100 MG
CAPSULE ORAL ONCE
OUTPATIENT
Start: 2024-03-05 | End: 2024-03-05

## 2024-03-05 RX ORDER — BETAMETHASONE DIPROPIONATE 0.5 MG/G
CREAM TOPICAL ONCE
OUTPATIENT
Start: 2024-03-05 | End: 2024-03-05

## 2024-03-05 RX ADMIN — LIDOCAINE HYDROCHLORIDE: 20 JELLY TOPICAL at 13:25

## 2024-03-05 NOTE — PATIENT INSTRUCTIONS
[facility] Wound Care and Hyperbaric Oxygen Therapy   Physician Orders and Discharge Instructions  [address] [place] [address]  [address]  [place] [address]  Telephone: [phone]      FAX [phone]    NAME:  Vonda Lutz  YOB: 1970  MEDICAL RECORD NUMBER:  [id number]  DATE:  3/5/2024    Discharge condition: Stable    Discharge to: Home    Left via:Private automobile    Accompanied by: Self    ECF/HHA: Halo-compression stockings    Dressing Orders: Left Lower Ext Ulcers  Xeroform to open areas  Coflex Unnaboot, Keep clean and Dry  Elevate feet to level or above heart 3-4 times daily and as needed to for 30 minutes to reduce swelling  Remove wraps and call office if- Wraps get wet, Cause increased pain, Slide down or you are unable to make  your next scheduled appointment  Multi Vitamin, High Protein diet as tolerated    [facility] follow up visit _____________1 week________________  (Please note your next appointment above and if you are unable to keep, kindly give a 24 hour notice. Thank you.)    If you experience any of the following, please call the Wound Care Center during business hours:    * Increase in Pain  * Temperature over 101  * Increase in drainage from your wound  * Drainage with a foul odor  * Bleeding  * Increase in swelling  * Need for compression bandage changes due to slippage, breakthrough drainage.    If you need medical attention outside of the business hours of the Wound Care Centers please contact your PCP or go to the nearest emergency room.

## 2024-03-05 NOTE — PROGRESS NOTES
Unna Boot Application   Below Knee    NAME:  Vonda Lutz  YOB: 1970  MEDICAL RECORD NUMBER:  [id number]  DATE:  3/5/2024    Unna boot: Applied moisturizing agent to dry skin as needed.   Appied primary and secondary dressing as ordered.  Applied Unna roll from toes to knee overlapping each time.   Applied ace wrap or coban from toes to below the knee.   Instructed patient/caregiver to keep dressing dry and intact. DO NOT REMOVE DRESSING.   Instructed pt/family/caregiver to report excessive draining, loose bandage, wet dressing, severe pain or tingling in toes.  Applied Unna Boot dressing below the knee to left lower leg.    Unna Boot(s) were applied per  Guidelines.     Electronically signed by Anh Castellon RN on 3/5/2024 at 1:48 PM     
packs/day: 0.50     Average packs/day: 0.5 packs/day for 41.2 years (20.6 ttl pk-yrs)     Types: Cigarettes     Start date: 1983     Passive exposure: Current    Smokeless tobacco: Never   Substance Use Topics    Alcohol use: Yes    Drug use: Never       ALLERGIES    Allergies   Allergen Reactions    Penicillins Anaphylaxis and Other (See Comments)     Childhood reaction    Pt was told anaphylaxis    Varenicline Other (See Comments)       MEDICATIONS    Current Outpatient Medications on File Prior to Encounter   Medication Sig Dispense Refill    potassium chloride (K-TAB) 20 MEQ TBCR extended release tablet Take 1 tablet by mouth twice daily 60 tablet 1    ibuprofen (ADVIL;MOTRIN) 600 MG tablet Take 1 tablet by mouth every 6 hours as needed for Pain      Calcium Carbonate-Vitamin D 600-5 MG-MCG CAPS Take by mouth      citalopram (CELEXA) 40 MG tablet Take 1 tablet by mouth once daily 30 tablet 5    brimonidine-timolol (COMBIGAN) 0.2-0.5 % ophthalmic solution Place 1 drop into both eyes daily      sulfacetamide-prednisoLONE (BLEPHAMIDE) 10-0.2 % ophthalmic suspension Place 1 drop into both eyes 2 times daily      Magnesium 500 MG TABS Take 1 tablet by mouth in the morning and at bedtime       No current facility-administered medications on file prior to encounter.       REVIEW OF SYSTEMS    A comprehensive review of systems was negative.    Objective:      /73   Pulse (!) 108   Temp 98.4 °F (36.9 °C) (Temporal)   Resp 20     Wt Readings from Last 3 Encounters:   03/01/24 40.4 kg (89 lb)   02/26/24 38.1 kg (84 lb)   02/20/24 38.1 kg (84 lb)       PHYSICAL EXAM    General Appearance: alert and oriented to person, place and time, well developed and well- nourished, in no acute distress  Skin: warm and dry, no rash or erythema  Head: normocephalic and atraumatic  Eyes: pupils equal, round, and reactive to light, extraocular eye movements intact, conjunctivae normal  ENT: tympanic membrane, external ear and ear

## 2024-03-05 NOTE — PLAN OF CARE
Problem: Wound:  Goal: Will show signs of wound healing; wound closure and no evidence of infection  Description: Will show signs of wound healing; wound closure and no evidence of infection  Outcome: Progressing     Problem: Venous:  Goal: Signs of wound healing will improve  Description: Signs of wound healing will improve  Outcome: Progressing     Problem: Compression therapy:  Goal: Will be free from complications associated with compression therapy  Description: Will be free from complications associated with compression therapy  Outcome: Progressing

## 2024-03-12 ENCOUNTER — HOSPITAL ENCOUNTER (OUTPATIENT)
Dept: WOUND CARE | Age: 54
Discharge: HOME | End: 2024-03-12
Payer: MEDICAID

## 2024-03-12 VITALS
TEMPERATURE: 97.9 F | SYSTOLIC BLOOD PRESSURE: 130 MMHG | DIASTOLIC BLOOD PRESSURE: 81 MMHG | RESPIRATION RATE: 20 BRPM | HEART RATE: 108 BPM

## 2024-03-12 DIAGNOSIS — I73.9 PVD (PERIPHERAL VASCULAR DISEASE) (HCC): ICD-10-CM

## 2024-03-12 DIAGNOSIS — L97.522 CHRONIC ULCER OF LEFT FOOT, WITH FAT LAYER EXPOSED (HCC): Primary | ICD-10-CM

## 2024-03-12 DIAGNOSIS — L97.922 CHRONIC ULCER OF LEFT LEG, WITH FAT LAYER EXPOSED (HCC): ICD-10-CM

## 2024-03-12 PROCEDURE — 97597 DBRDMT OPN WND 1ST 20 CM/<: CPT

## 2024-03-12 PROCEDURE — 97597 DBRDMT OPN WND 1ST 20 CM/<: CPT | Performed by: SURGERY

## 2024-03-12 RX ORDER — GENTAMICIN SULFATE 1 MG/G
OINTMENT TOPICAL ONCE
OUTPATIENT
Start: 2024-03-12 | End: 2024-03-12

## 2024-03-12 RX ORDER — TRIAMCINOLONE ACETONIDE 1 MG/G
OINTMENT TOPICAL ONCE
OUTPATIENT
Start: 2024-03-12 | End: 2024-03-12

## 2024-03-12 RX ORDER — LIDOCAINE HYDROCHLORIDE 20 MG/ML
JELLY TOPICAL ONCE
OUTPATIENT
Start: 2024-03-12 | End: 2024-03-12

## 2024-03-12 RX ORDER — LIDOCAINE HYDROCHLORIDE 20 MG/ML
JELLY TOPICAL ONCE
Status: COMPLETED | OUTPATIENT
Start: 2024-03-12 | End: 2024-03-12

## 2024-03-12 RX ORDER — GINSENG 100 MG
CAPSULE ORAL ONCE
OUTPATIENT
Start: 2024-03-12 | End: 2024-03-12

## 2024-03-12 RX ORDER — IBUPROFEN 200 MG
TABLET ORAL ONCE
OUTPATIENT
Start: 2024-03-12 | End: 2024-03-12

## 2024-03-12 RX ORDER — LIDOCAINE 50 MG/G
OINTMENT TOPICAL ONCE
OUTPATIENT
Start: 2024-03-12 | End: 2024-03-12

## 2024-03-12 RX ORDER — LIDOCAINE HYDROCHLORIDE 40 MG/ML
SOLUTION TOPICAL ONCE
OUTPATIENT
Start: 2024-03-12 | End: 2024-03-12

## 2024-03-12 RX ORDER — BETAMETHASONE DIPROPIONATE 0.5 MG/G
CREAM TOPICAL ONCE
OUTPATIENT
Start: 2024-03-12 | End: 2024-03-12

## 2024-03-12 RX ORDER — CLOBETASOL PROPIONATE 0.5 MG/G
OINTMENT TOPICAL ONCE
OUTPATIENT
Start: 2024-03-12 | End: 2024-03-12

## 2024-03-12 RX ORDER — LIDOCAINE 40 MG/G
CREAM TOPICAL ONCE
OUTPATIENT
Start: 2024-03-12 | End: 2024-03-12

## 2024-03-12 RX ORDER — SODIUM CHLOR/HYPOCHLOROUS ACID 0.033 %
SOLUTION, IRRIGATION IRRIGATION ONCE
OUTPATIENT
Start: 2024-03-12 | End: 2024-03-12

## 2024-03-12 RX ORDER — BACITRACIN ZINC AND POLYMYXIN B SULFATE 500; 1000 [USP'U]/G; [USP'U]/G
OINTMENT TOPICAL ONCE
OUTPATIENT
Start: 2024-03-12 | End: 2024-03-12

## 2024-03-12 RX ADMIN — LIDOCAINE HYDROCHLORIDE: 20 JELLY TOPICAL at 10:52

## 2024-03-12 NOTE — PROGRESS NOTES
Unna Boot Application   Below Knee    NAME:  Vonda Lutz  YOB: 1970  MEDICAL RECORD NUMBER:  [id number]  DATE:  3/12/2024    Unna boot: Applied moisturizing agent to dry skin as needed.   Appied primary and secondary dressing as ordered.  Applied Unna roll from toes to knee overlapping each time.   Applied ace wrap or coban from toes to below the knee.   Instructed patient/caregiver to keep dressing dry and intact. DO NOT REMOVE DRESSING.   Instructed pt/family/caregiver to report excessive draining, loose bandage, wet dressing, severe pain or tingling in toes.  Applied Unna Boot dressing below the knee to left lower leg.    Unna Boot(s) were applied per  Guidelines.     Electronically signed by Anh Castellon RN on 3/12/2024 at 11:17 AM

## 2024-03-12 NOTE — PATIENT INSTRUCTIONS
[facility] Wound Care and Hyperbaric Oxygen Therapy   Physician Orders and Discharge Instructions  [address] [place] [address]  [address]  [place] [address]  Telephone: [phone]      FAX [phone]    NAME:  Vonda Lutz  YOB: 1970  MEDICAL RECORD NUMBER:  [id number]  DATE:  3/12/2024    Discharge condition: Stable    Discharge to: Home    Left via:Private automobile    Accompanied by: Self    ECF/HHA: Halo-compression stockings    Dressing Orders: Left Lower Ext Ulcers  Xeroform to open areas  Coflex Unnaboot, Keep clean and Dry  Elevate feet to level or above heart 3-4 times daily and as needed to for 30 minutes to reduce swelling  Remove wraps and call office if- Wraps get wet, Cause increased pain, Slide down or you are unable to make  your next scheduled appointment  Multi Vitamin, High Protein diet as tolerated    [facility] follow up visit ____________1 week_________________  (Please note your next appointment above and if you are unable to keep, kindly give a 24 hour notice. Thank you.)    If you experience any of the following, please call the Wound Care Center during business hours:    * Increase in Pain  * Temperature over 101  * Increase in drainage from your wound  * Drainage with a foul odor  * Bleeding  * Increase in swelling  * Need for compression bandage changes due to slippage, breakthrough drainage.    If you need medical attention outside of the business hours of the Wound Care Centers please contact your PCP or go to the nearest emergency room.

## 2024-03-12 NOTE — PROGRESS NOTES
[facility]   Progress Note and Procedure Note      Vonda Lutz  MEDICAL RECORD NUMBER:  [id number]  AGE: 53 y.o.   GENDER: female  : 1970  EPISODE DATE:  3/12/2024    Subjective:     Chief Complaint   Patient presents with    Wound Check     Left leg wound         HISTORY of PRESENT ILLNESS HPI     Vonda Lutz is a 53 y.o. female who presents today for wound/ulcer evaluation.   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 cancer    Osteoporosis Mother     Kidney Disease Mother     Diabetes Mother     Prostate Cancer Father     Diabetes Maternal Grandmother     Alcohol Abuse Maternal Grandfather     No Known Problems Paternal Grandmother     No Known Problems Paternal Grandfather     Breast Cancer Other         three aunts, some cousing       SOCIAL HISTORY    Social History     Tobacco Use    Smoking status: Every Day     Current packs/day:

## 2024-03-13 ENCOUNTER — HOSPITAL ENCOUNTER (OUTPATIENT)
Dept: CT IMAGING | Age: 54
Discharge: HOME | End: 2024-03-13
Payer: MEDICAID

## 2024-03-13 DIAGNOSIS — Z87.891 SMOKING HISTORY: ICD-10-CM

## 2024-03-13 PROCEDURE — 71271 CT THORAX LUNG CANCER SCR C-: CPT

## 2024-03-14 DIAGNOSIS — E04.9 ENLARGEMENT OF THYROID: Primary | ICD-10-CM

## 2024-03-19 ENCOUNTER — HOSPITAL ENCOUNTER (OUTPATIENT)
Dept: WOUND CARE | Age: 54
Discharge: HOME | End: 2024-03-19
Payer: MEDICAID

## 2024-03-19 ENCOUNTER — HOSPITAL ENCOUNTER (OUTPATIENT)
Dept: ULTRASOUND IMAGING | Age: 54
Discharge: HOME | End: 2024-03-19
Payer: MEDICAID

## 2024-03-19 VITALS
HEIGHT: 59 IN | RESPIRATION RATE: 20 BRPM | TEMPERATURE: 98 F | WEIGHT: 89 LBS | SYSTOLIC BLOOD PRESSURE: 130 MMHG | DIASTOLIC BLOOD PRESSURE: 72 MMHG | HEART RATE: 85 BPM | BODY MASS INDEX: 17.94 KG/M2

## 2024-03-19 DIAGNOSIS — L97.522 CHRONIC ULCER OF LEFT FOOT, WITH FAT LAYER EXPOSED (HCC): Primary | ICD-10-CM

## 2024-03-19 DIAGNOSIS — I73.9 PVD (PERIPHERAL VASCULAR DISEASE) (HCC): ICD-10-CM

## 2024-03-19 DIAGNOSIS — L97.922 CHRONIC ULCER OF LEFT LEG, WITH FAT LAYER EXPOSED (HCC): ICD-10-CM

## 2024-03-19 DIAGNOSIS — E04.9 ENLARGEMENT OF THYROID: ICD-10-CM

## 2024-03-19 PROCEDURE — 97597 DBRDMT OPN WND 1ST 20 CM/<: CPT

## 2024-03-19 PROCEDURE — 76536 US EXAM OF HEAD AND NECK: CPT

## 2024-03-19 PROCEDURE — 97597 DBRDMT OPN WND 1ST 20 CM/<: CPT | Performed by: SURGERY

## 2024-03-19 RX ORDER — LIDOCAINE 40 MG/G
CREAM TOPICAL ONCE
OUTPATIENT
Start: 2024-03-19 | End: 2024-03-19

## 2024-03-19 RX ORDER — LIDOCAINE HYDROCHLORIDE 40 MG/ML
SOLUTION TOPICAL ONCE
OUTPATIENT
Start: 2024-03-19 | End: 2024-03-19

## 2024-03-19 RX ORDER — GINSENG 100 MG
CAPSULE ORAL ONCE
OUTPATIENT
Start: 2024-03-19 | End: 2024-03-19

## 2024-03-19 RX ORDER — CLOBETASOL PROPIONATE 0.5 MG/G
OINTMENT TOPICAL ONCE
OUTPATIENT
Start: 2024-03-19 | End: 2024-03-19

## 2024-03-19 RX ORDER — BETAMETHASONE DIPROPIONATE 0.5 MG/G
CREAM TOPICAL ONCE
OUTPATIENT
Start: 2024-03-19 | End: 2024-03-19

## 2024-03-19 RX ORDER — BACITRACIN ZINC AND POLYMYXIN B SULFATE 500; 1000 [USP'U]/G; [USP'U]/G
OINTMENT TOPICAL ONCE
OUTPATIENT
Start: 2024-03-19 | End: 2024-03-19

## 2024-03-19 RX ORDER — LIDOCAINE 50 MG/G
OINTMENT TOPICAL ONCE
OUTPATIENT
Start: 2024-03-19 | End: 2024-03-19

## 2024-03-19 RX ORDER — GENTAMICIN SULFATE 1 MG/G
OINTMENT TOPICAL ONCE
OUTPATIENT
Start: 2024-03-19 | End: 2024-03-19

## 2024-03-19 RX ORDER — TRIAMCINOLONE ACETONIDE 1 MG/G
OINTMENT TOPICAL ONCE
OUTPATIENT
Start: 2024-03-19 | End: 2024-03-19

## 2024-03-19 RX ORDER — IBUPROFEN 200 MG
TABLET ORAL ONCE
OUTPATIENT
Start: 2024-03-19 | End: 2024-03-19

## 2024-03-19 RX ORDER — LIDOCAINE HYDROCHLORIDE 20 MG/ML
JELLY TOPICAL ONCE
OUTPATIENT
Start: 2024-03-19 | End: 2024-03-19

## 2024-03-19 RX ORDER — LIDOCAINE HYDROCHLORIDE 20 MG/ML
JELLY TOPICAL ONCE
Status: COMPLETED | OUTPATIENT
Start: 2024-03-19 | End: 2024-03-19

## 2024-03-19 RX ORDER — SODIUM CHLOR/HYPOCHLOROUS ACID 0.033 %
SOLUTION, IRRIGATION IRRIGATION ONCE
OUTPATIENT
Start: 2024-03-19 | End: 2024-03-19

## 2024-03-19 RX ADMIN — LIDOCAINE HYDROCHLORIDE: 20 JELLY TOPICAL at 11:47

## 2024-03-19 NOTE — PATIENT INSTRUCTIONS
[facility] Wound Care and Hyperbaric Oxygen Therapy   Physician Orders and Discharge Instructions  [address] [place] [address]  [address]  [place] [address]  Telephone: [phone]      FAX [phone]    NAME:  Vonda Lutz  YOB: 1970  MEDICAL RECORD NUMBER:  [id number]  DATE:  3/19/2024    Discharge condition: Stable    Discharge to: Home    Left via:Private automobile    Accompanied by: Self    ECF/HHA: Halo-compression stockings    Dressing Orders: Left 2nd Toe  Xeroform to open area, cover with dry gauze and medipore tape, change daily     Dressing Orders: Left Lower Ext Ulcers  Xeroform to open areas  Coflex Unnaboot, Keep clean and Dry  Elevate feet to level or above heart 3-4 times daily and as needed to for 30 minutes to reduce swelling  Remove wraps and call office if- Wraps get wet, Cause increased pain, Slide down or you are unable to make  your next scheduled appointment  No Smoking   Multi Vitamin, High Protein diet as tolerated  Dr Figueroa office will call with appointment     C follow up visit ____________1 week_________________  (Please note your next appointment above and if you are unable to keep, kindly give a 24 hour notice. Thank you.)    If you experience any of the following, please call the Wound Care Center during business hours:    * Increase in Pain  * Temperature over 101  * Increase in drainage from your wound  * Drainage with a foul odor  * Bleeding  * Increase in swelling  * Need for compression bandage changes due to slippage, breakthrough drainage.    If you need medical attention outside of the business hours of the Wound Care Centers please contact your PCP or go to the nearest emergency room.

## 2024-03-19 NOTE — PLAN OF CARE
Problem: Wound:  Goal: Will show signs of wound healing; wound closure and no evidence of infection  Description: Will show signs of wound healing; wound closure and no evidence of infection  Outcome: Progressing     Problem: Venous:  Goal: Signs of wound healing will improve  Description: Signs of wound healing will improve  Outcome: Progressing     Problem: Compression therapy:  Goal: Will be free from complications associated with compression therapy  Description: Will be free from complications associated with compression therapy  Outcome: Progressing     Problem: Weight control:  Goal: Ability to maintain an optimal weight for height and age will be supported  Description: Ability to maintain an optimal weight for height and age will be supported  Outcome: Progressing     Problem: Falls - Risk of:  Goal: Will remain free from falls  Description: Will remain free from falls  Outcome: Progressing

## 2024-03-19 NOTE — PLAN OF CARE
Unna Boot Application   Below Knee    NAME:  Vonda Lutz  YOB: 1970  MEDICAL RECORD NUMBER:  [id number]  DATE:  3/19/2024    Unna boot: Applied moisturizing agent to dry skin as needed.   Appied primary and secondary dressing as ordered.  Applied Unna roll from toes to knee overlapping each time.   Applied ace wrap or coban from toes to below the knee.   Instructed patient/caregiver to keep dressing dry and intact. DO NOT REMOVE DRESSING.   Instructed pt/family/caregiver to report excessive draining, loose bandage, wet dressing, severe pain or tingling in toes.  Applied Unna Boot dressing below the knee to left lower leg.    Unna Boot(s) were applied per  Guidelines.     Electronically signed by Deya Saul RN on 3/19/2024 at 12:26 PM

## 2024-03-19 NOTE — PROGRESS NOTES
cm 03/19/24 1155   Post-Procedure Surface Area (cm^2) 0.7 cm^2 03/19/24 1155   Post-Procedure Volume (cm^3) 0.07 cm^3 03/19/24 1155   Distance Tunneling (cm) 0 cm 03/19/24 1147   Tunneling Position ___ O'Clock 0 03/19/24 1147   Undermining Starts ___ O'Clock 0 03/19/24 1147   Undermining Ends___ O'Clock 0 03/19/24 1147   Undermining Maxium Distance (cm) 0 03/19/24 1147   Wound Assessment Pink/red 03/19/24 1147   Drainage Amount Moderate (25-50%) 03/19/24 1147   Drainage Description Serosanguinous 03/19/24 1147   Odor None 03/19/24 1147   Anjali-wound Assessment Blanchable erythema 03/19/24 1147   Margins Attached edges 03/19/24 1147   Wound Thickness Description not for Pressure Injury Full thickness 03/19/24 1147   Number of days: 0             Estimated Blood Loss:  Minimal    Hemostasis Achieved:  by pressure    Procedural Pain:  0  / 10     Post Procedural Pain:  0 / 10     Response to treatment:  Well tolerated by patient.         Plan:     Problem List Items Addressed This Visit       Chronic ulcer of left foot, with fat layer exposed (HCC) - Primary    Relevant Orders    Initiate Outpatient Wound Care Protocol    * (Principal) Chronic ulcer of left leg, with fat layer exposed (HCC)    Relevant Orders    Initiate Outpatient Wound Care Protocol    PVD (peripheral vascular disease) (HCC)    Relevant Orders    Initiate Outpatient Wound Care Protocol       Refer to Dr. Figueroa for non healing LLE wounds.RTO 1 week compression    Treatment Note please see attached Discharge Instructions    In my professional opinion this patient would benefit from HBO Therapy: No    Written patient dismissal instructions given to patient and signed by patient or POA.             Electronically signed by Connor Pedroza MD on 3/19/2024 at 12:07 PM

## 2024-03-25 DIAGNOSIS — R93.89 ABNORMAL THYROID ULTRASOUND: ICD-10-CM

## 2024-03-25 DIAGNOSIS — E07.9 THYROID DISEASE: Primary | ICD-10-CM

## 2024-03-26 ENCOUNTER — HOSPITAL ENCOUNTER (OUTPATIENT)
Dept: WOUND CARE | Age: 54
Discharge: HOME | End: 2024-03-26
Payer: MEDICAID

## 2024-03-26 VITALS
TEMPERATURE: 98.7 F | RESPIRATION RATE: 20 BRPM | DIASTOLIC BLOOD PRESSURE: 81 MMHG | SYSTOLIC BLOOD PRESSURE: 127 MMHG | HEART RATE: 99 BPM

## 2024-03-26 DIAGNOSIS — L97.522 CHRONIC ULCER OF LEFT FOOT, WITH FAT LAYER EXPOSED (HCC): Primary | ICD-10-CM

## 2024-03-26 DIAGNOSIS — I73.9 PVD (PERIPHERAL VASCULAR DISEASE) (HCC): ICD-10-CM

## 2024-03-26 DIAGNOSIS — L97.922 CHRONIC ULCER OF LEFT LEG, WITH FAT LAYER EXPOSED (HCC): ICD-10-CM

## 2024-03-26 DIAGNOSIS — I82.4Y3 DEEP VEIN THROMBOSIS (DVT) OF PROXIMAL VEIN OF BOTH LOWER EXTREMITIES, UNSPECIFIED CHRONICITY (HCC): Primary | ICD-10-CM

## 2024-03-26 PROCEDURE — 99212 OFFICE O/P EST SF 10 MIN: CPT | Performed by: SURGERY

## 2024-03-26 PROCEDURE — 29580 STRAPPING UNNA BOOT: CPT

## 2024-03-26 RX ORDER — LIDOCAINE HYDROCHLORIDE 20 MG/ML
JELLY TOPICAL ONCE
Status: COMPLETED | OUTPATIENT
Start: 2024-03-26 | End: 2024-03-26

## 2024-03-26 RX ORDER — TRIAMCINOLONE ACETONIDE 1 MG/G
OINTMENT TOPICAL ONCE
OUTPATIENT
Start: 2024-03-26 | End: 2024-03-26

## 2024-03-26 RX ORDER — CLOBETASOL PROPIONATE 0.5 MG/G
OINTMENT TOPICAL ONCE
OUTPATIENT
Start: 2024-03-26 | End: 2024-03-26

## 2024-03-26 RX ORDER — GENTAMICIN SULFATE 1 MG/G
OINTMENT TOPICAL ONCE
OUTPATIENT
Start: 2024-03-26 | End: 2024-03-26

## 2024-03-26 RX ORDER — LIDOCAINE 40 MG/G
CREAM TOPICAL ONCE
OUTPATIENT
Start: 2024-03-26 | End: 2024-03-26

## 2024-03-26 RX ORDER — BETAMETHASONE DIPROPIONATE 0.5 MG/G
CREAM TOPICAL ONCE
OUTPATIENT
Start: 2024-03-26 | End: 2024-03-26

## 2024-03-26 RX ORDER — LIDOCAINE HYDROCHLORIDE 20 MG/ML
JELLY TOPICAL ONCE
OUTPATIENT
Start: 2024-03-26 | End: 2024-03-26

## 2024-03-26 RX ORDER — GINSENG 100 MG
CAPSULE ORAL ONCE
OUTPATIENT
Start: 2024-03-26 | End: 2024-03-26

## 2024-03-26 RX ORDER — IBUPROFEN 200 MG
TABLET ORAL ONCE
OUTPATIENT
Start: 2024-03-26 | End: 2024-03-26

## 2024-03-26 RX ORDER — LIDOCAINE 50 MG/G
OINTMENT TOPICAL ONCE
OUTPATIENT
Start: 2024-03-26 | End: 2024-03-26

## 2024-03-26 RX ORDER — BACITRACIN ZINC AND POLYMYXIN B SULFATE 500; 1000 [USP'U]/G; [USP'U]/G
OINTMENT TOPICAL ONCE
OUTPATIENT
Start: 2024-03-26 | End: 2024-03-26

## 2024-03-26 RX ORDER — LIDOCAINE HYDROCHLORIDE 40 MG/ML
SOLUTION TOPICAL ONCE
OUTPATIENT
Start: 2024-03-26 | End: 2024-03-26

## 2024-03-26 RX ORDER — SODIUM CHLOR/HYPOCHLOROUS ACID 0.033 %
SOLUTION, IRRIGATION IRRIGATION ONCE
OUTPATIENT
Start: 2024-03-26 | End: 2024-03-26

## 2024-03-26 RX ADMIN — LIDOCAINE HYDROCHLORIDE: 20 JELLY TOPICAL at 09:41

## 2024-03-26 NOTE — PATIENT INSTRUCTIONS
[facility] Wound Care and Hyperbaric Oxygen Therapy   Physician Orders and Discharge Instructions  [address] [place] [address] Drive  [address]  [place] [address]  Telephone: [phone]      FAX [phone]    NAME:  Vonda Lutz  YOB: 1970  MEDICAL RECORD NUMBER:  [id number]  DATE:  3/26/2024    Discharge condition: Stable    Discharge to: Home    Left via:Private automobile    Accompanied by: Self    ECF/HHA: Halo-compression stockings    Dressing Orders: Left 2nd Toe  Xeroform to open area, cover with dry gauze and medipore tape, change daily    Dressing Orders: Left Lower Ext Ulcers  Xeroform to open areas  Coflex Unnaboot, Keep clean and Dry  Elevate feet to level or above heart 3-4 times daily and as needed to for 30 minutes to reduce swelling  Remove wraps and call office if- Wraps get wet, Cause increased pain, Slide down or you are unable to make your next scheduled appointment  No Smoking  Multi Vitamin, High Protein diet as tolerated  MARIA M on 4/3 [place] in [address] Test will be done in [address] at 10:00  Wound Care Appointment to follow test at 10:30  Dr Figueroa office will call with appointment    [facility] follow up visit ____________1 week_________________  (Please note your next appointment above and if you are unable to keep, kindly give a 24 hour notice. Thank you.)    If you experience any of the following, please call the Wound Care Center during business hours:    * Increase in Pain  * Temperature over 101  * Increase in drainage from your wound  * Drainage with a foul odor  * Bleeding  * Increase in swelling  * Need for compression bandage changes due to slippage, breakthrough drainage.    If you need medical attention outside of the business hours of the Wound Care Centers please contact your PCP or go to the nearest emergency room.

## 2024-03-26 NOTE — PROGRESS NOTES
Unna Boot Application   Below Knee    NAME:  Vonda Lutz  YOB: 1970  MEDICAL RECORD NUMBER:  [id number]  DATE:  3/26/2024    Unna boot: Applied moisturizing agent to dry skin as needed.   Appied primary and secondary dressing as ordered.  Applied Unna roll from toes to knee overlapping each time.   Applied ace wrap or coban from toes to below the knee.   Instructed patient/caregiver to keep dressing dry and intact. DO NOT REMOVE DRESSING.   Instructed pt/family/caregiver to report excessive draining, loose bandage, wet dressing, severe pain or tingling in toes.  Applied Unna Boot dressing below the knee to left lower leg.    Unna Boot(s) were applied per  Guidelines.     Electronically signed by Anh Castellon RN on 3/26/2024 at 10:20 AM     
Serosanguinous 03/26/24 0927   Odor None 03/26/24 0927   Anjali-wound Assessment Intact 03/26/24 0927   Margins Defined edges 03/26/24 0927   Wound Thickness Description not for Pressure Injury Full thickness 03/26/24 0927   Number of days: 48       Wound 03/19/24 Foot Left Wound #3, left second toe, traumatic (Active)   Wound Image   03/26/24 0927   Wound Etiology Traumatic 03/26/24 0927   Dressing Status Old drainage noted 03/26/24 0927   Wound Cleansed Soap and water 03/26/24 0927   Dressing/Treatment Xeroform;Gauze dressing/dressing sponge;Tape/Soft cloth adhesive tape 03/19/24 1218   Wound Length (cm) 1.4 cm 03/26/24 0927   Wound Width (cm) 0.5 cm 03/26/24 0927   Wound Depth (cm) 0.1 cm 03/26/24 0927   Wound Surface Area (cm^2) 0.7 cm^2 03/26/24 0927   Change in Wound Size % (l*w) 0 03/26/24 0927   Wound Volume (cm^3) 0.07 cm^3 03/26/24 0927   Wound Healing % 0 03/26/24 0927   Post-Procedure Length (cm) 1.4 cm 03/19/24 1155   Post-Procedure Width (cm) 0.5 cm 03/19/24 1155   Post-Procedure Depth (cm) 0.1 cm 03/19/24 1155   Post-Procedure Surface Area (cm^2) 0.7 cm^2 03/19/24 1155   Post-Procedure Volume (cm^3) 0.07 cm^3 03/19/24 1155   Distance Tunneling (cm) 0 cm 03/19/24 1147   Tunneling Position ___ O'Clock 0 03/19/24 1147   Undermining Starts ___ O'Clock 0 03/19/24 1147   Undermining Ends___ O'Clock 0 03/19/24 1147   Undermining Maxium Distance (cm) 0 03/19/24 1147   Wound Assessment [place]/red;[place] 03/26/24 0927   Drainage Amount Moderate (25-50%) 03/26/24 0927   Drainage Description Serosanguinous 03/26/24 0927   Odor None 03/26/24 0927   Anjali-wound Assessment Intact 03/26/24 0927   Margins Attached edges 03/26/24 0927   Wound Thickness Description not for Pressure Injury Full thickness 03/26/24 0927   Number of days: 6             Plan:     Problem List Items Addressed This Visit       Chronic ulcer of left foot, with fat layer exposed (HCC) - Primary    Relevant Orders    Initiate Outpatient Wound Care

## 2024-03-28 DIAGNOSIS — E87.6 HYPOKALEMIA: ICD-10-CM

## 2024-03-29 ENCOUNTER — TELEPHONE (OUTPATIENT)
Dept: HEMATOLOGY | Age: 54
End: 2024-03-29

## 2024-03-29 RX ORDER — POTASSIUM CHLORIDE 1500 MG/1
20 TABLET, EXTENDED RELEASE ORAL 2 TIMES DAILY
Qty: 60 TABLET | Refills: 5 | Status: SHIPPED | OUTPATIENT
Start: 2024-03-29

## 2024-03-29 RX ORDER — POTASSIUM CHLORIDE 1500 MG/1
20 TABLET, EXTENDED RELEASE ORAL 2 TIMES DAILY
Qty: 60 TABLET | Refills: 1 | OUTPATIENT
Start: 2024-03-29

## 2024-04-02 ENCOUNTER — OFFICE VISIT (OUTPATIENT)
Dept: HEMATOLOGY | Age: 54
End: 2024-04-02
Payer: MEDICAID

## 2024-04-02 ENCOUNTER — HOSPITAL ENCOUNTER (OUTPATIENT)
Dept: INFUSION THERAPY | Age: 54
Discharge: HOME | End: 2024-04-02
Payer: MEDICAID

## 2024-04-02 VITALS
HEART RATE: 98 BPM | BODY MASS INDEX: 17.8 KG/M2 | SYSTOLIC BLOOD PRESSURE: 130 MMHG | TEMPERATURE: 98.7 F | DIASTOLIC BLOOD PRESSURE: 84 MMHG | WEIGHT: 88.3 LBS | OXYGEN SATURATION: 97 % | HEIGHT: 59 IN

## 2024-04-02 DIAGNOSIS — R74.8 ELEVATED SERUM TRYPTASE: ICD-10-CM

## 2024-04-02 DIAGNOSIS — D47.09 MAST CELL DISORDER: ICD-10-CM

## 2024-04-02 DIAGNOSIS — T14.8XXA CHRONIC WOUND: ICD-10-CM

## 2024-04-02 DIAGNOSIS — L29.9 CHRONIC PRURITUS: ICD-10-CM

## 2024-04-02 DIAGNOSIS — D53.8 OTHER SPECIFIED NUTRITIONAL ANEMIAS: ICD-10-CM

## 2024-04-02 DIAGNOSIS — L97.922 CHRONIC ULCER OF LEFT LEG, WITH FAT LAYER EXPOSED (HCC): ICD-10-CM

## 2024-04-02 DIAGNOSIS — R74.8 ELEVATED SERUM TRYPTASE: Primary | ICD-10-CM

## 2024-04-02 DIAGNOSIS — D50.8 IRON DEFICIENCY ANEMIA SECONDARY TO INADEQUATE DIETARY IRON INTAKE: ICD-10-CM

## 2024-04-02 LAB
ALBUMIN SERPL-MCNC: 3.3 G/DL (ref 3.5–5.2)
ALP SERPL-CCNC: 186 U/L (ref 35–104)
ALT SERPL-CCNC: 14 U/L (ref 9–52)
ANION GAP SERPL CALCULATED.3IONS-SCNC: 11 MMOL/L (ref 7–19)
AST SERPL-CCNC: 26 U/L (ref 14–36)
BASOPHILS # BLD: 0.08 K/UL (ref 0.01–0.08)
BASOPHILS NFR BLD: 1.1 % (ref 0.1–1.2)
BILIRUB SERPL-MCNC: <0.2 MG/DL (ref 0.2–1.3)
BUN SERPL-MCNC: 21 MG/DL (ref 7–17)
CALCIUM SERPL-MCNC: 8.8 MG/DL (ref 8.4–10.2)
CHLORIDE SERPL-SCNC: 99 MMOL/L (ref 98–111)
CO2 SERPL-SCNC: 28 MMOL/L (ref 22–29)
CREAT SERPL-MCNC: 0.6 MG/DL (ref 0.5–1)
CRP SERPL HS-MCNC: 1.42 MG/DL (ref 0–0.5)
EOSINOPHIL # BLD: 0.38 K/UL (ref 0.04–0.54)
EOSINOPHIL NFR BLD: 5 % (ref 0.7–7)
ERYTHROCYTE [DISTWIDTH] IN BLOOD BY AUTOMATED COUNT: 13.2 % (ref 11.7–14.4)
ERYTHROCYTE [SEDIMENTATION RATE] IN BLOOD BY WESTERGREN METHOD: 63 MM/HR (ref 0–25)
FERRITIN SERPL-MCNC: 106.8 NG/ML (ref 13–150)
FOLATE SERPL-MCNC: 14.5 NG/ML (ref 4.8–37.3)
GLOBULIN: 3.4 G/DL
GLUCOSE SERPL-MCNC: 97 MG/DL (ref 74–106)
HAPTOGLOB SERPL-MCNC: 229 MG/DL (ref 30–200)
HCT VFR BLD AUTO: 31.9 % (ref 34.1–44.9)
HCT VFR BLD AUTO: 32.4 % (ref 37–47)
HGB BLD-MCNC: 10.1 G/DL (ref 11.2–15.7)
IRON SATN MFR SERPL: 13 % (ref 14–50)
IRON SERPL-MCNC: 28 UG/DL (ref 37–145)
LDH SERPL-CCNC: 211 U/L (ref 120–246)
LYMPHOCYTES # BLD: 2.48 K/UL (ref 1.18–3.74)
LYMPHOCYTES NFR BLD: 32.8 % (ref 19.3–53.1)
MCH RBC QN AUTO: 27.4 PG (ref 25.6–32.2)
MCHC RBC AUTO-ENTMCNC: 31.7 G/DL (ref 32.3–35.5)
MCV RBC AUTO: 86.4 FL (ref 79.4–94.8)
MONOCYTES # BLD: 0.71 K/UL (ref 0.24–0.82)
MONOCYTES NFR BLD: 9.4 % (ref 4.7–12.5)
NEUTROPHILS # BLD: 3.86 K/UL (ref 1.56–6.13)
NEUTS SEG NFR BLD: 51.2 % (ref 34–71.1)
PLATELET # BLD AUTO: 552 K/UL (ref 182–369)
PMV BLD AUTO: 9.4 FL (ref 7.4–10.4)
POTASSIUM SERPL-SCNC: 3.5 MMOL/L (ref 3.5–5.1)
PROT SERPL-MCNC: 6.7 G/DL (ref 6.3–8.2)
RBC # BLD AUTO: 3.69 M/UL (ref 3.93–5.22)
RETICS # AUTO: 0.04 M/UL (ref 0.03–0.12)
RETICS/RBC NFR: 1.19 % (ref 0.5–1.5)
SODIUM SERPL-SCNC: 138 MMOL/L (ref 137–145)
TIBC SERPL-MCNC: 214 UG/DL (ref 250–400)
VIT B12 SERPL-MCNC: 336 PG/ML (ref 211–946)
WBC # BLD AUTO: 7.55 K/UL (ref 3.98–10.04)

## 2024-04-02 PROCEDURE — 83615 LACTATE (LD) (LDH) ENZYME: CPT

## 2024-04-02 PROCEDURE — 99212 OFFICE O/P EST SF 10 MIN: CPT

## 2024-04-02 PROCEDURE — 36415 COLL VENOUS BLD VENIPUNCTURE: CPT

## 2024-04-02 PROCEDURE — 85025 COMPLETE CBC W/AUTO DIFF WBC: CPT

## 2024-04-02 PROCEDURE — 80053 COMPREHEN METABOLIC PANEL: CPT

## 2024-04-02 PROCEDURE — 99205 OFFICE O/P NEW HI 60 MIN: CPT | Performed by: NURSE PRACTITIONER

## 2024-04-02 ASSESSMENT — ENCOUNTER SYMPTOMS
EYE REDNESS: 0
CONSTIPATION: 0
SORE THROAT: 0
COUGH: 0
EYE PAIN: 0
VOMITING: 0
GASTROINTESTINAL NEGATIVE: 1
RESPIRATORY NEGATIVE: 1
DIARRHEA: 0
BACK PAIN: 1
EYES NEGATIVE: 1
ABDOMINAL PAIN: 0
EYE DISCHARGE: 0
WHEEZING: 0
BLOOD IN STOOL: 0

## 2024-04-02 NOTE — PROGRESS NOTES
is soft. There is no mass.      Tenderness: There is no abdominal tenderness. There is no guarding.   Musculoskeletal:         General: No tenderness or deformity.      Cervical back: Normal range of motion and neck supple.      Comments: Range of motion within normal limits x4 extremities   Skin:     General: Skin is warm.      Findings: No bruising, erythema or rash.      Comments: Dressing to left lower extremity clean dry and intact   Neurological:      Mental Status: She is alert and oriented to person, place, and time.      Cranial Nerves: No cranial nerve deficit.      Coordination: Coordination normal.      Gait: Gait abnormal (Using a rollator for stability).   Psychiatric:         Behavior: Behavior normal.         Thought Content: Thought content normal.         Labs reviewed today:  Lab Results   Component Value Date    WBC 7.55 04/02/2024    HGB 10.1 (L) 04/02/2024    HCT 31.9 (L) 04/02/2024    MCV 86.4 04/02/2024     (HH) 04/02/2024     Lab Results   Component Value Date    NEUTROABS 3.86 04/02/2024       ASSESSMENT/PLAN:      1. Elevated serum tryptase with chronic pruritus: 08/18/2023 Tryptase 14.4 (<10.9) and 01/24/2024 Tryptase 12.8 (<10.9) and KIT D816V mutation not detected  She specifically denied any anaphylactic reactions other than in 2005 she had severe swelling with an antibiotic.  Vonda presented with complaints of not sleeping well, chronic pruritus with no relief, chronic bone and muscle pain and occasional difficulty with concentration.  She specifically denied any abdominal pain, skin flushing, diarrhea, nausea vomiting, and and no bleeding other than from occasional hemorrhoids.  She has no known history of splenomegaly, liver disease or lymphadenopathy and physical exam at initial consultation was negative.     Elevated tryptase is seen in the mast cell activation or an increased total mast cells, this can be concerning for an underlying malignancy.    Will obtain the

## 2024-04-03 ENCOUNTER — HOSPITAL ENCOUNTER (OUTPATIENT)
Dept: NON INVASIVE DIAGNOSTICS | Age: 54
Discharge: HOME | End: 2024-04-03
Payer: MEDICAID

## 2024-04-03 ENCOUNTER — OFFICE VISIT (OUTPATIENT)
Dept: VASCULAR SURGERY | Age: 54
End: 2024-04-03
Payer: MEDICAID

## 2024-04-03 ENCOUNTER — HOSPITAL ENCOUNTER (OUTPATIENT)
Dept: WOUND CARE | Age: 54
Discharge: HOME | End: 2024-04-03
Payer: MEDICAID

## 2024-04-03 VITALS
HEIGHT: 59 IN | SYSTOLIC BLOOD PRESSURE: 125 MMHG | DIASTOLIC BLOOD PRESSURE: 76 MMHG | BODY MASS INDEX: 17.74 KG/M2 | HEART RATE: 84 BPM | WEIGHT: 88 LBS | TEMPERATURE: 97.7 F | RESPIRATION RATE: 18 BRPM

## 2024-04-03 VITALS
SYSTOLIC BLOOD PRESSURE: 120 MMHG | HEART RATE: 89 BPM | DIASTOLIC BLOOD PRESSURE: 83 MMHG | TEMPERATURE: 97.5 F | OXYGEN SATURATION: 99 %

## 2024-04-03 DIAGNOSIS — L97.922 CHRONIC ULCER OF LEFT LEG, WITH FAT LAYER EXPOSED (HCC): ICD-10-CM

## 2024-04-03 DIAGNOSIS — I70.213 ATHEROSCLER OF NATIVE ARTERY OF BOTH LEGS WITH INTERMIT CLAUDICATION (HCC): Primary | ICD-10-CM

## 2024-04-03 DIAGNOSIS — I73.9 PVD (PERIPHERAL VASCULAR DISEASE) (HCC): ICD-10-CM

## 2024-04-03 DIAGNOSIS — L97.522 CHRONIC ULCER OF LEFT FOOT, WITH FAT LAYER EXPOSED (HCC): Primary | ICD-10-CM

## 2024-04-03 DIAGNOSIS — L97.522 CHRONIC ULCER OF LEFT FOOT, WITH FAT LAYER EXPOSED (HCC): ICD-10-CM

## 2024-04-03 PROBLEM — I89.0 LYMPHEDEMA: Chronic | Status: ACTIVE | Noted: 2024-02-06

## 2024-04-03 PROCEDURE — 97597 DBRDMT OPN WND 1ST 20 CM/<: CPT

## 2024-04-03 PROCEDURE — 99203 OFFICE O/P NEW LOW 30 MIN: CPT | Performed by: NURSE PRACTITIONER

## 2024-04-03 PROCEDURE — 97597 DBRDMT OPN WND 1ST 20 CM/<: CPT | Performed by: SURGERY

## 2024-04-03 PROCEDURE — 93923 UPR/LXTR ART STDY 3+ LVLS: CPT

## 2024-04-03 RX ORDER — LIDOCAINE 40 MG/G
CREAM TOPICAL ONCE
OUTPATIENT
Start: 2024-04-03 | End: 2024-04-03

## 2024-04-03 RX ORDER — CLOBETASOL PROPIONATE 0.5 MG/G
OINTMENT TOPICAL ONCE
OUTPATIENT
Start: 2024-04-03 | End: 2024-04-03

## 2024-04-03 RX ORDER — LIDOCAINE HYDROCHLORIDE 40 MG/ML
SOLUTION TOPICAL ONCE
OUTPATIENT
Start: 2024-04-03 | End: 2024-04-03

## 2024-04-03 RX ORDER — LIDOCAINE HYDROCHLORIDE 20 MG/ML
JELLY TOPICAL ONCE
OUTPATIENT
Start: 2024-04-03 | End: 2024-04-03

## 2024-04-03 RX ORDER — BACITRACIN ZINC AND POLYMYXIN B SULFATE 500; 1000 [USP'U]/G; [USP'U]/G
OINTMENT TOPICAL ONCE
OUTPATIENT
Start: 2024-04-03 | End: 2024-04-03

## 2024-04-03 RX ORDER — LIDOCAINE 50 MG/G
OINTMENT TOPICAL ONCE
OUTPATIENT
Start: 2024-04-03 | End: 2024-04-03

## 2024-04-03 RX ORDER — IBUPROFEN 200 MG
TABLET ORAL ONCE
OUTPATIENT
Start: 2024-04-03 | End: 2024-04-03

## 2024-04-03 RX ORDER — GINSENG 100 MG
CAPSULE ORAL ONCE
OUTPATIENT
Start: 2024-04-03 | End: 2024-04-03

## 2024-04-03 RX ORDER — GENTAMICIN SULFATE 1 MG/G
OINTMENT TOPICAL ONCE
OUTPATIENT
Start: 2024-04-03 | End: 2024-04-03

## 2024-04-03 RX ORDER — LIDOCAINE HYDROCHLORIDE 20 MG/ML
JELLY TOPICAL ONCE
Status: COMPLETED | OUTPATIENT
Start: 2024-04-03 | End: 2024-04-03

## 2024-04-03 RX ORDER — TRIAMCINOLONE ACETONIDE 1 MG/G
OINTMENT TOPICAL ONCE
OUTPATIENT
Start: 2024-04-03 | End: 2024-04-03

## 2024-04-03 RX ORDER — SODIUM CHLOR/HYPOCHLOROUS ACID 0.033 %
SOLUTION, IRRIGATION IRRIGATION ONCE
OUTPATIENT
Start: 2024-04-03 | End: 2024-04-03

## 2024-04-03 RX ORDER — BETAMETHASONE DIPROPIONATE 0.5 MG/G
CREAM TOPICAL ONCE
OUTPATIENT
Start: 2024-04-03 | End: 2024-04-03

## 2024-04-03 RX ADMIN — LIDOCAINE HYDROCHLORIDE: 20 JELLY TOPICAL at 11:23

## 2024-04-03 ASSESSMENT — PROMIS GLOBAL HEALTH SCALE
SUM OF RESPONSES TO QUESTIONS 3, 6, 7, & 8: 11
IN THE PAST 7 DAYS, HOW WOULD YOU RATE YOUR PAIN ON AVERAGE [ON A SCALE FROM 0 (NO PAIN) TO 10 (WORST IMAGINABLE PAIN)]?: 5
IN THE PAST 7 DAYS, HOW OFTEN HAVE YOU BEEN BOTHERED BY EMOTIONAL PROBLEMS, SUCH AS FEELING ANXIOUS, DEPRESSED, OR IRRITABLE [ON A SCALE FROM 1 (NEVER) TO 5 (ALWAYS)]?: RARELY
IN GENERAL, HOW WOULD YOU RATE YOUR SATISFACTION WITH YOUR SOCIAL ACTIVITIES AND RELATIONSHIPS [ON A SCALE OF 1 (POOR) TO 5 (EXCELLENT)]?: EXCELLENT
IN GENERAL, PLEASE RATE HOW WELL YOU CARRY OUT YOUR USUAL SOCIAL ACTIVITIES (INCLUDES ACTIVITIES AT HOME, AT WORK, AND IN YOUR COMMUNITY, AND RESPONSIBILITIES AS A PARENT, CHILD, SPOUSE, EMPLOYEE, FRIEND, ETC) [ON A SCALE OF 1 (POOR) TO 5 (EXCELLENT)]?: VERY GOOD
IN GENERAL, HOW WOULD YOU RATE YOUR MENTAL HEALTH, INCLUDING YOUR MOOD AND YOUR ABILITY TO THINK [ON A SCALE OF 1 (POOR) TO 5 (EXCELLENT)]?: FAIR
IN THE PAST 7 DAYS, HOW WOULD YOU RATE YOUR FATIGUE ON AVERAGE [ON A SCALE FROM 1 (NONE) TO 5 (VERY SEVERE)]?: SEVERE
IN GENERAL, WOULD YOU SAY YOUR QUALITY OF LIFE IS...[ON A SCALE OF 1 (POOR) TO 5 (EXCELLENT)]: VERY GOOD
IN GENERAL, WOULD YOU SAY YOUR HEALTH IS...[ON A SCALE OF 1 (POOR) TO 5 (EXCELLENT)]: POOR
SUM OF RESPONSES TO QUESTIONS 2, 4, 5, & 10: 15
IN GENERAL, HOW WOULD YOU RATE YOUR PHYSICAL HEALTH [ON A SCALE OF 1 (POOR) TO 5 (EXCELLENT)]?: POOR
TO WHAT EXTENT ARE YOU ABLE TO CARRY OUT YOUR EVERYDAY PHYSICAL ACTIVITIES SUCH AS WALKING, CLIMBING STAIRS, CARRYING GROCERIES, OR MOVING A CHAIR [ON A SCALE OF 1 (NOT AT ALL) TO 5 (COMPLETELY)]?: MODERATELY

## 2024-04-03 ASSESSMENT — PAIN DESCRIPTION - DESCRIPTORS: DESCRIPTORS: ACHING;BURNING;SORE;TENDER

## 2024-04-03 ASSESSMENT — PAIN DESCRIPTION - ORIENTATION: ORIENTATION: LEFT

## 2024-04-03 ASSESSMENT — PAIN SCALES - GENERAL: PAINLEVEL_OUTOF10: 4

## 2024-04-03 ASSESSMENT — PAIN DESCRIPTION - LOCATION: LOCATION: LEG

## 2024-04-03 NOTE — PATIENT INSTRUCTIONS
[facility] Wound Care and Hyperbaric Oxygen Therapy   Physician Orders and Discharge Instructions  [address] [place] [address]  [address]  [place] [address]  Telephone: [phone]      FAX [phone]    NAME:  Vonda Lutz  YOB: 1970  MEDICAL RECORD NUMBER:  [id number]  DATE:  4/3/2024    Discharge condition: Stable    Discharge to: Home    Left via:Private automobile    Accompanied by: Self    ECF/HHA: Halo-compression stockings    Dressing Orders: Left 2nd Toe  Xeroform to open area, cover with dry gauze and medipore tape, change daily    Dressing Orders: Left Lower Ext Ulcers  Xeroform to open areas  Coflex Unnaboot, Keep clean and Dry  Elevate feet to level or above heart 3-4 times daily and as needed to for 30 minutes to reduce swelling  Remove wraps and call office if- Wraps get wet, Cause increased pain, Slide down or you are unable to make  your next scheduled appointment  No Smoking  Multi Vitamin, High Protein diet as tolerated  Dr Figueroa office will call with appointment    C follow up visit _____________1 week________________  (Please note your next appointment above and if you are unable to keep, kindly give a 24 hour notice. Thank you.)    If you experience any of the following, please call the Wound Care Center during business hours:    * Increase in Pain  * Temperature over 101  * Increase in drainage from your wound  * Drainage with a foul odor  * Bleeding  * Increase in swelling  * Need for compression bandage changes due to slippage, breakthrough drainage.    If you need medical attention outside of the business hours of the Wound Care Centers please contact your PCP or go to the nearest emergency room.

## 2024-04-03 NOTE — PROGRESS NOTES
syncope.  No  significant leg swelling.  No claudication.  Musculoskeletal - no gait disturbance  Skin -has new wound.  Neurologic -  No speech difficulty or lateralizing weakness.  All other review of systems are negative.    Physical Exam    /83 (Site: Left Upper Arm, Position: Sitting, Cuff Size: Medium Adult)   Pulse 89   Temp 97.5 °F (36.4 °C)   SpO2 99%       Neck- carotid pulses 2+ to palpation with no bruit  Cardiovascular - Regular rate and rhythm.    Pulmonary - effort appears normal.  No respiratory distress.    Lungs - Breath sounds normal. No wheezes or rales.    Extremities -  Radial and brachial pulses are 2+ to palpation bilaterally.  Right femoral pulse: present 2+; Right popliteal pulse: absent Right DP: absent; Right PT absent; Left femoral pulse: present 2+; Left popliteal pulse: absent; Left DP: absent; Left PT: absent No cyanosis, clubbing, or significant edema.  No signs atheroembolic event.  Neurologic - alert and oriented X 3.  Physiologic.   Face symmetric.  Skin - warm, dry, and intact.  has  wound left shin and toes  Psychiatric - mood, affect, and behavior appear normal.  Judgment and thought processes appear normal.    Risk factors for atherosclerosis of all vascular beds have been reviewed with the patient including:  Family history, tobacco abuse in all forms, elevated cholesterol, hyperlipidemia, and diabetes.    Lower extremity arterial study: Right DAPHNE 1.07, Left DAPHNE 1.12  Toe pressures 0.3 and 0.62.  Individual films reviewed: Yes.  These results were reviewed with the patient.  Disease process is undiagnosed new problem with uncertain prognosis            Reviewed on this visit: speciality care notes from [facility]    Options have been discussed with the patient including continued medical management vs. 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

## 2024-04-03 NOTE — PLAN OF CARE
Unna Boot Application   Below Knee    NAME:  Vonda Lutz  YOB: 1970  MEDICAL RECORD NUMBER:  [id number]  DATE:  4/3/2024    Unna boot: Applied moisturizing agent to dry skin as needed.   Appied primary and secondary dressing as ordered.  Applied Unna roll from toes to knee overlapping each time.   Applied ace wrap or coban from toes to below the knee.   Instructed patient/caregiver to keep dressing dry and intact. DO NOT REMOVE DRESSING.   Instructed pt/family/caregiver to report excessive draining, loose bandage, wet dressing, severe pain or tingling in toes.  Applied Unna Boot dressing below the knee to left lower leg.    Unna Boot(s) were applied per  Guidelines.     Electronically signed by Deya Saul RN on 4/3/2024 at 1:07 PM

## 2024-04-03 NOTE — PROGRESS NOTES
1125   Anjali-wound Assessment Intact 04/03/24 1125   Margins Defined edges 04/03/24 1125   Wound Thickness Description not for Pressure Injury Full thickness 04/03/24 1125   Number of days: 56       Wound 02/06/24 Pretibial Left wound 1-left leg wound venous (Active)   Wound Image   04/03/24 1125   Wound Etiology Venous 04/03/24 1125   Dressing Status Old drainage noted 04/03/24 1125   Wound Cleansed Soap and water 04/03/24 1125   Dressing/Treatment Xeroform;Other (comment) 03/26/24 1005   Wound Length (cm) 1.9 cm 04/03/24 1125   Wound Width (cm) 1 cm 04/03/24 1125   Wound Depth (cm) 0.1 cm 04/03/24 1125   Wound Surface Area (cm^2) 1.9 cm^2 04/03/24 1125   Change in Wound Size % (l*w) 68.33 04/03/24 1125   Wound Volume (cm^3) 0.19 cm^3 04/03/24 1125   Wound Healing % 84 04/03/24 1125   Post-Procedure Length (cm) 1.9 cm 04/03/24 1130   Post-Procedure Width (cm) 1 cm 04/03/24 1130   Post-Procedure Depth (cm) 0.1 cm 04/03/24 1130   Post-Procedure Surface Area (cm^2) 1.9 cm^2 04/03/24 1130   Post-Procedure Volume (cm^3) 0.19 cm^3 04/03/24 1130   Distance Tunneling (cm) 0 cm 04/03/24 1125   Tunneling Position ___ O'Clock 0 04/03/24 1125   Undermining Starts ___ O'Clock 0 04/03/24 1125   Undermining Ends___ O'Clock 0 04/03/24 1125   Undermining Maxium Distance (cm) 0 04/03/24 1125   Wound Assessment Pink/red;Granulation tissue 04/03/24 1125   Drainage Amount Moderate (25-50%) 04/03/24 1125   Drainage Description Serosanguinous 04/03/24 1125   Odor None 04/03/24 1125   Anjali-wound Assessment Blanchable erythema;Hemosiderin staining (brown yellow) 04/03/24 1125   Margins Defined edges 04/03/24 1125   Wound Thickness Description not for Pressure Injury Full thickness 04/03/24 1125   Number of days: 56       Wound 03/19/24 Foot Left Wound #3, left second toe, traumatic (Active)   Wound Image   04/03/24 1125   Wound Etiology Traumatic 04/03/24 1125   Dressing Status Dry;Intact 04/03/24 1125   Wound Cleansed Soap and water

## 2024-04-04 LAB
B2 MICROGLOB SERPL-MCNC: 3.5 MG/L (ref 0.8–2.4)
TRYPTASE SERPL-MCNC: 11.4 UG/L

## 2024-04-05 ENCOUNTER — TELEPHONE (OUTPATIENT)
Dept: HEMATOLOGY | Age: 54
End: 2024-04-05

## 2024-04-05 DIAGNOSIS — D50.8 IRON DEFICIENCY ANEMIA SECONDARY TO INADEQUATE DIETARY IRON INTAKE: Primary | ICD-10-CM

## 2024-04-05 RX ORDER — ACETAMINOPHEN 500 MG
1 TABLET ORAL DAILY
Qty: 30 TABLET | Refills: 5 | Status: SHIPPED | OUTPATIENT
Start: 2024-04-05

## 2024-04-05 NOTE — TELEPHONE ENCOUNTER
----- Message from URSZULA Grijalva sent at 4/3/2024  7:14 AM CDT -----  Available labs suggest chronic inflammatory process and iron deficiency.  Recommend a trial of oral iron, slow release iron 45 mg p.o. daily.  If she agrees please send electronic prescription.  Additional lab work is pending

## 2024-04-05 NOTE — TELEPHONE ENCOUNTER
Another Rx request sent to Dr Roberto Charles. Called patient and reviewed lab results, iron saturation is 13%.  Instructed that URSZULA Wise would like for her to get over the counter slow release iron and take one tablet (45 mg) daily.  Instructed patient on medication, dosage, frequency, and side effects. Also instructed to take it with orange juice or take a vitamin C with it to help absorption.  Instructed to call with any problems.  Patient v/u and is agreeable to plan.

## 2024-04-06 LAB
ALBUMIN SERPL-MCNC: 2.85 G/DL (ref 3.75–5.01)
ALPHA1 GLOB SERPL ELPH-MCNC: 0.43 G/DL (ref 0.19–0.46)
ALPHA2 GLOB SERPL ELPH-MCNC: 0.93 G/DL (ref 0.48–1.05)
B-GLOBULIN SERPL ELPH-MCNC: 0.89 G/DL (ref 0.48–1.1)
DEPRECATED KAPPA LC FREE/LAMBDA SER: 1.59 {RATIO} (ref 0.26–1.65)
EER MONOCLONAL PROTEIN AND FLC, SERUM: ABNORMAL
GAMMA GLOB SERPL ELPH-MCNC: 1.18 G/DL (ref 0.62–1.51)
IGA SERPL-MCNC: 218 MG/DL (ref 68–408)
IGG SERPL-MCNC: 1247 MG/DL (ref 768–1632)
IGM SERPL-MCNC: 90 MG/DL (ref 35–263)
INTERPRETATION SERPL IFE-IMP: ABNORMAL
INTERPRETATION SERPL IFE-IMP: ABNORMAL
KAPPA LC FREE SER-MCNC: 58.48 MG/L (ref 3.3–19.4)
LAMBDA LC FREE SERPL-MCNC: 36.7 MG/L (ref 5.71–26.3)
MONOCLONAL PROTEIN, SERUM: ABNORMAL G/DL
PROT SERPL-MCNC: 6.3 G/DL (ref 6.3–8.2)

## 2024-04-08 ENCOUNTER — OFFICE VISIT (OUTPATIENT)
Dept: ENT CLINIC | Age: 54
End: 2024-04-08
Payer: MEDICAID

## 2024-04-08 VITALS
WEIGHT: 88.8 LBS | HEIGHT: 59 IN | DIASTOLIC BLOOD PRESSURE: 62 MMHG | BODY MASS INDEX: 17.9 KG/M2 | SYSTOLIC BLOOD PRESSURE: 100 MMHG

## 2024-04-08 DIAGNOSIS — E04.2 NONTOXIC MULTINODULAR GOITER: Primary | ICD-10-CM

## 2024-04-08 PROCEDURE — 99203 OFFICE O/P NEW LOW 30 MIN: CPT | Performed by: NURSE PRACTITIONER

## 2024-04-08 ASSESSMENT — ENCOUNTER SYMPTOMS
EYES NEGATIVE: 1
GASTROINTESTINAL NEGATIVE: 1
ALLERGIC/IMMUNOLOGIC NEGATIVE: 1
RESPIRATORY NEGATIVE: 1

## 2024-04-08 NOTE — PROGRESS NOTES
2024    Vonda Lutz (:  1970) is a 53 y.o. female, Established patient, here for evaluation of the following chief complaint(s):  New Patient (Thyroid )      Vitals:    24 1441   BP: 100/62   Weight: 40.3 kg (88 lb 12.8 oz)   Height: 1.499 m (4' 11\")       Wt Readings from Last 3 Encounters:   24 40.3 kg (88 lb 12.8 oz)   24 39.9 kg (88 lb)   24 40.1 kg (88 lb 4.8 oz)       BP Readings from Last 3 Encounters:   24 100/62   24 125/76   24 120/83         SUBJECTIVE/OBJECTIVE:    Patient seen today for thyroid. She had an US done of her thyroid 3/19/24 that showed multiple nodules. She had a CT of her chest that showed thyroid nodules and then her PCP ordered the thyroid US. She states that she has had an enlarged thyroid for as long as she can remember. She denies pain, difficulty swallowing, or difficulty breathing.        Review of Systems   Constitutional: Negative.    HENT: Negative.     Eyes: Negative.    Respiratory: Negative.     Cardiovascular: Negative.    Gastrointestinal: Negative.    Endocrine: Negative.    Musculoskeletal:  Positive for gait problem (uses rollator).   Skin: Negative.    Allergic/Immunologic: Negative.    Hematological: Negative.    Psychiatric/Behavioral: Negative.          Physical Exam  Vitals reviewed.   Constitutional:       Appearance: Normal appearance. She is normal weight.   HENT:      Head: Normocephalic and atraumatic.      Right Ear: External ear normal.      Left Ear: External ear normal.      Nose: Nose normal.      Mouth/Throat:      Mouth: Mucous membranes are moist.      Pharynx: Oropharynx is clear.   Eyes:      Extraocular Movements: Extraocular movements intact.      Pupils: Pupils are equal, round, and reactive to light.   Cardiovascular:      Rate and Rhythm: Normal rate and regular rhythm.   Pulmonary:      Effort: Pulmonary effort is normal.      Breath sounds: Normal breath sounds.   Musculoskeletal:

## 2024-04-09 ENCOUNTER — TELEPHONE (OUTPATIENT)
Dept: VASCULAR SURGERY | Age: 54
End: 2024-04-09

## 2024-04-09 LAB
CALR EXON 9 MUT ANL BLD/T: NORMAL
CITATION REF LAB TEST: NORMAL
JAK2 GENE MUT ANL BLD/T: NORMAL
JAK2 P.V617F BLD/T QL: NORMAL
LAB DIRECTOR NAME PROVIDER: NORMAL
MPL GENE MUT TESTED MAR: NORMAL
REF LAB TEST METHOD: NORMAL
REFLEX: NORMAL
TEST PERFORMANCE INFO SPEC: NORMAL

## 2024-04-09 NOTE — TELEPHONE ENCOUNTER
----- Message from URSZULA Camara sent at 4/9/2024  7:01 AM CDT -----  Can we try to reach her today.  I need to talk with her.  Left a message a few days ago

## 2024-04-10 ENCOUNTER — HOSPITAL ENCOUNTER (OUTPATIENT)
Dept: WOUND CARE | Age: 54
Discharge: HOME | End: 2024-04-10
Payer: MEDICAID

## 2024-04-10 VITALS
BODY MASS INDEX: 17.74 KG/M2 | HEIGHT: 59 IN | SYSTOLIC BLOOD PRESSURE: 127 MMHG | RESPIRATION RATE: 18 BRPM | DIASTOLIC BLOOD PRESSURE: 83 MMHG | WEIGHT: 88 LBS | HEART RATE: 121 BPM | TEMPERATURE: 99.2 F

## 2024-04-10 DIAGNOSIS — L97.522 CHRONIC ULCER OF LEFT FOOT, WITH FAT LAYER EXPOSED (HCC): Primary | ICD-10-CM

## 2024-04-10 DIAGNOSIS — L97.922 CHRONIC ULCER OF LEFT LEG, WITH FAT LAYER EXPOSED (HCC): ICD-10-CM

## 2024-04-10 DIAGNOSIS — I73.9 PVD (PERIPHERAL VASCULAR DISEASE) (HCC): ICD-10-CM

## 2024-04-10 PROCEDURE — 97597 DBRDMT OPN WND 1ST 20 CM/<: CPT

## 2024-04-10 PROCEDURE — 97597 DBRDMT OPN WND 1ST 20 CM/<: CPT | Performed by: SURGERY

## 2024-04-10 RX ORDER — LIDOCAINE HYDROCHLORIDE 40 MG/ML
SOLUTION TOPICAL ONCE
OUTPATIENT
Start: 2024-04-10 | End: 2024-04-10

## 2024-04-10 RX ORDER — LIDOCAINE HYDROCHLORIDE 20 MG/ML
JELLY TOPICAL ONCE
OUTPATIENT
Start: 2024-04-10 | End: 2024-04-10

## 2024-04-10 RX ORDER — CLOBETASOL PROPIONATE 0.5 MG/G
OINTMENT TOPICAL ONCE
OUTPATIENT
Start: 2024-04-10 | End: 2024-04-10

## 2024-04-10 RX ORDER — LIDOCAINE 40 MG/G
CREAM TOPICAL ONCE
OUTPATIENT
Start: 2024-04-10 | End: 2024-04-10

## 2024-04-10 RX ORDER — LIDOCAINE 50 MG/G
OINTMENT TOPICAL ONCE
OUTPATIENT
Start: 2024-04-10 | End: 2024-04-10

## 2024-04-10 RX ORDER — IBUPROFEN 200 MG
TABLET ORAL ONCE
OUTPATIENT
Start: 2024-04-10 | End: 2024-04-10

## 2024-04-10 RX ORDER — BACITRACIN ZINC AND POLYMYXIN B SULFATE 500; 1000 [USP'U]/G; [USP'U]/G
OINTMENT TOPICAL ONCE
OUTPATIENT
Start: 2024-04-10 | End: 2024-04-10

## 2024-04-10 RX ORDER — BETAMETHASONE DIPROPIONATE 0.5 MG/G
CREAM TOPICAL ONCE
OUTPATIENT
Start: 2024-04-10 | End: 2024-04-10

## 2024-04-10 RX ORDER — GINSENG 100 MG
CAPSULE ORAL ONCE
OUTPATIENT
Start: 2024-04-10 | End: 2024-04-10

## 2024-04-10 RX ORDER — GENTAMICIN SULFATE 1 MG/G
OINTMENT TOPICAL ONCE
OUTPATIENT
Start: 2024-04-10 | End: 2024-04-10

## 2024-04-10 RX ORDER — LIDOCAINE HYDROCHLORIDE 20 MG/ML
JELLY TOPICAL ONCE
Status: COMPLETED | OUTPATIENT
Start: 2024-04-10 | End: 2024-04-10

## 2024-04-10 RX ORDER — SODIUM CHLOR/HYPOCHLOROUS ACID 0.033 %
SOLUTION, IRRIGATION IRRIGATION ONCE
OUTPATIENT
Start: 2024-04-10 | End: 2024-04-10

## 2024-04-10 RX ORDER — TRIAMCINOLONE ACETONIDE 1 MG/G
OINTMENT TOPICAL ONCE
OUTPATIENT
Start: 2024-04-10 | End: 2024-04-10

## 2024-04-10 RX ADMIN — LIDOCAINE HYDROCHLORIDE: 20 JELLY TOPICAL at 08:55

## 2024-04-10 ASSESSMENT — PAIN DESCRIPTION - ONSET: ONSET: ON-GOING

## 2024-04-10 ASSESSMENT — PAIN SCALES - GENERAL: PAINLEVEL_OUTOF10: 4

## 2024-04-10 ASSESSMENT — PAIN - FUNCTIONAL ASSESSMENT: PAIN_FUNCTIONAL_ASSESSMENT: PREVENTS OR INTERFERES SOME ACTIVE ACTIVITIES AND ADLS

## 2024-04-10 ASSESSMENT — PAIN DESCRIPTION - PAIN TYPE: TYPE: ACUTE PAIN

## 2024-04-10 ASSESSMENT — PAIN DESCRIPTION - FREQUENCY: FREQUENCY: INTERMITTENT

## 2024-04-10 ASSESSMENT — PAIN DESCRIPTION - DESCRIPTORS: DESCRIPTORS: ACHING;BURNING;SORE;TENDER

## 2024-04-10 ASSESSMENT — PAIN DESCRIPTION - LOCATION: LOCATION: LEG

## 2024-04-10 ASSESSMENT — PAIN DESCRIPTION - ORIENTATION: ORIENTATION: LEFT

## 2024-04-10 NOTE — PLAN OF CARE
Problem: Pain  Goal: Verbalizes/displays adequate comfort level or baseline comfort level  Outcome: Progressing     Problem: Wound:  Goal: Will show signs of wound healing; wound closure and no evidence of infection  Description: Will show signs of wound healing; wound closure and no evidence of infection  Outcome: Progressing     Problem: Venous:  Goal: Signs of wound healing will improve  Description: Signs of wound healing will improve  Outcome: Progressing     Problem: Compression therapy:  Goal: Will be free from complications associated with compression therapy  Description: Will be free from complications associated with compression therapy  Outcome: Progressing     Problem: Weight control:  Goal: Ability to maintain an optimal weight for height and age will be supported  Description: Ability to maintain an optimal weight for height and age will be supported  Outcome: Progressing     Problem: Falls - Risk of:  Goal: Will remain free from falls  Description: Will remain free from falls  Outcome: Progressing

## 2024-04-10 NOTE — PLAN OF CARE
Unna Boot Application   Below Knee    NAME:  Vonda Lutz  YOB: 1970  MEDICAL RECORD NUMBER:  [id number]  DATE:  4/10/2024    Unna boot: Applied moisturizing agent to dry skin as needed.   Appied primary and secondary dressing as ordered.  Applied Unna roll from toes to knee overlapping each time.   Applied ace wrap or coban from toes to below the knee.   Secured with tape and/or metal clips covered with tape.   Instructed patient/caregiver to keep dressing dry and intact. DO NOT REMOVE DRESSING.   Instructed pt/family/caregiver to report excessive draining, loose bandage, wet dressing, severe pain or tingling in toes.  Applied Unna Boot dressing below the knee to left lower leg.    Unna Boot(s) were applied per  Guidelines.     Electronically signed by Kelli Aj RN on 4/10/2024 at 9:52 AM

## 2024-04-10 NOTE — PROGRESS NOTES
[facility]   Progress Note and Procedure Note      Vonda Lutz  MEDICAL RECORD NUMBER:  [id number]  AGE: 53 y.o.   GENDER: female  : 1970  EPISODE DATE:  4/10/2024    Subjective:     Chief Complaint   Patient presents with    Wound Check     Patient presents today for recheck left leg and foot wounds.         HISTORY of PRESENT ILLNESS HPI     Vonda Lutz is a 53 y.o. female who presents today for wound/ulcer evaluation.   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 cancer    Osteoporosis Mother     Kidney Disease Mother     Diabetes Mother     Prostate Cancer Father     Diabetes Maternal Grandmother     Alcohol Abuse Maternal Grandfather     No Known Problems Paternal Grandmother     No Known Problems Paternal Grandfather     Breast Cancer Other         three aunts, some cousing       SOCIAL

## 2024-04-10 NOTE — PATIENT INSTRUCTIONS
[facility] Wound Care and Hyperbaric Oxygen Therapy   Physician Orders and Discharge Instructions  [address] [place] [address]  [address]  [place] [address]  Telephone: [phone]      FAX [phone]    NAME:  Vonda Lutz  YOB: 1970  MEDICAL RECORD NUMBER:  [id number]  DATE:  4/10/2024    Discharge condition: Stable    Discharge to: Home    Left via:Private automobile    Accompanied by: Self    ECF/HHA: Halo-compression stockings    Dressing Orders: Left 2nd Toe  Xeroform to open area, cover with dry gauze and medipore tape, change daily    Dressing Orders: Left Lower Ext Ulcers  Xeroform to open areas  Coflex Unnaboot, Keep clean and Dry  Elevate feet to level or above heart 3-4 times daily and as needed to for 30 minutes to reduce swelling  Remove wraps and call office if- Wraps get wet, Cause increased pain, Slide down or you are unable to make  your next scheduled appointment  No Smoking  Multi Vitamin, High Protein diet as tolerated  Call Dr Figueroa today to speak with [facility] follow up visit ___________1 week__________________  (Please note your next appointment above and if you are unable to keep, kindly give a 24 hour notice. Thank you.)    If you experience any of the following, please call the Wound Care Center during business hours:    * Increase in Pain  * Temperature over 101  * Increase in drainage from your wound  * Drainage with a foul odor  * Bleeding  * Increase in swelling  * Need for compression bandage changes due to slippage, breakthrough drainage.    If you need medical attention outside of the business hours of the Wound Care Centers please contact your PCP or go to the nearest emergency room.

## 2024-04-11 ENCOUNTER — TELEPHONE (OUTPATIENT)
Dept: VASCULAR SURGERY | Age: 54
End: 2024-04-11

## 2024-04-11 NOTE — TELEPHONE ENCOUNTER
I called and spoke with the patient in regards to surgery instructions below. Patient verbalized understanding.     Vonda Lutz     Arteriogram Instructions     Report to the [facility] at [facility] (go in the front door and to the left) on Tuesday, 04/30/2024 at 9:30 AM .  Nothing to eat or drink after midnight the night before the procedure.  Please take all medications as normally scheduled to take unless listed below with a sip of water  If you have sleep apnea and require C-PAP, please bring this with you to the hospital.  Bring a list of all of your allergies and medications with you to the hospital.  Please let our nurse know if you have had an allergy to iodine, shellfish, or x-ray dye.  Let the nurse know if you take any of the following:  Over the counter herbal supplements  Diclofenec, indomethacin, ketoprofen, Caridopa/levadopa, naproxen, sulindac, piroxicam, glucosamine, Chondrotin, cocchine, or methotrexate.  Plan to stay at the hospital for 4 - 6 hours before being released  by the physician. You will need someone to drive you home after the procedure.  Please stop at your local [employer] or pharmacy and buy a bottle of Hibiclens. Wash thoroughly with this the night before and the morning of the procedure, paying special attention to the area that will be operated on.  Make sure you rinse very well. The Hibiclens should only be used prior to surgery.  14. Other Directions: For any questions or concerns, feel free to call [phone] and ask to speak to Dejah.   15.  You will need a  to take you home  16.  Follow up appt: Wednesday, 05/22/2024 @ 9:45 AM with Carissa.      Unless instructed otherwise by your physician, cleanse incision/puncture site twice daily with soap and water.  Apply dry gauze. Do not get in tub.  Okay to shower.  Do not apply any salve, cream, peroxide or alcohol to the incision.  Call with any increasing redness or drainage

## 2024-04-16 RX ORDER — ASPIRIN 81 MG/1
81 TABLET ORAL ONCE
OUTPATIENT
Start: 2024-04-16 | End: 2024-04-16

## 2024-04-16 RX ORDER — CLONIDINE HYDROCHLORIDE 0.1 MG/1
0.1 TABLET ORAL PRN
OUTPATIENT
Start: 2024-04-16

## 2024-04-16 RX ORDER — SODIUM CHLORIDE 9 MG/ML
INJECTION, SOLUTION INTRAVENOUS CONTINUOUS
OUTPATIENT
Start: 2024-04-16

## 2024-04-16 RX ORDER — SODIUM CHLORIDE 0.9 % (FLUSH) 0.9 %
5-40 SYRINGE (ML) INJECTION PRN
OUTPATIENT
Start: 2024-04-16

## 2024-04-16 RX ORDER — SODIUM CHLORIDE 9 MG/ML
INJECTION, SOLUTION INTRAVENOUS PRN
OUTPATIENT
Start: 2024-04-16

## 2024-04-16 RX ORDER — SODIUM CHLORIDE 0.9 % (FLUSH) 0.9 %
5-40 SYRINGE (ML) INJECTION EVERY 12 HOURS SCHEDULED
OUTPATIENT
Start: 2024-04-16

## 2024-04-16 NOTE — H&P
Vonda Lutz (:  1970) is a 53 y.o. female,New patient, here for evaluation of the following chief complaint(s):  New Patient (New patient left lower extremity ulcer. )            SUBJECTIVE/OBJECTIVE:  Vonda has a history of peripheral vascular disease of the lower extremities.  She has had this for < 1 year. Current treatment includes none.  Vonda has new wounds. Recently, she reports claudication at a distance of   feet.  Vonda reports that the left leg is more significant than the right.  She reports claudication is worsened and is mostly in the form of crampy type pain starting in the hips, thighs, and calves. She has a short recovery time. This is reproducible in nature. She reports ischemic rest pain 0 times per night.  She reports walking with cart does not help.  Has had wound since may or .  Admits to scratching herself.  Took herself off statins.  Smokes.      I have personally reviewed the following: problem list, current meds, allergies, PMH, PSH, family hx, and social hx  Vonda Lutz is a 53 y.o. female with the following history as recorded in [facility]:  Patient Active Problem List    Diagnosis Date Noted    Chronic ulcer of left foot, with fat layer exposed ([facility]) 2024    Chronic ulcer of left leg, with fat layer exposed ([facility]) 2024    Lymphedema 2024    PVD (peripheral vascular disease) ([facility]) 2024     Current Outpatient Medications   Medication Sig Dispense Refill    potassium chloride (K-TAB) 20 MEQ TBCR extended release tablet Take 1 tablet by mouth twice daily 60 tablet 5    ibuprofen (ADVIL;MOTRIN) 600 MG tablet Take 1 tablet by mouth every 6 hours as needed for Pain      Calcium Carbonate-Vitamin D 600-5 MG-MCG CAPS Take by mouth daily      citalopram (CELEXA) 40 MG tablet Take 1 tablet by mouth once daily 30 tablet 5    brimonidine-timolol (COMBIGAN) 0.2-0.5 % ophthalmic solution Place 1 drop into both eyes daily      sulfacetamide-prednisoLONE

## 2024-04-16 NOTE — H&P (VIEW-ONLY)
syncope.  No  significant leg swelling.  No claudication.  Musculoskeletal - no gait disturbance  Skin -has new wound.  Neurologic -  No speech difficulty or lateralizing weakness.  All other review of systems are negative.    Physical Exam    /83 (Site: Left Upper Arm, Position: Sitting, Cuff Size: Medium Adult)   Pulse 89   Temp 97.5 °F (36.4 °C)   SpO2 99%       Neck- carotid pulses 2+ to palpation with no bruit  Cardiovascular - Regular rate and rhythm.    Pulmonary - effort appears normal.  No respiratory distress.    Lungs - Breath sounds normal. No wheezes or rales.    Extremities -  Radial and brachial pulses are 2+ to palpation bilaterally.  Right femoral pulse: present 2+; Right popliteal pulse: absent Right DP: absent; Right PT absent; Left femoral pulse: present 2+; Left popliteal pulse: absent; Left DP: absent; Left PT: absent No cyanosis, clubbing, or significant edema.  No signs atheroembolic event.  Neurologic - alert and oriented X 3.  Physiologic.   Face symmetric.  Skin - warm, dry, and intact.  has  wound left shin and toes  Psychiatric - mood, affect, and behavior appear normal.  Judgment and thought processes appear normal.    Risk factors for atherosclerosis of all vascular beds have been reviewed with the patient including:  Family history, tobacco abuse in all forms, elevated cholesterol, hyperlipidemia, and diabetes.    Lower extremity arterial study: Right DAPHNE 1.07, Left DAPHNE 1.12  Toe pressures 0.3 and 0.62.  Individual films reviewed: Yes.  These results were reviewed with the patient.  Disease process is undiagnosed new problem with uncertain prognosis            Reviewed on this visit: speciality care notes from [facility]    Options have been discussed with the patient including continued medical management vs. 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

## 2024-04-17 ENCOUNTER — HOSPITAL ENCOUNTER (OUTPATIENT)
Dept: WOUND CARE | Age: 54
Discharge: HOME | End: 2024-04-17
Payer: MEDICAID

## 2024-04-17 VITALS
HEIGHT: 59 IN | BODY MASS INDEX: 17.74 KG/M2 | SYSTOLIC BLOOD PRESSURE: 117 MMHG | TEMPERATURE: 98.4 F | DIASTOLIC BLOOD PRESSURE: 75 MMHG | HEART RATE: 103 BPM | RESPIRATION RATE: 18 BRPM | WEIGHT: 88 LBS

## 2024-04-17 DIAGNOSIS — L97.922 CHRONIC ULCER OF LEFT LEG, WITH FAT LAYER EXPOSED (HCC): ICD-10-CM

## 2024-04-17 DIAGNOSIS — I73.9 PVD (PERIPHERAL VASCULAR DISEASE) (HCC): ICD-10-CM

## 2024-04-17 DIAGNOSIS — L97.522 CHRONIC ULCER OF LEFT FOOT, WITH FAT LAYER EXPOSED (HCC): Primary | ICD-10-CM

## 2024-04-17 PROCEDURE — 97597 DBRDMT OPN WND 1ST 20 CM/<: CPT | Performed by: SURGERY

## 2024-04-17 PROCEDURE — 97597 DBRDMT OPN WND 1ST 20 CM/<: CPT

## 2024-04-17 RX ORDER — CLOBETASOL PROPIONATE 0.5 MG/G
OINTMENT TOPICAL ONCE
OUTPATIENT
Start: 2024-04-17 | End: 2024-04-17

## 2024-04-17 RX ORDER — BETAMETHASONE DIPROPIONATE 0.5 MG/G
CREAM TOPICAL ONCE
OUTPATIENT
Start: 2024-04-17 | End: 2024-04-17

## 2024-04-17 RX ORDER — LIDOCAINE HYDROCHLORIDE 20 MG/ML
JELLY TOPICAL ONCE
OUTPATIENT
Start: 2024-04-17 | End: 2024-04-17

## 2024-04-17 RX ORDER — GENTAMICIN SULFATE 1 MG/G
OINTMENT TOPICAL ONCE
OUTPATIENT
Start: 2024-04-17 | End: 2024-04-17

## 2024-04-17 RX ORDER — TRIAMCINOLONE ACETONIDE 1 MG/G
OINTMENT TOPICAL ONCE
OUTPATIENT
Start: 2024-04-17 | End: 2024-04-17

## 2024-04-17 RX ORDER — GINSENG 100 MG
CAPSULE ORAL ONCE
OUTPATIENT
Start: 2024-04-17 | End: 2024-04-17

## 2024-04-17 RX ORDER — LIDOCAINE 50 MG/G
OINTMENT TOPICAL ONCE
OUTPATIENT
Start: 2024-04-17 | End: 2024-04-17

## 2024-04-17 RX ORDER — BACITRACIN ZINC AND POLYMYXIN B SULFATE 500; 1000 [USP'U]/G; [USP'U]/G
OINTMENT TOPICAL ONCE
OUTPATIENT
Start: 2024-04-17 | End: 2024-04-17

## 2024-04-17 RX ORDER — IBUPROFEN 200 MG
TABLET ORAL ONCE
OUTPATIENT
Start: 2024-04-17 | End: 2024-04-17

## 2024-04-17 RX ORDER — SODIUM CHLOR/HYPOCHLOROUS ACID 0.033 %
SOLUTION, IRRIGATION IRRIGATION ONCE
OUTPATIENT
Start: 2024-04-17 | End: 2024-04-17

## 2024-04-17 RX ORDER — LIDOCAINE 40 MG/G
CREAM TOPICAL ONCE
OUTPATIENT
Start: 2024-04-17 | End: 2024-04-17

## 2024-04-17 RX ORDER — LIDOCAINE HYDROCHLORIDE 40 MG/ML
SOLUTION TOPICAL ONCE
OUTPATIENT
Start: 2024-04-17 | End: 2024-04-17

## 2024-04-17 RX ORDER — LIDOCAINE HYDROCHLORIDE 20 MG/ML
JELLY TOPICAL ONCE
Status: COMPLETED | OUTPATIENT
Start: 2024-04-17 | End: 2024-04-17

## 2024-04-17 RX ADMIN — LIDOCAINE HYDROCHLORIDE: 20 JELLY TOPICAL at 09:49

## 2024-04-17 ASSESSMENT — PAIN - FUNCTIONAL ASSESSMENT: PAIN_FUNCTIONAL_ASSESSMENT: PREVENTS OR INTERFERES SOME ACTIVE ACTIVITIES AND ADLS

## 2024-04-17 ASSESSMENT — PAIN DESCRIPTION - PAIN TYPE: TYPE: CHRONIC PAIN

## 2024-04-17 ASSESSMENT — PAIN DESCRIPTION - FREQUENCY: FREQUENCY: INTERMITTENT

## 2024-04-17 ASSESSMENT — PAIN DESCRIPTION - LOCATION: LOCATION: NECK;BACK

## 2024-04-17 ASSESSMENT — PAIN DESCRIPTION - DESCRIPTORS: DESCRIPTORS: ACHING;BURNING;SORE;TENDER

## 2024-04-17 ASSESSMENT — PAIN DESCRIPTION - ONSET: ONSET: ON-GOING

## 2024-04-17 NOTE — PATIENT INSTRUCTIONS
[facility] Wound Care and Hyperbaric Oxygen Therapy   Physician Orders and Discharge Instructions  [address] [place] [address]  [address]  [place] [address]  Telephone: [phone]      FAX [phone]    NAME:  Vonda Lutz  YOB: 1970  MEDICAL RECORD NUMBER:  [id number]  DATE:  4/17/2024    Discharge condition: Stable    Discharge to: Home    Left via:Private automobile    Accompanied by: Self    ECF/HHA: Halo    Dressing Orders: Left 2nd Toe  Xeroform to open area, cover with dry gauze and medipore tape, change daily  Wear open toe shoes   Try to stop smoking     Dressing Orders: Left Lower Ext Ulcers  Xeroform to open areas  Coflex Unnaboot, Keep clean and Dry  Elevate feet to level or above heart 3-4 times daily and as needed to for 30 minutes to reduce swelling  Remove wraps and call office if- Wraps get wet, Cause increased pain, Slide down or you are unable to make  your next scheduled appointment  No Smoking  Multi Vitamin, High Protein diet as tolerated  Call Dr Figueroa today to speak with [facility] follow up visit _____________1 week________________  (Please note your next appointment above and if you are unable to keep, kindly give a 24 hour notice. Thank you.)    If you experience any of the following, please call the Wound Care Center during business hours:    * Increase in Pain  * Temperature over 101  * Increase in drainage from your wound  * Drainage with a foul odor  * Bleeding  * Increase in swelling  * Need for compression bandage changes due to slippage, breakthrough drainage.    If you need medical attention outside of the business hours of the Wound Care Centers please contact your PCP or go to the nearest emergency room.

## 2024-04-17 NOTE — PROGRESS NOTES
04/17/24 0949   Dressing Status Old drainage noted 04/17/24 0949   Wound Cleansed Soap and water 04/17/24 0949   Dressing/Treatment Xeroform;Gauze dressing/dressing sponge;Tape/Soft cloth adhesive tape 04/10/24 0951   Wound Length (cm) 0.4 cm 04/17/24 0949   Wound Width (cm) 0.4 cm 04/17/24 0949   Wound Depth (cm) 0.1 cm 04/17/24 0949   Wound Surface Area (cm^2) 0.16 cm^2 04/17/24 0949   Change in Wound Size % (l*w) 77.14 04/17/24 0949   Wound Volume (cm^3) 0.016 cm^3 04/17/24 0949   Wound Healing % 77 04/17/24 0949   Post-Procedure Length (cm) 0.2 cm 04/10/24 0929   Post-Procedure Width (cm) 0.2 cm 04/10/24 0929   Post-Procedure Depth (cm) 0.1 cm 04/10/24 0929   Post-Procedure Surface Area (cm^2) 0.04 cm^2 04/10/24 0929   Post-Procedure Volume (cm^3) 0.004 cm^3 04/10/24 0929   Distance Tunneling (cm) 0 cm 04/17/24 0949   Tunneling Position ___ O'Clock 0 04/17/24 0949   Undermining Starts ___ O'Clock 0 04/17/24 0949   Undermining Ends___ O'Clock 0 04/17/24 0949   Undermining Maxium Distance (cm) 0 04/17/24 0949   Wound Assessment Eschar dry 04/17/24 0949   Drainage Amount Scant (moist but unmeasurable) 04/17/24 0949   Drainage Description Serosanguinous 04/17/24 0949   Odor None 04/17/24 0949   Anjali-wound Assessment Intact 04/17/24 0949   Margins Attached edges 04/17/24 0949   Wound Thickness Description not for Pressure Injury Full thickness 04/17/24 0949   Number of days: 28             Estimated Blood Loss:  Minimal    Hemostasis Achieved:  by pressure    Procedural Pain:  0  / 10     Post Procedural Pain:  0 / 10     Response to treatment:  Well tolerated by patient.         Plan:     Problem List Items Addressed This Visit       * (Principal) Chronic ulcer of left leg, with fat layer exposed (HCC) (Chronic)    Relevant Orders    Initiate Outpatient Wound Care Protocol    Chronic ulcer of left foot, with fat layer exposed (HCC) - Primary    Relevant Orders    Initiate Outpatient Wound Care Protocol    PVD

## 2024-04-17 NOTE — PLAN OF CARE
Unna Boot Application   Below Knee    NAME:  Vonda Lutz  YOB: 1970  MEDICAL RECORD NUMBER:  [id number]  DATE:  4/17/2024    Unna boot: Applied moisturizing agent to dry skin as needed.   Appied primary and secondary dressing as ordered.  Applied Unna roll from toes to knee overlapping each time.   Applied ace wrap or coban from toes to below the knee.   Instructed patient/caregiver to keep dressing dry and intact. DO NOT REMOVE DRESSING.   Instructed pt/family/caregiver to report excessive draining, loose bandage, wet dressing, severe pain or tingling in toes.  Applied Unna Boot dressing below the knee to left lower leg.    Unna Boot(s) were applied per  Guidelines.     Electronically signed by Deya Saul RN on 4/17/2024 at 1:18 PM

## 2024-04-24 ENCOUNTER — TELEPHONE (OUTPATIENT)
Dept: HEMATOLOGY | Age: 54
End: 2024-04-24

## 2024-04-24 ENCOUNTER — HOSPITAL ENCOUNTER (OUTPATIENT)
Dept: WOUND CARE | Age: 54
Discharge: HOME | End: 2024-04-24
Payer: MEDICAID

## 2024-04-24 VITALS
HEIGHT: 59 IN | RESPIRATION RATE: 18 BRPM | TEMPERATURE: 98.5 F | SYSTOLIC BLOOD PRESSURE: 112 MMHG | HEART RATE: 103 BPM | WEIGHT: 88 LBS | DIASTOLIC BLOOD PRESSURE: 75 MMHG | BODY MASS INDEX: 17.74 KG/M2

## 2024-04-24 DIAGNOSIS — L97.522 CHRONIC ULCER OF LEFT FOOT, WITH FAT LAYER EXPOSED (HCC): Primary | ICD-10-CM

## 2024-04-24 DIAGNOSIS — I70.213 ATHEROSCLER OF NATIVE ARTERY OF BOTH LEGS WITH INTERMIT CLAUDICATION (HCC): ICD-10-CM

## 2024-04-24 DIAGNOSIS — I73.9 PVD (PERIPHERAL VASCULAR DISEASE) (HCC): ICD-10-CM

## 2024-04-24 DIAGNOSIS — L97.922 CHRONIC ULCER OF LEFT LEG, WITH FAT LAYER EXPOSED (HCC): ICD-10-CM

## 2024-04-24 LAB
CHOLEST SERPL-MCNC: 146 MG/DL (ref 160–199)
HDLC SERPL-MCNC: 62 MG/DL (ref 65–121)
LDLC SERPL CALC-MCNC: 71 MG/DL
TRIGL SERPL-MCNC: 65 MG/DL (ref 0–149)

## 2024-04-24 PROCEDURE — 97597 DBRDMT OPN WND 1ST 20 CM/<: CPT | Performed by: SURGERY

## 2024-04-24 PROCEDURE — 97597 DBRDMT OPN WND 1ST 20 CM/<: CPT

## 2024-04-24 RX ORDER — LIDOCAINE HYDROCHLORIDE 40 MG/ML
SOLUTION TOPICAL ONCE
OUTPATIENT
Start: 2024-04-24 | End: 2024-04-24

## 2024-04-24 RX ORDER — IBUPROFEN 200 MG
TABLET ORAL ONCE
OUTPATIENT
Start: 2024-04-24 | End: 2024-04-24

## 2024-04-24 RX ORDER — GENTAMICIN SULFATE 1 MG/G
OINTMENT TOPICAL ONCE
OUTPATIENT
Start: 2024-04-24 | End: 2024-04-24

## 2024-04-24 RX ORDER — BACITRACIN ZINC AND POLYMYXIN B SULFATE 500; 1000 [USP'U]/G; [USP'U]/G
OINTMENT TOPICAL ONCE
OUTPATIENT
Start: 2024-04-24 | End: 2024-04-24

## 2024-04-24 RX ORDER — TRIAMCINOLONE ACETONIDE 1 MG/G
OINTMENT TOPICAL ONCE
OUTPATIENT
Start: 2024-04-24 | End: 2024-04-24

## 2024-04-24 RX ORDER — CLOBETASOL PROPIONATE 0.5 MG/G
OINTMENT TOPICAL ONCE
OUTPATIENT
Start: 2024-04-24 | End: 2024-04-24

## 2024-04-24 RX ORDER — SODIUM CHLOR/HYPOCHLOROUS ACID 0.033 %
SOLUTION, IRRIGATION IRRIGATION ONCE
OUTPATIENT
Start: 2024-04-24 | End: 2024-04-24

## 2024-04-24 RX ORDER — GINSENG 100 MG
CAPSULE ORAL ONCE
OUTPATIENT
Start: 2024-04-24 | End: 2024-04-24

## 2024-04-24 RX ORDER — LIDOCAINE HYDROCHLORIDE 20 MG/ML
JELLY TOPICAL ONCE
OUTPATIENT
Start: 2024-04-24 | End: 2024-04-24

## 2024-04-24 RX ORDER — BETAMETHASONE DIPROPIONATE 0.5 MG/G
CREAM TOPICAL ONCE
OUTPATIENT
Start: 2024-04-24 | End: 2024-04-24

## 2024-04-24 RX ORDER — LIDOCAINE 50 MG/G
OINTMENT TOPICAL ONCE
OUTPATIENT
Start: 2024-04-24 | End: 2024-04-24

## 2024-04-24 RX ORDER — LIDOCAINE 40 MG/G
CREAM TOPICAL ONCE
OUTPATIENT
Start: 2024-04-24 | End: 2024-04-24

## 2024-04-24 RX ORDER — LIDOCAINE HYDROCHLORIDE 20 MG/ML
JELLY TOPICAL ONCE
Status: COMPLETED | OUTPATIENT
Start: 2024-04-24 | End: 2024-04-24

## 2024-04-24 RX ADMIN — LIDOCAINE HYDROCHLORIDE: 20 JELLY TOPICAL at 10:47

## 2024-04-24 ASSESSMENT — PAIN DESCRIPTION - ONSET: ONSET: ON-GOING

## 2024-04-24 ASSESSMENT — PAIN DESCRIPTION - FREQUENCY: FREQUENCY: INTERMITTENT

## 2024-04-24 ASSESSMENT — PAIN DESCRIPTION - PAIN TYPE: TYPE: CHRONIC PAIN

## 2024-04-24 ASSESSMENT — PAIN DESCRIPTION - DESCRIPTORS: DESCRIPTORS: ACHING;BURNING;SORE;TENDER

## 2024-04-24 ASSESSMENT — PAIN SCALES - GENERAL: PAINLEVEL_OUTOF10: 2

## 2024-04-24 ASSESSMENT — PAIN DESCRIPTION - LOCATION: LOCATION: BACK

## 2024-04-24 ASSESSMENT — PAIN DESCRIPTION - ORIENTATION: ORIENTATION: LOWER

## 2024-04-24 ASSESSMENT — PAIN - FUNCTIONAL ASSESSMENT: PAIN_FUNCTIONAL_ASSESSMENT: PREVENTS OR INTERFERES SOME ACTIVE ACTIVITIES AND ADLS

## 2024-04-24 NOTE — PROGRESS NOTES
[facility]   Progress Note and Procedure Note      Vonda Lutz  MEDICAL RECORD NUMBER:  [id number]  AGE: 53 y.o.   GENDER: female  : 1970  EPISODE DATE:  2024    Subjective:     Chief Complaint   Patient presents with    Wound Check     Pt presents for recheck of LLE wound         HISTORY of PRESENT ILLNESS HPI     Vonda Lutz is a 53 y.o. female who presents today for wound/ulcer evaluation.   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 cancer    Osteoporosis Mother     Kidney Disease Mother     Diabetes Mother     Prostate Cancer Father     Diabetes Maternal Grandmother     Alcohol Abuse Maternal Grandfather     No Known Problems Paternal Grandmother     No Known Problems Paternal Grandfather     Breast Cancer Other         three aunts, some cousing       SOCIAL HISTORY    Social History     Tobacco Use    Smoking status: Every Day

## 2024-04-24 NOTE — TELEPHONE ENCOUNTER
Spoke with Ms. Lutz related to needing further evaluation with a bone marrow aspiration biopsy to evaluate for systemic mastocytosis.    She was in agreement although would like to have this completed at [facility] under sedation.    She is scheduled for vascular procedure on 4/30/2024, requested that we hold doing the bone marrow aspiration to she has some recovery time, agree will anticipate having bone marrow completed about the third week in May 2024    I answered all her questions the best my ability and she verbalized understanding of recommendations.

## 2024-04-24 NOTE — PATIENT INSTRUCTIONS
[facility] Wound Care and Hyperbaric Oxygen Therapy   Physician Orders and Discharge Instructions  [address] [place] [address] Drive  [address]  [place] [address]  Telephone: [phone]      FAX [phone]    NAME:  Vonda Lutz  YOB: 1970  MEDICAL RECORD NUMBER:  [id number]  DATE:  4/24/2024    Discharge condition: Stable    Discharge to: Home    Left via:Private automobile    Accompanied by: Self    ECF/HHA: Halo    Dressing Orders: Left 2nd Toe  Healed, Moisturize and Protect     Dressing Orders: Left Lower Ext Ulcers  Xeroform to open areas  Coflex Unnaboot, Keep clean and Dry  Elevate feet to level or above heart 3-4 times daily and as needed to for 30 minutes to reduce swelling  Remove wraps and call office if- Wraps get wet, Cause increased pain, Slide down or you are unable to make  your next scheduled appointment  No Smoking  Multi Vitamin, High Protein diet as tolerated  Remove wrap on the 29 and apply Mepilex Border over wound   Follow up with Vascular on the 30th    [facility] follow up visit __________1 week___________________  (Please note your next appointment above and if you are unable to keep, kindly give a 24 hour notice. Thank you.)    If you experience any of the following, please call the Wound Care Center during business hours:    * Increase in Pain  * Temperature over 101  * Increase in drainage from your wound  * Drainage with a foul odor  * Bleeding  * Increase in swelling  * Need for compression bandage changes due to slippage, breakthrough drainage.    If you need medical attention outside of the business hours of the Wound Care Centers please contact your PCP or go to the nearest emergency room.

## 2024-04-24 NOTE — PLAN OF CARE
Unna Boot Application   Below Knee    NAME:  Vonda Lutz  YOB: 1970  MEDICAL RECORD NUMBER:  [id number]  DATE:  4/24/2024    Unna boot: Applied moisturizing agent to dry skin as needed.   Appied primary and secondary dressing as ordered.  Applied Unna roll from toes to knee overlapping each time.   Applied ace wrap or coban from toes to below the knee.   Instructed patient/caregiver to keep dressing dry and intact. DO NOT REMOVE DRESSING.   Instructed pt/family/caregiver to report excessive draining, loose bandage, wet dressing, severe pain or tingling in toes.  Applied Unna Boot dressing below the knee to left lower leg.      Patient requested dressing to be placed to left 2nd toe, she was afraid it wound rub with stocking and her shoe, informed Solitario FANG and Dr. Pedroza that dressing was placed.    Unna Boot(s) were applied per  Guidelines.     Electronically signed by Yary Rodas RN on 4/24/2024 at 11:29 AM

## 2024-04-25 ENCOUNTER — TELEPHONE (OUTPATIENT)
Dept: HEMATOLOGY | Age: 54
End: 2024-04-25

## 2024-04-25 ENCOUNTER — TELEPHONE (OUTPATIENT)
Dept: VASCULAR SURGERY | Age: 54
End: 2024-04-25

## 2024-04-25 DIAGNOSIS — Z86.39 HISTORY OF HYPERLIPIDEMIA: Primary | ICD-10-CM

## 2024-04-25 RX ORDER — ATORVASTATIN CALCIUM 10 MG/1
10 TABLET, FILM COATED ORAL DAILY
Qty: 30 TABLET | Refills: 3 | Status: SHIPPED | OUTPATIENT
Start: 2024-04-25

## 2024-04-25 NOTE — TELEPHONE ENCOUNTER
Notified of bone marrow biopsy in [facility] on Monday, April 29 at 3:00 with 2:00 arrival. Nothing to eat or drink after midnight and she will need a .

## 2024-04-25 NOTE — TELEPHONE ENCOUNTER
Called and spoke to the patient and she has agreed to begin the Lipitor.  We will call this in to [facility] on [address].  The patient will get her repeat labs at the end of July.  The patient voiced understanding.           ----- Message from URSZULA Camara sent at 4/25/2024  7:45 AM CDT -----  Please let her know I reviewed her labs.  I would simply recommend a low dose statin.  Lipitor 10.  If she is willing to take it call it in.  No grapefruit.  Call with any new aches and pains.  Needs 3 month fasting lipid.  2 refills

## 2024-04-29 ENCOUNTER — HOSPITAL ENCOUNTER (OUTPATIENT)
Age: 54
Setting detail: OUTPATIENT SURGERY
Discharge: HOME | End: 2024-04-29
Attending: INTERNAL MEDICINE | Admitting: INTERNAL MEDICINE
Payer: MEDICAID

## 2024-04-29 ENCOUNTER — ANESTHESIA EVENT (OUTPATIENT)
Dept: OPERATING ROOM | Age: 54
End: 2024-04-29

## 2024-04-29 ENCOUNTER — ANESTHESIA (OUTPATIENT)
Dept: OPERATING ROOM | Age: 54
End: 2024-04-29

## 2024-04-29 VITALS
DIASTOLIC BLOOD PRESSURE: 74 MMHG | TEMPERATURE: 98.6 F | RESPIRATION RATE: 16 BRPM | OXYGEN SATURATION: 97 % | HEART RATE: 105 BPM | HEIGHT: 59 IN | BODY MASS INDEX: 17.74 KG/M2 | SYSTOLIC BLOOD PRESSURE: 112 MMHG | WEIGHT: 88 LBS

## 2024-04-29 PROCEDURE — 38222 DX BONE MARROW BX & ASPIR: CPT

## 2024-04-29 PROCEDURE — 38222 DX BONE MARROW BX & ASPIR: CPT | Performed by: NURSE PRACTITIONER

## 2024-04-29 RX ORDER — LIDOCAINE HYDROCHLORIDE 10 MG/ML
INJECTION, SOLUTION INFILTRATION; PERINEURAL PRN
Status: DISCONTINUED | OUTPATIENT
Start: 2024-04-29 | End: 2024-04-29 | Stop reason: ALTCHOICE

## 2024-04-29 RX ORDER — LIDOCAINE HYDROCHLORIDE 10 MG/ML
INJECTION, SOLUTION INFILTRATION; PERINEURAL PRN
Status: DISCONTINUED | OUTPATIENT
Start: 2024-04-29 | End: 2024-04-29 | Stop reason: SDUPTHER

## 2024-04-29 RX ORDER — SODIUM CHLORIDE, SODIUM LACTATE, POTASSIUM CHLORIDE, CALCIUM CHLORIDE 600; 310; 30; 20 MG/100ML; MG/100ML; MG/100ML; MG/100ML
INJECTION, SOLUTION INTRAVENOUS CONTINUOUS PRN
Status: DISCONTINUED | OUTPATIENT
Start: 2024-04-29 | End: 2024-04-29 | Stop reason: SDUPTHER

## 2024-04-29 RX ORDER — PROPOFOL 10 MG/ML
INJECTION, EMULSION INTRAVENOUS PRN
Status: DISCONTINUED | OUTPATIENT
Start: 2024-04-29 | End: 2024-04-29 | Stop reason: SDUPTHER

## 2024-04-29 RX ORDER — SODIUM CHLORIDE, SODIUM LACTATE, POTASSIUM CHLORIDE, CALCIUM CHLORIDE 600; 310; 30; 20 MG/100ML; MG/100ML; MG/100ML; MG/100ML
INJECTION, SOLUTION INTRAVENOUS CONTINUOUS
Status: DISCONTINUED | OUTPATIENT
Start: 2024-04-29 | End: 2024-04-29 | Stop reason: HOSPADM

## 2024-04-29 RX ADMIN — SODIUM CHLORIDE, SODIUM LACTATE, POTASSIUM CHLORIDE, CALCIUM CHLORIDE: 600; 310; 30; 20 INJECTION, SOLUTION INTRAVENOUS at 14:41

## 2024-04-29 RX ADMIN — PROPOFOL 250 MG: 10 INJECTION, EMULSION INTRAVENOUS at 15:00

## 2024-04-29 RX ADMIN — LIDOCAINE HYDROCHLORIDE 40 MG: 10 INJECTION, SOLUTION INFILTRATION; PERINEURAL at 15:00

## 2024-04-29 RX ADMIN — SODIUM CHLORIDE, SODIUM LACTATE, POTASSIUM CHLORIDE, CALCIUM CHLORIDE: 600; 310; 30; 20 INJECTION, SOLUTION INTRAVENOUS at 14:54

## 2024-04-29 ASSESSMENT — PAIN - FUNCTIONAL ASSESSMENT
PAIN_FUNCTIONAL_ASSESSMENT: ACTIVITIES ARE NOT PREVENTED
PAIN_FUNCTIONAL_ASSESSMENT: 0-10
PAIN_FUNCTIONAL_ASSESSMENT: 0-10

## 2024-04-29 ASSESSMENT — LIFESTYLE VARIABLES: SMOKING_STATUS: 1

## 2024-04-29 ASSESSMENT — PAIN DESCRIPTION - DESCRIPTORS
DESCRIPTORS: ACHING;TENDER
DESCRIPTORS: ACHING

## 2024-04-29 NOTE — H&P
Luz Elena ESTEVES MD   ibuprofen (ADVIL;MOTRIN) 600 MG tablet Take 1 tablet by mouth every 6 hours as needed for Pain    Jass Tierney MD   Calcium Carbonate-Vitamin D 600-5 MG-MCG CAPS Take by mouth daily    Jass Tierney MD   citalopram (CELEXA) 40 MG tablet Take 1 tablet by mouth once daily 11/10/23   Luz Elena Fernandez MD   brimonidine-timolol (COMBIGAN) 0.2-0.5 % ophthalmic solution Place 1 drop into both eyes daily    Jass Tierney MD   sulfacetamide-prednisoLONE (BLEPHAMIDE) 10-0.2 % ophthalmic suspension Place 1 drop into both eyes 2 times daily    Jass Tierney MD   Magnesium 500 MG TABS Take 1 tablet by mouth in the morning and at bedtime    ProviderJass MD       Past Medical History:  Past Medical History:   Diagnosis Date    Closed fracture of left foot     Gallbladder anomaly     Hip fracture (HCC)     x2    Hyperlipidemia     Hypokalemia     Hypomagnesemia     Multiple fractures     multiple fractures over lifetime, 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    Osteoporosis     Patella fracture     Radial fracture     Shoulder fracture     x2       Past Surgical History:  Past Surgical History:   Procedure Laterality Date    BREAST ENHANCEMENT SURGERY Bilateral     CARPAL TUNNEL RELEASE Bilateral      SECTION      CHOLECYSTECTOMY      CORNEAL TRANSPLANT      HERNIA REPAIR      HIP SURGERY      history of osteoporosis;hip surgery x 2    MOUTH SURGERY      OTHER SURGICAL HISTORY      TIPS    TUBAL LIGATION         Social History:  Social History     Tobacco Use    Smoking status: Every Day     Current packs/day: 0.50     Average packs/day: 0.5 packs/day for 41.3 years (20.7 ttl pk-yrs)     Types: Cigarettes     Start date:      Passive exposure: Current    Smokeless tobacco: Never    Tobacco comments:     6-8 cigarettes a day   Vaping Use    Vaping Use: Never used   Substance Use Topics    Alcohol use: Yes     Comment: rarely    Drug use: Never       Vital Signs:

## 2024-04-29 NOTE — ANESTHESIA POSTPROCEDURE EVALUATION
Department of Anesthesiology  Postprocedure Note    Patient: Vonda Lutz  MRN: [id number]  YOB: 1970  Date of evaluation: 4/29/2024    Procedure Summary       Date: 04/29/24 Room / Location: [place] [address] / 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    Pillo Phase I:      Pillo Phase II:      Anesthesia Post Evaluation    Patient location during evaluation: bedside  Patient participation: complete - patient participated  Level of consciousness: sleepy but conscious  Pain score: 0  Airway patency: patent  Nausea & Vomiting: no nausea and no vomiting  Cardiovascular status: hemodynamically stable and blood pressure returned to baseline  Respiratory status: acceptable and nasal cannula  Hydration status: stable  Pain management: adequate    No notable events documented.

## 2024-04-29 NOTE — DISCHARGE SUMMARY
Discharge Summary    Vonda Lutz  :  1970  MRN:  [id number]    Admit date:  2024  Discharge date:  2024      Primary Care Physician:  Luz Elena Fernandez MD    Portions of this note have been copied forward, however, changed to reflect the most current clinical status of this patient.    Discharge Diagnoses: Mastocytosis    Significant Diagnostic procedure: Bone marrow aspiration and biopsy    Outpatient clinical course:  Bone marrow aspiration and biopsy was tolerated well, please see procedural note.      Physical Exam: Please see today's History and Physical note    Vitals: /74   Pulse (!) 105   Temp 98.6 °F (37 °C) (Temporal)   Resp 16   Ht 1.499 m (4' 11\")   Wt 39.9 kg (88 lb)   SpO2 97%   BMI 17.77 kg/m²     Discharge Medications:         Medication List        ASK your doctor about these medications      atorvastatin 10 MG tablet  Commonly known as: Lipitor  Take 1 tablet by mouth daily     brimonidine-timolol 0.2-0.5 % ophthalmic solution  Commonly known as: COMBIGAN     Calcium Carbonate-Vitamin D 600-5 MG-MCG Caps     citalopram 40 MG tablet  Commonly known as: CELEXA  Take 1 tablet by mouth once daily     FEXOFENADINE HCL PO     ibuprofen 600 MG tablet  Commonly known as: ADVIL;MOTRIN     Magnesium 500 MG Tabs     potassium chloride 20 MEQ Tbcr extended release tablet  Commonly known as: K-TAB  Take 1 tablet by mouth twice daily     Slow Release Iron 45 MG Tbcr  Generic drug: Ferrous Sulfate Dried ER  Take 1 tablet by mouth daily     sulfacetamide-prednisoLONE 10-0.2 % ophthalmic suspension  Commonly known as: BLEPHAMIDE              Discharge Instructions:   Follow up with Luz Elena Fernandez MDas recommended  Follow-up in clinic within 7-10 business days as scheduled to review biopsy results.  2024 At 2:15 PM    Take medications as directed.      Resume activity as tolerated.  No driving or lifting heavy machinery at least the next 12 hours.    Resume diet as

## 2024-04-29 NOTE — ANESTHESIA PRE PROCEDURE
Department of Anesthesiology  Preprocedure Note       Name:  Vonda Lutz   Age:  53 y.o.  :  1970                                          MRN:  [id number]         Date:  2024      Surgeon: Surgeon(s):  Juliana Rush APRN    Procedure: Procedure(s):  BONE MARROW ASPIRATION BIOPSY    Medications prior to admission:   Prior to Admission medications    Medication Sig Start Date End Date Taking? Authorizing Provider   atorvastatin (LIPITOR) 10 MG tablet Take 1 tablet by mouth daily 24   Carissa Xiong APRN   Ferrous Sulfate Dried ER (SLOW RELEASE IRON) 45 MG TBCR Take 1 tablet by mouth daily 24   Juliana Rush APRN   FEXOFENADINE HCL PO Take 1 tablet by mouth in the morning and 1 tablet in the evening.    Jass Tierney MD   potassium chloride (K-TAB) 20 MEQ TBCR extended release tablet Take 1 tablet by mouth twice daily 3/29/24   Luz Elena Fernandez MD   ibuprofen (ADVIL;MOTRIN) 600 MG tablet Take 1 tablet by mouth every 6 hours as needed for Pain    Jass Tierney MD   Calcium Carbonate-Vitamin D 600-5 MG-MCG CAPS Take by mouth daily    Jass Tierney MD   citalopram (CELEXA) 40 MG tablet Take 1 tablet by mouth once daily 11/10/23   Luz Elena Fernandez MD   brimonidine-timolol (COMBIGAN) 0.2-0.5 % ophthalmic solution Place 1 drop into both eyes daily    Jass Tierney MD   sulfacetamide-prednisoLONE (BLEPHAMIDE) 10-0.2 % ophthalmic suspension Place 1 drop into both eyes 2 times daily    Jass Tierney MD   Magnesium 500 MG TABS Take 1 tablet by mouth in the morning and at bedtime    Jass Tierney MD       Current medications:    No current facility-administered medications for this encounter.     Current Outpatient Medications   Medication Sig Dispense Refill   • atorvastatin (LIPITOR) 10 MG tablet Take 1 tablet by mouth daily 30 tablet 3   • Ferrous Sulfate Dried ER (SLOW RELEASE IRON) 45 MG TBCR Take 1 tablet by mouth daily 30 tablet 5   • FEXOFENADINE

## 2024-04-29 NOTE — PROCEDURES
Patient name: Vonda Lutz  Patient : 1970  [id number]  2024    Procedure Note: Bone Marrow Aspirate and Biopsy    Indication: Mastocytosis      SITE: Right Iliac crest   After informed consent was obtained the patient was placed in the Left lateral ducubitus position. A time out was performed indicating the procedure and the patient, and all was comfirmed by the nurse. The Right posterior iliac crest was located and prepped and draped in a sterile fashion Lidocaine 1% was injected for local anesthesia. An Jamshidi needle was placed and a bone marrow aspirate was obtained. The Jamshidi needle was placed in a different location and a core biopsy was obtained. Pressure was held until homeostasis was obtained. A sterile dressing was applied. The patient tolerated the procedure well with no apparent complications. Estimated blood loss was minimal. The specimen was sent to pathology for flow cytometry, cytogenetics, histology and FISH if indicated.       URSZULA Grijalva

## 2024-04-30 ENCOUNTER — HOSPITAL ENCOUNTER (OUTPATIENT)
Dept: INTERVENTIONAL RADIOLOGY/VASCULAR | Age: 54
Discharge: HOME | End: 2024-04-30
Payer: MEDICAID

## 2024-04-30 VITALS
BODY MASS INDEX: 17.74 KG/M2 | TEMPERATURE: 98.2 F | DIASTOLIC BLOOD PRESSURE: 80 MMHG | OXYGEN SATURATION: 94 % | WEIGHT: 88 LBS | SYSTOLIC BLOOD PRESSURE: 112 MMHG | HEART RATE: 86 BPM | RESPIRATION RATE: 17 BRPM | HEIGHT: 59 IN

## 2024-04-30 DIAGNOSIS — I73.9 PVD (PERIPHERAL VASCULAR DISEASE) (HCC): ICD-10-CM

## 2024-04-30 PROCEDURE — 6370000000 HC RX 637 (ALT 250 FOR IP): Performed by: NURSE PRACTITIONER

## 2024-04-30 PROCEDURE — 99153 MOD SED SAME PHYS/QHP EA: CPT

## 2024-04-30 PROCEDURE — 36200 PLACE CATHETER IN AORTA: CPT

## 2024-04-30 PROCEDURE — 2709999900 IR AORTAGRAM

## 2024-04-30 PROCEDURE — 6360000002 HC RX W HCPCS: Performed by: SURGERY

## 2024-04-30 PROCEDURE — 6360000002 HC RX W HCPCS: Performed by: NURSE PRACTITIONER

## 2024-04-30 PROCEDURE — 2580000003 HC RX 258: Performed by: NURSE PRACTITIONER

## 2024-04-30 PROCEDURE — 2500000003 HC RX 250 WO HCPCS: Performed by: SURGERY

## 2024-04-30 PROCEDURE — 99152 MOD SED SAME PHYS/QHP 5/>YRS: CPT

## 2024-04-30 PROCEDURE — 75625 CONTRAST EXAM ABDOMINL AORTA: CPT

## 2024-04-30 PROCEDURE — 75716 ARTERY X-RAYS ARMS/LEGS: CPT

## 2024-04-30 PROCEDURE — 6360000004 HC RX CONTRAST MEDICATION: Performed by: SURGERY

## 2024-04-30 RX ORDER — SODIUM CHLORIDE 9 MG/ML
INJECTION, SOLUTION INTRAVENOUS CONTINUOUS
Status: DISCONTINUED | OUTPATIENT
Start: 2024-04-30 | End: 2024-05-02 | Stop reason: HOSPADM

## 2024-04-30 RX ORDER — CLONIDINE HYDROCHLORIDE 0.1 MG/1
0.1 TABLET ORAL PRN
Status: DISCONTINUED | OUTPATIENT
Start: 2024-04-30 | End: 2024-05-02 | Stop reason: HOSPADM

## 2024-04-30 RX ORDER — ASPIRIN 81 MG/1
81 TABLET ORAL ONCE
Status: DISCONTINUED | OUTPATIENT
Start: 2024-04-30 | End: 2024-04-30 | Stop reason: SDUPTHER

## 2024-04-30 RX ORDER — SODIUM CHLORIDE 0.9 % (FLUSH) 0.9 %
5-40 SYRINGE (ML) INJECTION EVERY 12 HOURS SCHEDULED
Status: DISCONTINUED | OUTPATIENT
Start: 2024-04-30 | End: 2024-05-02 | Stop reason: HOSPADM

## 2024-04-30 RX ORDER — CLONIDINE HYDROCHLORIDE 0.1 MG/1
0.1 TABLET ORAL PRN
Status: DISCONTINUED | OUTPATIENT
Start: 2024-04-30 | End: 2024-04-30 | Stop reason: SDUPTHER

## 2024-04-30 RX ORDER — IODIXANOL 320 MG/ML
INJECTION, SOLUTION INTRAVASCULAR PRN
Status: COMPLETED | OUTPATIENT
Start: 2024-04-30 | End: 2024-04-30

## 2024-04-30 RX ORDER — MIDAZOLAM HYDROCHLORIDE 1 MG/ML
INJECTION INTRAMUSCULAR; INTRAVENOUS PRN
Status: COMPLETED | OUTPATIENT
Start: 2024-04-30 | End: 2024-04-30

## 2024-04-30 RX ORDER — HEPARIN SODIUM 5000 [USP'U]/ML
INJECTION, SOLUTION INTRAVENOUS; SUBCUTANEOUS PRN
Status: COMPLETED | OUTPATIENT
Start: 2024-04-30 | End: 2024-04-30

## 2024-04-30 RX ORDER — SODIUM CHLORIDE 9 MG/ML
INJECTION, SOLUTION INTRAVENOUS PRN
Status: DISCONTINUED | OUTPATIENT
Start: 2024-04-30 | End: 2024-05-02 | Stop reason: HOSPADM

## 2024-04-30 RX ORDER — SODIUM CHLORIDE 0.9 % (FLUSH) 0.9 %
5-40 SYRINGE (ML) INJECTION PRN
Status: DISCONTINUED | OUTPATIENT
Start: 2024-04-30 | End: 2024-05-02 | Stop reason: HOSPADM

## 2024-04-30 RX ORDER — LIDOCAINE HYDROCHLORIDE 20 MG/ML
INJECTION, SOLUTION EPIDURAL; INFILTRATION; INTRACAUDAL; PERINEURAL PRN
Status: COMPLETED | OUTPATIENT
Start: 2024-04-30 | End: 2024-04-30

## 2024-04-30 RX ORDER — ASPIRIN 81 MG/1
81 TABLET ORAL ONCE
Status: COMPLETED | OUTPATIENT
Start: 2024-04-30 | End: 2024-04-30

## 2024-04-30 RX ORDER — FENTANYL CITRATE 50 UG/ML
INJECTION, SOLUTION INTRAMUSCULAR; INTRAVENOUS PRN
Status: COMPLETED | OUTPATIENT
Start: 2024-04-30 | End: 2024-04-30

## 2024-04-30 RX ADMIN — SODIUM CHLORIDE: 9 INJECTION, SOLUTION INTRAVENOUS at 13:14

## 2024-04-30 RX ADMIN — FENTANYL CITRATE 25 MCG: 50 INJECTION, SOLUTION INTRAMUSCULAR; INTRAVENOUS at 14:56

## 2024-04-30 RX ADMIN — WATER 2000 MG: 1 INJECTION INTRAMUSCULAR; INTRAVENOUS; SUBCUTANEOUS at 14:52

## 2024-04-30 RX ADMIN — LIDOCAINE HYDROCHLORIDE 10 ML: 20 INJECTION, SOLUTION EPIDURAL; INFILTRATION; INTRACAUDAL; PERINEURAL at 14:54

## 2024-04-30 RX ADMIN — IODIXANOL 120 ML: 320 INJECTION, SOLUTION INTRAVASCULAR at 15:09

## 2024-04-30 RX ADMIN — MIDAZOLAM 1 MG: 1 INJECTION INTRAMUSCULAR; INTRAVENOUS at 14:56

## 2024-04-30 RX ADMIN — HEPARIN SODIUM 5000 UNITS: 5000 INJECTION INTRAVENOUS; SUBCUTANEOUS at 14:53

## 2024-04-30 RX ADMIN — ASPIRIN 81 MG: 81 TABLET, COATED ORAL at 13:24

## 2024-04-30 ASSESSMENT — PAIN SCALES - GENERAL: PAINLEVEL_OUTOF10: 6

## 2024-04-30 ASSESSMENT — PAIN DESCRIPTION - LOCATION: LOCATION: ARM;BACK

## 2024-04-30 NOTE — OP NOTE
Patient Name: Vonda Lutz   YOB: 1970   MRN: [id number]     Procedure Date: 4/30/2024     Pre Op Diagnosis: PAD with left leg wound    Post Op Diagnosis: same    Procedure: Aortagram with bilateral lower extremity runoff    Anesthesia: Local with Moderate Sedation      Estimated Blood Loss: Less than 50 ml    Complications: None    Implants: none    Procedure Details: Patient was brought to the angiogram suite and placed in supine position.  She received preoperative antibiotics.  Her bilateral groins were prepped and draped in sterile fashion.  Timeouts were performed.  Right common femoral artery was accessed under continuous ultrasound guidance using standard micropuncture technique.  5 [place] glide sheath was inserted.  J-wire was floated proximally followed by Omni Flush catheter.  Using power injection aortogram with bilateral lower extremity runoff were performed as mentioned below findings.  The devices were removed and the access site was sealed using 5 [place] Mynx.  Plan to discussed with patient repair of her aortic stenosis using Endologix stent graft.    Findings: Aorta with distal high-grade stenosis, bilateral renal arteries are patent, opacified SMA patent, bilateral common, internal and external iliac arteries are patent.  Bilateral common femoral arteries are patent, bilateral profunda femoris are patent, bilateral SFA arteries are patent, popliteal artery is patent, three-vessel runoff bilaterally    Disposition:aroused from sedation, and taken to the recovery room in a stable condition    Kimberly Figueroa DO  4/30/2024 3:15 PM

## 2024-04-30 NOTE — DISCHARGE INSTRUCTIONS
POST ANGIOGRAM/STENTING INSTRUCTIONS    1.  You will be on bedrest the day of your procedure, up around the house and to the bathroom only on the day after your procedure.  2.  You must be transported home by a responsible person after your procedure, you cannot drive yourself home.  3.  Do not drive for 48 hours after discharge home.  4.  Drink extra fluids for 24 hours after the procedure to help flush the contrast used (you will make more urine) .  5.  Check the puncture site after each activity for bleeding or swelling.    6.  If bleeding occurs, apply pressure to site and call 911 or rush to the nearest emergency room (do NOT drive yourself!).  7.  Resume normal non-strenuous activity 48 hours after discharge home.  8.  Bruising and soreness at the puncture site may occur, this will heal and clear after several weeks.    9.  Keep your leg (with puncture site) mostly straight while sitting or lying for the first 24 hours after your procedure.    10. No heavy lifting or straining (more than 10 pounds) for 48 hours after discharge.       11. Keep the bandage over the puncture site for 24 hours after discharge home. You may remove the bandage and shower after 24 hours, no hot tub or tub  baths.  12.  Once a day for the next 3 days, look at the puncture site, call physician if you notice:  * red and/or hot at the puncture site  * bloody drainage, foul-smelling, yellowish or greenish drainage from the puncture site  *a very large bruise under/arond the puncture site (often firm to touch)  *numbness, tingling in foot/leg/or loss of motion/sensation in foot or leg  *itching/hives on any part of your body; or nausea/vomiting after receiving the dye

## 2024-04-30 NOTE — INTERVAL H&P NOTE
Update History & Physical    The patient's History and Physical of April 16, 2024 was reviewed with the patient and I examined the patient. There was no change. The surgical site was confirmed by the patient and me.     Plan: The risks, benefits, expected outcome, and alternative to the recommended procedure have been discussed with the patient. Patient understands and wants to proceed with the procedure.   Moderate sedation  ASA III, Mallampati 3    Electronically signed by Kimberly Figueroa DO on 4/30/2024 at 2:48 PM

## 2024-05-01 ENCOUNTER — HOSPITAL ENCOUNTER (OUTPATIENT)
Dept: WOUND CARE | Age: 54
Discharge: HOME | End: 2024-05-01
Payer: MEDICAID

## 2024-05-01 VITALS
HEART RATE: 118 BPM | WEIGHT: 92.3 LBS | TEMPERATURE: 98.6 F | SYSTOLIC BLOOD PRESSURE: 114 MMHG | RESPIRATION RATE: 18 BRPM | HEIGHT: 59 IN | DIASTOLIC BLOOD PRESSURE: 73 MMHG | BODY MASS INDEX: 18.61 KG/M2

## 2024-05-01 DIAGNOSIS — L97.922 CHRONIC ULCER OF LEFT LEG, WITH FAT LAYER EXPOSED (HCC): ICD-10-CM

## 2024-05-01 DIAGNOSIS — L97.522 CHRONIC ULCER OF LEFT FOOT, WITH FAT LAYER EXPOSED (HCC): Primary | ICD-10-CM

## 2024-05-01 DIAGNOSIS — I73.9 PVD (PERIPHERAL VASCULAR DISEASE) (HCC): ICD-10-CM

## 2024-05-01 PROCEDURE — 97597 DBRDMT OPN WND 1ST 20 CM/<: CPT

## 2024-05-01 PROCEDURE — 97597 DBRDMT OPN WND 1ST 20 CM/<: CPT | Performed by: SURGERY

## 2024-05-01 RX ORDER — LIDOCAINE HYDROCHLORIDE 20 MG/ML
JELLY TOPICAL ONCE
OUTPATIENT
Start: 2024-05-01 | End: 2024-05-01

## 2024-05-01 RX ORDER — BETAMETHASONE DIPROPIONATE 0.5 MG/G
CREAM TOPICAL ONCE
OUTPATIENT
Start: 2024-05-01 | End: 2024-05-01

## 2024-05-01 RX ORDER — GENTAMICIN SULFATE 1 MG/G
OINTMENT TOPICAL ONCE
OUTPATIENT
Start: 2024-05-01 | End: 2024-05-01

## 2024-05-01 RX ORDER — SODIUM CHLOR/HYPOCHLOROUS ACID 0.033 %
SOLUTION, IRRIGATION IRRIGATION ONCE
OUTPATIENT
Start: 2024-05-01 | End: 2024-05-01

## 2024-05-01 RX ORDER — LIDOCAINE 40 MG/G
CREAM TOPICAL ONCE
OUTPATIENT
Start: 2024-05-01 | End: 2024-05-01

## 2024-05-01 RX ORDER — TRIAMCINOLONE ACETONIDE 1 MG/G
OINTMENT TOPICAL ONCE
OUTPATIENT
Start: 2024-05-01 | End: 2024-05-01

## 2024-05-01 RX ORDER — GINSENG 100 MG
CAPSULE ORAL ONCE
OUTPATIENT
Start: 2024-05-01 | End: 2024-05-01

## 2024-05-01 RX ORDER — LIDOCAINE HYDROCHLORIDE 40 MG/ML
SOLUTION TOPICAL ONCE
OUTPATIENT
Start: 2024-05-01 | End: 2024-05-01

## 2024-05-01 RX ORDER — BACITRACIN ZINC AND POLYMYXIN B SULFATE 500; 1000 [USP'U]/G; [USP'U]/G
OINTMENT TOPICAL ONCE
OUTPATIENT
Start: 2024-05-01 | End: 2024-05-01

## 2024-05-01 RX ORDER — LIDOCAINE HYDROCHLORIDE 20 MG/ML
JELLY TOPICAL ONCE
Status: COMPLETED | OUTPATIENT
Start: 2024-05-01 | End: 2024-05-01

## 2024-05-01 RX ORDER — LIDOCAINE 50 MG/G
OINTMENT TOPICAL ONCE
OUTPATIENT
Start: 2024-05-01 | End: 2024-05-01

## 2024-05-01 RX ORDER — IBUPROFEN 200 MG
TABLET ORAL ONCE
OUTPATIENT
Start: 2024-05-01 | End: 2024-05-01

## 2024-05-01 RX ORDER — CLOBETASOL PROPIONATE 0.5 MG/G
OINTMENT TOPICAL ONCE
OUTPATIENT
Start: 2024-05-01 | End: 2024-05-01

## 2024-05-01 RX ADMIN — LIDOCAINE HYDROCHLORIDE: 20 JELLY TOPICAL at 11:44

## 2024-05-01 ASSESSMENT — PAIN SCALES - GENERAL: PAINLEVEL_OUTOF10: 6

## 2024-05-01 ASSESSMENT — PAIN DESCRIPTION - PAIN TYPE: TYPE: ACUTE PAIN

## 2024-05-01 ASSESSMENT — PAIN - FUNCTIONAL ASSESSMENT: PAIN_FUNCTIONAL_ASSESSMENT: PREVENTS OR INTERFERES SOME ACTIVE ACTIVITIES AND ADLS

## 2024-05-01 ASSESSMENT — PAIN DESCRIPTION - FREQUENCY: FREQUENCY: INTERMITTENT

## 2024-05-01 ASSESSMENT — PAIN DESCRIPTION - ONSET: ONSET: ON-GOING

## 2024-05-01 ASSESSMENT — PAIN DESCRIPTION - DESCRIPTORS: DESCRIPTORS: ACHING;SORE;TENDER

## 2024-05-01 ASSESSMENT — PAIN DESCRIPTION - LOCATION: LOCATION: BACK;LEG

## 2024-05-01 NOTE — PLAN OF CARE
Problem: Pain  Goal: Verbalizes/displays adequate comfort level or baseline comfort level  Outcome: Progressing     Problem: Wound:  Goal: Will show signs of wound healing; wound closure and no evidence of infection  Description: Will show signs of wound healing; wound closure and no evidence of infection  Outcome: Progressing     Problem: Compression therapy:  Goal: Will be free from complications associated with compression therapy  Description: Will be free from complications associated with compression therapy  Outcome: Progressing     Problem: Weight control:  Goal: Ability to maintain an optimal weight for height and age will be supported  Description: Ability to maintain an optimal weight for height and age will be supported  Outcome: Progressing     Problem: Falls - Risk of:  Goal: Will remain free from falls  Description: Will remain free from falls  Outcome: Progressing

## 2024-05-01 NOTE — PATIENT INSTRUCTIONS
[facility] Wound Care and Hyperbaric Oxygen Therapy   Physician Orders and Discharge Instructions  [address] [place] [address]  [address]  [place] [address]  Telephone: [phone]      FAX [phone]    NAME:  Vonda Lutz  YOB: 1970  MEDICAL RECORD NUMBER:  [id number]  DATE:  5/1/2024    Discharge condition: Stable    Discharge to: Home    Left via:Private automobile    Accompanied by: Self    ECF/HHA: [facility]    Dressing Orders: Left Foot- Healed   Healed, Moisturize and Protect    Dressing Orders: Left Lower Ext Ulcer  Adhesive Bandage to open area, change in 3-4 days  Wear knee high compression stockings or 6 inch ace wrap from toes to knee daily as tolerated   Elevate feet to level or above heart 3-4 times daily and as needed to for 30 minutes to reduce swelling  No Smoking  Multi Vitamin, High Protein diet as tolerated    [facility] follow up visit ____________1 week_________________  (Please note your next appointment above and if you are unable to keep, kindly give a 24 hour notice. Thank you.)    If you experience any of the following, please call the Wound Care Center during business hours:    * Increase in Pain  * Temperature over 101  * Increase in drainage from your wound  * Drainage with a foul odor  * Bleeding  * Increase in swelling  * Need for compression bandage changes due to slippage, breakthrough drainage.    If you need medical attention outside of the business hours of the Wound Care Centers please contact your PCP or go to the nearest emergency room.

## 2024-05-01 NOTE — PROGRESS NOTES
[facility] Wound Care Center   Progress Note and Procedure Note      Vonda Lutz  MEDICAL RECORD NUMBER:  [id number]  AGE: 53 y.o.   GENDER: female  : 1970  EPISODE DATE:  2024    Subjective:     Chief Complaint   Patient presents with    Wound Check     Patient presents today for recheck left leg and left foot wounds.         HISTORY of PRESENT ILLNESS HPI     Vonda Lutz is a 53 y.o. female who presents today for wound/ulcer evaluation.   Wound Context: Pt with LLE wound here for eval/treat  Wound/Ulcer Pain Timing/Severity: none  Quality of pain: N/A  Severity:  0 / 10   Modifying Factors: None  Associated Signs/Symptoms: none    Ulcer Identification:  Ulcer Type: venous  Contributing Factors: edema    Wound:  venous/arterial        PAST MEDICAL HISTORY        Diagnosis Date    Arthritis     Closed fracture of left foot     Gallbladder anomaly     Hip fracture (HCC)     x2    History of blood transfusion     Hyperlipidemia     Hypertension     Hypokalemia     Hypomagnesemia     Multiple fractures     multiple fractures over lifetime, 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    Osteoporosis     Patella fracture     Radial fracture     Shoulder fracture     x2    Thyroid disease        PAST SURGICAL HISTORY    Past Surgical History:   Procedure Laterality Date    BONE MARROW ASPIRATE & BIOPSY  2024    BONE MARROW BIOPSY Right 2024    BONE MARROW ASPIRATION BIOPSY performed by Juliana Rush APRN at [facility] OR    BREAST ENHANCEMENT SURGERY Bilateral     CARPAL TUNNEL RELEASE Bilateral      SECTION      CHOLECYSTECTOMY      CORNEAL TRANSPLANT      HERNIA REPAIR      HIP SURGERY      history of osteoporosis;hip surgery x 2    MOUTH SURGERY      OTHER SURGICAL HISTORY      TIPS    TUBAL LIGATION         FAMILY HISTORY    Family History   Problem Relation Age of Onset    Thyroid Disease Mother         ? cancer    Osteoporosis Mother     Kidney Disease Mother     Diabetes Mother     Prostate

## 2024-05-12 DIAGNOSIS — F32.89 OTHER DEPRESSION: ICD-10-CM

## 2024-05-13 RX ORDER — CITALOPRAM 40 MG/1
40 TABLET ORAL DAILY
Qty: 30 TABLET | Refills: 5 | Status: SHIPPED | OUTPATIENT
Start: 2024-05-13

## 2024-05-14 ENCOUNTER — HOSPITAL ENCOUNTER (OUTPATIENT)
Dept: WOUND CARE | Age: 54
Discharge: HOME | End: 2024-05-14
Payer: MEDICAID

## 2024-05-14 VITALS
DIASTOLIC BLOOD PRESSURE: 76 MMHG | RESPIRATION RATE: 18 BRPM | HEART RATE: 98 BPM | SYSTOLIC BLOOD PRESSURE: 115 MMHG | HEIGHT: 59 IN | TEMPERATURE: 98 F | BODY MASS INDEX: 18.55 KG/M2 | WEIGHT: 92 LBS

## 2024-05-14 DIAGNOSIS — L97.922 CHRONIC ULCER OF LEFT LEG, WITH FAT LAYER EXPOSED (HCC): ICD-10-CM

## 2024-05-14 DIAGNOSIS — I73.9 PVD (PERIPHERAL VASCULAR DISEASE) (HCC): ICD-10-CM

## 2024-05-14 DIAGNOSIS — L97.522 CHRONIC ULCER OF LEFT FOOT, WITH FAT LAYER EXPOSED (HCC): Primary | ICD-10-CM

## 2024-05-14 PROCEDURE — 97597 DBRDMT OPN WND 1ST 20 CM/<: CPT | Performed by: SURGERY

## 2024-05-14 PROCEDURE — 97597 DBRDMT OPN WND 1ST 20 CM/<: CPT

## 2024-05-14 RX ORDER — LIDOCAINE HYDROCHLORIDE 20 MG/ML
JELLY TOPICAL ONCE
OUTPATIENT
Start: 2024-05-14 | End: 2024-05-14

## 2024-05-14 RX ORDER — CLOBETASOL PROPIONATE 0.5 MG/G
OINTMENT TOPICAL ONCE
OUTPATIENT
Start: 2024-05-14 | End: 2024-05-14

## 2024-05-14 RX ORDER — SODIUM CHLOR/HYPOCHLOROUS ACID 0.033 %
SOLUTION, IRRIGATION IRRIGATION ONCE
OUTPATIENT
Start: 2024-05-14 | End: 2024-05-14

## 2024-05-14 RX ORDER — IBUPROFEN 200 MG
TABLET ORAL ONCE
OUTPATIENT
Start: 2024-05-14 | End: 2024-05-14

## 2024-05-14 RX ORDER — BACITRACIN ZINC AND POLYMYXIN B SULFATE 500; 1000 [USP'U]/G; [USP'U]/G
OINTMENT TOPICAL ONCE
OUTPATIENT
Start: 2024-05-14 | End: 2024-05-14

## 2024-05-14 RX ORDER — GINSENG 100 MG
CAPSULE ORAL ONCE
OUTPATIENT
Start: 2024-05-14 | End: 2024-05-14

## 2024-05-14 RX ORDER — BETAMETHASONE DIPROPIONATE 0.5 MG/G
CREAM TOPICAL ONCE
OUTPATIENT
Start: 2024-05-14 | End: 2024-05-14

## 2024-05-14 RX ORDER — LIDOCAINE HYDROCHLORIDE 40 MG/ML
SOLUTION TOPICAL ONCE
OUTPATIENT
Start: 2024-05-14 | End: 2024-05-14

## 2024-05-14 RX ORDER — LIDOCAINE 50 MG/G
OINTMENT TOPICAL ONCE
OUTPATIENT
Start: 2024-05-14 | End: 2024-05-14

## 2024-05-14 RX ORDER — LIDOCAINE 40 MG/G
CREAM TOPICAL ONCE
OUTPATIENT
Start: 2024-05-14 | End: 2024-05-14

## 2024-05-14 RX ORDER — TRIAMCINOLONE ACETONIDE 1 MG/G
OINTMENT TOPICAL ONCE
OUTPATIENT
Start: 2024-05-14 | End: 2024-05-14

## 2024-05-14 RX ORDER — LIDOCAINE HYDROCHLORIDE 20 MG/ML
JELLY TOPICAL ONCE
Status: COMPLETED | OUTPATIENT
Start: 2024-05-14 | End: 2024-05-14

## 2024-05-14 RX ORDER — GENTAMICIN SULFATE 1 MG/G
OINTMENT TOPICAL ONCE
OUTPATIENT
Start: 2024-05-14 | End: 2024-05-14

## 2024-05-14 RX ADMIN — LIDOCAINE HYDROCHLORIDE: 20 JELLY TOPICAL at 12:00

## 2024-05-14 NOTE — PROGRESS NOTES
[facility] Wound Care Center   Progress Note and Procedure Note      Vonda Lutz  MEDICAL RECORD NUMBER:  [id number]  AGE: 53 y.o.   GENDER: female  : 1970  EPISODE DATE:  2024    Subjective:     Chief Complaint   Patient presents with    Wound Check     Pt presents for recheck of left leg wound         HISTORY of PRESENT ILLNESS HPI     Vonda Lutz is a 53 y.o. female who presents today for wound/ulcer evaluation.   Wound Context: Pt with LLE wound here for eval/treat  Wound/Ulcer Pain Timing/Severity: none  Quality of pain: N/A  Severity:  0 / 10   Modifying Factors: None  Associated Signs/Symptoms: edema    Ulcer Identification:  Ulcer Type: venous and lymphedema  Contributing Factors: edema, venous stasis, lymphedema, and smoking    Wound:  venous        PAST MEDICAL HISTORY        Diagnosis Date    Arthritis     Closed fracture of left foot     Gallbladder anomaly     Hip fracture (HCC)     x2    History of blood transfusion     Hyperlipidemia     Hypertension     Hypokalemia     Hypomagnesemia     Multiple fractures     multiple fractures over lifetime, 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    Osteoporosis     Patella fracture     Radial fracture     Shoulder fracture     x2    Thyroid disease        PAST SURGICAL HISTORY    Past Surgical History:   Procedure Laterality Date    BONE MARROW ASPIRATE & BIOPSY  2024    BONE MARROW BIOPSY Right 2024    BONE MARROW ASPIRATION BIOPSY performed by Juliana Rush APRN at [facility] OR    BREAST ENHANCEMENT SURGERY Bilateral     CARPAL TUNNEL RELEASE Bilateral      SECTION      CHOLECYSTECTOMY      CORNEAL TRANSPLANT      HERNIA REPAIR      HIP SURGERY      history of osteoporosis;hip surgery x 2    MOUTH SURGERY      OTHER SURGICAL HISTORY      TIPS    TUBAL LIGATION      VASCULAR SURGERY Bilateral 2024    Aortagram with bilateral lower extremity runoff       FAMILY HISTORY    Family History   Problem Relation Age of Onset

## 2024-05-14 NOTE — PLAN OF CARE
Unna Boot Application   Below Knee    NAME:  Vonda Lutz  YOB: 1970  MEDICAL RECORD NUMBER:  [id number]  DATE:  5/14/2024    Unna boot: Applied moisturizing agent to dry skin as needed.   Appied primary and secondary dressing as ordered.  Applied Unna roll from toes to knee overlapping each time.   Applied ace wrap or coban from toes to below the knee.   Secured with tape and/or metal clips covered with tape.   Instructed patient/caregiver to keep dressing dry and intact. DO NOT REMOVE DRESSING.   Instructed pt/family/caregiver to report excessive draining, loose bandage, wet dressing, severe pain or tingling in toes.  Applied Unna Boot dressing below the knee to left lower leg.    Unna Boot(s) were applied per  Guidelines.     Electronically signed by Kelli Aj RN on 5/14/2024 at 12:17 PM

## 2024-05-14 NOTE — PLAN OF CARE
Problem: Wound:  Goal: Will show signs of wound healing; wound closure and no evidence of infection  Description: Will show signs of wound healing; wound closure and no evidence of infection  Outcome: Progressing     Problem: Venous:  Goal: Signs of wound healing will improve  Description: Signs of wound healing will improve  Outcome: Progressing     Problem: Smoking cessation:  Goal: Ability to formulate a plan to maintain a tobacco-free life will be supported  Description: Ability to formulate a plan to maintain a tobacco-free life will be supported  Outcome: Progressing  Patient reports that she has decreased her smoking use to 4-5 cigarettes per day     Problem: Compression therapy:  Goal: Will be free from complications associated with compression therapy  Description: Will be free from complications associated with compression therapy  Outcome: Progressing     Problem: Weight control:  Goal: Ability to maintain an optimal weight for height and age will be supported  Description: Ability to maintain an optimal weight for height and age will be supported  Outcome: Progressing     Problem: Falls - Risk of:  Goal: Will remain free from falls  Description: Will remain free from falls  Outcome: Progressing

## 2024-05-14 NOTE — PATIENT INSTRUCTIONS
[facility] Wound Care and Hyperbaric Oxygen Therapy   Physician Orders and Discharge Instructions  [address] [place] [address]  [address]  [place] [address]  Telephone: [phone]      FAX [phone]    NAME:  Vonda Lutz  YOB: 1970  MEDICAL RECORD NUMBER:  [id number]  DATE:  5/14/2024    Discharge condition: Stable    Discharge to: Home    Left via:Private automobile    Accompanied by: Self    ECF/HHA: [facility]    Dressing Orders: Left Lower Ext Ulcers  Xeroform to open areas  Coflex Unnaboot, Keep clean and Dry  Elevate feet to level or above heart 3-4 times daily and as needed to for 30 minutes to reduce swelling  Remove wraps and call office if- Wraps get wet, Cause increased pain, Slide down or you are unable to make  your next scheduled appointment  No Smoking  Multi Vitamin, High Protein diet as tolerated    [facility] follow up visit ___________1 week__________________  (Please note your next appointment above and if you are unable to keep, kindly give a 24 hour notice. Thank you.)    If you experience any of the following, please call the Wound Care Center during business hours:    * Increase in Pain  * Temperature over 101  * Increase in drainage from your wound  * Drainage with a foul odor  * Bleeding  * Increase in swelling  * Need for compression bandage changes due to slippage, breakthrough drainage.    If you need medical attention outside of the business hours of the Wound Care Centers please contact your PCP or go to the nearest emergency room.

## 2024-05-15 ENCOUNTER — TELEPHONE (OUTPATIENT)
Dept: HEMATOLOGY | Age: 54
End: 2024-05-15

## 2024-05-15 NOTE — TELEPHONE ENCOUNTER
Called patient today for their upcoming appointment on 05/17/2024 and patient needed to be rescheduled. Patient's name and number was taken and given to  staff (   sent to  stafff vis teams  )  so that the patient could be rescheduled to a better date & time for the patient.

## 2024-05-21 ENCOUNTER — HOSPITAL ENCOUNTER (OUTPATIENT)
Dept: WOUND CARE | Age: 54
Discharge: HOME | End: 2024-05-21
Payer: MEDICAID

## 2024-05-21 VITALS
SYSTOLIC BLOOD PRESSURE: 117 MMHG | TEMPERATURE: 98 F | HEART RATE: 79 BPM | DIASTOLIC BLOOD PRESSURE: 69 MMHG | RESPIRATION RATE: 20 BRPM | HEIGHT: 59 IN | BODY MASS INDEX: 18.55 KG/M2 | WEIGHT: 92 LBS

## 2024-05-21 DIAGNOSIS — I89.0 LYMPHEDEMA: Chronic | ICD-10-CM

## 2024-05-21 DIAGNOSIS — L97.522 CHRONIC ULCER OF LEFT FOOT, WITH FAT LAYER EXPOSED (HCC): Primary | ICD-10-CM

## 2024-05-21 DIAGNOSIS — I73.9 PVD (PERIPHERAL VASCULAR DISEASE) (HCC): ICD-10-CM

## 2024-05-21 DIAGNOSIS — L97.922 CHRONIC ULCER OF LEFT LEG, WITH FAT LAYER EXPOSED (HCC): ICD-10-CM

## 2024-05-21 PROCEDURE — 97597 DBRDMT OPN WND 1ST 20 CM/<: CPT | Performed by: NURSE PRACTITIONER

## 2024-05-21 PROCEDURE — 97597 DBRDMT OPN WND 1ST 20 CM/<: CPT

## 2024-05-21 RX ORDER — BETAMETHASONE DIPROPIONATE 0.5 MG/G
CREAM TOPICAL ONCE
OUTPATIENT
Start: 2024-05-21 | End: 2024-05-21

## 2024-05-21 RX ORDER — LIDOCAINE HYDROCHLORIDE 20 MG/ML
JELLY TOPICAL ONCE
OUTPATIENT
Start: 2024-05-21 | End: 2024-05-21

## 2024-05-21 RX ORDER — GINSENG 100 MG
CAPSULE ORAL ONCE
OUTPATIENT
Start: 2024-05-21 | End: 2024-05-21

## 2024-05-21 RX ORDER — LIDOCAINE HYDROCHLORIDE 40 MG/ML
SOLUTION TOPICAL ONCE
OUTPATIENT
Start: 2024-05-21 | End: 2024-05-21

## 2024-05-21 RX ORDER — LIDOCAINE 50 MG/G
OINTMENT TOPICAL ONCE
OUTPATIENT
Start: 2024-05-21 | End: 2024-05-21

## 2024-05-21 RX ORDER — LIDOCAINE HYDROCHLORIDE 20 MG/ML
JELLY TOPICAL ONCE
Status: COMPLETED | OUTPATIENT
Start: 2024-05-21 | End: 2024-05-21

## 2024-05-21 RX ORDER — IBUPROFEN 200 MG
TABLET ORAL ONCE
OUTPATIENT
Start: 2024-05-21 | End: 2024-05-21

## 2024-05-21 RX ORDER — TRIAMCINOLONE ACETONIDE 1 MG/G
OINTMENT TOPICAL ONCE
OUTPATIENT
Start: 2024-05-21 | End: 2024-05-21

## 2024-05-21 RX ORDER — CLOBETASOL PROPIONATE 0.5 MG/G
OINTMENT TOPICAL ONCE
OUTPATIENT
Start: 2024-05-21 | End: 2024-05-21

## 2024-05-21 RX ORDER — LIDOCAINE 40 MG/G
CREAM TOPICAL ONCE
OUTPATIENT
Start: 2024-05-21 | End: 2024-05-21

## 2024-05-21 RX ORDER — BACITRACIN ZINC AND POLYMYXIN B SULFATE 500; 1000 [USP'U]/G; [USP'U]/G
OINTMENT TOPICAL ONCE
OUTPATIENT
Start: 2024-05-21 | End: 2024-05-21

## 2024-05-21 RX ORDER — SODIUM CHLOR/HYPOCHLOROUS ACID 0.033 %
SOLUTION, IRRIGATION IRRIGATION ONCE
OUTPATIENT
Start: 2024-05-21 | End: 2024-05-21

## 2024-05-21 RX ORDER — GENTAMICIN SULFATE 1 MG/G
OINTMENT TOPICAL ONCE
OUTPATIENT
Start: 2024-05-21 | End: 2024-05-21

## 2024-05-21 RX ADMIN — LIDOCAINE HYDROCHLORIDE: 20 JELLY TOPICAL at 10:51

## 2024-05-21 NOTE — PATIENT INSTRUCTIONS
[facility] Wound Care and Hyperbaric Oxygen Therapy   Physician Orders and Discharge Instructions  [address] [place] [address]  [address]  [place] [address]  Telephone: [phone]      FAX [phone]    NAME:  Vonda Lutz  YOB: 1970  MEDICAL RECORD NUMBER:  [id number]  DATE:  5/21/2024    Discharge condition: Stable    Discharge to: Home    Left via:Private automobile    Accompanied by: Self    ECF/HHA: [facility]    Dressing Orders: Left Lower Ext Ulcers  Xeroform to open areas  Coflex Unnaboot, Keep clean and Dry  Elevate feet to level or above heart 3-4 times daily and as needed to for 30 minutes to reduce swelling  Remove wraps and call office if- Wraps get wet, Cause increased pain, Slide down or you are unable to make  your next scheduled appointment  No Smoking  Multi Vitamin, High Protein diet as tolerated    [facility] follow up visit ____________1 week_________________  (Please note your next appointment above and if you are unable to keep, kindly give a 24 hour notice. Thank you.)    If you experience any of the following, please call the Wound Care Center during business hours:    * Increase in Pain  * Temperature over 101  * Increase in drainage from your wound  * Drainage with a foul odor  * Bleeding  * Increase in swelling  * Need for compression bandage changes due to slippage, breakthrough drainage.    If you need medical attention outside of the business hours of the Wound Care Centers please contact your PCP or go to the nearest emergency room.

## 2024-05-21 NOTE — PROGRESS NOTES
(cm^2) 1.44 cm^2 05/21/24 1049   Change in Wound Size % (l*w) 76 05/21/24 1049   Wound Volume (cm^3) 0.144 cm^3 05/21/24 1049   Wound Healing % 88 05/21/24 1049   Post-Procedure Length (cm) 1.8 cm 05/21/24 1056   Post-Procedure Width (cm) 0.8 cm 05/21/24 1056   Post-Procedure Depth (cm) 0.1 cm 05/21/24 1056   Post-Procedure Surface Area (cm^2) 1.44 cm^2 05/21/24 1056   Post-Procedure Volume (cm^3) 0.144 cm^3 05/21/24 1056   Distance Tunneling (cm) 0 cm 05/01/24 1142   Tunneling Position ___ O'Clock 0 05/01/24 1142   Undermining Starts ___ O'Clock 0 05/01/24 1142   Undermining Ends___ O'Clock 0 05/01/24 1142   Undermining Maxium Distance (cm) 0 05/01/24 1142   Wound Assessment Pink/red;Slough 05/21/24 1049   Drainage Amount Moderate (25-50%) 05/21/24 1049   Drainage Description Serosanguinous 05/21/24 1049   Odor None 05/21/24 1049   Anjali-wound Assessment Blanchable erythema;Hemosiderin staining (brown yellow) 05/21/24 1049   Margins Defined edges 05/21/24 1049   Wound Thickness Description not for Pressure Injury Full thickness 05/21/24 1049   Number of days: 104           Procedure Note  Indications:  Based on my examination of this patient's wound(s)/ulcer(s) today, debridement is required to promote healing and evaluate the wound base.    Performed by: URSZULA Hazel - CNP    Consent obtained:  Yes    Time out taken:  Yes    Pain Control: Anesthetic  Anesthetic: 2% Lidocaine Gel Topical       Debridement:Non-excisional Debridement    Using curette the wound(s)/ulcer(s) was/were sharply debrided down through and including the removal of epidermis and dermis.        Devitalized Tissue Debrided:  fibrin, biofilm, slough, and exudate    Pre Debridement Measurements:  Are located in the Wound/Ulcer Documentation Flow Sheet    Wound/Ulcer #: 1    Post Debridement Measurements:  Wound/Ulcer Descriptions are Pre Debridement except measurements:      Percent of Wound/Ulcer Debrided: 100%    Total Surface Area

## 2024-05-21 NOTE — PLAN OF CARE
Unna Boot Application   Below Knee    NAME:  Vonda Lutz  YOB: 1970  MEDICAL RECORD NUMBER:  [id number]  DATE:  5/21/2024    Unna boot: Applied moisturizing agent to dry skin as needed.   Appied primary and secondary dressing as ordered.  Applied Unna roll from toes to knee overlapping each time.   Applied ace wrap or coban from toes to below the knee.   Instructed patient/caregiver to keep dressing dry and intact. DO NOT REMOVE DRESSING.   Instructed pt/family/caregiver to report excessive draining, loose bandage, wet dressing, severe pain or tingling in toes.  Applied Unna Boot dressing below the knee to left lower leg.    Unna Boot(s) were applied per  Guidelines.     Electronically signed by Yary Rodas RN on 5/21/2024 at 11:19 AM

## 2024-05-22 ENCOUNTER — OFFICE VISIT (OUTPATIENT)
Dept: VASCULAR SURGERY | Age: 54
End: 2024-05-22
Payer: MEDICAID

## 2024-05-22 VITALS
TEMPERATURE: 97.4 F | DIASTOLIC BLOOD PRESSURE: 50 MMHG | SYSTOLIC BLOOD PRESSURE: 90 MMHG | HEART RATE: 87 BPM | OXYGEN SATURATION: 98 %

## 2024-05-22 DIAGNOSIS — I70.213 ATHEROSCLER OF NATIVE ARTERY OF BOTH LEGS WITH INTERMIT CLAUDICATION (HCC): Primary | ICD-10-CM

## 2024-05-22 DIAGNOSIS — I70.244 ATHEROSCLEROSIS OF NATIVE ARTERY OF LEFT LOWER EXTREMITY WITH ULCERATION OF MIDFOOT (HCC): ICD-10-CM

## 2024-05-22 PROCEDURE — 99214 OFFICE O/P EST MOD 30 MIN: CPT | Performed by: NURSE PRACTITIONER

## 2024-05-22 NOTE — PROGRESS NOTES
Vonda Lutz (:  1970) is a 53 y.o. female,Established patient, here for evaluation of the following chief complaint(s):  Follow-up (2 week follow up angiogram. )            SUBJECTIVE/OBJECTIVE:  Patient presents for follow up after an arteriogram.  She was found to have severe aortic stenosis .  Procedure was done for peripheral vascular disease with wound. Post op problems include none.  Angiogram complications were none.  Post op pain and swelling are minimal.  At present, she reports claudication at a distance of   feet.  Vonda reports that the right leg is equal to the left.  She reports claudication is not changed and is mostly in the form of crampy type pain starting in the hips, thighs, and calves. She has a short recovery time. This is reproducible in nature. She reports ischemic rest pain 0 times per night.  She reports walking with cart does not help.      I have personally reviewed the following: problem list, current meds, allergies, PMH, PSH, family hx, and social hx  Vonda Lutz is a 53 y.o. female with the following history as recorded in [facility]:  Patient Active Problem List    Diagnosis Date Noted    Chronic ulcer of left foot, with fat layer exposed ([facility]) 2024    Chronic ulcer of left leg, with fat layer exposed ([facility]) 2024    Lymphedema 2024    PVD (peripheral vascular disease) ([facility]) 2024     Current Outpatient Medications   Medication Sig Dispense Refill    citalopram (CELEXA) 40 MG tablet Take 1 tablet by mouth once daily 30 tablet 5    atorvastatin (LIPITOR) 10 MG tablet Take 1 tablet by mouth daily 30 tablet 3    Ferrous Sulfate Dried ER (SLOW RELEASE IRON) 45 MG TBCR Take 1 tablet by mouth daily 30 tablet 5    FEXOFENADINE HCL PO Take 1 tablet by mouth in the morning and 1 tablet in the evening.      potassium chloride (K-TAB) 20 MEQ TBCR extended release tablet Take 1 tablet by mouth twice daily 60 tablet 5    ibuprofen (ADVIL;MOTRIN) 600 MG tablet

## 2024-05-28 ENCOUNTER — TELEPHONE (OUTPATIENT)
Dept: VASCULAR SURGERY | Age: 54
End: 2024-05-28

## 2024-05-28 ENCOUNTER — PREP FOR PROCEDURE (OUTPATIENT)
Dept: VASCULAR SURGERY | Age: 54
End: 2024-05-28

## 2024-05-28 DIAGNOSIS — Z01.818 PRE-OP TESTING: Primary | ICD-10-CM

## 2024-05-28 DIAGNOSIS — I73.9 PERIPHERAL VASCULAR DISEASE, UNSPECIFIED (HCC): ICD-10-CM

## 2024-05-28 RX ORDER — ASPIRIN 81 MG/1
81 TABLET ORAL DAILY
COMMUNITY

## 2024-05-28 NOTE — TELEPHONE ENCOUNTER
Left a message for the patient to give me a call back to go over the following surgery scheduling information.             Vonda Lutz     Surgery Directions     Report to the outpatient registration at [facility] on Wednesday, June 26, 2-24 @ 6:00 AM.   Nothing to eat or drink after midnight the night before the procedure.  Please take all medications as normally scheduled to take unless listed below with a sip of water.  Patient should begin 81 MG Aspirin daily.   If you have sleep apnea and require C-PAP, please bring this with you to the hospital.  Bring a list of all of your allergies and medications with you to the hospital.  Please let our nurse know if you have had an allergy to iodine, shellfish, or x-ray dye.  Let the nurse know if you take any of the following:  Over the counter herbal supplements  Diclofenec, indomethacin, ketoprofen, Caridopa/levadopa, naproxen, sulindac, piroxicam, glucosamine, Chondrotin, cocchine, or methotrexate.  Plan to stay at the hospital for 4 - 6 hours before being released  by the physician. You will need someone to drive you home after the procedure.  Please register at the outpatient area of the [facility] on 06/19/2024 @ 9:30 AM for pre-op testing.  You will not need to be fasting prior to this appointment.  After the appointment please come to the office to see Carissa for a chart review prior to surgery.   11. Other Directions: For any questions or concerns please contact our office @        [phone] and ask to speak to Luz Elena.   12.  You will need a  to take you home.  13.  Follow up appt: 07/10/2024 @ 8:30 AM with Carissa.      Unless instructed otherwise by your physician, cleanse incision/puncture site twice daily with soap and water.  Apply dry gauze. Do not get in tub.  Okay to shower.  Do not apply any salve, cream, peroxide or alcohol to the incision.  Call with any increasing redness or drainage

## 2024-05-29 NOTE — TELEPHONE ENCOUNTER
Spoke with the patient to let her know the following regarding her surgery scheduling. The patient voiced understanding.

## 2024-05-30 ENCOUNTER — HOSPITAL ENCOUNTER (OUTPATIENT)
Dept: WOUND CARE | Age: 54
Discharge: HOME | End: 2024-05-30
Payer: MEDICAID

## 2024-05-30 ENCOUNTER — TELEPHONE (OUTPATIENT)
Dept: HEMATOLOGY | Age: 54
End: 2024-05-30

## 2024-05-30 VITALS
WEIGHT: 92 LBS | HEIGHT: 59 IN | BODY MASS INDEX: 18.55 KG/M2 | HEART RATE: 105 BPM | DIASTOLIC BLOOD PRESSURE: 76 MMHG | TEMPERATURE: 98 F | SYSTOLIC BLOOD PRESSURE: 126 MMHG | RESPIRATION RATE: 18 BRPM

## 2024-05-30 DIAGNOSIS — L97.922 CHRONIC ULCER OF LEFT LEG, WITH FAT LAYER EXPOSED (HCC): ICD-10-CM

## 2024-05-30 DIAGNOSIS — I73.9 PVD (PERIPHERAL VASCULAR DISEASE) (HCC): ICD-10-CM

## 2024-05-30 DIAGNOSIS — L97.522 CHRONIC ULCER OF LEFT FOOT, WITH FAT LAYER EXPOSED (HCC): Primary | ICD-10-CM

## 2024-05-30 PROCEDURE — 97597 DBRDMT OPN WND 1ST 20 CM/<: CPT

## 2024-05-30 PROCEDURE — 97597 DBRDMT OPN WND 1ST 20 CM/<: CPT | Performed by: SURGERY

## 2024-05-30 RX ORDER — SODIUM CHLOR/HYPOCHLOROUS ACID 0.033 %
SOLUTION, IRRIGATION IRRIGATION ONCE
OUTPATIENT
Start: 2024-05-30 | End: 2024-05-30

## 2024-05-30 RX ORDER — LIDOCAINE 40 MG/G
CREAM TOPICAL ONCE
OUTPATIENT
Start: 2024-05-30 | End: 2024-05-30

## 2024-05-30 RX ORDER — LIDOCAINE HYDROCHLORIDE 20 MG/ML
JELLY TOPICAL ONCE
OUTPATIENT
Start: 2024-05-30 | End: 2024-05-30

## 2024-05-30 RX ORDER — LIDOCAINE HYDROCHLORIDE 40 MG/ML
SOLUTION TOPICAL ONCE
OUTPATIENT
Start: 2024-05-30 | End: 2024-05-30

## 2024-05-30 RX ORDER — IBUPROFEN 200 MG
TABLET ORAL ONCE
OUTPATIENT
Start: 2024-05-30 | End: 2024-05-30

## 2024-05-30 RX ORDER — GINSENG 100 MG
CAPSULE ORAL ONCE
OUTPATIENT
Start: 2024-05-30 | End: 2024-05-30

## 2024-05-30 RX ORDER — CLOBETASOL PROPIONATE 0.5 MG/G
OINTMENT TOPICAL ONCE
OUTPATIENT
Start: 2024-05-30 | End: 2024-05-30

## 2024-05-30 RX ORDER — TRIAMCINOLONE ACETONIDE 1 MG/G
OINTMENT TOPICAL ONCE
OUTPATIENT
Start: 2024-05-30 | End: 2024-05-30

## 2024-05-30 RX ORDER — BETAMETHASONE DIPROPIONATE 0.5 MG/G
CREAM TOPICAL ONCE
OUTPATIENT
Start: 2024-05-30 | End: 2024-05-30

## 2024-05-30 RX ORDER — LIDOCAINE 50 MG/G
OINTMENT TOPICAL ONCE
OUTPATIENT
Start: 2024-05-30 | End: 2024-05-30

## 2024-05-30 RX ORDER — GENTAMICIN SULFATE 1 MG/G
OINTMENT TOPICAL ONCE
OUTPATIENT
Start: 2024-05-30 | End: 2024-05-30

## 2024-05-30 RX ORDER — LIDOCAINE HYDROCHLORIDE 20 MG/ML
JELLY TOPICAL ONCE
Status: COMPLETED | OUTPATIENT
Start: 2024-05-30 | End: 2024-05-30

## 2024-05-30 RX ORDER — BACITRACIN ZINC AND POLYMYXIN B SULFATE 500; 1000 [USP'U]/G; [USP'U]/G
OINTMENT TOPICAL ONCE
OUTPATIENT
Start: 2024-05-30 | End: 2024-05-30

## 2024-05-30 RX ADMIN — LIDOCAINE HYDROCHLORIDE: 20 JELLY TOPICAL at 11:45

## 2024-05-30 ASSESSMENT — PAIN SCALES - GENERAL: PAINLEVEL_OUTOF10: 2

## 2024-05-30 ASSESSMENT — PAIN DESCRIPTION - PAIN TYPE: TYPE: ACUTE PAIN

## 2024-05-30 ASSESSMENT — PAIN - FUNCTIONAL ASSESSMENT
PAIN_FUNCTIONAL_ASSESSMENT: ACTIVITIES ARE NOT PREVENTED
PAIN_FUNCTIONAL_ASSESSMENT: ACTIVITIES ARE NOT PREVENTED

## 2024-05-30 ASSESSMENT — PAIN DESCRIPTION - DESCRIPTORS: DESCRIPTORS: BURNING

## 2024-05-30 ASSESSMENT — PAIN DESCRIPTION - ORIENTATION: ORIENTATION: LEFT

## 2024-05-30 ASSESSMENT — PAIN DESCRIPTION - LOCATION: LOCATION: LEG

## 2024-05-30 ASSESSMENT — PAIN DESCRIPTION - FREQUENCY: FREQUENCY: INTERMITTENT

## 2024-05-30 NOTE — PLAN OF CARE
Problem: Pain  Goal: Verbalizes/displays adequate comfort level or baseline comfort level  Outcome: Progressing     Problem: Wound:  Goal: Will show signs of wound healing; wound closure and no evidence of infection  Description: Will show signs of wound healing; wound closure and no evidence of infection  Outcome: Progressing     Problem: Venous:  Goal: Signs of wound healing will improve  Description: Signs of wound healing will improve  Outcome: Progressing     Problem: Smoking cessation:  Goal: Ability to formulate a plan to maintain a tobacco-free life will be supported  Description: Ability to formulate a plan to maintain a tobacco-free life will be supported  Outcome: Progressing     Problem: Compression therapy:  Goal: Will be free from complications associated with compression therapy  Description: Will be free from complications associated with compression therapy  Outcome: Progressing     Problem: Falls - Risk of:  Goal: Will remain free from falls  Description: Will remain free from falls  Outcome: Progressing

## 2024-05-30 NOTE — TELEPHONE ENCOUNTER
I called and reminded pt. of their appt on 06/04/2024. Patient is aware to come at time of appt and not lab appt time. Patient confirmed appt date and time.

## 2024-05-30 NOTE — PROGRESS NOTES
[facility]   Progress Note and Procedure Note      Vonda Lutz  MEDICAL RECORD NUMBER:  [id number]  AGE: 53 y.o.   GENDER: female  : 1970  EPISODE DATE:  2024    Subjective:     Chief Complaint   Patient presents with    Wound Check     No c/o today         HISTORY of PRESENT ILLNESS HPI     Vonda Lutz is a 53 y.o. female who presents today for wound/ulcer evaluation.   Wound Context: Pt with LLE wound here for eval/treat  Wound/Ulcer Pain Timing/Severity: none  Quality of pain: N/A  Severity:  0 / 10   Modifying Factors: None  Associated Signs/Symptoms: none    Ulcer Identification:  Ulcer Type: arterial  Contributing Factors: arterial insufficiency    Wound:  arterial/venous        PAST MEDICAL HISTORY        Diagnosis Date    Arthritis     Closed fracture of left foot     Gallbladder anomaly     Hip fracture (HCC)     x2    History of blood transfusion     Hyperlipidemia     Hypertension     Hypokalemia     Hypomagnesemia     Multiple fractures     multiple fractures over lifetime, 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    Osteoporosis     Patella fracture     Radial fracture     Shoulder fracture     x2    Thyroid disease        PAST SURGICAL HISTORY    Past Surgical History:   Procedure Laterality Date    BONE MARROW ASPIRATE & BIOPSY  2024    BONE MARROW BIOPSY Right 2024    BONE MARROW ASPIRATION BIOPSY performed by Juliana Rush APRN at [facility] OR    BREAST ENHANCEMENT SURGERY Bilateral     CARPAL TUNNEL RELEASE Bilateral      SECTION      CHOLECYSTECTOMY      CORNEAL TRANSPLANT      HERNIA REPAIR      HIP SURGERY      history of osteoporosis;hip surgery x 2    MOUTH SURGERY      OTHER SURGICAL HISTORY      TIPS    TUBAL LIGATION      VASCULAR SURGERY Bilateral 2024    Aortagram with bilateral lower extremity runoff       FAMILY HISTORY    Family History   Problem Relation Age of Onset    Thyroid Disease Mother         ? cancer    Osteoporosis Mother

## 2024-05-30 NOTE — PATIENT INSTRUCTIONS
[facility] Wound Care and Hyperbaric Oxygen Therapy   Physician Orders and Discharge Instructions  [address] [place] [address]  [address]  [place] [address]  Telephone: [phone]      FAX [phone]    NAME:  Vonda Lutz  YOB: 1970  MEDICAL RECORD NUMBER:  [id number]  DATE:  5/30/2024    Discharge condition: Stable    Discharge to: Home    Left via:Private automobile    Accompanied by: Self    ECF/HHA: [facility]    Dressing Orders: Left Lower Ext Ulcer  Silver Alginate cut to fit open area, Cover with a Adhesive Bandage change every 2-3 days   Wear knee high compression stockings or 6 inch ace wrap from toes to knee daily as tolerated  Elevate feet to level or above heart 3-4 times daily and as needed to for 30 minutes to reduce swelling  No Smoking  Multi Vitamin, High Protein diet as tolerated    [facility] follow up visit ___________2 weeks__________________  (Please note your next appointment above and if you are unable to keep, kindly give a 24 hour notice. Thank you.)    If you experience any of the following, please call the Wound Care Center during business hours:    * Increase in Pain  * Temperature over 101  * Increase in drainage from your wound  * Drainage with a foul odor  * Bleeding  * Increase in swelling  * Need for compression bandage changes due to slippage, breakthrough drainage.    If you need medical attention outside of the business hours of the Wound Care Centers please contact your PCP or go to the nearest emergency room.

## 2024-06-03 DIAGNOSIS — D50.8 IRON DEFICIENCY ANEMIA SECONDARY TO INADEQUATE DIETARY IRON INTAKE: Primary | ICD-10-CM

## 2024-06-03 NOTE — PROGRESS NOTES
Progress note      Pt Name: Vonda Lutz  YOB: 1970  MRN: [id number]    Date of evaluation: 6/4/2024  History Obtained From:  patient, electronic medical record    CHIEF COMPLAINT:    Chief Complaint   Patient presents with    Follow-up     Elevated serum tryptase       HISTORY OF PRESENT ILLNESS:    Vonda Lutz is a 54 y.o.  female who is currently being evaluated for elevated serum tryptase with chronic pruritus with findings of iron deficiency without anemia mild thrombocytosis.  Serology studies obtained at initial consultation on 4/2/2024 reported an elevated sed rate and CRP suggesting inflammatory process, improving tryptase level, no findings for myeloproliferative disorder with a negative JAK2 with reflex, flow cytometry and peripheral blood smear were negative with no increased blasts.  A bone marrow aspiration biopsy completed on 4/29/2024 ruled out systemic mastocytosis and reported thrombocytosis suspected to be reactive.  She was placed on slow release iron 45 mg daily.  Encouraged to continue following with wound care for her chronic left foot/leg ulceration.  Vonda returns today in scheduled follow-up to review workup results and further treatment recommendations.  Current recommendation is to continue with oral iron replacement and conservative monitoring of labs.    Today's clinic visit to include physical assessment, review of systems, any lab or radiographic findings that were available and plan of care are documented below.    HEMATOLOGIC HISTORY:   Diagnosis:  Iron deficiency without anemia  Mild thrombocytosis, suspected to be reactive: Iron deficiency and chronic left lower extremity ulceration    Treatment summary:  04/05/2024- Initiated slow release iron 45 mg daily  Conservative lab monitoring    Vonda was seen in initial hematology consultation on 4/2/2024, referred by Dr.Julie Covarrubias with the allergy and asthma clinic for elevated tyrptase and anemia.

## 2024-06-04 ENCOUNTER — OFFICE VISIT (OUTPATIENT)
Dept: HEMATOLOGY | Age: 54
End: 2024-06-04
Payer: MEDICAID

## 2024-06-04 ENCOUNTER — HOSPITAL ENCOUNTER (OUTPATIENT)
Dept: INFUSION THERAPY | Age: 54
Discharge: HOME | End: 2024-06-04
Payer: MEDICAID

## 2024-06-04 VITALS
SYSTOLIC BLOOD PRESSURE: 120 MMHG | BODY MASS INDEX: 17.74 KG/M2 | TEMPERATURE: 98.7 F | HEIGHT: 59 IN | OXYGEN SATURATION: 98 % | DIASTOLIC BLOOD PRESSURE: 70 MMHG | HEART RATE: 93 BPM | WEIGHT: 88 LBS

## 2024-06-04 DIAGNOSIS — D50.8 IRON DEFICIENCY ANEMIA SECONDARY TO INADEQUATE DIETARY IRON INTAKE: ICD-10-CM

## 2024-06-04 DIAGNOSIS — R79.82 ELEVATED C-REACTIVE PROTEIN (CRP): ICD-10-CM

## 2024-06-04 DIAGNOSIS — D50.8 IRON DEFICIENCY ANEMIA SECONDARY TO INADEQUATE DIETARY IRON INTAKE: Primary | ICD-10-CM

## 2024-06-04 DIAGNOSIS — R74.8 ELEVATED SERUM TRYPTASE: ICD-10-CM

## 2024-06-04 DIAGNOSIS — Z72.0 TOBACCO USE: ICD-10-CM

## 2024-06-04 DIAGNOSIS — T14.8XXA CHRONIC WOUND: ICD-10-CM

## 2024-06-04 LAB
ALBUMIN SERPL-MCNC: 3.8 G/DL (ref 3.5–5.2)
ALP SERPL-CCNC: 140 U/L (ref 35–104)
ALT SERPL-CCNC: 10 U/L (ref 9–52)
ANION GAP SERPL CALCULATED.3IONS-SCNC: 8 MMOL/L (ref 7–19)
AST SERPL-CCNC: 27 U/L (ref 14–36)
BASOPHILS # BLD: 0.06 K/UL (ref 0.01–0.08)
BASOPHILS NFR BLD: 0.7 % (ref 0.1–1.2)
BILIRUB SERPL-MCNC: 0.5 MG/DL (ref 0.2–1.3)
BUN SERPL-MCNC: 15 MG/DL (ref 7–17)
CALCIUM SERPL-MCNC: 9.1 MG/DL (ref 8.4–10.2)
CHLORIDE SERPL-SCNC: 98 MMOL/L (ref 98–111)
CO2 SERPL-SCNC: 32 MMOL/L (ref 22–29)
CREAT SERPL-MCNC: 0.8 MG/DL (ref 0.5–1)
EOSINOPHIL # BLD: 0.27 K/UL (ref 0.04–0.54)
EOSINOPHIL NFR BLD: 2.9 % (ref 0.7–7)
ERYTHROCYTE [DISTWIDTH] IN BLOOD BY AUTOMATED COUNT: 14.2 % (ref 11.7–14.4)
FERRITIN SERPL-MCNC: 115.4 NG/ML (ref 13–150)
GLOBULIN: 3.1 G/DL
GLUCOSE SERPL-MCNC: 106 MG/DL (ref 74–106)
HCT VFR BLD AUTO: 29.1 % (ref 34.1–44.9)
HGB BLD-MCNC: 9 G/DL (ref 11.2–15.7)
IRON SATN MFR SERPL: 14 % (ref 14–50)
IRON SERPL-MCNC: 29 UG/DL (ref 37–145)
LYMPHOCYTES # BLD: 2.18 K/UL (ref 1.18–3.74)
LYMPHOCYTES NFR BLD: 23.7 % (ref 19.3–53.1)
MCH RBC QN AUTO: 26.7 PG (ref 25.6–32.2)
MCHC RBC AUTO-ENTMCNC: 30.9 G/DL (ref 32.3–35.5)
MCV RBC AUTO: 86.4 FL (ref 79.4–94.8)
MONOCYTES # BLD: 0.82 K/UL (ref 0.24–0.82)
MONOCYTES NFR BLD: 8.9 % (ref 4.7–12.5)
NEUTROPHILS # BLD: 5.82 K/UL (ref 1.56–6.13)
NEUTS SEG NFR BLD: 63.3 % (ref 34–71.1)
PLATELET # BLD AUTO: 543 K/UL (ref 182–369)
PMV BLD AUTO: 8.9 FL (ref 7.4–10.4)
POTASSIUM SERPL-SCNC: 4.3 MMOL/L (ref 3.5–5.1)
PROT SERPL-MCNC: 6.8 G/DL (ref 6.3–8.2)
RBC # BLD AUTO: 3.37 M/UL (ref 3.93–5.22)
SODIUM SERPL-SCNC: 138 MMOL/L (ref 137–145)
TIBC SERPL-MCNC: 213 UG/DL (ref 250–400)
WBC # BLD AUTO: 9.2 K/UL (ref 3.98–10.04)

## 2024-06-04 PROCEDURE — 85025 COMPLETE CBC W/AUTO DIFF WBC: CPT

## 2024-06-04 PROCEDURE — 80053 COMPREHEN METABOLIC PANEL: CPT

## 2024-06-04 PROCEDURE — 36415 COLL VENOUS BLD VENIPUNCTURE: CPT

## 2024-06-04 PROCEDURE — 99214 OFFICE O/P EST MOD 30 MIN: CPT | Performed by: NURSE PRACTITIONER

## 2024-06-04 PROCEDURE — 99212 OFFICE O/P EST SF 10 MIN: CPT

## 2024-06-05 LAB — CRP SERPL HS-MCNC: 2.79 MG/DL (ref 0–0.5)

## 2024-06-05 ASSESSMENT — ENCOUNTER SYMPTOMS
VOMITING: 0
BLOOD IN STOOL: 0
EYE REDNESS: 0
EYE PAIN: 0
RESPIRATORY NEGATIVE: 1
GASTROINTESTINAL NEGATIVE: 1
SORE THROAT: 0
ABDOMINAL PAIN: 0
NAUSEA: 0
DIARRHEA: 0
WHEEZING: 0
BACK PAIN: 0
COUGH: 0
SHORTNESS OF BREATH: 0
EYE DISCHARGE: 0
EYES NEGATIVE: 1
CONSTIPATION: 0

## 2024-06-06 ENCOUNTER — TELEPHONE (OUTPATIENT)
Dept: HEMATOLOGY | Age: 54
End: 2024-06-06

## 2024-06-06 DIAGNOSIS — D50.8 IRON DEFICIENCY ANEMIA SECONDARY TO INADEQUATE DIETARY IRON INTAKE: Primary | ICD-10-CM

## 2024-06-06 DIAGNOSIS — K90.9 IRON MALABSORPTION: ICD-10-CM

## 2024-06-06 RX ORDER — FAMOTIDINE 10 MG/ML
20 INJECTION, SOLUTION INTRAVENOUS
OUTPATIENT
Start: 2024-06-10

## 2024-06-06 RX ORDER — SODIUM CHLORIDE 9 MG/ML
INJECTION, SOLUTION INTRAVENOUS CONTINUOUS
OUTPATIENT
Start: 2024-06-10

## 2024-06-06 RX ORDER — ALBUTEROL SULFATE 90 UG/1
4 AEROSOL, METERED RESPIRATORY (INHALATION) PRN
OUTPATIENT
Start: 2024-06-10

## 2024-06-06 RX ORDER — SODIUM CHLORIDE 9 MG/ML
5-250 INJECTION, SOLUTION INTRAVENOUS PRN
OUTPATIENT
Start: 2024-06-10

## 2024-06-06 RX ORDER — ONDANSETRON 2 MG/ML
8 INJECTION INTRAMUSCULAR; INTRAVENOUS
OUTPATIENT
Start: 2024-06-10

## 2024-06-06 RX ORDER — ACETAMINOPHEN 325 MG/1
650 TABLET ORAL
OUTPATIENT
Start: 2024-06-10

## 2024-06-06 RX ORDER — HEPARIN SODIUM (PORCINE) LOCK FLUSH IV SOLN 100 UNIT/ML 100 UNIT/ML
500 SOLUTION INTRAVENOUS PRN
OUTPATIENT
Start: 2024-06-10

## 2024-06-06 RX ORDER — EPINEPHRINE 1 MG/ML
0.3 INJECTION, SOLUTION, CONCENTRATE INTRAVENOUS PRN
OUTPATIENT
Start: 2024-06-10

## 2024-06-06 RX ORDER — SODIUM CHLORIDE 0.9 % (FLUSH) 0.9 %
5-40 SYRINGE (ML) INJECTION PRN
OUTPATIENT
Start: 2024-06-10

## 2024-06-06 RX ORDER — DIPHENHYDRAMINE HYDROCHLORIDE 50 MG/ML
50 INJECTION INTRAMUSCULAR; INTRAVENOUS
OUTPATIENT
Start: 2024-06-10

## 2024-06-06 NOTE — TELEPHONE ENCOUNTER
----- Message from URSZULA Grijalva sent at 6/5/2024  9:45 AM CDT -----  Please call and discuss the need for IV iron replacement    Please build supportive plan for Feraheme 510 mg mg IV weekly x2 doses.  Please contact patient once insurance approval has been obtained and appointments have been arranged

## 2024-06-06 NOTE — TELEPHONE ENCOUNTER
Called patient and reviewed lab results, iron saturation of 14% and Hgb has dropped to 9.0.  Instructed that URSZULA Wise would like for her to get 2 doses of IV iron.  Instructed that she will get this at our outpatient infusion center and they will call her to set up appointments once the treatment has been approved with insurance. Patient v/u and is agreeable to plan.

## 2024-06-07 LAB — TRYPTASE SERPL-MCNC: 12.2 UG/L

## 2024-06-10 ENCOUNTER — CLINICAL DOCUMENTATION (OUTPATIENT)
Facility: HOSPITAL | Age: 54
End: 2024-06-10

## 2024-06-10 NOTE — PROGRESS NOTES
Reviewed pt's insurance and no assistance is needed at this time with pt having [facility] Medicaid.

## 2024-06-13 ENCOUNTER — HOSPITAL ENCOUNTER (OUTPATIENT)
Dept: WOUND CARE | Age: 54
Discharge: HOME | End: 2024-06-13
Payer: MEDICAID

## 2024-06-13 ENCOUNTER — APPOINTMENT (OUTPATIENT)
Dept: INFUSION THERAPY | Age: 54
End: 2024-06-13
Payer: MEDICAID

## 2024-06-13 VITALS
DIASTOLIC BLOOD PRESSURE: 68 MMHG | HEART RATE: 101 BPM | RESPIRATION RATE: 16 BRPM | TEMPERATURE: 98 F | SYSTOLIC BLOOD PRESSURE: 106 MMHG

## 2024-06-13 DIAGNOSIS — L97.922 CHRONIC ULCER OF LEFT LEG, WITH FAT LAYER EXPOSED (HCC): ICD-10-CM

## 2024-06-13 DIAGNOSIS — L97.522 CHRONIC ULCER OF LEFT FOOT, WITH FAT LAYER EXPOSED (HCC): Primary | ICD-10-CM

## 2024-06-13 DIAGNOSIS — I73.9 PVD (PERIPHERAL VASCULAR DISEASE) (HCC): ICD-10-CM

## 2024-06-13 PROCEDURE — 97597 DBRDMT OPN WND 1ST 20 CM/<: CPT

## 2024-06-13 RX ORDER — BACITRACIN ZINC AND POLYMYXIN B SULFATE 500; 1000 [USP'U]/G; [USP'U]/G
OINTMENT TOPICAL ONCE
OUTPATIENT
Start: 2024-06-13 | End: 2024-06-13

## 2024-06-13 RX ORDER — IBUPROFEN 200 MG
TABLET ORAL ONCE
OUTPATIENT
Start: 2024-06-13 | End: 2024-06-13

## 2024-06-13 RX ORDER — TRIAMCINOLONE ACETONIDE 1 MG/G
OINTMENT TOPICAL ONCE
OUTPATIENT
Start: 2024-06-13 | End: 2024-06-13

## 2024-06-13 RX ORDER — LIDOCAINE HYDROCHLORIDE 20 MG/ML
JELLY TOPICAL ONCE
OUTPATIENT
Start: 2024-06-13 | End: 2024-06-13

## 2024-06-13 RX ORDER — GINSENG 100 MG
CAPSULE ORAL ONCE
OUTPATIENT
Start: 2024-06-13 | End: 2024-06-13

## 2024-06-13 RX ORDER — BETAMETHASONE DIPROPIONATE 0.5 MG/G
CREAM TOPICAL ONCE
OUTPATIENT
Start: 2024-06-13 | End: 2024-06-13

## 2024-06-13 RX ORDER — LIDOCAINE 50 MG/G
OINTMENT TOPICAL ONCE
OUTPATIENT
Start: 2024-06-13 | End: 2024-06-13

## 2024-06-13 RX ORDER — LIDOCAINE HYDROCHLORIDE 20 MG/ML
JELLY TOPICAL ONCE
Status: COMPLETED | OUTPATIENT
Start: 2024-06-13 | End: 2024-06-13

## 2024-06-13 RX ORDER — CLOBETASOL PROPIONATE 0.5 MG/G
OINTMENT TOPICAL ONCE
OUTPATIENT
Start: 2024-06-13 | End: 2024-06-13

## 2024-06-13 RX ORDER — LIDOCAINE 40 MG/G
CREAM TOPICAL ONCE
OUTPATIENT
Start: 2024-06-13 | End: 2024-06-13

## 2024-06-13 RX ORDER — SODIUM CHLOR/HYPOCHLOROUS ACID 0.033 %
SOLUTION, IRRIGATION IRRIGATION ONCE
OUTPATIENT
Start: 2024-06-13 | End: 2024-06-13

## 2024-06-13 RX ORDER — GENTAMICIN SULFATE 1 MG/G
OINTMENT TOPICAL ONCE
OUTPATIENT
Start: 2024-06-13 | End: 2024-06-13

## 2024-06-13 RX ORDER — LIDOCAINE HYDROCHLORIDE 40 MG/ML
SOLUTION TOPICAL ONCE
OUTPATIENT
Start: 2024-06-13 | End: 2024-06-13

## 2024-06-13 RX ADMIN — LIDOCAINE HYDROCHLORIDE: 20 JELLY TOPICAL at 11:48

## 2024-06-13 NOTE — PROGRESS NOTES
Measurements:  Are located in the Wound/Ulcer Documentation Flow Sheet    Wound/Ulcer #: 1    Post Debridement Measurements:  Wound/Ulcer Descriptions are Pre Debridement except measurements:      Percent of Wound/Ulcer Debrided: 100%    Total Surface Area Debrided:  1.02 sq cm     Estimated Blood Loss:  Minimal    Hemostasis Achieved:  by pressure    Procedural Pain:  0  / 10     Post Procedural Pain:  0 / 10     Response to treatment:  Well tolerated by patient.         Diabetic/Pressure/Non Pressure Ulcers only:  Ulcer: N/A            Plan:     Problem List Items Addressed This Visit          Circulatory    PVD (peripheral vascular disease) (HCC)    Relevant Orders    Initiate Outpatient Wound Care Protocol       Other    * (Principal) Chronic ulcer of left leg, with fat layer exposed (HCC) (Chronic)    Relevant Orders    Initiate Outpatient Wound Care Protocol    Chronic ulcer of left foot, with fat layer exposed (HCC) - Primary    Relevant Orders    Initiate Outpatient Wound Care Protocol       Treatment Note please see attached Discharge Instructions    In my professional opinion this patient would benefit from HBO Therapy: No    Written patient dismissal instructions given to patient and signed by patient or POA.         Ms. Lutz is healing well, she is awaiting revascularization with Dr. Figueroa. Follow up with Dr. Pedroza in 1  week.  Electronically signed by URSZULA Hazel CNP on 6/13/2024 at 1:21 PM

## 2024-06-13 NOTE — PLAN OF CARE
Problem: Wound:  Goal: Will show signs of wound healing; wound closure and no evidence of infection  Description: Will show signs of wound healing; wound closure and no evidence of infection  Outcome: Progressing     Problem: Venous:  Goal: Signs of wound healing will improve  Description: Signs of wound healing will improve  Outcome: Progressing     Problem: Smoking cessation:  Goal: Ability to formulate a plan to maintain a tobacco-free life will be supported  Description: Ability to formulate a plan to maintain a tobacco-free life will be supported  Outcome: Progressing     Problem: Compression therapy:  Goal: Will be free from complications associated with compression therapy  Description: Will be free from complications associated with compression therapy  Outcome: Progressing     Problem: Falls - Risk of:  Goal: Will remain free from falls  Description: Will remain free from falls  Outcome: Progressing

## 2024-06-13 NOTE — PATIENT INSTRUCTIONS
[facility] Wound Care and Hyperbaric Oxygen Therapy   Physician Orders and Discharge Instructions  [address] [place] [address]  [address]  [place] [address]  Telephone: [phone]      FAX [phone]    NAME:  Vonda Lutz  YOB: 1970  MEDICAL RECORD NUMBER:  [id number]  DATE:  6/13/2024    Discharge condition: Stable    Discharge to: Home    Left via:Private automobile    Accompanied by: Self    Dressing Orders: Left Lower Ext Ulcer  Xeroform cut to fit open area, Cover with a Adhesive Bandage change every 2-3 days  Wear knee high compression stockings or 6 inch ace wrap from toes to knee daily as tolerated  Elevate feet to level or above heart 3-4 times daily and as needed to for 30 minutes to reduce swelling  No Smoking  Multi Vitamin, High Protein diet as tolerated    [facility] follow up visit _____________1 week________________  (Please note your next appointment above and if you are unable to keep, kindly give a 24 hour notice. Thank you.)    If you experience any of the following, please call the Wound Care Center during business hours:    * Increase in Pain  * Temperature over 101  * Increase in drainage from your wound  * Drainage with a foul odor  * Bleeding  * Increase in swelling  * Need for compression bandage changes due to slippage, breakthrough drainage.    If you need medical attention outside of the business hours of the Wound Care Centers please contact your PCP or go to the nearest emergency room.

## 2024-06-19 ENCOUNTER — TELEPHONE (OUTPATIENT)
Dept: VASCULAR SURGERY | Age: 54
End: 2024-06-19

## 2024-06-19 ENCOUNTER — HOSPITAL ENCOUNTER (OUTPATIENT)
Dept: GENERAL RADIOLOGY | Age: 54
Discharge: HOME | End: 2024-06-19
Payer: MEDICAID

## 2024-06-19 ENCOUNTER — OFFICE VISIT (OUTPATIENT)
Dept: VASCULAR SURGERY | Age: 54
End: 2024-06-19
Payer: MEDICAID

## 2024-06-19 ENCOUNTER — HOSPITAL ENCOUNTER (OUTPATIENT)
Dept: PREADMISSION TESTING | Age: 54
Discharge: HOME | End: 2024-06-23
Payer: MEDICAID

## 2024-06-19 VITALS
HEART RATE: 74 BPM | SYSTOLIC BLOOD PRESSURE: 99 MMHG | TEMPERATURE: 97.7 F | DIASTOLIC BLOOD PRESSURE: 64 MMHG | OXYGEN SATURATION: 98 %

## 2024-06-19 VITALS — BODY MASS INDEX: 17.14 KG/M2 | HEIGHT: 59 IN | WEIGHT: 85 LBS

## 2024-06-19 DIAGNOSIS — Z01.818 PRE-OP TESTING: ICD-10-CM

## 2024-06-19 DIAGNOSIS — I70.213 ATHEROSCLER OF NATIVE ARTERY OF BOTH LEGS WITH INTERMIT CLAUDICATION (HCC): Primary | ICD-10-CM

## 2024-06-19 DIAGNOSIS — I70.245 ATHEROSCLEROSIS OF NATIVE ARTERY OF LEFT LOWER EXTREMITY WITH ULCERATION OF OTHER PART OF FOOT (HCC): ICD-10-CM

## 2024-06-19 DIAGNOSIS — D50.8 IRON DEFICIENCY ANEMIA SECONDARY TO INADEQUATE DIETARY IRON INTAKE: ICD-10-CM

## 2024-06-19 LAB
ABO/RH: NORMAL
ANION GAP SERPL CALCULATED.3IONS-SCNC: 12 MMOL/L (ref 7–19)
ANTIBODY SCREEN: NORMAL
APTT PPP: 35.2 SEC (ref 26–36.2)
BASOPHILS # BLD: 0.1 K/UL (ref 0–0.2)
BASOPHILS NFR BLD: 0.7 % (ref 0–1)
BUN SERPL-MCNC: 13 MG/DL (ref 6–20)
CALCIUM SERPL-MCNC: 9 MG/DL (ref 8.6–10)
CHLORIDE SERPL-SCNC: 100 MMOL/L (ref 98–111)
CO2 SERPL-SCNC: 24 MMOL/L (ref 22–29)
CREAT SERPL-MCNC: 0.9 MG/DL (ref 0.5–0.9)
EOSINOPHIL # BLD: 0.4 K/UL (ref 0–0.6)
EOSINOPHIL NFR BLD: 3.5 % (ref 0–5)
ERYTHROCYTE [DISTWIDTH] IN BLOOD BY AUTOMATED COUNT: 14.3 % (ref 11.5–14.5)
GLUCOSE SERPL-MCNC: 94 MG/DL (ref 74–109)
HCT VFR BLD AUTO: 32.2 % (ref 37–47)
HGB BLD-MCNC: 9.4 G/DL (ref 12–16)
IMM GRANULOCYTES # BLD: 0.1 K/UL
INR PPP: 1.12 (ref 0.88–1.18)
LYMPHOCYTES # BLD: 2.8 K/UL (ref 1.1–4.5)
LYMPHOCYTES NFR BLD: 27.4 % (ref 20–40)
MCH RBC QN AUTO: 25.7 PG (ref 27–31)
MCHC RBC AUTO-ENTMCNC: 29.2 G/DL (ref 33–37)
MCV RBC AUTO: 88 FL (ref 81–99)
MONOCYTES # BLD: 0.9 K/UL (ref 0–0.9)
MONOCYTES NFR BLD: 8.9 % (ref 0–10)
MRSA DNA SPEC QL NAA+PROBE: NOT DETECTED
NEUTROPHILS # BLD: 6.1 K/UL (ref 1.5–7.5)
NEUTS SEG NFR BLD: 59 % (ref 50–65)
PLATELET # BLD AUTO: 648 K/UL (ref 130–400)
PMV BLD AUTO: 9.6 FL (ref 9.4–12.3)
POTASSIUM SERPL-SCNC: 5.4 MMOL/L (ref 3.5–5)
PROTHROMBIN TIME: 14.1 SEC (ref 12–14.6)
RBC # BLD AUTO: 3.66 M/UL (ref 4.2–5.4)
SODIUM SERPL-SCNC: 136 MMOL/L (ref 136–145)
WBC # BLD AUTO: 10.3 K/UL (ref 4.8–10.8)

## 2024-06-19 PROCEDURE — 86900 BLOOD TYPING SEROLOGIC ABO: CPT

## 2024-06-19 PROCEDURE — 86850 RBC ANTIBODY SCREEN: CPT

## 2024-06-19 PROCEDURE — 85730 THROMBOPLASTIN TIME PARTIAL: CPT

## 2024-06-19 PROCEDURE — 85025 COMPLETE CBC W/AUTO DIFF WBC: CPT

## 2024-06-19 PROCEDURE — 86901 BLOOD TYPING SEROLOGIC RH(D): CPT

## 2024-06-19 PROCEDURE — 99214 OFFICE O/P EST MOD 30 MIN: CPT | Performed by: NURSE PRACTITIONER

## 2024-06-19 PROCEDURE — 71046 X-RAY EXAM CHEST 2 VIEWS: CPT

## 2024-06-19 PROCEDURE — 80048 BASIC METABOLIC PNL TOTAL CA: CPT

## 2024-06-19 PROCEDURE — 87641 MR-STAPH DNA AMP PROBE: CPT

## 2024-06-19 PROCEDURE — 85610 PROTHROMBIN TIME: CPT

## 2024-06-19 RX ORDER — SODIUM CHLORIDE, SODIUM LACTATE, POTASSIUM CHLORIDE, CALCIUM CHLORIDE 600; 310; 30; 20 MG/100ML; MG/100ML; MG/100ML; MG/100ML
INJECTION, SOLUTION INTRAVENOUS CONTINUOUS
Status: CANCELLED | OUTPATIENT
Start: 2024-06-19

## 2024-06-19 NOTE — TELEPHONE ENCOUNTER
Left a message for the patient to let her know the following. I will send the lab results to Dr. Fernandez via fax today as well.  I left my office number should the patient have questions.            ----- Message from URSZULA Camara sent at 6/19/2024  1:07 PM CDT -----  Please let her know her potassium is high.  She needs to hold this until she hears from Dr. Fernandez.  Please send lab to Dr. Fernandez

## 2024-06-19 NOTE — PROGRESS NOTES
Patient states she is coming and leaving by PACS. Instructed patient she would need a  if she was not admitted to the hospital. She stated  Breezy Mabry would come pick her up. His phone number [phone].

## 2024-06-19 NOTE — DISCHARGE INSTRUCTIONS
Vonda Lutz     Surgery Directions     Report to the outpatient registration at [facility] on Wednesday, June 26, 2-24 @ 6:00 AM.   Nothing to eat or drink after midnight the night before the procedure.  Please take all medications as normally scheduled to take unless listed below with a sip of water.  Patient should begin 81 MG Aspirin daily.   If you have sleep apnea and require C-PAP, please bring this with you to the hospital.  Bring a list of all of your allergies and medications with you to the hospital.  Please let our nurse know if you have had an allergy to iodine, shellfish, or x-ray dye.  Let the nurse know if you take any of the following:  Over the counter herbal supplements  Diclofenec, indomethacin, ketoprofen, Caridopa/levadopa, naproxen, sulindac, piroxicam, glucosamine, Chondrotin, cocchine, or methotrexate.  Plan to stay at the hospital for 4 - 6 hours before being released  by the physician. You will need someone to drive you home after the procedure.  Please register at the outpatient area of the [facility] on 06/19/2024 @ 9:30 AM for pre-op testing.  You will not need to be fasting prior to this appointment.  After the appointment please come to the office to see Carissa for a chart review prior to surgery.   11. Other Directions: For any questions or concerns please contact our office @        [phone] and ask to speak to Luz Elena.   12.  You will need a  to take you home.  13.  Follow up appt: 07/10/2024 @ 8:30 AM with Carissa.      Unless instructed otherwise by your physician, cleanse incision/puncture site twice daily with soap and water.  Apply dry gauze. Do not get in tub.  Okay to shower.  Do not apply any salve, cream, peroxide or alcohol to the incision.  Call with any increasing redness or drainage      Chlorhexidine Gluconate 4% Solution    Patient should shower with this soap the night before surgery and the morning of surgery washing from the neck

## 2024-06-20 ENCOUNTER — HOSPITAL ENCOUNTER (OUTPATIENT)
Dept: INFUSION THERAPY | Age: 54
Setting detail: INFUSION SERIES
Discharge: HOME | End: 2024-06-20
Payer: MEDICAID

## 2024-06-20 ENCOUNTER — HOSPITAL ENCOUNTER (OUTPATIENT)
Dept: WOUND CARE | Age: 54
Discharge: HOME | End: 2024-06-20
Payer: MEDICAID

## 2024-06-20 ENCOUNTER — TELEPHONE (OUTPATIENT)
Dept: INTERNAL MEDICINE | Age: 54
End: 2024-06-20

## 2024-06-20 VITALS
TEMPERATURE: 97.8 F | RESPIRATION RATE: 18 BRPM | DIASTOLIC BLOOD PRESSURE: 71 MMHG | HEART RATE: 74 BPM | OXYGEN SATURATION: 99 % | SYSTOLIC BLOOD PRESSURE: 114 MMHG

## 2024-06-20 VITALS
HEART RATE: 86 BPM | RESPIRATION RATE: 16 BRPM | TEMPERATURE: 98 F | SYSTOLIC BLOOD PRESSURE: 117 MMHG | DIASTOLIC BLOOD PRESSURE: 81 MMHG

## 2024-06-20 DIAGNOSIS — L97.522 CHRONIC ULCER OF LEFT FOOT, WITH FAT LAYER EXPOSED (HCC): Primary | ICD-10-CM

## 2024-06-20 DIAGNOSIS — I73.9 PVD (PERIPHERAL VASCULAR DISEASE) (HCC): ICD-10-CM

## 2024-06-20 DIAGNOSIS — L97.922 CHRONIC ULCER OF LEFT LEG, WITH FAT LAYER EXPOSED (HCC): ICD-10-CM

## 2024-06-20 DIAGNOSIS — D50.8 IRON DEFICIENCY ANEMIA SECONDARY TO INADEQUATE DIETARY IRON INTAKE: ICD-10-CM

## 2024-06-20 DIAGNOSIS — K90.9 IRON MALABSORPTION: Primary | ICD-10-CM

## 2024-06-20 PROCEDURE — 6360000002 HC RX W HCPCS: Performed by: NURSE PRACTITIONER

## 2024-06-20 PROCEDURE — 96365 THER/PROPH/DIAG IV INF INIT: CPT

## 2024-06-20 PROCEDURE — 2580000003 HC RX 258: Performed by: NURSE PRACTITIONER

## 2024-06-20 PROCEDURE — 97597 DBRDMT OPN WND 1ST 20 CM/<: CPT

## 2024-06-20 RX ORDER — SODIUM CHLORIDE 9 MG/ML
5-250 INJECTION, SOLUTION INTRAVENOUS PRN
OUTPATIENT
Start: 2024-06-27

## 2024-06-20 RX ORDER — SODIUM CHLORIDE 9 MG/ML
INJECTION, SOLUTION INTRAVENOUS CONTINUOUS
OUTPATIENT
Start: 2024-06-27

## 2024-06-20 RX ORDER — ACETAMINOPHEN 325 MG/1
650 TABLET ORAL
OUTPATIENT
Start: 2024-06-27

## 2024-06-20 RX ORDER — DIPHENHYDRAMINE HYDROCHLORIDE 50 MG/ML
50 INJECTION INTRAMUSCULAR; INTRAVENOUS
OUTPATIENT
Start: 2024-06-27

## 2024-06-20 RX ORDER — LIDOCAINE HYDROCHLORIDE 20 MG/ML
JELLY TOPICAL ONCE
OUTPATIENT
Start: 2024-06-20 | End: 2024-06-20

## 2024-06-20 RX ORDER — CLONIDINE HYDROCHLORIDE 0.1 MG/1
0.1 TABLET ORAL PRN
OUTPATIENT
Start: 2024-06-20

## 2024-06-20 RX ORDER — GENTAMICIN SULFATE 1 MG/G
OINTMENT TOPICAL ONCE
OUTPATIENT
Start: 2024-06-20 | End: 2024-06-20

## 2024-06-20 RX ORDER — ALBUTEROL SULFATE 90 UG/1
4 AEROSOL, METERED RESPIRATORY (INHALATION) PRN
OUTPATIENT
Start: 2024-06-27

## 2024-06-20 RX ORDER — LIDOCAINE HYDROCHLORIDE 20 MG/ML
JELLY TOPICAL ONCE
Status: COMPLETED | OUTPATIENT
Start: 2024-06-20 | End: 2024-06-20

## 2024-06-20 RX ORDER — ONDANSETRON 2 MG/ML
8 INJECTION INTRAMUSCULAR; INTRAVENOUS
OUTPATIENT
Start: 2024-06-27

## 2024-06-20 RX ORDER — CLOBETASOL PROPIONATE 0.5 MG/G
OINTMENT TOPICAL ONCE
OUTPATIENT
Start: 2024-06-20 | End: 2024-06-20

## 2024-06-20 RX ORDER — EPINEPHRINE 1 MG/ML
0.3 INJECTION, SOLUTION, CONCENTRATE INTRAVENOUS PRN
OUTPATIENT
Start: 2024-06-27

## 2024-06-20 RX ORDER — HEPARIN 100 UNIT/ML
500 SYRINGE INTRAVENOUS PRN
OUTPATIENT
Start: 2024-06-27

## 2024-06-20 RX ORDER — LIDOCAINE HYDROCHLORIDE 40 MG/ML
SOLUTION TOPICAL ONCE
OUTPATIENT
Start: 2024-06-20 | End: 2024-06-20

## 2024-06-20 RX ORDER — SODIUM CHLORIDE 0.9 % (FLUSH) 0.9 %
5-40 SYRINGE (ML) INJECTION PRN
OUTPATIENT
Start: 2024-06-27

## 2024-06-20 RX ORDER — BETAMETHASONE DIPROPIONATE 0.5 MG/G
CREAM TOPICAL ONCE
OUTPATIENT
Start: 2024-06-20 | End: 2024-06-20

## 2024-06-20 RX ORDER — SODIUM CHLOR/HYPOCHLOROUS ACID 0.033 %
SOLUTION, IRRIGATION IRRIGATION ONCE
OUTPATIENT
Start: 2024-06-20 | End: 2024-06-20

## 2024-06-20 RX ORDER — SODIUM CHLORIDE 0.9 % (FLUSH) 0.9 %
5-40 SYRINGE (ML) INJECTION PRN
OUTPATIENT
Start: 2024-06-20

## 2024-06-20 RX ORDER — SODIUM CHLORIDE 9 MG/ML
5-250 INJECTION, SOLUTION INTRAVENOUS PRN
Status: DISCONTINUED | OUTPATIENT
Start: 2024-06-20 | End: 2024-06-21 | Stop reason: HOSPADM

## 2024-06-20 RX ORDER — LIDOCAINE 50 MG/G
OINTMENT TOPICAL ONCE
OUTPATIENT
Start: 2024-06-20 | End: 2024-06-20

## 2024-06-20 RX ORDER — SODIUM CHLORIDE 0.9 % (FLUSH) 0.9 %
5-40 SYRINGE (ML) INJECTION EVERY 12 HOURS SCHEDULED
OUTPATIENT
Start: 2024-06-20

## 2024-06-20 RX ORDER — IBUPROFEN 200 MG
TABLET ORAL ONCE
OUTPATIENT
Start: 2024-06-20 | End: 2024-06-20

## 2024-06-20 RX ORDER — SODIUM CHLORIDE 9 MG/ML
INJECTION, SOLUTION INTRAVENOUS PRN
OUTPATIENT
Start: 2024-06-20

## 2024-06-20 RX ORDER — TRIAMCINOLONE ACETONIDE 1 MG/G
OINTMENT TOPICAL ONCE
OUTPATIENT
Start: 2024-06-20 | End: 2024-06-20

## 2024-06-20 RX ORDER — ASPIRIN 81 MG/1
81 TABLET ORAL ONCE
OUTPATIENT
Start: 2024-06-20 | End: 2024-06-20

## 2024-06-20 RX ORDER — GINSENG 100 MG
CAPSULE ORAL ONCE
OUTPATIENT
Start: 2024-06-20 | End: 2024-06-20

## 2024-06-20 RX ORDER — BACITRACIN ZINC AND POLYMYXIN B SULFATE 500; 1000 [USP'U]/G; [USP'U]/G
OINTMENT TOPICAL ONCE
OUTPATIENT
Start: 2024-06-20 | End: 2024-06-20

## 2024-06-20 RX ORDER — LIDOCAINE 40 MG/G
CREAM TOPICAL ONCE
OUTPATIENT
Start: 2024-06-20 | End: 2024-06-20

## 2024-06-20 RX ADMIN — FERUMOXYTOL 510 MG: 510 INJECTION INTRAVENOUS at 13:36

## 2024-06-20 RX ADMIN — SODIUM CHLORIDE 20 ML/HR: 9 INJECTION, SOLUTION INTRAVENOUS at 13:36

## 2024-06-20 RX ADMIN — LIDOCAINE HYDROCHLORIDE: 20 JELLY TOPICAL at 11:35

## 2024-06-20 NOTE — PROGRESS NOTES
Vonda Lutz (:  1970) is a 53 y.o. female,New patient, here for evaluation of the following chief complaint(s):  Established patient for pvd            SUBJECTIVE/OBJECTIVE:  Vonda has a history of peripheral vascular disease of the lower extremities.  She has had this for < 1 year. Current treatment includes none.  Vonda has new wounds. Recently, she reports claudication at a distance of   feet.  Vonda reports that the left leg is more significant than the right.  She reports claudication is worsened and is mostly in the form of crampy type pain starting in the hips, thighs, and calves. She has a short recovery time. This is reproducible in nature. She reports ischemic rest pain 0 times per night.  She reports walking with cart does not help.  Has had wound since may or .  Admits to scratching herself.  Took herself off statins.  Smokes.  Is still going to wound care    I have personally reviewed the following: problem list, current meds, allergies, PMH, PSH, family hx, and social hx  Vonda Lutz is a 54 y.o. female with the following history as recorded in [facility]:  Patient Active Problem List    Diagnosis Date Noted    Iron deficiency anemia secondary to inadequate dietary iron intake 2024    Iron malabsorption 2024    Peripheral vascular disease, unspecified ([facility]) 2024    Chronic ulcer of left foot, with fat layer exposed ([facility]) 2024    Chronic ulcer of left leg, with fat layer exposed ([facility]) 2024    Lymphedema 2024    PVD (peripheral vascular disease) ([facility]) 2024     Current Outpatient Medications   Medication Sig Dispense Refill    aspirin 81 MG EC tablet Take 1 tablet by mouth daily      citalopram (CELEXA) 40 MG tablet Take 1 tablet by mouth once daily 30 tablet 5    atorvastatin (LIPITOR) 10 MG tablet Take 1 tablet by mouth daily 30 tablet 3    Ferrous Sulfate Dried ER (SLOW RELEASE IRON) 45 MG TBCR Take 1 tablet by mouth daily 30 tablet 5

## 2024-06-20 NOTE — PATIENT INSTRUCTIONS
[facility] Wound Care and Hyperbaric Oxygen Therapy   Physician Orders and Discharge Instructions  [address] [place] [address]  [address]  [place] [address]  Telephone: [phone]      FAX [phone]    NAME:  Vonda Lutz  YOB: 1970  MEDICAL RECORD NUMBER:  [id number]  DATE:  6/20/2024    Discharge condition: Stable    Discharge to: Home    Left via:Private automobile    Accompanied by: Self    Dressing Orders: Left Lower Ext Ulcer  Xeroform cut to fit open area, Cover with a Adhesive Bandage change every 2-3 days  Wear knee high compression stockings or 6 inch ace wrap from toes to knee daily as tolerated  Elevate feet to level or above heart 3-4 times daily and as needed to for 30 minutes to reduce swelling  No Smoking  Multi Vitamin, High Protein diet as tolerated    [facility] follow up visit ___________2 weeks________________  (Please note your next appointment above and if you are unable to keep, kindly give a 24 hour notice. Thank you.)    If you experience any of the following, please call the Wound Care Center during business hours:    * Increase in Pain  * Temperature over 101  * Increase in drainage from your wound  * Drainage with a foul odor  * Bleeding  * Increase in swelling  * Need for compression bandage changes due to slippage, breakthrough drainage.    If you need medical attention outside of the business hours of the Wound Care Centers please contact your PCP or go to the nearest emergency room.

## 2024-06-20 NOTE — PROGRESS NOTES
brimonidine-timolol (COMBIGAN) 0.2-0.5 % ophthalmic solution Place 1 drop into both eyes daily      sulfacetamide-prednisoLONE (BLEPHAMIDE) 10-0.2 % ophthalmic suspension Place 1 drop into both eyes 2 times daily      Magnesium 500 MG TABS Take 1 tablet by mouth daily       No current facility-administered medications on file prior to encounter.       REVIEW OF SYSTEMS    A comprehensive review of systems was negative.    Objective:      /81   Pulse 86   Temp 98 °F (36.7 °C) (Temporal)   Resp 16     Wt Readings from Last 3 Encounters:   06/19/24 38.6 kg (85 lb)   06/04/24 39.9 kg (88 lb)   05/30/24 41.7 kg (92 lb)       PHYSICAL EXAM    General Appearance: alert and oriented to person, place and time, well developed and well- nourished, in no acute distress  Skin: warm and dry, no rash or erythema  Head: normocephalic and atraumatic  Eyes: pupils equal, round, and reactive to light, extraocular eye movements intact, conjunctivae normal  ENT: tympanic membrane, external ear and ear canal normal bilaterally, nose without deformity, nasal mucosa and turbinates normal without polyps, lips teeth and gums normal  Neck: supple and non-tender without mass, no thyromegaly or thyroid nodules, no cervical lymphadenopathy  Pulmonary/Chest: clear to auscultation bilaterally- no wheezes, rales or rhonchi, normal air movement, no respiratory distress  Cardiovascular: normal rate, regular rhythm, normal S1 and S2, no murmurs, rubs, clicks, or gallops, distal pulses intact, no carotid bruits  Abdomen: soft, non-tender, non-distended, normal bowel sounds, no masses or organomegaly  Extremities: no cyanosis, clubbing or edema  Musculoskeletal: normal range of motion, no joint swelling, deformity or tenderness  Neurologic: reflexes normal and symmetric, no cranial nerve deficit, gait, coordination and speech normal, skin sensation normal      Assessment:      Problem List Items Addressed This Visit       * (Principal) Chronic

## 2024-06-20 NOTE — TELEPHONE ENCOUNTER
Left detailed message on secure VM  Explained to contact our office once surgery completed to recheck levels.

## 2024-06-20 NOTE — H&P (VIEW-ONLY)
Vonda Lutz (:  1970) is a 53 y.o. female,New patient, here for evaluation of the following chief complaint(s):  Established patient for pvd            SUBJECTIVE/OBJECTIVE:  Vonda has a history of peripheral vascular disease of the lower extremities.  She has had this for < 1 year. Current treatment includes none.  Vonda has new wounds. Recently, she reports claudication at a distance of   feet.  Vonda reports that the left leg is more significant than the right.  She reports claudication is worsened and is mostly in the form of crampy type pain starting in the hips, thighs, and calves. She has a short recovery time. This is reproducible in nature. She reports ischemic rest pain 0 times per night.  She reports walking with cart does not help.  Has had wound since may or .  Admits to scratching herself.  Took herself off statins.  Smokes.  Is still going to wound care    I have personally reviewed the following: problem list, current meds, allergies, PMH, PSH, family hx, and social hx  Vonda Lutz is a 54 y.o. female with the following history as recorded in [facility]:  Patient Active Problem List    Diagnosis Date Noted    Iron deficiency anemia secondary to inadequate dietary iron intake 2024    Iron malabsorption 2024    Peripheral vascular disease, unspecified ([facility]) 2024    Chronic ulcer of left foot, with fat layer exposed ([facility]) 2024    Chronic ulcer of left leg, with fat layer exposed ([facility]) 2024    Lymphedema 2024    PVD (peripheral vascular disease) ([facility]) 2024     Current Outpatient Medications   Medication Sig Dispense Refill    aspirin 81 MG EC tablet Take 1 tablet by mouth daily      citalopram (CELEXA) 40 MG tablet Take 1 tablet by mouth once daily 30 tablet 5    atorvastatin (LIPITOR) 10 MG tablet Take 1 tablet by mouth daily 30 tablet 3    Ferrous Sulfate Dried ER (SLOW RELEASE IRON) 45 MG TBCR Take 1 tablet by mouth daily 30 tablet 5     Bilateral 04/30/2024    Aortagram with bilateral lower extremity runoff     Family History   Problem Relation Age of Onset    Thyroid Disease Mother         ? cancer    Osteoporosis Mother     Kidney Disease Mother     Diabetes Mother     Prostate Cancer Father     Diabetes Maternal Grandmother     Alcohol Abuse Maternal Grandfather     No Known Problems Paternal Grandmother     No Known Problems Paternal Grandfather     Breast Cancer Other         three aunts, some cousing     Social History     Tobacco Use    Smoking status: Every Day     Current packs/day: 0.50     Average packs/day: 0.5 packs/day for 41.5 years (20.7 ttl pk-yrs)     Types: Cigarettes     Start date: 1983     Passive exposure: Current    Smokeless tobacco: Never    Tobacco comments:     Pt reports 4-5 cigarettes a day 5/14/24   Substance Use Topics    Alcohol use: Yes     Comment: rarely         ROS  Eyes - no sudden vision change or amaurosis.  Respiratory - no significant shortness of breath,  Cardiovascular - no chest pain or syncope.  No  significant leg swelling.  No claudication.  Musculoskeletal - no gait disturbance  Skin -has new wound.  Neurologic -  No speech difficulty or lateralizing weakness.  All other review of systems are negative.    Physical Exam    BP 99/64 (Site: Right Upper Arm, Position: Sitting, Cuff Size: Medium Adult)   Pulse 74   Temp 97.7 °F (36.5 °C)   SpO2 98%       Neck- carotid pulses 2+ to palpation with no bruit  Cardiovascular - Regular rate and rhythm.    Pulmonary - effort appears normal.  No respiratory distress.    Lungs - Breath sounds normal. No wheezes or rales.    Extremities -  Radial and brachial pulses are 2+ to palpation bilaterally.  Right femoral pulse: present 2+; Right popliteal pulse: absent Right DP: absent; Right PT absent; Left femoral pulse: present 2+; Left popliteal pulse: absent; Left DP: absent; Left PT: absent No cyanosis, clubbing, or significant edema.  No signs atheroembolic

## 2024-06-20 NOTE — H&P
Vonda Lutz (:  1970) is a 53 y.o. female,New patient, here for evaluation of the following chief complaint(s):  Established patient for pvd            SUBJECTIVE/OBJECTIVE:  Vonda has a history of peripheral vascular disease of the lower extremities.  She has had this for < 1 year. Current treatment includes none.  Vonda has new wounds. Recently, she reports claudication at a distance of   feet.  Vonda reports that the left leg is more significant than the right.  She reports claudication is worsened and is mostly in the form of crampy type pain starting in the hips, thighs, and calves. She has a short recovery time. This is reproducible in nature. She reports ischemic rest pain 0 times per night.  She reports walking with cart does not help.  Has had wound since may or .  Admits to scratching herself.  Took herself off statins.  Smokes.  Is still going to wound care    I have personally reviewed the following: problem list, current meds, allergies, PMH, PSH, family hx, and social hx  Vonda Lutz is a 54 y.o. female with the following history as recorded in [facility]:  Patient Active Problem List    Diagnosis Date Noted    Iron deficiency anemia secondary to inadequate dietary iron intake 2024    Iron malabsorption 2024    Peripheral vascular disease, unspecified ([facility]) 2024    Chronic ulcer of left foot, with fat layer exposed ([facility]) 2024    Chronic ulcer of left leg, with fat layer exposed ([facility]) 2024    Lymphedema 2024    PVD (peripheral vascular disease) ([facility]) 2024     Current Outpatient Medications   Medication Sig Dispense Refill    aspirin 81 MG EC tablet Take 1 tablet by mouth daily      citalopram (CELEXA) 40 MG tablet Take 1 tablet by mouth once daily 30 tablet 5    atorvastatin (LIPITOR) 10 MG tablet Take 1 tablet by mouth daily 30 tablet 3    Ferrous Sulfate Dried ER (SLOW RELEASE IRON) 45 MG TBCR Take 1 tablet by mouth daily 30 tablet 5

## 2024-06-20 NOTE — PROGRESS NOTES
Feraheme 510mg administered. Pt monitored for 30 minutes post infusion. Pt tolerated well.     Electronically signed by Michelle Prado RN on 6/20/2024 at 3:01 PM

## 2024-06-20 NOTE — TELEPHONE ENCOUNTER
Patient asking if she should hold her potassium supplement as her BMP from Yesterday shows elevated level. She is having surgery on 6/26/2024.  Please advise.

## 2024-06-23 LAB
EKG P AXIS: 74 DEGREES
EKG P-R INTERVAL: 124 MS
EKG Q-T INTERVAL: 414 MS
EKG QRS DURATION: 78 MS
EKG QTC CALCULATION (BAZETT): 428 MS
EKG T AXIS: 68 DEGREES

## 2024-06-26 ENCOUNTER — APPOINTMENT (OUTPATIENT)
Dept: INTERVENTIONAL RADIOLOGY/VASCULAR | Age: 54
End: 2024-06-26
Attending: SURGERY
Payer: MEDICAID

## 2024-06-26 ENCOUNTER — ANESTHESIA (OUTPATIENT)
Dept: OPERATING ROOM | Age: 54
End: 2024-06-26
Payer: MEDICAID

## 2024-06-26 ENCOUNTER — ANESTHESIA EVENT (OUTPATIENT)
Dept: OPERATING ROOM | Age: 54
End: 2024-06-26
Payer: MEDICAID

## 2024-06-26 ENCOUNTER — HOSPITAL ENCOUNTER (INPATIENT)
Age: 54
LOS: 1 days | Discharge: HOME | End: 2024-06-28
Attending: SURGERY | Admitting: SURGERY
Payer: MEDICAID

## 2024-06-26 DIAGNOSIS — I74.09 AORTOILIAC OCCLUSIVE DISEASE (HCC): Primary | ICD-10-CM

## 2024-06-26 LAB
ABO/RH: NORMAL
ANION GAP SERPL CALCULATED.3IONS-SCNC: 12 MMOL/L (ref 7–19)
ANTIBODY SCREEN: NORMAL
BUN SERPL-MCNC: 20 MG/DL (ref 6–20)
CALCIUM SERPL-MCNC: 9.1 MG/DL (ref 8.6–10)
CHLORIDE SERPL-SCNC: 98 MMOL/L (ref 98–111)
CO2 SERPL-SCNC: 27 MMOL/L (ref 22–29)
CREAT SERPL-MCNC: 0.9 MG/DL (ref 0.5–0.9)
GLUCOSE SERPL-MCNC: 124 MG/DL (ref 74–109)
POTASSIUM SERPL-SCNC: 3.4 MMOL/L (ref 3.5–4.9)
SODIUM SERPL-SCNC: 137 MMOL/L (ref 136–145)

## 2024-06-26 PROCEDURE — C1893 INTRO/SHEATH, FIXED,NON-PEEL: HCPCS | Performed by: SURGERY

## 2024-06-26 PROCEDURE — 3700000000 HC ANESTHESIA ATTENDED CARE: Performed by: SURGERY

## 2024-06-26 PROCEDURE — 6360000002 HC RX W HCPCS: Performed by: SURGERY

## 2024-06-26 PROCEDURE — 2580000003 HC RX 258: Performed by: SURGERY

## 2024-06-26 PROCEDURE — 7100000000 HC PACU RECOVERY - FIRST 15 MIN: Performed by: SURGERY

## 2024-06-26 PROCEDURE — 36415 COLL VENOUS BLD VENIPUNCTURE: CPT

## 2024-06-26 PROCEDURE — 86900 BLOOD TYPING SEROLOGIC ABO: CPT

## 2024-06-26 PROCEDURE — 34705 EVAC RPR A-BIILIAC NDGFT: CPT | Performed by: SURGERY

## 2024-06-26 PROCEDURE — 86850 RBC ANTIBODY SCREEN: CPT

## 2024-06-26 PROCEDURE — C1773 RET DEV, INSERTABLE: HCPCS | Performed by: SURGERY

## 2024-06-26 PROCEDURE — 6360000002 HC RX W HCPCS: Performed by: NURSE ANESTHETIST, CERTIFIED REGISTERED

## 2024-06-26 PROCEDURE — C1725 CATH, TRANSLUMIN NON-LASER: HCPCS | Performed by: SURGERY

## 2024-06-26 PROCEDURE — 047L3Z1 DILATION OF LEFT FEMORAL ARTERY USING DRUG-COATED BALLOON, PERCUTANEOUS APPROACH: ICD-10-PCS | Performed by: SURGERY

## 2024-06-26 PROCEDURE — 94760 N-INVAS EAR/PLS OXIMETRY 1: CPT

## 2024-06-26 PROCEDURE — 86901 BLOOD TYPING SEROLOGIC RH(D): CPT

## 2024-06-26 PROCEDURE — 047K3Z1 DILATION OF RIGHT FEMORAL ARTERY USING DRUG-COATED BALLOON, PERCUTANEOUS APPROACH: ICD-10-PCS | Performed by: SURGERY

## 2024-06-26 PROCEDURE — 2780000010 HC IMPLANT OTHER: Performed by: SURGERY

## 2024-06-26 PROCEDURE — 3600000017 HC SURGERY HYBRID ADDL 15MIN: Performed by: SURGERY

## 2024-06-26 PROCEDURE — C1768 GRAFT, VASCULAR: HCPCS | Performed by: SURGERY

## 2024-06-26 PROCEDURE — 2500000003 HC RX 250 WO HCPCS: Performed by: NURSE ANESTHETIST, CERTIFIED REGISTERED

## 2024-06-26 PROCEDURE — 04HA3DZ INSERTION OF INTRALUMINAL DEVICE INTO LEFT RENAL ARTERY, PERCUTANEOUS APPROACH: ICD-10-PCS | Performed by: SURGERY

## 2024-06-26 PROCEDURE — 7100000001 HC PACU RECOVERY - ADDTL 15 MIN: Performed by: SURGERY

## 2024-06-26 PROCEDURE — 3700000001 HC ADD 15 MINUTES (ANESTHESIA): Performed by: SURGERY

## 2024-06-26 PROCEDURE — 80048 BASIC METABOLIC PNL TOTAL CA: CPT

## 2024-06-26 PROCEDURE — 2580000003 HC RX 258: Performed by: NURSE ANESTHETIST, CERTIFIED REGISTERED

## 2024-06-26 PROCEDURE — C1887 CATHETER, GUIDING: HCPCS | Performed by: SURGERY

## 2024-06-26 PROCEDURE — 04H93DZ INSERTION OF INTRALUMINAL DEVICE INTO RIGHT RENAL ARTERY, PERCUTANEOUS APPROACH: ICD-10-PCS | Performed by: SURGERY

## 2024-06-26 PROCEDURE — 34713 PERQ ACCESS & CLSR FEM ART: CPT | Performed by: SURGERY

## 2024-06-26 PROCEDURE — A4217 STERILE WATER/SALINE, 500 ML: HCPCS | Performed by: SURGERY

## 2024-06-26 PROCEDURE — C1769 GUIDE WIRE: HCPCS | Performed by: SURGERY

## 2024-06-26 PROCEDURE — B41G1ZZ FLUOROSCOPY OF LEFT LOWER EXTREMITY ARTERIES USING LOW OSMOLAR CONTRAST: ICD-10-PCS | Performed by: SURGERY

## 2024-06-26 PROCEDURE — 2580000003 HC RX 258: Performed by: ANESTHESIOLOGY

## 2024-06-26 PROCEDURE — C1894 INTRO/SHEATH, NON-LASER: HCPCS | Performed by: SURGERY

## 2024-06-26 PROCEDURE — 94150 VITAL CAPACITY TEST: CPT

## 2024-06-26 PROCEDURE — 6360000004 HC RX CONTRAST MEDICATION: Performed by: SURGERY

## 2024-06-26 PROCEDURE — 6360000002 HC RX W HCPCS: Performed by: NURSE PRACTITIONER

## 2024-06-26 PROCEDURE — 2709999900 HC NON-CHARGEABLE SUPPLY: Performed by: SURGERY

## 2024-06-26 PROCEDURE — B41F1ZZ FLUOROSCOPY OF RIGHT LOWER EXTREMITY ARTERIES USING LOW OSMOLAR CONTRAST: ICD-10-PCS | Performed by: SURGERY

## 2024-06-26 PROCEDURE — 3600000007 HC SURGERY HYBRID BASE: Performed by: SURGERY

## 2024-06-26 PROCEDURE — C2628 CATHETER, OCCLUSION: HCPCS | Performed by: SURGERY

## 2024-06-26 PROCEDURE — B41D1ZZ FLUOROSCOPY OF AORTA AND BILATERAL LOWER EXTREMITY ARTERIES USING LOW OSMOLAR CONTRAST: ICD-10-PCS | Performed by: SURGERY

## 2024-06-26 PROCEDURE — C1760 CLOSURE DEV, VASC: HCPCS | Performed by: SURGERY

## 2024-06-26 PROCEDURE — G0378 HOSPITAL OBSERVATION PER HR: HCPCS

## 2024-06-26 DEVICE — IMPLANTABLE DEVICE: Type: IMPLANTABLE DEVICE | Status: FUNCTIONAL

## 2024-06-26 RX ORDER — SODIUM CHLORIDE 9 MG/ML
INJECTION, SOLUTION INTRAVENOUS PRN
Status: DISCONTINUED | OUTPATIENT
Start: 2024-06-26 | End: 2024-06-26 | Stop reason: HOSPADM

## 2024-06-26 RX ORDER — SODIUM CHLORIDE 0.9 % (FLUSH) 0.9 %
5-40 SYRINGE (ML) INJECTION EVERY 12 HOURS SCHEDULED
Status: DISCONTINUED | OUTPATIENT
Start: 2024-06-26 | End: 2024-06-26 | Stop reason: HOSPADM

## 2024-06-26 RX ORDER — SODIUM CHLORIDE 0.9 % (FLUSH) 0.9 %
5-40 SYRINGE (ML) INJECTION EVERY 12 HOURS SCHEDULED
Status: DISCONTINUED | OUTPATIENT
Start: 2024-06-26 | End: 2024-06-28 | Stop reason: HOSPADM

## 2024-06-26 RX ORDER — SODIUM CHLORIDE, SODIUM LACTATE, POTASSIUM CHLORIDE, CALCIUM CHLORIDE 600; 310; 30; 20 MG/100ML; MG/100ML; MG/100ML; MG/100ML
INJECTION, SOLUTION INTRAVENOUS CONTINUOUS
Status: DISCONTINUED | OUTPATIENT
Start: 2024-06-26 | End: 2024-06-26 | Stop reason: HOSPADM

## 2024-06-26 RX ORDER — ASPIRIN 81 MG/1
81 TABLET ORAL DAILY
Status: DISCONTINUED | OUTPATIENT
Start: 2024-06-27 | End: 2024-06-28 | Stop reason: HOSPADM

## 2024-06-26 RX ORDER — FENTANYL CITRATE 50 UG/ML
INJECTION, SOLUTION INTRAMUSCULAR; INTRAVENOUS PRN
Status: DISCONTINUED | OUTPATIENT
Start: 2024-06-26 | End: 2024-06-26 | Stop reason: SDUPTHER

## 2024-06-26 RX ORDER — NALOXONE HYDROCHLORIDE 0.4 MG/ML
INJECTION, SOLUTION INTRAMUSCULAR; INTRAVENOUS; SUBCUTANEOUS PRN
Status: DISCONTINUED | OUTPATIENT
Start: 2024-06-26 | End: 2024-06-26 | Stop reason: HOSPADM

## 2024-06-26 RX ORDER — CETIRIZINE HYDROCHLORIDE 10 MG/1
10 TABLET ORAL DAILY PRN
Status: DISCONTINUED | OUTPATIENT
Start: 2024-06-26 | End: 2024-06-28 | Stop reason: HOSPADM

## 2024-06-26 RX ORDER — ROCURONIUM BROMIDE 10 MG/ML
INJECTION, SOLUTION INTRAVENOUS PRN
Status: DISCONTINUED | OUTPATIENT
Start: 2024-06-26 | End: 2024-06-26 | Stop reason: SDUPTHER

## 2024-06-26 RX ORDER — IODIXANOL 320 MG/ML
INJECTION, SOLUTION INTRAVASCULAR PRN
Status: DISCONTINUED | OUTPATIENT
Start: 2024-06-26 | End: 2024-06-26 | Stop reason: HOSPADM

## 2024-06-26 RX ORDER — CITALOPRAM 20 MG/1
40 TABLET ORAL DAILY
Status: DISCONTINUED | OUTPATIENT
Start: 2024-06-27 | End: 2024-06-28 | Stop reason: HOSPADM

## 2024-06-26 RX ORDER — ASPIRIN 81 MG/1
81 TABLET ORAL ONCE
Status: DISCONTINUED | OUTPATIENT
Start: 2024-06-26 | End: 2024-06-26 | Stop reason: HOSPADM

## 2024-06-26 RX ORDER — PROPOFOL 10 MG/ML
INJECTION, EMULSION INTRAVENOUS PRN
Status: DISCONTINUED | OUTPATIENT
Start: 2024-06-26 | End: 2024-06-26 | Stop reason: SDUPTHER

## 2024-06-26 RX ORDER — UREA 10 %
45 LOTION (ML) TOPICAL DAILY
Status: DISCONTINUED | OUTPATIENT
Start: 2024-06-27 | End: 2024-06-28 | Stop reason: HOSPADM

## 2024-06-26 RX ORDER — CLONIDINE HYDROCHLORIDE 0.1 MG/1
0.1 TABLET ORAL PRN
Status: DISCONTINUED | OUTPATIENT
Start: 2024-06-26 | End: 2024-06-26 | Stop reason: HOSPADM

## 2024-06-26 RX ORDER — SODIUM CHLORIDE 0.9 % (FLUSH) 0.9 %
5-40 SYRINGE (ML) INJECTION PRN
Status: DISCONTINUED | OUTPATIENT
Start: 2024-06-26 | End: 2024-06-28 | Stop reason: HOSPADM

## 2024-06-26 RX ORDER — SODIUM CHLORIDE, SODIUM LACTATE, POTASSIUM CHLORIDE, CALCIUM CHLORIDE 600; 310; 30; 20 MG/100ML; MG/100ML; MG/100ML; MG/100ML
INJECTION, SOLUTION INTRAVENOUS CONTINUOUS PRN
Status: DISCONTINUED | OUTPATIENT
Start: 2024-06-26 | End: 2024-06-26 | Stop reason: SDUPTHER

## 2024-06-26 RX ORDER — SODIUM CHLORIDE 9 MG/ML
INJECTION, SOLUTION INTRAVENOUS CONTINUOUS
Status: DISCONTINUED | OUTPATIENT
Start: 2024-06-26 | End: 2024-06-28 | Stop reason: HOSPADM

## 2024-06-26 RX ORDER — MORPHINE SULFATE 2 MG/ML
2 INJECTION, SOLUTION INTRAMUSCULAR; INTRAVENOUS EVERY 5 MIN PRN
Status: DISCONTINUED | OUTPATIENT
Start: 2024-06-26 | End: 2024-06-26 | Stop reason: HOSPADM

## 2024-06-26 RX ORDER — ONDANSETRON 2 MG/ML
4 INJECTION INTRAMUSCULAR; INTRAVENOUS EVERY 6 HOURS PRN
Status: DISCONTINUED | OUTPATIENT
Start: 2024-06-26 | End: 2024-06-28 | Stop reason: HOSPADM

## 2024-06-26 RX ORDER — LIDOCAINE HYDROCHLORIDE 10 MG/ML
INJECTION, SOLUTION INFILTRATION; PERINEURAL PRN
Status: DISCONTINUED | OUTPATIENT
Start: 2024-06-26 | End: 2024-06-26 | Stop reason: SDUPTHER

## 2024-06-26 RX ORDER — HEPARIN SODIUM 1000 [USP'U]/ML
INJECTION, SOLUTION INTRAVENOUS; SUBCUTANEOUS PRN
Status: DISCONTINUED | OUTPATIENT
Start: 2024-06-26 | End: 2024-06-26 | Stop reason: SDUPTHER

## 2024-06-26 RX ORDER — SODIUM CHLORIDE 9 MG/ML
INJECTION, SOLUTION INTRAVENOUS PRN
Status: DISCONTINUED | OUTPATIENT
Start: 2024-06-26 | End: 2024-06-28 | Stop reason: HOSPADM

## 2024-06-26 RX ORDER — MORPHINE SULFATE 2 MG/ML
1 INJECTION, SOLUTION INTRAMUSCULAR; INTRAVENOUS EVERY 5 MIN PRN
Status: DISCONTINUED | OUTPATIENT
Start: 2024-06-26 | End: 2024-06-26 | Stop reason: HOSPADM

## 2024-06-26 RX ORDER — ONDANSETRON 2 MG/ML
4 INJECTION INTRAMUSCULAR; INTRAVENOUS
Status: DISCONTINUED | OUTPATIENT
Start: 2024-06-26 | End: 2024-06-26 | Stop reason: HOSPADM

## 2024-06-26 RX ORDER — HYDROCODONE BITARTRATE AND ACETAMINOPHEN 5; 325 MG/1; MG/1
1 TABLET ORAL EVERY 4 HOURS PRN
Status: DISCONTINUED | OUTPATIENT
Start: 2024-06-26 | End: 2024-06-28 | Stop reason: HOSPADM

## 2024-06-26 RX ORDER — SODIUM CHLORIDE 0.9 % (FLUSH) 0.9 %
5-40 SYRINGE (ML) INJECTION PRN
Status: DISCONTINUED | OUTPATIENT
Start: 2024-06-26 | End: 2024-06-26 | Stop reason: HOSPADM

## 2024-06-26 RX ORDER — ONDANSETRON 2 MG/ML
INJECTION INTRAMUSCULAR; INTRAVENOUS PRN
Status: DISCONTINUED | OUTPATIENT
Start: 2024-06-26 | End: 2024-06-26 | Stop reason: SDUPTHER

## 2024-06-26 RX ORDER — FEXOFENADINE HCL 60 MG/1
60 TABLET, FILM COATED ORAL 2 TIMES DAILY PRN
Status: DISCONTINUED | OUTPATIENT
Start: 2024-06-26 | End: 2024-06-26

## 2024-06-26 RX ORDER — ACETAMINOPHEN 325 MG/1
650 TABLET ORAL EVERY 6 HOURS PRN
Status: DISCONTINUED | OUTPATIENT
Start: 2024-06-26 | End: 2024-06-28 | Stop reason: HOSPADM

## 2024-06-26 RX ORDER — EPHEDRINE SULFATE/0.9% NACL/PF 25 MG/5 ML
SYRINGE (ML) INTRAVENOUS PRN
Status: DISCONTINUED | OUTPATIENT
Start: 2024-06-26 | End: 2024-06-26 | Stop reason: SDUPTHER

## 2024-06-26 RX ORDER — ATORVASTATIN CALCIUM 20 MG/1
10 TABLET, FILM COATED ORAL DAILY
Status: DISCONTINUED | OUTPATIENT
Start: 2024-06-27 | End: 2024-06-28 | Stop reason: HOSPADM

## 2024-06-26 RX ADMIN — SODIUM CHLORIDE, SODIUM LACTATE, POTASSIUM CHLORIDE, AND CALCIUM CHLORIDE: 600; 310; 30; 20 INJECTION, SOLUTION INTRAVENOUS at 08:07

## 2024-06-26 RX ADMIN — FENTANYL CITRATE 50 MCG: 0.05 INJECTION, SOLUTION INTRAMUSCULAR; INTRAVENOUS at 10:22

## 2024-06-26 RX ADMIN — SODIUM CHLORIDE: 9 INJECTION, SOLUTION INTRAVENOUS at 14:15

## 2024-06-26 RX ADMIN — SODIUM CHLORIDE: 9 INJECTION, SOLUTION INTRAVENOUS at 23:56

## 2024-06-26 RX ADMIN — SODIUM CHLORIDE, POTASSIUM CHLORIDE, SODIUM LACTATE AND CALCIUM CHLORIDE: 600; 310; 30; 20 INJECTION, SOLUTION INTRAVENOUS at 07:30

## 2024-06-26 RX ADMIN — Medication 1000 MG: at 07:39

## 2024-06-26 RX ADMIN — EPHEDRINE SULFATE 10 MG: 5 INJECTION INTRAVENOUS at 08:47

## 2024-06-26 RX ADMIN — ONDANSETRON 4 MG: 2 INJECTION INTRAMUSCULAR; INTRAVENOUS at 10:22

## 2024-06-26 RX ADMIN — ROCURONIUM BROMIDE 50 MG: 10 INJECTION, SOLUTION INTRAVENOUS at 08:13

## 2024-06-26 RX ADMIN — HEPARIN SODIUM 2000 UNITS: 1000 INJECTION, SOLUTION INTRAVENOUS; SUBCUTANEOUS at 09:06

## 2024-06-26 RX ADMIN — FENTANYL CITRATE 50 MCG: 0.05 INJECTION, SOLUTION INTRAMUSCULAR; INTRAVENOUS at 08:13

## 2024-06-26 RX ADMIN — PHENYLEPHRINE HYDROCHLORIDE 100 MCG: 10 INJECTION INTRAVENOUS at 08:23

## 2024-06-26 RX ADMIN — PROPOFOL 40 MG: 10 INJECTION, EMULSION INTRAVENOUS at 08:15

## 2024-06-26 RX ADMIN — PHENYLEPHRINE HYDROCHLORIDE 200 MCG: 10 INJECTION INTRAVENOUS at 08:45

## 2024-06-26 RX ADMIN — LIDOCAINE HYDROCHLORIDE 20 MG: 10 INJECTION, SOLUTION INFILTRATION; PERINEURAL at 08:13

## 2024-06-26 RX ADMIN — PROPOFOL 100 MG: 10 INJECTION, EMULSION INTRAVENOUS at 08:13

## 2024-06-26 RX ADMIN — MORPHINE SULFATE 2 MG: 2 INJECTION, SOLUTION INTRAMUSCULAR; INTRAVENOUS at 11:56

## 2024-06-26 SDOH — ECONOMIC STABILITY: FOOD INSECURITY: WITHIN THE PAST 12 MONTHS, YOU WORRIED THAT YOUR FOOD WOULD RUN OUT BEFORE YOU GOT MONEY TO BUY MORE.: NEVER TRUE

## 2024-06-26 SDOH — ECONOMIC STABILITY: INCOME INSECURITY: IN THE PAST 12 MONTHS, HAS THE ELECTRIC, GAS, OIL, OR WATER COMPANY THREATENED TO SHUT OFF SERVICE IN YOUR HOME?: NO

## 2024-06-26 SDOH — ECONOMIC STABILITY: INCOME INSECURITY: HOW HARD IS IT FOR YOU TO PAY FOR THE VERY BASICS LIKE FOOD, HOUSING, MEDICAL CARE, AND HEATING?: NOT HARD AT ALL

## 2024-06-26 ASSESSMENT — PAIN DESCRIPTION - DESCRIPTORS: DESCRIPTORS: ACHING

## 2024-06-26 ASSESSMENT — LIFESTYLE VARIABLES: SMOKING_STATUS: 1

## 2024-06-26 ASSESSMENT — PAIN SCALES - GENERAL: PAINLEVEL_OUTOF10: 7

## 2024-06-26 ASSESSMENT — PAIN - FUNCTIONAL ASSESSMENT: PAIN_FUNCTIONAL_ASSESSMENT: NONE - DENIES PAIN

## 2024-06-26 ASSESSMENT — PATIENT HEALTH QUESTIONNAIRE - PHQ9
2. FEELING DOWN, DEPRESSED OR HOPELESS: NOT AT ALL
1. LITTLE INTEREST OR PLEASURE IN DOING THINGS: NOT AT ALL
SUM OF ALL RESPONSES TO PHQ QUESTIONS 1-9: 0
SUM OF ALL RESPONSES TO PHQ9 QUESTIONS 1 & 2: 0

## 2024-06-26 ASSESSMENT — PAIN DESCRIPTION - ORIENTATION: ORIENTATION: RIGHT;LEFT

## 2024-06-26 ASSESSMENT — PAIN DESCRIPTION - LOCATION: LOCATION: GROIN

## 2024-06-26 NOTE — PROGRESS NOTES
4 Eyes Skin Assessment     NAME:  Vonda Lutz  YOB: 1970  MEDICAL RECORD NUMBER:  [id number]    The patient is being assessed for  Post-Op Surgical    I agree that at least one RN has performed a thorough Head to Toe Skin Assessment on the patient. ALL assessment sites listed below have been assessed.      Areas assessed by both nurses:    Head, Face, Ears, Shoulders, Back, Chest, Arms, Elbows, Hands, Sacrum. Buttock, Coccyx, Ischium, and Legs. Feet and Heels        Does the Patient have a Wound? Yes wound(s) were present on assessment. LDA wound assessment was Initiated and completed by RN       Jagjit Prevention initiated by RN: Yes  Wound Care Orders initiated by RN: Yes    Pressure Injury (Stage 3,4, Unstageable, DTI, NWPT, and Complex wounds) if present, place Wound referral order by RN under : No    New Ostomies, if present place, Ostomy referral order under : No     Nurse 1 eSignature: Electronically signed by Meseret Bansal RN on 6/26/24 at 5:52 PM CDT    **SHARE this note so that the co-signing nurse can place an eSignature**    Nurse 2 eSignature: {Esignature:[id number]}

## 2024-06-26 NOTE — OP NOTE
Operative Note      Patient: Vonda Lutz  YOB: 1970  MRN: [id number]    Date of Procedure: 6/26/2024    Preop diagnosis: Aortoiliac disease with high-grade stenosis of distal aorta    Post-Op Diagnosis: Same       Procedure: Endovascular repair of aortoiliac disease using Endologix stent   graft   Surgeon(s):  Kimberly Figueroa DO    Assistant:   * No surgical staff found *    Anesthesia: General    Estimated Blood Loss (mL): 150    Complications: None    Specimens:   * No specimens in log *    Implants:  Implant Name Type Inv. Item Serial No.  Lot No. LRB No. Used Action   GRAFT EVAR L60MM [id number] LIMB L40MM [id number] BIFUR STNT AFX2 - [id number] Endografts GRAFT EVAR L60MM [id number] LIMB L40MM [id number] BIFUR STNT AFX2 [id number] ENDOLOGIX-WD   1 Implanted   GRAFT STENT EXT ILIAC 14FR [id number] - [id number] Stent:Biliary/Pancreatic/Iliac GRAFT STENT EXT ILIAC 14FR [id number]  ENDOLOGIX-PMM [id number] N/A 1 Implanted         Drains:   Urinary Catheter 06/26/24 2 Way (Active)     Findings:   1.  Successful stent graft repair of aortoiliac occlusive disease  2.  There was bilateral iliac arterial stenosis   3.  There was flow to bilateral renal arteries after endograft   4.  There was no endoleak       Procedure in detail:    After the patient was consented and given intravenous antibiotics, she was brought to the hybrid operating room and placed on the operating room table in the supine position.  General endotracheal anesthesia was achieved.  The patient's abdomen and bilateral groins were then prepped and draped in the usual sterile fashion.    Ultrasound and fluoroscopic guidance was used to cannulate bilateral common femoral arteries with a micropuncture needle, and utilized seldinger technique, a 5 Emirati sheath is placed in the right femoral artery (ipsilateral) and a 7 Emir[place] sheath in the left femoral artery (contralateral). Bilateral 0.035 wires were then placed into the suprarenal

## 2024-06-26 NOTE — ANESTHESIA POSTPROCEDURE EVALUATION
Department of Anesthesiology  Postprocedure Note    Patient: Vonda Lutz  MRN: [id number]  YOB: 1970  Date of evaluation: 6/26/2024    Procedure Summary       Date: 06/26/24 Room / Location: [facility] OR [address]    Anesthesia Start: 0807 Anesthesia Stop: 1042    Procedure: INTRAOPERATIVE ANGIOGRAM, POSSIBLE ANGIOPLASTY, ATHERECTOMY, STENT ENDOVASCULAR REPAIR. (Bilateral) Diagnosis:       Peripheral vascular disease, unspecified (HCC)      (Peripheral vascular disease, unspecified (HCC) [I73.9])    Surgeons: Kimberly Figueroa DO Responsible Provider: Anoop Reynoso APRN - CRNA    Anesthesia Type: general, TIVA ASA Status: 3            Anesthesia Type: No value filed.    Pillo Phase I: Pillo Score: 10    Pillo Phase II:      Anesthesia Post Evaluation    Patient location during evaluation: PACU  Patient participation: complete - patient participated  Level of consciousness: sleepy but conscious  Pain score: 0  Airway patency: patent  Nausea & Vomiting: no nausea and no vomiting  Cardiovascular status: blood pressure returned to baseline  Respiratory status: acceptable  Pain management: adequate        No notable events documented.

## 2024-06-26 NOTE — INTERVAL H&P NOTE
Update History & Physical    The patient's History and Physical of June 20, 2024 was reviewed with the patient and I examined the patient. There was no change. The surgical site was confirmed by the patient and me.     Plan: The risks, benefits, expected outcome, and alternative to the recommended procedure have been discussed with the patient. Patient understands and wants to proceed with the procedure.     Electronically signed by Kimberly Figueroa DO on 6/26/2024 at 7:41 AM

## 2024-06-26 NOTE — ANESTHESIA PRE PROCEDURE
AM    RDW 14.3 06/19/2024 10:00 AM     06/19/2024 10:00 AM       CMP:   Lab Results   Component Value Date/Time     06/19/2024 10:00 AM    K 5.4 06/19/2024 10:00 AM     06/19/2024 10:00 AM    CO2 24 06/19/2024 10:00 AM    BUN 13 06/19/2024 10:00 AM    CREATININE 0.9 06/19/2024 10:00 AM    LABGLOM 75 06/19/2024 10:00 AM    LABGLOM >90 04/02/2024 03:56 PM    GLUCOSE 94 06/19/2024 10:00 AM    CALCIUM 9.0 06/19/2024 10:00 AM    BILITOT 0.5 06/04/2024 03:01 PM    ALKPHOS 140 06/04/2024 03:01 PM    AST 27 06/04/2024 03:01 PM    ALT 10 06/04/2024 03:01 PM       POC Tests: No results for input(s): \"POCGLU\", \"POCNA\", \"POCK\", \"POCCL\", \"POCBUN\", \"POCHEMO\", \"POCHCT\" in the last 72 hours.    Coags:   Lab Results   Component Value Date/Time    PROTIME 14.1 06/19/2024 10:00 AM    INR 1.12 06/19/2024 10:00 AM    APTT 35.2 06/19/2024 10:00 AM       HCG (If Applicable): No results found for: \"PREGTESTUR\", \"PREGSERUM\", \"HCG\", \"HCGQUANT\"     ABGs: No results found for: \"PHART\", \"PO2ART\", \"QTN9XIB\", \"BFR3TEQ\", \"BEART\", \"X1DUHQUP\"     Type & Screen (If Applicable):  No results found for: \"LABABO\"    Drug/Infectious Status (If Applicable):  Lab Results   Component Value Date/Time    HIV Non-reactive 08/18/2023 12:44 PM       COVID-19 Screening (If Applicable): No results found for: \"COVID19\"        Anesthesia Evaluation  Patient summary reviewed  Airway: Mallampati: I  TM distance: >3 FB   Neck ROM: full  Mouth opening: > = 3 FB   Dental: normal exam         Pulmonary:normal exam    (+)           current smoker          Patient smoked on day of surgery.                ROS comment: Current tobacco user   Cardiovascular:  Exercise tolerance: no interval change  (+) hypertension:, CAD:, hyperlipidemia         Beta Blocker:  Not on Beta Blocker         Neuro/Psych:                ROS comment: Corneal transplant GI/Hepatic/Renal:            ROS comment: Etoh use per chart  2018 TIF/fundoplication .   Endo/Other:    (+) blood

## 2024-06-26 NOTE — DISCHARGE INSTRUCTIONS
POST ANGIOGRAM / ENDOVASCULAR AORTIC STENTING INSTRUCTIONS    1.  You will be on bedrest the day of your procedure, up around the house and to the bathroom only on the day after your procedure.  2.  You must be transported home by a responsible person after your procedure, YOU CANNOT DRIVE YOURSELF HOME.     3.  Do not drive for 1 WEEK  after discharge home.  4.  Drink extra fluids for 24 hours after the procedure to help flush the contrast used (you will make more urine) .  5.  Check the puncture siteS after each activity for bleeding or swelling.    6.  If bleeding occurs, apply pressure to site and call 911 or rush to the nearest emergency room (DO NOT drive yourself!).  7.  Resume normal non-strenuous activity 48 hours after discharge home.  8.  Bruising and soreness at the puncture site may occur, this will heal and clear after several weeks.    9.  Keep your leg (with puncture site) mostly straight while sitting or lying for the first 24 hours after your procedure.    10. No heavy lifting or straining (NO more than 10 pounds) for 1 WEEK after discharge.          11. Keep the bandage over the puncture site for 24 hours after discharge home.  You may remove the bandage and shower after 24 hours, place bandaids over puncture sites daily. NO TUB BATH, NO HOT TUB, NO SWIMMING FOR 2 WEEKS.  12.  Once a day for the next 7 days, look at the puncture site, call physician if you notice:  *  red and/or hot at the puncture site  *  bloody drainage, foul-smelling, yellowish or greenish drainage from the puncture site  *  a very large bruise under/arond the puncture site (often firm to touch)  *  numbness, tingling in foot/leg/or loss of motion/sensation in foot or leg  *  itching/hives on any part of your body; or nausea/vomiting after receiving the dye  13.  If your are taking Glucophage please restart:  Date:

## 2024-06-26 NOTE — PROGRESS NOTES
Pts dentures, glasses, and cellphone brought to me in pacu, belongings placed in a bag securely and are with the pt.    One arm of glasses secured with a safety pin, this is the condition upon receipt

## 2024-06-26 NOTE — PROGRESS NOTES
Vonda Lutz arrived to room # 324.   Presented with: EVAR  Mental Status: Patient is oriented, alert, and coherent.   Vitals:    06/26/24 1629   BP: (!) 121/59   Pulse: 82   Resp: 16   Temp: 97 °F (36.1 °C)   SpO2: 95%     Patient safety contract and falls prevention contract reviewed with patient Yes.  Oriented Patient and Family to room.  Call light within reach. Yes.  Needs, issues or concerns expressed at this time: no.      Electronically signed by Meseret Bansal RN on 6/26/2024 at 5:50 PM

## 2024-06-27 ENCOUNTER — APPOINTMENT (OUTPATIENT)
Dept: VASCULAR LAB | Age: 54
End: 2024-06-27
Attending: SURGERY
Payer: MEDICAID

## 2024-06-27 PROBLEM — I77.9 AORTO-ILIAC DISEASE (HCC): Status: ACTIVE | Noted: 2024-06-27

## 2024-06-27 PROBLEM — E43 SEVERE MALNUTRITION (HCC): Status: ACTIVE | Noted: 2024-06-27

## 2024-06-27 LAB
ANION GAP SERPL CALCULATED.3IONS-SCNC: 11 MMOL/L (ref 7–19)
BUN SERPL-MCNC: 11 MG/DL (ref 6–20)
CALCIUM SERPL-MCNC: 7.8 MG/DL (ref 8.6–10)
CHLORIDE SERPL-SCNC: 103 MMOL/L (ref 98–111)
CO2 SERPL-SCNC: 23 MMOL/L (ref 22–29)
CREAT SERPL-MCNC: 0.7 MG/DL (ref 0.5–0.9)
ERYTHROCYTE [DISTWIDTH] IN BLOOD BY AUTOMATED COUNT: 14.8 % (ref 11.5–14.5)
GLUCOSE SERPL-MCNC: 84 MG/DL (ref 74–109)
HCT VFR BLD AUTO: 27.4 % (ref 37–47)
HGB BLD-MCNC: 8.1 G/DL (ref 12–16)
MCH RBC QN AUTO: 25.9 PG (ref 27–31)
MCHC RBC AUTO-ENTMCNC: 29.6 G/DL (ref 33–37)
MCV RBC AUTO: 87.5 FL (ref 81–99)
PLATELET # BLD AUTO: 434 K/UL (ref 130–400)
PMV BLD AUTO: 9.9 FL (ref 9.4–12.3)
POTASSIUM SERPL-SCNC: 3.2 MMOL/L (ref 3.5–5)
RBC # BLD AUTO: 3.13 M/UL (ref 4.2–5.4)
SODIUM SERPL-SCNC: 137 MMOL/L (ref 136–145)
WBC # BLD AUTO: 10.8 K/UL (ref 4.8–10.8)

## 2024-06-27 PROCEDURE — 85027 COMPLETE CBC AUTOMATED: CPT

## 2024-06-27 PROCEDURE — 6360000002 HC RX W HCPCS: Performed by: SURGERY

## 2024-06-27 PROCEDURE — 94760 N-INVAS EAR/PLS OXIMETRY 1: CPT

## 2024-06-27 PROCEDURE — 36415 COLL VENOUS BLD VENIPUNCTURE: CPT

## 2024-06-27 PROCEDURE — 80048 BASIC METABOLIC PNL TOTAL CA: CPT

## 2024-06-27 PROCEDURE — 93922 UPR/L XTREMITY ART 2 LEVELS: CPT

## 2024-06-27 PROCEDURE — 2580000003 HC RX 258: Performed by: SURGERY

## 2024-06-27 PROCEDURE — 6370000000 HC RX 637 (ALT 250 FOR IP): Performed by: SURGERY

## 2024-06-27 PROCEDURE — 6370000000 HC RX 637 (ALT 250 FOR IP): Performed by: HOSPITALIST

## 2024-06-27 PROCEDURE — 1200000000 HC SEMI PRIVATE

## 2024-06-27 RX ORDER — POTASSIUM CHLORIDE 20 MEQ/1
40 TABLET, EXTENDED RELEASE ORAL PRN
Status: DISCONTINUED | OUTPATIENT
Start: 2024-06-27 | End: 2024-06-28 | Stop reason: HOSPADM

## 2024-06-27 RX ORDER — BISMUTH TRIBROMOPH/PETROLATUM 5"X9"
1 BANDAGE TOPICAL DAILY
Status: DISCONTINUED | OUTPATIENT
Start: 2024-06-27 | End: 2024-06-28 | Stop reason: HOSPADM

## 2024-06-27 RX ORDER — POTASSIUM CHLORIDE 7.45 MG/ML
10 INJECTION INTRAVENOUS PRN
Status: DISCONTINUED | OUTPATIENT
Start: 2024-06-27 | End: 2024-06-28 | Stop reason: HOSPADM

## 2024-06-27 RX ADMIN — SODIUM CHLORIDE: 9 INJECTION, SOLUTION INTRAVENOUS at 06:23

## 2024-06-27 RX ADMIN — Medication 1 EACH: at 13:43

## 2024-06-27 RX ADMIN — ONDANSETRON 4 MG: 2 INJECTION INTRAMUSCULAR; INTRAVENOUS at 21:41

## 2024-06-27 RX ADMIN — SODIUM CHLORIDE, PRESERVATIVE FREE 10 ML: 5 INJECTION INTRAVENOUS at 21:40

## 2024-06-27 RX ADMIN — ASPIRIN 81 MG: 81 TABLET, COATED ORAL at 08:57

## 2024-06-27 RX ADMIN — ATORVASTATIN CALCIUM 10 MG: 20 TABLET, FILM COATED ORAL at 08:57

## 2024-06-27 RX ADMIN — CITALOPRAM HYDROBROMIDE 40 MG: 20 TABLET ORAL at 08:57

## 2024-06-27 RX ADMIN — POTASSIUM CHLORIDE 40 MEQ: 1500 TABLET, EXTENDED RELEASE ORAL at 06:18

## 2024-06-27 NOTE — PROGRESS NOTES
Comprehensive Nutrition Assessment    Type and Reason for Visit:  Initial    Nutrition Recommendations/Plan:   Follow for po intake, willingness to try ONS, weight and labs     Malnutrition Assessment:  Malnutrition Status:  Severe malnutrition (06/27/24 1054)    Context:  Chronic Illness     Findings of the 6 clinical characteristics of malnutrition:  Energy Intake:  Mild decrease in energy intake (Comment)  Weight Loss:  Greater than 10% over 6 months     Body Fat Loss:  Severe body fat loss Buccal region, Triceps, Orbital   Muscle Mass Loss:  Severe muscle mass loss Temples (temporalis), Clavicles (pectoralis & deltoids)  Fluid Accumulation:  Severe Extremities   Strength:  Not Performed    Nutrition Assessment:    Following patient for low BMI = 16.  Pt is receiving a Regular diet.  Refusing breakfast--\"I'm just not hungry.\"  Nursing aware--in room at time of visit.  No preferences voiced or desire for ONS. Pt has lost 10# or 11.4% of UBW since 12/12/2023 (6 months).    Nutrition Related Findings:    +2 pitting BLE edema    K+ 3.2 Wound Type:  (venous)       Current Nutrition Intake & Therapies:    Average Meal Intake: 0%  Average Supplements Intake: None Ordered  ADULT DIET; Regular    Anthropometric Measures:  Height: 149.9 cm (4' 11.02\")  Ideal Body Weight (IBW): 95 lbs (43 kg)    Admission Body Weight: 35.8 kg (79 lb)  Current Body Weight: 35.8 kg (78 lb 14.8 oz), 83.1 % IBW.    Current BMI (kg/m2): 15.9  Usual Body Weight: 40.4 kg (89 lb 1.1 oz) (3/1/2024 & 12/12/2023)  % Weight Change (Calculated): -11.4  BMI Categories: Underweight (BMI less than 18.5)    Estimated Daily Nutrient Needs:  Energy Requirements Based On: Kcal/kg  Weight Used for Energy Requirements: Current  Energy (kcal/day): 9747-5253 kcals (30-35 kcals/kg)  Weight Used for Protein Requirements: Current  Protein (g/day): 54g  Method Used for Fluid Requirements: 1 ml/kcal  Fluid (ml/day): 1252-7201 ml    Nutrition Diagnosis:   Inadequate

## 2024-06-27 NOTE — PROGRESS NOTES
Vascular lab preliminary.  Bilateral DAPHNE's completed.  Right DAPHNE:  1.01  Left DAPHNE:  0.88  Final report pending.

## 2024-06-27 NOTE — PLAN OF CARE
Nutrition Problem #1: Inadequate oral intake, Severe malnutrition, In context of chronic illness  Intervention: Food and/or Nutrient Delivery: Continue Current Diet  Nutritional

## 2024-06-27 NOTE — DISCHARGE SUMMARY
Physician Discharge Summary     Patient ID:  Vonda Lutz  [id number]  54 y.o.  1970    Admit date: 6/26/2024    Discharge date and time: 6/27/2024    Admitting Physician: Kimberly Figueroa DO     Discharge Physician: same     Admission Diagnoses: Peripheral vascular disease, unspecified (HCC) [I73.9]  Aorto-iliac disease (HCC) [I77.9]    Discharge Diagnoses:   Patient Active Problem List   Diagnosis    Chronic ulcer of left foot, with fat layer exposed (HCC)    Chronic ulcer of left leg, with fat layer exposed (HCC)    Lymphedema    PVD (peripheral vascular disease) (HCC)    Peripheral vascular disease, unspecified (HCC)    Iron deficiency anemia secondary to inadequate dietary iron intake    Iron malabsorption    Aortoiliac occlusive disease (HCC)    Aorto-iliac disease (HCC)    Severe malnutrition (HCC)       Procedures during admission: Procedure(s) (LRB):  INTRAOPERATIVE ANGIOGRAM,  ENDOVASCULAR REPAIR,  WITH BALLOON ANGIOPLASTY (Bilateral)    Admission Condition: good    Discharged Condition: good    Indication for Admission: Patient underwent EVAR for aortoiliac occlusive disease using Endologix device.    Hospital Course: Hospital course was uneventful.  Patient was doing well.  Her pain was monitored and controlled postoperatively.  She did not have bleeding or hematomas in the groins.  She had postoperative ABIs.  She was discharged on postop day #1.    Consults: none    Significant Diagnostic Studies: labs: cbc chem    Treatments: IV hydration    Disposition: home    Patient Instructions:   meds- see medication reconciliation   Activity: no lifting, Driving, or Strenuous exercise for 2 weeks  Diet: regular diet  Wound Care: as directed    Follow-up with vascular surgery in 2 weeks.    Signed:  Kimberly Figueroa DO  6/27/2024  3:19 PM

## 2024-06-27 NOTE — PROGRESS NOTES
Vascular Surgery  Dr. Kimberly Figueroa   Daily Progress Note    Pt Name: Vonda Lutz  Medical Record Number: [id number]  Date of Birth 1970   Today's Date: 6/27/2024    SUBJECTIVE:     Patient was seen and examined.  Stating her chronic shoulder, arms, hips, and leg pain ain is well controlled in the mornings, slow to go. History of multiple broken bones and residual pain   Has not had nausea/vomiting. Refused breakfast, stating I am too tired. Did agree to eat peanut butter and crackers.     OBJECTIVE:     Patient Vitals for the past 24 hrs:   BP Temp Temp src Pulse Resp SpO2 Weight   06/27/24 0750 -- -- -- -- -- 94 % --   06/27/24 0737 (!) 142/77 98.2 °F (36.8 °C) -- 90 -- 92 % --   06/27/24 0621 -- -- -- -- -- -- 35.8 kg (79 lb)   06/27/24 0611 136/75 98.1 °F (36.7 °C) Oral 86 16 95 % --   06/27/24 0005 125/60 98.1 °F (36.7 °C) Oral 83 16 93 % --   06/26/24 1949 -- -- -- 78 18 95 % --   06/26/24 1918 131/68 98.1 °F (36.7 °C) Oral 80 16 94 % --   06/26/24 1629 (!) 121/59 97 °F (36.1 °C) Temporal 82 16 95 % --   06/26/24 1415 136/80 98.1 °F (36.7 °C) Oral 78 14 93 % --   06/26/24 1350 (!) 146/82 -- -- 79 15 100 % --   06/26/24 1345 -- 97.1 °F (36.2 °C) Temporal 75 16 100 % --   06/26/24 1340 -- -- -- 81 14 100 % --   06/26/24 1335 (!) 152/83 -- -- 80 17 99 % --   06/26/24 1330 -- -- -- 80 16 98 % --   06/26/24 1325 -- -- -- 84 16 -- --   06/26/24 1320 (!) 150/96 -- -- 80 16 99 % --   06/26/24 1315 -- -- -- 78 16 -- --   06/26/24 1310 (!) 155/83 -- -- 79 15 99 % --   06/26/24 1300 -- -- -- 78 17 99 % --   06/26/24 1250 (!) 144/89 -- -- 81 17 99 % --   06/26/24 1240 -- -- -- 79 15 98 % --   06/26/24 1235 (!) 154/93 -- -- 83 17 98 % --   06/26/24 1230 -- -- -- 80 18 100 % --   06/26/24 1225 -- -- -- 81 16 100 % --   06/26/24 1220 (!) 150/84 -- -- 80 17 100 % --   06/26/24 1215 -- -- -- 83 16 100 % --   06/26/24 1210 -- -- -- 81 16 100 % --   06/26/24 1205 139/85 -- -- 80 18 100 % --   06/26/24 1200 -- -- -- 80 18

## 2024-06-27 NOTE — PLAN OF CARE
Problem: Pain  Goal: Verbalizes/displays adequate comfort level or baseline comfort level  Outcome: Adequate for Discharge     Problem: Safety - Adult  Goal: Free from fall injury  Outcome: Adequate for Discharge     Problem: Discharge Planning  Goal: Discharge to home or other facility with appropriate resources  Outcome: Adequate for Discharge     Problem: ABCDS Injury Assessment  Goal: Absence of physical injury  Outcome: Adequate for Discharge     Problem: Nutrition Deficit:  Goal: Optimize nutritional status  Outcome: Adequate for Discharge

## 2024-06-27 NOTE — CARE COORDINATION
Pt's emergency contact has been called x3; no answer and VM is full. Pt also told RN she takes the [facility] transit; SW received call re: d/c at 1705 and it is too late in the day to call. Cab was offered via [employer]; Pt doesn't want to go by cab; also uncertain if [facility] and her daughter doesn't have a car.     Electronically signed by EDMOND Arciniega on 6/27/2024 at 6:20 PM

## 2024-06-28 VITALS
HEART RATE: 85 BPM | SYSTOLIC BLOOD PRESSURE: 152 MMHG | BODY MASS INDEX: 15.92 KG/M2 | TEMPERATURE: 98.1 F | OXYGEN SATURATION: 96 % | HEIGHT: 59 IN | RESPIRATION RATE: 16 BRPM | DIASTOLIC BLOOD PRESSURE: 83 MMHG | WEIGHT: 79 LBS

## 2024-06-28 LAB
ECHO BSA: 1.27 M2
VAS LEFT ABI: 0.88
VAS LEFT ARM BP: 145 MMHG
VAS LEFT DORSALIS PEDIS BP: 128 MMHG
VAS LEFT PTA BP: 138 MMHG
VAS LEFT TBI: 0.52
VAS LEFT TOE PRESSURE: 81 MMHG
VAS RIGHT ABI: 1.01
VAS RIGHT ARM BP: 156 MMHG
VAS RIGHT DORSALIS PEDIS BP: 153 MMHG
VAS RIGHT PTA BP: 157 MMHG
VAS RIGHT TBI: 0.63
VAS RIGHT TOE PRESSURE: 99 MMHG

## 2024-06-28 PROCEDURE — 94760 N-INVAS EAR/PLS OXIMETRY 1: CPT

## 2024-06-28 PROCEDURE — 93922 UPR/L XTREMITY ART 2 LEVELS: CPT | Performed by: SURGERY

## 2024-06-28 NOTE — CARE COORDINATION
Spoke with Yolanda at P[facility].  Pacs will call the nurses station when they get to the hospital.  PACS # [phone]  Electronically signed by Cecelia Carlin RN on 6/28/2024 at 8:31 AM

## 2024-07-01 ENCOUNTER — HOSPITAL ENCOUNTER (OUTPATIENT)
Dept: INFUSION THERAPY | Age: 54
Setting detail: INFUSION SERIES
Discharge: HOME | End: 2024-07-01
Payer: MEDICAID

## 2024-07-01 VITALS
SYSTOLIC BLOOD PRESSURE: 136 MMHG | HEART RATE: 93 BPM | OXYGEN SATURATION: 97 % | DIASTOLIC BLOOD PRESSURE: 87 MMHG | TEMPERATURE: 98.7 F | RESPIRATION RATE: 18 BRPM

## 2024-07-01 DIAGNOSIS — D50.8 IRON DEFICIENCY ANEMIA SECONDARY TO INADEQUATE DIETARY IRON INTAKE: ICD-10-CM

## 2024-07-01 DIAGNOSIS — K90.9 IRON MALABSORPTION: Primary | ICD-10-CM

## 2024-07-01 PROCEDURE — 2580000003 HC RX 258: Performed by: NURSE PRACTITIONER

## 2024-07-01 PROCEDURE — 96365 THER/PROPH/DIAG IV INF INIT: CPT

## 2024-07-01 PROCEDURE — 6360000002 HC RX W HCPCS: Performed by: NURSE PRACTITIONER

## 2024-07-01 RX ORDER — ALBUTEROL SULFATE 90 UG/1
4 AEROSOL, METERED RESPIRATORY (INHALATION) PRN
OUTPATIENT
Start: 2024-07-04

## 2024-07-01 RX ORDER — EPINEPHRINE 1 MG/ML
0.3 INJECTION, SOLUTION, CONCENTRATE INTRAVENOUS PRN
OUTPATIENT
Start: 2024-07-04

## 2024-07-01 RX ORDER — SODIUM CHLORIDE 9 MG/ML
5-250 INJECTION, SOLUTION INTRAVENOUS PRN
Status: DISCONTINUED | OUTPATIENT
Start: 2024-07-01 | End: 2024-07-02 | Stop reason: HOSPADM

## 2024-07-01 RX ORDER — SODIUM CHLORIDE 9 MG/ML
5-250 INJECTION, SOLUTION INTRAVENOUS PRN
Status: CANCELLED | OUTPATIENT
Start: 2024-07-04

## 2024-07-01 RX ORDER — SODIUM CHLORIDE 0.9 % (FLUSH) 0.9 %
5-40 SYRINGE (ML) INJECTION PRN
Status: DISCONTINUED | OUTPATIENT
Start: 2024-07-01 | End: 2024-07-02 | Stop reason: HOSPADM

## 2024-07-01 RX ORDER — ONDANSETRON 2 MG/ML
8 INJECTION INTRAMUSCULAR; INTRAVENOUS
OUTPATIENT
Start: 2024-07-04

## 2024-07-01 RX ORDER — SODIUM CHLORIDE 9 MG/ML
INJECTION, SOLUTION INTRAVENOUS CONTINUOUS
OUTPATIENT
Start: 2024-07-04

## 2024-07-01 RX ORDER — HEPARIN 100 UNIT/ML
500 SYRINGE INTRAVENOUS PRN
OUTPATIENT
Start: 2024-07-04

## 2024-07-01 RX ORDER — SODIUM CHLORIDE 9 MG/ML
5-250 INJECTION, SOLUTION INTRAVENOUS PRN
OUTPATIENT
Start: 2024-07-04

## 2024-07-01 RX ORDER — ACETAMINOPHEN 325 MG/1
650 TABLET ORAL
OUTPATIENT
Start: 2024-07-04

## 2024-07-01 RX ORDER — SODIUM CHLORIDE 0.9 % (FLUSH) 0.9 %
5-40 SYRINGE (ML) INJECTION PRN
OUTPATIENT
Start: 2024-07-04

## 2024-07-01 RX ORDER — DIPHENHYDRAMINE HYDROCHLORIDE 50 MG/ML
50 INJECTION INTRAMUSCULAR; INTRAVENOUS
OUTPATIENT
Start: 2024-07-04

## 2024-07-01 RX ADMIN — SODIUM CHLORIDE 20 ML/HR: 9 INJECTION, SOLUTION INTRAVENOUS at 14:36

## 2024-07-01 RX ADMIN — FERUMOXYTOL 510 MG: 510 INJECTION INTRAVENOUS at 14:36

## 2024-07-01 NOTE — PROGRESS NOTES
Pt arrived to OPIT and PIV placed. Feraheme 510mg administered. Pt tolerated well.     Electronically signed by Michelle Prado RN on 7/1/2024 at 4:29 PM

## 2024-07-02 ENCOUNTER — OFFICE VISIT (OUTPATIENT)
Dept: NEUROLOGY | Age: 54
End: 2024-07-02
Payer: MEDICAID

## 2024-07-02 ENCOUNTER — OFFICE VISIT (OUTPATIENT)
Dept: INTERNAL MEDICINE | Age: 54
End: 2024-07-02

## 2024-07-02 VITALS
RESPIRATION RATE: 20 BRPM | DIASTOLIC BLOOD PRESSURE: 73 MMHG | BODY MASS INDEX: 17.26 KG/M2 | HEIGHT: 59 IN | WEIGHT: 85.6 LBS | SYSTOLIC BLOOD PRESSURE: 140 MMHG | HEART RATE: 80 BPM

## 2024-07-02 VITALS
SYSTOLIC BLOOD PRESSURE: 134 MMHG | HEART RATE: 88 BPM | HEIGHT: 59 IN | OXYGEN SATURATION: 98 % | DIASTOLIC BLOOD PRESSURE: 83 MMHG | WEIGHT: 85.8 LBS | BODY MASS INDEX: 17.3 KG/M2

## 2024-07-02 DIAGNOSIS — Z59.89 INSURANCE COVERAGE PROBLEMS: ICD-10-CM

## 2024-07-02 DIAGNOSIS — R29.898 WEAKNESS OF BOTH LOWER EXTREMITIES: ICD-10-CM

## 2024-07-02 DIAGNOSIS — R26.89 IMPAIRED GAIT AND MOBILITY: ICD-10-CM

## 2024-07-02 DIAGNOSIS — M79.605 PAIN IN BOTH LOWER EXTREMITIES: ICD-10-CM

## 2024-07-02 DIAGNOSIS — M79.604 PAIN IN BOTH LOWER EXTREMITIES: ICD-10-CM

## 2024-07-02 DIAGNOSIS — R63.0 POOR APPETITE: ICD-10-CM

## 2024-07-02 DIAGNOSIS — F32.89 OTHER DEPRESSION: ICD-10-CM

## 2024-07-02 DIAGNOSIS — I73.9 PVD (PERIPHERAL VASCULAR DISEASE) (HCC): Primary | ICD-10-CM

## 2024-07-02 DIAGNOSIS — G47.10 HYPERSOMNIA: Primary | ICD-10-CM

## 2024-07-02 DIAGNOSIS — D50.9 IRON DEFICIENCY ANEMIA, UNSPECIFIED IRON DEFICIENCY ANEMIA TYPE: ICD-10-CM

## 2024-07-02 DIAGNOSIS — E78.5 HYPERLIPIDEMIA, UNSPECIFIED HYPERLIPIDEMIA TYPE: ICD-10-CM

## 2024-07-02 PROCEDURE — 99214 OFFICE O/P EST MOD 30 MIN: CPT | Performed by: PSYCHIATRY & NEUROLOGY

## 2024-07-02 RX ORDER — DORZOLAMIDE HYDROCHLORIDE AND TIMOLOL MALEATE 20; 5 MG/ML; MG/ML
SOLUTION/ DROPS OPHTHALMIC
COMMUNITY
Start: 2024-05-16

## 2024-07-02 RX ORDER — MEGESTROL ACETATE 20 MG/1
20 TABLET ORAL DAILY
Qty: 30 TABLET | Refills: 3 | Status: SHIPPED | OUTPATIENT
Start: 2024-07-02

## 2024-07-02 SDOH — ECONOMIC STABILITY - INCOME SECURITY: OTHER PROBLEMS RELATED TO HOUSING AND ECONOMIC CIRCUMSTANCES: Z59.89

## 2024-07-02 NOTE — PROGRESS NOTES
[facility]  [address], [address]  [place]  [address]  Phone [phone]  Fax [phone]     [facility] Follow Up Encounter  24 3:26 PM CDT    Information:   Patient Name: Vonda Lutz  :   1970  Age:   54 y.o.  MRN:   [id number]  Account #:  [id number]  Today:  24    Provider: Miko Crowley M.D.     Chief Complaint:   Chief Complaint   Patient presents with    Follow-up     3 month follow up     Sleep Apnea    Other     Patient states she got vascular stent Wednesday and is doing iron infusions        Subjective:   Vonda Lutz is a 54 y.o. woman with hypersomnia who is following up.  She was referred for a PSG followed by an MSLT when first seen 6 months ago.  She had to cancel those appointments due unhealing wounds. She was found to have PVD and had a distal aorta stent placed.  She continues to smoke.  She had iron deficient anemia and has had iron infusions.  Her drowsiness and fatigue are somewhat better.        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

## 2024-07-02 NOTE — PROGRESS NOTES
Behavior: Behavior normal.         Thought Content: Thought content normal.         Judgment: Judgment normal.     54-year-old woman here for hospital follow-up    1.  Peripheral vascular disease: Status post EVAR of left lower extremity: Patient seems to be doing well.  CBC and CMP ordered today    2.  She does have some concerns over her best course of Medicare insurance.  I will have her reach out to one of our care coordinators to help her determine the best plan for her.    3.  Poor appetite: Megace ordered    4.  Iron deficiency anemia: Continue ferrous sulfate    5.  Hyperlipidemia: Continue Lipitor    6.  Depression: Stable on Celexa    An electronic signature was used to authenticate this note.  --Luz Elena Fernandez MD

## 2024-07-10 ENCOUNTER — HOSPITAL ENCOUNTER (OUTPATIENT)
Dept: WOUND CARE | Age: 54
Discharge: HOME | End: 2024-07-10
Payer: MEDICAID

## 2024-07-10 ENCOUNTER — OFFICE VISIT (OUTPATIENT)
Dept: VASCULAR SURGERY | Age: 54
End: 2024-07-10

## 2024-07-10 ENCOUNTER — HOSPITAL ENCOUNTER (OUTPATIENT)
Dept: VASCULAR LAB | Age: 54
Discharge: HOME | End: 2024-07-12
Payer: MEDICAID

## 2024-07-10 VITALS
HEART RATE: 86 BPM | SYSTOLIC BLOOD PRESSURE: 126 MMHG | BODY MASS INDEX: 17.14 KG/M2 | HEIGHT: 59 IN | RESPIRATION RATE: 18 BRPM | DIASTOLIC BLOOD PRESSURE: 77 MMHG | WEIGHT: 85 LBS | TEMPERATURE: 98.4 F

## 2024-07-10 VITALS
HEART RATE: 92 BPM | OXYGEN SATURATION: 99 % | SYSTOLIC BLOOD PRESSURE: 160 MMHG | TEMPERATURE: 99.3 F | DIASTOLIC BLOOD PRESSURE: 80 MMHG

## 2024-07-10 DIAGNOSIS — I73.9 PVD (PERIPHERAL VASCULAR DISEASE) (HCC): ICD-10-CM

## 2024-07-10 DIAGNOSIS — I70.213 ATHEROSCLER OF NATIVE ARTERY OF BOTH LEGS WITH INTERMIT CLAUDICATION (HCC): Primary | ICD-10-CM

## 2024-07-10 DIAGNOSIS — L97.922 CHRONIC ULCER OF LEFT LEG, WITH FAT LAYER EXPOSED (HCC): ICD-10-CM

## 2024-07-10 DIAGNOSIS — M79.605 LEG PAIN, LEFT: ICD-10-CM

## 2024-07-10 DIAGNOSIS — L97.522 CHRONIC ULCER OF LEFT FOOT, WITH FAT LAYER EXPOSED (HCC): Primary | ICD-10-CM

## 2024-07-10 DIAGNOSIS — M79.89 LEG SWELLING: ICD-10-CM

## 2024-07-10 PROCEDURE — 93971 EXTREMITY STUDY: CPT

## 2024-07-10 PROCEDURE — 97597 DBRDMT OPN WND 1ST 20 CM/<: CPT

## 2024-07-10 PROCEDURE — 99024 POSTOP FOLLOW-UP VISIT: CPT | Performed by: NURSE PRACTITIONER

## 2024-07-10 RX ORDER — GINSENG 100 MG
CAPSULE ORAL ONCE
OUTPATIENT
Start: 2024-07-10 | End: 2024-07-10

## 2024-07-10 RX ORDER — BETAMETHASONE DIPROPIONATE 0.5 MG/G
CREAM TOPICAL ONCE
OUTPATIENT
Start: 2024-07-10 | End: 2024-07-10

## 2024-07-10 RX ORDER — IBUPROFEN 200 MG
TABLET ORAL ONCE
OUTPATIENT
Start: 2024-07-10 | End: 2024-07-10

## 2024-07-10 RX ORDER — GENTAMICIN SULFATE 1 MG/G
OINTMENT TOPICAL ONCE
OUTPATIENT
Start: 2024-07-10 | End: 2024-07-10

## 2024-07-10 RX ORDER — BACITRACIN ZINC AND POLYMYXIN B SULFATE 500; 1000 [USP'U]/G; [USP'U]/G
OINTMENT TOPICAL ONCE
OUTPATIENT
Start: 2024-07-10 | End: 2024-07-10

## 2024-07-10 RX ORDER — LIDOCAINE HYDROCHLORIDE 20 MG/ML
JELLY TOPICAL ONCE
OUTPATIENT
Start: 2024-07-10 | End: 2024-07-10

## 2024-07-10 RX ORDER — LIDOCAINE 40 MG/G
CREAM TOPICAL ONCE
OUTPATIENT
Start: 2024-07-10 | End: 2024-07-10

## 2024-07-10 RX ORDER — SODIUM CHLOR/HYPOCHLOROUS ACID 0.033 %
SOLUTION, IRRIGATION IRRIGATION ONCE
OUTPATIENT
Start: 2024-07-10 | End: 2024-07-10

## 2024-07-10 RX ORDER — LIDOCAINE HYDROCHLORIDE 40 MG/ML
SOLUTION TOPICAL ONCE
OUTPATIENT
Start: 2024-07-10 | End: 2024-07-10

## 2024-07-10 RX ORDER — TRIAMCINOLONE ACETONIDE 1 MG/G
OINTMENT TOPICAL ONCE
OUTPATIENT
Start: 2024-07-10 | End: 2024-07-10

## 2024-07-10 RX ORDER — LIDOCAINE HYDROCHLORIDE 20 MG/ML
JELLY TOPICAL ONCE
Status: COMPLETED | OUTPATIENT
Start: 2024-07-10 | End: 2024-07-10

## 2024-07-10 RX ORDER — LIDOCAINE 50 MG/G
OINTMENT TOPICAL ONCE
OUTPATIENT
Start: 2024-07-10 | End: 2024-07-10

## 2024-07-10 RX ORDER — CLOBETASOL PROPIONATE 0.5 MG/G
OINTMENT TOPICAL ONCE
OUTPATIENT
Start: 2024-07-10 | End: 2024-07-10

## 2024-07-10 RX ADMIN — LIDOCAINE HYDROCHLORIDE: 20 JELLY TOPICAL at 10:36

## 2024-07-10 ASSESSMENT — PAIN DESCRIPTION - FREQUENCY: FREQUENCY: INTERMITTENT

## 2024-07-10 ASSESSMENT — PAIN DESCRIPTION - ORIENTATION: ORIENTATION: LEFT

## 2024-07-10 ASSESSMENT — PAIN DESCRIPTION - PAIN TYPE: TYPE: ACUTE PAIN

## 2024-07-10 ASSESSMENT — PAIN - FUNCTIONAL ASSESSMENT: PAIN_FUNCTIONAL_ASSESSMENT: PREVENTS OR INTERFERES SOME ACTIVE ACTIVITIES AND ADLS

## 2024-07-10 ASSESSMENT — PAIN SCALES - GENERAL: PAINLEVEL_OUTOF10: 7

## 2024-07-10 ASSESSMENT — PAIN DESCRIPTION - DESCRIPTORS: DESCRIPTORS: ACHING;SORE;TENDER

## 2024-07-10 ASSESSMENT — PAIN DESCRIPTION - ONSET: ONSET: ON-GOING

## 2024-07-10 ASSESSMENT — PAIN DESCRIPTION - LOCATION: LOCATION: LEG

## 2024-07-10 NOTE — PROGRESS NOTES
Unna Boot Application   Below Knee    NAME:  Vonda Lutz  YOB: 1970  MEDICAL RECORD NUMBER:  [id number]  DATE:  7/10/2024    Unna boot: Applied moisturizing agent to dry skin as needed.   Appied primary and secondary dressing as ordered.  Applied Unna roll from toes to knee overlapping each time.   Applied ace wrap or coban from toes to below the knee.   Instructed patient/caregiver to keep dressing dry and intact. DO NOT REMOVE DRESSING.   Instructed pt/family/caregiver to report excessive draining, loose bandage, wet dressing, severe pain or tingling in toes.  Applied Unna Boot dressing below the knee to left lower leg.    Unna Boot(s) were applied per  Guidelines.     Electronically signed by Anh Castellon RN on 7/10/2024 at 11:20 AM     
07/10/24 1037   Margins Attached edges 07/10/24 1037   Wound Thickness Description not for Pressure Injury Full thickness 07/10/24 1037   Number of days: 154             Estimated Blood Loss:  Minimal    Hemostasis Achieved:  by pressure    Procedural Pain:  0  / 10     Post Procedural Pain:  0 / 10     Response to treatment:  Well tolerated by patient.         Plan:     Problem List Items Addressed This Visit       * (Principal) Chronic ulcer of left leg, with fat layer exposed (HCC) (Chronic)    Relevant Orders    Initiate Outpatient Wound Care Protocol    Chronic ulcer of left foot, with fat layer exposed (HCC) - Primary    Relevant Orders    Initiate Outpatient Wound Care Protocol    PVD (peripheral vascular disease) (HCC)    Relevant Orders    Initiate Outpatient Wound Care Protocol       Cont compression RTO 1 week    Treatment Note please see attached Discharge Instructions    In my professional opinion this patient would benefit from HBO Therapy: No    Written patient dismissal instructions given to patient and signed by patient or POA.         Dressing Orders: Left Lower Ext Ulcer  Silver Alginate to open areas  Coflex Unnaboot, Keep clean and Dry  Elevate feet to level or above heart 3-4 times daily and as needed to for 30 minutes to reduce swelling  Remove wraps and call office if- Wraps get wet, Cause increased pain, Slide down or you are unable to make  your next scheduled appointment  Multi Vitamin, High Protein diet as tolerated  [facility] follow up visit ___________1 week__________________  (Please note your next appointment above and if you are unable to keep, kindly give a 24 hour notice. Thank  you.)  If you experience any of the following, please call the Wound Care Center during business hours:  * Increase in Pain  * Temperature over 101  * Increase in drainage from your wound  * Drainage with a foul odor  * Bleeding  * Increase in swelling  * Need for compression bandage changes due to slippage,

## 2024-07-10 NOTE — PATIENT INSTRUCTIONS
[facility]   Physician Orders and Discharge Instructions  [address] [place] [address]  [address]  [place] [address]  Telephone: [phone]      FAX [phone]    NAME:  Vonda Lutz  YOB: 1970  MEDICAL RECORD NUMBER:  [id number]  DATE:  7/10/2024    Discharge condition: Stable    Discharge to: Home    Left via:Private automobile    Accompanied by: Self    Dressing Orders: Left Lower Ext Ulcer  Silver Alginate to open areas  Coflex Unnaboot, Keep clean and Dry  Elevate feet to level or above heart 3-4 times daily and as needed to for 30 minutes to reduce swelling  Remove wraps and call office if- Wraps get wet, Cause increased pain, Slide down or you are unable to make your next scheduled appointment   Multi Vitamin, High Protein diet as tolerated    [facility] follow up visit ___________1 week__________________  (Please note your next appointment above and if you are unable to keep, kindly give a 24 hour notice. Thank you.)    If you experience any of the following, please call the Wound Care Center during business hours:    * Increase in Pain  * Temperature over 101  * Increase in drainage from your wound  * Drainage with a foul odor  * Bleeding  * Increase in swelling  * Need for compression bandage changes due to slippage, breakthrough drainage.    If you need medical attention outside of the business hours of the Wound Care Centers please contact your PCP or go to the nearest emergency room.

## 2024-07-10 NOTE — PROGRESS NOTES
Vonda Lutz is a 54 y.o. female who presents for post op evaluation.  Patient had a  Endovascular repair of aortoiliac disease using Endologix stent   graft  2 weeks ago.  This was performed by Dr. Figueroa.  Post op symptoms reported none.   Post op pain is minimal. She does report she fell off the bed last week and got an immediate lump in leg     On evaluation today, incision is clean, dry, intact.  No signs of infection are present.  .  femoral pulses are present 2+ . She does have swelling from fall    Today we have recommended she Wash incision daily with soap and water and cover with dry gauze until healed.  Venous scan today shows hematoma.  We have recommended continued ASA 81 mg po qd daily.  We will follow up with her in 3 months. Will get venous scan to make sure no dvt. This should bring you up to date on Vonda Lutz  As always we want to thank you for allowing us to participate in the care of your patients.    Sincerely,    URSZULA Camara

## 2024-07-11 ENCOUNTER — TELEPHONE (OUTPATIENT)
Dept: VASCULAR SURGERY | Age: 54
End: 2024-07-11

## 2024-07-11 PROCEDURE — 93971 EXTREMITY STUDY: CPT | Performed by: SURGERY

## 2024-07-11 NOTE — TELEPHONE ENCOUNTER
Left a message for the patient to let her know the following.  The patient voiced understanding.             ----- Message from URSZULA Camara sent at 7/11/2024  7:50 AM CDT -----  Please let her know that her venous scan showed hematoma, no dvt

## 2024-07-14 ENCOUNTER — TELEPHONE (OUTPATIENT)
Dept: INTERNAL MEDICINE | Age: 54
End: 2024-07-14

## 2024-07-14 NOTE — TELEPHONE ENCOUNTER
At her last office visit, she stated that she was going to probably have to go on Medicare.  She would like to talk with one of our care coordinators to see which Medicare plan would be right for her.  Can we  get her referred to a care coordinator?

## 2024-07-15 ENCOUNTER — CARE COORDINATION (OUTPATIENT)
Dept: CARE COORDINATION | Age: 54
End: 2024-07-15

## 2024-07-15 NOTE — CARE COORDINATION
Attempt to initiate SW referral for Medicare questions. Unable to reach. Left detailed message. Will make another attempt.

## 2024-07-17 ENCOUNTER — HOSPITAL ENCOUNTER (OUTPATIENT)
Dept: WOUND CARE | Age: 54
Discharge: HOME | End: 2024-07-17
Payer: MEDICAID

## 2024-07-17 VITALS
RESPIRATION RATE: 18 BRPM | BODY MASS INDEX: 17.14 KG/M2 | HEART RATE: 76 BPM | HEIGHT: 59 IN | SYSTOLIC BLOOD PRESSURE: 157 MMHG | DIASTOLIC BLOOD PRESSURE: 80 MMHG | TEMPERATURE: 98.5 F | WEIGHT: 85 LBS

## 2024-07-17 DIAGNOSIS — L97.522 CHRONIC ULCER OF LEFT FOOT, WITH FAT LAYER EXPOSED (HCC): Primary | ICD-10-CM

## 2024-07-17 DIAGNOSIS — L97.922 CHRONIC ULCER OF LEFT LEG, WITH FAT LAYER EXPOSED (HCC): ICD-10-CM

## 2024-07-17 DIAGNOSIS — I73.9 PVD (PERIPHERAL VASCULAR DISEASE) (HCC): ICD-10-CM

## 2024-07-17 PROCEDURE — 97597 DBRDMT OPN WND 1ST 20 CM/<: CPT | Performed by: SURGERY

## 2024-07-17 PROCEDURE — 97597 DBRDMT OPN WND 1ST 20 CM/<: CPT

## 2024-07-17 RX ORDER — GINSENG 100 MG
CAPSULE ORAL ONCE
OUTPATIENT
Start: 2024-07-17 | End: 2024-07-17

## 2024-07-17 RX ORDER — LIDOCAINE HYDROCHLORIDE 20 MG/ML
JELLY TOPICAL ONCE
OUTPATIENT
Start: 2024-07-17 | End: 2024-07-17

## 2024-07-17 RX ORDER — SODIUM CHLOR/HYPOCHLOROUS ACID 0.033 %
SOLUTION, IRRIGATION IRRIGATION ONCE
OUTPATIENT
Start: 2024-07-17 | End: 2024-07-17

## 2024-07-17 RX ORDER — CLOBETASOL PROPIONATE 0.5 MG/G
OINTMENT TOPICAL ONCE
OUTPATIENT
Start: 2024-07-17 | End: 2024-07-17

## 2024-07-17 RX ORDER — LIDOCAINE HYDROCHLORIDE 20 MG/ML
JELLY TOPICAL ONCE
Status: COMPLETED | OUTPATIENT
Start: 2024-07-17 | End: 2024-07-17

## 2024-07-17 RX ORDER — LIDOCAINE 50 MG/G
OINTMENT TOPICAL ONCE
OUTPATIENT
Start: 2024-07-17 | End: 2024-07-17

## 2024-07-17 RX ORDER — BACITRACIN ZINC AND POLYMYXIN B SULFATE 500; 1000 [USP'U]/G; [USP'U]/G
OINTMENT TOPICAL ONCE
OUTPATIENT
Start: 2024-07-17 | End: 2024-07-17

## 2024-07-17 RX ORDER — TRIAMCINOLONE ACETONIDE 1 MG/G
OINTMENT TOPICAL ONCE
OUTPATIENT
Start: 2024-07-17 | End: 2024-07-17

## 2024-07-17 RX ORDER — LIDOCAINE 40 MG/G
CREAM TOPICAL ONCE
OUTPATIENT
Start: 2024-07-17 | End: 2024-07-17

## 2024-07-17 RX ORDER — IBUPROFEN 200 MG
TABLET ORAL ONCE
OUTPATIENT
Start: 2024-07-17 | End: 2024-07-17

## 2024-07-17 RX ORDER — BETAMETHASONE DIPROPIONATE 0.5 MG/G
CREAM TOPICAL ONCE
OUTPATIENT
Start: 2024-07-17 | End: 2024-07-17

## 2024-07-17 RX ORDER — GENTAMICIN SULFATE 1 MG/G
OINTMENT TOPICAL ONCE
OUTPATIENT
Start: 2024-07-17 | End: 2024-07-17

## 2024-07-17 RX ORDER — LIDOCAINE HYDROCHLORIDE 40 MG/ML
SOLUTION TOPICAL ONCE
OUTPATIENT
Start: 2024-07-17 | End: 2024-07-17

## 2024-07-17 RX ADMIN — LIDOCAINE HYDROCHLORIDE: 20 JELLY TOPICAL at 11:49

## 2024-07-17 ASSESSMENT — PAIN DESCRIPTION - ONSET: ONSET: ON-GOING

## 2024-07-17 ASSESSMENT — PAIN DESCRIPTION - FREQUENCY: FREQUENCY: INTERMITTENT

## 2024-07-17 ASSESSMENT — PAIN DESCRIPTION - PAIN TYPE: TYPE: ACUTE PAIN

## 2024-07-17 ASSESSMENT — PAIN DESCRIPTION - DESCRIPTORS: DESCRIPTORS: ACHING;SORE;TENDER

## 2024-07-17 ASSESSMENT — PAIN DESCRIPTION - ORIENTATION: ORIENTATION: LEFT

## 2024-07-17 ASSESSMENT — PAIN SCALES - GENERAL: PAINLEVEL_OUTOF10: 6

## 2024-07-17 ASSESSMENT — PAIN DESCRIPTION - LOCATION: LOCATION: LEG

## 2024-07-17 ASSESSMENT — PAIN - FUNCTIONAL ASSESSMENT: PAIN_FUNCTIONAL_ASSESSMENT: PREVENTS OR INTERFERES SOME ACTIVE ACTIVITIES AND ADLS

## 2024-07-17 NOTE — PATIENT INSTRUCTIONS
[facility] Care and Hyperbaric Oxygen Therapy   Physician Orders and Discharge Instructions  [address] [place] [address]  [address]  [place] [address]  Telephone: [phone]      FAX [phone]    NAME:  Vonda Lutz  YOB: 1970  MEDICAL RECORD NUMBER:  [id number]  DATE:  7/17/2024    Discharge condition: Stable    Discharge to: Home    Left via:Private automobile    Accompanied by: Self        [facility] follow up visit __________1 week___________________  (Please note your next appointment above and if you are unable to keep, kindly give a 24 hour notice. Thank you.)    If you experience any of the following, please call the Wound Care Center during business hours:    * Increase in Pain  * Temperature over 101  * Increase in drainage from your wound  * Drainage with a foul odor  * Bleeding  * Increase in swelling  * Need for compression bandage changes due to slippage, breakthrough drainage.    If you need medical attention outside of the business hours of the Wound Care Centers please contact your PCP or go to the nearest emergency room.

## 2024-07-17 NOTE — PROGRESS NOTES
[facility]   Progress Note and Procedure Note      Vonda Lutz  MEDICAL RECORD NUMBER:  [id number]  AGE: 54 y.o.   GENDER: female  : 1970  EPISODE DATE:  2024    Subjective:     Chief Complaint   Patient presents with    Wound Check     Pt presents for recheck of left leg wound         HISTORY of PRESENT ILLNESS HPI     Vonda Lutz is a 54 y.o. female who presents today for wound/ulcer evaluation.   Wound Context: Pt with LLE wound here for eval/treat  Wound/Ulcer Pain Timing/Severity: none  Quality of pain: N/A  Severity:  0 / 10   Modifying Factors: None  Associated Signs/Symptoms: none    Ulcer Identification:  Ulcer Type: venous  Contributing Factors: none    Wound:  venous        PAST MEDICAL HISTORY        Diagnosis Date    Arthritis     CAD (coronary artery disease)     Closed fracture of left foot     Gallbladder anomaly     Hip fracture (HCC)     x2    History of blood transfusion     Hyperlipidemia     Hypertension     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    Osteoporosis     PAD (peripheral artery disease) (HCC)     Patella fracture     Radial fracture     Shoulder fracture     x2    Thyroid disease     Wears dentures        PAST SURGICAL HISTORY    Past Surgical History:   Procedure Laterality Date    BONE MARROW ASPIRATE & BIOPSY  2024    BONE MARROW BIOPSY Right 2024    BONE MARROW ASPIRATION BIOPSY performed by Juliana Rush APRN at [facility] OR    BREAST ENHANCEMENT SURGERY Bilateral     CARPAL TUNNEL RELEASE Bilateral      SECTION      CHOLECYSTECTOMY      CORNEAL TRANSPLANT      CORONARY ANGIOPLASTY WITH STENT PLACEMENT      HERNIA REPAIR      HIP SURGERY      history of osteoporosis;hip surgery x 2    MOUTH SURGERY      OTHER SURGICAL HISTORY

## 2024-07-17 NOTE — PROGRESS NOTES
Unna Boot Application   Below Knee    NAME:  Vonda Lutz  YOB: 1970  MEDICAL RECORD NUMBER:  [id number]  DATE:  7/17/2024    Unna boot: Applied moisturizing agent to dry skin as needed.   Appied primary and secondary dressing as ordered.  Applied Unna roll from toes to knee overlapping each time.   Applied ace wrap or coban from toes to below the knee.   Instructed patient/caregiver to keep dressing dry and intact. DO NOT REMOVE DRESSING.   Instructed pt/family/caregiver to report excessive draining, loose bandage, wet dressing, severe pain or tingling in toes.  Applied Unna Boot dressing below the knee to left lower leg.    Unna Boot(s) were applied per  Guidelines.     Electronically signed by Anh Castellon RN on 7/17/2024 at 12:30 PM

## 2024-07-22 ENCOUNTER — CARE COORDINATION (OUTPATIENT)
Dept: CARE COORDINATION | Age: 54
End: 2024-07-22

## 2024-07-22 NOTE — CARE COORDINATION
Call to pt to initiate SW referral for Medicare questions. Pt daughter answered and stated she will have pt give me a call back.

## 2024-07-23 ENCOUNTER — CARE COORDINATION (OUTPATIENT)
Dept: CARE COORDINATION | Age: 54
End: 2024-07-23

## 2024-07-23 NOTE — CARE COORDINATION
Call from patient returning SW call. She reported that she had questions regarding Medicare plan and enrollment. KAUSHAL suggested calling [employer] ([employer]) who are able to answer all Medicare questions. KAUSHAL sent pt the contact info by text. No other  Questions/needs to be addressed.

## 2024-07-24 ENCOUNTER — HOSPITAL ENCOUNTER (OUTPATIENT)
Dept: WOUND CARE | Age: 54
Discharge: HOME | End: 2024-07-24
Payer: MEDICAID

## 2024-07-24 VITALS
RESPIRATION RATE: 18 BRPM | BODY MASS INDEX: 17.14 KG/M2 | HEART RATE: 78 BPM | DIASTOLIC BLOOD PRESSURE: 85 MMHG | HEIGHT: 59 IN | SYSTOLIC BLOOD PRESSURE: 128 MMHG | WEIGHT: 85 LBS | TEMPERATURE: 98.1 F

## 2024-07-24 DIAGNOSIS — I73.9 PVD (PERIPHERAL VASCULAR DISEASE) (HCC): ICD-10-CM

## 2024-07-24 DIAGNOSIS — L97.522 CHRONIC ULCER OF LEFT FOOT, WITH FAT LAYER EXPOSED (HCC): Primary | ICD-10-CM

## 2024-07-24 DIAGNOSIS — L97.922 CHRONIC ULCER OF LEFT LEG, WITH FAT LAYER EXPOSED (HCC): ICD-10-CM

## 2024-07-24 PROCEDURE — 97597 DBRDMT OPN WND 1ST 20 CM/<: CPT

## 2024-07-24 PROCEDURE — 97597 DBRDMT OPN WND 1ST 20 CM/<: CPT | Performed by: SURGERY

## 2024-07-24 RX ORDER — LIDOCAINE 40 MG/G
CREAM TOPICAL ONCE
OUTPATIENT
Start: 2024-07-24 | End: 2024-07-24

## 2024-07-24 RX ORDER — SODIUM CHLOR/HYPOCHLOROUS ACID 0.033 %
SOLUTION, IRRIGATION IRRIGATION ONCE
OUTPATIENT
Start: 2024-07-24 | End: 2024-07-24

## 2024-07-24 RX ORDER — GINSENG 100 MG
CAPSULE ORAL ONCE
OUTPATIENT
Start: 2024-07-24 | End: 2024-07-24

## 2024-07-24 RX ORDER — LIDOCAINE HYDROCHLORIDE 20 MG/ML
JELLY TOPICAL ONCE
OUTPATIENT
Start: 2024-07-24 | End: 2024-07-24

## 2024-07-24 RX ORDER — BACITRACIN ZINC AND POLYMYXIN B SULFATE 500; 1000 [USP'U]/G; [USP'U]/G
OINTMENT TOPICAL ONCE
OUTPATIENT
Start: 2024-07-24 | End: 2024-07-24

## 2024-07-24 RX ORDER — LIDOCAINE 50 MG/G
OINTMENT TOPICAL ONCE
OUTPATIENT
Start: 2024-07-24 | End: 2024-07-24

## 2024-07-24 RX ORDER — GENTAMICIN SULFATE 1 MG/G
OINTMENT TOPICAL ONCE
OUTPATIENT
Start: 2024-07-24 | End: 2024-07-24

## 2024-07-24 RX ORDER — LIDOCAINE HYDROCHLORIDE 20 MG/ML
JELLY TOPICAL ONCE
Status: COMPLETED | OUTPATIENT
Start: 2024-07-24 | End: 2024-07-24

## 2024-07-24 RX ORDER — LIDOCAINE HYDROCHLORIDE 40 MG/ML
SOLUTION TOPICAL ONCE
OUTPATIENT
Start: 2024-07-24 | End: 2024-07-24

## 2024-07-24 RX ORDER — CLOBETASOL PROPIONATE 0.5 MG/G
OINTMENT TOPICAL ONCE
OUTPATIENT
Start: 2024-07-24 | End: 2024-07-24

## 2024-07-24 RX ORDER — BETAMETHASONE DIPROPIONATE 0.5 MG/G
CREAM TOPICAL ONCE
OUTPATIENT
Start: 2024-07-24 | End: 2024-07-24

## 2024-07-24 RX ORDER — TRIAMCINOLONE ACETONIDE 1 MG/G
OINTMENT TOPICAL ONCE
OUTPATIENT
Start: 2024-07-24 | End: 2024-07-24

## 2024-07-24 RX ORDER — IBUPROFEN 200 MG
TABLET ORAL ONCE
OUTPATIENT
Start: 2024-07-24 | End: 2024-07-24

## 2024-07-24 RX ADMIN — LIDOCAINE HYDROCHLORIDE: 20 JELLY TOPICAL at 11:34

## 2024-07-24 NOTE — PATIENT INSTRUCTIONS
[facility] Wound Care and Hyperbaric Oxygen Therapy   Physician Orders and Discharge Instructions  [address] [place] [address]  [address]  [place] [address]  Telephone: [phone]      FAX [phone]    NAME:  Vonda Lutz  YOB: 1970  MEDICAL RECORD NUMBER:  [id number]  DATE:  7/24/2024    Discharge condition: Stable    Discharge to: Home    Left via:Private automobile    Accompanied by: Self        [facility] follow up visit ____________1 week_________________  (Please note your next appointment above and if you are unable to keep, kindly give a 24 hour notice. Thank you.)    If you experience any of the following, please call the Wound Care Center during business hours:    * Increase in Pain  * Temperature over 101  * Increase in drainage from your wound  * Drainage with a foul odor  * Bleeding  * Increase in swelling  * Need for compression bandage changes due to slippage, breakthrough drainage.    If you need medical attention outside of the business hours of the Wound Care Centers please contact your PCP or go to the nearest emergency room.

## 2024-07-24 NOTE — PROGRESS NOTES
Unna Boot Application   Below Knee    NAME:  Vonda Lutz  YOB: 1970  MEDICAL RECORD NUMBER:  [id number]  DATE:  7/24/2024    Unna boot: Applied moisturizing agent to dry skin as needed.   Appied primary and secondary dressing as ordered.  Applied Unna roll from toes to knee overlapping each time.   Applied ace wrap or coban from toes to below the knee.   Instructed patient/caregiver to keep dressing dry and intact. DO NOT REMOVE DRESSING.   Instructed pt/family/caregiver to report excessive draining, loose bandage, wet dressing, severe pain or tingling in toes.  Applied Unna Boot dressing below the knee to left lower leg.    Unna Boot(s) were applied per  Guidelines.     Electronically signed by Anh Castellon RN on 7/24/2024 at 12:17 PM     
dressing;Tegaderm/transparent film dressing 06/27/24 1343   Dressing Status Clean, dry & intact 06/27/24 1343   Dressing/Treatment Gauze dressing/dressing sponge;Tegaderm/transparent film dressing 06/27/24 1343   Number of days: 28       Puncture 06/26/24 Femoral (Active)   Drainage Amount None 06/27/24 1343   Odor None 06/27/24 1343   Dressing/Treatment Dry dressing;Tegaderm/transparent film dressing 06/27/24 1343   Dressing Status Clean, dry & intact 06/27/24 1343   Dressing/Treatment Gauze dressing/dressing sponge;Tegaderm/transparent film dressing 06/27/24 1343   Number of days: 28       Wound 02/06/24 Pretibial Left wound 1-left leg wound venous (Active)   Wound Image   07/24/24 1135   Wound Etiology Venous 07/24/24 1135   Dressing Status Old drainage noted 07/24/24 1135   Wound Cleansed Soap and water 07/24/24 1135   Dressing/Treatment Alginate with Ag 07/17/24 1219   Wound Length (cm) 2.5 cm 07/24/24 1135   Wound Width (cm) 1.4 cm 07/24/24 1135   Wound Depth (cm) 0.1 cm 07/24/24 1135   Wound Surface Area (cm^2) 3.5 cm^2 07/24/24 1135   Change in Wound Size % (l*w) 41.67 07/24/24 1135   Wound Volume (cm^3) 0.35 cm^3 07/24/24 1135   Wound Healing % 71 07/24/24 1135   Post-Procedure Length (cm) 2.5 cm 07/24/24 1136   Post-Procedure Width (cm) 1.4 cm 07/24/24 1136   Post-Procedure Depth (cm) 0.1 cm 07/24/24 1136   Post-Procedure Surface Area (cm^2) 3.5 cm^2 07/24/24 1136   Post-Procedure Volume (cm^3) 0.35 cm^3 07/24/24 1136   Distance Tunneling (cm) 0 cm 07/24/24 1135   Tunneling Position ___ O'Clock 0 07/24/24 1135   Undermining Starts ___ O'Clock 0 07/24/24 1135   Undermining Ends___ O'Clock 0 07/24/24 1135   Undermining Maxium Distance (cm) 0 07/24/24 1135   Wound Assessment Slough;Pink/red 07/24/24 1135   Drainage Amount Moderate (25-50%) 07/24/24 1135   Drainage Description Serosanguinous 07/24/24 1135   Odor None 07/24/24 1135   Anjali-wound Assessment Blanchable erythema 07/24/24 1135   Margins Attached

## 2024-07-24 NOTE — PLAN OF CARE
Problem: Pain  Goal: Verbalizes/displays adequate comfort level or baseline comfort level  Outcome: Progressing     Problem: Wound:  Goal: Will show signs of wound healing; wound closure and no evidence of infection  Description: Will show signs of wound healing; wound closure and no evidence of infection  Outcome: Progressing     Problem: Venous:  Goal: Signs of wound healing will improve  Description: Signs of wound healing will improve  Outcome: Progressing     Problem: Smoking cessation:  Goal: Ability to formulate a plan to maintain a tobacco-free life will be supported  Description: Ability to formulate a plan to maintain a tobacco-free life will be supported  Outcome: Progressing     Problem: Compression therapy:  Goal: Will be free from complications associated with compression therapy  Description: Will be free from complications associated with compression therapy  Outcome: Progressing     Problem: Weight control:  Goal: Ability to maintain an optimal weight for height and age will be supported  Description: Ability to maintain an optimal weight for height and age will be supported  Outcome: Progressing     Problem: Falls - Risk of:  Goal: Will remain free from falls  Description: Will remain free from falls  Outcome: Progressing

## 2024-07-25 ENCOUNTER — TELEPHONE (OUTPATIENT)
Dept: HEMATOLOGY | Age: 54
End: 2024-07-25

## 2024-07-25 NOTE — TELEPHONE ENCOUNTER
Called Patient and reminded patient of their appointment on 08/01/2024 and patient confirmed they would be here. Reminded patient to just come at appointment time. Reminded patient that we will not check them in any more than 30 minutes before appointment time.  We have now moved to the [facility] cancer center that is located between our old office and the ER at the [facility]

## 2024-08-01 ENCOUNTER — HOSPITAL ENCOUNTER (OUTPATIENT)
Dept: INFUSION THERAPY | Age: 54
Discharge: HOME | End: 2024-08-01
Payer: MEDICAID

## 2024-08-01 DIAGNOSIS — D50.8 IRON DEFICIENCY ANEMIA SECONDARY TO INADEQUATE DIETARY IRON INTAKE: ICD-10-CM

## 2024-08-01 LAB
BASOPHILS # BLD: 0.07 K/UL (ref 0.01–0.08)
BASOPHILS NFR BLD: 0.8 % (ref 0.1–1.2)
EOSINOPHIL # BLD: 0.41 K/UL (ref 0.04–0.54)
EOSINOPHIL NFR BLD: 4.7 % (ref 0.7–7)
ERYTHROCYTE [DISTWIDTH] IN BLOOD BY AUTOMATED COUNT: 15.8 % (ref 11.7–14.4)
HCT VFR BLD AUTO: 30.4 % (ref 34.1–44.9)
HGB BLD-MCNC: 9.6 G/DL (ref 11.2–15.7)
LYMPHOCYTES # BLD: 2.54 K/UL (ref 1.18–3.74)
LYMPHOCYTES NFR BLD: 29.3 % (ref 19.3–53.1)
MCH RBC QN AUTO: 27.1 PG (ref 25.6–32.2)
MCHC RBC AUTO-ENTMCNC: 31.6 G/DL (ref 32.3–35.5)
MCV RBC AUTO: 85.9 FL (ref 79.4–94.8)
MONOCYTES # BLD: 0.8 K/UL (ref 0.24–0.82)
MONOCYTES NFR BLD: 9.2 % (ref 4.7–12.5)
NEUTROPHILS # BLD: 4.82 K/UL (ref 1.56–6.13)
NEUTS SEG NFR BLD: 55.7 % (ref 34–71.1)
PLATELET # BLD AUTO: 447 K/UL (ref 182–369)
PMV BLD AUTO: 9.3 FL (ref 7.4–10.4)
RBC # BLD AUTO: 3.54 M/UL (ref 3.93–5.22)
WBC # BLD AUTO: 8.67 K/UL (ref 3.98–10.04)

## 2024-08-01 PROCEDURE — 36415 COLL VENOUS BLD VENIPUNCTURE: CPT

## 2024-08-01 PROCEDURE — 85025 COMPLETE CBC W/AUTO DIFF WBC: CPT

## 2024-08-06 ENCOUNTER — HOSPITAL ENCOUNTER (OUTPATIENT)
Dept: WOUND CARE | Age: 54
Discharge: HOME | End: 2024-08-06
Payer: MEDICAID

## 2024-08-06 VITALS
WEIGHT: 85 LBS | BODY MASS INDEX: 17.14 KG/M2 | HEIGHT: 59 IN | DIASTOLIC BLOOD PRESSURE: 95 MMHG | TEMPERATURE: 98.9 F | SYSTOLIC BLOOD PRESSURE: 159 MMHG | RESPIRATION RATE: 18 BRPM | HEART RATE: 92 BPM

## 2024-08-06 DIAGNOSIS — I73.9 PVD (PERIPHERAL VASCULAR DISEASE) (HCC): ICD-10-CM

## 2024-08-06 DIAGNOSIS — L97.922 CHRONIC ULCER OF LEFT LEG, WITH FAT LAYER EXPOSED (HCC): ICD-10-CM

## 2024-08-06 DIAGNOSIS — L97.522 CHRONIC ULCER OF LEFT FOOT, WITH FAT LAYER EXPOSED (HCC): Primary | ICD-10-CM

## 2024-08-06 PROCEDURE — 97597 DBRDMT OPN WND 1ST 20 CM/<: CPT

## 2024-08-06 PROCEDURE — 97597 DBRDMT OPN WND 1ST 20 CM/<: CPT | Performed by: SURGERY

## 2024-08-06 RX ORDER — LIDOCAINE 40 MG/G
CREAM TOPICAL ONCE
OUTPATIENT
Start: 2024-08-06 | End: 2024-08-06

## 2024-08-06 RX ORDER — GINSENG 100 MG
CAPSULE ORAL ONCE
OUTPATIENT
Start: 2024-08-06 | End: 2024-08-06

## 2024-08-06 RX ORDER — TRIAMCINOLONE ACETONIDE 1 MG/G
OINTMENT TOPICAL ONCE
OUTPATIENT
Start: 2024-08-06 | End: 2024-08-06

## 2024-08-06 RX ORDER — IBUPROFEN 200 MG
TABLET ORAL ONCE
OUTPATIENT
Start: 2024-08-06 | End: 2024-08-06

## 2024-08-06 RX ORDER — SODIUM CHLOR/HYPOCHLOROUS ACID 0.033 %
SOLUTION, IRRIGATION IRRIGATION ONCE
OUTPATIENT
Start: 2024-08-06 | End: 2024-08-06

## 2024-08-06 RX ORDER — LIDOCAINE HYDROCHLORIDE 20 MG/ML
JELLY TOPICAL ONCE
OUTPATIENT
Start: 2024-08-06 | End: 2024-08-06

## 2024-08-06 RX ORDER — BACITRACIN ZINC AND POLYMYXIN B SULFATE 500; 1000 [USP'U]/G; [USP'U]/G
OINTMENT TOPICAL ONCE
OUTPATIENT
Start: 2024-08-06 | End: 2024-08-06

## 2024-08-06 RX ORDER — LIDOCAINE 50 MG/G
OINTMENT TOPICAL ONCE
OUTPATIENT
Start: 2024-08-06 | End: 2024-08-06

## 2024-08-06 RX ORDER — LIDOCAINE HYDROCHLORIDE 40 MG/ML
SOLUTION TOPICAL ONCE
OUTPATIENT
Start: 2024-08-06 | End: 2024-08-06

## 2024-08-06 RX ORDER — CLOBETASOL PROPIONATE 0.5 MG/G
OINTMENT TOPICAL ONCE
OUTPATIENT
Start: 2024-08-06 | End: 2024-08-06

## 2024-08-06 RX ORDER — BETAMETHASONE DIPROPIONATE 0.5 MG/G
CREAM TOPICAL ONCE
OUTPATIENT
Start: 2024-08-06 | End: 2024-08-06

## 2024-08-06 RX ORDER — LIDOCAINE HYDROCHLORIDE 20 MG/ML
JELLY TOPICAL ONCE
Status: COMPLETED | OUTPATIENT
Start: 2024-08-06 | End: 2024-08-06

## 2024-08-06 RX ORDER — GENTAMICIN SULFATE 1 MG/G
OINTMENT TOPICAL ONCE
OUTPATIENT
Start: 2024-08-06 | End: 2024-08-06

## 2024-08-06 RX ADMIN — LIDOCAINE HYDROCHLORIDE: 20 JELLY TOPICAL at 15:52

## 2024-08-06 ASSESSMENT — PAIN - FUNCTIONAL ASSESSMENT: PAIN_FUNCTIONAL_ASSESSMENT: PREVENTS OR INTERFERES SOME ACTIVE ACTIVITIES AND ADLS

## 2024-08-06 ASSESSMENT — PAIN SCALES - GENERAL: PAINLEVEL_OUTOF10: 5

## 2024-08-06 ASSESSMENT — PAIN DESCRIPTION - ONSET: ONSET: ON-GOING

## 2024-08-06 ASSESSMENT — PAIN DESCRIPTION - LOCATION: LOCATION: BACK

## 2024-08-06 ASSESSMENT — PAIN DESCRIPTION - PAIN TYPE: TYPE: CHRONIC PAIN

## 2024-08-06 ASSESSMENT — PAIN DESCRIPTION - DESCRIPTORS: DESCRIPTORS: ACHING;DISCOMFORT

## 2024-08-06 ASSESSMENT — PAIN DESCRIPTION - FREQUENCY: FREQUENCY: INTERMITTENT

## 2024-08-06 NOTE — PLAN OF CARE
Unna Boot Application   Below Knee    NAME:  Vonda Lutz  YOB: 1970  MEDICAL RECORD NUMBER:  [id number]  DATE:  8/6/2024    Unna boot: Applied moisturizing agent to dry skin as needed.   Appied primary and secondary dressing as ordered.  Applied Unna roll from toes to knee overlapping each time.   Applied ace wrap or coban from toes to below the knee.   Instructed patient/caregiver to keep dressing dry and intact. DO NOT REMOVE DRESSING.   Instructed pt/family/caregiver to report excessive draining, loose bandage, wet dressing, severe pain or tingling in toes.  Applied Unna Boot dressing below the knee to left lower leg.    Unna Boot(s) were applied per  Guidelines.     Electronically signed by Deya Saul RN on 8/6/2024 at 4:54 PM

## 2024-08-06 NOTE — PATIENT INSTRUCTIONS
[facility] Wound Care and Hyperbaric Oxygen Therapy   Physician Orders and Discharge Instructions  [address] [place] [address]  [address]  [place] [address]  Telephone: [phone]      FAX [phone]    NAME:  Vonda Lutz  YOB: 1970  MEDICAL RECORD NUMBER:  [id number]  DATE:  8/6/2024    Discharge condition: Stable    Discharge to: Home    Left via:Private automobile    Accompanied by: Self    Dressing Orders: Left Lower Ext Ulcer  Silver Alginate to open areas  Coflex Unnaboot, Keep clean and Dry  Elevate feet to level or above heart 3-4 times daily and as needed to for 30 minutes to reduce swelling  Remove wraps and call office if- Wraps get wet, Cause increased pain, Slide down or you are unable to make  your next scheduled appointment  Multi Vitamin, High Protein diet as tolerated    [facility] follow up visit ___________1 week__________________  (Please note your next appointment above and if you are unable to keep, kindly give a 24 hour notice. Thank you.)    If you experience any of the following, please call the Wound Care Center during business hours:    * Increase in Pain  * Temperature over 101  * Increase in drainage from your wound  * Drainage with a foul odor  * Bleeding  * Increase in swelling  * Need for compression bandage changes due to slippage, breakthrough drainage.    If you need medical attention outside of the business hours of the Wound Care Centers please contact your PCP or go to the nearest emergency room.

## 2024-08-06 NOTE — PROGRESS NOTES
[facility]   Progress Note and Procedure Note      Vonda Lutz  MEDICAL RECORD NUMBER:  [id number]  AGE: 54 y.o.   GENDER: female  : 1970  EPISODE DATE:  2024    Subjective:     Chief Complaint   Patient presents with    Wound Check     Pt presents for recheck of left leg wound         HISTORY of PRESENT ILLNESS HPI     Vonda Lutz is a 54 y.o. female who presents today for wound/ulcer evaluation.   Wound Context: Pt with lymphedema and L LE wound heree for eval/treat  Wound/Ulcer Pain Timing/Severity: none  Quality of pain: N/A  Severity:  0 / 10   Modifying Factors: None  Associated Signs/Symptoms: none    Ulcer Identification:  Ulcer Type: venous  Contributing Factors: edema, venous stasis, and lymphedema    Wound:  venous        PAST MEDICAL HISTORY        Diagnosis Date    Arthritis     CAD (coronary artery disease)     Closed fracture of left foot     Gallbladder anomaly     Hip fracture (HCC)     x2    History of blood transfusion     Hyperlipidemia     Hypertension     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    Osteoporosis     PAD (peripheral artery disease) (HCC)     Patella fracture     Radial fracture     Shoulder fracture     x2    Thyroid disease     Wears dentures        PAST SURGICAL HISTORY    Past Surgical History:   Procedure Laterality Date    BONE MARROW ASPIRATE & BIOPSY  2024    BONE MARROW BIOPSY Right 2024    BONE MARROW ASPIRATION BIOPSY performed by Juliana Rush APRN at [facility] OR    BREAST ENHANCEMENT SURGERY Bilateral     CARPAL TUNNEL RELEASE Bilateral      SECTION      CHOLECYSTECTOMY      CORNEAL TRANSPLANT      CORONARY ANGIOPLASTY WITH STENT PLACEMENT      HERNIA REPAIR      HIP SURGERY      history of osteoporosis;hip surgery x 2

## 2024-08-13 ENCOUNTER — HOSPITAL ENCOUNTER (OUTPATIENT)
Dept: WOUND CARE | Age: 54
Discharge: HOME | End: 2024-08-13
Payer: MEDICAID

## 2024-08-13 VITALS
BODY MASS INDEX: 17.14 KG/M2 | RESPIRATION RATE: 18 BRPM | SYSTOLIC BLOOD PRESSURE: 150 MMHG | WEIGHT: 85 LBS | DIASTOLIC BLOOD PRESSURE: 86 MMHG | HEART RATE: 73 BPM | HEIGHT: 59 IN | TEMPERATURE: 97.6 F

## 2024-08-13 DIAGNOSIS — I73.9 PVD (PERIPHERAL VASCULAR DISEASE) (HCC): ICD-10-CM

## 2024-08-13 DIAGNOSIS — L97.522 CHRONIC ULCER OF LEFT FOOT, WITH FAT LAYER EXPOSED (HCC): Primary | ICD-10-CM

## 2024-08-13 DIAGNOSIS — L97.922 CHRONIC ULCER OF LEFT LEG, WITH FAT LAYER EXPOSED (HCC): ICD-10-CM

## 2024-08-13 PROCEDURE — 97597 DBRDMT OPN WND 1ST 20 CM/<: CPT | Performed by: SURGERY

## 2024-08-13 PROCEDURE — 97597 DBRDMT OPN WND 1ST 20 CM/<: CPT

## 2024-08-13 RX ORDER — CLOBETASOL PROPIONATE 0.5 MG/G
OINTMENT TOPICAL ONCE
OUTPATIENT
Start: 2024-08-13 | End: 2024-08-13

## 2024-08-13 RX ORDER — LIDOCAINE HYDROCHLORIDE 20 MG/ML
JELLY TOPICAL ONCE
OUTPATIENT
Start: 2024-08-13 | End: 2024-08-13

## 2024-08-13 RX ORDER — LIDOCAINE 40 MG/G
CREAM TOPICAL ONCE
OUTPATIENT
Start: 2024-08-13 | End: 2024-08-13

## 2024-08-13 RX ORDER — LIDOCAINE 50 MG/G
OINTMENT TOPICAL ONCE
OUTPATIENT
Start: 2024-08-13 | End: 2024-08-13

## 2024-08-13 RX ORDER — LIDOCAINE HYDROCHLORIDE 40 MG/ML
SOLUTION TOPICAL ONCE
OUTPATIENT
Start: 2024-08-13 | End: 2024-08-13

## 2024-08-13 RX ORDER — SODIUM CHLOR/HYPOCHLOROUS ACID 0.033 %
SOLUTION, IRRIGATION IRRIGATION ONCE
OUTPATIENT
Start: 2024-08-13 | End: 2024-08-13

## 2024-08-13 RX ORDER — TRIAMCINOLONE ACETONIDE 1 MG/G
OINTMENT TOPICAL ONCE
OUTPATIENT
Start: 2024-08-13 | End: 2024-08-13

## 2024-08-13 RX ORDER — GINSENG 100 MG
CAPSULE ORAL ONCE
OUTPATIENT
Start: 2024-08-13 | End: 2024-08-13

## 2024-08-13 RX ORDER — BETAMETHASONE DIPROPIONATE 0.5 MG/G
CREAM TOPICAL ONCE
OUTPATIENT
Start: 2024-08-13 | End: 2024-08-13

## 2024-08-13 RX ORDER — GENTAMICIN SULFATE 1 MG/G
OINTMENT TOPICAL ONCE
OUTPATIENT
Start: 2024-08-13 | End: 2024-08-13

## 2024-08-13 RX ORDER — IBUPROFEN 200 MG
TABLET ORAL ONCE
OUTPATIENT
Start: 2024-08-13 | End: 2024-08-13

## 2024-08-13 RX ORDER — LIDOCAINE HYDROCHLORIDE 20 MG/ML
JELLY TOPICAL ONCE
Status: COMPLETED | OUTPATIENT
Start: 2024-08-13 | End: 2024-08-13

## 2024-08-13 RX ORDER — BACITRACIN ZINC AND POLYMYXIN B SULFATE 500; 1000 [USP'U]/G; [USP'U]/G
OINTMENT TOPICAL ONCE
OUTPATIENT
Start: 2024-08-13 | End: 2024-08-13

## 2024-08-13 RX ADMIN — LIDOCAINE HYDROCHLORIDE: 20 JELLY TOPICAL at 15:39

## 2024-08-13 ASSESSMENT — PAIN SCALES - GENERAL: PAINLEVEL_OUTOF10: 7

## 2024-08-13 ASSESSMENT — PAIN DESCRIPTION - FREQUENCY: FREQUENCY: INTERMITTENT

## 2024-08-13 ASSESSMENT — PAIN - FUNCTIONAL ASSESSMENT: PAIN_FUNCTIONAL_ASSESSMENT: PREVENTS OR INTERFERES SOME ACTIVE ACTIVITIES AND ADLS

## 2024-08-13 ASSESSMENT — PAIN DESCRIPTION - DESCRIPTORS: DESCRIPTORS: ACHING;DISCOMFORT

## 2024-08-13 ASSESSMENT — PAIN DESCRIPTION - LOCATION: LOCATION: BACK

## 2024-08-13 ASSESSMENT — PAIN DESCRIPTION - ONSET: ONSET: ON-GOING

## 2024-08-13 ASSESSMENT — PAIN DESCRIPTION - PAIN TYPE: TYPE: CHRONIC PAIN

## 2024-08-13 NOTE — PLAN OF CARE
Problem: Pain  Goal: Verbalizes/displays adequate comfort level or baseline comfort level  Outcome: Progressing     Problem: Wound:  Goal: Will show signs of wound healing; wound closure and no evidence of infection  Description: Will show signs of wound healing; wound closure and no evidence of infection  Outcome: Progressing     Problem: Venous:  Goal: Signs of wound healing will improve  Description: Signs of wound healing will improve  Outcome: Progressing     Problem: Compression therapy:  Goal: Will be free from complications associated with compression therapy  Description: Will be free from complications associated with compression therapy  Outcome: Progressing     Problem: Falls - Risk of:  Goal: Will remain free from falls  Description: Will remain free from falls  Outcome: Progressing

## 2024-08-13 NOTE — PROGRESS NOTES
(peripheral vascular disease) (HCC)    Relevant Orders    Initiate Outpatient Wound Care Protocol       Cont compression and RTO 1 week    Treatment Note please see attached Discharge Instructions    In my professional opinion this patient would benefit from HBO Therapy: No    Written patient dismissal instructions given to patient and signed by patient or POA.             Electronically signed by Connor Pedroza MD on 8/13/2024 at 3:47 PM

## 2024-08-13 NOTE — PLAN OF CARE
Unna Boot Application   Below Knee    NAME:  Vonda Lutz  YOB: 1970  MEDICAL RECORD NUMBER:  [id number]  DATE:  8/13/2024    Unna boot: Applied moisturizing agent to dry skin as needed.   Appied primary and secondary dressing as ordered.  Applied Unna roll from toes to knee overlapping each time.   Applied ace wrap or coban from toes to below the knee.   Instructed patient/caregiver to keep dressing dry and intact. DO NOT REMOVE DRESSING.   Instructed pt/family/caregiver to report excessive draining, loose bandage, wet dressing, severe pain or tingling in toes.  Applied Unna Boot dressing below the knee to left lower leg.    Unna Boot(s) were applied per  Guidelines.     Electronically signed by Deya Saul RN on 8/13/2024 at 4:15 PM

## 2024-08-13 NOTE — PATIENT INSTRUCTIONS
[facility] Wound Care and Hyperbaric Oxygen Therapy   Physician Orders and Discharge Instructions  [address] [place] [address]  [address]  [place] [address]  Telephone: [phone]      FAX [phone]    NAME:  Vonda Lutz  YOB: 1970  MEDICAL RECORD NUMBER:  [id number]  DATE:  8/13/2024    Discharge condition: Stable    Discharge to: Home    Left via:Private automobile    Accompanied by: Self    Dressing Orders: Left Lower Ext Ulcer  Silver Alginate to open areas  Coflex Unnaboot, Keep clean and Dry  Elevate feet to level or above heart 3-4 times daily and as needed to for 30 minutes to reduce swelling  Remove wraps and call office if- Wraps get wet, Cause increased pain, Slide down or you are unable to make  your next scheduled appointment  Multi Vitamin, High Protein diet as tolerated    [facility] follow up visit ___________1 week__________________  (Please note your next appointment above and if you are unable to keep, kindly give a 24 hour notice. Thank you.)    If you experience any of the following, please call the Wound Care Center during business hours:    * Increase in Pain  * Temperature over 101  * Increase in drainage from your wound  * Drainage with a foul odor  * Bleeding  * Increase in swelling  * Need for compression bandage changes due to slippage, breakthrough drainage.    If you need medical attention outside of the business hours of the Wound Care Centers please contact your PCP or go to the nearest emergency room.

## 2024-08-14 ENCOUNTER — OFFICE VISIT (OUTPATIENT)
Dept: INTERNAL MEDICINE | Age: 54
End: 2024-08-14
Payer: MEDICAID

## 2024-08-14 VITALS
WEIGHT: 87 LBS | SYSTOLIC BLOOD PRESSURE: 150 MMHG | HEART RATE: 89 BPM | DIASTOLIC BLOOD PRESSURE: 80 MMHG | BODY MASS INDEX: 17.54 KG/M2 | HEIGHT: 59 IN | OXYGEN SATURATION: 98 %

## 2024-08-14 DIAGNOSIS — R63.0 POOR APPETITE: ICD-10-CM

## 2024-08-14 DIAGNOSIS — F32.89 OTHER DEPRESSION: ICD-10-CM

## 2024-08-14 DIAGNOSIS — D50.9 IRON DEFICIENCY ANEMIA, UNSPECIFIED IRON DEFICIENCY ANEMIA TYPE: ICD-10-CM

## 2024-08-14 DIAGNOSIS — Z00.00 ROUTINE ADULT HEALTH MAINTENANCE: Primary | ICD-10-CM

## 2024-08-14 DIAGNOSIS — I10 PRIMARY HYPERTENSION: ICD-10-CM

## 2024-08-14 DIAGNOSIS — I73.9 PVD (PERIPHERAL VASCULAR DISEASE) (HCC): ICD-10-CM

## 2024-08-14 DIAGNOSIS — D50.8 IRON DEFICIENCY ANEMIA SECONDARY TO INADEQUATE DIETARY IRON INTAKE: ICD-10-CM

## 2024-08-14 DIAGNOSIS — E78.5 HYPERLIPIDEMIA, UNSPECIFIED HYPERLIPIDEMIA TYPE: ICD-10-CM

## 2024-08-14 DIAGNOSIS — I74.09 AORTOILIAC OCCLUSIVE DISEASE (HCC): ICD-10-CM

## 2024-08-14 DIAGNOSIS — L97.922 CHRONIC ULCER OF LEFT LEG, WITH FAT LAYER EXPOSED (HCC): Chronic | ICD-10-CM

## 2024-08-14 DIAGNOSIS — L97.522 CHRONIC ULCER OF LEFT FOOT, WITH FAT LAYER EXPOSED (HCC): ICD-10-CM

## 2024-08-14 PROCEDURE — 90715 TDAP VACCINE 7 YRS/> IM: CPT | Performed by: INTERNAL MEDICINE

## 2024-08-14 PROCEDURE — 3079F DIAST BP 80-89 MM HG: CPT | Performed by: INTERNAL MEDICINE

## 2024-08-14 PROCEDURE — 90471 IMMUNIZATION ADMIN: CPT | Performed by: INTERNAL MEDICINE

## 2024-08-14 PROCEDURE — 3077F SYST BP >= 140 MM HG: CPT | Performed by: INTERNAL MEDICINE

## 2024-08-14 PROCEDURE — G0439 PPPS, SUBSEQ VISIT: HCPCS | Performed by: INTERNAL MEDICINE

## 2024-08-14 RX ORDER — LISINOPRIL 5 MG/1
5 TABLET ORAL DAILY
Qty: 30 TABLET | Refills: 5 | Status: SHIPPED | OUTPATIENT
Start: 2024-08-14

## 2024-08-14 NOTE — PROGRESS NOTES
After obtaining consent, and per orders of Dr. Fernandez, injection of TDAP given in Right deltoid by Billie Duong MA. Patient instructed to remain in clinic for 20 minutes afterwards, and to report any adverse reaction to me immediately.    
   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    Osteoporosis     PAD (peripheral artery disease) (HCC)     Patella fracture     Radial fracture     Shoulder fracture     x2    Thyroid disease     Wears dentures       Past Surgical History:   Procedure Laterality Date    BONE MARROW ASPIRATE & BIOPSY  2024    BONE MARROW BIOPSY Right 2024    BONE MARROW ASPIRATION BIOPSY performed by Juliana Rush APRN at [facility] OR    BREAST ENHANCEMENT SURGERY Bilateral     CARPAL TUNNEL RELEASE Bilateral      SECTION      CHOLECYSTECTOMY      CORNEAL TRANSPLANT      CORONARY ANGIOPLASTY WITH STENT PLACEMENT      HERNIA REPAIR      HIP SURGERY      history of osteoporosis;hip surgery x 2    MOUTH SURGERY      OTHER SURGICAL HISTORY      TIPS    TUBAL LIGATION      VASCULAR SURGERY Bilateral 2024    Aortagram with bilateral lower extremity runoff    VASCULAR SURGERY Bilateral 2024    INTRAOPERATIVE ANGIOGRAM,  ENDOVASCULAR REPAIR,  WITH BALLOON ANGIOPLASTY performed by Kimberly Figueroa DO at [facility] OR      Social History     Socioeconomic History    Marital status: Single     Spouse name: None    Number of children: None    Years of education: None    Highest education level: None   Tobacco Use    Smoking status: Every Day     Current packs/day: 0.50     Average packs/day: 0.5 packs/day for 41.6 years (20.8 ttl pk-yrs)     Types: Cigarettes     Start date:      Passive exposure: Current    Smokeless tobacco: Never    Tobacco comments:     Pt reports 4-5 cigarettes a day 24   Vaping Use    Vaping status: Never Used   Substance and Sexual Activity    Alcohol use: Yes     Comment: rarely    Drug use: Never    Sexual activity: Defer     Social Determinants of Health     Financial Resource Strain: Low

## 2024-08-15 SDOH — ECONOMIC STABILITY: INCOME INSECURITY: HOW HARD IS IT FOR YOU TO PAY FOR THE VERY BASICS LIKE FOOD, HOUSING, MEDICAL CARE, AND HEATING?: NOT HARD AT ALL

## 2024-08-15 SDOH — ECONOMIC STABILITY: FOOD INSECURITY: WITHIN THE PAST 12 MONTHS, YOU WORRIED THAT YOUR FOOD WOULD RUN OUT BEFORE YOU GOT MONEY TO BUY MORE.: NEVER TRUE

## 2024-08-15 SDOH — ECONOMIC STABILITY: FOOD INSECURITY: WITHIN THE PAST 12 MONTHS, THE FOOD YOU BOUGHT JUST DIDN'T LAST AND YOU DIDN'T HAVE MONEY TO GET MORE.: NEVER TRUE

## 2024-08-15 ASSESSMENT — ENCOUNTER SYMPTOMS
WHEEZING: 0
EYE REDNESS: 0
ABDOMINAL PAIN: 0
CONSTIPATION: 0
BACK PAIN: 0
SINUS PRESSURE: 0
EYE DISCHARGE: 0
SORE THROAT: 0
CHEST TIGHTNESS: 0
VOICE CHANGE: 0
RECTAL PAIN: 0
EYE PAIN: 0
BLOOD IN STOOL: 0
EYE ITCHING: 0
COUGH: 0
FACIAL SWELLING: 0
ABDOMINAL DISTENTION: 0
NAUSEA: 0
VOMITING: 0
RHINORRHEA: 0
DIARRHEA: 0
SHORTNESS OF BREATH: 0
TROUBLE SWALLOWING: 0
COLOR CHANGE: 0
ANAL BLEEDING: 0
CHOKING: 0
PHOTOPHOBIA: 0

## 2024-08-15 ASSESSMENT — PATIENT HEALTH QUESTIONNAIRE - PHQ9
1. LITTLE INTEREST OR PLEASURE IN DOING THINGS: NOT AT ALL
SUM OF ALL RESPONSES TO PHQ QUESTIONS 1-9: 2
4. FEELING TIRED OR HAVING LITTLE ENERGY: SEVERAL DAYS
7. TROUBLE CONCENTRATING ON THINGS, SUCH AS READING THE NEWSPAPER OR WATCHING TELEVISION: NOT AT ALL
5. POOR APPETITE OR OVEREATING: NOT AT ALL
6. FEELING BAD ABOUT YOURSELF - OR THAT YOU ARE A FAILURE OR HAVE LET YOURSELF OR YOUR FAMILY DOWN: NOT AT ALL
SUM OF ALL RESPONSES TO PHQ9 QUESTIONS 1 & 2: 1
10. IF YOU CHECKED OFF ANY PROBLEMS, HOW DIFFICULT HAVE THESE PROBLEMS MADE IT FOR YOU TO DO YOUR WORK, TAKE CARE OF THINGS AT HOME, OR GET ALONG WITH OTHER PEOPLE: NOT DIFFICULT AT ALL
SUM OF ALL RESPONSES TO PHQ QUESTIONS 1-9: 2
SUM OF ALL RESPONSES TO PHQ QUESTIONS 1-9: 2
3. TROUBLE FALLING OR STAYING ASLEEP: NOT AT ALL
2. FEELING DOWN, DEPRESSED OR HOPELESS: SEVERAL DAYS
8. MOVING OR SPEAKING SO SLOWLY THAT OTHER PEOPLE COULD HAVE NOTICED. OR THE OPPOSITE, BEING SO FIGETY OR RESTLESS THAT YOU HAVE BEEN MOVING AROUND A LOT MORE THAN USUAL: NOT AT ALL
9. THOUGHTS THAT YOU WOULD BE BETTER OFF DEAD, OR OF HURTING YOURSELF: NOT AT ALL
SUM OF ALL RESPONSES TO PHQ QUESTIONS 1-9: 2

## 2024-08-19 ENCOUNTER — TELEPHONE (OUTPATIENT)
Dept: VASCULAR SURGERY | Age: 54
End: 2024-08-19

## 2024-08-19 DIAGNOSIS — E78.5 HYPERLIPIDEMIA, UNSPECIFIED HYPERLIPIDEMIA TYPE: Primary | ICD-10-CM

## 2024-08-19 RX ORDER — ATORVASTATIN CALCIUM 10 MG/1
10 TABLET, FILM COATED ORAL DAILY
Qty: 30 TABLET | Refills: 0 | OUTPATIENT
Start: 2024-08-19

## 2024-08-19 NOTE — TELEPHONE ENCOUNTER
She is overdue  for fasting lipid and cmp.  Please make sure she gets this.  Okay for 30 day supply

## 2024-08-19 NOTE — TELEPHONE ENCOUNTER
Left a message for the patient to let  her know that we have called her in 30 days of medication but the patient is due for a fasting lipid and CMP.  I let her know she can stop at the outpatient lab any time but will need to be fasting prior.  I also left our office number should the patient have questions.

## 2024-08-20 ENCOUNTER — HOSPITAL ENCOUNTER (OUTPATIENT)
Dept: WOUND CARE | Age: 54
Discharge: HOME | End: 2024-08-20
Payer: MEDICAID

## 2024-08-20 VITALS
SYSTOLIC BLOOD PRESSURE: 161 MMHG | BODY MASS INDEX: 17.54 KG/M2 | HEIGHT: 59 IN | HEART RATE: 97 BPM | DIASTOLIC BLOOD PRESSURE: 89 MMHG | RESPIRATION RATE: 18 BRPM | TEMPERATURE: 98 F | WEIGHT: 87 LBS

## 2024-08-20 DIAGNOSIS — L97.522 CHRONIC ULCER OF LEFT FOOT, WITH FAT LAYER EXPOSED (HCC): Primary | ICD-10-CM

## 2024-08-20 DIAGNOSIS — L97.922 CHRONIC ULCER OF LEFT LEG, WITH FAT LAYER EXPOSED (HCC): ICD-10-CM

## 2024-08-20 DIAGNOSIS — I73.9 PVD (PERIPHERAL VASCULAR DISEASE) (HCC): ICD-10-CM

## 2024-08-20 PROCEDURE — 97597 DBRDMT OPN WND 1ST 20 CM/<: CPT | Performed by: SURGERY

## 2024-08-20 PROCEDURE — 97597 DBRDMT OPN WND 1ST 20 CM/<: CPT

## 2024-08-20 RX ORDER — LIDOCAINE 50 MG/G
OINTMENT TOPICAL ONCE
OUTPATIENT
Start: 2024-08-20 | End: 2024-08-20

## 2024-08-20 RX ORDER — LIDOCAINE HYDROCHLORIDE 20 MG/ML
JELLY TOPICAL ONCE
OUTPATIENT
Start: 2024-08-20 | End: 2024-08-20

## 2024-08-20 RX ORDER — IBUPROFEN 200 MG
TABLET ORAL ONCE
OUTPATIENT
Start: 2024-08-20 | End: 2024-08-20

## 2024-08-20 RX ORDER — BETAMETHASONE DIPROPIONATE 0.5 MG/G
CREAM TOPICAL ONCE
OUTPATIENT
Start: 2024-08-20 | End: 2024-08-20

## 2024-08-20 RX ORDER — GENTAMICIN SULFATE 1 MG/G
OINTMENT TOPICAL ONCE
OUTPATIENT
Start: 2024-08-20 | End: 2024-08-20

## 2024-08-20 RX ORDER — LIDOCAINE HYDROCHLORIDE 20 MG/ML
JELLY TOPICAL ONCE
Status: COMPLETED | OUTPATIENT
Start: 2024-08-20 | End: 2024-08-20

## 2024-08-20 RX ORDER — SODIUM CHLOR/HYPOCHLOROUS ACID 0.033 %
SOLUTION, IRRIGATION IRRIGATION ONCE
OUTPATIENT
Start: 2024-08-20 | End: 2024-08-20

## 2024-08-20 RX ORDER — LIDOCAINE 40 MG/G
CREAM TOPICAL ONCE
OUTPATIENT
Start: 2024-08-20 | End: 2024-08-20

## 2024-08-20 RX ORDER — BACITRACIN ZINC AND POLYMYXIN B SULFATE 500; 1000 [USP'U]/G; [USP'U]/G
OINTMENT TOPICAL ONCE
OUTPATIENT
Start: 2024-08-20 | End: 2024-08-20

## 2024-08-20 RX ORDER — CLOBETASOL PROPIONATE 0.5 MG/G
OINTMENT TOPICAL ONCE
OUTPATIENT
Start: 2024-08-20 | End: 2024-08-20

## 2024-08-20 RX ORDER — GINSENG 100 MG
CAPSULE ORAL ONCE
OUTPATIENT
Start: 2024-08-20 | End: 2024-08-20

## 2024-08-20 RX ORDER — LIDOCAINE HYDROCHLORIDE 40 MG/ML
SOLUTION TOPICAL ONCE
OUTPATIENT
Start: 2024-08-20 | End: 2024-08-20

## 2024-08-20 RX ORDER — TRIAMCINOLONE ACETONIDE 1 MG/G
OINTMENT TOPICAL ONCE
OUTPATIENT
Start: 2024-08-20 | End: 2024-08-20

## 2024-08-20 RX ADMIN — LIDOCAINE HYDROCHLORIDE: 20 JELLY TOPICAL at 15:55

## 2024-08-20 ASSESSMENT — PAIN DESCRIPTION - DESCRIPTORS: DESCRIPTORS: ACHING;DISCOMFORT

## 2024-08-20 ASSESSMENT — PAIN DESCRIPTION - LOCATION: LOCATION: LEG

## 2024-08-20 ASSESSMENT — PAIN DESCRIPTION - ORIENTATION: ORIENTATION: LEFT

## 2024-08-20 ASSESSMENT — PAIN DESCRIPTION - PAIN TYPE: TYPE: CHRONIC PAIN

## 2024-08-20 ASSESSMENT — PAIN DESCRIPTION - ONSET: ONSET: ON-GOING

## 2024-08-20 ASSESSMENT — PAIN - FUNCTIONAL ASSESSMENT: PAIN_FUNCTIONAL_ASSESSMENT: PREVENTS OR INTERFERES SOME ACTIVE ACTIVITIES AND ADLS

## 2024-08-20 ASSESSMENT — PAIN SCALES - GENERAL: PAINLEVEL_OUTOF10: 7

## 2024-08-20 ASSESSMENT — PAIN DESCRIPTION - FREQUENCY: FREQUENCY: INTERMITTENT

## 2024-08-20 NOTE — PATIENT INSTRUCTIONS
[facility] Wound Care and Hyperbaric Oxygen Therapy   Physician Orders and Discharge Instructions  [address] [place] [address]  [address]  [place] [address]  Telephone: [phone]      FAX [phone]    NAME:  Vonda Lutz  YOB: 1970  MEDICAL RECORD NUMBER:  [id number]  DATE:  8/20/2024    Discharge condition: Stable    Discharge to: Home    Left via:Private automobile    Accompanied by: Self    Dressing Orders: Left Lower Ext Ulcer  Silver Alginate to open areas  Coflex Unnaboot, Keep clean and Dry  Elevate feet to level or above heart 3-4 times daily and as needed to for 30 minutes to reduce swelling  Remove wraps and call office if- Wraps get wet, Cause increased pain, Slide down or you are unable to make  your next scheduled appointment  Multi Vitamin, High Protein diet as tolerated    [facility] follow up visit ____________1 week_________________  (Please note your next appointment above and if you are unable to keep, kindly give a 24 hour notice. Thank you.)    If you experience any of the following, please call the Wound Care Center during business hours:    * Increase in Pain  * Temperature over 101  * Increase in drainage from your wound  * Drainage with a foul odor  * Bleeding  * Increase in swelling  * Need for compression bandage changes due to slippage, breakthrough drainage.    If you need medical attention outside of the business hours of the Wound Care Centers please contact your PCP or go to the nearest emergency room.

## 2024-08-20 NOTE — PLAN OF CARE
Unna Boot Application   Below Knee    NAME:  Vonda Lutz  YOB: 1970  MEDICAL RECORD NUMBER:  [id number]  DATE:  8/20/2024    Unna boot: Applied moisturizing agent to dry skin as needed.   Appied primary and secondary dressing as ordered.  Applied Unna roll from toes to knee overlapping each time.   Applied ace wrap or coban from toes to below the knee.   Instructed patient/caregiver to keep dressing dry and intact. DO NOT REMOVE DRESSING.   Instructed pt/family/caregiver to report excessive draining, loose bandage, wet dressing, severe pain or tingling in toes.  Applied Unna Boot dressing below the knee to left lower leg.    Unna Boot(s) were applied per  Guidelines.     Electronically signed by Deya Saul RN on 8/20/2024 at 4:40 PM

## 2024-08-20 NOTE — PROGRESS NOTES
Wound Image   08/20/24 1556   Wound Etiology Venous 08/20/24 1556   Dressing Status Old drainage noted 08/20/24 1556   Wound Cleansed Soap and water 08/20/24 1556   Dressing/Treatment Alginate with Ag;Other (comment) 08/13/24 1603   Wound Length (cm) 1.9 cm 08/20/24 1556   Wound Width (cm) 1.1 cm 08/20/24 1556   Wound Depth (cm) 0.1 cm 08/20/24 1556   Wound Surface Area (cm^2) 2.09 cm^2 08/20/24 1556   Change in Wound Size % (l*w) 65.17 08/20/24 1556   Wound Volume (cm^3) 0.209 cm^3 08/20/24 1556   Wound Healing % 83 08/20/24 1556   Post-Procedure Length (cm) 1.9 cm 08/20/24 1606   Post-Procedure Width (cm) 1.1 cm 08/20/24 1606   Post-Procedure Depth (cm) 0.1 cm 08/20/24 1606   Post-Procedure Surface Area (cm^2) 2.09 cm^2 08/20/24 1606   Post-Procedure Volume (cm^3) 0.209 cm^3 08/20/24 1606   Distance Tunneling (cm) 0 cm 08/20/24 1556   Tunneling Position ___ O'Clock 0 08/20/24 1556   Undermining Starts ___ O'Clock 0 08/20/24 1556   Undermining Ends___ O'Clock 0 08/20/24 1556   Undermining Maxium Distance (cm) 0 08/20/24 1556   Wound Assessment Slough;Pink/red 08/20/24 1556   Drainage Amount Moderate (25-50%) 08/20/24 1556   Drainage Description Serosanguinous 08/20/24 1556   Odor None 08/20/24 1556   Anjali-wound Assessment Blanchable erythema 08/20/24 1556   Margins Attached edges 08/20/24 1556   Wound Thickness Description not for Pressure Injury Full thickness 08/20/24 1556   Number of days: 196             Estimated Blood Loss:  Minimal    Hemostasis Achieved:  by pressure    Procedural Pain:  0  / 10     Post Procedural Pain:  0 / 10     Response to treatment:  Well tolerated by patient.         Plan:     Problem List Items Addressed This Visit       * (Principal) Chronic ulcer of left leg, with fat layer exposed (HCC) (Chronic)    Relevant Orders    Initiate Outpatient Wound Care Protocol    Chronic ulcer of left foot, with fat layer exposed (HCC) - Primary    Relevant Orders    Initiate Outpatient Wound Care

## 2024-08-27 ENCOUNTER — HOSPITAL ENCOUNTER (OUTPATIENT)
Dept: WOUND CARE | Age: 54
Discharge: HOME | End: 2024-08-27
Payer: MEDICAID

## 2024-08-27 VITALS
BODY MASS INDEX: 17.54 KG/M2 | HEIGHT: 59 IN | TEMPERATURE: 98.5 F | DIASTOLIC BLOOD PRESSURE: 88 MMHG | HEART RATE: 94 BPM | WEIGHT: 87 LBS | SYSTOLIC BLOOD PRESSURE: 150 MMHG | RESPIRATION RATE: 18 BRPM

## 2024-08-27 DIAGNOSIS — I73.9 PVD (PERIPHERAL VASCULAR DISEASE) (HCC): ICD-10-CM

## 2024-08-27 DIAGNOSIS — L97.922 CHRONIC ULCER OF LEFT LEG, WITH FAT LAYER EXPOSED (HCC): ICD-10-CM

## 2024-08-27 DIAGNOSIS — L97.522 CHRONIC ULCER OF LEFT FOOT, WITH FAT LAYER EXPOSED (HCC): Primary | ICD-10-CM

## 2024-08-27 PROCEDURE — 97597 DBRDMT OPN WND 1ST 20 CM/<: CPT | Performed by: SURGERY

## 2024-08-27 PROCEDURE — 97597 DBRDMT OPN WND 1ST 20 CM/<: CPT

## 2024-08-27 RX ORDER — MUPIROCIN 20 MG/G
OINTMENT TOPICAL ONCE
OUTPATIENT
Start: 2024-08-27 | End: 2024-08-27

## 2024-08-27 RX ORDER — BACITRACIN ZINC AND POLYMYXIN B SULFATE 500; 1000 [USP'U]/G; [USP'U]/G
OINTMENT TOPICAL ONCE
OUTPATIENT
Start: 2024-08-27 | End: 2024-08-27

## 2024-08-27 RX ORDER — SODIUM CHLOR/HYPOCHLOROUS ACID 0.033 %
SOLUTION, IRRIGATION IRRIGATION ONCE
OUTPATIENT
Start: 2024-08-27 | End: 2024-08-27

## 2024-08-27 RX ORDER — LIDOCAINE HYDROCHLORIDE 20 MG/ML
JELLY TOPICAL ONCE
OUTPATIENT
Start: 2024-08-27 | End: 2024-08-27

## 2024-08-27 RX ORDER — LIDOCAINE 40 MG/G
CREAM TOPICAL ONCE
OUTPATIENT
Start: 2024-08-27 | End: 2024-08-27

## 2024-08-27 RX ORDER — BETAMETHASONE DIPROPIONATE 0.5 MG/G
CREAM TOPICAL ONCE
OUTPATIENT
Start: 2024-08-27 | End: 2024-08-27

## 2024-08-27 RX ORDER — GENTAMICIN SULFATE 1 MG/G
OINTMENT TOPICAL ONCE
OUTPATIENT
Start: 2024-08-27 | End: 2024-08-27

## 2024-08-27 RX ORDER — LIDOCAINE 50 MG/G
OINTMENT TOPICAL ONCE
OUTPATIENT
Start: 2024-08-27 | End: 2024-08-27

## 2024-08-27 RX ORDER — SILVER SULFADIAZINE 10 MG/G
CREAM TOPICAL ONCE
OUTPATIENT
Start: 2024-08-27 | End: 2024-08-27

## 2024-08-27 RX ORDER — NEOMYCIN/BACITRACIN/POLYMYXINB 3.5-400-5K
OINTMENT (GRAM) TOPICAL ONCE
OUTPATIENT
Start: 2024-08-27 | End: 2024-08-27

## 2024-08-27 RX ORDER — GINSENG 100 MG
CAPSULE ORAL ONCE
OUTPATIENT
Start: 2024-08-27 | End: 2024-08-27

## 2024-08-27 RX ORDER — LIDOCAINE HYDROCHLORIDE 40 MG/ML
SOLUTION TOPICAL ONCE
OUTPATIENT
Start: 2024-08-27 | End: 2024-08-27

## 2024-08-27 RX ORDER — TRIAMCINOLONE ACETONIDE 1 MG/G
OINTMENT TOPICAL ONCE
OUTPATIENT
Start: 2024-08-27 | End: 2024-08-27

## 2024-08-27 RX ORDER — LIDOCAINE HYDROCHLORIDE 20 MG/ML
JELLY TOPICAL ONCE
Status: COMPLETED | OUTPATIENT
Start: 2024-08-27 | End: 2024-08-27

## 2024-08-27 RX ORDER — CLOBETASOL PROPIONATE 0.5 MG/G
OINTMENT TOPICAL ONCE
OUTPATIENT
Start: 2024-08-27 | End: 2024-08-27

## 2024-08-27 RX ADMIN — LIDOCAINE HYDROCHLORIDE: 20 JELLY TOPICAL at 15:33

## 2024-08-27 ASSESSMENT — PAIN - FUNCTIONAL ASSESSMENT: PAIN_FUNCTIONAL_ASSESSMENT: PREVENTS OR INTERFERES SOME ACTIVE ACTIVITIES AND ADLS

## 2024-08-27 ASSESSMENT — PAIN DESCRIPTION - DESCRIPTORS: DESCRIPTORS: ACHING;BURNING;DISCOMFORT

## 2024-08-27 ASSESSMENT — PAIN DESCRIPTION - FREQUENCY: FREQUENCY: INTERMITTENT

## 2024-08-27 ASSESSMENT — PAIN DESCRIPTION - ORIENTATION: ORIENTATION: LEFT

## 2024-08-27 ASSESSMENT — PAIN DESCRIPTION - PAIN TYPE: TYPE: CHRONIC PAIN

## 2024-08-27 ASSESSMENT — PAIN SCALES - GENERAL: PAINLEVEL_OUTOF10: 7

## 2024-08-27 ASSESSMENT — PAIN DESCRIPTION - ONSET: ONSET: ON-GOING

## 2024-08-27 ASSESSMENT — PAIN DESCRIPTION - LOCATION: LOCATION: GENERALIZED;LEG

## 2024-08-27 NOTE — PATIENT INSTRUCTIONS
[facility] Wound Care and Hyperbaric Oxygen Therapy   Physician Orders and Discharge Instructions  [address] [place] [address]  [address]  [place] [address]  Telephone: [phone]      FAX [phone]    NAME:  Vonda Lutz  YOB: 1970  MEDICAL RECORD NUMBER:  [id number]  DATE:  8/27/2024    Discharge condition: Stable    Discharge to: Home    Left via:Private automobile    Accompanied by: Self    [employer]    Dressing Orders: Left Lower Ext Ulcer  Soap and water wash  Silver Alginate to open area  Cover with Adhesive bandage, change daily  Wear compression stockings   Elevate feet to level or above heart 3-4 times daily and as needed to for 30 minutes to reduce swelling  Multi Vitamin, High Protein diet as tolerated    [facility] follow up visit ________2 weeks__________________  (Please note your next appointment above and if you are unable to keep, kindly give a 24 hour notice. Thank you.)    If you experience any of the following, please call the Wound Care Center during business hours:    * Increase in Pain  * Temperature over 101  * Increase in drainage from your wound  * Drainage with a foul odor  * Bleeding  * Increase in swelling  * Need for compression bandage changes due to slippage, breakthrough drainage.    If you need medical attention outside of the business hours of the Wound Care Centers please contact your PCP or go to the nearest emergency room.

## 2024-08-27 NOTE — WOUND CARE
Wound Care Supplies      Supply Company:     [employer] [id number] [place] [address] p: [phone] f: [phone]     Ordering Center:     [facility]  [address] [place] [address] [address]  [place] [id number] [phone]  WOUND CARE Dept: [phone]   FAX NUMBER [phone]    Patient Information:      Vonda Lutz  [address]  [place] [address]   [phone]   : 1970  AGE: 54 y.o.     GENDER: female   EPISODE DATE: 2024    Insurance:      PRIMARY INSURANCE:  Plan: [employer] [place]  Coverage: [employer] [address]  Effective Date: 2023  Group Number: [unfilled]  Subscriber Number: [id number] - (Medicaid Managed)    Payer/Plan Subscr  Sex Relation Sub. Ins. ID Effective Group Num   1. [employer]* VONDA LUTZ 1970 Female Self [id number] [facility]                                   [address]       Patient Wound Information:      Problem List Items Addressed This Visit          Circulatory    PVD (peripheral vascular disease) (HCC)    Relevant Orders    Initiate Outpatient Wound Care Protocol       Other    * (Principal) Chronic ulcer of left leg, with fat layer exposed (HCC) (Chronic)    Relevant Orders    Initiate Outpatient Wound Care Protocol    Chronic ulcer of left foot, with fat layer exposed (HCC) - Primary    Relevant Orders    Initiate Outpatient Wound Care Protocol       WOUNDS REQUIRING DRESSING SUPPLIES:     Puncture 24 Femoral (Active)   Number of days: 62       Puncture 24 Femoral (Active)   Number of days: 62       Wound 24 Pretibial Left wound 1-left leg wound venous (Active)   Wound Image   24 1537   Wound Etiology Venous 24 1537   Dressing Status Old drainage noted 24 1537   Wound Cleansed Soap and water 24 1537   Dressing/Treatment Alginate with Ag;Other (comment) 24 1624   Wound Length (cm) 1.9 cm 24 1537   Wound Width

## 2024-08-28 DIAGNOSIS — E21.3 HYPERPARATHYROIDISM (HCC): ICD-10-CM

## 2024-08-28 DIAGNOSIS — R53.83 OTHER FATIGUE: ICD-10-CM

## 2024-08-28 LAB
25(OH)D3 SERPL-MCNC: 41.5 NG/ML
ALBUMIN SERPL-MCNC: 3.2 G/DL (ref 3.5–5.2)
ALP SERPL-CCNC: 124 U/L (ref 35–104)
ALT SERPL-CCNC: 12 U/L (ref 5–33)
ANION GAP SERPL CALCULATED.3IONS-SCNC: 11 MMOL/L (ref 7–19)
AST SERPL-CCNC: 22 U/L (ref 5–32)
BASOPHILS # BLD: 0.1 K/UL (ref 0–0.2)
BASOPHILS NFR BLD: 0.7 % (ref 0–1)
BILIRUB SERPL-MCNC: 0.2 MG/DL (ref 0.2–1.2)
BUN SERPL-MCNC: 14 MG/DL (ref 6–20)
CALCIUM SERPL-MCNC: 8.7 MG/DL (ref 8.6–10)
CHLORIDE SERPL-SCNC: 101 MMOL/L (ref 98–111)
CHOLEST SERPL-MCNC: 131 MG/DL (ref 0–199)
CO2 SERPL-SCNC: 27 MMOL/L (ref 22–29)
CREAT SERPL-MCNC: 1 MG/DL (ref 0.5–0.9)
EOSINOPHIL # BLD: 0.2 K/UL (ref 0–0.6)
EOSINOPHIL NFR BLD: 2.4 % (ref 0–5)
ERYTHROCYTE [DISTWIDTH] IN BLOOD BY AUTOMATED COUNT: 15.4 % (ref 11.5–14.5)
GLUCOSE SERPL-MCNC: 111 MG/DL (ref 70–99)
HCT VFR BLD AUTO: 30.8 % (ref 37–47)
HDLC SERPL-MCNC: 64 MG/DL (ref 40–60)
HGB BLD-MCNC: 9.4 G/DL (ref 12–16)
IMM GRANULOCYTES # BLD: 0 K/UL
LDLC SERPL CALC-MCNC: 52 MG/DL
LYMPHOCYTES # BLD: 2.3 K/UL (ref 1.1–4.5)
LYMPHOCYTES NFR BLD: 27.3 % (ref 20–40)
MCH RBC QN AUTO: 26.9 PG (ref 27–31)
MCHC RBC AUTO-ENTMCNC: 30.5 G/DL (ref 33–37)
MCV RBC AUTO: 88 FL (ref 81–99)
MONOCYTES # BLD: 0.7 K/UL (ref 0–0.9)
MONOCYTES NFR BLD: 8.3 % (ref 0–10)
NEUTROPHILS # BLD: 5.1 K/UL (ref 1.5–7.5)
NEUTS SEG NFR BLD: 61.1 % (ref 50–65)
PLATELET # BLD AUTO: 435 K/UL (ref 130–400)
PMV BLD AUTO: 9.5 FL (ref 9.4–12.3)
POTASSIUM SERPL-SCNC: 3.6 MMOL/L (ref 3.5–5)
PROT SERPL-MCNC: 6.6 G/DL (ref 6.4–8.3)
PTH-INTACT SERPL-MCNC: 78.6 PG/ML (ref 15–65)
RBC # BLD AUTO: 3.5 M/UL (ref 4.2–5.4)
SODIUM SERPL-SCNC: 139 MMOL/L (ref 136–145)
TRIGL SERPL-MCNC: 76 MG/DL (ref 0–149)
TSH SERPL DL<=0.005 MIU/L-ACNC: 0.78 UIU/ML (ref 0.27–4.2)
WBC # BLD AUTO: 8.4 K/UL (ref 4.8–10.8)

## 2024-08-29 ENCOUNTER — TELEPHONE (OUTPATIENT)
Dept: HEMATOLOGY | Age: 54
End: 2024-08-29

## 2024-08-29 NOTE — TELEPHONE ENCOUNTER
Called Patient and reminded patient of their appointment on 09/05/2024 and patient confirmed they would be here. Reminded patient to just come at appointment time, and to not come at the lab appointment time. Reminded patient that we will not check them in any more than 30 minutes before appointment time.  We have now moved to the [facility] cancer center that is located between our old office and the ER at the hospitals. Letting the Pt know that our front entrance faces the  [employer] [place].

## 2024-08-31 LAB — TRYPTASE SERPL-MCNC: 13.6 UG/L

## 2024-09-02 LAB — PTH RELATED PROT SERPL-SCNC: 5.2 PMOL/L (ref 0–3.4)

## 2024-09-03 DIAGNOSIS — D50.8 IRON DEFICIENCY ANEMIA SECONDARY TO INADEQUATE DIETARY IRON INTAKE: Primary | ICD-10-CM

## 2024-09-03 DIAGNOSIS — R74.8 ELEVATED SERUM TRYPTASE: ICD-10-CM

## 2024-09-03 DIAGNOSIS — E43 SEVERE MALNUTRITION (HCC): ICD-10-CM

## 2024-09-05 ENCOUNTER — OFFICE VISIT (OUTPATIENT)
Dept: HEMATOLOGY | Age: 54
End: 2024-09-05
Payer: MEDICARE

## 2024-09-05 ENCOUNTER — HOSPITAL ENCOUNTER (OUTPATIENT)
Dept: INFUSION THERAPY | Age: 54
Discharge: HOME | End: 2024-09-05
Payer: MEDICARE

## 2024-09-05 VITALS
TEMPERATURE: 97.9 F | WEIGHT: 83.7 LBS | DIASTOLIC BLOOD PRESSURE: 80 MMHG | SYSTOLIC BLOOD PRESSURE: 150 MMHG | HEART RATE: 107 BPM | OXYGEN SATURATION: 96 % | BODY MASS INDEX: 16.88 KG/M2 | HEIGHT: 59 IN

## 2024-09-05 DIAGNOSIS — E87.6 HYPOKALEMIA: ICD-10-CM

## 2024-09-05 DIAGNOSIS — L97.922 CHRONIC ULCER OF LEFT LEG, WITH FAT LAYER EXPOSED (HCC): Primary | ICD-10-CM

## 2024-09-05 DIAGNOSIS — D50.8 IRON DEFICIENCY ANEMIA SECONDARY TO INADEQUATE DIETARY IRON INTAKE: ICD-10-CM

## 2024-09-05 DIAGNOSIS — R74.8 ELEVATED SERUM TRYPTASE: ICD-10-CM

## 2024-09-05 DIAGNOSIS — R79.82 ELEVATED C-REACTIVE PROTEIN (CRP): ICD-10-CM

## 2024-09-05 LAB
ALBUMIN SERPL-MCNC: 3.4 G/DL (ref 3.5–5.2)
ALP SERPL-CCNC: 126 U/L (ref 35–104)
ALT SERPL-CCNC: 9 U/L (ref 5–33)
ANION GAP SERPL CALCULATED.3IONS-SCNC: 14 MMOL/L (ref 7–19)
AST SERPL-CCNC: 18 U/L (ref 5–32)
BASOPHILS # BLD: 0.04 K/UL (ref 0.01–0.08)
BASOPHILS NFR BLD: 0.5 % (ref 0.1–1.2)
BILIRUB SERPL-MCNC: <0.2 MG/DL (ref 0.2–1.2)
BUN SERPL-MCNC: 15 MG/DL (ref 6–20)
CALCIUM SERPL-MCNC: 9.3 MG/DL (ref 8.6–10)
CHLORIDE SERPL-SCNC: 97 MMOL/L (ref 98–111)
CO2 SERPL-SCNC: 25 MMOL/L (ref 22–29)
CREAT SERPL-MCNC: 1.3 MG/DL (ref 0.5–0.9)
EOSINOPHIL # BLD: 0.2 K/UL (ref 0.04–0.54)
EOSINOPHIL NFR BLD: 2.6 % (ref 0.7–7)
ERYTHROCYTE [DISTWIDTH] IN BLOOD BY AUTOMATED COUNT: 14.4 % (ref 11.7–14.4)
FERRITIN SERPL-MCNC: 579.3 NG/ML (ref 13–150)
GLUCOSE SERPL-MCNC: 96 MG/DL (ref 70–99)
HCT VFR BLD AUTO: 32.5 % (ref 34.1–44.9)
HGB BLD-MCNC: 10.3 G/DL (ref 11.2–15.7)
IRON SATN MFR SERPL: 21 % (ref 14–50)
IRON SERPL-MCNC: 39 UG/DL (ref 37–145)
LYMPHOCYTES # BLD: 2.64 K/UL (ref 1.18–3.74)
LYMPHOCYTES NFR BLD: 34.6 % (ref 19.3–53.1)
MCH RBC QN AUTO: 27.6 PG (ref 25.6–32.2)
MCHC RBC AUTO-ENTMCNC: 31.7 G/DL (ref 32.3–35.5)
MCV RBC AUTO: 87.1 FL (ref 79.4–94.8)
MONOCYTES # BLD: 0.53 K/UL (ref 0.24–0.82)
MONOCYTES NFR BLD: 6.9 % (ref 4.7–12.5)
NEUTROPHILS # BLD: 4.21 K/UL (ref 1.56–6.13)
NEUTS SEG NFR BLD: 55.1 % (ref 34–71.1)
PLATELET # BLD AUTO: 445 K/UL (ref 182–369)
PMV BLD AUTO: 9.2 FL (ref 7.4–10.4)
POTASSIUM SERPL-SCNC: 3.7 MMOL/L (ref 3.5–5)
PROT SERPL-MCNC: 7.1 G/DL (ref 6.4–8.3)
RBC # BLD AUTO: 3.73 M/UL (ref 3.93–5.22)
SODIUM SERPL-SCNC: 136 MMOL/L (ref 136–145)
TIBC SERPL-MCNC: 183 UG/DL (ref 250–400)
WBC # BLD AUTO: 7.64 K/UL (ref 3.98–10.04)

## 2024-09-05 PROCEDURE — G8427 DOCREV CUR MEDS BY ELIG CLIN: HCPCS | Performed by: NURSE PRACTITIONER

## 2024-09-05 PROCEDURE — 36415 COLL VENOUS BLD VENIPUNCTURE: CPT | Performed by: NURSE PRACTITIONER

## 2024-09-05 PROCEDURE — 36415 COLL VENOUS BLD VENIPUNCTURE: CPT

## 2024-09-05 PROCEDURE — 99212 OFFICE O/P EST SF 10 MIN: CPT

## 2024-09-05 PROCEDURE — 4004F PT TOBACCO SCREEN RCVD TLK: CPT | Performed by: NURSE PRACTITIONER

## 2024-09-05 PROCEDURE — 3017F COLORECTAL CA SCREEN DOC REV: CPT | Performed by: NURSE PRACTITIONER

## 2024-09-05 PROCEDURE — G8419 CALC BMI OUT NRM PARAM NOF/U: HCPCS | Performed by: NURSE PRACTITIONER

## 2024-09-05 PROCEDURE — 85025 COMPLETE CBC W/AUTO DIFF WBC: CPT

## 2024-09-05 PROCEDURE — 99214 OFFICE O/P EST MOD 30 MIN: CPT | Performed by: NURSE PRACTITIONER

## 2024-09-05 RX ORDER — POTASSIUM CHLORIDE 1500 MG/1
20 TABLET, EXTENDED RELEASE ORAL 2 TIMES DAILY
COMMUNITY
Start: 2024-08-18

## 2024-09-10 ENCOUNTER — OFFICE VISIT (OUTPATIENT)
Dept: INTERNAL MEDICINE | Age: 54
End: 2024-09-10

## 2024-09-10 VITALS
OXYGEN SATURATION: 94 % | SYSTOLIC BLOOD PRESSURE: 142 MMHG | HEIGHT: 59 IN | RESPIRATION RATE: 20 BRPM | BODY MASS INDEX: 16.93 KG/M2 | WEIGHT: 84 LBS | HEART RATE: 114 BPM | DIASTOLIC BLOOD PRESSURE: 96 MMHG

## 2024-09-10 DIAGNOSIS — F32.89 OTHER DEPRESSION: ICD-10-CM

## 2024-09-10 DIAGNOSIS — R63.0 POOR APPETITE: ICD-10-CM

## 2024-09-10 DIAGNOSIS — Z23 NEEDS FLU SHOT: ICD-10-CM

## 2024-09-10 DIAGNOSIS — E44.1 MILD PROTEIN-CALORIE MALNUTRITION (HCC): ICD-10-CM

## 2024-09-10 DIAGNOSIS — I10 PRIMARY HYPERTENSION: ICD-10-CM

## 2024-09-10 DIAGNOSIS — N28.9 RENAL INSUFFICIENCY: Primary | ICD-10-CM

## 2024-09-10 DIAGNOSIS — F33.0 MAJOR DEPRESSIVE DISORDER, RECURRENT, MILD (HCC): ICD-10-CM

## 2024-09-10 DIAGNOSIS — E78.5 HYPERLIPIDEMIA, UNSPECIFIED HYPERLIPIDEMIA TYPE: ICD-10-CM

## 2024-09-10 RX ORDER — LISINOPRIL 10 MG/1
10 TABLET ORAL DAILY
Qty: 90 TABLET | Refills: 1 | Status: SHIPPED | OUTPATIENT
Start: 2024-09-10

## 2024-09-10 RX ORDER — LISINOPRIL 10 MG/1
10 TABLET ORAL DAILY
Qty: 90 TABLET | Refills: 1 | Status: SHIPPED | OUTPATIENT
Start: 2024-09-10 | End: 2024-09-10

## 2024-09-11 ENCOUNTER — TELEPHONE (OUTPATIENT)
Dept: HEMATOLOGY | Age: 54
End: 2024-09-11

## 2024-09-11 ASSESSMENT — ENCOUNTER SYMPTOMS
WHEEZING: 0
BLOOD IN STOOL: 0
GASTROINTESTINAL NEGATIVE: 1
EYES NEGATIVE: 1
SHORTNESS OF BREATH: 0
NAUSEA: 0
BACK PAIN: 0
ABDOMINAL PAIN: 0
SORE THROAT: 0
CONSTIPATION: 0
EYE REDNESS: 0
EYE DISCHARGE: 0
EYE PAIN: 0
VOMITING: 0
RESPIRATORY NEGATIVE: 1
COUGH: 0
DIARRHEA: 0

## 2024-09-12 ENCOUNTER — HOSPITAL ENCOUNTER (OUTPATIENT)
Dept: WOUND CARE | Age: 54
Discharge: HOME | End: 2024-09-12
Payer: MEDICARE

## 2024-09-12 VITALS
TEMPERATURE: 98.2 F | RESPIRATION RATE: 20 BRPM | DIASTOLIC BLOOD PRESSURE: 84 MMHG | SYSTOLIC BLOOD PRESSURE: 134 MMHG | HEART RATE: 94 BPM

## 2024-09-12 DIAGNOSIS — L97.922 CHRONIC ULCER OF LEFT LEG, WITH FAT LAYER EXPOSED (HCC): ICD-10-CM

## 2024-09-12 DIAGNOSIS — I73.9 PVD (PERIPHERAL VASCULAR DISEASE) (HCC): ICD-10-CM

## 2024-09-12 DIAGNOSIS — L97.522 CHRONIC ULCER OF LEFT FOOT, WITH FAT LAYER EXPOSED (HCC): Primary | ICD-10-CM

## 2024-09-12 PROCEDURE — 99213 OFFICE O/P EST LOW 20 MIN: CPT

## 2024-09-12 RX ORDER — LIDOCAINE HYDROCHLORIDE 40 MG/ML
SOLUTION TOPICAL ONCE
OUTPATIENT
Start: 2024-09-12 | End: 2024-09-12

## 2024-09-12 RX ORDER — LIDOCAINE HYDROCHLORIDE 20 MG/ML
JELLY TOPICAL ONCE
Status: COMPLETED | OUTPATIENT
Start: 2024-09-12 | End: 2024-09-12

## 2024-09-12 RX ORDER — NEOMYCIN/BACITRACIN/POLYMYXINB 3.5-400-5K
OINTMENT (GRAM) TOPICAL ONCE
OUTPATIENT
Start: 2024-09-12 | End: 2024-09-12

## 2024-09-12 RX ORDER — LIDOCAINE HYDROCHLORIDE 20 MG/ML
JELLY TOPICAL ONCE
OUTPATIENT
Start: 2024-09-12 | End: 2024-09-12

## 2024-09-12 RX ORDER — LIDOCAINE 50 MG/G
OINTMENT TOPICAL ONCE
OUTPATIENT
Start: 2024-09-12 | End: 2024-09-12

## 2024-09-12 RX ORDER — MUPIROCIN 20 MG/G
OINTMENT TOPICAL ONCE
OUTPATIENT
Start: 2024-09-12 | End: 2024-09-12

## 2024-09-12 RX ORDER — SILVER SULFADIAZINE 10 MG/G
CREAM TOPICAL ONCE
OUTPATIENT
Start: 2024-09-12 | End: 2024-09-12

## 2024-09-12 RX ORDER — GINSENG 100 MG
CAPSULE ORAL ONCE
OUTPATIENT
Start: 2024-09-12 | End: 2024-09-12

## 2024-09-12 RX ORDER — GENTAMICIN SULFATE 1 MG/G
OINTMENT TOPICAL ONCE
OUTPATIENT
Start: 2024-09-12 | End: 2024-09-12

## 2024-09-12 RX ORDER — BACITRACIN ZINC AND POLYMYXIN B SULFATE 500; 1000 [USP'U]/G; [USP'U]/G
OINTMENT TOPICAL ONCE
OUTPATIENT
Start: 2024-09-12 | End: 2024-09-12

## 2024-09-12 RX ORDER — BETAMETHASONE DIPROPIONATE 0.5 MG/G
CREAM TOPICAL ONCE
OUTPATIENT
Start: 2024-09-12 | End: 2024-09-12

## 2024-09-12 RX ORDER — SODIUM CHLOR/HYPOCHLOROUS ACID 0.033 %
SOLUTION, IRRIGATION IRRIGATION ONCE
OUTPATIENT
Start: 2024-09-12 | End: 2024-09-12

## 2024-09-12 RX ORDER — CLOBETASOL PROPIONATE 0.5 MG/G
OINTMENT TOPICAL ONCE
OUTPATIENT
Start: 2024-09-12 | End: 2024-09-12

## 2024-09-12 RX ORDER — TRIAMCINOLONE ACETONIDE 1 MG/G
OINTMENT TOPICAL ONCE
OUTPATIENT
Start: 2024-09-12 | End: 2024-09-12

## 2024-09-12 RX ORDER — LIDOCAINE 40 MG/G
CREAM TOPICAL ONCE
OUTPATIENT
Start: 2024-09-12 | End: 2024-09-12

## 2024-09-12 RX ADMIN — LIDOCAINE HYDROCHLORIDE: 20 JELLY TOPICAL at 10:20

## 2024-09-12 ASSESSMENT — PAIN DESCRIPTION - PAIN TYPE: TYPE: CHRONIC PAIN

## 2024-09-12 ASSESSMENT — PAIN DESCRIPTION - FREQUENCY: FREQUENCY: INTERMITTENT

## 2024-09-12 ASSESSMENT — PAIN DESCRIPTION - ONSET: ONSET: ON-GOING

## 2024-09-12 ASSESSMENT — PAIN - FUNCTIONAL ASSESSMENT: PAIN_FUNCTIONAL_ASSESSMENT: PREVENTS OR INTERFERES SOME ACTIVE ACTIVITIES AND ADLS

## 2024-09-12 ASSESSMENT — PAIN SCALES - GENERAL: PAINLEVEL_OUTOF10: 4

## 2024-09-12 ASSESSMENT — PAIN DESCRIPTION - LOCATION: LOCATION: LEG

## 2024-09-12 ASSESSMENT — PAIN DESCRIPTION - DESCRIPTORS: DESCRIPTORS: TEARING

## 2024-09-12 ASSESSMENT — PAIN DESCRIPTION - ORIENTATION: ORIENTATION: LEFT

## 2024-09-13 PROBLEM — F33.0 MAJOR DEPRESSIVE DISORDER, RECURRENT, MILD (HCC): Status: ACTIVE | Noted: 2024-09-13

## 2024-09-13 PROBLEM — E46 PROTEIN CALORIE MALNUTRITION (HCC): Status: ACTIVE | Noted: 2024-09-13

## 2024-09-13 SDOH — ECONOMIC STABILITY: FOOD INSECURITY: WITHIN THE PAST 12 MONTHS, THE FOOD YOU BOUGHT JUST DIDN'T LAST AND YOU DIDN'T HAVE MONEY TO GET MORE.: NEVER TRUE

## 2024-09-13 SDOH — ECONOMIC STABILITY: FOOD INSECURITY: WITHIN THE PAST 12 MONTHS, YOU WORRIED THAT YOUR FOOD WOULD RUN OUT BEFORE YOU GOT MONEY TO BUY MORE.: NEVER TRUE

## 2024-09-13 SDOH — ECONOMIC STABILITY: INCOME INSECURITY: HOW HARD IS IT FOR YOU TO PAY FOR THE VERY BASICS LIKE FOOD, HOUSING, MEDICAL CARE, AND HEATING?: NOT HARD AT ALL

## 2024-09-13 ASSESSMENT — PATIENT HEALTH QUESTIONNAIRE - PHQ9
SUM OF ALL RESPONSES TO PHQ QUESTIONS 1-9: 3
SUM OF ALL RESPONSES TO PHQ9 QUESTIONS 1 & 2: 2
1. LITTLE INTEREST OR PLEASURE IN DOING THINGS: SEVERAL DAYS
5. POOR APPETITE OR OVEREATING: NOT AT ALL
SUM OF ALL RESPONSES TO PHQ QUESTIONS 1-9: 3
6. FEELING BAD ABOUT YOURSELF - OR THAT YOU ARE A FAILURE OR HAVE LET YOURSELF OR YOUR FAMILY DOWN: NOT AT ALL
4. FEELING TIRED OR HAVING LITTLE ENERGY: SEVERAL DAYS
SUM OF ALL RESPONSES TO PHQ QUESTIONS 1-9: 3
9. THOUGHTS THAT YOU WOULD BE BETTER OFF DEAD, OR OF HURTING YOURSELF: NOT AT ALL
10. IF YOU CHECKED OFF ANY PROBLEMS, HOW DIFFICULT HAVE THESE PROBLEMS MADE IT FOR YOU TO DO YOUR WORK, TAKE CARE OF THINGS AT HOME, OR GET ALONG WITH OTHER PEOPLE: NOT DIFFICULT AT ALL
8. MOVING OR SPEAKING SO SLOWLY THAT OTHER PEOPLE COULD HAVE NOTICED. OR THE OPPOSITE, BEING SO FIGETY OR RESTLESS THAT YOU HAVE BEEN MOVING AROUND A LOT MORE THAN USUAL: NOT AT ALL
2. FEELING DOWN, DEPRESSED OR HOPELESS: SEVERAL DAYS
SUM OF ALL RESPONSES TO PHQ QUESTIONS 1-9: 3
3. TROUBLE FALLING OR STAYING ASLEEP: NOT AT ALL

## 2024-09-13 ASSESSMENT — ENCOUNTER SYMPTOMS
EYE REDNESS: 0
ABDOMINAL PAIN: 0
EYE PAIN: 0
BLOOD IN STOOL: 0
CHOKING: 0
COUGH: 0
RECTAL PAIN: 0
CHEST TIGHTNESS: 0
VOICE CHANGE: 0
WHEEZING: 0
SINUS PRESSURE: 0
TROUBLE SWALLOWING: 0
NAUSEA: 0
FACIAL SWELLING: 0
CONSTIPATION: 0
EYE DISCHARGE: 0
PHOTOPHOBIA: 0
SORE THROAT: 0
RHINORRHEA: 0
SHORTNESS OF BREATH: 0
ABDOMINAL DISTENTION: 0
EYE ITCHING: 0
BACK PAIN: 0
VOMITING: 0
ANAL BLEEDING: 0
DIARRHEA: 0
COLOR CHANGE: 0

## 2024-09-15 DIAGNOSIS — E87.6 HYPOKALEMIA: ICD-10-CM

## 2024-09-16 RX ORDER — POTASSIUM CHLORIDE 1500 MG/1
20 TABLET, EXTENDED RELEASE ORAL 2 TIMES DAILY
Qty: 60 TABLET | Refills: 5 | Status: SHIPPED | OUTPATIENT
Start: 2024-09-16

## 2024-09-19 ENCOUNTER — HOSPITAL ENCOUNTER (OUTPATIENT)
Dept: WOUND CARE | Age: 54
Discharge: HOME | End: 2024-09-19
Payer: MEDICARE

## 2024-09-19 VITALS
DIASTOLIC BLOOD PRESSURE: 80 MMHG | RESPIRATION RATE: 18 BRPM | TEMPERATURE: 98.9 F | HEART RATE: 83 BPM | SYSTOLIC BLOOD PRESSURE: 116 MMHG

## 2024-09-19 DIAGNOSIS — L97.522 CHRONIC ULCER OF LEFT FOOT, WITH FAT LAYER EXPOSED (HCC): Primary | ICD-10-CM

## 2024-09-19 DIAGNOSIS — L97.922 CHRONIC ULCER OF LEFT LEG, WITH FAT LAYER EXPOSED (HCC): ICD-10-CM

## 2024-09-19 DIAGNOSIS — I73.9 PVD (PERIPHERAL VASCULAR DISEASE) (HCC): ICD-10-CM

## 2024-09-19 PROCEDURE — 97597 DBRDMT OPN WND 1ST 20 CM/<: CPT | Performed by: SURGERY

## 2024-09-19 PROCEDURE — 97597 DBRDMT OPN WND 1ST 20 CM/<: CPT

## 2024-09-19 RX ORDER — GINSENG 100 MG
CAPSULE ORAL ONCE
OUTPATIENT
Start: 2024-09-19 | End: 2024-09-19

## 2024-09-19 RX ORDER — GENTAMICIN SULFATE 1 MG/G
OINTMENT TOPICAL ONCE
OUTPATIENT
Start: 2024-09-19 | End: 2024-09-19

## 2024-09-19 RX ORDER — BACITRACIN ZINC AND POLYMYXIN B SULFATE 500; 1000 [USP'U]/G; [USP'U]/G
OINTMENT TOPICAL ONCE
OUTPATIENT
Start: 2024-09-19 | End: 2024-09-19

## 2024-09-19 RX ORDER — NEOMYCIN/BACITRACIN/POLYMYXINB 3.5-400-5K
OINTMENT (GRAM) TOPICAL ONCE
OUTPATIENT
Start: 2024-09-19 | End: 2024-09-19

## 2024-09-19 RX ORDER — BETAMETHASONE DIPROPIONATE 0.5 MG/G
CREAM TOPICAL ONCE
OUTPATIENT
Start: 2024-09-19 | End: 2024-09-19

## 2024-09-19 RX ORDER — SODIUM CHLOR/HYPOCHLOROUS ACID 0.033 %
SOLUTION, IRRIGATION IRRIGATION ONCE
OUTPATIENT
Start: 2024-09-19 | End: 2024-09-19

## 2024-09-19 RX ORDER — LIDOCAINE HYDROCHLORIDE 20 MG/ML
JELLY TOPICAL ONCE
OUTPATIENT
Start: 2024-09-19 | End: 2024-09-19

## 2024-09-19 RX ORDER — CLOBETASOL PROPIONATE 0.5 MG/G
OINTMENT TOPICAL ONCE
OUTPATIENT
Start: 2024-09-19 | End: 2024-09-19

## 2024-09-19 RX ORDER — LIDOCAINE HYDROCHLORIDE 20 MG/ML
JELLY TOPICAL ONCE
Status: COMPLETED | OUTPATIENT
Start: 2024-09-19 | End: 2024-09-19

## 2024-09-19 RX ORDER — LIDOCAINE 50 MG/G
OINTMENT TOPICAL ONCE
OUTPATIENT
Start: 2024-09-19 | End: 2024-09-19

## 2024-09-19 RX ORDER — LIDOCAINE HYDROCHLORIDE 40 MG/ML
SOLUTION TOPICAL ONCE
OUTPATIENT
Start: 2024-09-19 | End: 2024-09-19

## 2024-09-19 RX ORDER — MUPIROCIN 20 MG/G
OINTMENT TOPICAL ONCE
OUTPATIENT
Start: 2024-09-19 | End: 2024-09-19

## 2024-09-19 RX ORDER — LIDOCAINE 40 MG/G
CREAM TOPICAL ONCE
OUTPATIENT
Start: 2024-09-19 | End: 2024-09-19

## 2024-09-19 RX ORDER — SILVER SULFADIAZINE 10 MG/G
CREAM TOPICAL ONCE
OUTPATIENT
Start: 2024-09-19 | End: 2024-09-19

## 2024-09-19 RX ORDER — TRIAMCINOLONE ACETONIDE 1 MG/G
OINTMENT TOPICAL ONCE
OUTPATIENT
Start: 2024-09-19 | End: 2024-09-19

## 2024-09-19 RX ADMIN — LIDOCAINE HYDROCHLORIDE: 20 JELLY TOPICAL at 10:35

## 2024-09-19 ASSESSMENT — PAIN SCALES - GENERAL: PAINLEVEL_OUTOF10: 2

## 2024-09-19 ASSESSMENT — PAIN DESCRIPTION - ORIENTATION: ORIENTATION: LEFT

## 2024-09-19 ASSESSMENT — PAIN - FUNCTIONAL ASSESSMENT: PAIN_FUNCTIONAL_ASSESSMENT: ACTIVITIES ARE NOT PREVENTED

## 2024-09-19 ASSESSMENT — PAIN DESCRIPTION - PAIN TYPE: TYPE: CHRONIC PAIN

## 2024-09-19 ASSESSMENT — PAIN DESCRIPTION - FREQUENCY: FREQUENCY: INTERMITTENT

## 2024-09-19 ASSESSMENT — PAIN DESCRIPTION - ONSET: ONSET: ON-GOING

## 2024-09-19 ASSESSMENT — PAIN DESCRIPTION - LOCATION: LOCATION: LEG;NECK

## 2024-09-19 ASSESSMENT — PAIN DESCRIPTION - DESCRIPTORS: DESCRIPTORS: SORE;ACHING

## 2024-09-26 ENCOUNTER — HOSPITAL ENCOUNTER (OUTPATIENT)
Dept: WOUND CARE | Age: 54
Discharge: HOME | End: 2024-09-26
Payer: MEDICARE

## 2024-09-26 VITALS
RESPIRATION RATE: 18 BRPM | HEART RATE: 110 BPM | DIASTOLIC BLOOD PRESSURE: 84 MMHG | TEMPERATURE: 98.8 F | SYSTOLIC BLOOD PRESSURE: 136 MMHG

## 2024-09-26 DIAGNOSIS — I73.9 PVD (PERIPHERAL VASCULAR DISEASE) (HCC): ICD-10-CM

## 2024-09-26 DIAGNOSIS — L97.922 CHRONIC ULCER OF LEFT LEG, WITH FAT LAYER EXPOSED (HCC): Primary | Chronic | ICD-10-CM

## 2024-09-26 DIAGNOSIS — L97.522 CHRONIC ULCER OF LEFT FOOT, WITH FAT LAYER EXPOSED (HCC): ICD-10-CM

## 2024-09-26 PROCEDURE — 11104 PUNCH BX SKIN SINGLE LESION: CPT

## 2024-09-26 RX ORDER — LIDOCAINE 40 MG/G
CREAM TOPICAL ONCE
OUTPATIENT
Start: 2024-09-26 | End: 2024-09-26

## 2024-09-26 RX ORDER — CLOBETASOL PROPIONATE 0.5 MG/G
OINTMENT TOPICAL ONCE
OUTPATIENT
Start: 2024-09-26 | End: 2024-09-26

## 2024-09-26 RX ORDER — TRIAMCINOLONE ACETONIDE 1 MG/G
OINTMENT TOPICAL ONCE
OUTPATIENT
Start: 2024-09-26 | End: 2024-09-26

## 2024-09-26 RX ORDER — LIDOCAINE HYDROCHLORIDE 20 MG/ML
JELLY TOPICAL ONCE
OUTPATIENT
Start: 2024-09-26 | End: 2024-09-26

## 2024-09-26 RX ORDER — LIDOCAINE HYDROCHLORIDE 40 MG/ML
SOLUTION TOPICAL ONCE
OUTPATIENT
Start: 2024-09-26 | End: 2024-09-26

## 2024-09-26 RX ORDER — NEOMYCIN/BACITRACIN/POLYMYXINB 3.5-400-5K
OINTMENT (GRAM) TOPICAL ONCE
OUTPATIENT
Start: 2024-09-26 | End: 2024-09-26

## 2024-09-26 RX ORDER — SILVER SULFADIAZINE 10 MG/G
CREAM TOPICAL ONCE
OUTPATIENT
Start: 2024-09-26 | End: 2024-09-26

## 2024-09-26 RX ORDER — BETAMETHASONE DIPROPIONATE 0.5 MG/G
CREAM TOPICAL ONCE
OUTPATIENT
Start: 2024-09-26 | End: 2024-09-26

## 2024-09-26 RX ORDER — GENTAMICIN SULFATE 1 MG/G
OINTMENT TOPICAL ONCE
OUTPATIENT
Start: 2024-09-26 | End: 2024-09-26

## 2024-09-26 RX ORDER — GINSENG 100 MG
CAPSULE ORAL ONCE
OUTPATIENT
Start: 2024-09-26 | End: 2024-09-26

## 2024-09-26 RX ORDER — SODIUM CHLOR/HYPOCHLOROUS ACID 0.033 %
SOLUTION, IRRIGATION IRRIGATION ONCE
OUTPATIENT
Start: 2024-09-26 | End: 2024-09-26

## 2024-09-26 RX ORDER — MUPIROCIN 20 MG/G
OINTMENT TOPICAL ONCE
OUTPATIENT
Start: 2024-09-26 | End: 2024-09-26

## 2024-09-26 RX ORDER — BACITRACIN ZINC AND POLYMYXIN B SULFATE 500; 1000 [USP'U]/G; [USP'U]/G
OINTMENT TOPICAL ONCE
OUTPATIENT
Start: 2024-09-26 | End: 2024-09-26

## 2024-09-26 RX ORDER — LIDOCAINE 50 MG/G
OINTMENT TOPICAL ONCE
OUTPATIENT
Start: 2024-09-26 | End: 2024-09-26

## 2024-09-26 RX ORDER — LIDOCAINE HYDROCHLORIDE 20 MG/ML
JELLY TOPICAL ONCE
Status: COMPLETED | OUTPATIENT
Start: 2024-09-26 | End: 2024-09-26

## 2024-09-26 RX ADMIN — LIDOCAINE HYDROCHLORIDE: 20 JELLY TOPICAL at 11:37

## 2024-09-26 ASSESSMENT — PAIN DESCRIPTION - DESCRIPTORS: DESCRIPTORS: ACHING

## 2024-09-26 ASSESSMENT — PAIN SCALES - GENERAL: PAINLEVEL_OUTOF10: 2

## 2024-09-26 ASSESSMENT — PAIN - FUNCTIONAL ASSESSMENT: PAIN_FUNCTIONAL_ASSESSMENT: ACTIVITIES ARE NOT PREVENTED

## 2024-09-26 ASSESSMENT — PAIN DESCRIPTION - PAIN TYPE: TYPE: CHRONIC PAIN

## 2024-09-26 ASSESSMENT — PAIN DESCRIPTION - ONSET: ONSET: ON-GOING

## 2024-09-26 ASSESSMENT — PAIN DESCRIPTION - LOCATION: LOCATION: BACK;NECK

## 2024-09-26 ASSESSMENT — PAIN DESCRIPTION - ORIENTATION: ORIENTATION: LOWER

## 2024-09-26 ASSESSMENT — PAIN DESCRIPTION - FREQUENCY: FREQUENCY: INTERMITTENT

## 2024-10-03 ENCOUNTER — HOSPITAL ENCOUNTER (OUTPATIENT)
Dept: WOUND CARE | Age: 54
Discharge: HOME | End: 2024-10-03
Payer: MEDICARE

## 2024-10-03 VITALS
RESPIRATION RATE: 18 BRPM | SYSTOLIC BLOOD PRESSURE: 125 MMHG | HEIGHT: 59 IN | BODY MASS INDEX: 16.93 KG/M2 | DIASTOLIC BLOOD PRESSURE: 74 MMHG | WEIGHT: 84 LBS | TEMPERATURE: 98 F | HEART RATE: 105 BPM

## 2024-10-03 DIAGNOSIS — L97.922 CHRONIC ULCER OF LEFT LEG, WITH FAT LAYER EXPOSED (HCC): ICD-10-CM

## 2024-10-03 DIAGNOSIS — L97.522 CHRONIC ULCER OF LEFT FOOT, WITH FAT LAYER EXPOSED (HCC): Primary | ICD-10-CM

## 2024-10-03 DIAGNOSIS — I73.9 PVD (PERIPHERAL VASCULAR DISEASE) (HCC): ICD-10-CM

## 2024-10-03 PROCEDURE — 97597 DBRDMT OPN WND 1ST 20 CM/<: CPT

## 2024-10-03 PROCEDURE — 97597 DBRDMT OPN WND 1ST 20 CM/<: CPT | Performed by: SURGERY

## 2024-10-03 RX ORDER — GENTAMICIN SULFATE 1 MG/G
OINTMENT TOPICAL ONCE
OUTPATIENT
Start: 2024-10-03 | End: 2024-10-03

## 2024-10-03 RX ORDER — GINSENG 100 MG
CAPSULE ORAL ONCE
OUTPATIENT
Start: 2024-10-03 | End: 2024-10-03

## 2024-10-03 RX ORDER — BETAMETHASONE DIPROPIONATE 0.5 MG/G
CREAM TOPICAL ONCE
OUTPATIENT
Start: 2024-10-03 | End: 2024-10-03

## 2024-10-03 RX ORDER — LIDOCAINE 40 MG/G
CREAM TOPICAL ONCE
OUTPATIENT
Start: 2024-10-03 | End: 2024-10-03

## 2024-10-03 RX ORDER — LIDOCAINE 50 MG/G
OINTMENT TOPICAL ONCE
OUTPATIENT
Start: 2024-10-03 | End: 2024-10-03

## 2024-10-03 RX ORDER — LIDOCAINE HYDROCHLORIDE 20 MG/ML
JELLY TOPICAL ONCE
OUTPATIENT
Start: 2024-10-03 | End: 2024-10-03

## 2024-10-03 RX ORDER — SILVER SULFADIAZINE 10 MG/G
CREAM TOPICAL ONCE
OUTPATIENT
Start: 2024-10-03 | End: 2024-10-03

## 2024-10-03 RX ORDER — TRIAMCINOLONE ACETONIDE 1 MG/G
OINTMENT TOPICAL ONCE
OUTPATIENT
Start: 2024-10-03 | End: 2024-10-03

## 2024-10-03 RX ORDER — NEOMYCIN/BACITRACIN/POLYMYXINB 3.5-400-5K
OINTMENT (GRAM) TOPICAL ONCE
OUTPATIENT
Start: 2024-10-03 | End: 2024-10-03

## 2024-10-03 RX ORDER — LIDOCAINE HYDROCHLORIDE 40 MG/ML
SOLUTION TOPICAL ONCE
OUTPATIENT
Start: 2024-10-03 | End: 2024-10-03

## 2024-10-03 RX ORDER — CLOBETASOL PROPIONATE 0.5 MG/G
OINTMENT TOPICAL ONCE
OUTPATIENT
Start: 2024-10-03 | End: 2024-10-03

## 2024-10-03 RX ORDER — SODIUM CHLOR/HYPOCHLOROUS ACID 0.033 %
SOLUTION, IRRIGATION IRRIGATION ONCE
OUTPATIENT
Start: 2024-10-03 | End: 2024-10-03

## 2024-10-03 RX ORDER — BACITRACIN ZINC AND POLYMYXIN B SULFATE 500; 1000 [USP'U]/G; [USP'U]/G
OINTMENT TOPICAL ONCE
OUTPATIENT
Start: 2024-10-03 | End: 2024-10-03

## 2024-10-03 RX ORDER — MUPIROCIN 20 MG/G
OINTMENT TOPICAL ONCE
OUTPATIENT
Start: 2024-10-03 | End: 2024-10-03

## 2024-10-03 RX ORDER — LIDOCAINE HYDROCHLORIDE 20 MG/ML
JELLY TOPICAL ONCE
Status: COMPLETED | OUTPATIENT
Start: 2024-10-03 | End: 2024-10-03

## 2024-10-03 RX ADMIN — LIDOCAINE HYDROCHLORIDE: 20 JELLY TOPICAL at 11:12

## 2024-10-03 NOTE — PATIENT INSTRUCTIONS
[facility] Wound Care and Hyperbaric Oxygen Therapy   Physician Orders and Discharge Instructions  [address] [place] [address]  [address]  [place] [address]  Telephone: [phone]      FAX [phone]    NAME:  Vonda Lutz  YOB: 1970  MEDICAL RECORD NUMBER:  [id number]  DATE:  10/3/2024    Discharge condition: Stable    Discharge to: Home    Left via:Private automobile    Accompanied by: Self    Dressing Orders: Left Lower Ext Ulcer  Wash with soap and water  Apply peal and place drape over wound  Set wound vac to 125 mmHG, continuous. Change wound vac dressing weekly and PRN  Remove vac and apply alternate dressing if you develop signs of infection, increased pain or the vac will not maintain a seal,  Then call the office and set up an appointment for reapplication  6 inch ace wrap from toes to knee daily as tolerated    Alternative dressing: Wash with soap and water. Apply Saline or 1/4 STR Dakins moistened gauze to wound bed.  Cover with gauze. Secure with roll gauze and medipore tape. Change twice daily .    [facility] follow up visit ___________1 week__________________  (Please note your next appointment above and if you are unable to keep, kindly give a 24 hour notice. Thank you.)    If you experience any of the following, please call the Wound Care Center during business hours:    * Increase in Pain  * Temperature over 101  * Increase in drainage from your wound  * Drainage with a foul odor  * Bleeding  * Increase in swelling  * Need for compression bandage changes due to slippage, breakthrough drainage.    If you need medical attention outside of the business hours of the Wound Care Centers please contact your PCP or go to the nearest emergency room.

## 2024-10-03 NOTE — PROGRESS NOTES
staining (brown yellow) 10/03/24 1113   Margins Attached edges 10/03/24 1113   Wound Thickness Description not for Pressure Injury Full thickness 10/03/24 1113   Number of days: 239             Estimated Blood Loss:  Minimal    Hemostasis Achieved:  by pressure    Procedural Pain:  0  / 10     Post Procedural Pain:  0 / 10     Response to treatment:  Well tolerated by patient.         Plan:     Problem List Items Addressed This Visit       * (Principal) Chronic ulcer of left leg, with fat layer exposed (HCC) (Chronic)    Relevant Orders    Initiate Outpatient Wound Care Protocol    Chronic ulcer of left foot, with fat layer exposed (HCC) - Primary    Relevant Orders    Initiate Outpatient Wound Care Protocol    PVD (peripheral vascular disease) (HCC)    Relevant Orders    Initiate Outpatient Wound Care Protocol       PT bx negative for cancer Cont vac RTO 1 week    Treatment Note please see attached Discharge Instructions    In my professional opinion this patient would benefit from HBO Therapy: No    Written patient dismissal instructions given to patient and signed by patient or POA.             Electronically signed by Connor Pedroza MD on 10/3/2024 at 12:04 PM

## 2024-10-03 NOTE — PLAN OF CARE
Negative Pressure Wound Therapy    NAME:  Vonda Lutz  YOB: 1970  MEDICAL RECORD NUMBER:  [id number]  DATE:  10/3/2024    Applied Negative Pressure to left leg wound(s)/ulcer(s).  [x] Applied skin barrier prep to mary-wound.   [x] Cut strips of plastic drape to picture frame wound so that mary-wound is     covered with the drape.   [x] If bridging dressing to less prominent site, cover any intact skin that will come in contact with the Negative Pressure Therapy sponge, gauze or channel drain with plastic drape. The sponge should never touch intact skin.   [x] Cut sponge, gauze or channel drain to size which will fit into the wound/ulcer bed without being forced.   [x] Be sure the sponge is large enough to hold the entire round plastic flange which is attached to the tubing. Never allow flange to be larger than the sponge or it will produce suction damaging intact skin.  Total number of individual pieces of foam used within the wound bed: 1    [x] If bridging the dressing away from the primary site, be sure the bridge leads to a piece of sponge large enough to hold the entire flange without allowing any of the flange to overlap onto intact skin.   [x] Covered sponge, gauze or channel drain with plastic drape.   [x] Cut a hole in this plastic drape directly over the sponge the same size as the plastic drain tubing.   [x] Removed plastic liner from flange and apply it directly over the hole you cut.   [x] Removed the plastic cover from the flange.   [x] Attached the tubing to the wound/ulcer Negative Pressure Therapy and turn it on to be sure a vacuum is created and that there are no leaks.   [x] If air leaks occur, use plastic drape to patch them.   [] Secured Negative Pressure Therapy dressing with ace wrap loosely if located on an extremity. Maintain tubing outside of ace wrap. Tubing must not exert pressure on intact skin.    Applied per  Guidelines      Electronically signed by Kelli SMALLWOOD

## 2024-10-03 NOTE — PLAN OF CARE
Problem: Wound:  Goal: Will show signs of wound healing; wound closure and no evidence of infection  Description: Will show signs of wound healing; wound closure and no evidence of infection  Outcome: Progressing     Problem: Venous:  Goal: Signs of wound healing will improve  Description: Signs of wound healing will improve  Outcome: Progressing     Problem: Smoking cessation:  Goal: Ability to formulate a plan to maintain a tobacco-free life will be supported  Description: Ability to formulate a plan to maintain a tobacco-free life will be supported  Outcome: Progressing     Problem: Compression therapy:  Goal: Will be free from complications associated with compression therapy  Description: Will be free from complications associated with compression therapy  Outcome: Progressing     Problem: Weight control:  Goal: Ability to maintain an optimal weight for height and age will be supported  Description: Ability to maintain an optimal weight for height and age will be supported  Outcome: Progressing     Problem: Falls - Risk of:  Goal: Will remain free from falls  Description: Will remain free from falls  Outcome: Progressing     Problem: Pain  Goal: Verbalizes/displays adequate comfort level or baseline comfort level  Outcome: Progressing

## 2024-10-04 DIAGNOSIS — I70.213 ATHEROSCLER OF NATIVE ARTERY OF BOTH LEGS WITH INTERMIT CLAUDICATION (HCC): Primary | ICD-10-CM

## 2024-10-07 RX ORDER — ATORVASTATIN CALCIUM 10 MG/1
10 TABLET, FILM COATED ORAL DAILY
Qty: 30 TABLET | Refills: 0 | Status: SHIPPED | OUTPATIENT
Start: 2024-10-07

## 2024-10-10 ENCOUNTER — HOSPITAL ENCOUNTER (OUTPATIENT)
Dept: WOUND CARE | Age: 54
Discharge: HOME | End: 2024-10-10
Payer: MEDICARE

## 2024-10-10 VITALS
DIASTOLIC BLOOD PRESSURE: 96 MMHG | SYSTOLIC BLOOD PRESSURE: 154 MMHG | WEIGHT: 84 LBS | HEART RATE: 111 BPM | HEIGHT: 59 IN | RESPIRATION RATE: 20 BRPM | TEMPERATURE: 99.4 F | BODY MASS INDEX: 16.93 KG/M2

## 2024-10-10 DIAGNOSIS — L97.922 CHRONIC ULCER OF LEFT LEG, WITH FAT LAYER EXPOSED (HCC): Chronic | ICD-10-CM

## 2024-10-10 DIAGNOSIS — I73.9 PVD (PERIPHERAL VASCULAR DISEASE) (HCC): ICD-10-CM

## 2024-10-10 DIAGNOSIS — L97.522 CHRONIC ULCER OF LEFT FOOT, WITH FAT LAYER EXPOSED (HCC): Primary | ICD-10-CM

## 2024-10-10 PROCEDURE — 97597 DBRDMT OPN WND 1ST 20 CM/<: CPT | Performed by: SURGERY

## 2024-10-10 PROCEDURE — 97597 DBRDMT OPN WND 1ST 20 CM/<: CPT

## 2024-10-10 RX ORDER — LIDOCAINE 40 MG/G
CREAM TOPICAL ONCE
OUTPATIENT
Start: 2024-10-10 | End: 2024-10-10

## 2024-10-10 RX ORDER — GINSENG 100 MG
CAPSULE ORAL ONCE
OUTPATIENT
Start: 2024-10-10 | End: 2024-10-10

## 2024-10-10 RX ORDER — LIDOCAINE HYDROCHLORIDE 20 MG/ML
JELLY TOPICAL ONCE
OUTPATIENT
Start: 2024-10-10 | End: 2024-10-10

## 2024-10-10 RX ORDER — MUPIROCIN 20 MG/G
OINTMENT TOPICAL ONCE
OUTPATIENT
Start: 2024-10-10 | End: 2024-10-10

## 2024-10-10 RX ORDER — LIDOCAINE HYDROCHLORIDE 20 MG/ML
JELLY TOPICAL ONCE
Status: COMPLETED | OUTPATIENT
Start: 2024-10-10 | End: 2024-10-10

## 2024-10-10 RX ORDER — LIDOCAINE HYDROCHLORIDE 40 MG/ML
SOLUTION TOPICAL ONCE
OUTPATIENT
Start: 2024-10-10 | End: 2024-10-10

## 2024-10-10 RX ORDER — SILVER SULFADIAZINE 10 MG/G
CREAM TOPICAL ONCE
OUTPATIENT
Start: 2024-10-10 | End: 2024-10-10

## 2024-10-10 RX ORDER — GENTAMICIN SULFATE 1 MG/G
OINTMENT TOPICAL ONCE
OUTPATIENT
Start: 2024-10-10 | End: 2024-10-10

## 2024-10-10 RX ORDER — TRIAMCINOLONE ACETONIDE 1 MG/G
OINTMENT TOPICAL ONCE
OUTPATIENT
Start: 2024-10-10 | End: 2024-10-10

## 2024-10-10 RX ORDER — NEOMYCIN/BACITRACIN/POLYMYXINB 3.5-400-5K
OINTMENT (GRAM) TOPICAL ONCE
OUTPATIENT
Start: 2024-10-10 | End: 2024-10-10

## 2024-10-10 RX ORDER — CLOBETASOL PROPIONATE 0.5 MG/G
OINTMENT TOPICAL ONCE
OUTPATIENT
Start: 2024-10-10 | End: 2024-10-10

## 2024-10-10 RX ORDER — LIDOCAINE 50 MG/G
OINTMENT TOPICAL ONCE
OUTPATIENT
Start: 2024-10-10 | End: 2024-10-10

## 2024-10-10 RX ORDER — BACITRACIN ZINC AND POLYMYXIN B SULFATE 500; 1000 [USP'U]/G; [USP'U]/G
OINTMENT TOPICAL ONCE
OUTPATIENT
Start: 2024-10-10 | End: 2024-10-10

## 2024-10-10 RX ORDER — BETAMETHASONE DIPROPIONATE 0.5 MG/G
CREAM TOPICAL ONCE
OUTPATIENT
Start: 2024-10-10 | End: 2024-10-10

## 2024-10-10 RX ORDER — SODIUM CHLOR/HYPOCHLOROUS ACID 0.033 %
SOLUTION, IRRIGATION IRRIGATION ONCE
OUTPATIENT
Start: 2024-10-10 | End: 2024-10-10

## 2024-10-10 RX ADMIN — LIDOCAINE HYDROCHLORIDE: 20 JELLY TOPICAL at 09:51

## 2024-10-10 NOTE — PATIENT INSTRUCTIONS
[facility] Wound Care and Hyperbaric Oxygen Therapy   Physician Orders and Discharge Instructions  [address] [place] [address]  [address]  [place] [address]  Telephone: [phone]      FAX [phone]    NAME:  Vonda Lutz  YOB: 1970  MEDICAL RECORD NUMBER:  [id number]  DATE:  10/10/2024    Discharge condition: Stable    Discharge to: Home    Left via:Private automobile    Accompanied by: Self    Dressing Orders: Left Lower Ext Ulcer  Wash with soap and water  Apply peel and place drape over wound  Set wound vac to 125 mmHG, continuous. Change wound vac dressing weekly and PRN  Remove vac and apply alternate dressing if you develop signs of infection, increased pain or the vac will not maintain a seal,  Then call the office and set up an appointment for reapplication  6 inch ace wrap from toes to knee daily as tolerated    Alternative dressing: Wash with soap and water. Apply Saline or 1/4 STR Dakins moistened gauze to wound bed.  Cover with gauze. Secure with roll gauze and medipore tape. Change twice daily .    [facility] follow up visit ___________1 week__________________  (Please note your next appointment above and if you are unable to keep, kindly give a 24 hour notice. Thank you.)    If you experience any of the following, please call the Wound Care Center during business hours:    * Increase in Pain  * Temperature over 101  * Increase in drainage from your wound  * Drainage with a foul odor  * Bleeding  * Increase in swelling  * Need for compression bandage changes due to slippage, breakthrough drainage.    If you need medical attention outside of the business hours of the Wound Care Centers please contact your PCP or go to the nearest emergency room.

## 2024-10-10 NOTE — PROGRESS NOTES
Negative Pressure Wound Therapy    NAME:  Vonda Lutz  YOB: 1970  MEDICAL RECORD NUMBER:  [id number]  DATE:  10/10/2024    Applied Negative Pressure to left leg wound(s)/ulcer(s).  [x] Applied skin barrier prep to mary-wound.   [x] Cut strips of plastic drape to picture frame wound so that mary-wound is     covered with the drape.   [x] If bridging dressing to less prominent site, cover any intact skin that will come in contact with the Negative Pressure Therapy sponge, gauze or channel drain with plastic drape. The sponge should never touch intact skin.   [x] Cut sponge, gauze or channel drain to size which will fit into the wound/ulcer bed without being forced.   [x] Be sure the sponge is large enough to hold the entire round plastic flange which is attached to the tubing. Never allow flange to be larger than the sponge or it will produce suction damaging intact skin.  Total number of individual pieces of foam used within the wound bed: 1 peel and place     [x] If bridging the dressing away from the primary site, be sure the bridge leads to a piece of sponge large enough to hold the entire flange without allowing any of the flange to overlap onto intact skin.   [x] Covered sponge, gauze or channel drain with plastic drape.   [x] Cut a hole in this plastic drape directly over the sponge the same size as the plastic drain tubing.   [x] Removed plastic liner from flange and apply it directly over the hole you cut.   [x] Removed the plastic cover from the flange.   [x] Attached the tubing to the wound/ulcer Negative Pressure Therapy and turn it on to be sure a vacuum is created and that there are no leaks.   [x] If air leaks occur, use plastic drape to patch them.   [x] Secured Negative Pressure Therapy dressing with ace wrap loosely if located on an extremity. Maintain tubing outside of ace wrap. Tubing must not exert pressure on intact skin.    Applied per  Guidelines      Electronically 
(Active)   Wound Type Other (Comment) 10/10/24 0951   Unit Type 3M 10/10/24 0951   Dressing Type Other (Comment) 10/10/24 0951   Number of pieces used 1 10/03/24 1215   Number of pieces removed 1 10/10/24 0951   Cycle Continuous 10/10/24 0951   Target Pressure (mmHg) 125 10/10/24 0951   Canister changed? No 10/10/24 0951   Dressing Status Old drainage noted 10/10/24 0951   Dressing Changed Changed/New 10/03/24 1215   Drainage Amount Moderate 10/10/24 0951   Drainage Description Serosanguinous 10/10/24 0951   Wound Assessment Granulation tissue;Hyper granulation tissue;Slough 10/10/24 0951   Anjali-wound Assessment Blanchable erythema;Hemosiderin staining (brown yellow);Maceration 10/10/24 0951   Shape oval 10/10/24 0951   Odor None 10/10/24 0951   Number of days: 13       Puncture 06/26/24 Femoral (Active)   Number of days: 106       Puncture 06/26/24 Femoral (Active)   Number of days: 106       Wound 02/06/24 Pretibial Left wound 1-left leg wound venous (Active)   Wound Image   10/10/24 0951   Wound Etiology Venous 10/10/24 0951   Dressing Status Old drainage noted 10/10/24 0951   Wound Cleansed Soap and water 10/10/24 0951   Dressing/Treatment Negative pressure wound therapy 10/03/24 1215   Wound Length (cm) 1.6 cm 10/10/24 0951   Wound Width (cm) 1 cm 10/10/24 0951   Wound Depth (cm) 0.1 cm 10/10/24 0951   Wound Surface Area (cm^2) 1.6 cm^2 10/10/24 0951   Change in Wound Size % (l*w) 73.33 10/10/24 0951   Wound Volume (cm^3) 0.16 cm^3 10/10/24 0951   Wound Healing % 87 10/10/24 0951   Post-Procedure Length (cm) 1.6 cm 10/10/24 1004   Post-Procedure Width (cm) 1 cm 10/10/24 1004   Post-Procedure Depth (cm) 0.1 cm 10/10/24 1004   Post-Procedure Surface Area (cm^2) 1.6 cm^2 10/10/24 1004   Post-Procedure Volume (cm^3) 0.16 cm^3 10/10/24 1004   Distance Tunneling (cm) 0 cm 10/10/24 1004   Tunneling Position ___ O'Clock 0 10/10/24 1004   Undermining Starts ___ O'Clock 0 10/10/24 1004   Undermining Ends___ O'Clock 0

## 2024-10-17 ENCOUNTER — OFFICE VISIT (OUTPATIENT)
Dept: INTERNAL MEDICINE | Age: 54
End: 2024-10-17
Payer: MEDICARE

## 2024-10-17 ENCOUNTER — HOSPITAL ENCOUNTER (OUTPATIENT)
Dept: WOUND CARE | Age: 54
Discharge: HOME | End: 2024-10-17
Payer: MEDICARE

## 2024-10-17 VITALS
HEIGHT: 59 IN | DIASTOLIC BLOOD PRESSURE: 89 MMHG | SYSTOLIC BLOOD PRESSURE: 135 MMHG | HEART RATE: 104 BPM | BODY MASS INDEX: 16.93 KG/M2 | TEMPERATURE: 98.9 F | RESPIRATION RATE: 20 BRPM | WEIGHT: 84 LBS

## 2024-10-17 VITALS
DIASTOLIC BLOOD PRESSURE: 76 MMHG | BODY MASS INDEX: 17.62 KG/M2 | HEIGHT: 59 IN | OXYGEN SATURATION: 99 % | SYSTOLIC BLOOD PRESSURE: 130 MMHG | HEART RATE: 107 BPM | WEIGHT: 87.4 LBS

## 2024-10-17 DIAGNOSIS — F32.89 OTHER DEPRESSION: ICD-10-CM

## 2024-10-17 DIAGNOSIS — L97.922 CHRONIC ULCER OF LEFT LEG, WITH FAT LAYER EXPOSED (HCC): Chronic | ICD-10-CM

## 2024-10-17 DIAGNOSIS — I10 PRIMARY HYPERTENSION: Primary | ICD-10-CM

## 2024-10-17 DIAGNOSIS — L97.522 CHRONIC ULCER OF LEFT FOOT, WITH FAT LAYER EXPOSED (HCC): Primary | ICD-10-CM

## 2024-10-17 DIAGNOSIS — E78.5 HYPERLIPIDEMIA, UNSPECIFIED HYPERLIPIDEMIA TYPE: ICD-10-CM

## 2024-10-17 DIAGNOSIS — I73.9 PVD (PERIPHERAL VASCULAR DISEASE) (HCC): ICD-10-CM

## 2024-10-17 DIAGNOSIS — R63.0 POOR APPETITE: ICD-10-CM

## 2024-10-17 LAB
ALBUMIN SERPL-MCNC: 3.6 G/DL (ref 3.5–5.2)
ALP SERPL-CCNC: 123 U/L (ref 35–104)
ALT SERPL-CCNC: 13 U/L (ref 5–33)
ANION GAP SERPL CALCULATED.3IONS-SCNC: 8 MMOL/L (ref 7–19)
AST SERPL-CCNC: 22 U/L (ref 5–32)
BASOPHILS # BLD: 0 K/UL (ref 0–0.2)
BASOPHILS NFR BLD: 0.5 % (ref 0–1)
BILIRUB SERPL-MCNC: 0.2 MG/DL (ref 0.2–1.2)
BUN SERPL-MCNC: 20 MG/DL (ref 6–20)
CALCIUM SERPL-MCNC: 9.1 MG/DL (ref 8.6–10)
CHLORIDE SERPL-SCNC: 100 MMOL/L (ref 98–111)
CO2 SERPL-SCNC: 32 MMOL/L (ref 22–29)
CREAT SERPL-MCNC: 1.5 MG/DL (ref 0.5–0.9)
EOSINOPHIL # BLD: 0.2 K/UL (ref 0–0.6)
EOSINOPHIL NFR BLD: 2.3 % (ref 0–5)
ERYTHROCYTE [DISTWIDTH] IN BLOOD BY AUTOMATED COUNT: 13.5 % (ref 11.5–14.5)
GLUCOSE SERPL-MCNC: 92 MG/DL (ref 70–99)
HCT VFR BLD AUTO: 35.4 % (ref 37–47)
HGB BLD-MCNC: 10.6 G/DL (ref 12–16)
IMM GRANULOCYTES # BLD: 0.1 K/UL
LYMPHOCYTES # BLD: 3 K/UL (ref 1.1–4.5)
LYMPHOCYTES NFR BLD: 35.8 % (ref 20–40)
MCH RBC QN AUTO: 27.5 PG (ref 27–31)
MCHC RBC AUTO-ENTMCNC: 29.9 G/DL (ref 33–37)
MCV RBC AUTO: 91.7 FL (ref 81–99)
MONOCYTES # BLD: 0.7 K/UL (ref 0–0.9)
MONOCYTES NFR BLD: 8.4 % (ref 0–10)
NEUTROPHILS # BLD: 4.4 K/UL (ref 1.5–7.5)
NEUTS SEG NFR BLD: 52.4 % (ref 50–65)
PLATELET # BLD AUTO: 394 K/UL (ref 130–400)
PMV BLD AUTO: 10.8 FL (ref 9.4–12.3)
POTASSIUM SERPL-SCNC: 5.1 MMOL/L (ref 3.5–5)
PROT SERPL-MCNC: 6.7 G/DL (ref 6.4–8.3)
RBC # BLD AUTO: 3.86 M/UL (ref 4.2–5.4)
SODIUM SERPL-SCNC: 140 MMOL/L (ref 136–145)
WBC # BLD AUTO: 8.4 K/UL (ref 4.8–10.8)

## 2024-10-17 PROCEDURE — 97597 DBRDMT OPN WND 1ST 20 CM/<: CPT | Performed by: SURGERY

## 2024-10-17 PROCEDURE — G8484 FLU IMMUNIZE NO ADMIN: HCPCS | Performed by: INTERNAL MEDICINE

## 2024-10-17 PROCEDURE — 4004F PT TOBACCO SCREEN RCVD TLK: CPT | Performed by: INTERNAL MEDICINE

## 2024-10-17 PROCEDURE — G8419 CALC BMI OUT NRM PARAM NOF/U: HCPCS | Performed by: INTERNAL MEDICINE

## 2024-10-17 PROCEDURE — G8427 DOCREV CUR MEDS BY ELIG CLIN: HCPCS | Performed by: INTERNAL MEDICINE

## 2024-10-17 PROCEDURE — 97597 DBRDMT OPN WND 1ST 20 CM/<: CPT

## 2024-10-17 PROCEDURE — 3075F SYST BP GE 130 - 139MM HG: CPT | Performed by: INTERNAL MEDICINE

## 2024-10-17 PROCEDURE — 99214 OFFICE O/P EST MOD 30 MIN: CPT | Performed by: INTERNAL MEDICINE

## 2024-10-17 PROCEDURE — 3017F COLORECTAL CA SCREEN DOC REV: CPT | Performed by: INTERNAL MEDICINE

## 2024-10-17 PROCEDURE — 3078F DIAST BP <80 MM HG: CPT | Performed by: INTERNAL MEDICINE

## 2024-10-17 NOTE — PROGRESS NOTES
oz)   SpO2 99%   BMI 17.65 kg/m²   Physical Exam  Vitals and nursing note reviewed.   Constitutional:       General: She is not in acute distress.     Appearance: Normal appearance. She is well-developed. She is not ill-appearing, toxic-appearing or diaphoretic.   HENT:      Head: Normocephalic and atraumatic.      Right Ear: Tympanic membrane, ear canal and external ear normal. There is no impacted cerumen.      Left Ear: Tympanic membrane, ear canal and external ear normal. There is no impacted cerumen.      Nose: Nose normal. No congestion or rhinorrhea.      Mouth/Throat:      Mouth: Mucous membranes are moist.      Pharynx: Oropharynx is clear. No oropharyngeal exudate or posterior oropharyngeal erythema.   Eyes:      General: No scleral icterus.        Right eye: No discharge.         Left eye: No discharge.      Extraocular Movements: Extraocular movements intact.      Conjunctiva/sclera: Conjunctivae normal.      Pupils: Pupils are equal, round, and reactive to light.   Neck:      Thyroid: No thyromegaly.      Vascular: No carotid bruit or JVD.      Trachea: No tracheal deviation.   Cardiovascular:      Rate and Rhythm: Normal rate and regular rhythm.      Pulses: Normal pulses.      Heart sounds: Normal heart sounds. No murmur heard.     No friction rub. No gallop.   Pulmonary:      Effort: Pulmonary effort is normal. No respiratory distress.      Breath sounds: Normal breath sounds. No stridor. No wheezing, rhonchi or rales.   Chest:      Chest wall: No tenderness.   Abdominal:      General: Bowel sounds are normal. There is no distension.      Palpations: Abdomen is soft. There is no mass.      Tenderness: There is no abdominal tenderness. There is no right CVA tenderness, left CVA tenderness, guarding or rebound.      Hernia: No hernia is present.   Musculoskeletal:         General: No swelling, tenderness, deformity or signs of injury. Normal range of motion.      Cervical back: Normal range of motion

## 2024-10-17 NOTE — PROGRESS NOTES
[facility]ound [facility]   Progress Note and Procedure Note      Vonda Lutz  MEDICAL RECORD NUMBER:  [id number]  AGE: 54 y.o.   GENDER: female  : 1970  EPISODE DATE:  10/17/2024    Subjective:     Chief Complaint   Patient presents with    Wound Check     Pt presents for recheck of left leg wound         HISTORY of PRESENT ILLNESS HPI     Vonda Lutz is a 54 y.o. female who presents today for wound/ulcer evaluation.   Wound Context: Pt with LLE wound here for eval/treat  Wound/Ulcer Pain Timing/Severity: none  Quality of pain: N/A  Severity:  0 / 10   Modifying Factors: None  Associated Signs/Symptoms: none    Ulcer Identification:  Ulcer Type: non-healing surgical  Contributing Factors: edema    Wound:  surgical/venous        PAST MEDICAL HISTORY        Diagnosis Date    Arthritis     CAD (coronary artery disease)     Closed fracture of left foot     Gallbladder anomaly     Hip fracture (HCC)     x2    History of blood transfusion     Hyperlipidemia     Hypertension     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    Osteoporosis     PAD (peripheral artery disease) ([facility])     Patella fracture     Radial fracture     Shoulder fracture     x2    Thyroid disease     Wears dentures        PAST SURGICAL HISTORY    Past Surgical History:   Procedure Laterality Date    BONE MARROW ASPIRATE & BIOPSY  2024    BONE MARROW BIOPSY Right 2024    BONE MARROW ASPIRATION BIOPSY performed by Juliana Rush APRN at [facility] OR    BREAST ENHANCEMENT SURGERY Bilateral     CARPAL TUNNEL RELEASE Bilateral      SECTION      CHOLECYSTECTOMY      CORNEAL TRANSPLANT      CORONARY ANGIOPLASTY WITH STENT PLACEMENT      HERNIA REPAIR      HIP SURGERY      history of osteoporosis;hip surgery x 2    MOUTH SURGERY

## 2024-10-17 NOTE — PATIENT INSTRUCTIONS
[facility] Wound Care and Hyperbaric Oxygen Therapy   Physician Orders and Discharge Instructions  [address] [place] [address]  [address]  [place] [address]  Telephone: [phone]      FAX [phone]    NAME:  Vonda Lutz  YOB: 1970  MEDICAL RECORD NUMBER:  [id number]  DATE:  10/17/2024    Discharge condition: Stable    Discharge to: Home    Left via:Private automobile    Accompanied by: Self    Dressing Orders: Left Lower Ext Ulcer  Wash with soap and water  Apply peel and place drape over wound  Set wound vac to 125 mmHG, continuous. Change wound vac dressing weekly and PRN  Remove vac and apply alternate dressing if you develop signs of infection, increased pain or the vac will not maintain a seal,  Then call the office and set up an appointment for reapplication  6 inch ace wrap from toes to knee daily as tolerated    Alternative dressing: Wash with soap and water. Apply Saline or 1/4 STR Dakins moistened gauze to wound bed.  Cover with gauze. Secure with roll gauze and medipore tape. Change twice daily .    [facility] follow up visit ___________1 week__________________  (Please note your next appointment above and if you are unable to keep, kindly give a 24 hour notice. Thank you.)    If you experience any of the following, please call the Wound Care Center during business hours:    * Increase in Pain  * Temperature over 101  * Increase in drainage from your wound  * Drainage with a foul odor  * Bleeding  * Increase in swelling  * Need for compression bandage changes due to slippage, breakthrough drainage.    If you need medical attention outside of the business hours of the Wound Care Centers please contact your PCP or go to the nearest emergency room.

## 2024-10-17 NOTE — PLAN OF CARE
Negative Pressure Wound Therapy    NAME:  Vonda Lutz  YOB: 1970  MEDICAL RECORD NUMBER:  [id number]  DATE:  10/17/2024    Applied Negative Pressure to left leg wound(s)/ulcer(s).  [x] Applied skin barrier prep to mary-wound.   [x] Cut strips of plastic drape to picture frame wound so that mary-wound is     covered with the drape.   [x] If bridging dressing to less prominent site, cover any intact skin that will come in contact with the Negative Pressure Therapy sponge, gauze or channel drain with plastic drape. The sponge should never touch intact skin.   [x] Cut sponge, gauze or channel drain to size which will fit into the wound/ulcer bed without being forced.   [x] Be sure the sponge is large enough to hold the entire round plastic flange which is attached to the tubing. Never allow flange to be larger than the sponge or it will produce suction damaging intact skin.  Total number of individual pieces of foam used within the wound bed: 1 PEEL AND PLACE    [x] If bridging the dressing away from the primary site, be sure the bridge leads to a piece of sponge large enough to hold the entire flange without allowing any of the flange to overlap onto intact skin.   [x] Covered sponge, gauze or channel drain with plastic drape.   [x] Cut a hole in this plastic drape directly over the sponge the same size as the plastic drain tubing.   [x] Removed plastic liner from flange and apply it directly over the hole you cut.   [x] Removed the plastic cover from the flange.   [x] Attached the tubing to the wound/ulcer Negative Pressure Therapy and turn it on to be sure a vacuum is created and that there are no leaks.   [x] If air leaks occur, use plastic drape to patch them.   [x] Secured Negative Pressure Therapy dressing with ace wrap loosely if located on an extremity. Maintain tubing outside of ace wrap. Tubing must not exert pressure on intact skin.    Applied per  Guidelines      Electronically

## 2024-10-23 SDOH — ECONOMIC STABILITY: FOOD INSECURITY: WITHIN THE PAST 12 MONTHS, THE FOOD YOU BOUGHT JUST DIDN'T LAST AND YOU DIDN'T HAVE MONEY TO GET MORE.: NEVER TRUE

## 2024-10-23 SDOH — ECONOMIC STABILITY: FOOD INSECURITY: WITHIN THE PAST 12 MONTHS, YOU WORRIED THAT YOUR FOOD WOULD RUN OUT BEFORE YOU GOT MONEY TO BUY MORE.: NEVER TRUE

## 2024-10-23 SDOH — ECONOMIC STABILITY: INCOME INSECURITY: HOW HARD IS IT FOR YOU TO PAY FOR THE VERY BASICS LIKE FOOD, HOUSING, MEDICAL CARE, AND HEATING?: NOT HARD AT ALL

## 2024-10-23 ASSESSMENT — ENCOUNTER SYMPTOMS
WHEEZING: 0
EYE REDNESS: 0
SINUS PRESSURE: 0
ANAL BLEEDING: 0
NAUSEA: 0
TROUBLE SWALLOWING: 0
EYE ITCHING: 0
COLOR CHANGE: 0
BACK PAIN: 0
RHINORRHEA: 0
CONSTIPATION: 0
EYE PAIN: 0
BLOOD IN STOOL: 0
ABDOMINAL DISTENTION: 0
ABDOMINAL PAIN: 0
VOICE CHANGE: 0
CHOKING: 0
EYE DISCHARGE: 0
COUGH: 0
VOMITING: 0
FACIAL SWELLING: 0
CHEST TIGHTNESS: 0
SORE THROAT: 0
SHORTNESS OF BREATH: 0
DIARRHEA: 0
RECTAL PAIN: 0
PHOTOPHOBIA: 0

## 2024-10-23 ASSESSMENT — PATIENT HEALTH QUESTIONNAIRE - PHQ9
2. FEELING DOWN, DEPRESSED OR HOPELESS: SEVERAL DAYS
SUM OF ALL RESPONSES TO PHQ9 QUESTIONS 1 & 2: 1
5. POOR APPETITE OR OVEREATING: SEVERAL DAYS
7. TROUBLE CONCENTRATING ON THINGS, SUCH AS READING THE NEWSPAPER OR WATCHING TELEVISION: NOT AT ALL
SUM OF ALL RESPONSES TO PHQ QUESTIONS 1-9: 3
9. THOUGHTS THAT YOU WOULD BE BETTER OFF DEAD, OR OF HURTING YOURSELF: NOT AT ALL
SUM OF ALL RESPONSES TO PHQ QUESTIONS 1-9: 3
SUM OF ALL RESPONSES TO PHQ QUESTIONS 1-9: 3
3. TROUBLE FALLING OR STAYING ASLEEP: NOT AT ALL
8. MOVING OR SPEAKING SO SLOWLY THAT OTHER PEOPLE COULD HAVE NOTICED. OR THE OPPOSITE, BEING SO FIGETY OR RESTLESS THAT YOU HAVE BEEN MOVING AROUND A LOT MORE THAN USUAL: NOT AT ALL
4. FEELING TIRED OR HAVING LITTLE ENERGY: SEVERAL DAYS
6. FEELING BAD ABOUT YOURSELF - OR THAT YOU ARE A FAILURE OR HAVE LET YOURSELF OR YOUR FAMILY DOWN: NOT AT ALL
SUM OF ALL RESPONSES TO PHQ QUESTIONS 1-9: 3
1. LITTLE INTEREST OR PLEASURE IN DOING THINGS: NOT AT ALL
10. IF YOU CHECKED OFF ANY PROBLEMS, HOW DIFFICULT HAVE THESE PROBLEMS MADE IT FOR YOU TO DO YOUR WORK, TAKE CARE OF THINGS AT HOME, OR GET ALONG WITH OTHER PEOPLE: NOT DIFFICULT AT ALL

## 2024-10-24 ENCOUNTER — HOSPITAL ENCOUNTER (OUTPATIENT)
Dept: WOUND CARE | Age: 54
Discharge: HOME | End: 2024-10-24
Payer: MEDICARE

## 2024-10-24 VITALS
BODY MASS INDEX: 17.54 KG/M2 | RESPIRATION RATE: 18 BRPM | SYSTOLIC BLOOD PRESSURE: 126 MMHG | WEIGHT: 87 LBS | DIASTOLIC BLOOD PRESSURE: 75 MMHG | TEMPERATURE: 98 F | HEART RATE: 108 BPM | HEIGHT: 59 IN

## 2024-10-24 DIAGNOSIS — I73.9 PVD (PERIPHERAL VASCULAR DISEASE) (HCC): ICD-10-CM

## 2024-10-24 DIAGNOSIS — L97.922 CHRONIC ULCER OF LEFT LEG, WITH FAT LAYER EXPOSED (HCC): Chronic | ICD-10-CM

## 2024-10-24 DIAGNOSIS — L97.522 CHRONIC ULCER OF LEFT FOOT, WITH FAT LAYER EXPOSED (HCC): Primary | ICD-10-CM

## 2024-10-24 PROCEDURE — 97597 DBRDMT OPN WND 1ST 20 CM/<: CPT

## 2024-10-24 RX ORDER — MUPIROCIN 20 MG/G
OINTMENT TOPICAL ONCE
OUTPATIENT
Start: 2024-10-24 | End: 2024-10-24

## 2024-10-24 RX ORDER — GENTAMICIN SULFATE 1 MG/G
OINTMENT TOPICAL ONCE
OUTPATIENT
Start: 2024-10-24 | End: 2024-10-24

## 2024-10-24 RX ORDER — NEOMYCIN/BACITRACIN/POLYMYXINB 3.5-400-5K
OINTMENT (GRAM) TOPICAL ONCE
OUTPATIENT
Start: 2024-10-24 | End: 2024-10-24

## 2024-10-24 RX ORDER — LIDOCAINE HYDROCHLORIDE 20 MG/ML
JELLY TOPICAL ONCE
OUTPATIENT
Start: 2024-10-24 | End: 2024-10-24

## 2024-10-24 RX ORDER — LIDOCAINE 40 MG/G
CREAM TOPICAL ONCE
OUTPATIENT
Start: 2024-10-24 | End: 2024-10-24

## 2024-10-24 RX ORDER — GINSENG 100 MG
CAPSULE ORAL ONCE
OUTPATIENT
Start: 2024-10-24 | End: 2024-10-24

## 2024-10-24 RX ORDER — SODIUM CHLOR/HYPOCHLOROUS ACID 0.033 %
SOLUTION, IRRIGATION IRRIGATION ONCE
OUTPATIENT
Start: 2024-10-24 | End: 2024-10-24

## 2024-10-24 RX ORDER — SILVER SULFADIAZINE 10 MG/G
CREAM TOPICAL ONCE
OUTPATIENT
Start: 2024-10-24 | End: 2024-10-24

## 2024-10-24 RX ORDER — LIDOCAINE 50 MG/G
OINTMENT TOPICAL ONCE
OUTPATIENT
Start: 2024-10-24 | End: 2024-10-24

## 2024-10-24 RX ORDER — LIDOCAINE HYDROCHLORIDE 40 MG/ML
SOLUTION TOPICAL ONCE
OUTPATIENT
Start: 2024-10-24 | End: 2024-10-24

## 2024-10-24 RX ORDER — BACITRACIN ZINC AND POLYMYXIN B SULFATE 500; 1000 [USP'U]/G; [USP'U]/G
OINTMENT TOPICAL ONCE
OUTPATIENT
Start: 2024-10-24 | End: 2024-10-24

## 2024-10-24 RX ORDER — TRIAMCINOLONE ACETONIDE 1 MG/G
OINTMENT TOPICAL ONCE
OUTPATIENT
Start: 2024-10-24 | End: 2024-10-24

## 2024-10-24 RX ORDER — LIDOCAINE HYDROCHLORIDE 20 MG/ML
JELLY TOPICAL ONCE
Status: COMPLETED | OUTPATIENT
Start: 2024-10-24 | End: 2024-10-24

## 2024-10-24 RX ORDER — CLOBETASOL PROPIONATE 0.5 MG/G
OINTMENT TOPICAL ONCE
OUTPATIENT
Start: 2024-10-24 | End: 2024-10-24

## 2024-10-24 RX ORDER — BETAMETHASONE DIPROPIONATE 0.5 MG/G
CREAM TOPICAL ONCE
OUTPATIENT
Start: 2024-10-24 | End: 2024-10-24

## 2024-10-24 RX ADMIN — LIDOCAINE HYDROCHLORIDE: 20 JELLY TOPICAL at 11:50

## 2024-10-24 ASSESSMENT — PAIN SCALES - GENERAL: PAINLEVEL_OUTOF10: 5

## 2024-10-24 ASSESSMENT — PAIN DESCRIPTION - PAIN TYPE: TYPE: CHRONIC PAIN

## 2024-10-24 ASSESSMENT — PAIN DESCRIPTION - FREQUENCY: FREQUENCY: INTERMITTENT

## 2024-10-24 ASSESSMENT — PAIN DESCRIPTION - ONSET: ONSET: ON-GOING

## 2024-10-24 ASSESSMENT — PAIN DESCRIPTION - ORIENTATION: ORIENTATION: LOWER

## 2024-10-24 ASSESSMENT — PAIN DESCRIPTION - LOCATION: LOCATION: BACK

## 2024-10-24 ASSESSMENT — PAIN - FUNCTIONAL ASSESSMENT: PAIN_FUNCTIONAL_ASSESSMENT: ACTIVITIES ARE NOT PREVENTED

## 2024-10-24 ASSESSMENT — PAIN DESCRIPTION - DESCRIPTORS: DESCRIPTORS: ACHING

## 2024-10-24 NOTE — PATIENT INSTRUCTIONS
[facility] Wound Care and Hyperbaric Oxygen Therapy   Physician Orders and Discharge Instructions  [address] [place] [address]  [address]  [place] [address]  Telephone: [phone]      FAX [phone]    NAME:  Vonda Lutz  YOB: 1970  MEDICAL RECORD NUMBER:  [id number]  DATE:  10/24/2024    Discharge condition: Stable    Discharge to: Home    Left via:Private automobile    Accompanied by: Self        [facility] follow up visit ___________1 week__________________  (Please note your next appointment above and if you are unable to keep, kindly give a 24 hour notice. Thank you.)    If you experience any of the following, please call the Wound Care Center during business hours:    * Increase in Pain  * Temperature over 101  * Increase in drainage from your wound  * Drainage with a foul odor  * Bleeding  * Increase in swelling  * Need for compression bandage changes due to slippage, breakthrough drainage.    If you need medical attention outside of the business hours of the Wound Care Centers please contact your PCP or go to the nearest emergency room.

## 2024-10-24 NOTE — PROGRESS NOTES
Initiate Outpatient Wound Care Protocol    PVD (peripheral vascular disease) (HCC)    Relevant Orders    Initiate Outpatient Wound Care Protocol       Cont current care and vac RTO 1 week    Treatment Note please see attached Discharge Instructions    In my professional opinion this patient would benefit from HBO Therapy: No    Written patient dismissal instructions given to patient and signed by patient or POA.             Electronically signed by Connor Pedroza MD on 10/24/2024 at 12:05 PM

## 2024-10-24 NOTE — PLAN OF CARE
Negative Pressure Wound Therapy    NAME:  Vonda Lutz  YOB: 1970  MEDICAL RECORD NUMBER:  [id number]  DATE:  10/24/2024    Applied Negative Pressure to left leg wound(s)/ulcer(s).  [x] Applied skin barrier prep to mary-wound.   [x] Cut strips of plastic drape to picture frame wound so that mary-wound is     covered with the drape.   [x] If bridging dressing to less prominent site, cover any intact skin that will come in contact with the Negative Pressure Therapy sponge, gauze or channel drain with plastic drape. The sponge should never touch intact skin.   [x] Cut sponge, gauze or channel drain to size which will fit into the wound/ulcer bed without being forced.   [x] Be sure the sponge is large enough to hold the entire round plastic flange which is attached to the tubing. Never allow flange to be larger than the sponge or it will produce suction damaging intact skin.  Total number of individual pieces of foam used within the wound bed: 1, peel and place dressing.    [x] If bridging the dressing away from the primary site, be sure the bridge leads to a piece of sponge large enough to hold the entire flange without allowing any of the flange to overlap onto intact skin.   [x] Covered sponge, gauze or channel drain with plastic drape.   [x] Cut a hole in this plastic drape directly over the sponge the same size as the plastic drain tubing.   [x] Removed plastic liner from flange and apply it directly over the hole you cut.   [x] Removed the plastic cover from the flange.   [x] Attached the tubing to the wound/ulcer Negative Pressure Therapy and turn it on to be sure a vacuum is created and that there are no leaks.   [x] If air leaks occur, use plastic drape to patch them.   [x] Secured Negative Pressure Therapy dressing with ace wrap loosely if located on an extremity. Maintain tubing outside of ace wrap. Tubing must not exert pressure on intact skin.    Applied per

## 2024-10-31 ENCOUNTER — HOSPITAL ENCOUNTER (OUTPATIENT)
Dept: WOUND CARE | Age: 54
Discharge: HOME | End: 2024-10-31
Payer: MEDICARE

## 2024-10-31 VITALS
BODY MASS INDEX: 17.54 KG/M2 | DIASTOLIC BLOOD PRESSURE: 82 MMHG | WEIGHT: 87 LBS | HEART RATE: 102 BPM | SYSTOLIC BLOOD PRESSURE: 135 MMHG | RESPIRATION RATE: 20 BRPM | HEIGHT: 59 IN | TEMPERATURE: 98.8 F

## 2024-10-31 DIAGNOSIS — L97.522 CHRONIC ULCER OF LEFT FOOT, WITH FAT LAYER EXPOSED (HCC): Primary | ICD-10-CM

## 2024-10-31 DIAGNOSIS — L97.922 CHRONIC ULCER OF LEFT LEG, WITH FAT LAYER EXPOSED (HCC): ICD-10-CM

## 2024-10-31 DIAGNOSIS — I73.9 PVD (PERIPHERAL VASCULAR DISEASE) (HCC): ICD-10-CM

## 2024-10-31 PROCEDURE — 97597 DBRDMT OPN WND 1ST 20 CM/<: CPT

## 2024-10-31 PROCEDURE — 97597 DBRDMT OPN WND 1ST 20 CM/<: CPT | Performed by: SURGERY

## 2024-10-31 RX ORDER — TRIAMCINOLONE ACETONIDE 1 MG/G
OINTMENT TOPICAL ONCE
OUTPATIENT
Start: 2024-10-31 | End: 2024-10-31

## 2024-10-31 RX ORDER — BACITRACIN ZINC AND POLYMYXIN B SULFATE 500; 1000 [USP'U]/G; [USP'U]/G
OINTMENT TOPICAL ONCE
OUTPATIENT
Start: 2024-10-31 | End: 2024-10-31

## 2024-10-31 RX ORDER — SILVER SULFADIAZINE 10 MG/G
CREAM TOPICAL ONCE
OUTPATIENT
Start: 2024-10-31 | End: 2024-10-31

## 2024-10-31 RX ORDER — GINSENG 100 MG
CAPSULE ORAL ONCE
OUTPATIENT
Start: 2024-10-31 | End: 2024-10-31

## 2024-10-31 RX ORDER — BETAMETHASONE DIPROPIONATE 0.5 MG/G
CREAM TOPICAL ONCE
OUTPATIENT
Start: 2024-10-31 | End: 2024-10-31

## 2024-10-31 RX ORDER — SODIUM CHLOR/HYPOCHLOROUS ACID 0.033 %
SOLUTION, IRRIGATION IRRIGATION ONCE
OUTPATIENT
Start: 2024-10-31 | End: 2024-10-31

## 2024-10-31 RX ORDER — GENTAMICIN SULFATE 1 MG/G
OINTMENT TOPICAL ONCE
OUTPATIENT
Start: 2024-10-31 | End: 2024-10-31

## 2024-10-31 RX ORDER — LIDOCAINE HYDROCHLORIDE 40 MG/ML
SOLUTION TOPICAL ONCE
OUTPATIENT
Start: 2024-10-31 | End: 2024-10-31

## 2024-10-31 RX ORDER — CLOBETASOL PROPIONATE 0.5 MG/G
OINTMENT TOPICAL ONCE
OUTPATIENT
Start: 2024-10-31 | End: 2024-10-31

## 2024-10-31 RX ORDER — LIDOCAINE HYDROCHLORIDE 20 MG/ML
JELLY TOPICAL ONCE
Status: COMPLETED | OUTPATIENT
Start: 2024-10-31 | End: 2024-10-31

## 2024-10-31 RX ORDER — NEOMYCIN/BACITRACIN/POLYMYXINB 3.5-400-5K
OINTMENT (GRAM) TOPICAL ONCE
OUTPATIENT
Start: 2024-10-31 | End: 2024-10-31

## 2024-10-31 RX ORDER — LIDOCAINE HYDROCHLORIDE 20 MG/ML
JELLY TOPICAL ONCE
OUTPATIENT
Start: 2024-10-31 | End: 2024-10-31

## 2024-10-31 RX ORDER — LIDOCAINE 40 MG/G
CREAM TOPICAL ONCE
OUTPATIENT
Start: 2024-10-31 | End: 2024-10-31

## 2024-10-31 RX ORDER — MUPIROCIN 20 MG/G
OINTMENT TOPICAL ONCE
OUTPATIENT
Start: 2024-10-31 | End: 2024-10-31

## 2024-10-31 RX ORDER — LIDOCAINE 50 MG/G
OINTMENT TOPICAL ONCE
OUTPATIENT
Start: 2024-10-31 | End: 2024-10-31

## 2024-10-31 RX ADMIN — LIDOCAINE HYDROCHLORIDE: 20 JELLY TOPICAL at 10:57

## 2024-10-31 ASSESSMENT — PAIN DESCRIPTION - PAIN TYPE: TYPE: CHRONIC PAIN

## 2024-10-31 ASSESSMENT — PAIN DESCRIPTION - LOCATION: LOCATION: BACK

## 2024-10-31 ASSESSMENT — PAIN DESCRIPTION - ONSET: ONSET: ON-GOING

## 2024-10-31 ASSESSMENT — PAIN SCALES - GENERAL: PAINLEVEL_OUTOF10: 7

## 2024-10-31 ASSESSMENT — PAIN DESCRIPTION - FREQUENCY: FREQUENCY: INTERMITTENT

## 2024-10-31 ASSESSMENT — PAIN - FUNCTIONAL ASSESSMENT: PAIN_FUNCTIONAL_ASSESSMENT: ACTIVITIES ARE NOT PREVENTED

## 2024-10-31 ASSESSMENT — PAIN DESCRIPTION - DESCRIPTORS: DESCRIPTORS: ACHING;SORE

## 2024-10-31 NOTE — PLAN OF CARE
Negative Pressure Wound Therapy    NAME:  Vonda Lutz  YOB: 1970  MEDICAL RECORD NUMBER:  [id number]  DATE:  10/31/2024    Applied Negative Pressure to left leg wound(s)/ulcer(s).  [x] Applied skin barrier prep to mary-wound.   [x] Cut strips of plastic drape to picture frame wound so that mary-wound is     covered with the drape.   [x] If bridging dressing to less prominent site, cover any intact skin that will come in contact with the Negative Pressure Therapy sponge, gauze or channel drain with plastic drape. The sponge should never touch intact skin.   [x] Cut sponge, gauze or channel drain to size which will fit into the wound/ulcer bed without being forced.   [x] Be sure the sponge is large enough to hold the entire round plastic flange which is attached to the tubing. Never allow flange to be larger than the sponge or it will produce suction damaging intact skin.  Total number of individual pieces of foam used within the wound bed: 1, peel and place, size small.    [x] If bridging the dressing away from the primary site, be sure the bridge leads to a piece of sponge large enough to hold the entire flange without allowing any of the flange to overlap onto intact skin.   [x] Covered sponge, gauze or channel drain with plastic drape.   [x] Cut a hole in this plastic drape directly over the sponge the same size as the plastic drain tubing.   [x] Removed plastic liner from flange and apply it directly over the hole you cut.   [x] Removed the plastic cover from the flange.   [x] Attached the tubing to the wound/ulcer Negative Pressure Therapy and turn it on to be sure a vacuum is created and that there are no leaks.   [x] If air leaks occur, use plastic drape to patch them.   [x] Secured Negative Pressure Therapy dressing with ace wrap loosely if located on an extremity. Maintain tubing outside of ace wrap. Tubing must not exert pressure on intact skin.    Applied per

## 2024-10-31 NOTE — PROGRESS NOTES
examination of this patient's wound(s)/ulcer(s) today, debridement is required to promote healing and evaluate the wound base.    Performed by: Connor Pedroza MD    Consent obtained:  Yes    Time out taken:  Yes    Pain Control: Anesthetic  Anesthetic: 2% Lidocaine Gel Topical       Debridement:Non-excisional Debridement    Using curette the wound(s)/ulcer(s) was/were sharply debrided down through and including the removal of epidermis and dermis.        Devitalized Tissue Debrided:  fibrin, biofilm, slough, necrotic/eschar, and exudate      Pre Debridement Measurements:  Are located in the Wound/Ulcer Documentation Flow Sheet    Wound/Ulcer #: 1    Percent of Wound(s)/Ulcer(s) Debrided: 100%    Total Surface Area Debrided:  1.68 sq cm       Diabetic/Pressure/Non Pressure Ulcers only:  Ulcer: Non-Pressure ulcer, fat layer exposed             Post Debridement Measurements:    Wound/Ulcer Descriptions are Pre Debridement --EXCEPT MEASUREMENTS    Negative Pressure Wound Therapy Leg Left;Lower;Anterior (Active)   Wound Type Other (Comment) 10/24/24 1215   Unit Type 3M 10/31/24 1116   Dressing Type Other (Comment) 10/31/24 1116   Number of pieces used 1 10/24/24 1215   Number of pieces removed 1 10/31/24 1116   Cycle Continuous 10/31/24 1116   Target Pressure (mmHg) 125 10/31/24 1116   Canister changed? No 10/24/24 1151   Dressing Status Old drainage noted 10/31/24 1116   Dressing Changed Changed/New 10/24/24 1215   Drainage Amount Moderate 10/31/24 1116   Drainage Description Serosanguinous 10/31/24 1116   Wound Assessment Granulation tissue;Hyper granulation tissue;Slough 10/31/24 1116   Anjali-wound Assessment Hemosiderin staining (brown yellow) 10/31/24 1116   Shape oval 10/31/24 1116   Odor None 10/31/24 1116   Number of days: 35       Puncture 06/26/24 Femoral (Active)   Number of days: 127       Puncture 06/26/24 Femoral (Active)   Number of days: 127       Wound 02/06/24 Pretibial Left wound 1-left leg wound

## 2024-10-31 NOTE — PATIENT INSTRUCTIONS
[facility] Wound Care and Hyperbaric Oxygen Therapy   Physician Orders and Discharge Instructions  [address] [place] [address]  [address]  [place] [address]  Telephone: [phone]      FAX [phone]    NAME:  Vonda Lutz  YOB: 1970  MEDICAL RECORD NUMBER:  [id number]  DATE:  10/31/2024    Discharge condition: Stable    Discharge to: Home    Left via:Private automobile    Accompanied by: Self    Dressing Orders: Left Lower Ext Ulcer  Wash with soap and water  Apply peel and place drape over wound  Set wound vac to 125 mmHG, continuous. Change wound vac dressing weekly and PRN  Remove vac and apply alternate dressing if you develop signs of infection, increased pain or the vac will not maintain a seal,  Then call the office and set up an appointment for reapplication  6 inch ace wrap from toes to knee daily as tolerated    Alternative dressing: Wash with soap and water. Apply Saline or 1/4 STR Dakins moistened gauze to wound bed.  Cover with gauze. Secure with roll gauze and medipore tape. Change twice daily .    [facility] follow up visit ____________1 week_________________  (Please note your next appointment above and if you are unable to keep, kindly give a 24 hour notice. Thank you.)    If you experience any of the following, please call the Wound Care Center during business hours:    * Increase in Pain  * Temperature over 101  * Increase in drainage from your wound  * Drainage with a foul odor  * Bleeding  * Increase in swelling  * Need for compression bandage changes due to slippage, breakthrough drainage.    If you need medical attention outside of the business hours of the Wound Care Centers please contact your PCP or go to the nearest emergency room.

## 2024-11-01 DIAGNOSIS — I70.213 ATHEROSCLER OF NATIVE ARTERY OF BOTH LEGS WITH INTERMIT CLAUDICATION (HCC): ICD-10-CM

## 2024-11-04 RX ORDER — ATORVASTATIN CALCIUM 10 MG/1
10 TABLET, FILM COATED ORAL DAILY
Qty: 30 TABLET | Refills: 0 | Status: SHIPPED | OUTPATIENT
Start: 2024-11-04

## 2024-11-06 ENCOUNTER — HOSPITAL ENCOUNTER (OUTPATIENT)
Dept: WOUND CARE | Age: 54
Discharge: HOME | End: 2024-11-06
Payer: MEDICARE

## 2024-11-06 VITALS
DIASTOLIC BLOOD PRESSURE: 76 MMHG | RESPIRATION RATE: 16 BRPM | TEMPERATURE: 98 F | HEART RATE: 99 BPM | SYSTOLIC BLOOD PRESSURE: 127 MMHG

## 2024-11-06 DIAGNOSIS — I73.9 PVD (PERIPHERAL VASCULAR DISEASE) (HCC): ICD-10-CM

## 2024-11-06 DIAGNOSIS — L97.522 CHRONIC ULCER OF LEFT FOOT, WITH FAT LAYER EXPOSED (HCC): Primary | ICD-10-CM

## 2024-11-06 DIAGNOSIS — L97.922 CHRONIC ULCER OF LEFT LEG, WITH FAT LAYER EXPOSED (HCC): Chronic | ICD-10-CM

## 2024-11-06 PROCEDURE — 97597 DBRDMT OPN WND 1ST 20 CM/<: CPT | Performed by: SURGERY

## 2024-11-06 PROCEDURE — 97597 DBRDMT OPN WND 1ST 20 CM/<: CPT

## 2024-11-06 RX ORDER — LIDOCAINE 50 MG/G
OINTMENT TOPICAL ONCE
OUTPATIENT
Start: 2024-11-06 | End: 2024-11-06

## 2024-11-06 RX ORDER — GENTAMICIN SULFATE 1 MG/G
OINTMENT TOPICAL ONCE
OUTPATIENT
Start: 2024-11-06 | End: 2024-11-06

## 2024-11-06 RX ORDER — LIDOCAINE HYDROCHLORIDE 20 MG/ML
JELLY TOPICAL ONCE
OUTPATIENT
Start: 2024-11-06 | End: 2024-11-06

## 2024-11-06 RX ORDER — GINSENG 100 MG
CAPSULE ORAL ONCE
OUTPATIENT
Start: 2024-11-06 | End: 2024-11-06

## 2024-11-06 RX ORDER — BETAMETHASONE DIPROPIONATE 0.5 MG/G
CREAM TOPICAL ONCE
OUTPATIENT
Start: 2024-11-06 | End: 2024-11-06

## 2024-11-06 RX ORDER — TRIAMCINOLONE ACETONIDE 1 MG/G
OINTMENT TOPICAL ONCE
OUTPATIENT
Start: 2024-11-06 | End: 2024-11-06

## 2024-11-06 RX ORDER — MUPIROCIN 20 MG/G
OINTMENT TOPICAL ONCE
OUTPATIENT
Start: 2024-11-06 | End: 2024-11-06

## 2024-11-06 RX ORDER — LIDOCAINE HYDROCHLORIDE 40 MG/ML
SOLUTION TOPICAL ONCE
OUTPATIENT
Start: 2024-11-06 | End: 2024-11-06

## 2024-11-06 RX ORDER — CLOBETASOL PROPIONATE 0.5 MG/G
OINTMENT TOPICAL ONCE
OUTPATIENT
Start: 2024-11-06 | End: 2024-11-06

## 2024-11-06 RX ORDER — BACITRACIN ZINC AND POLYMYXIN B SULFATE 500; 1000 [USP'U]/G; [USP'U]/G
OINTMENT TOPICAL ONCE
OUTPATIENT
Start: 2024-11-06 | End: 2024-11-06

## 2024-11-06 RX ORDER — NEOMYCIN/BACITRACIN/POLYMYXINB 3.5-400-5K
OINTMENT (GRAM) TOPICAL ONCE
OUTPATIENT
Start: 2024-11-06 | End: 2024-11-06

## 2024-11-06 RX ORDER — SILVER SULFADIAZINE 10 MG/G
CREAM TOPICAL ONCE
OUTPATIENT
Start: 2024-11-06 | End: 2024-11-06

## 2024-11-06 RX ORDER — SODIUM CHLOR/HYPOCHLOROUS ACID 0.033 %
SOLUTION, IRRIGATION IRRIGATION ONCE
OUTPATIENT
Start: 2024-11-06 | End: 2024-11-06

## 2024-11-06 RX ORDER — LIDOCAINE HYDROCHLORIDE 20 MG/ML
JELLY TOPICAL ONCE
Status: COMPLETED | OUTPATIENT
Start: 2024-11-06 | End: 2024-11-06

## 2024-11-06 RX ORDER — LIDOCAINE 40 MG/G
CREAM TOPICAL ONCE
OUTPATIENT
Start: 2024-11-06 | End: 2024-11-06

## 2024-11-06 RX ADMIN — LIDOCAINE HYDROCHLORIDE: 20 JELLY TOPICAL at 15:43

## 2024-11-06 ASSESSMENT — PAIN SCALES - GENERAL: PAINLEVEL_OUTOF10: 5

## 2024-11-06 ASSESSMENT — PAIN DESCRIPTION - ONSET: ONSET: ON-GOING

## 2024-11-06 ASSESSMENT — PAIN DESCRIPTION - PAIN TYPE: TYPE: CHRONIC PAIN

## 2024-11-06 ASSESSMENT — PAIN - FUNCTIONAL ASSESSMENT: PAIN_FUNCTIONAL_ASSESSMENT: PREVENTS OR INTERFERES SOME ACTIVE ACTIVITIES AND ADLS

## 2024-11-06 ASSESSMENT — PAIN DESCRIPTION - ORIENTATION: ORIENTATION: LOWER

## 2024-11-06 ASSESSMENT — PAIN DESCRIPTION - FREQUENCY: FREQUENCY: INTERMITTENT

## 2024-11-06 ASSESSMENT — PAIN DESCRIPTION - DESCRIPTORS: DESCRIPTORS: ACHING

## 2024-11-06 ASSESSMENT — PAIN DESCRIPTION - LOCATION: LOCATION: BACK

## 2024-11-06 NOTE — PATIENT INSTRUCTIONS
[facility] Wound Care and Hyperbaric Oxygen Therapy   Physician Orders and Discharge Instructions  [address] [place] [address]  [address]  [place] [address]  Telephone: [phone]      FAX [phone]    NAME:  Vonda Lutz  YOB: 1970  MEDICAL RECORD NUMBER:  [id number]  DATE:  11/6/2024    Discharge condition: Stable    Discharge to: Home    Left via:Private automobile    Accompanied by: Self    Hold vac for 1 week     Dressing Orders: Left Lower Ext Ulcer  Xeroform to open areas  Calamine Coflex Unnaboot, Keep clean and Dry  Elevate feet to level or above heart 3-4 times daily and as needed to for 30 minutes to reduce swelling  Remove wraps and call office if- Wraps get wet, Cause increased pain, Slide down or you are unable to make your next scheduled appointment   Multi Vitamin, High Protein diet as tolerated  Possible refer to general surgery    [facility] follow up visit ___________1 week__________________  (Please note your next appointment above and if you are unable to keep, kindly give a 24 hour notice. Thank you.)    If you experience any of the following, please call the Wound Care Center during business hours:    * Increase in Pain  * Temperature over 101  * Increase in drainage from your wound  * Drainage with a foul odor  * Bleeding  * Increase in swelling  * Need for compression bandage changes due to slippage, breakthrough drainage.    If you need medical attention outside of the business hours of the Wound Care Centers please contact your PCP or go to the nearest emergency room.

## 2024-11-06 NOTE — PROGRESS NOTES
Readings from Last 3 Encounters:   10/31/24 39.5 kg (87 lb)   10/24/24 39.5 kg (87 lb)   10/17/24 38.1 kg (84 lb)       PHYSICAL EXAM    General Appearance: alert and oriented to person, place and time, well developed and well- nourished, in no acute distress  Skin: warm and dry, no rash or erythema  Head: normocephalic and atraumatic  Eyes: pupils equal, round, and reactive to light, extraocular eye movements intact, conjunctivae normal  ENT: tympanic membrane, external ear and ear canal normal bilaterally, nose without deformity, nasal mucosa and turbinates normal without polyps, lips teeth and gums normal  Neck: supple and non-tender without mass, no thyromegaly or thyroid nodules, no cervical lymphadenopathy  Pulmonary/Chest: clear to auscultation bilaterally- no wheezes, rales or rhonchi, normal air movement, no respiratory distress  Cardiovascular: normal rate, regular rhythm, normal S1 and S2, no murmurs, rubs, clicks, or gallops, distal pulses intact, no carotid bruits  Abdomen: soft, non-tender, non-distended, normal bowel sounds, no masses or organomegaly  Extremities: no cyanosis, clubbing or edema  Musculoskeletal: normal range of motion, no joint swelling, deformity or tenderness  Neurologic: reflexes normal and symmetric, no cranial nerve deficit, gait, coordination and speech normal, skin sensation normal      Assessment:      Problem List Items Addressed This Visit       * (Principal) Chronic ulcer of left leg, with fat layer exposed (HCC) (Chronic)    Relevant Orders    Initiate Outpatient Wound Care Protocol    Chronic ulcer of left foot, with fat layer exposed (HCC) - Primary    Relevant Orders    Initiate Outpatient Wound Care Protocol    PVD (peripheral vascular disease) (HCC)    Relevant Orders    Initiate Outpatient Wound Care Protocol        Procedure Note  Indications:  Based on my examination of this patient's wound(s)/ulcer(s) today, debridement is required to promote healing and evaluate

## 2024-11-06 NOTE — WOUND CARE
Unna Boot Application   Below Knee    NAME:  Vonda Lutz  YOB: 1970  MEDICAL RECORD NUMBER:  [id number]  DATE:  11/6/2024    Unna boot: Applied moisturizing agent to dry skin as needed.   Appied primary and secondary dressing as ordered.  Applied Unna roll from toes to knee overlapping each time.   Applied ace wrap or coban from toes to below the knee.   Secured with tape and/or metal clips covered with tape.   Instructed patient/caregiver to keep dressing dry and intact. DO NOT REMOVE DRESSING.   Instructed pt/family/caregiver to report excessive draining, loose bandage, wet dressing, severe pain or tingling in toes.  Applied Unna Boot dressing below the knee to left lower leg.    Unna Boot(s) were applied per  Guidelines.     Electronically signed by Solitario Dunn RN on 11/6/2024 at 4:05 PM

## 2024-11-13 ENCOUNTER — HOSPITAL ENCOUNTER (OUTPATIENT)
Dept: WOUND CARE | Age: 54
Discharge: HOME | End: 2024-11-13
Payer: MEDICARE

## 2024-11-13 ENCOUNTER — TELEPHONE (OUTPATIENT)
Dept: SURGERY | Age: 54
End: 2024-11-13

## 2024-11-13 VITALS
SYSTOLIC BLOOD PRESSURE: 119 MMHG | HEIGHT: 59 IN | RESPIRATION RATE: 18 BRPM | DIASTOLIC BLOOD PRESSURE: 79 MMHG | HEART RATE: 111 BPM | BODY MASS INDEX: 17.54 KG/M2 | TEMPERATURE: 98.5 F | WEIGHT: 87 LBS

## 2024-11-13 DIAGNOSIS — L97.522 CHRONIC ULCER OF LEFT FOOT, WITH FAT LAYER EXPOSED (HCC): Primary | ICD-10-CM

## 2024-11-13 DIAGNOSIS — I73.9 PVD (PERIPHERAL VASCULAR DISEASE) (HCC): ICD-10-CM

## 2024-11-13 DIAGNOSIS — L97.922 CHRONIC ULCER OF LEFT LEG, WITH FAT LAYER EXPOSED (HCC): ICD-10-CM

## 2024-11-13 PROCEDURE — 99212 OFFICE O/P EST SF 10 MIN: CPT | Performed by: SURGERY

## 2024-11-13 PROCEDURE — 29580 STRAPPING UNNA BOOT: CPT

## 2024-11-13 RX ORDER — TRIAMCINOLONE ACETONIDE 1 MG/G
OINTMENT TOPICAL ONCE
OUTPATIENT
Start: 2024-11-13 | End: 2024-11-13

## 2024-11-13 RX ORDER — LIDOCAINE 50 MG/G
OINTMENT TOPICAL ONCE
OUTPATIENT
Start: 2024-11-13 | End: 2024-11-13

## 2024-11-13 RX ORDER — CLOBETASOL PROPIONATE 0.5 MG/G
OINTMENT TOPICAL ONCE
OUTPATIENT
Start: 2024-11-13 | End: 2024-11-13

## 2024-11-13 RX ORDER — NEOMYCIN/BACITRACIN/POLYMYXINB 3.5-400-5K
OINTMENT (GRAM) TOPICAL ONCE
OUTPATIENT
Start: 2024-11-13 | End: 2024-11-13

## 2024-11-13 RX ORDER — LIDOCAINE HYDROCHLORIDE 20 MG/ML
JELLY TOPICAL ONCE
Status: COMPLETED | OUTPATIENT
Start: 2024-11-13 | End: 2024-11-13

## 2024-11-13 RX ORDER — LIDOCAINE HYDROCHLORIDE 20 MG/ML
JELLY TOPICAL ONCE
OUTPATIENT
Start: 2024-11-13 | End: 2024-11-13

## 2024-11-13 RX ORDER — SODIUM CHLOR/HYPOCHLOROUS ACID 0.033 %
SOLUTION, IRRIGATION IRRIGATION ONCE
OUTPATIENT
Start: 2024-11-13 | End: 2024-11-13

## 2024-11-13 RX ORDER — LIDOCAINE 40 MG/G
CREAM TOPICAL ONCE
OUTPATIENT
Start: 2024-11-13 | End: 2024-11-13

## 2024-11-13 RX ORDER — SILVER SULFADIAZINE 10 MG/G
CREAM TOPICAL ONCE
OUTPATIENT
Start: 2024-11-13 | End: 2024-11-13

## 2024-11-13 RX ORDER — LIDOCAINE HYDROCHLORIDE 40 MG/ML
SOLUTION TOPICAL ONCE
OUTPATIENT
Start: 2024-11-13 | End: 2024-11-13

## 2024-11-13 RX ORDER — BACITRACIN ZINC AND POLYMYXIN B SULFATE 500; 1000 [USP'U]/G; [USP'U]/G
OINTMENT TOPICAL ONCE
OUTPATIENT
Start: 2024-11-13 | End: 2024-11-13

## 2024-11-13 RX ORDER — BETAMETHASONE DIPROPIONATE 0.5 MG/G
CREAM TOPICAL ONCE
OUTPATIENT
Start: 2024-11-13 | End: 2024-11-13

## 2024-11-13 RX ORDER — GENTAMICIN SULFATE 1 MG/G
OINTMENT TOPICAL ONCE
OUTPATIENT
Start: 2024-11-13 | End: 2024-11-13

## 2024-11-13 RX ORDER — GINSENG 100 MG
CAPSULE ORAL ONCE
OUTPATIENT
Start: 2024-11-13 | End: 2024-11-13

## 2024-11-13 RX ORDER — MUPIROCIN 20 MG/G
OINTMENT TOPICAL ONCE
OUTPATIENT
Start: 2024-11-13 | End: 2024-11-13

## 2024-11-13 RX ADMIN — LIDOCAINE HYDROCHLORIDE: 20 JELLY TOPICAL at 11:29

## 2024-11-13 ASSESSMENT — PAIN DESCRIPTION - FREQUENCY: FREQUENCY: INTERMITTENT

## 2024-11-13 ASSESSMENT — PAIN DESCRIPTION - PAIN TYPE: TYPE: CHRONIC PAIN

## 2024-11-13 ASSESSMENT — PAIN DESCRIPTION - DESCRIPTORS: DESCRIPTORS: ACHING

## 2024-11-13 ASSESSMENT — PAIN - FUNCTIONAL ASSESSMENT: PAIN_FUNCTIONAL_ASSESSMENT: PREVENTS OR INTERFERES SOME ACTIVE ACTIVITIES AND ADLS

## 2024-11-13 ASSESSMENT — PAIN DESCRIPTION - LOCATION: LOCATION: BACK

## 2024-11-13 ASSESSMENT — PAIN SCALES - GENERAL: PAINLEVEL_OUTOF10: 5

## 2024-11-13 ASSESSMENT — PAIN DESCRIPTION - ONSET: ONSET: ON-GOING

## 2024-11-13 NOTE — TELEPHONE ENCOUNTER
[facility] Wound care called to schedule pt a appt from referral. Referral status is closed. Please call to schedule. [phone] Ext [phone]. Please advise.

## 2024-11-13 NOTE — PLAN OF CARE
Unna Boot Application   Below Knee    NAME:  Vonda Lutz  YOB: 1970  MEDICAL RECORD NUMBER:  [id number]  DATE:  11/13/2024    Unna boot: Applied moisturizing agent to dry skin as needed.   Appied primary and secondary dressing as ordered.  Applied Unna roll from toes to knee overlapping each time.   Applied ace wrap or coban from toes to below the knee.   Instructed patient/caregiver to keep dressing dry and intact. DO NOT REMOVE DRESSING.   Instructed pt/family/caregiver to report excessive draining, loose bandage, wet dressing, severe pain or tingling in toes.  Applied Unna Boot dressing below the knee to left lower leg.    Unna Boot(s) were applied per  Guidelines.     Electronically signed by Yary Rodas RN on 11/13/2024 at 12:17 PM

## 2024-11-13 NOTE — PATIENT INSTRUCTIONS
[facility] Wound Care and Hyperbaric Oxygen Therapy   Physician Orders and Discharge Instructions  [address] [place] [address]  [address]  [place] [address]  Telephone: [phone]      FAX [phone]    NAME:  Vonda Lutz  YOB: 1970  MEDICAL RECORD NUMBER:  [id number]  DATE:  11/13/2024    Discharge condition: Stable    Discharge to: Home    Left via:Private automobile    Accompanied by: Self    Hold vac for 1 week    Dressing Orders: Left Lower Ext Ulcer  Xeroform to open areas  Calamine Coflex Unnaboot, Keep clean and Dry  Elevate feet to level or above heart 3-4 times daily and as needed to for 30 minutes to reduce swelling  Remove wraps and call office if- Wraps get wet, Cause increased pain, Slide down or you are unable to make  your next scheduled appointment  Multi Vitamin, High Protein diet as tolerated  Possible refer to general surgery    [facility] follow up visit _____________________________  (Please note your next appointment above and if you are unable to keep, kindly give a 24 hour notice. Thank you.)    If you experience any of the following, please call the Wound Care Center during business hours:    * Increase in Pain  * Temperature over 101  * Increase in drainage from your wound  * Drainage with a foul odor  * Bleeding  * Increase in swelling  * Need for compression bandage changes due to slippage, breakthrough drainage.    If you need medical attention outside of the business hours of the Wound Care Centers please contact your PCP or go to the nearest emergency room.

## 2024-11-13 NOTE — PROGRESS NOTES
[facility] Wound Care Center   Progress Note and Procedure Note      Vonda Lutz  MEDICAL RECORD NUMBER:  [id number]  AGE: 54 y.o.   GENDER: female  : 1970  EPISODE DATE:  2024    Subjective:     Chief Complaint   Patient presents with    Wound Check     Pt presents for recheck of left leg wound         HISTORY of PRESENT ILLNESS HPI     Vonda Lutz is a 54 y.o. female who presents today for wound/ulcer evaluation.   History of Wound Context: Pt with LLE wound here for eval/treat  Wound/Ulcer Pain Timing/Severity: none  Quality of pain: N/A  Severity:  0 / 10   Modifying Factors: None  Associated Signs/Symptoms: none    Ulcer Identification:  Ulcer Type: undetermined  Contributing Factors: edema    Wound:  undetermined        PAST MEDICAL HISTORY        Diagnosis Date    Anxiety     Ongoing    Arthritis     CAD (coronary artery disease)     Closed fracture of left foot     Depression     Ongoing    Gallbladder anomaly     Hip fracture (HCC)     x2    History of blood transfusion     Hyperlipidemia     Hypertension     Hyperthyroidism 2020    Negative 2021    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 OR    BREAST ENHANCEMENT SURGERY Bilateral     CARPAL TUNNEL RELEASE Bilateral      SECTION      CHOLECYSTECTOMY      CORNEAL TRANSPLANT      CORONARY ANGIOPLASTY WITH STENT

## 2024-11-14 NOTE — TELEPHONE ENCOUNTER
ANNALISA Pringle @ wound care is going to discuss with Dr Pedroza when he is back in the office Friday and will call me back

## 2024-11-15 DIAGNOSIS — L97.922 CHRONIC ULCER OF LEFT LEG, WITH FAT LAYER EXPOSED (HCC): Primary | ICD-10-CM

## 2024-11-21 ENCOUNTER — HOSPITAL ENCOUNTER (OUTPATIENT)
Dept: WOUND CARE | Age: 54
Discharge: HOME | End: 2024-11-21
Payer: MEDICARE

## 2024-11-21 VITALS
SYSTOLIC BLOOD PRESSURE: 120 MMHG | HEART RATE: 102 BPM | TEMPERATURE: 98.4 F | RESPIRATION RATE: 18 BRPM | DIASTOLIC BLOOD PRESSURE: 79 MMHG

## 2024-11-21 DIAGNOSIS — L97.522 CHRONIC ULCER OF LEFT FOOT, WITH FAT LAYER EXPOSED (HCC): Primary | ICD-10-CM

## 2024-11-21 DIAGNOSIS — I73.9 PVD (PERIPHERAL VASCULAR DISEASE) (HCC): ICD-10-CM

## 2024-11-21 DIAGNOSIS — L97.922 CHRONIC ULCER OF LEFT LEG, WITH FAT LAYER EXPOSED (HCC): ICD-10-CM

## 2024-11-21 PROCEDURE — 99212 OFFICE O/P EST SF 10 MIN: CPT | Performed by: SURGERY

## 2024-11-21 PROCEDURE — 99212 OFFICE O/P EST SF 10 MIN: CPT

## 2024-11-21 RX ORDER — NEOMYCIN/BACITRACIN/POLYMYXINB 3.5-400-5K
OINTMENT (GRAM) TOPICAL ONCE
OUTPATIENT
Start: 2024-11-21 | End: 2024-11-21

## 2024-11-21 RX ORDER — TRIAMCINOLONE ACETONIDE 1 MG/G
OINTMENT TOPICAL ONCE
OUTPATIENT
Start: 2024-11-21 | End: 2024-11-21

## 2024-11-21 RX ORDER — MUPIROCIN 20 MG/G
OINTMENT TOPICAL ONCE
OUTPATIENT
Start: 2024-11-21 | End: 2024-11-21

## 2024-11-21 RX ORDER — LIDOCAINE 40 MG/G
CREAM TOPICAL ONCE
OUTPATIENT
Start: 2024-11-21 | End: 2024-11-21

## 2024-11-21 RX ORDER — BETAMETHASONE DIPROPIONATE 0.5 MG/G
CREAM TOPICAL ONCE
OUTPATIENT
Start: 2024-11-21 | End: 2024-11-21

## 2024-11-21 RX ORDER — LIDOCAINE HYDROCHLORIDE 20 MG/ML
JELLY TOPICAL ONCE
OUTPATIENT
Start: 2024-11-21 | End: 2024-11-21

## 2024-11-21 RX ORDER — SODIUM CHLOR/HYPOCHLOROUS ACID 0.033 %
SOLUTION, IRRIGATION IRRIGATION ONCE
OUTPATIENT
Start: 2024-11-21 | End: 2024-11-21

## 2024-11-21 RX ORDER — BACITRACIN ZINC AND POLYMYXIN B SULFATE 500; 1000 [USP'U]/G; [USP'U]/G
OINTMENT TOPICAL ONCE
OUTPATIENT
Start: 2024-11-21 | End: 2024-11-21

## 2024-11-21 RX ORDER — LIDOCAINE HYDROCHLORIDE 20 MG/ML
JELLY TOPICAL ONCE
Status: COMPLETED | OUTPATIENT
Start: 2024-11-21 | End: 2024-11-21

## 2024-11-21 RX ORDER — GINSENG 100 MG
CAPSULE ORAL ONCE
OUTPATIENT
Start: 2024-11-21 | End: 2024-11-21

## 2024-11-21 RX ORDER — CLOBETASOL PROPIONATE 0.5 MG/G
OINTMENT TOPICAL ONCE
OUTPATIENT
Start: 2024-11-21 | End: 2024-11-21

## 2024-11-21 RX ORDER — LIDOCAINE HYDROCHLORIDE 40 MG/ML
SOLUTION TOPICAL ONCE
OUTPATIENT
Start: 2024-11-21 | End: 2024-11-21

## 2024-11-21 RX ORDER — LIDOCAINE 50 MG/G
OINTMENT TOPICAL ONCE
OUTPATIENT
Start: 2024-11-21 | End: 2024-11-21

## 2024-11-21 RX ORDER — SILVER SULFADIAZINE 10 MG/G
CREAM TOPICAL ONCE
OUTPATIENT
Start: 2024-11-21 | End: 2024-11-21

## 2024-11-21 RX ORDER — GENTAMICIN SULFATE 1 MG/G
OINTMENT TOPICAL ONCE
OUTPATIENT
Start: 2024-11-21 | End: 2024-11-21

## 2024-11-21 RX ADMIN — LIDOCAINE HYDROCHLORIDE: 20 JELLY TOPICAL at 11:52

## 2024-11-21 ASSESSMENT — PAIN DESCRIPTION - PAIN TYPE: TYPE: CHRONIC PAIN

## 2024-11-21 ASSESSMENT — PAIN DESCRIPTION - FREQUENCY: FREQUENCY: INTERMITTENT

## 2024-11-21 ASSESSMENT — PAIN DESCRIPTION - ONSET: ONSET: ON-GOING

## 2024-11-21 ASSESSMENT — PAIN - FUNCTIONAL ASSESSMENT: PAIN_FUNCTIONAL_ASSESSMENT: PREVENTS OR INTERFERES SOME ACTIVE ACTIVITIES AND ADLS

## 2024-11-21 ASSESSMENT — PAIN SCALES - GENERAL: PAINLEVEL_OUTOF10: 6

## 2024-11-21 ASSESSMENT — PAIN DESCRIPTION - DESCRIPTORS: DESCRIPTORS: ACHING

## 2024-11-21 ASSESSMENT — PAIN DESCRIPTION - LOCATION: LOCATION: BACK

## 2024-11-21 NOTE — PATIENT INSTRUCTIONS
[facility] Wound Care and Hyperbaric Oxygen Therapy   Physician Orders and Discharge Instructions  [address] [place] [address]  [address]  [place] [address]  Telephone: [phone]      FAX [phone]    NAME:  Vonda Lutz  YOB: 1970  MEDICAL RECORD NUMBER:  [id number]  DATE:  11/21/2024    Discharge condition: Stable    Discharge to: Home    Left via:Private automobile    Accompanied by: Self    Hold vac for 1 more week    Dressing Orders: Left Lower Ext Ulcer  Xeroform to open area, cover with mepilex border, change every other day  Wear compression stockings or ace wrap from base of the toes to just below the knee   Elevate feet to level or above heart 3-4 times daily and as needed to for 30 minutes to reduce swelling  Multi Vitamin, High Protein diet as tolerated  Follow up with Vascular on Monday 11/21 at 2:00    [facility] follow up visit ___________1 week__________________  (Please note your next appointment above and if you are unable to keep, kindly give a 24 hour notice. Thank you.)    If you experience any of the following, please call the Wound Care Center during business hours:    * Increase in Pain  * Temperature over 101  * Increase in drainage from your wound  * Drainage with a foul odor  * Bleeding  * Increase in swelling  * Need for compression bandage changes due to slippage, breakthrough drainage.    If you need medical attention outside of the business hours of the Wound Care Centers please contact your PCP or go to the nearest emergency room.

## 2024-11-21 NOTE — PROGRESS NOTES
Left;Lower;Anterior (Active)   Canister changed? No 10/24/24 1151   Unit Type 3 M 11/06/24 1550   Mode Continuous 10/31/24 1116   Target Pressure (mmHg) 125 10/31/24 1116   Number of pieces placed 1 10/24/24 1215   Dressing Type Other (Comment) 10/31/24 1116   Number of days: 56       Puncture 06/26/24 Femoral (Active)   Number of days: 148       Puncture 06/26/24 Femoral (Active)   Number of days: 148       Wound 02/06/24 Pretibial Left wound 1-left leg wound venous (Active)   Wound Image   11/21/24 1137   Wound Etiology Venous 11/21/24 1137   Dressing Status Old drainage noted 11/21/24 1137   Wound Cleansed Soap and water 11/21/24 1137   Dressing/Treatment Xeroform 11/13/24 1215   Wound Length (cm) 1.1 cm 11/21/24 1137   Wound Width (cm) 0.9 cm 11/21/24 1137   Wound Depth (cm) 0.1 cm 11/21/24 1137   Wound Surface Area (cm^2) 0.99 cm^2 11/21/24 1137   Change in Wound Size % (l*w) 83.5 11/21/24 1137   Wound Volume (cm^3) 0.099 cm^3 11/21/24 1137   Wound Healing % 92 11/21/24 1137   Post-Procedure Length (cm) 1.4 cm 11/06/24 1555   Post-Procedure Width (cm) 1.2 cm 11/06/24 1555   Post-Procedure Depth (cm) 0.1 cm 11/06/24 1555   Post-Procedure Surface Area (cm^2) 1.68 cm^2 11/06/24 1555   Post-Procedure Volume (cm^3) 0.168 cm^3 11/06/24 1555   Distance Tunneling (cm) 0 cm 11/13/24 1150   Tunneling Position ___ O'Clock 0 11/13/24 1150   Undermining Starts ___ O'Clock 0 11/13/24 1150   Undermining Ends___ O'Clock 0 11/13/24 1150   Undermining Maxium Distance (cm) 0 11/13/24 1150   Wound Assessment Pink/red;Slough 11/21/24 1137   Drainage Amount Moderate (25-50%) 11/21/24 1137   Drainage Description Serosanguinous 11/21/24 1137   Odor None 11/21/24 1137   Anjali-wound Assessment Intact;Hemosiderin staining (brown yellow) 11/21/24 1137   Margins Attached edges 11/21/24 1137   Wound Thickness Description not for Pressure Injury Full thickness 11/21/24 1137   Number of days: 289             Plan:     Problem List Items

## 2024-11-25 ENCOUNTER — HOSPITAL ENCOUNTER (OUTPATIENT)
Dept: VASCULAR LAB | Age: 54
Discharge: HOME | End: 2024-11-27
Payer: MEDICARE

## 2024-11-25 ENCOUNTER — OFFICE VISIT (OUTPATIENT)
Dept: VASCULAR SURGERY | Age: 54
End: 2024-11-25
Payer: MEDICARE

## 2024-11-25 VITALS — DIASTOLIC BLOOD PRESSURE: 83 MMHG | HEART RATE: 86 BPM | SYSTOLIC BLOOD PRESSURE: 112 MMHG | OXYGEN SATURATION: 100 %

## 2024-11-25 DIAGNOSIS — I70.213 ATHEROSCLER OF NATIVE ARTERY OF BOTH LEGS WITH INTERMIT CLAUDICATION (HCC): ICD-10-CM

## 2024-11-25 DIAGNOSIS — F32.89 OTHER DEPRESSION: ICD-10-CM

## 2024-11-25 DIAGNOSIS — I70.213 ATHEROSCLER OF NATIVE ARTERY OF BOTH LEGS WITH INTERMIT CLAUDICATION (HCC): Primary | ICD-10-CM

## 2024-11-25 PROCEDURE — 99214 OFFICE O/P EST MOD 30 MIN: CPT | Performed by: NURSE PRACTITIONER

## 2024-11-25 PROCEDURE — 93923 UPR/LXTR ART STDY 3+ LVLS: CPT

## 2024-11-25 PROCEDURE — 3017F COLORECTAL CA SCREEN DOC REV: CPT | Performed by: NURSE PRACTITIONER

## 2024-11-25 PROCEDURE — G8427 DOCREV CUR MEDS BY ELIG CLIN: HCPCS | Performed by: NURSE PRACTITIONER

## 2024-11-25 PROCEDURE — G8484 FLU IMMUNIZE NO ADMIN: HCPCS | Performed by: NURSE PRACTITIONER

## 2024-11-25 PROCEDURE — 4004F PT TOBACCO SCREEN RCVD TLK: CPT | Performed by: NURSE PRACTITIONER

## 2024-11-25 PROCEDURE — G8419 CALC BMI OUT NRM PARAM NOF/U: HCPCS | Performed by: NURSE PRACTITIONER

## 2024-11-25 RX ORDER — CITALOPRAM HYDROBROMIDE 40 MG/1
40 TABLET ORAL DAILY
Qty: 30 TABLET | Refills: 5 | Status: SHIPPED | OUTPATIENT
Start: 2024-11-25

## 2024-11-25 NOTE — PROGRESS NOTES
Vonda Lutz (:  1970) is a 54 y.o. female,Established patient, here for evaluation of the following chief complaint(s):  Follow-up            SUBJECTIVE/OBJECTIVE:  Vonda has a history of peripheral vascular disease of the lower extremities.  She has had this for 1 - 5 years. Current treatment includes asa and lipitor.  Vonda has new wounds since May. Recently, she reports claudication at a distance of  which varies.  Vonda reports that the right leg is equal to the left.  She reports claudication is not changed and is mostly in the form of crampy type pain starting in the calves. She has a short recovery time. This is reproducible in nature. She reports ischemic rest pain 0 times per night.  She reports walking with cart does not help.    I have personally reviewed the following: problem list, current meds, allergies, PMH, PSH, family hx, and social hx  Vonda Lutz is a 54 y.o. female with the following history as recorded in [facility]:  Patient Active Problem List    Diagnosis Date Noted    Major depressive disorder, recurrent, mild 2024    Protein calorie malnutrition 2024    Aorto-iliac disease (HCC) 2024    Severe malnutrition (HCC) 2024    Aortoiliac occlusive disease (HCC) 2024    Iron deficiency anemia secondary to inadequate dietary iron intake 2024    Iron malabsorption 2024    Peripheral vascular disease, unspecified (HCC) 2024    Chronic ulcer of left foot, with fat layer exposed (HCC) 2024    Chronic ulcer of left leg, with fat layer exposed (HCC) 2024    Lymphedema 2024    PVD (peripheral vascular disease) ([facility]) 2024     Current Outpatient Medications   Medication Sig Dispense Refill    citalopram (CELEXA) 40 MG tablet Take 1 tablet by mouth once daily 30 tablet 5    atorvastatin (LIPITOR) 10 MG tablet Take 1 tablet by mouth once daily 30 tablet 0    potassium chloride (K-TAB) 20 MEQ TBCR extended release tablet Take 1

## 2024-11-27 ENCOUNTER — HOSPITAL ENCOUNTER (OUTPATIENT)
Dept: WOUND CARE | Age: 54
Discharge: HOME | End: 2024-11-27
Attending: SURGERY
Payer: MEDICARE

## 2024-11-27 ENCOUNTER — TELEPHONE (OUTPATIENT)
Dept: VASCULAR SURGERY | Age: 54
End: 2024-11-27

## 2024-11-27 VITALS
DIASTOLIC BLOOD PRESSURE: 79 MMHG | TEMPERATURE: 98 F | SYSTOLIC BLOOD PRESSURE: 128 MMHG | HEART RATE: 107 BPM | RESPIRATION RATE: 18 BRPM

## 2024-11-27 DIAGNOSIS — L97.922 CHRONIC ULCER OF LEFT LEG, WITH FAT LAYER EXPOSED (HCC): ICD-10-CM

## 2024-11-27 DIAGNOSIS — L97.522 CHRONIC ULCER OF LEFT FOOT, WITH FAT LAYER EXPOSED (HCC): Primary | ICD-10-CM

## 2024-11-27 DIAGNOSIS — I73.9 PVD (PERIPHERAL VASCULAR DISEASE) (HCC): ICD-10-CM

## 2024-11-27 LAB
VAS LEFT ABI: 0.87
VAS LEFT ARM BP: 95 MMHG
VAS LEFT CALF PRESSURE: 93 MMHG
VAS LEFT DORSALIS PEDIS BP: 75 MMHG
VAS LEFT HIGH THIGH PRESSURE: 109 MMHG
VAS LEFT LOW THIGH PRESSURE: 90 MMHG
VAS LEFT PTA BP: 83 MMHG
VAS LEFT TBI: 0.19
VAS LEFT TOE PRESSURE: 18 MMHG
VAS RIGHT ABI: 0.79
VAS RIGHT ARM BP: 89 MMHG
VAS RIGHT CALF PRESSURE: 82 MMHG
VAS RIGHT DORSALIS PEDIS BP: 75 MMHG
VAS RIGHT HIGH THIGH PRESSURE: 115 MMHG
VAS RIGHT LOW THIGH PRESSURE: 96 MMHG
VAS RIGHT PTA BP: 75 MMHG
VAS RIGHT TBI: 0.61
VAS RIGHT TOE PRESSURE: 58 MMHG

## 2024-11-27 PROCEDURE — 97597 DBRDMT OPN WND 1ST 20 CM/<: CPT | Performed by: SURGERY

## 2024-11-27 PROCEDURE — 93923 UPR/LXTR ART STDY 3+ LVLS: CPT | Performed by: SURGERY

## 2024-11-27 PROCEDURE — 97597 DBRDMT OPN WND 1ST 20 CM/<: CPT

## 2024-11-27 RX ORDER — LIDOCAINE 40 MG/G
CREAM TOPICAL ONCE
OUTPATIENT
Start: 2024-11-27 | End: 2024-11-27

## 2024-11-27 RX ORDER — TRIAMCINOLONE ACETONIDE 1 MG/G
OINTMENT TOPICAL ONCE
OUTPATIENT
Start: 2024-11-27 | End: 2024-11-27

## 2024-11-27 RX ORDER — LIDOCAINE HYDROCHLORIDE 20 MG/ML
JELLY TOPICAL ONCE
OUTPATIENT
Start: 2024-11-27 | End: 2024-11-27

## 2024-11-27 RX ORDER — LIDOCAINE HYDROCHLORIDE 40 MG/ML
SOLUTION TOPICAL ONCE
OUTPATIENT
Start: 2024-11-27 | End: 2024-11-27

## 2024-11-27 RX ORDER — SILVER SULFADIAZINE 10 MG/G
CREAM TOPICAL ONCE
OUTPATIENT
Start: 2024-11-27 | End: 2024-11-27

## 2024-11-27 RX ORDER — BACITRACIN ZINC AND POLYMYXIN B SULFATE 500; 1000 [USP'U]/G; [USP'U]/G
OINTMENT TOPICAL ONCE
OUTPATIENT
Start: 2024-11-27 | End: 2024-11-27

## 2024-11-27 RX ORDER — CLOBETASOL PROPIONATE 0.5 MG/G
OINTMENT TOPICAL ONCE
OUTPATIENT
Start: 2024-11-27 | End: 2024-11-27

## 2024-11-27 RX ORDER — LIDOCAINE HYDROCHLORIDE 20 MG/ML
JELLY TOPICAL ONCE
Status: COMPLETED | OUTPATIENT
Start: 2024-11-27 | End: 2024-11-27

## 2024-11-27 RX ORDER — MUPIROCIN 20 MG/G
OINTMENT TOPICAL ONCE
OUTPATIENT
Start: 2024-11-27 | End: 2024-11-27

## 2024-11-27 RX ORDER — BETAMETHASONE DIPROPIONATE 0.5 MG/G
CREAM TOPICAL ONCE
OUTPATIENT
Start: 2024-11-27 | End: 2024-11-27

## 2024-11-27 RX ORDER — NEOMYCIN/BACITRACIN/POLYMYXINB 3.5-400-5K
OINTMENT (GRAM) TOPICAL ONCE
OUTPATIENT
Start: 2024-11-27 | End: 2024-11-27

## 2024-11-27 RX ORDER — LIDOCAINE 50 MG/G
OINTMENT TOPICAL ONCE
OUTPATIENT
Start: 2024-11-27 | End: 2024-11-27

## 2024-11-27 RX ORDER — SODIUM CHLOR/HYPOCHLOROUS ACID 0.033 %
SOLUTION, IRRIGATION IRRIGATION ONCE
OUTPATIENT
Start: 2024-11-27 | End: 2024-11-27

## 2024-11-27 RX ORDER — GENTAMICIN SULFATE 1 MG/G
OINTMENT TOPICAL ONCE
OUTPATIENT
Start: 2024-11-27 | End: 2024-11-27

## 2024-11-27 RX ORDER — GINSENG 100 MG
CAPSULE ORAL ONCE
OUTPATIENT
Start: 2024-11-27 | End: 2024-11-27

## 2024-11-27 RX ADMIN — LIDOCAINE HYDROCHLORIDE: 20 JELLY TOPICAL at 10:34

## 2024-11-27 ASSESSMENT — PAIN DESCRIPTION - FREQUENCY: FREQUENCY: INTERMITTENT

## 2024-11-27 ASSESSMENT — PAIN DESCRIPTION - PAIN TYPE: TYPE: CHRONIC PAIN

## 2024-11-27 ASSESSMENT — PAIN DESCRIPTION - ORIENTATION: ORIENTATION: LOWER

## 2024-11-27 ASSESSMENT — PAIN SCALES - GENERAL: PAINLEVEL_OUTOF10: 7

## 2024-11-27 ASSESSMENT — PAIN DESCRIPTION - ONSET: ONSET: ON-GOING

## 2024-11-27 ASSESSMENT — PAIN DESCRIPTION - DESCRIPTORS: DESCRIPTORS: ACHING

## 2024-11-27 ASSESSMENT — PAIN - FUNCTIONAL ASSESSMENT: PAIN_FUNCTIONAL_ASSESSMENT: PREVENTS OR INTERFERES SOME ACTIVE ACTIVITIES AND ADLS

## 2024-11-27 ASSESSMENT — PAIN DESCRIPTION - LOCATION: LOCATION: BACK

## 2024-11-27 NOTE — TELEPHONE ENCOUNTER
Contacted the patient to let her know the following.  We will mail her an appointment card for her 3 month follow up.            ----- Message from URSZULA Camara sent at 11/27/2024  6:18 AM CST -----  Please let her know vi does not recommend intervention at this time.  We will see her back in 3 months

## 2024-11-27 NOTE — PROGRESS NOTES
[facility] Wound Care Center   Progress Note and Procedure Note      Vonda Lutz  MEDICAL RECORD NUMBER:  [id number]  AGE: 54 y.o.   GENDER: female  : 1970  EPISODE DATE:  2024    Subjective:     Chief Complaint   Patient presents with    Wound Check     Left leg wound         HISTORY of PRESENT ILLNESS HPI     Vonda Lutz is a 54 y.o. female who presents today for wound/ulcer evaluation.   Wound Context: Pt with LLE skin lesion needing removal Here for eval/treat  Wound/Ulcer Pain Timing/Severity: none  Quality of pain: N/A  Severity:  0 / 10   Modifying Factors: None  Associated Signs/Symptoms: none    Ulcer Identification:  Ulcer Type: venous  Contributing Factors: edema    Wound:  venous        PAST MEDICAL HISTORY        Diagnosis Date    Anxiety     Ongoing    Arthritis     CAD (coronary artery disease)     Closed fracture of left foot     Depression     Ongoing    Gallbladder anomaly     Hip fracture (HCC)     x2    History of blood transfusion     Hyperlipidemia     Hypertension     Hyperthyroidism 2020    Negative 2021    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    Osteoporosis     PAD (peripheral artery disease) (HCC)     Patella fracture     Radial fracture     Shoulder fracture     x2    Thyroid disease     Wears dentures        PAST SURGICAL HISTORY    Past Surgical History:   Procedure Laterality Date    BONE MARROW ASPIRATE & BIOPSY  2024    BONE MARROW BIOPSY Right 2024    BONE MARROW ASPIRATION BIOPSY performed by Juliana Rush APRN at [facility] OR    BREAST ENHANCEMENT SURGERY Bilateral     CARPAL TUNNEL RELEASE Bilateral      SECTION      CHOLECYSTECTOMY      CORNEAL TRANSPLANT      CORONARY ANGIOPLASTY WITH STENT PLACEMENT      HERNIA REPAIR

## 2024-11-27 NOTE — PATIENT INSTRUCTIONS
[facility] Wound Care and Hyperbaric Oxygen Therapy   Physician Orders and Discharge Instructions  [address] [place] [address]  [address]  [place] [address]  Telephone: [phone]      FAX [phone]    NAME:  Vonda Lutz  YOB: 1970  MEDICAL RECORD NUMBER:  [id number]  DATE:  11/27/2024    Discharge condition: Stable    Discharge to: Home    Left via:Private automobile    Accompanied by: Self    D/C wound vac    Dressing Orders: Left Lower Ext Ulcer  Xeroform to open area, cover with adhesive bandage, change every other day  Wear compression stockings or ace wrap from base of the toes to just below the knee  Elevate feet to level or above heart 3-4 times daily and as needed to for 30 minutes to reduce swelling  Multi Vitamin, High Protein diet as tolerated    [facility] follow up visit ___________2 weeks__________________  (Please note your next appointment above and if you are unable to keep, kindly give a 24 hour notice. Thank you.)    If you experience any of the following, please call the Wound Care Center during business hours:    * Increase in Pain  * Temperature over 101  * Increase in drainage from your wound  * Drainage with a foul odor  * Bleeding  * Increase in swelling  * Need for compression bandage changes due to slippage, breakthrough drainage.    If you need medical attention outside of the business hours of the Wound Care Centers please contact your PCP or go to the nearest emergency room.

## 2024-11-27 NOTE — PLAN OF CARE
Problem: Pain  Goal: Verbalizes/displays adequate comfort level or baseline comfort level  Outcome: Progressing     Problem: Wound:  Goal: Will show signs of wound healing; wound closure and no evidence of infection  Description: Will show signs of wound healing; wound closure and no evidence of infection  Outcome: Progressing     Problem: Venous:  Goal: Signs of wound healing will improve  Description: Signs of wound healing will improve  Outcome: Progressing     Problem: Smoking cessation:  Goal: Ability to formulate a plan to maintain a tobacco-free life will be supported  Description: Ability to formulate a plan to maintain a tobacco-free life will be supported  Outcome: Progressing     Problem: Compression therapy:  Goal: Will be free from complications associated with compression therapy  Description: Will be free from complications associated with compression therapy  Outcome: Progressing     Problem: Weight control:  Goal: Ability to maintain an optimal weight for height and age will be supported  Description: Ability to maintain an optimal weight for height and age will be supported  Outcome: Progressing     Problem: Falls - Risk of:  Goal: Will remain free from falls  Description: Will remain free from falls  Outcome: Progressing     Problem: Pain  Goal: Verbalizes/displays adequate comfort level or baseline comfort level  Outcome: Progressing

## 2024-12-01 DIAGNOSIS — I70.213 ATHEROSCLER OF NATIVE ARTERY OF BOTH LEGS WITH INTERMIT CLAUDICATION (HCC): ICD-10-CM

## 2024-12-03 RX ORDER — ATORVASTATIN CALCIUM 10 MG/1
10 TABLET, FILM COATED ORAL DAILY
Qty: 30 TABLET | Refills: 0 | Status: SHIPPED | OUTPATIENT
Start: 2024-12-03

## 2024-12-17 ENCOUNTER — HOSPITAL ENCOUNTER (OUTPATIENT)
Dept: WOUND CARE | Age: 54
Discharge: HOME | End: 2024-12-17
Attending: SURGERY
Payer: MEDICARE

## 2024-12-17 VITALS
SYSTOLIC BLOOD PRESSURE: 109 MMHG | RESPIRATION RATE: 18 BRPM | HEART RATE: 83 BPM | HEIGHT: 59 IN | TEMPERATURE: 97.7 F | BODY MASS INDEX: 17.54 KG/M2 | DIASTOLIC BLOOD PRESSURE: 75 MMHG | WEIGHT: 87 LBS

## 2024-12-17 DIAGNOSIS — I73.9 PVD (PERIPHERAL VASCULAR DISEASE) (HCC): ICD-10-CM

## 2024-12-17 DIAGNOSIS — L97.922 CHRONIC ULCER OF LEFT LEG, WITH FAT LAYER EXPOSED (HCC): ICD-10-CM

## 2024-12-17 DIAGNOSIS — L97.522 CHRONIC ULCER OF LEFT FOOT, WITH FAT LAYER EXPOSED (HCC): Primary | ICD-10-CM

## 2024-12-17 PROCEDURE — 97597 DBRDMT OPN WND 1ST 20 CM/<: CPT

## 2024-12-17 PROCEDURE — 97597 DBRDMT OPN WND 1ST 20 CM/<: CPT | Performed by: SURGERY

## 2024-12-17 RX ORDER — LIDOCAINE HYDROCHLORIDE 20 MG/ML
JELLY TOPICAL ONCE
Status: COMPLETED | OUTPATIENT
Start: 2024-12-17 | End: 2024-12-17

## 2024-12-17 RX ORDER — SODIUM CHLOR/HYPOCHLOROUS ACID 0.033 %
SOLUTION, IRRIGATION IRRIGATION ONCE
OUTPATIENT
Start: 2024-12-17 | End: 2024-12-17

## 2024-12-17 RX ORDER — LIDOCAINE HYDROCHLORIDE 40 MG/ML
SOLUTION TOPICAL ONCE
OUTPATIENT
Start: 2024-12-17 | End: 2024-12-17

## 2024-12-17 RX ORDER — NEOMYCIN/BACITRACIN/POLYMYXINB 3.5-400-5K
OINTMENT (GRAM) TOPICAL ONCE
OUTPATIENT
Start: 2024-12-17 | End: 2024-12-17

## 2024-12-17 RX ORDER — MUPIROCIN 20 MG/G
OINTMENT TOPICAL ONCE
OUTPATIENT
Start: 2024-12-17 | End: 2024-12-17

## 2024-12-17 RX ORDER — CLOBETASOL PROPIONATE 0.5 MG/G
OINTMENT TOPICAL ONCE
OUTPATIENT
Start: 2024-12-17 | End: 2024-12-17

## 2024-12-17 RX ORDER — SILVER SULFADIAZINE 10 MG/G
CREAM TOPICAL ONCE
OUTPATIENT
Start: 2024-12-17 | End: 2024-12-17

## 2024-12-17 RX ORDER — LIDOCAINE 40 MG/G
CREAM TOPICAL ONCE
OUTPATIENT
Start: 2024-12-17 | End: 2024-12-17

## 2024-12-17 RX ORDER — GENTAMICIN SULFATE 1 MG/G
OINTMENT TOPICAL ONCE
OUTPATIENT
Start: 2024-12-17 | End: 2024-12-17

## 2024-12-17 RX ORDER — LIDOCAINE HYDROCHLORIDE 20 MG/ML
JELLY TOPICAL ONCE
OUTPATIENT
Start: 2024-12-17 | End: 2024-12-17

## 2024-12-17 RX ORDER — LIDOCAINE 50 MG/G
OINTMENT TOPICAL ONCE
OUTPATIENT
Start: 2024-12-17 | End: 2024-12-17

## 2024-12-17 RX ORDER — GINSENG 100 MG
CAPSULE ORAL ONCE
OUTPATIENT
Start: 2024-12-17 | End: 2024-12-17

## 2024-12-17 RX ORDER — TRIAMCINOLONE ACETONIDE 1 MG/G
OINTMENT TOPICAL ONCE
OUTPATIENT
Start: 2024-12-17 | End: 2024-12-17

## 2024-12-17 RX ORDER — BETAMETHASONE DIPROPIONATE 0.5 MG/G
CREAM TOPICAL ONCE
OUTPATIENT
Start: 2024-12-17 | End: 2024-12-17

## 2024-12-17 RX ORDER — BACITRACIN ZINC AND POLYMYXIN B SULFATE 500; 1000 [USP'U]/G; [USP'U]/G
OINTMENT TOPICAL ONCE
OUTPATIENT
Start: 2024-12-17 | End: 2024-12-17

## 2024-12-17 RX ADMIN — LIDOCAINE HYDROCHLORIDE: 20 JELLY TOPICAL at 15:34

## 2024-12-17 ASSESSMENT — PAIN - FUNCTIONAL ASSESSMENT: PAIN_FUNCTIONAL_ASSESSMENT: PREVENTS OR INTERFERES SOME ACTIVE ACTIVITIES AND ADLS

## 2024-12-17 ASSESSMENT — PAIN DESCRIPTION - DESCRIPTORS: DESCRIPTORS: ACHING

## 2024-12-17 ASSESSMENT — PAIN SCALES - GENERAL: PAINLEVEL_OUTOF10: 6

## 2024-12-17 ASSESSMENT — PAIN DESCRIPTION - ONSET: ONSET: ON-GOING

## 2024-12-17 ASSESSMENT — PAIN DESCRIPTION - LOCATION: LOCATION: BACK;HIP

## 2024-12-17 ASSESSMENT — PAIN DESCRIPTION - PAIN TYPE: TYPE: CHRONIC PAIN

## 2024-12-17 ASSESSMENT — PAIN DESCRIPTION - FREQUENCY: FREQUENCY: INTERMITTENT

## 2024-12-17 NOTE — WOUND CARE
Wound Care Supplies      Supply Company:     [employer] [address] [place] [address] f: [phone] f: [phone] p: [phone] [email or web address]      Ordering Center:     [facility]  [address] [place] [address] [address]  [place] [address] [phone]  WOUND CARE Dept: [phone]   FAX NUMBER [phone]    Patient Information:      Vonda Lutz  [address]  [place] [address]   [phone]   : 1970  AGE: 54 y.o.     GENDER: female   EPISODE DATE: 2024    Insurance:      PRIMARY INSURANCE:  Plan: MEDICARE PART A AND B  Coverage: MEDICARE  Effective Date: 2024  Group Number: [unfilled]  Subscriber Number: [id number] - (Medicare)    Payer/Plan Subscr  Sex Relation Sub. Ins. ID Effective Group Num   1. [place]ARE - [place]* VONDA LUTZ 1970 Female Self [id number]                                    [address]        Patient Wound Information:      Problem List Items Addressed This Visit          Circulatory    PVD (peripheral vascular disease) (HCC)    Relevant Orders    Initiate Outpatient Wound Care Protocol       Other    * (Principal) Chronic ulcer of left leg, with fat layer exposed (HCC) (Chronic)    Relevant Orders    Initiate Outpatient Wound Care Protocol    Chronic ulcer of left foot, with fat layer exposed (HCC) - Primary    Relevant Orders    Initiate Outpatient Wound Care Protocol       WOUNDS REQUIRING DRESSING SUPPLIES:     Negative Pressure Wound Therapy Leg Left;Lower;Anterior (Active)   Canister changed? No 10/24/24 1151   Unit Type 3 M 24 1550   Mode Continuous 10/31/24 1116   Target Pressure (mmHg) 125 10/31/24 1116   Number of pieces placed 1 10/24/24 1215   Dressing Type Other (Comment) 10/31/24 1116   Number of days: 82       Puncture 24 Femoral (Active)   Number of days: 174       Puncture 24 Femoral (Active)   Number of days: 174       Wound 24 Pretibial Left wound 1-left leg wound

## 2024-12-17 NOTE — PATIENT INSTRUCTIONS
[facility] Wound Care and Hyperbaric Oxygen Therapy   Physician Orders and Discharge Instructions  [address] [place] [address]  [address]  [place] [address]  Telephone: [phone]      FAX [phone]    NAME:  Vonda Lutz  YOB: 1970  MEDICAL RECORD NUMBER:  [id number]  DATE:  12/17/2024    Discharge condition: Stable    Discharge to: Home    Left via:Private automobile    Accompanied by: Self    Dressing Orders: Left Lower Ext Ulcer  Xeroform to open area, cover with adhesive bandage, change every other day  Wear compression stockings or ace wrap from base of the toes to just below the knee  Elevate feet to level or above heart 3-4 times daily and as needed to for 30 minutes to reduce swelling  Multi Vitamin, High Protein diet as tolerated    [facility] follow up visit _________2 weeks with Sylvia ____________________  (Please note your next appointment above and if you are unable to keep, kindly give a 24 hour notice. Thank you.)    If you experience any of the following, please call the Wound Care Center during business hours:    * Increase in Pain  * Temperature over 101  * Increase in drainage from your wound  * Drainage with a foul odor  * Bleeding  * Increase in swelling  * Need for compression bandage changes due to slippage, breakthrough drainage.    If you need medical attention outside of the business hours of the Wound Care Centers please contact your PCP or go to the nearest emergency room.

## 2024-12-17 NOTE — PROGRESS NOTES
[facility]   Progress Note and Procedure Note      Vonda Lutz  MEDICAL RECORD NUMBER:  [id number]  AGE: 54 y.o.   GENDER: female  : 1970  EPISODE DATE:  2024    Subjective:     Chief Complaint   Patient presents with    Wound Check     Pt presents for recheck of left leg wound         HISTORY of PRESENT ILLNESS HPI     Vonda Lutz is a 54 y.o. female who presents today for wound/ulcer evaluation.   Wound Context: Pt with LLE wound here for eval/treat  Wound/Ulcer Pain Timing/Severity: none  Quality of pain: N/A  Severity:  0 / 10   Modifying Factors: None  Associated Signs/Symptoms: none    Ulcer Identification:  Ulcer Type: venous  Contributing Factors: edema    Wound:  LLE lesion        PAST MEDICAL HISTORY        Diagnosis Date    Anxiety     Ongoing    Arthritis     CAD (coronary artery disease)     Closed fracture of left foot     Depression     Ongoing    Gallbladder anomaly     Hip fracture (HCC)     x2    History of blood transfusion     Hyperlipidemia     Hypertension     Hyperthyroidism 2020    Negative 2021    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    Osteoporosis     PAD (peripheral artery disease) (HCC)     Patella fracture     Radial fracture     Shoulder fracture     x2    Thyroid disease     Wears dentures        PAST SURGICAL HISTORY    Past Surgical History:   Procedure Laterality Date    BONE MARROW ASPIRATE & BIOPSY  2024    BONE MARROW BIOPSY Right 2024    BONE MARROW ASPIRATION BIOPSY performed by Juliana Rush APRN at [facility] OR    BREAST ENHANCEMENT SURGERY Bilateral     CARPAL TUNNEL RELEASE Bilateral      SECTION      CHOLECYSTECTOMY      CORNEAL TRANSPLANT      CORONARY ANGIOPLASTY WITH STENT PLACEMENT      HERNIA

## 2024-12-31 ENCOUNTER — HOSPITAL ENCOUNTER (OUTPATIENT)
Dept: WOUND CARE | Age: 54
Discharge: HOME | End: 2024-12-31
Attending: SURGERY
Payer: MEDICARE

## 2024-12-31 VITALS
SYSTOLIC BLOOD PRESSURE: 119 MMHG | HEIGHT: 59 IN | WEIGHT: 87 LBS | RESPIRATION RATE: 20 BRPM | TEMPERATURE: 98.3 F | BODY MASS INDEX: 17.54 KG/M2 | HEART RATE: 80 BPM | DIASTOLIC BLOOD PRESSURE: 75 MMHG

## 2024-12-31 DIAGNOSIS — I70.213 ATHEROSCLER OF NATIVE ARTERY OF BOTH LEGS WITH INTERMIT CLAUDICATION (HCC): ICD-10-CM

## 2024-12-31 DIAGNOSIS — L97.922 CHRONIC ULCER OF LEFT LEG, WITH FAT LAYER EXPOSED (HCC): ICD-10-CM

## 2024-12-31 DIAGNOSIS — I73.9 PVD (PERIPHERAL VASCULAR DISEASE) (HCC): ICD-10-CM

## 2024-12-31 DIAGNOSIS — L97.522 CHRONIC ULCER OF LEFT FOOT, WITH FAT LAYER EXPOSED (HCC): Primary | ICD-10-CM

## 2024-12-31 DIAGNOSIS — I74.09 AORTOILIAC OCCLUSIVE DISEASE (HCC): ICD-10-CM

## 2024-12-31 PROCEDURE — 97597 DBRDMT OPN WND 1ST 20 CM/<: CPT | Performed by: NURSE PRACTITIONER

## 2024-12-31 PROCEDURE — 97597 DBRDMT OPN WND 1ST 20 CM/<: CPT

## 2024-12-31 RX ORDER — SODIUM CHLOR/HYPOCHLOROUS ACID 0.033 %
SOLUTION, IRRIGATION IRRIGATION ONCE
OUTPATIENT
Start: 2024-12-31 | End: 2024-12-31

## 2024-12-31 RX ORDER — MUPIROCIN 20 MG/G
OINTMENT TOPICAL ONCE
OUTPATIENT
Start: 2024-12-31 | End: 2024-12-31

## 2024-12-31 RX ORDER — LIDOCAINE 50 MG/G
OINTMENT TOPICAL ONCE
OUTPATIENT
Start: 2024-12-31 | End: 2024-12-31

## 2024-12-31 RX ORDER — LIDOCAINE HYDROCHLORIDE 40 MG/ML
SOLUTION TOPICAL ONCE
OUTPATIENT
Start: 2024-12-31 | End: 2024-12-31

## 2024-12-31 RX ORDER — GENTAMICIN SULFATE 1 MG/G
OINTMENT TOPICAL ONCE
OUTPATIENT
Start: 2024-12-31 | End: 2024-12-31

## 2024-12-31 RX ORDER — BETAMETHASONE DIPROPIONATE 0.5 MG/G
CREAM TOPICAL ONCE
OUTPATIENT
Start: 2024-12-31 | End: 2024-12-31

## 2024-12-31 RX ORDER — LIDOCAINE HYDROCHLORIDE 20 MG/ML
JELLY TOPICAL ONCE
OUTPATIENT
Start: 2024-12-31 | End: 2024-12-31

## 2024-12-31 RX ORDER — CLOBETASOL PROPIONATE 0.5 MG/G
OINTMENT TOPICAL ONCE
OUTPATIENT
Start: 2024-12-31 | End: 2024-12-31

## 2024-12-31 RX ORDER — LIDOCAINE 40 MG/G
CREAM TOPICAL ONCE
OUTPATIENT
Start: 2024-12-31 | End: 2024-12-31

## 2024-12-31 RX ORDER — BACITRACIN ZINC AND POLYMYXIN B SULFATE 500; 1000 [USP'U]/G; [USP'U]/G
OINTMENT TOPICAL ONCE
OUTPATIENT
Start: 2024-12-31 | End: 2024-12-31

## 2024-12-31 RX ORDER — SILVER SULFADIAZINE 10 MG/G
CREAM TOPICAL ONCE
OUTPATIENT
Start: 2024-12-31 | End: 2024-12-31

## 2024-12-31 RX ORDER — ATORVASTATIN CALCIUM 10 MG/1
10 TABLET, FILM COATED ORAL DAILY
Qty: 30 TABLET | Refills: 0 | Status: SHIPPED | OUTPATIENT
Start: 2024-12-31

## 2024-12-31 RX ORDER — NEOMYCIN/BACITRACIN/POLYMYXINB 3.5-400-5K
OINTMENT (GRAM) TOPICAL ONCE
OUTPATIENT
Start: 2024-12-31 | End: 2024-12-31

## 2024-12-31 RX ORDER — LIDOCAINE HYDROCHLORIDE 20 MG/ML
JELLY TOPICAL ONCE
Status: COMPLETED | OUTPATIENT
Start: 2024-12-31 | End: 2024-12-31

## 2024-12-31 RX ORDER — GINSENG 100 MG
CAPSULE ORAL ONCE
OUTPATIENT
Start: 2024-12-31 | End: 2024-12-31

## 2024-12-31 RX ORDER — TRIAMCINOLONE ACETONIDE 1 MG/G
OINTMENT TOPICAL ONCE
OUTPATIENT
Start: 2024-12-31 | End: 2024-12-31

## 2024-12-31 RX ADMIN — LIDOCAINE HYDROCHLORIDE: 20 JELLY TOPICAL at 11:42

## 2024-12-31 ASSESSMENT — PAIN DESCRIPTION - PAIN TYPE: TYPE: CHRONIC PAIN

## 2024-12-31 ASSESSMENT — PAIN DESCRIPTION - LOCATION: LOCATION: BACK

## 2024-12-31 ASSESSMENT — PAIN DESCRIPTION - ONSET: ONSET: ON-GOING

## 2024-12-31 ASSESSMENT — PAIN - FUNCTIONAL ASSESSMENT: PAIN_FUNCTIONAL_ASSESSMENT: PREVENTS OR INTERFERES SOME ACTIVE ACTIVITIES AND ADLS

## 2024-12-31 ASSESSMENT — PAIN DESCRIPTION - DESCRIPTORS: DESCRIPTORS: ACHING

## 2024-12-31 ASSESSMENT — PAIN SCALES - GENERAL: PAINLEVEL_OUTOF10: 7

## 2024-12-31 ASSESSMENT — PAIN DESCRIPTION - FREQUENCY: FREQUENCY: INTERMITTENT

## 2024-12-31 NOTE — PROGRESS NOTES
[facility]   Progress Note and Procedure Note      Vonda Lutz  MEDICAL RECORD NUMBER:  [id number]  AGE: 54 y.o.   GENDER: female  : 1970  EPISODE DATE:  2024    Subjective:     Chief Complaint   Patient presents with    Wound Check     Pt presents for recheck of left leg wound      HISTORY of PRESENT ILLNESS HPI     Vonda Lutz is a 54 y.o. female who presents today for wound/ulcer evaluation.   History of Wound Context: left leg wound follow up/eval and treat    Ulcer Identification:  Ulcer Type: venous and arterial  Contributing Factors: edema and arterial insufficiency    Wound: N/A        PAST MEDICAL HISTORY        Diagnosis Date    Anxiety     Ongoing    Arthritis     CAD (coronary artery disease)     Closed fracture of left foot     Depression     Ongoing    Gallbladder anomaly     Hip fracture (HCC)     x2    History of blood transfusion     Hyperlipidemia     Hypertension     Hyperthyroidism 2020    Negative 2021    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    Osteoporosis     PAD (peripheral artery disease) (HCC)     Patella fracture     Radial fracture     Shoulder fracture     x2    Thyroid disease     Wears dentures        PAST SURGICAL HISTORY    Past Surgical History:   Procedure Laterality Date    BONE MARROW ASPIRATE & BIOPSY  2024    BONE MARROW BIOPSY Right 2024    BONE MARROW ASPIRATION BIOPSY performed by Juliana Rush APRN at [facility] OR    BREAST ENHANCEMENT SURGERY Bilateral     CARPAL TUNNEL RELEASE Bilateral      SECTION      CHOLECYSTECTOMY      CORNEAL TRANSPLANT      CORONARY ANGIOPLASTY WITH STENT PLACEMENT      HERNIA REPAIR      HIP SURGERY      history of osteoporosis;hip surgery x 2    MOUTH SURGERY

## 2024-12-31 NOTE — DISCHARGE INSTRUCTIONS
[facility]   Physician Orders and Discharge Instructions  [address] [place] [address]  [address]  [place] [address]  Telephone: [phone]      FAX [phone]    NAME:  Vonda Lutz  YOB: 1970  MEDICAL RECORD NUMBER:  [id number]  DATE:  12/31/2024    Discharge condition: Stable    Discharge to: Home    Left via:Private automobile    Accompanied by:  self    ECF/HHA:     Dressing Orders:   Left Lower Ext Ulcer  Xeroform to open area, cover with adhesive bandage, change every other day  Wear compression stockings or ace wrap from base of the toes to just below the knee    Elevate feet to level or above heart 3-4 times daily and as needed to for 30 minutes to reduce swelling  Multi Vitamin, High Protein diet as tolerated    [facility] follow up visit __1 week with Dr. Pedroza___________________________  (Please note your next appointment above and if you are unable to keep, kindly give a 24 hour notice. Thank you.)          If you experience any of the following, please call the Wound Care Center during business hours:    * Increase in Pain  * Temperature over 101  * Increase in drainage from your wound  * Drainage with a foul odor  * Bleeding  * Increase in swelling  * Need for compression bandage changes due to slippage, breakthrough drainage.    If you need medical attention outside of the business hours of the Wound Care Centers please contact your PCP or go to the nearest emergency room.

## 2024-12-31 NOTE — PATIENT INSTRUCTIONS
[facility] Hyperbar[facility] Oxygen Therapy   Physician Orders and Discharge Instructions  [address] [place] [address]  [address]  [place] [address]  Telephone: [phone]      FAX [phone]    NAME:  Vonda Lutz  YOB: 1970  MEDICAL RECORD NUMBER:  [id number]  DATE:  12/31/2024    Discharge condition: Stable    Discharge to: Home    Left via:Private automobile    Accompanied by:  self    ECF/HHA:     Dressing Orders:   Left Lower Ext Ulcer  Xeroform to open area, cover with adhesive bandage, change every other day  Wear compression stockings or ace wrap from base of the toes to just below the knee    Elevate feet to level or above heart 3-4 times daily and as needed to for 30 minutes to reduce swelling  Multi Vitamin, High Protein diet as tolerated    [facility] follow up visit __1 week with Dr. Pedroza___________________________  (Please note your next appointment above and if you are unable to keep, kindly give a 24 hour notice. Thank you.)          If you experience any of the following, please call the Wound Care Center during business hours:    * Increase in Pain  * Temperature over 101  * Increase in drainage from your wound  * Drainage with a foul odor  * Bleeding  * Increase in swelling  * Need for compression bandage changes due to slippage, breakthrough drainage.    If you need medical attention outside of the business hours of the Wound Care Centers please contact your PCP or go to the nearest emergency room.

## 2025-01-02 ENCOUNTER — OFFICE VISIT (OUTPATIENT)
Dept: NEUROLOGY | Age: 55
End: 2025-01-02
Payer: MEDICARE

## 2025-01-02 ENCOUNTER — TELEPHONE (OUTPATIENT)
Dept: NEUROLOGY | Age: 55
End: 2025-01-02

## 2025-01-02 VITALS
HEART RATE: 94 BPM | WEIGHT: 87 LBS | DIASTOLIC BLOOD PRESSURE: 74 MMHG | HEIGHT: 59 IN | BODY MASS INDEX: 17.54 KG/M2 | SYSTOLIC BLOOD PRESSURE: 120 MMHG

## 2025-01-02 DIAGNOSIS — G47.10 HYPERSOMNIA: Primary | ICD-10-CM

## 2025-01-02 DIAGNOSIS — R26.89 IMPAIRED GAIT AND MOBILITY: ICD-10-CM

## 2025-01-02 DIAGNOSIS — M79.605 PAIN IN BOTH LOWER EXTREMITIES: ICD-10-CM

## 2025-01-02 DIAGNOSIS — I73.9 PERIPHERAL VASCULAR DISEASE (HCC): ICD-10-CM

## 2025-01-02 DIAGNOSIS — R20.0 NUMBNESS AND TINGLING: ICD-10-CM

## 2025-01-02 DIAGNOSIS — R20.2 NUMBNESS AND TINGLING: ICD-10-CM

## 2025-01-02 DIAGNOSIS — M79.604 PAIN IN BOTH LOWER EXTREMITIES: ICD-10-CM

## 2025-01-02 DIAGNOSIS — R29.898 WEAKNESS OF BOTH LOWER EXTREMITIES: ICD-10-CM

## 2025-01-02 PROCEDURE — G8419 CALC BMI OUT NRM PARAM NOF/U: HCPCS | Performed by: PSYCHIATRY & NEUROLOGY

## 2025-01-02 PROCEDURE — 4004F PT TOBACCO SCREEN RCVD TLK: CPT | Performed by: PSYCHIATRY & NEUROLOGY

## 2025-01-02 PROCEDURE — G8427 DOCREV CUR MEDS BY ELIG CLIN: HCPCS | Performed by: PSYCHIATRY & NEUROLOGY

## 2025-01-02 PROCEDURE — 99213 OFFICE O/P EST LOW 20 MIN: CPT | Performed by: PSYCHIATRY & NEUROLOGY

## 2025-01-02 PROCEDURE — 3017F COLORECTAL CA SCREEN DOC REV: CPT | Performed by: PSYCHIATRY & NEUROLOGY

## 2025-01-02 NOTE — PROGRESS NOTES
Right 2024    BONE MARROW ASPIRATION BIOPSY performed by Juliana Rush APRN at [facility] OR    BREAST ENHANCEMENT SURGERY Bilateral     CARPAL TUNNEL RELEASE Bilateral      SECTION      CHOLECYSTECTOMY      CORNEAL TRANSPLANT      CORONARY ANGIOPLASTY WITH STENT PLACEMENT      HERNIA REPAIR      HIP SURGERY      history of osteoporosis;hip surgery x 2    MOUTH SURGERY      OTHER SURGICAL HISTORY      TIPS    TUBAL LIGATION      VASCULAR SURGERY Bilateral 2024    Aortagram with bilateral lower extremity runoff    VASCULAR SURGERY Bilateral 2024    INTRAOPERATIVE ANGIOGRAM,  ENDOVASCULAR REPAIR,  WITH BALLOON ANGIOPLASTY performed by Kimberly Figueroa DO at [facility] OR       Recent Hospitalizations  None    Significant Injuries  None    Habits  Vonda Lutz reports that she has been smoking cigarettes. She started smoking about 42 years ago. She has a 21 pack-year smoking history. She has been exposed to tobacco smoke. She has never used smokeless tobacco. She reports current alcohol use of about 2.0 standard drinks of alcohol per week. She reports that she does not use drugs.    Family History   Problem Relation Age of Onset    Thyroid Disease Mother         ? cancer    Osteoporosis Mother     Kidney Disease Mother     Diabetes Mother     Depression Mother     High Blood Pressure Mother     High Cholesterol Mother     Other Mother         Thyroid cancer    Prostate Cancer Father     Diabetes Maternal Grandmother     Arthritis Maternal Grandmother     High Blood Pressure Maternal Grandmother     Vision Loss Maternal Grandmother     Alcohol Abuse Maternal Grandfather     No Known Problems Paternal Grandmother     No Known Problems Paternal Grandfather     Breast Cancer Other         three aunts, some cousing    Birth Defects Sister     Breast Cancer Maternal Aunt         All 3 of my maternal aunts and 3 cousins    Depression Sister     Depression Brother     Diabetes Maternal Uncle     High Blood

## 2025-01-07 ENCOUNTER — TELEPHONE (OUTPATIENT)
Dept: HEMATOLOGY | Age: 55
End: 2025-01-07

## 2025-01-07 ENCOUNTER — OFFICE VISIT (OUTPATIENT)
Dept: VASCULAR SURGERY | Age: 55
End: 2025-01-07
Payer: MEDICARE

## 2025-01-07 VITALS
TEMPERATURE: 97.1 F | OXYGEN SATURATION: 95 % | SYSTOLIC BLOOD PRESSURE: 120 MMHG | DIASTOLIC BLOOD PRESSURE: 72 MMHG | HEART RATE: 104 BPM

## 2025-01-07 DIAGNOSIS — M79.89 LEG SWELLING: ICD-10-CM

## 2025-01-07 DIAGNOSIS — I73.9 PVD (PERIPHERAL VASCULAR DISEASE) (HCC): Primary | ICD-10-CM

## 2025-01-07 DIAGNOSIS — M79.604 RIGHT LEG PAIN: ICD-10-CM

## 2025-01-07 DIAGNOSIS — M79.605 LEG PAIN, LEFT: ICD-10-CM

## 2025-01-07 DIAGNOSIS — I70.234 ATHEROSCLEROSIS OF NATIVE ARTERY OF RIGHT LOWER EXTREMITY WITH ULCERATION OF MIDFOOT (HCC): ICD-10-CM

## 2025-01-07 DIAGNOSIS — I70.213 ATHEROSCLER OF NATIVE ARTERY OF BOTH LEGS WITH INTERMIT CLAUDICATION (HCC): ICD-10-CM

## 2025-01-07 PROCEDURE — G8427 DOCREV CUR MEDS BY ELIG CLIN: HCPCS | Performed by: NURSE PRACTITIONER

## 2025-01-07 PROCEDURE — M1308 PR FLU IMMUNIZE NO ADMIN: HCPCS | Performed by: NURSE PRACTITIONER

## 2025-01-07 PROCEDURE — 4004F PT TOBACCO SCREEN RCVD TLK: CPT | Performed by: NURSE PRACTITIONER

## 2025-01-07 PROCEDURE — G8419 CALC BMI OUT NRM PARAM NOF/U: HCPCS | Performed by: NURSE PRACTITIONER

## 2025-01-07 PROCEDURE — 3017F COLORECTAL CA SCREEN DOC REV: CPT | Performed by: NURSE PRACTITIONER

## 2025-01-07 PROCEDURE — 99214 OFFICE O/P EST MOD 30 MIN: CPT | Performed by: NURSE PRACTITIONER

## 2025-01-08 RX ORDER — SODIUM CHLORIDE 0.9 % (FLUSH) 0.9 %
5-40 SYRINGE (ML) INJECTION PRN
OUTPATIENT
Start: 2025-01-08

## 2025-01-08 RX ORDER — CLONIDINE HYDROCHLORIDE 0.1 MG/1
0.1 TABLET ORAL PRN
OUTPATIENT
Start: 2025-01-08

## 2025-01-08 RX ORDER — ASPIRIN 81 MG/1
81 TABLET ORAL ONCE
OUTPATIENT
Start: 2025-01-08 | End: 2025-01-08

## 2025-01-08 RX ORDER — SODIUM CHLORIDE 9 MG/ML
INJECTION, SOLUTION INTRAVENOUS CONTINUOUS
OUTPATIENT
Start: 2025-01-08

## 2025-01-08 RX ORDER — SODIUM CHLORIDE 9 MG/ML
INJECTION, SOLUTION INTRAVENOUS PRN
OUTPATIENT
Start: 2025-01-08

## 2025-01-08 RX ORDER — SODIUM CHLORIDE 0.9 % (FLUSH) 0.9 %
5-40 SYRINGE (ML) INJECTION EVERY 12 HOURS SCHEDULED
OUTPATIENT
Start: 2025-01-08

## 2025-01-08 NOTE — H&P
Vonda Lutz (:  1970) is a 54 y.o. female,Established patient, here for evaluation of the following chief complaint(s):  Follow-up            SUBJECTIVE/OBJECTIVE:  Vonda has a history of peripheral vascular disease of the lower extremities.  She has had this for 1 - 5 years. Current treatment includes asa and lipitor.  Vonda has new wounds since May left leg and new wound right foot from ruptured bulla. . Recently, she reports claudication at a distance of  which varies.  Vonda reports that the right leg is equal to the left.  She reports claudication is not changed and is mostly in the form of crampy type pain starting in the calves. She has a short recovery time. This is reproducible in nature. She reports ischemic rest pain 0 times per night.  She reports walking with cart does not help.    I have personally reviewed the following: problem list, current meds, allergies, PMH, PSH, family hx, and social hx  Vonda Lutz is a 54 y.o. female with the following history as recorded in [facility]:  Patient Active Problem List    Diagnosis Date Noted    Major depressive disorder, recurrent, mild 2024    Protein calorie malnutrition 2024    Aorto-iliac disease (HCC) 2024    Severe malnutrition (HCC) 2024    Aortoiliac occlusive disease (HCC) 2024    Iron deficiency anemia secondary to inadequate dietary iron intake 2024    Iron malabsorption 2024    Peripheral vascular disease, unspecified (HCC) 2024    Chronic ulcer of left foot, with fat layer exposed (HCC) 2024    Chronic ulcer of left leg, with fat layer exposed (HCC) 2024    Lymphedema 2024    PVD (peripheral vascular disease) (HCC) 2024     Current Outpatient Medications   Medication Sig Dispense Refill    atorvastatin (LIPITOR) 10 MG tablet Take 1 tablet by mouth once daily 30 tablet 0    citalopram (CELEXA) 40 MG tablet Take 1 tablet by mouth once daily 30 tablet 5    potassium

## 2025-01-08 NOTE — H&P (VIEW-ONLY)
Average packs/day: 0.5 packs/day for 42.0 years (21.0 ttl pk-yrs)     Types: Cigarettes     Start date: 1/1/1983     Passive exposure: Current    Smokeless tobacco: Never    Tobacco comments:     Less than 4 cigarettes per day   Substance Use Topics    Alcohol use: Yes     Alcohol/week: 2.0 standard drinks of alcohol     Types: 2 Drinks containing 0.5 oz of alcohol per week     Comment: Usually takes having a headache and not every week         ROS  Eyes - no sudden vision change or amaurosis.  Respiratory - no significant shortness of breath,  Cardiovascular - no chest pain or syncope.  No  significant leg swelling.  has claudication.  Musculoskeletal - no gait disturbance  Skin -has new wound.  Neurologic -  No speech difficulty or lateralizing weakness.  All other review of systems are negative.    Physical Exam    /72 (Site: Right Upper Arm, Position: Sitting, Cuff Size: Medium Adult)   Pulse (!) 104   Temp 97.1 °F (36.2 °C)   SpO2 95%       Neck- carotid pulses 2+ to palpation with no bruit  Cardiovascular - Regular rate and rhythm.    Pulmonary - effort appears normal.  No respiratory distress.    Lungs - Breath sounds normal. No wheezes or rales.    Extremities -  Radial and brachial pulses are 2+ to palpation bilaterally.  Right femoral pulse: present 2+; Right popliteal pulse: absent Right DP: absent; Right PT absent; Left femoral pulse: present 2+; Left popliteal pulse: absent; Left DP: absent; Left PT: absent No cyanosis, clubbing, or significant edema.  No signs atheroembolic event.  Has some brawny discoloration consistent with possible venous disease  Neurologic - alert and oriented X 3.  Physiologic.   Face symmetric.  Skin - warm, dry, and intact.  Has   wound left medial shin and right foot 3 cm   Psychiatric - mood, affect, and behavior appear normal.  Judgment and thought processes appear normal.    Risk factors for atherosclerosis of all vascular beds have been reviewed with the patient

## 2025-01-09 ENCOUNTER — HOSPITAL ENCOUNTER (OUTPATIENT)
Dept: WOUND CARE | Age: 55
Discharge: HOME | End: 2025-01-09
Attending: SURGERY
Payer: MEDICARE

## 2025-01-09 ENCOUNTER — HOSPITAL ENCOUNTER (OUTPATIENT)
Dept: INFUSION THERAPY | Age: 55
Discharge: HOME | End: 2025-01-09
Payer: MEDICARE

## 2025-01-09 ENCOUNTER — OFFICE VISIT (OUTPATIENT)
Dept: HEMATOLOGY | Age: 55
End: 2025-01-09
Payer: MEDICARE

## 2025-01-09 VITALS
TEMPERATURE: 98.3 F | SYSTOLIC BLOOD PRESSURE: 123 MMHG | DIASTOLIC BLOOD PRESSURE: 71 MMHG | RESPIRATION RATE: 20 BRPM | HEART RATE: 99 BPM

## 2025-01-09 VITALS
BODY MASS INDEX: 17.86 KG/M2 | TEMPERATURE: 97.9 F | HEIGHT: 59 IN | WEIGHT: 88.6 LBS | DIASTOLIC BLOOD PRESSURE: 68 MMHG | HEART RATE: 88 BPM | OXYGEN SATURATION: 100 % | SYSTOLIC BLOOD PRESSURE: 116 MMHG

## 2025-01-09 DIAGNOSIS — R79.82 ELEVATED C-REACTIVE PROTEIN (CRP): ICD-10-CM

## 2025-01-09 DIAGNOSIS — R70.0 ELEVATED SED RATE: ICD-10-CM

## 2025-01-09 DIAGNOSIS — L97.922 CHRONIC ULCER OF LEFT LEG, WITH FAT LAYER EXPOSED (HCC): ICD-10-CM

## 2025-01-09 DIAGNOSIS — D50.8 IRON DEFICIENCY ANEMIA SECONDARY TO INADEQUATE DIETARY IRON INTAKE: Primary | ICD-10-CM

## 2025-01-09 DIAGNOSIS — R74.8 ELEVATED SERUM TRYPTASE: ICD-10-CM

## 2025-01-09 DIAGNOSIS — I73.9 PVD (PERIPHERAL VASCULAR DISEASE) (HCC): ICD-10-CM

## 2025-01-09 DIAGNOSIS — L97.522 CHRONIC ULCER OF LEFT FOOT, WITH FAT LAYER EXPOSED (HCC): Primary | ICD-10-CM

## 2025-01-09 DIAGNOSIS — D50.8 IRON DEFICIENCY ANEMIA SECONDARY TO INADEQUATE DIETARY IRON INTAKE: ICD-10-CM

## 2025-01-09 LAB
ALBUMIN SERPL-MCNC: 3.2 G/DL (ref 3.5–5.2)
ALP SERPL-CCNC: 117 U/L (ref 35–104)
ALT SERPL-CCNC: 14 U/L (ref 5–33)
ANION GAP SERPL CALCULATED.3IONS-SCNC: 7 MMOL/L (ref 7–19)
AST SERPL-CCNC: 28 U/L (ref 5–32)
BASOPHILS # BLD: 0.05 K/UL (ref 0.01–0.08)
BASOPHILS NFR BLD: 0.6 % (ref 0.1–1.2)
BILIRUB SERPL-MCNC: <0.2 MG/DL (ref 0–1.2)
BUN SERPL-MCNC: 21 MG/DL (ref 6–20)
CALCIUM SERPL-MCNC: 9.1 MG/DL (ref 8.6–10)
CHLORIDE SERPL-SCNC: 104 MMOL/L (ref 98–107)
CO2 SERPL-SCNC: 26 MMOL/L (ref 22–29)
CREAT SERPL-MCNC: 1.5 MG/DL (ref 0.5–0.9)
CRP SERPL HS-MCNC: <0.3 MG/DL (ref 0–0.5)
EOSINOPHIL # BLD: 0.37 K/UL (ref 0.04–0.54)
EOSINOPHIL NFR BLD: 4.6 % (ref 0.7–7)
ERYTHROCYTE [DISTWIDTH] IN BLOOD BY AUTOMATED COUNT: 13.3 % (ref 11.7–14.4)
ERYTHROCYTE [SEDIMENTATION RATE] IN BLOOD BY WESTERGREN METHOD: 38 MM/HR (ref 0–25)
FERRITIN SERPL-MCNC: 417.2 NG/ML (ref 13–150)
GLUCOSE SERPL-MCNC: 114 MG/DL (ref 70–99)
HCT VFR BLD AUTO: 28.8 % (ref 34.1–44.9)
HGB BLD-MCNC: 9 G/DL (ref 11.2–15.7)
IRON SATN MFR SERPL: 28 % (ref 14–50)
IRON SERPL-MCNC: 52 UG/DL (ref 37–145)
LYMPHOCYTES # BLD: 2.7 K/UL (ref 1.18–3.74)
LYMPHOCYTES NFR BLD: 33.9 % (ref 19.3–53.1)
MCH RBC QN AUTO: 28.8 PG (ref 25.6–32.2)
MCHC RBC AUTO-ENTMCNC: 31.3 G/DL (ref 32.3–35.5)
MCV RBC AUTO: 92 FL (ref 79.4–94.8)
MONOCYTES # BLD: 0.63 K/UL (ref 0.24–0.82)
MONOCYTES NFR BLD: 7.9 % (ref 4.7–12.5)
NEUTROPHILS # BLD: 4.2 K/UL (ref 1.56–6.13)
NEUTS SEG NFR BLD: 52.7 % (ref 34–71.1)
PLATELET # BLD AUTO: 409 K/UL (ref 182–369)
PMV BLD AUTO: 9.7 FL (ref 7.4–10.4)
POTASSIUM SERPL-SCNC: 5.4 MMOL/L (ref 3.5–5.1)
PROT SERPL-MCNC: 6.1 G/DL (ref 6.4–8.3)
RBC # BLD AUTO: 3.13 M/UL (ref 3.93–5.22)
SODIUM SERPL-SCNC: 137 MMOL/L (ref 136–145)
TIBC SERPL-MCNC: 189 UG/DL (ref 250–400)
WBC # BLD AUTO: 7.97 K/UL (ref 3.98–10.04)

## 2025-01-09 PROCEDURE — 80053 COMPREHEN METABOLIC PANEL: CPT

## 2025-01-09 PROCEDURE — 97597 DBRDMT OPN WND 1ST 20 CM/<: CPT | Performed by: SURGERY

## 2025-01-09 PROCEDURE — 4004F PT TOBACCO SCREEN RCVD TLK: CPT | Performed by: NURSE PRACTITIONER

## 2025-01-09 PROCEDURE — 3017F COLORECTAL CA SCREEN DOC REV: CPT | Performed by: NURSE PRACTITIONER

## 2025-01-09 PROCEDURE — G8419 CALC BMI OUT NRM PARAM NOF/U: HCPCS | Performed by: NURSE PRACTITIONER

## 2025-01-09 PROCEDURE — 36415 COLL VENOUS BLD VENIPUNCTURE: CPT

## 2025-01-09 PROCEDURE — G8427 DOCREV CUR MEDS BY ELIG CLIN: HCPCS | Performed by: NURSE PRACTITIONER

## 2025-01-09 PROCEDURE — 99214 OFFICE O/P EST MOD 30 MIN: CPT | Performed by: NURSE PRACTITIONER

## 2025-01-09 PROCEDURE — 97597 DBRDMT OPN WND 1ST 20 CM/<: CPT

## 2025-01-09 PROCEDURE — 85025 COMPLETE CBC W/AUTO DIFF WBC: CPT

## 2025-01-09 RX ORDER — LIDOCAINE HYDROCHLORIDE 20 MG/ML
JELLY TOPICAL ONCE
Status: COMPLETED | OUTPATIENT
Start: 2025-01-09 | End: 2025-01-09

## 2025-01-09 RX ORDER — LIDOCAINE HYDROCHLORIDE 20 MG/ML
JELLY TOPICAL ONCE
OUTPATIENT
Start: 2025-01-09 | End: 2025-01-09

## 2025-01-09 RX ORDER — LIDOCAINE HYDROCHLORIDE 40 MG/ML
SOLUTION TOPICAL ONCE
OUTPATIENT
Start: 2025-01-09 | End: 2025-01-09

## 2025-01-09 RX ORDER — BACITRACIN ZINC AND POLYMYXIN B SULFATE 500; 1000 [USP'U]/G; [USP'U]/G
OINTMENT TOPICAL ONCE
OUTPATIENT
Start: 2025-01-09 | End: 2025-01-09

## 2025-01-09 RX ORDER — SODIUM CHLOR/HYPOCHLOROUS ACID 0.033 %
SOLUTION, IRRIGATION IRRIGATION ONCE
OUTPATIENT
Start: 2025-01-09 | End: 2025-01-09

## 2025-01-09 RX ORDER — LIDOCAINE 40 MG/G
CREAM TOPICAL ONCE
OUTPATIENT
Start: 2025-01-09 | End: 2025-01-09

## 2025-01-09 RX ORDER — CLOBETASOL PROPIONATE 0.5 MG/G
OINTMENT TOPICAL ONCE
OUTPATIENT
Start: 2025-01-09 | End: 2025-01-09

## 2025-01-09 RX ORDER — SILVER SULFADIAZINE 10 MG/G
CREAM TOPICAL ONCE
OUTPATIENT
Start: 2025-01-09 | End: 2025-01-09

## 2025-01-09 RX ORDER — MUPIROCIN 20 MG/G
OINTMENT TOPICAL ONCE
OUTPATIENT
Start: 2025-01-09 | End: 2025-01-09

## 2025-01-09 RX ORDER — GINSENG 100 MG
CAPSULE ORAL ONCE
OUTPATIENT
Start: 2025-01-09 | End: 2025-01-09

## 2025-01-09 RX ORDER — GENTAMICIN SULFATE 1 MG/G
OINTMENT TOPICAL ONCE
OUTPATIENT
Start: 2025-01-09 | End: 2025-01-09

## 2025-01-09 RX ORDER — NEOMYCIN/BACITRACIN/POLYMYXINB 3.5-400-5K
OINTMENT (GRAM) TOPICAL ONCE
OUTPATIENT
Start: 2025-01-09 | End: 2025-01-09

## 2025-01-09 RX ORDER — M-VIT,TX,IRON,MINS/CALC/FOLIC 27MG-0.4MG
1 TABLET ORAL DAILY
COMMUNITY

## 2025-01-09 RX ORDER — BETAMETHASONE DIPROPIONATE 0.5 MG/G
CREAM TOPICAL ONCE
OUTPATIENT
Start: 2025-01-09 | End: 2025-01-09

## 2025-01-09 RX ORDER — LIDOCAINE 50 MG/G
OINTMENT TOPICAL ONCE
OUTPATIENT
Start: 2025-01-09 | End: 2025-01-09

## 2025-01-09 RX ORDER — TRIAMCINOLONE ACETONIDE 1 MG/G
OINTMENT TOPICAL ONCE
OUTPATIENT
Start: 2025-01-09 | End: 2025-01-09

## 2025-01-09 RX ADMIN — LIDOCAINE HYDROCHLORIDE: 20 JELLY TOPICAL at 11:34

## 2025-01-09 ASSESSMENT — ENCOUNTER SYMPTOMS
SHORTNESS OF BREATH: 0
EYES NEGATIVE: 1
BACK PAIN: 0
NAUSEA: 0
EYE REDNESS: 0
GASTROINTESTINAL NEGATIVE: 1
COUGH: 0
WHEEZING: 0
DIARRHEA: 0
EYE PAIN: 0
RESPIRATORY NEGATIVE: 1
CONSTIPATION: 0
ABDOMINAL PAIN: 0
EYE DISCHARGE: 0
VOMITING: 0
BLOOD IN STOOL: 0
SORE THROAT: 0

## 2025-01-09 ASSESSMENT — PAIN DESCRIPTION - DESCRIPTORS: DESCRIPTORS: ACHING

## 2025-01-09 ASSESSMENT — PAIN - FUNCTIONAL ASSESSMENT: PAIN_FUNCTIONAL_ASSESSMENT: PREVENTS OR INTERFERES SOME ACTIVE ACTIVITIES AND ADLS

## 2025-01-09 ASSESSMENT — PAIN DESCRIPTION - ORIENTATION: ORIENTATION: LOWER

## 2025-01-09 ASSESSMENT — PAIN DESCRIPTION - ONSET: ONSET: ON-GOING

## 2025-01-09 ASSESSMENT — PAIN DESCRIPTION - PAIN TYPE: TYPE: CHRONIC PAIN

## 2025-01-09 ASSESSMENT — PAIN SCALES - GENERAL: PAINLEVEL_OUTOF10: 7

## 2025-01-09 ASSESSMENT — PAIN DESCRIPTION - LOCATION: LOCATION: BACK

## 2025-01-09 ASSESSMENT — PAIN DESCRIPTION - FREQUENCY: FREQUENCY: INTERMITTENT

## 2025-01-09 NOTE — PATIENT INSTRUCTIONS
[facility]ound [facility] and Hyperbaric Oxygen Therapy   Physician Orders and Discharge Instructions  [address] [place] [address]  [address]  [place] [address]  Telephone: [phone]      FAX [phone]    NAME:  Vonda Lutz  YOB: 1970  MEDICAL RECORD NUMBER:  [id number]  DATE:  1/9/2025    Discharge condition: Stable    Discharge to: Home    Left via:Private automobile    Accompanied by: Self     ECF/HHA:     Dressing Orders:   Left Lower Ext Ulcer  Xeroform to open area, cover with adhesive bandage, change every other day  Wear compression stockings or ace wrap from base of the toes to just below the knee     Elevate feet to level or above heart 3-4 times daily and as needed to for 30 minutes to reduce swelling  Multi Vitamin, High Protein diet as tolerated    [facility] follow up visit ___________1 week__________________  (Please note your next appointment above and if you are unable to keep, kindly give a 24 hour notice. Thank you.)    If you experience any of the following, please call the Wound Care Center during business hours:    * Increase in Pain  * Temperature over 101  * Increase in drainage from your wound  * Drainage with a foul odor  * Bleeding  * Increase in swelling  * Need for compression bandage changes due to slippage, breakthrough drainage.    If you need medical attention outside of the business hours of the Wound Care Centers please contact your PCP or go to the nearest emergency room.

## 2025-01-09 NOTE — PROGRESS NOTES
[facility]   Progress Note and Procedure Note      Vonda Lutz  MEDICAL RECORD NUMBER:  [id number]  AGE: 54 y.o.   GENDER: female  : 1970  EPISODE DATE:  2025    Subjective:     Chief Complaint   Patient presents with    Wound Check     Left leg wound         HISTORY of PRESENT ILLNESS HPI     Vonda Lutz is a 54 y.o. female who presents today for wound/ulcer evaluation.   Wound Context: Pt with LLE wound here for eval/treat  Wound/Ulcer Pain Timing/Severity: none  Quality of pain: N/A  Severity:  0 / 10   Modifying Factors: None  Associated Signs/Symptoms: none    Ulcer Identification:  Ulcer Type: undetermined  Contributing Factors: none    Wound:  unknown etiology         PAST MEDICAL HISTORY        Diagnosis Date    Anxiety     Ongoing    Arthritis     CAD (coronary artery disease)     Closed fracture of left foot     Depression     Ongoing    Gallbladder anomaly     Hip fracture (HCC)     x2    History of blood transfusion     Hyperlipidemia     Hypertension     Hyperthyroidism 2020    Negative 2021    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    Osteoporosis     PAD (peripheral artery disease) (HCC)     Patella fracture     Radial fracture     Shoulder fracture     x2    Thyroid disease     Wears dentures        PAST SURGICAL HISTORY    Past Surgical History:   Procedure Laterality Date    BONE MARROW ASPIRATE & BIOPSY  2024    BONE MARROW BIOPSY Right 2024    BONE MARROW ASPIRATION BIOPSY performed by Juliana Rush APRN at [facility] OR    BREAST ENHANCEMENT SURGERY Bilateral     CARPAL TUNNEL RELEASE Bilateral      SECTION      CHOLECYSTECTOMY      CORNEAL TRANSPLANT      CORONARY ANGIOPLASTY WITH STENT PLACEMENT      HERNIA REPAIR      HIP

## 2025-01-09 NOTE — PLAN OF CARE
Problem: Pain  Goal: Verbalizes/displays adequate comfort level or baseline comfort level  Outcome: Progressing     Problem: Pain  Goal: Verbalizes/displays adequate comfort level or baseline comfort level  Outcome: Progressing     Problem: Wound:  Goal: Will show signs of wound healing; wound closure and no evidence of infection  Description: Will show signs of wound healing; wound closure and no evidence of infection  Outcome: Progressing     Problem: Venous:  Goal: Signs of wound healing will improve  Description: Signs of wound healing will improve  Outcome: Progressing     Problem: Smoking cessation:  Goal: Ability to formulate a plan to maintain a tobacco-free life will be supported  Description: Ability to formulate a plan to maintain a tobacco-free life will be supported  Outcome: Progressing     Problem: Compression therapy:  Goal: Will be free from complications associated with compression therapy  Description: Will be free from complications associated with compression therapy  Outcome: Progressing

## 2025-01-09 NOTE — PROGRESS NOTES
Progress note      Pt Name: Vonda Lutz  YOB: 1970  MRN: [id number]    Date of evaluation: 01/09/2025  History Obtained From:  patient, electronic medical record    CHIEF COMPLAINT:    Chief Complaint   Patient presents with    Follow-up     Chronic ulcer of left leg, with fat layer exposed (HCC)     HISTORY OF PRESENT ILLNESS:    Vonda Lutz is a 54 y.o.  female who is currently being evaluated for chronic elevated serum tryptase with chronic pruritus with findings of iron deficiency without anemia and mild thrombocytosis associated with chronic inflammatory process.  Serology studies on 4/2/2024 indicated no findings for myeloproliferative disorder with a negative JAK2 with reflex, flow cytometry and peripheral blood smear were negative with no increased blasts.  A bone marrow aspiration biopsy completed on 4/29/2024 ruled out systemic mastocytosis and reported thrombocytosis suspected to be reactive.  She has received IV iron replacement for her iron deficiency and current recommendation is slow release iron 45 mg daily and conservative lab monitoring.  Encouraged to continue following with wound care for her chronic left foot/leg ulceration.  Vonda returns today in scheduled follow-up to review workup results and further treatment recommendations.   History of Present Illness  She reports experiencing fatigue, which she attributes to a pulse oximetry study conducted last night. She has not observed any signs of bleeding, such as blood in her stool. She recalls an episode of significant nosebleed, which she believes was due to dry sinuses. She expresses interest in dietary modifications to increase her iron intake. She is on a regimen of slow-release iron supplements, which she tolerates well.    She continues to smoke 4 to 6 cigarettes daily. She has not yet visited her allergist due to financial constraints but plans to do so once she secures a supplemental insurance plan.    She

## 2025-01-13 ENCOUNTER — TELEPHONE (OUTPATIENT)
Dept: VASCULAR SURGERY | Age: 55
End: 2025-01-13

## 2025-01-13 NOTE — TELEPHONE ENCOUNTER
Called and left a message for the patient to let her know that her surgical appointment has been pushed back and she should arrive @ 9:00 AM.  I left our office number should the patient have any questions

## 2025-01-14 ENCOUNTER — HOSPITAL ENCOUNTER (OUTPATIENT)
Dept: INTERVENTIONAL RADIOLOGY/VASCULAR | Age: 55
Discharge: HOME | End: 2025-01-14
Payer: MEDICARE

## 2025-01-14 VITALS
RESPIRATION RATE: 17 BRPM | WEIGHT: 88 LBS | SYSTOLIC BLOOD PRESSURE: 137 MMHG | TEMPERATURE: 97.5 F | HEIGHT: 59 IN | OXYGEN SATURATION: 91 % | BODY MASS INDEX: 17.74 KG/M2 | DIASTOLIC BLOOD PRESSURE: 74 MMHG | HEART RATE: 94 BPM

## 2025-01-14 DIAGNOSIS — I70.213 ATHEROSCLEROSIS OF NATIVE ARTERIES OF EXTREMITIES WITH INTERMITTENT CLAUDICATION, BILATERAL LEGS (HCC): ICD-10-CM

## 2025-01-14 LAB — TRYPTASE SERPL-MCNC: 15.6 UG/L

## 2025-01-14 PROCEDURE — 6360000002 HC RX W HCPCS: Performed by: SURGERY

## 2025-01-14 PROCEDURE — 76937 US GUIDE VASCULAR ACCESS: CPT

## 2025-01-14 PROCEDURE — 99152 MOD SED SAME PHYS/QHP 5/>YRS: CPT

## 2025-01-14 PROCEDURE — G0269 OCCLUSIVE DEVICE IN VEIN ART: HCPCS

## 2025-01-14 PROCEDURE — 6360000002 HC RX W HCPCS: Performed by: NURSE PRACTITIONER

## 2025-01-14 PROCEDURE — 75625 CONTRAST EXAM ABDOMINL AORTA: CPT

## 2025-01-14 PROCEDURE — 75716 ARTERY X-RAYS ARMS/LEGS: CPT

## 2025-01-14 PROCEDURE — 2580000003 HC RX 258: Performed by: NURSE PRACTITIONER

## 2025-01-14 PROCEDURE — 36200 PLACE CATHETER IN AORTA: CPT

## 2025-01-14 PROCEDURE — 6360000004 HC RX CONTRAST MEDICATION: Performed by: SURGERY

## 2025-01-14 PROCEDURE — 2709999900 IR AORTAGRAM

## 2025-01-14 RX ORDER — FENTANYL CITRATE 50 UG/ML
INJECTION, SOLUTION INTRAMUSCULAR; INTRAVENOUS PRN
Status: COMPLETED | OUTPATIENT
Start: 2025-01-14 | End: 2025-01-14

## 2025-01-14 RX ORDER — ASPIRIN 81 MG/1
81 TABLET ORAL ONCE
Status: DISCONTINUED | OUTPATIENT
Start: 2025-01-14 | End: 2025-01-16 | Stop reason: HOSPADM

## 2025-01-14 RX ORDER — SODIUM CHLORIDE 0.9 % (FLUSH) 0.9 %
5-40 SYRINGE (ML) INJECTION PRN
Status: DISCONTINUED | OUTPATIENT
Start: 2025-01-14 | End: 2025-01-16 | Stop reason: HOSPADM

## 2025-01-14 RX ORDER — SODIUM CHLORIDE 9 MG/ML
INJECTION, SOLUTION INTRAVENOUS PRN
Status: DISCONTINUED | OUTPATIENT
Start: 2025-01-14 | End: 2025-01-16 | Stop reason: HOSPADM

## 2025-01-14 RX ORDER — CLONIDINE HYDROCHLORIDE 0.1 MG/1
0.1 TABLET ORAL PRN
Status: DISCONTINUED | OUTPATIENT
Start: 2025-01-14 | End: 2025-01-16 | Stop reason: HOSPADM

## 2025-01-14 RX ORDER — HEPARIN SODIUM 5000 [USP'U]/ML
INJECTION, SOLUTION INTRAVENOUS; SUBCUTANEOUS PRN
Status: COMPLETED | OUTPATIENT
Start: 2025-01-14 | End: 2025-01-14

## 2025-01-14 RX ORDER — SODIUM CHLORIDE 0.9 % (FLUSH) 0.9 %
5-40 SYRINGE (ML) INJECTION EVERY 12 HOURS SCHEDULED
Status: DISCONTINUED | OUTPATIENT
Start: 2025-01-14 | End: 2025-01-16 | Stop reason: HOSPADM

## 2025-01-14 RX ORDER — SODIUM CHLORIDE 9 MG/ML
INJECTION, SOLUTION INTRAVENOUS CONTINUOUS
Status: DISCONTINUED | OUTPATIENT
Start: 2025-01-14 | End: 2025-01-16 | Stop reason: HOSPADM

## 2025-01-14 RX ORDER — ASPIRIN 81 MG/1
81 TABLET ORAL ONCE
Status: DISCONTINUED | OUTPATIENT
Start: 2025-01-14 | End: 2025-01-14 | Stop reason: SDUPTHER

## 2025-01-14 RX ORDER — VANCOMYCIN 1 G/200ML
1000 INJECTION, SOLUTION INTRAVENOUS
Status: COMPLETED | OUTPATIENT
Start: 2025-01-14 | End: 2025-01-14

## 2025-01-14 RX ORDER — MIDAZOLAM HYDROCHLORIDE 1 MG/ML
INJECTION, SOLUTION INTRAMUSCULAR; INTRAVENOUS PRN
Status: COMPLETED | OUTPATIENT
Start: 2025-01-14 | End: 2025-01-14

## 2025-01-14 RX ORDER — CLONIDINE HYDROCHLORIDE 0.1 MG/1
0.1 TABLET ORAL PRN
Status: DISCONTINUED | OUTPATIENT
Start: 2025-01-14 | End: 2025-01-14 | Stop reason: SDUPTHER

## 2025-01-14 RX ORDER — IODIXANOL 320 MG/ML
INJECTION, SOLUTION INTRAVASCULAR PRN
Status: COMPLETED | OUTPATIENT
Start: 2025-01-14 | End: 2025-01-14

## 2025-01-14 RX ORDER — LIDOCAINE HYDROCHLORIDE 20 MG/ML
INJECTION, SOLUTION EPIDURAL; INFILTRATION; INTRACAUDAL; PERINEURAL PRN
Status: COMPLETED | OUTPATIENT
Start: 2025-01-14 | End: 2025-01-14

## 2025-01-14 RX ADMIN — MIDAZOLAM 1 MG: 1 INJECTION INTRAMUSCULAR; INTRAVENOUS at 11:30

## 2025-01-14 RX ADMIN — VANCOMYCIN 1000 MG: 1 INJECTION, SOLUTION INTRAVENOUS at 11:15

## 2025-01-14 RX ADMIN — SODIUM CHLORIDE: 9 INJECTION, SOLUTION INTRAVENOUS at 08:41

## 2025-01-14 RX ADMIN — LIDOCAINE HYDROCHLORIDE 10 ML: 20 INJECTION, SOLUTION EPIDURAL; INFILTRATION; INTRACAUDAL; PERINEURAL at 11:28

## 2025-01-14 RX ADMIN — HEPARIN SODIUM 5000 UNITS: 5000 INJECTION INTRAVENOUS; SUBCUTANEOUS at 11:28

## 2025-01-14 RX ADMIN — FENTANYL CITRATE 25 MCG: 50 INJECTION, SOLUTION INTRAMUSCULAR; INTRAVENOUS at 11:29

## 2025-01-14 RX ADMIN — IODIXANOL 120 ML: 320 INJECTION, SOLUTION INTRAVASCULAR at 11:49

## 2025-01-14 NOTE — INTERVAL H&P NOTE
Update History & Physical    The patient's History and Physical of January 8, 2025 was reviewed with the patient and I examined the patient. There was no change. The surgical site was confirmed by the patient and me.     Plan: The risks, benefits, expected outcome, and alternative to the recommended procedure have been discussed with the patient. Patient understands and wants to proceed with the procedure.     ASA III, Mallampati 3    Electronically signed by Kimberly Figueroa DO on 1/14/2025 at 11:07 AM

## 2025-01-14 NOTE — PROGRESS NOTES
Patient ambulated in [place] with staff without difficulty/complaints of pain. Left groin site c/d/i post ambulation.  Electronically signed by Elisha Zapata RN on 1/14/2025 at 4:31 PM

## 2025-01-14 NOTE — DISCHARGE INSTRUCTIONS
POST PROCEDURE HOME CARE INSTRUCTIONS:         YOU CAN SHOWER AFTER 24 HOURS. DO NOT TAKE ANY BATHS OR SUBMERGE THE SITE IN ANY WATER SOURCE FOR 5 DAYS.   REMOVE THE DRESSING AFTER 24 HOURS. WASH THE AREA WITH ANTIBACTERIAL SOAP. PAT DRY. REAPPLY A CLEAN BANDAID EVERYDAY FOR 3-4 DAYS.   MONITOR SITE FOR ANY SIGNS OF INFECTION.  SIGNS AND SYMPTOMS OF INFECTION INCLUDE SUDDEN ELEVATED TEMP OVER 101, GREEN/YELLOW  DRAINAGE, OR RED STREAKING FROM SITE. CALL THE DOCTOR IF YOU NOTICE SIGNS OR SYMPTOMS OF INFECTION.  4.   REST UNTIL MORNING.  5.   DO NOT DRIVE FOR THE NEXT 24 HOURS.  6.   Drink one 8-10 oz glass of non-alcoholic liquid every one hour until bedtime.  7.   If bleeding occurs, lay flat and apply pressure to site. If unable to get bleeding to stop after 15 minutes call 911 or rush to nearest emergency room.  8.   Keep affected leg straight until bedtime doing leg exercises to encourage blood flow, (Try wiggling your toes and rotating your ankle in circles)  9.   Resume normal ( non-strenuous) activity AFTER 2 DAYS.  10. Bruising and soreness at the puncture site may occur.  This will heal and clear after several weeks.  11. NO LIFTING, TUGGING, OR PULLING ANYTHING MORE THAN 5 POUNDS FOR 2 DAYS.

## 2025-01-14 NOTE — PROGRESS NOTES
Discharge instructions reviewed with patient and patient states understanding. Left groin site c/d/I. Patient discharged from cath holding with all belongings, mynx booklet, and AVS.  Electronically signed by Elisha Zapata RN on 1/14/2025 at 4:52 PM

## 2025-01-14 NOTE — OP NOTE
Patient Name: Vonda Lutz   YOB: 1970   MRN: [id number]     Procedure Date: 1/14/2025     Pre Op Diagnosis: PAD with wounds    Post Op Diagnosis: same    Procedure: Aortogram with bilateral lower extremity runoff.  Additional picture with the cath removed with the left sheath shot    Anesthesia: Local with Moderate Sedation  Anesthesia: local with moderate sedation  Moderate sedation ASA class III  Timing start: 11:28  End time:11:47  Given 1 Versed and 25 Fentanyl  Vital signs were observed by separate provider that had no other duties    Estimated Blood Loss: Less than 50 ml    Complications: None    Implants: none    Procedure Details: Patient was brought into the angiogram suite and placed in supine position.  Her bilateral groins were prepped and draped sterile fashion.  Preoperative antibiotics were given.  Timeout performed.  Left common femoral artery was accessed under continuous ultrasound guidance using standard micropuncture technique.  5 Bolivian glide sheath was inserted.  J-wire was for proximally followed by Omni Flush catheter.  Using power injection aortogram with bilateral lower extremity runoff were performed as mentioned below findings.  The catheter seem to be giving artifact and left external iliac artery and therefore the wire was inserted and the catheter was pulled and the sheath injection was performed showing some irregularity proximally without any stenosis.  Device were removed and the exit site was sealed using 5 Bolivian Mynx.  Patient tolerated procedure well and was transferred back in stable condition.    Findings: Aorta iliac Endologix stent patent, bilateral common iliac arteries patent, internal iliac arteries patent with some artificial stenosis at the left side.  Right external iliac patent left external iliac with some minor irregularity patent, bilateral common femoral artery patent, profunda femoris patent, bilateral SFA patent, bilateral popliteal artery patent

## 2025-01-16 ENCOUNTER — HOSPITAL ENCOUNTER (OUTPATIENT)
Dept: WOUND CARE | Age: 55
Discharge: HOME | End: 2025-01-16
Attending: SURGERY
Payer: MEDICARE

## 2025-01-16 ENCOUNTER — TELEPHONE (OUTPATIENT)
Dept: NEUROLOGY | Age: 55
End: 2025-01-16

## 2025-01-16 VITALS
HEART RATE: 108 BPM | BODY MASS INDEX: 17.74 KG/M2 | HEIGHT: 59 IN | TEMPERATURE: 98 F | DIASTOLIC BLOOD PRESSURE: 73 MMHG | SYSTOLIC BLOOD PRESSURE: 109 MMHG | RESPIRATION RATE: 18 BRPM | WEIGHT: 88 LBS

## 2025-01-16 DIAGNOSIS — L97.522 CHRONIC ULCER OF LEFT FOOT, WITH FAT LAYER EXPOSED (HCC): Primary | ICD-10-CM

## 2025-01-16 DIAGNOSIS — I73.9 PVD (PERIPHERAL VASCULAR DISEASE) (HCC): ICD-10-CM

## 2025-01-16 DIAGNOSIS — L97.922 CHRONIC ULCER OF LEFT LEG, WITH FAT LAYER EXPOSED (HCC): ICD-10-CM

## 2025-01-16 LAB
ALBUMIN SERPL-MCNC: 3.5 G/DL (ref 3.5–5.2)
ALP SERPL-CCNC: 138 U/L (ref 35–104)
ALT SERPL-CCNC: 13 U/L (ref 5–33)
ANION GAP SERPL CALCULATED.3IONS-SCNC: 9 MMOL/L (ref 7–19)
AST SERPL-CCNC: 26 U/L (ref 5–32)
BILIRUB SERPL-MCNC: 0.2 MG/DL (ref 0.2–1.2)
BUN SERPL-MCNC: 19 MG/DL (ref 6–20)
CALCIUM SERPL-MCNC: 9.3 MG/DL (ref 8.6–10)
CHLORIDE SERPL-SCNC: 102 MMOL/L (ref 98–111)
CO2 SERPL-SCNC: 28 MMOL/L (ref 22–29)
CREAT SERPL-MCNC: 1.6 MG/DL (ref 0.5–0.9)
GLUCOSE SERPL-MCNC: 100 MG/DL (ref 70–99)
POTASSIUM SERPL-SCNC: 4.6 MMOL/L (ref 3.5–5)
PROT SERPL-MCNC: 7 G/DL (ref 6.4–8.3)
SODIUM SERPL-SCNC: 139 MMOL/L (ref 136–145)

## 2025-01-16 PROCEDURE — 99212 OFFICE O/P EST SF 10 MIN: CPT

## 2025-01-16 PROCEDURE — 99212 OFFICE O/P EST SF 10 MIN: CPT | Performed by: SURGERY

## 2025-01-16 RX ORDER — LIDOCAINE HYDROCHLORIDE 20 MG/ML
JELLY TOPICAL ONCE
Status: COMPLETED | OUTPATIENT
Start: 2025-01-16 | End: 2025-01-16

## 2025-01-16 RX ORDER — SODIUM CHLOR/HYPOCHLOROUS ACID 0.033 %
SOLUTION, IRRIGATION IRRIGATION ONCE
OUTPATIENT
Start: 2025-01-16 | End: 2025-01-16

## 2025-01-16 RX ORDER — BACITRACIN ZINC AND POLYMYXIN B SULFATE 500; 1000 [USP'U]/G; [USP'U]/G
OINTMENT TOPICAL ONCE
OUTPATIENT
Start: 2025-01-16 | End: 2025-01-16

## 2025-01-16 RX ORDER — SILVER SULFADIAZINE 10 MG/G
CREAM TOPICAL ONCE
OUTPATIENT
Start: 2025-01-16 | End: 2025-01-16

## 2025-01-16 RX ORDER — GENTAMICIN SULFATE 1 MG/G
OINTMENT TOPICAL ONCE
OUTPATIENT
Start: 2025-01-16 | End: 2025-01-16

## 2025-01-16 RX ORDER — LIDOCAINE HYDROCHLORIDE 20 MG/ML
JELLY TOPICAL ONCE
OUTPATIENT
Start: 2025-01-16 | End: 2025-01-16

## 2025-01-16 RX ORDER — BETAMETHASONE DIPROPIONATE 0.5 MG/G
CREAM TOPICAL ONCE
OUTPATIENT
Start: 2025-01-16 | End: 2025-01-16

## 2025-01-16 RX ORDER — TRIAMCINOLONE ACETONIDE 1 MG/G
OINTMENT TOPICAL ONCE
OUTPATIENT
Start: 2025-01-16 | End: 2025-01-16

## 2025-01-16 RX ORDER — CLOBETASOL PROPIONATE 0.5 MG/G
OINTMENT TOPICAL ONCE
OUTPATIENT
Start: 2025-01-16 | End: 2025-01-16

## 2025-01-16 RX ORDER — MUPIROCIN 20 MG/G
OINTMENT TOPICAL ONCE
OUTPATIENT
Start: 2025-01-16 | End: 2025-01-16

## 2025-01-16 RX ORDER — GINSENG 100 MG
CAPSULE ORAL ONCE
OUTPATIENT
Start: 2025-01-16 | End: 2025-01-16

## 2025-01-16 RX ORDER — NEOMYCIN/BACITRACIN/POLYMYXINB 3.5-400-5K
OINTMENT (GRAM) TOPICAL ONCE
OUTPATIENT
Start: 2025-01-16 | End: 2025-01-16

## 2025-01-16 RX ORDER — LIDOCAINE 50 MG/G
OINTMENT TOPICAL ONCE
OUTPATIENT
Start: 2025-01-16 | End: 2025-01-16

## 2025-01-16 RX ORDER — LIDOCAINE 40 MG/G
CREAM TOPICAL ONCE
OUTPATIENT
Start: 2025-01-16 | End: 2025-01-16

## 2025-01-16 RX ORDER — LIDOCAINE HYDROCHLORIDE 40 MG/ML
SOLUTION TOPICAL ONCE
OUTPATIENT
Start: 2025-01-16 | End: 2025-01-16

## 2025-01-16 RX ADMIN — LIDOCAINE HYDROCHLORIDE: 20 JELLY TOPICAL at 10:30

## 2025-01-16 NOTE — PATIENT INSTRUCTIONS
[facility] Wound Care and Hyperbaric Oxygen Therapy   Physician Orders and Discharge Instructions  [address] [place] [address]  [address]  [place] [address]  Telephone: [phone]      FAX [phone]    NAME:  Vonda Lutz  YOB: 1970  MEDICAL RECORD NUMBER:  [id number]  DATE:  1/16/2025    Discharge condition: Stable    Discharge to: Home    Left via:Private automobile    Accompanied by: Self    ECF/HHA: None     Dressing Orders: Left Lower Ext Ulcer  Xeroform to open area, cover with adhesive bandage, change every other day  Wear compression stockings or ace wrap from base of the toes to just below the knee  Elevate feet to level or above heart 3-4 times daily and as needed to for 30 minutes to reduce swelling  Multi Vitamin, High Protein diet as tolerated    [facility] follow up visit ___________1 week__________________  (Please note your next appointment above and if you are unable to keep, kindly give a 24 hour notice. Thank you.)    If you experience any of the following, please call the Wound Care Center during business hours:    * Increase in Pain  * Temperature over 101  * Increase in drainage from your wound  * Drainage with a foul odor  * Bleeding  * Increase in swelling  * Need for compression bandage changes due to slippage, breakthrough drainage.    If you need medical attention outside of the business hours of the Wound Care Centers please contact your PCP or go to the nearest emergency room.

## 2025-01-16 NOTE — TELEPHONE ENCOUNTER
Pt called back after I had left a vm stating that I had some test results to give. I gave pt test results and she voiced understanding.

## 2025-01-16 NOTE — PLAN OF CARE
Problem: Pain  Goal: Verbalizes/displays adequate comfort level or baseline comfort level  Outcome: Progressing     Problem: Wound:  Goal: Will show signs of wound healing; wound closure and no evidence of infection  Description: Will show signs of wound healing; wound closure and no evidence of infection  Outcome: Progressing     Problem: Venous:  Goal: Signs of wound healing will improve  Description: Signs of wound healing will improve  Outcome: Progressing     Problem: Smoking cessation:  Goal: Ability to formulate a plan to maintain a tobacco-free life will be supported  Description: Ability to formulate a plan to maintain a tobacco-free life will be supported  Outcome: Progressing     Problem: Compression therapy:  Goal: Will be free from complications associated with compression therapy  Description: Will be free from complications associated with compression therapy  Outcome: Progressing

## 2025-01-16 NOTE — PROGRESS NOTES
Relevant Orders    Initiate Outpatient Wound Care Protocol    Chronic ulcer of left foot, with fat layer exposed (HCC) - Primary    Relevant Orders    Initiate Outpatient Wound Care Protocol    PVD (peripheral vascular disease) (HCC)    Relevant Orders    Initiate Outpatient Wound Care Protocol     Wound stable  cont current care RTO 1 week      Treatment Note please see attached Discharge Instructions    In my professional opinion this patient would benefit from HBO Therapy: No    Written patient dismissal instructions given to patient and signed by patient or POA.         Dressing Orders: Left Lower Ext Ulcer  Xeroform to open area, cover with adhesive bandage, change every other day  Wear compression stockings or ace wrap from base of the toes to just below the knee  Elevate feet to level or above heart 3-4 times daily and as needed to for 30 minutes to reduce swelling  Multi Vitamin, High Protein diet as tolerated  [facility] follow up visit ___________1 week__________________  (Please note your next appointment above and if you are unable to keep, kindly give a 24 hour notice. Thank  you.)  If you experience any of the following, please call the Wound Care Center during business hours:  * Increase in Pain  * Temperature over 101  * Increase in drainage from your wound  * Drainage with a foul odor  * Bleeding  * Increase in swelling  * Need for compression bandage changes due to slippage, breakthrough drainage.  If you need medical attention outside of the business hours of the Wound Care Centers please contact your PCP  or go to the nearest emergency room.    Electronically signed by Connor Pedroza MD on 1/16/2025 at 10:54 AM

## 2025-01-20 ENCOUNTER — HOSPITAL ENCOUNTER (OUTPATIENT)
Dept: VASCULAR LAB | Age: 55
Discharge: HOME | End: 2025-01-22
Payer: MEDICARE

## 2025-01-20 DIAGNOSIS — M79.605 LEFT LEG PAIN: Primary | ICD-10-CM

## 2025-01-20 PROCEDURE — 93970 EXTREMITY STUDY: CPT

## 2025-01-23 ENCOUNTER — HOSPITAL ENCOUNTER (OUTPATIENT)
Dept: WOUND CARE | Age: 55
Discharge: HOME | End: 2025-01-23
Attending: SURGERY
Payer: MEDICARE

## 2025-01-23 VITALS
DIASTOLIC BLOOD PRESSURE: 67 MMHG | RESPIRATION RATE: 18 BRPM | HEART RATE: 90 BPM | TEMPERATURE: 98.6 F | SYSTOLIC BLOOD PRESSURE: 117 MMHG

## 2025-01-23 DIAGNOSIS — I73.9 PVD (PERIPHERAL VASCULAR DISEASE) (HCC): ICD-10-CM

## 2025-01-23 DIAGNOSIS — L97.922 CHRONIC ULCER OF LEFT LEG, WITH FAT LAYER EXPOSED (HCC): ICD-10-CM

## 2025-01-23 DIAGNOSIS — L97.522 CHRONIC ULCER OF LEFT FOOT, WITH FAT LAYER EXPOSED (HCC): Primary | ICD-10-CM

## 2025-01-23 LAB
VAS LEFT CFV RFX: 1.4 S
VAS LEFT GSV JUNC DIAM: 5 MM
VAS LEFT GSV JUNC RFX: 1.5 S
VAS LEFT GSV THIGH MID DIAM: 1.1 MM
VAS LEFT GSV THIGH PROX DIAM: 2.8 MM
VAS LEFT GSV THIGH PROX RFX: 0 S
VAS LEFT GSV THIGHT MID RFX: 0 S
VAS LEFT SSV DIST DIAM: 1.8 MM
VAS LEFT SSV DIST RFX: 0 S
VAS LEFT SSV MID DIAM: 1.7 MM
VAS LEFT SSV MID RFX: 2.5 S
VAS LEFT SSV PROX DIAM: 1.2 MM
VAS LEFT SSV PROX RFX: 0 S
VAS RIGHT CFV RFX: 1.9 S
VAS RIGHT GSV AK RFX: 0 S
VAS RIGHT GSV AT KNEE DIAM: 1.6 MM
VAS RIGHT GSV JUNC DIAM: 5 MM
VAS RIGHT GSV JUNC RFX: 0.8 S
VAS RIGHT GSV THIGH MID DIAM: 1.6 MM
VAS RIGHT GSV THIGH MID RFX: 0 S
VAS RIGHT GSV THIGH PROX DIAM: 3.5 MM
VAS RIGHT GSV THIGH PROX RFX: 0 S
VAS RIGHT SSV DIST DIAM: 1.6 MM
VAS RIGHT SSV DIST RFX: 0 S
VAS RIGHT SSV MID DIAM: 1.2 MM
VAS RIGHT SSV MID RFX: 0 S
VAS RIGHT SSV PROX DIAM: 1.6 MM
VAS RIGHT SSV PROX RFX: 0 S

## 2025-01-23 PROCEDURE — 99212 OFFICE O/P EST SF 10 MIN: CPT | Performed by: SURGERY

## 2025-01-23 PROCEDURE — 99212 OFFICE O/P EST SF 10 MIN: CPT

## 2025-01-23 RX ORDER — LIDOCAINE 50 MG/G
OINTMENT TOPICAL ONCE
OUTPATIENT
Start: 2025-01-23 | End: 2025-01-23

## 2025-01-23 RX ORDER — BACITRACIN ZINC AND POLYMYXIN B SULFATE 500; 1000 [USP'U]/G; [USP'U]/G
OINTMENT TOPICAL ONCE
OUTPATIENT
Start: 2025-01-23 | End: 2025-01-23

## 2025-01-23 RX ORDER — GENTAMICIN SULFATE 1 MG/G
OINTMENT TOPICAL ONCE
OUTPATIENT
Start: 2025-01-23 | End: 2025-01-23

## 2025-01-23 RX ORDER — CLOBETASOL PROPIONATE 0.5 MG/G
OINTMENT TOPICAL ONCE
OUTPATIENT
Start: 2025-01-23 | End: 2025-01-23

## 2025-01-23 RX ORDER — SODIUM CHLOR/HYPOCHLOROUS ACID 0.033 %
SOLUTION, IRRIGATION IRRIGATION ONCE
OUTPATIENT
Start: 2025-01-23 | End: 2025-01-23

## 2025-01-23 RX ORDER — LIDOCAINE HYDROCHLORIDE 20 MG/ML
JELLY TOPICAL ONCE
OUTPATIENT
Start: 2025-01-23 | End: 2025-01-23

## 2025-01-23 RX ORDER — LIDOCAINE 40 MG/G
CREAM TOPICAL ONCE
OUTPATIENT
Start: 2025-01-23 | End: 2025-01-23

## 2025-01-23 RX ORDER — NEOMYCIN/BACITRACIN/POLYMYXINB 3.5-400-5K
OINTMENT (GRAM) TOPICAL ONCE
OUTPATIENT
Start: 2025-01-23 | End: 2025-01-23

## 2025-01-23 RX ORDER — LIDOCAINE HYDROCHLORIDE 40 MG/ML
SOLUTION TOPICAL ONCE
OUTPATIENT
Start: 2025-01-23 | End: 2025-01-23

## 2025-01-23 RX ORDER — BETAMETHASONE DIPROPIONATE 0.5 MG/G
CREAM TOPICAL ONCE
OUTPATIENT
Start: 2025-01-23 | End: 2025-01-23

## 2025-01-23 RX ORDER — TRIAMCINOLONE ACETONIDE 1 MG/G
OINTMENT TOPICAL ONCE
OUTPATIENT
Start: 2025-01-23 | End: 2025-01-23

## 2025-01-23 RX ORDER — LIDOCAINE HYDROCHLORIDE 20 MG/ML
JELLY TOPICAL ONCE
Status: COMPLETED | OUTPATIENT
Start: 2025-01-23 | End: 2025-01-23

## 2025-01-23 RX ORDER — MUPIROCIN 20 MG/G
OINTMENT TOPICAL ONCE
OUTPATIENT
Start: 2025-01-23 | End: 2025-01-23

## 2025-01-23 RX ORDER — SILVER SULFADIAZINE 10 MG/G
CREAM TOPICAL ONCE
OUTPATIENT
Start: 2025-01-23 | End: 2025-01-23

## 2025-01-23 RX ORDER — GINSENG 100 MG
CAPSULE ORAL ONCE
OUTPATIENT
Start: 2025-01-23 | End: 2025-01-23

## 2025-01-23 RX ADMIN — LIDOCAINE HYDROCHLORIDE: 20 JELLY TOPICAL at 11:14

## 2025-01-23 NOTE — PATIENT INSTRUCTIONS
[facility] Wound Care and Hyperbaric Oxygen Therapy   Physician Orders and Discharge Instructions  [address] [place] [address]  [address]  [place] [address]  Telephone: [phone]      FAX [phone]    NAME:  Vonda Lutz  YOB: 1970  MEDICAL RECORD NUMBER:  [id number]  DATE:  1/23/2025    Discharge condition: Stable    Discharge to: Home    Left via:Private automobile    Accompanied by: Self    ECF/HHA: None    Dressing Orders: Left Lower Ext Ulcer  Xeroform to open area, cover with adhesive bandage, change every other day  Wear compression stockings or ace wrap from base of the toes to just below the knee  Elevate feet to level or above heart 3-4 times daily and as needed to for 30 minutes to reduce swelling  Multi Vitamin, High Protein diet as tolerated    [facility] follow up visit _____2 weeks____________________  (Please note your next appointment above and if you are unable to keep, kindly give a 24 hour notice. Thank you.)    If you experience any of the following, please call the Wound Care Center during business hours:    * Increase in Pain  * Temperature over 101  * Increase in drainage from your wound  * Drainage with a foul odor  * Bleeding  * Increase in swelling  * Need for compression bandage changes due to slippage, breakthrough drainage.    If you need medical attention outside of the business hours of the Wound Care Centers please contact your PCP or go to the nearest emergency room.

## 2025-01-28 ENCOUNTER — OFFICE VISIT (OUTPATIENT)
Dept: VASCULAR SURGERY | Age: 55
End: 2025-01-28
Payer: MEDICARE

## 2025-01-28 VITALS
DIASTOLIC BLOOD PRESSURE: 70 MMHG | HEART RATE: 115 BPM | TEMPERATURE: 98.9 F | OXYGEN SATURATION: 100 % | SYSTOLIC BLOOD PRESSURE: 130 MMHG

## 2025-01-28 DIAGNOSIS — I87.2 VENOUS INSUFFICIENCY: ICD-10-CM

## 2025-01-28 DIAGNOSIS — I70.213 ATHEROSCLER OF NATIVE ARTERY OF BOTH LEGS WITH INTERMIT CLAUDICATION: ICD-10-CM

## 2025-01-28 DIAGNOSIS — I73.9 PVD (PERIPHERAL VASCULAR DISEASE) (HCC): Primary | ICD-10-CM

## 2025-01-28 PROCEDURE — 99213 OFFICE O/P EST LOW 20 MIN: CPT | Performed by: NURSE PRACTITIONER

## 2025-01-28 PROCEDURE — 4004F PT TOBACCO SCREEN RCVD TLK: CPT | Performed by: NURSE PRACTITIONER

## 2025-01-28 PROCEDURE — G8427 DOCREV CUR MEDS BY ELIG CLIN: HCPCS | Performed by: NURSE PRACTITIONER

## 2025-01-28 PROCEDURE — G8419 CALC BMI OUT NRM PARAM NOF/U: HCPCS | Performed by: NURSE PRACTITIONER

## 2025-01-28 PROCEDURE — 3017F COLORECTAL CA SCREEN DOC REV: CPT | Performed by: NURSE PRACTITIONER

## 2025-01-28 RX ORDER — ATORVASTATIN CALCIUM 10 MG/1
10 TABLET, FILM COATED ORAL DAILY
Qty: 30 TABLET | Refills: 0 | OUTPATIENT
Start: 2025-01-28

## 2025-01-28 NOTE — PROGRESS NOTES
Vonda Lutz (:  1970) is a 54 y.o. female,Established patient, here for evaluation of the following chief complaint(s):  Follow-up (2 week follow up angiogram, discuss reflux study)            SUBJECTIVE/OBJECTIVE:  Patient presents for follow up after an arteriogram done 2 weeks ago.  Procedure was done for peripheral vascular disease with wound. Post op problems include none.  Angiogram complications were none.  Post op pain and swelling are minimal.  At present, she reports claudication at a distance.  Vonda reports that the right leg is equal to the left.  She reports claudication is not changed and is mostly in the form of generalized weakness starting in the calves. She has a short recovery time. This is reproducible in nature. She reports ischemic rest pain 0 times per night.  She reports walking with cart does not help.      I have personally reviewed the following: problem list, current meds, allergies, PMH, PSH, family hx, and social hx  Vonda Lutz is a 54 y.o. female with the following history as recorded in [facility]:  Patient Active Problem List    Diagnosis Date Noted    Atherosclerosis of native arteries of extremities with intermittent claudication, bilateral legs ([facility]) 2025    Major depressive disorder, recurrent, mild 2024    Protein calorie malnutrition 2024    Aorto-iliac disease ([facility]) 2024    Severe malnutrition ([facility]) 2024    Aortoiliac occlusive disease ([facility]) 2024    Iron deficiency anemia secondary to inadequate dietary iron intake 2024    Iron malabsorption 2024    Peripheral vascular disease, unspecified ([facility]) 2024    Chronic ulcer of left foot, with fat layer exposed ([facility]) 2024    Chronic ulcer of left leg, with fat layer exposed ([facility]) 2024    Lymphedema 2024    PVD (peripheral vascular disease) ([facility]) 2024     Current Outpatient Medications   Medication Sig Dispense Refill    Multiple

## 2025-02-03 ENCOUNTER — TELEPHONE (OUTPATIENT)
Dept: VASCULAR SURGERY | Age: 55
End: 2025-02-03

## 2025-02-03 NOTE — TELEPHONE ENCOUNTER
Called and spoke to the patient to let her know the following.  Per Carissa she has already sent the information to Dr. Pedroza.  The patient is aware of her appointment in April.                    ----- Message from URSZULA Camara sent at 2/3/2025  7:24 AM CST -----  Please call patient.  Let her know I discussed with VI.  She does not feel treatment of veins will help with wound healing.  She does not think excision of this wound will work.  She does not think she is a candidate for a skin graft.  We will follow up in April with her abdoulaye as scheduled.

## 2025-02-06 ENCOUNTER — HOSPITAL ENCOUNTER (OUTPATIENT)
Dept: WOUND CARE | Age: 55
Discharge: HOME | End: 2025-02-06
Attending: SURGERY
Payer: MEDICARE

## 2025-02-06 VITALS
HEART RATE: 94 BPM | SYSTOLIC BLOOD PRESSURE: 118 MMHG | DIASTOLIC BLOOD PRESSURE: 70 MMHG | RESPIRATION RATE: 18 BRPM | WEIGHT: 88 LBS | HEIGHT: 59 IN | TEMPERATURE: 97.9 F | BODY MASS INDEX: 17.74 KG/M2

## 2025-02-06 DIAGNOSIS — L97.922 CHRONIC ULCER OF LEFT LEG, WITH FAT LAYER EXPOSED (HCC): ICD-10-CM

## 2025-02-06 DIAGNOSIS — L97.522 CHRONIC ULCER OF LEFT FOOT, WITH FAT LAYER EXPOSED (HCC): ICD-10-CM

## 2025-02-06 DIAGNOSIS — I73.9 PVD (PERIPHERAL VASCULAR DISEASE) (HCC): Primary | ICD-10-CM

## 2025-02-06 PROCEDURE — 99212 OFFICE O/P EST SF 10 MIN: CPT | Performed by: SURGERY

## 2025-02-06 PROCEDURE — 99212 OFFICE O/P EST SF 10 MIN: CPT

## 2025-02-06 RX ORDER — LIDOCAINE HYDROCHLORIDE 20 MG/ML
JELLY TOPICAL ONCE
Status: COMPLETED | OUTPATIENT
Start: 2025-02-06 | End: 2025-02-06

## 2025-02-06 RX ADMIN — LIDOCAINE HYDROCHLORIDE: 20 JELLY TOPICAL at 11:38

## 2025-02-06 NOTE — PROGRESS NOTES
[facility] Wound Care Center   Progress Note and Procedure Note      Vonda Lutz  MEDICAL RECORD NUMBER:  [id number]  AGE: 54 y.o.   GENDER: female  : 1970  EPISODE DATE:  2025    Subjective:     Chief Complaint   Patient presents with    Wound Check     Patient presents today for recheck left leg wound.         HISTORY of PRESENT ILLNESS HPI     Vonda Lutz is a 54 y.o. female who presents today for wound/ulcer evaluation.   History of Wound Context: Pt with LLE lesion biopsy negative here for eval/treat  Wound/Ulcer Pain Timing/Severity: none  Quality of pain: N/A  Severity:  0 / 10   Modifying Factors: None  Associated Signs/Symptoms: none    Ulcer Identification:  Ulcer Type: non-healing/non-surgical  Contributing Factors: none    Wound:  unknown etiology        PAST MEDICAL HISTORY        Diagnosis Date    Anxiety 1984    Ongoing    Arthritis     CAD (coronary artery disease)     Closed fracture of left foot     Depression     Ongoing    Gallbladder anomaly     Hip fracture (HCC)     x2    History of blood transfusion     Hyperlipidemia     Hypertension     Hyperthyroidism 2020    Negative 2021    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

## 2025-02-06 NOTE — PATIENT INSTRUCTIONS
[facility] Wound Care and Hyperbaric Oxygen Therapy   Physician Orders and Discharge Instructions  [address] [place] [address]  [address]  [place] [address]  Telephone: [phone]      FAX [phone]    NAME:  Vonda Lutz  YOB: 1970  MEDICAL RECORD NUMBER:  [id number]  DATE:  2/6/2025    Discharge condition: Stable    Discharge to: Home    Left via:Private automobile    Accompanied by: Self    ECF/HHA: None    Dressing Orders: Left Lower Ext Ulcer  Xeroform to open area, cover with adhesive bandage, change every other day  Wear compression stockings or ace wrap from base of the toes to just below the knee  Elevate feet to level or above heart 3-4 times daily and as needed to for 30 minutes to reduce swelling  Multi Vitamin, High Protein diet as tolerated  Follow up with Dermatology on 2/14/25 at 2:35    [facility] follow up visit ___________2 weeks__________________  (Please note your next appointment above and if you are unable to keep, kindly give a 24 hour notice. Thank you.)    If you experience any of the following, please call the Wound Care Center during business hours:    * Increase in Pain  * Temperature over 101  * Increase in drainage from your wound  * Drainage with a foul odor  * Bleeding  * Increase in swelling  * Need for compression bandage changes due to slippage, breakthrough drainage.    If you need medical attention outside of the business hours of the Wound Care Centers please contact your PCP or go to the nearest emergency room.

## 2025-02-17 ENCOUNTER — TELEPHONE (OUTPATIENT)
Dept: WOUND CARE | Age: 55
End: 2025-02-17

## 2025-02-17 DIAGNOSIS — E04.2 NONTOXIC MULTINODULAR GOITER: Primary | ICD-10-CM

## 2025-02-17 NOTE — TELEPHONE ENCOUNTER
Ms Lutz requested to have her appointment move out until March due to her transportation situation and her pending Dermatology appointment, Dr Pedroza said that was fine, but call if she needs to be seen sooner.

## 2025-02-26 ENCOUNTER — TELEPHONE (OUTPATIENT)
Dept: VASCULAR SURGERY | Age: 55
End: 2025-02-26

## 2025-02-26 NOTE — TELEPHONE ENCOUNTER
The patient came into the office today and left a [employer] consent form for her to be able to get rides to her appointment.  I called the patient to let her know I got that signed and have faxed it in for her.  The copy is in EPIC.  The patient voiced understanding.

## 2025-03-03 ENCOUNTER — HOSPITAL ENCOUNTER (OUTPATIENT)
Dept: WOUND CARE | Age: 55
Discharge: HOME | End: 2025-03-03
Attending: SURGERY
Payer: MEDICARE

## 2025-03-03 VITALS
HEART RATE: 63 BPM | DIASTOLIC BLOOD PRESSURE: 76 MMHG | BODY MASS INDEX: 17.74 KG/M2 | TEMPERATURE: 98 F | RESPIRATION RATE: 18 BRPM | WEIGHT: 88 LBS | HEIGHT: 59 IN | SYSTOLIC BLOOD PRESSURE: 131 MMHG

## 2025-03-03 DIAGNOSIS — I10 PRIMARY HYPERTENSION: ICD-10-CM

## 2025-03-03 DIAGNOSIS — I73.9 PVD (PERIPHERAL VASCULAR DISEASE): Primary | ICD-10-CM

## 2025-03-03 DIAGNOSIS — L97.522 CHRONIC ULCER OF LEFT FOOT, WITH FAT LAYER EXPOSED (HCC): ICD-10-CM

## 2025-03-03 DIAGNOSIS — L97.922 CHRONIC ULCER OF LEFT LEG, WITH FAT LAYER EXPOSED (HCC): ICD-10-CM

## 2025-03-03 DIAGNOSIS — I70.213 ATHEROSCLER OF NATIVE ARTERY OF BOTH LEGS WITH INTERMIT CLAUDICATION: ICD-10-CM

## 2025-03-03 LAB
ALBUMIN SERPL-MCNC: 3.6 G/DL (ref 3.5–5.2)
ALP SERPL-CCNC: 138 U/L (ref 35–104)
ALT SERPL-CCNC: 16 U/L (ref 5–33)
ANION GAP SERPL CALCULATED.3IONS-SCNC: 10 MMOL/L (ref 8–16)
AST SERPL-CCNC: 24 U/L (ref 5–32)
BASOPHILS # BLD: 0.1 K/UL (ref 0–0.2)
BASOPHILS NFR BLD: 0.6 % (ref 0–1)
BILIRUB SERPL-MCNC: <0.2 MG/DL (ref 0.2–1.2)
BUN SERPL-MCNC: 25 MG/DL (ref 6–20)
CALCIUM SERPL-MCNC: 9.2 MG/DL (ref 8.6–10)
CHLORIDE SERPL-SCNC: 101 MMOL/L (ref 98–107)
CHOLEST SERPL-MCNC: 134 MG/DL (ref 0–199)
CO2 SERPL-SCNC: 29 MMOL/L (ref 22–29)
CREAT SERPL-MCNC: 2.1 MG/DL (ref 0.5–0.9)
EOSINOPHIL # BLD: 0.2 K/UL (ref 0–0.6)
EOSINOPHIL NFR BLD: 2.8 % (ref 0–5)
ERYTHROCYTE [DISTWIDTH] IN BLOOD BY AUTOMATED COUNT: 12.5 % (ref 11.5–14.5)
GLUCOSE SERPL-MCNC: 100 MG/DL (ref 70–99)
HCT VFR BLD AUTO: 29.6 % (ref 37–47)
HDLC SERPL-MCNC: 60 MG/DL (ref 40–60)
HGB BLD-MCNC: 9.1 G/DL (ref 12–16)
IMM GRANULOCYTES # BLD: 0 K/UL
LDLC SERPL CALC-MCNC: 49 MG/DL
LYMPHOCYTES # BLD: 2.7 K/UL (ref 1.1–4.5)
LYMPHOCYTES NFR BLD: 31.1 % (ref 20–40)
MCH RBC QN AUTO: 28.4 PG (ref 27–31)
MCHC RBC AUTO-ENTMCNC: 30.7 G/DL (ref 33–37)
MCV RBC AUTO: 92.5 FL (ref 81–99)
MONOCYTES # BLD: 0.8 K/UL (ref 0–0.9)
MONOCYTES NFR BLD: 9.2 % (ref 0–10)
NEUTROPHILS # BLD: 4.9 K/UL (ref 1.5–7.5)
NEUTS SEG NFR BLD: 56 % (ref 50–65)
PLATELET # BLD AUTO: 464 K/UL (ref 130–400)
PMV BLD AUTO: 10.1 FL (ref 9.4–12.3)
POTASSIUM SERPL-SCNC: 4 MMOL/L (ref 3.5–5.1)
PROT SERPL-MCNC: 7 G/DL (ref 6.4–8.3)
RBC # BLD AUTO: 3.2 M/UL (ref 4.2–5.4)
SODIUM SERPL-SCNC: 140 MMOL/L (ref 136–145)
TRIGL SERPL-MCNC: 126 MG/DL (ref 0–149)
WBC # BLD AUTO: 8.7 K/UL (ref 4.8–10.8)

## 2025-03-03 PROCEDURE — 99212 OFFICE O/P EST SF 10 MIN: CPT

## 2025-03-03 PROCEDURE — 99212 OFFICE O/P EST SF 10 MIN: CPT | Performed by: SURGERY

## 2025-03-03 RX ORDER — ATORVASTATIN CALCIUM 10 MG/1
10 TABLET, FILM COATED ORAL DAILY
Qty: 30 TABLET | Refills: 5 | Status: SHIPPED | OUTPATIENT
Start: 2025-03-03

## 2025-03-03 RX ORDER — LIDOCAINE HYDROCHLORIDE 20 MG/ML
JELLY TOPICAL ONCE
Status: COMPLETED | OUTPATIENT
Start: 2025-03-03 | End: 2025-03-03

## 2025-03-03 RX ADMIN — LIDOCAINE HYDROCHLORIDE: 20 JELLY TOPICAL at 11:20

## 2025-03-03 ASSESSMENT — PAIN DESCRIPTION - ONSET: ONSET: ON-GOING

## 2025-03-03 ASSESSMENT — PAIN - FUNCTIONAL ASSESSMENT: PAIN_FUNCTIONAL_ASSESSMENT: PREVENTS OR INTERFERES SOME ACTIVE ACTIVITIES AND ADLS

## 2025-03-03 ASSESSMENT — PAIN SCALES - GENERAL: PAINLEVEL_OUTOF10: 7

## 2025-03-03 ASSESSMENT — PAIN DESCRIPTION - ORIENTATION: ORIENTATION: LEFT

## 2025-03-03 ASSESSMENT — PAIN DESCRIPTION - LOCATION: LOCATION: BACK;HIP

## 2025-03-03 ASSESSMENT — PAIN DESCRIPTION - DESCRIPTORS: DESCRIPTORS: ACHING;SORE;TENDER

## 2025-03-03 ASSESSMENT — PAIN DESCRIPTION - PAIN TYPE: TYPE: CHRONIC PAIN

## 2025-03-03 NOTE — PLAN OF CARE
Problem: Pain  Goal: Verbalizes/displays adequate comfort level or baseline comfort level  Outcome: Progressing     Problem: Wound:  Goal: Will show signs of wound healing; wound closure and no evidence of infection  Description: Will show signs of wound healing; wound closure and no evidence of infection  Outcome: Progressing     Problem: Venous:  Goal: Signs of wound healing will improve  Description: Signs of wound healing will improve  Outcome: Progressing     Problem: Smoking cessation:  Goal: Ability to formulate a plan to maintain a tobacco-free life will be supported  Description: Ability to formulate a plan to maintain a tobacco-free life will be supported  Outcome: Progressing     Problem: Compression therapy:  Goal: Will be free from complications associated with compression therapy  Description: Will be free from complications associated with compression therapy  Outcome: Progressing     Problem: Falls - Risk of:  Goal: Will remain free from falls  Description: Will remain free from falls  Outcome: Progressing     Problem: Weight control:  Goal: Ability to maintain an optimal weight for height and age will be supported  Description: Ability to maintain an optimal weight for height and age will be supported  Outcome: Progressing

## 2025-03-03 NOTE — PATIENT INSTRUCTIONS
[facility] Wound Care and Hyperbaric Oxygen Therapy   Physician Orders and Discharge Instructions  [address] [place] [address]  [address]  [place] [address]  Telephone: [phone]      FAX [phone]    NAME:  Vonda Lutz  YOB: 1970  MEDICAL RECORD NUMBER:  [id number]  DATE:  3/3/2025    Discharge condition: Stable    Discharge to: Home    Left via:Private automobile    Accompanied by: Self    ECF/HHA: None    Dressing Orders: Left Lower Ext Ulcer  Xeroform to open area, cover with adhesive bandage, change every other day  Wear compression stockings or ace wrap from base of the toes to just below the knee  Elevate feet to level or above heart 3-4 times daily and as needed to for 30 minutes to reduce swelling  Multi Vitamin, High Protein diet as tolerated  Dermatology appointment 4/21    [facility] follow up visit ___________3 weeks__________________  (Please note your next appointment above and if you are unable to keep, kindly give a 24 hour notice. Thank you.)    If you experience any of the following, please call the Wound Care Center during business hours:    * Increase in Pain  * Temperature over 101  * Increase in drainage from your wound  * Drainage with a foul odor  * Bleeding  * Increase in swelling  * Need for compression bandage changes due to slippage, breakthrough drainage.    If you need medical attention outside of the business hours of the Wound Care Centers please contact your PCP or go to the nearest emergency room.

## 2025-03-03 NOTE — PROGRESS NOTES
[facility] Wound Care Center   Progress Note and Procedure Note      Vonda Lutz  MEDICAL RECORD NUMBER:  [id number]  AGE: 54 y.o.   GENDER: female  : 1970  EPISODE DATE:  3/3/2025    Subjective:     Chief Complaint   Patient presents with    Wound Check     Patient presents today for recheck left leg wound.         HISTORY of PRESENT ILLNESS HPI     Vonda Lutz is a 54 y.o. female who presents today for wound/ulcer evaluation.   History of Wound Context: Pt with LLE lesion waiting for excision by vascular.   Pt next appointment   Wound/Ulcer Pain Timing/Severity: none  Quality of pain: N/A  Severity:  0 / 10   Modifying Factors: None  Associated Signs/Symptoms: none    Ulcer Identification:  Ulcer Type: undetermined  Contributing Factors: none    Wound:  unknown        PAST MEDICAL HISTORY        Diagnosis Date    Anxiety 1984    Ongoing    Arthritis     CAD (coronary artery disease)     Closed fracture of left foot     Depression     Ongoing    Gallbladder anomaly     Hip fracture (HCC)     x2    History of blood transfusion     Hyperlipidemia     Hypertension     Hyperthyroidism 2020    Negative 2021    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

## 2025-03-07 ENCOUNTER — TELEPHONE (OUTPATIENT)
Dept: INTERNAL MEDICINE | Age: 55
End: 2025-03-07

## 2025-03-07 DIAGNOSIS — R74.8 ELEVATED ALKALINE PHOSPHATASE LEVEL: Primary | ICD-10-CM

## 2025-03-07 NOTE — TELEPHONE ENCOUNTER
Patient did call to reschedule appointment. Call was received through [facility]. Attempted to contact patient but had to [facility].

## 2025-03-07 NOTE — TELEPHONE ENCOUNTER
I do not see where she has a follow-up with us.  Her cholesterol is 134.  Her anemia is stable.  Her kidney function is down quite a bit.  We need to get her into see a nephrologist.  Also, her alkaline phosphatase is elevated.  Get an ultrasound of her liver

## 2025-03-11 ENCOUNTER — HOSPITAL ENCOUNTER (OUTPATIENT)
Dept: ULTRASOUND IMAGING | Age: 55
Discharge: HOME | End: 2025-03-11
Payer: MEDICARE

## 2025-03-11 DIAGNOSIS — E04.2 NONTOXIC MULTINODULAR GOITER: ICD-10-CM

## 2025-03-11 DIAGNOSIS — R74.8 ELEVATED ALKALINE PHOSPHATASE LEVEL: ICD-10-CM

## 2025-03-11 PROCEDURE — 76705 ECHO EXAM OF ABDOMEN: CPT

## 2025-03-11 PROCEDURE — 76536 US EXAM OF HEAD AND NECK: CPT

## 2025-03-12 ENCOUNTER — OFFICE VISIT (OUTPATIENT)
Age: 55
End: 2025-03-12

## 2025-03-12 VITALS — BODY MASS INDEX: 18.14 KG/M2 | HEIGHT: 59 IN | WEIGHT: 90 LBS

## 2025-03-12 DIAGNOSIS — M25.531 RIGHT WRIST PAIN: ICD-10-CM
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
## 2025-03-12 DIAGNOSIS — S52.531A CLOSED COLLES' FRACTURE OF RIGHT RADIUS, INITIAL ENCOUNTER: Primary | ICD-10-CM

## 2025-03-12 NOTE — PROGRESS NOTES
Orthopaedic History and Physical - New Patient    NAME:  Vonda Lutz   : 1970  MRN: [id number]      3/12/2025     CHIEF COMPLAINT: Right wrist pain    HISTORY OF PRESENT ILLNESS:   Patient is a pleasant 54-year-old female that presents to the clinic today as a new patient for complaints of right wrist pain.  Patient reports that she fell around 4 AM this morning when trying to go the bathroom.  She fell onto her outstretched right arm which resulted in pain and deformity to the right wrist.  She denies any other injury, hitting her head or loss of consciousness.  Denies any anticoagulation use but reports she is on aspirin.  She states that she has a history of an ulnar fracture to the right wrist from several years ago.  Reports the pain is constant and moderate in nature.  She presents in a removable wrist splint.  She states she has been taking ibuprofen for pain relief.        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

## 2025-03-12 NOTE — PROGRESS NOTES
Casting Notes:    Type of cast/splint: Arm, Forearm Splint Application     Material Used:  1. Cast Padding: 3 inch roll x 4 rolls.     2. Ace Wrap: 3 inch roll x 2 roll.     3.    Fiberglass Cast Tape:  Plaster Splint Sheets. 4 inch x 15 inch sheets x 10 sheets.  Reason for Application:     ICD-10-CM    1. Closed Colles' fracture of right radius, initial encounter  S52.531A VA CAST SUP SHORT ARM SPLINT ADULT FIBERGLASS     APPLY FOREARM SPLINT,STATIC      2. Right wrist pain  M25.531 XR WRIST RIGHT (MIN 3 VIEWS)     VA CAST SUP SHORT ARM SPLINT ADULT FIBERGLASS     APPLY FOREARM SPLINT,STATIC      3. Fall, initial encounter  W19.XXXA VA CAST SUP SHORT ARM SPLINT ADULT FIBERGLASS     APPLY FOREARM SPLINT,STATIC           Patient was given cast care instructions, and told to call the office with any complaints, or concerns.     Patient was also told to keep their routine follow up appointment.    Toney Acevedo MA

## 2025-03-13 ENCOUNTER — PREP FOR PROCEDURE (OUTPATIENT)
Age: 55
End: 2025-03-13

## 2025-03-13 DIAGNOSIS — S52.531A CLOSED COLLES' FRACTURE OF RIGHT RADIUS, INITIAL ENCOUNTER: Primary | ICD-10-CM

## 2025-03-13 DIAGNOSIS — S52.531A COLLES' FRACTURE OF RIGHT RADIUS, INITIAL ENCOUNTER FOR CLOSED FRACTURE: ICD-10-CM

## 2025-03-13 RX ORDER — SODIUM CHLORIDE 0.9 % (FLUSH) 0.9 %
5-40 SYRINGE (ML) INJECTION PRN
Status: CANCELLED | OUTPATIENT
Start: 2025-03-20

## 2025-03-13 RX ORDER — SODIUM CHLORIDE 0.9 % (FLUSH) 0.9 %
5-40 SYRINGE (ML) INJECTION EVERY 12 HOURS SCHEDULED
Status: CANCELLED | OUTPATIENT
Start: 2025-03-20

## 2025-03-13 RX ORDER — SODIUM CHLORIDE 9 MG/ML
INJECTION, SOLUTION INTRAVENOUS PRN
Status: CANCELLED | OUTPATIENT
Start: 2025-03-20

## 2025-03-17 NOTE — DISCHARGE INSTRUCTIONS
UPPER EXTREMITY POST-OP INSTRUCTIONS - DR. JEFFERY    IMPORTANT PHONE NUMBERS:   For emergencies, please call [phone]   You may reach Dr. Jeffery and clinical staff at [phone]- M-F 8:00 am-5:00 pm   After 5pm or on the weekends, please call [phone]   Call immediately if you have any of the following symptoms:     Elevated temperature above 101.5 degrees for more than 48 hours after surgery     Persistent drainage from wound     Severe pain around surgical site    Sling use: The sling is provided for your comfort and to ensure proper healing of your repair following surgery. Please place the abduction pillow with the curved side against your side and the sling on the side of the pillow. Your surgery requires that you wear the sling if noted below.  ___ For comfort. Remove sling 24 hours and begin range of motion exercises  ____ At all times except bathing, dressing, and therapy. Also wear the sling during sleep.  __X__ No sling required    Bathing:  ___No bandages, no restrictions!!  ___You may remove you dressing and shower on the 3rd day after surgery (Ex. Tues surgery, shower on Friday)  ** if you are told to it is ok to remove your dressing and shower, DO NOT SOAK your incisions in a tub.  _x__Keep splint clean, dry, and intact. DO NOT place foreign objects into your splint.    DRIVING:  absolutely no driving while taking pain medication.  This will be discussed at your first postop visit.    Dressings: Keep dressing/splint intact unless instructed otherwise below. SOME DRAINAGE IS NORMAL!     DO NOT touch or apply ointment to the incision.     DO NOT remove the steri-strips over the incisions (if you have steri-strips). They will         generally fall off on their own or can be removed 1 weeksafter surgery.     If you have yellow gauze and it comes off, do not worry about it. Leave them off.    Signs of infection that warrant a phone call to our clinical line:     o Excessive drainage or  redness     o Red streaking coming away from the incision  o Increased pain  o Increased temperature above 101.5 degrees      Physical Therapy:        *  Your physical therapy status will be discussed with you postoperatively and at your first post-op appointment. Some injuries will not require physical therapy.      *  If you have a shoulder manipulation, please schedule therapy for the next day      Medications: You will be discharged with the appropriate medications following your surgery. Fill these at the pharmacy and take them as directed on the label. Not all of the medications below may be prescribed. Occasionally, other medications may be prescribed with specific instructions.    Percocet/Norco (oxycodone/hydrocodone with tylenol) - Pain Medication, will cause drowsiness, possibly itchiness (this is NOT an allergy - use benadryl or an over the counter allergy medication such as Claritin or Zyrtec)     o Take 1-2 tablets every 4-6 hours. DO NOT EXCEED 4,000mg of Tylenol in 24 hours.  **DO NOT MIX WITH ALCOHOL, DRIVE WHILE TAKING, OR TAKE with extra TYLENOL**     *  After the 2nd day of surgery, begin weaning pain medication (less pills, increased timeframe)     *  ALL narcotics will cause constipation - take a stool softener frequently.  If you become constipated consider taking Milk of Magnesia as directed on the bottle.    Colace (Docusate) - stool softener, used for constipation    Zofran (Ondansetron) or Phenergan - Anti-nausea medication, will cause drowsiness      **If you are running low on pain medications, please notify us if you need a refill 24-48 hours prior to when you run out, so we can make arrangements to refill the prescription for you if we determine is necessary.  [place] law prohibits \"calling in\" narcotic medication - you will be required to come by the office to  an additional prescription.

## 2025-03-18 ENCOUNTER — HOSPITAL ENCOUNTER (OUTPATIENT)
Dept: PREADMISSION TESTING | Age: 55
End: 2025-03-18
Payer: MEDICARE

## 2025-03-18 PROCEDURE — 93005 ELECTROCARDIOGRAM TRACING: CPT | Performed by: ANESTHESIOLOGY

## 2025-03-18 NOTE — DISCHARGE INSTRUCTIONS
room from        7A-7P.  A second visitor may sit in the ICU waiting room.  No overnight visitors in         ICU waiting room.

## 2025-03-19 ENCOUNTER — ANESTHESIA EVENT (OUTPATIENT)
Dept: OPERATING ROOM | Age: 55
End: 2025-03-19
Payer: MEDICARE

## 2025-03-19 LAB
EKG P AXIS: 44 DEGREES
EKG P-R INTERVAL: 132 MS
EKG Q-T INTERVAL: 358 MS
EKG QRS DURATION: 74 MS
EKG QTC CALCULATION (BAZETT): 433 MS
EKG T AXIS: 62 DEGREES

## 2025-03-19 PROCEDURE — 93010 ELECTROCARDIOGRAM REPORT: CPT | Performed by: INTERNAL MEDICINE

## 2025-03-19 NOTE — OP NOTE
Patient Name: Javi  MRN: [id number]  : 1970      [facility]    DATE of SURGERY: 3/19/2025    SURGEON: Justin Paige MD    ASSISTANT: NONE    PREOPERATIVE DIAGNOSIS:    Acute traumatic displaced Colles fracture of the Right radius, initial encounter for closed fracture    POSTOPERATIVE DIAGNOSIS:  Acute traumatic displaced Colles fracture of the Right radius, initial encounter for closed fracture    PROCEDURE PERFORMED: Open reduction internal fixation of Right extraarticular distal radius fracture    IMPLANTS: Synthes distal radial locking plate    ANESTHESIA USED: General endotrachial anesthesia    OPERATIVE INDICATIONS: 54 y.o. female status post fall at home while attempting to use the restroom 3/12/2025.  Surgical indications include fracture displacement and stabilization of fracture to prevent future deformity, pain, and loss of function.  Risks include, but are not limited to, anesthesia, bleeding, infection, pain, damage to local structures (radial artery), need for further surgery, malunion, nonunion, fracture displacement, failure of hardware, intraoperative death.  Risks, benefits, and alternative were discussed and the patient wishes to proceed with surgery.    ESTIMATED BLOOD LOSS: <10 mL    DRAINS: none     COMPLICATIONS: none    SPECIMENS: none    FINDINGS: see op note    PROCEDURE in DETAIL:  The patient was seen in the preoperative holding room, the informed consent was reviewed and signed, and the correct operative extremity marked with the patient’s agreement.  The patient was transported to the operating room, where a timeout was performed identifying the correct patient and operative site.  Perioperative antibiotics were administered prior to incision.    The upper extremity was prepped and draped in the usual sterile fashion after a tourniquet was placed on the upper brachium and inflated to 200 mm of mercury.  Total tourniquet time was <30 minutes.    A

## 2025-03-20 ENCOUNTER — APPOINTMENT (OUTPATIENT)
Dept: GENERAL RADIOLOGY | Age: 55
End: 2025-03-20
Attending: ORTHOPAEDIC SURGERY
Payer: MEDICARE

## 2025-03-20 ENCOUNTER — HOSPITAL ENCOUNTER (OUTPATIENT)
Age: 55
Setting detail: OUTPATIENT SURGERY
Discharge: HOME | End: 2025-03-20
Attending: ORTHOPAEDIC SURGERY | Admitting: ORTHOPAEDIC SURGERY
Payer: MEDICARE

## 2025-03-20 ENCOUNTER — ANESTHESIA (OUTPATIENT)
Dept: OPERATING ROOM | Age: 55
End: 2025-03-20
Payer: MEDICARE

## 2025-03-20 VITALS
HEIGHT: 59 IN | OXYGEN SATURATION: 98 % | SYSTOLIC BLOOD PRESSURE: 99 MMHG | RESPIRATION RATE: 16 BRPM | HEART RATE: 76 BPM | TEMPERATURE: 98.4 F | BODY MASS INDEX: 18.14 KG/M2 | DIASTOLIC BLOOD PRESSURE: 56 MMHG | WEIGHT: 90 LBS

## 2025-03-20 DIAGNOSIS — S52.531A COLLES' FRACTURE OF RIGHT RADIUS, INITIAL ENCOUNTER FOR CLOSED FRACTURE: Primary | ICD-10-CM

## 2025-03-20 DIAGNOSIS — S52.531A CLOSED COLLES' FRACTURE OF RIGHT RADIUS, INITIAL ENCOUNTER: ICD-10-CM

## 2025-03-20 PROCEDURE — 3600000014 HC SURGERY LEVEL 4 ADDTL 15MIN: Performed by: ORTHOPAEDIC SURGERY

## 2025-03-20 PROCEDURE — 7100000010 HC PHASE II RECOVERY - FIRST 15 MIN: Performed by: ORTHOPAEDIC SURGERY

## 2025-03-20 PROCEDURE — 6360000002 HC RX W HCPCS: Performed by: NURSE ANESTHETIST, CERTIFIED REGISTERED

## 2025-03-20 PROCEDURE — 6360000002 HC RX W HCPCS: Performed by: ANESTHESIOLOGY

## 2025-03-20 PROCEDURE — 7100000000 HC PACU RECOVERY - FIRST 15 MIN: Performed by: ORTHOPAEDIC SURGERY

## 2025-03-20 PROCEDURE — 7100000001 HC PACU RECOVERY - ADDTL 15 MIN: Performed by: ORTHOPAEDIC SURGERY

## 2025-03-20 PROCEDURE — 64415 NJX AA&/STRD BRCH PLXS IMG: CPT | Performed by: NURSE ANESTHETIST, CERTIFIED REGISTERED

## 2025-03-20 PROCEDURE — 3700000000 HC ANESTHESIA ATTENDED CARE: Performed by: ORTHOPAEDIC SURGERY

## 2025-03-20 PROCEDURE — 6360000002 HC RX W HCPCS: Performed by: ORTHOPAEDIC SURGERY

## 2025-03-20 PROCEDURE — 25607 OPTX DST RD XARTC FX/EPI SEP: CPT | Performed by: ORTHOPAEDIC SURGERY

## 2025-03-20 PROCEDURE — L3650 SO 8 ABD RESTRAINT PRE OTS: HCPCS | Performed by: ORTHOPAEDIC SURGERY

## 2025-03-20 PROCEDURE — 2709999900 HC NON-CHARGEABLE SUPPLY: Performed by: ORTHOPAEDIC SURGERY

## 2025-03-20 PROCEDURE — 73110 X-RAY EXAM OF WRIST: CPT

## 2025-03-20 PROCEDURE — 7100000011 HC PHASE II RECOVERY - ADDTL 15 MIN: Performed by: ORTHOPAEDIC SURGERY

## 2025-03-20 PROCEDURE — 3700000001 HC ADD 15 MINUTES (ANESTHESIA): Performed by: ORTHOPAEDIC SURGERY

## 2025-03-20 PROCEDURE — 2720000010 HC SURG SUPPLY STERILE: Performed by: ORTHOPAEDIC SURGERY

## 2025-03-20 PROCEDURE — 2580000003 HC RX 258: Performed by: ANESTHESIOLOGY

## 2025-03-20 PROCEDURE — 3600000004 HC SURGERY LEVEL 4 BASE: Performed by: ORTHOPAEDIC SURGERY

## 2025-03-20 PROCEDURE — C1713 ANCHOR/SCREW BN/BN,TIS/BN: HCPCS | Performed by: ORTHOPAEDIC SURGERY

## 2025-03-20 DEVICE — SCREW BNE L14MM DIA2.4MM DST RAD VOLAR S STL ST VAR ANG LOK: Type: IMPLANTABLE DEVICE | Site: ARM | Status: FUNCTIONAL

## 2025-03-20 DEVICE — PLATE BNE W19.5XL51MM NAR 6X3 H ST R DST RAD VOLAR S STL: Type: IMPLANTABLE DEVICE | Site: ARM | Status: FUNCTIONAL

## 2025-03-20 DEVICE — SCREW BNE L12MM DIA24MM CORT S STL ST T8 STARDRV RECESS: Type: IMPLANTABLE DEVICE | Site: ARM | Status: FUNCTIONAL

## 2025-03-20 DEVICE — SCREW BNE L16MM DIA2.4MM DST RAD VOLAR S STL ST VAR ANG LOK: Type: IMPLANTABLE DEVICE | Site: ARM | Status: FUNCTIONAL

## 2025-03-20 RX ORDER — METOCLOPRAMIDE HYDROCHLORIDE 5 MG/ML
10 INJECTION INTRAMUSCULAR; INTRAVENOUS
Status: DISCONTINUED | OUTPATIENT
Start: 2025-03-20 | End: 2025-03-20 | Stop reason: HOSPADM

## 2025-03-20 RX ORDER — SODIUM CHLORIDE 0.9 % (FLUSH) 0.9 %
5-40 SYRINGE (ML) INJECTION PRN
Status: DISCONTINUED | OUTPATIENT
Start: 2025-03-20 | End: 2025-03-20 | Stop reason: HOSPADM

## 2025-03-20 RX ORDER — CLINDAMYCIN PHOSPHATE 600 MG/50ML
600 INJECTION, SOLUTION INTRAVENOUS ONCE
Status: COMPLETED | OUTPATIENT
Start: 2025-03-20 | End: 2025-03-20

## 2025-03-20 RX ORDER — ONDANSETRON 2 MG/ML
INJECTION INTRAMUSCULAR; INTRAVENOUS
Status: DISCONTINUED | OUTPATIENT
Start: 2025-03-20 | End: 2025-03-20 | Stop reason: SDUPTHER

## 2025-03-20 RX ORDER — HYDROMORPHONE HYDROCHLORIDE 1 MG/ML
0.25 INJECTION, SOLUTION INTRAMUSCULAR; INTRAVENOUS; SUBCUTANEOUS EVERY 5 MIN PRN
Status: DISCONTINUED | OUTPATIENT
Start: 2025-03-20 | End: 2025-03-20 | Stop reason: HOSPADM

## 2025-03-20 RX ORDER — SODIUM CHLORIDE 9 MG/ML
INJECTION, SOLUTION INTRAVENOUS PRN
Status: DISCONTINUED | OUTPATIENT
Start: 2025-03-20 | End: 2025-03-20 | Stop reason: HOSPADM

## 2025-03-20 RX ORDER — ONDANSETRON 4 MG/1
4 TABLET, FILM COATED ORAL EVERY 8 HOURS PRN
Qty: 10 TABLET | Refills: 0 | Status: SHIPPED | OUTPATIENT
Start: 2025-03-20

## 2025-03-20 RX ORDER — ROPIVACAINE HYDROCHLORIDE 5 MG/ML
INJECTION, SOLUTION EPIDURAL; INFILTRATION; PERINEURAL
Status: COMPLETED | OUTPATIENT
Start: 2025-03-20 | End: 2025-03-20

## 2025-03-20 RX ORDER — LIDOCAINE HYDROCHLORIDE 10 MG/ML
1 INJECTION, SOLUTION EPIDURAL; INFILTRATION; INTRACAUDAL; PERINEURAL
Status: DISCONTINUED | OUTPATIENT
Start: 2025-03-20 | End: 2025-03-20 | Stop reason: HOSPADM

## 2025-03-20 RX ORDER — LIDOCAINE HYDROCHLORIDE 10 MG/ML
INJECTION, SOLUTION EPIDURAL; INFILTRATION; INTRACAUDAL; PERINEURAL
Status: DISCONTINUED | OUTPATIENT
Start: 2025-03-20 | End: 2025-03-20 | Stop reason: SDUPTHER

## 2025-03-20 RX ORDER — SCOPOLAMINE 1 MG/3D
1 PATCH, EXTENDED RELEASE TRANSDERMAL
Status: DISCONTINUED | OUTPATIENT
Start: 2025-03-20 | End: 2025-03-20 | Stop reason: HOSPADM

## 2025-03-20 RX ORDER — PROPOFOL 10 MG/ML
INJECTION, EMULSION INTRAVENOUS
Status: DISCONTINUED | OUTPATIENT
Start: 2025-03-20 | End: 2025-03-20 | Stop reason: SDUPTHER

## 2025-03-20 RX ORDER — OXYCODONE AND ACETAMINOPHEN 7.5; 325 MG/1; MG/1
1 TABLET ORAL EVERY 6 HOURS PRN
Qty: 20 TABLET | Refills: 0 | Status: SHIPPED | OUTPATIENT
Start: 2025-03-20 | End: 2025-03-25

## 2025-03-20 RX ORDER — SODIUM CHLORIDE 0.9 % (FLUSH) 0.9 %
5-40 SYRINGE (ML) INJECTION EVERY 12 HOURS SCHEDULED
Status: DISCONTINUED | OUTPATIENT
Start: 2025-03-20 | End: 2025-03-20 | Stop reason: HOSPADM

## 2025-03-20 RX ORDER — MIDAZOLAM HYDROCHLORIDE 2 MG/2ML
2 INJECTION, SOLUTION INTRAMUSCULAR; INTRAVENOUS
Status: COMPLETED | OUTPATIENT
Start: 2025-03-20 | End: 2025-03-20

## 2025-03-20 RX ORDER — SODIUM CHLORIDE, SODIUM LACTATE, POTASSIUM CHLORIDE, CALCIUM CHLORIDE 600; 310; 30; 20 MG/100ML; MG/100ML; MG/100ML; MG/100ML
INJECTION, SOLUTION INTRAVENOUS CONTINUOUS
Status: DISCONTINUED | OUTPATIENT
Start: 2025-03-20 | End: 2025-03-20 | Stop reason: HOSPADM

## 2025-03-20 RX ORDER — NALOXONE HYDROCHLORIDE 0.4 MG/ML
INJECTION, SOLUTION INTRAMUSCULAR; INTRAVENOUS; SUBCUTANEOUS PRN
Status: DISCONTINUED | OUTPATIENT
Start: 2025-03-20 | End: 2025-03-20 | Stop reason: HOSPADM

## 2025-03-20 RX ORDER — HYDROMORPHONE HYDROCHLORIDE 1 MG/ML
0.5 INJECTION, SOLUTION INTRAMUSCULAR; INTRAVENOUS; SUBCUTANEOUS EVERY 5 MIN PRN
Status: DISCONTINUED | OUTPATIENT
Start: 2025-03-20 | End: 2025-03-20 | Stop reason: HOSPADM

## 2025-03-20 RX ORDER — DIPHENHYDRAMINE HYDROCHLORIDE 50 MG/ML
12.5 INJECTION, SOLUTION INTRAMUSCULAR; INTRAVENOUS
Status: DISCONTINUED | OUTPATIENT
Start: 2025-03-20 | End: 2025-03-20 | Stop reason: HOSPADM

## 2025-03-20 RX ORDER — LIDOCAINE HYDROCHLORIDE 10 MG/ML
INJECTION, SOLUTION INFILTRATION; PERINEURAL
Status: COMPLETED | OUTPATIENT
Start: 2025-03-20 | End: 2025-03-20

## 2025-03-20 RX ORDER — ROPIVACAINE HYDROCHLORIDE 5 MG/ML
30 INJECTION, SOLUTION EPIDURAL; INFILTRATION; PERINEURAL ONCE
Status: DISCONTINUED | OUTPATIENT
Start: 2025-03-20 | End: 2025-03-20 | Stop reason: HOSPADM

## 2025-03-20 RX ORDER — FAMOTIDINE 20 MG/1
20 TABLET, FILM COATED ORAL ONCE
Status: DISCONTINUED | OUTPATIENT
Start: 2025-03-20 | End: 2025-03-20 | Stop reason: HOSPADM

## 2025-03-20 RX ADMIN — PHENYLEPHRINE HYDROCHLORIDE 80 MCG: 10 INJECTION INTRAVENOUS at 07:23

## 2025-03-20 RX ADMIN — ONDANSETRON 4 MG: 2 INJECTION INTRAMUSCULAR; INTRAVENOUS at 07:40

## 2025-03-20 RX ADMIN — PHENYLEPHRINE HYDROCHLORIDE 80 MCG: 10 INJECTION INTRAVENOUS at 07:20

## 2025-03-20 RX ADMIN — LIDOCAINE HYDROCHLORIDE 1 ML: 10 INJECTION, SOLUTION INFILTRATION; PERINEURAL at 07:00

## 2025-03-20 RX ADMIN — PHENYLEPHRINE HYDROCHLORIDE 80 MCG: 10 INJECTION INTRAVENOUS at 07:32

## 2025-03-20 RX ADMIN — ROPIVACAINE HYDROCHLORIDE 20 ML: 5 INJECTION, SOLUTION EPIDURAL; INFILTRATION; PERINEURAL at 07:00

## 2025-03-20 RX ADMIN — PROPOFOL 50 MG: 10 INJECTION, EMULSION INTRAVENOUS at 07:09

## 2025-03-20 RX ADMIN — PHENYLEPHRINE HYDROCHLORIDE 80 MCG: 10 INJECTION INTRAVENOUS at 07:29

## 2025-03-20 RX ADMIN — SODIUM CHLORIDE, SODIUM LACTATE, POTASSIUM CHLORIDE, AND CALCIUM CHLORIDE: .6; .31; .03; .02 INJECTION, SOLUTION INTRAVENOUS at 06:38

## 2025-03-20 RX ADMIN — LIDOCAINE HYDROCHLORIDE 50 MG: 10 INJECTION, SOLUTION EPIDURAL; INFILTRATION; INTRACAUDAL; PERINEURAL at 07:09

## 2025-03-20 RX ADMIN — PHENYLEPHRINE HYDROCHLORIDE 80 MCG: 10 INJECTION INTRAVENOUS at 07:43

## 2025-03-20 RX ADMIN — CLINDAMYCIN PHOSPHATE 600 MG: 600 INJECTION, SOLUTION INTRAVENOUS at 07:15

## 2025-03-20 RX ADMIN — PHENYLEPHRINE HYDROCHLORIDE 80 MCG: 10 INJECTION INTRAVENOUS at 07:40

## 2025-03-20 RX ADMIN — MIDAZOLAM HYDROCHLORIDE 2 MG: 1 INJECTION, SOLUTION INTRAMUSCULAR; INTRAVENOUS at 06:55

## 2025-03-20 RX ADMIN — PHENYLEPHRINE HYDROCHLORIDE 80 MCG: 10 INJECTION INTRAVENOUS at 07:26

## 2025-03-20 RX ADMIN — PROPOFOL 30 MG: 10 INJECTION, EMULSION INTRAVENOUS at 07:11

## 2025-03-20 ASSESSMENT — PAIN - FUNCTIONAL ASSESSMENT
PAIN_FUNCTIONAL_ASSESSMENT: NONE - DENIES PAIN
PAIN_FUNCTIONAL_ASSESSMENT: 0-10
PAIN_FUNCTIONAL_ASSESSMENT: NONE - DENIES PAIN

## 2025-03-20 ASSESSMENT — LIFESTYLE VARIABLES: SMOKING_STATUS: 1

## 2025-03-20 NOTE — BRIEF OP NOTE
Brief Postoperative Note      Patient: Vonda Lutz  YOB: 1970  MRN: [id number]    Date of Procedure: 3/20/2025    Pre-Op Diagnosis Codes:      * Colles' fracture of right radius, initial encounter for closed fracture [S52.531A]    Post-Op Diagnosis: Same       Procedure(s):  RIGHT DISTAL RADIUS OPEN REDUCTION INTERNAL FIXATION    Surgeon(s):  Justin Paige MD    Assistant:  First Assistant: Lenore Shin    Anesthesia: Choice    Estimated Blood Loss (mL): Minimal    Complications: None    Specimens:   * No specimens in log *    Implants:  Implant Name Type Inv. Item Serial No.  Lot No. LRB No. Used Action   PLATE BNE [id number] TED 6X3 H ST R DST RAD VOLAR S STL - [id number]  PLATE BNE [id number] TED 6X3 H ST R DST RAD VOLAR S STL  DE[employer]  Right 1 Implanted   SCREW BNE L14MM DIA2.4MM DST RAD VOLAR S STL ST CRISTIANO ANG KINZA - [id number]  SCREW BNE L14MM DIA2.4MM DST RAD VOLAR S STL ST CRISTIANO ANG KINZA  DEPU[employer]-WD  Right 2 Implanted   SCREW BNE L16MM DIA2.4MM DST RAD VOLAR S STL ST CRISTIANO ANG KINZA - [id number]  SCREW BNE L16MM DIA2.4MM DST RAD VOLAR S STL ST CRISTIANO ANG KINZA  DEPUY [employer]-WD  Right 2 Implanted   SCREW BNE L12MM [id number] CAROLE S STL ST T8 STARDRV RECESS - [id number]  SCREW BNE L12MM [id number] CAROLE S STL ST T8 STARDRV RECESS  DEPUY SY[employer]-WD  Right 2 Implanted         Drains: * No LDAs found *    Findings:  Infection Present At Time Of Surgery (PATOS) (choose all levels that have infection present):  No infection present  Other Findings: see op note    Electronically signed by Justin Paige MD on 3/20/2025 at 7:56 AM

## 2025-03-20 NOTE — ANESTHESIA POSTPROCEDURE EVALUATION
Department of Anesthesiology  Postprocedure Note    Patient: Vonda Lutz  MRN: [id number]  YOB: 1970  Date of evaluation: 3/20/2025    Procedure Summary       Date: 03/20/25 Room / Location: [address]    Anesthesia Start: 0703 Anesthesia Stop: 0804    Procedure: RIGHT DISTAL RADIUS OPEN REDUCTION INTERNAL FIXATION (Right: Arm Lower) Diagnosis:       Colles' fracture of right radius, initial encounter for closed fracture      (Colles' fracture of right radius, initial encounter for closed fracture [S52.531A])    Surgeons: Justin Paige MD Responsible Provider: Lawrence Hernandez APRN - CRNA    Anesthesia Type: general, regional ASA Status: 3            Anesthesia Type: No value filed.    Pillo Phase I: Pillo Score: 6    Pillo Phase II:      Anesthesia Post Evaluation    Patient location during evaluation: PACU  Patient participation: waiting for patient participation  Level of consciousness: obtunded/minimal responses  Pain score: 0  Airway patency: patent  Nausea & Vomiting: no vomiting and no nausea  Cardiovascular status: hemodynamically stable  Respiratory status: acceptable and oral airway  Hydration status: stable  Comments: BP (!) 103/53   Pulse 71   Temp 97.4 °F (36.3 °C) (Temporal)   Resp 16   Ht 1.499 m (4' 11\")   Wt 40.8 kg (90 lb)   SpO2 97%   BMI 18.18 kg/m²     Multimodal analgesia pain management approach  Pain management: adequate    No notable events documented.

## 2025-03-20 NOTE — ANESTHESIA PRE PROCEDURE
intravenous.    MIPS: Postoperative opioids intended and Prophylactic antiemetics administered.  Anesthetic plan and risks discussed with patient.              Post-op pain plan if not by surgeon: single peripheral nerve block            Billie Bone MD   3/20/2025

## 2025-03-20 NOTE — ANESTHESIA PROCEDURE NOTES
Peripheral Block    Patient location during procedure: holding area  Reason for block: post-op pain management  Start time: 3/20/2025 7:00 AM  Staffing  Performed: anesthesiologist   Anesthesiologist: Billie Bone MD  Performed by: Billie Bone MD  Authorized by: Billie Bone MD    Preanesthetic Checklist  Completed: patient identified, IV checked, site marked, risks and benefits discussed, surgical/procedural consents, equipment checked, pre-op evaluation, timeout performed, anesthesia consent given, oxygen available and monitors applied/VS acknowledged  Peripheral Block   Patient position: sitting  Prep: ChloraPrep  Provider prep: mask and sterile gloves  Patient monitoring: cardiac monitor, continuous pulse ox, frequent blood pressure checks and IV access  Block type: Brachial plexus  Supraclavicular  Laterality: right  Injection technique: single-shot  Guidance: ultrasound guided  Local infiltration: lidocaine  Infiltration strength: 1 %  Local infiltration: lidocaine  Dose: 1 mL    Needle   Needle type: combined needle/nerve stimulator   Needle gauge: 20 G  Needle localization: ultrasound guidance  Needle length: 5 cm  Assessment   Injection assessment: negative aspiration for heme, no paresthesia on injection and local visualized surrounding nerve on ultrasound  Paresthesia pain: none  Slow fractionated injection: yes  Hemodynamics: stable  Outcomes: patient tolerated procedure well and uncomplicated    Additional Notes  20 cc 0.5% Ropivacaine injected    Under ultrasound (\"US\") guidance, a 22 gauge needle was inserted and placed in close proximity to the brachial plexus nerves. Ultrasound was also used to visualize the spread of the anesthetic in close proximity to the nerve being blocked. The nerve appeared anatomically normal, and there were no abnormal pathological findings. A permanent image was recorded.   Medications Administered  lidocaine injection 1% - Subcuticular   1 mL - 3/20/2025

## 2025-03-20 NOTE — H&P
Patient Name: Vonda Lutz  MRN: [id number]  YOB: 1970  Date of evaluation: 3/20/2025    H&P including current review of systems was updated in the paper chart and/or the document previously scanned into the record.  There have been no significant changes or new problems since the original evaluation.  The patient's problems continue and indications for contemplated procedure have not changed.    Electronically signed by Justin Paige MD on 3/20/2025 at 6:15 AM.

## 2025-03-20 NOTE — PROGRESS NOTES
CLINICAL PHARMACY NOTE: MEDS TO BEDS    Total # of Prescriptions Filled: 2   The following medications were delivered to the patient:  Discharge Medication List as of 3/20/2025  8:34 AM        START taking these medications    Details   oxyCODONE-acetaminophen (PERCOCET) 7.5-325 MG per tablet Take 1 tablet by mouth every 6 hours as needed for Pain for up to 5 days. Max Daily Amount: 4 tablets, Disp-20 tablet, R-0Normal      ondansetron (ZOFRAN) 4 MG tablet Take 1 tablet by mouth every 8 hours as needed for Nausea or Vomiting, Disp-10 tablet, R-0Normal               Additional Documentation:  Patients family picked up inside pharmacy. Paid with cash.

## 2025-03-20 NOTE — PROGRESS NOTES
PATIENT IS A/OX4, SKIN P/W/D, ASSISTED DRESSING, WALKS WITH CANE, NO ACUTE DISTRESS NOTED, FRIENDLY AND TALKATIVE.

## 2025-03-24 ENCOUNTER — OFFICE VISIT (OUTPATIENT)
Dept: INTERNAL MEDICINE | Age: 55
End: 2025-03-24

## 2025-03-24 ENCOUNTER — RESULTS FOLLOW-UP (OUTPATIENT)
Dept: INTERNAL MEDICINE | Age: 55
End: 2025-03-24

## 2025-03-24 ENCOUNTER — HOSPITAL ENCOUNTER (OUTPATIENT)
Dept: WOUND CARE | Age: 55
Discharge: HOME | End: 2025-03-24
Attending: SURGERY
Payer: MEDICARE

## 2025-03-24 VITALS
HEART RATE: 97 BPM | HEIGHT: 59 IN | RESPIRATION RATE: 16 BRPM | DIASTOLIC BLOOD PRESSURE: 75 MMHG | WEIGHT: 90 LBS | SYSTOLIC BLOOD PRESSURE: 125 MMHG | TEMPERATURE: 98 F | BODY MASS INDEX: 18.14 KG/M2

## 2025-03-24 VITALS
SYSTOLIC BLOOD PRESSURE: 105 MMHG | OXYGEN SATURATION: 93 % | DIASTOLIC BLOOD PRESSURE: 69 MMHG | WEIGHT: 91.8 LBS | HEART RATE: 95 BPM | HEIGHT: 59 IN | BODY MASS INDEX: 18.51 KG/M2

## 2025-03-24 DIAGNOSIS — R04.0 EPISTAXIS: ICD-10-CM

## 2025-03-24 DIAGNOSIS — N28.9 RENAL INSUFFICIENCY: ICD-10-CM

## 2025-03-24 DIAGNOSIS — I10 PRIMARY HYPERTENSION: ICD-10-CM

## 2025-03-24 DIAGNOSIS — L97.922 CHRONIC ULCER OF LEFT LEG, WITH FAT LAYER EXPOSED (HCC): ICD-10-CM

## 2025-03-24 DIAGNOSIS — R63.0 POOR APPETITE: ICD-10-CM

## 2025-03-24 DIAGNOSIS — D50.8 IRON DEFICIENCY ANEMIA SECONDARY TO INADEQUATE DIETARY IRON INTAKE: ICD-10-CM

## 2025-03-24 DIAGNOSIS — R35.0 URINARY FREQUENCY: ICD-10-CM

## 2025-03-24 DIAGNOSIS — L97.522 CHRONIC ULCER OF LEFT FOOT, WITH FAT LAYER EXPOSED (HCC): ICD-10-CM

## 2025-03-24 DIAGNOSIS — I73.9 PVD (PERIPHERAL VASCULAR DISEASE): Primary | ICD-10-CM

## 2025-03-24 DIAGNOSIS — Z87.891 PERSONAL HISTORY OF TOBACCO USE: ICD-10-CM

## 2025-03-24 DIAGNOSIS — L97.922 CHRONIC ULCER OF LEFT LEG, WITH FAT LAYER EXPOSED (HCC): Chronic | ICD-10-CM

## 2025-03-24 DIAGNOSIS — R73.9 HYPERGLYCEMIA: ICD-10-CM

## 2025-03-24 DIAGNOSIS — E55.9 VITAMIN D DEFICIENCY: ICD-10-CM

## 2025-03-24 DIAGNOSIS — I74.09 AORTOILIAC OCCLUSIVE DISEASE (HCC): ICD-10-CM

## 2025-03-24 DIAGNOSIS — Z00.00 ROUTINE ADULT HEALTH MAINTENANCE: Primary | ICD-10-CM

## 2025-03-24 DIAGNOSIS — R53.83 OTHER FATIGUE: ICD-10-CM

## 2025-03-24 DIAGNOSIS — F32.89 OTHER DEPRESSION: ICD-10-CM

## 2025-03-24 DIAGNOSIS — E78.5 HYPERLIPIDEMIA, UNSPECIFIED HYPERLIPIDEMIA TYPE: ICD-10-CM

## 2025-03-24 DIAGNOSIS — Z12.31 ENCOUNTER FOR SCREENING MAMMOGRAM FOR MALIGNANT NEOPLASM OF BREAST: ICD-10-CM

## 2025-03-24 LAB
ANION GAP SERPL CALCULATED.3IONS-SCNC: 9 MMOL/L (ref 8–16)
BASOPHILS # BLD: 0.1 K/UL (ref 0–0.2)
BASOPHILS NFR BLD: 0.6 % (ref 0–1)
BUN SERPL-MCNC: 19 MG/DL (ref 6–20)
CALCIUM SERPL-MCNC: 9.3 MG/DL (ref 8.6–10)
CHLORIDE SERPL-SCNC: 101 MMOL/L (ref 98–107)
CO2 SERPL-SCNC: 30 MMOL/L (ref 22–29)
CREAT SERPL-MCNC: 1.5 MG/DL (ref 0.5–0.9)
EOSINOPHIL # BLD: 0.2 K/UL (ref 0–0.6)
EOSINOPHIL NFR BLD: 2 % (ref 0–5)
ERYTHROCYTE [DISTWIDTH] IN BLOOD BY AUTOMATED COUNT: 12.7 % (ref 11.5–14.5)
GLUCOSE SERPL-MCNC: 92 MG/DL (ref 70–99)
HCT VFR BLD AUTO: 29.5 % (ref 37–47)
HGB BLD-MCNC: 9.1 G/DL (ref 12–16)
IMM GRANULOCYTES # BLD: 0 K/UL
LYMPHOCYTES # BLD: 2.6 K/UL (ref 1.1–4.5)
LYMPHOCYTES NFR BLD: 28.8 % (ref 20–40)
MCH RBC QN AUTO: 28 PG (ref 27–31)
MCHC RBC AUTO-ENTMCNC: 30.8 G/DL (ref 33–37)
MCV RBC AUTO: 90.8 FL (ref 81–99)
MONOCYTES # BLD: 0.7 K/UL (ref 0–0.9)
MONOCYTES NFR BLD: 7.7 % (ref 0–10)
NEUTROPHILS # BLD: 5.4 K/UL (ref 1.5–7.5)
NEUTS SEG NFR BLD: 60.5 % (ref 50–65)
PLATELET # BLD AUTO: 420 K/UL (ref 130–400)
PMV BLD AUTO: 10.3 FL (ref 9.4–12.3)
POTASSIUM SERPL-SCNC: 4.3 MMOL/L (ref 3.5–5.1)
RBC # BLD AUTO: 3.25 M/UL (ref 4.2–5.4)
SODIUM SERPL-SCNC: 140 MMOL/L (ref 136–145)
WBC # BLD AUTO: 9 K/UL (ref 4.8–10.8)

## 2025-03-24 PROCEDURE — 99212 OFFICE O/P EST SF 10 MIN: CPT | Performed by: SURGERY

## 2025-03-24 PROCEDURE — 99212 OFFICE O/P EST SF 10 MIN: CPT

## 2025-03-24 RX ORDER — LIDOCAINE HYDROCHLORIDE 20 MG/ML
JELLY TOPICAL ONCE
Status: COMPLETED | OUTPATIENT
Start: 2025-03-24 | End: 2025-03-24

## 2025-03-24 RX ADMIN — LIDOCAINE HYDROCHLORIDE: 20 JELLY TOPICAL at 11:12

## 2025-03-24 NOTE — PROGRESS NOTES
WITH BALLOON ANGIOPLASTY performed by Kimberly Figueroa DO at [facility] OR    VASCULAR SURGERY  01/15/2025    Aortogram with bilateral lower extremity runoff.  Additional picture with the cath removed with the left sheath shot      Social History     Socioeconomic History    Marital status: Single   Tobacco Use    Smoking status: Every Day     Current packs/day: 0.50     Average packs/day: 0.5 packs/day for 42.2 years (21.1 ttl pk-yrs)     Types: Cigarettes     Start date: 1/1/1983     Passive exposure: Current    Smokeless tobacco: Never    Tobacco comments:     Less than 4-6 cigarettes per day   Vaping Use    Vaping status: Never Used   Substance and Sexual Activity    Alcohol use: Not Currently     Comment: rare    Drug use: Never    Sexual activity: Not Currently     Partners: Male     Comment: No sex drive     Social Drivers of Health     Financial Resource Strain: Low Risk  (10/23/2024)    Overall Financial Resource Strain (CARDIA)     Difficulty of Paying Living Expenses: Not hard at all   Food Insecurity: No Food Insecurity (3/28/2025)    Hunger Vital Sign     Worried About Running Out of Food in the Last Year: Never true     Ran Out of Food in the Last Year: Never true   Transportation Needs: No Transportation Needs (3/28/2025)    PRAPARE - Transportation     Lack of Transportation (Medical): No     Lack of Transportation (Non-Medical): No   Physical Activity: Unknown (6/26/2024)    Physical Activity ([facility] HRSN)     In the last 30 days, other than the activities you did for work, on average, how many days per week did you engage in moderate exercise (like walking fast, running, jogging, dancing, swimming, biking, or other similar activites)?: 0     Number of minutes of exercise per week:: 0   Social Connections: Socially Integrated (6/26/2024)    Social Connections ([facility] HRSN)     If for any reason you need help with day-to-day activities such as bathing, preparing meals, shopping, managing finances, etc., do

## 2025-03-24 NOTE — PATIENT INSTRUCTIONS
[facility]   Physician Orders and Discharge Instructions  [address] [place] [address] Drive  [address]  [place] [address]  Telephone: [phone]      FAX [phone]    NAME:  Vonda Lutz  YOB: 1970  MEDICAL RECORD NUMBER:  [id number]  DATE:  3/24/2025    Discharge condition: Stable    Discharge to: Home    Left via:Private automobile    Accompanied by: Self    Fixed - Parking Tickets [address] [place] [id number] f: [phone] f: [phone] p: [phone] [email or web address]      Dressing Orders: Left Lower Ext Ulcer  Xeroform to open area, cover with adhesive bandage, change every other day  Wear compression stockings or ace wrap from base of the toes to just below the knee  Elevate feet to level or above heart 3-4 times daily and as needed to for 30 minutes to reduce swelling  Multi Vitamin, High Protein diet as tolerated  Dermatology appointment 4/21    [facility] follow up visit _________3 weeks____________________  (Please note your next appointment above and if you are unable to keep, kindly give a 24 hour notice. Thank you.)    Preventing Falls: Care Instructions  Your Care Instructions  Getting around your home safely can be a challenge if you have injuries or health problems that make it easy for you to fall. Loose rugs and furniture in walkways are among the dangers for many older people who have problems walking or who have poor eyesight. People who have conditions such as arthritis, osteoporosis, or dementia also have to be careful not to fall.  You can make your home safer with a few simple measures.  Follow-up care is a key part of your treatment and safety. Be sure to make and go to all appointments, and call your doctor if you are having problems. It's also a good idea to know your test results and keep a list of the medicines you take.  How can you care for yourself at home?  Taking care of yourself  You may get dizzy if you

## 2025-03-24 NOTE — PROGRESS NOTES
[facility]   Progress Note and Procedure Note      Vonda Lutz  MEDICAL RECORD NUMBER:  [id number]  AGE: 54 y.o.   GENDER: female  : 1970  EPISODE DATE:  3/24/2025    Subjective:     Chief Complaint   Patient presents with    Wound Check         HISTORY of PRESENT ILLNESS HPI     Vonda Lutz is a 54 y.o. female who presents today for wound/ulcer evaluation.   History of Wound Context: Pt with LLE wound here for eval/treat  Wound/Ulcer Pain Timing/Severity: none  Quality of pain: N/A  Severity:  0 / 10   Modifying Factors: None  Associated Signs/Symptoms: none    Ulcer Identification:  Ulcer Type: undetermined  Contributing Factors: edema, venous stasis, and lymphedema    Wound:  woundLLE        PAST MEDICAL HISTORY        Diagnosis Date    Anemia     Anxiety     Ongoing    Arthritis     Closed fracture of left foot     Depression     Ongoing    Hip fracture (HCC)     x2    History of blood transfusion     Hyperlipidemia     Hypertension     Hyperthyroidism 2020    Negative 2021    Hypokalemia     pt states, \"it's not low now\"    Hypomagnesemia     Iron deficiency anemia secondary to inadequate dietary iron intake 2024    Iron malabsorption 2024    Kidney disease     pt denies; \"but they want me to see a nephrologist\"    Multiple fractures     multiple fractures over lifetime, 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

## 2025-03-26 ENCOUNTER — TELEPHONE (OUTPATIENT)
Dept: OTHER | Age: 55
End: 2025-03-26

## 2025-03-26 NOTE — TELEPHONE ENCOUNTER
Dr. Fernandez ordered smoking cessation referral for patient due to nicotine dependence.  Offered smoking cessation class beginning in March. Left message with all pertinent information provided.

## 2025-03-28 SDOH — ECONOMIC STABILITY: FOOD INSECURITY: WITHIN THE PAST 12 MONTHS, THE FOOD YOU BOUGHT JUST DIDN'T LAST AND YOU DIDN'T HAVE MONEY TO GET MORE.: NEVER TRUE

## 2025-03-28 SDOH — ECONOMIC STABILITY: FOOD INSECURITY: WITHIN THE PAST 12 MONTHS, YOU WORRIED THAT YOUR FOOD WOULD RUN OUT BEFORE YOU GOT MONEY TO BUY MORE.: NEVER TRUE

## 2025-03-28 ASSESSMENT — ENCOUNTER SYMPTOMS
EYE DISCHARGE: 0
DIARRHEA: 0
BACK PAIN: 0
VOMITING: 0
SINUS PRESSURE: 0
COUGH: 0
SORE THROAT: 0
RHINORRHEA: 0
ABDOMINAL DISTENTION: 0
ABDOMINAL PAIN: 0
FACIAL SWELLING: 0
SHORTNESS OF BREATH: 0
NAUSEA: 0
CONSTIPATION: 0
BLOOD IN STOOL: 0
RECTAL PAIN: 0
WHEEZING: 0
TROUBLE SWALLOWING: 0
EYE REDNESS: 0
CHEST TIGHTNESS: 0
CHOKING: 0
EYE PAIN: 0
EYE ITCHING: 0
COLOR CHANGE: 0
ANAL BLEEDING: 0
VOICE CHANGE: 0
PHOTOPHOBIA: 0

## 2025-03-28 ASSESSMENT — PATIENT HEALTH QUESTIONNAIRE - PHQ9
5. POOR APPETITE OR OVEREATING: SEVERAL DAYS
1. LITTLE INTEREST OR PLEASURE IN DOING THINGS: SEVERAL DAYS
SUM OF ALL RESPONSES TO PHQ QUESTIONS 1-9: 6
2. FEELING DOWN, DEPRESSED OR HOPELESS: NOT AT ALL
4. FEELING TIRED OR HAVING LITTLE ENERGY: SEVERAL DAYS
SUM OF ALL RESPONSES TO PHQ QUESTIONS 1-9: 6
9. THOUGHTS THAT YOU WOULD BE BETTER OFF DEAD, OR OF HURTING YOURSELF: NOT AT ALL
10. IF YOU CHECKED OFF ANY PROBLEMS, HOW DIFFICULT HAVE THESE PROBLEMS MADE IT FOR YOU TO DO YOUR WORK, TAKE CARE OF THINGS AT HOME, OR GET ALONG WITH OTHER PEOPLE: SOMEWHAT DIFFICULT
SUM OF ALL RESPONSES TO PHQ QUESTIONS 1-9: 6
SUM OF ALL RESPONSES TO PHQ QUESTIONS 1-9: 6
7. TROUBLE CONCENTRATING ON THINGS, SUCH AS READING THE NEWSPAPER OR WATCHING TELEVISION: SEVERAL DAYS
6. FEELING BAD ABOUT YOURSELF - OR THAT YOU ARE A FAILURE OR HAVE LET YOURSELF OR YOUR FAMILY DOWN: SEVERAL DAYS
3. TROUBLE FALLING OR STAYING ASLEEP: SEVERAL DAYS
8. MOVING OR SPEAKING SO SLOWLY THAT OTHER PEOPLE COULD HAVE NOTICED. OR THE OPPOSITE, BEING SO FIGETY OR RESTLESS THAT YOU HAVE BEEN MOVING AROUND A LOT MORE THAN USUAL: NOT AT ALL

## 2025-04-03 NOTE — PROGRESS NOTES
Orthopaedic Clinic Note - Established Patient    NAME:  Vonda Lutz   : 1970  MRN: [id number]      2025      CHIEF COMPLAINT: Post op ORIF distal radius fracture      HISTORY OF PRESENT ILLNESS:   Patient is a pleasant 54-year-old female that presents the clinic today for postop follow-up.  Patient's initial injury was on 3/12/2025 when she fell on her outstretched right arm when trying to go the bathroom. Patient had ORIF of right extra-articular distal radius fracture with Dr. Paige on 3/20/25. Patient was placed in a short arm splint post op.  Patient presents today 2 weeks postop.  She reports that pain has been well-controlled and describes as mild in nature.  She denies any new or worsening symptoms.  She is overall happy with surgical outcome so far.    Past Medical History:        Diagnosis Date    Anemia     Anxiety     Ongoing    Arthritis     Closed fracture of left foot     Depression     Ongoing    Hip fracture (HCC)     x2    History of blood transfusion     Hyperlipidemia     Hypertension     Hyperthyroidism 2020    Negative 2021    Hypokalemia     pt states, \"it's not low now\"    Hypomagnesemia     Iron deficiency anemia secondary to inadequate dietary iron intake 2024    Iron malabsorption 2024    Kidney disease     pt denies; \"but they want me to see a nephrologist\"    Multiple fractures     multiple fractures over lifetime, 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

## 2025-04-04 ENCOUNTER — OFFICE VISIT (OUTPATIENT)
Age: 55
End: 2025-04-04

## 2025-04-04 VITALS — BODY MASS INDEX: 18.14 KG/M2 | HEIGHT: 59 IN | WEIGHT: 90 LBS

## 2025-04-04 DIAGNOSIS — Z09 SURGERY FOLLOW-UP: ICD-10-CM

## 2025-04-04 DIAGNOSIS — Z87.81 S/P ORIF (OPEN REDUCTION INTERNAL FIXATION) FRACTURE: ICD-10-CM

## 2025-04-04 DIAGNOSIS — S52.531D CLOSED COLLES' FRACTURE OF RIGHT RADIUS WITH ROUTINE HEALING, SUBSEQUENT ENCOUNTER: Primary | ICD-10-CM

## 2025-04-04 DIAGNOSIS — Z98.890 S/P ORIF (OPEN REDUCTION INTERNAL FIXATION) FRACTURE: ICD-10-CM

## 2025-04-04 PROCEDURE — 99024 POSTOP FOLLOW-UP VISIT: CPT

## 2025-04-04 RX ORDER — DORZOLAMIDE HYDROCHLORIDE AND TIMOLOL MALEATE 20; 5 MG/ML; MG/ML
SOLUTION/ DROPS OPHTHALMIC
COMMUNITY
Start: 2025-03-11

## 2025-04-07 ENCOUNTER — OFFICE VISIT (OUTPATIENT)
Dept: VASCULAR SURGERY | Age: 55
End: 2025-04-07
Payer: MEDICARE

## 2025-04-07 ENCOUNTER — HOSPITAL ENCOUNTER (OUTPATIENT)
Dept: VASCULAR LAB | Age: 55
Discharge: HOME | End: 2025-04-09
Payer: MEDICARE

## 2025-04-07 VITALS
WEIGHT: 90 LBS | HEIGHT: 59 IN | SYSTOLIC BLOOD PRESSURE: 98 MMHG | OXYGEN SATURATION: 99 % | RESPIRATION RATE: 18 BRPM | BODY MASS INDEX: 18.14 KG/M2 | HEART RATE: 85 BPM | TEMPERATURE: 97 F | DIASTOLIC BLOOD PRESSURE: 48 MMHG

## 2025-04-07 DIAGNOSIS — M79.89 LEG SWELLING: ICD-10-CM

## 2025-04-07 DIAGNOSIS — M79.605 LEG PAIN, LEFT: ICD-10-CM

## 2025-04-07 DIAGNOSIS — I70.213 ATHEROSCLER OF NATIVE ARTERY OF BOTH LEGS WITH INTERMIT CLAUDICATION: ICD-10-CM

## 2025-04-07 DIAGNOSIS — I70.213 ATHEROSCLER OF NATIVE ARTERY OF BOTH LEGS WITH INTERMIT CLAUDICATION: Primary | ICD-10-CM

## 2025-04-07 DIAGNOSIS — M79.604 RIGHT LEG PAIN: ICD-10-CM

## 2025-04-07 PROCEDURE — 99214 OFFICE O/P EST MOD 30 MIN: CPT | Performed by: NURSE PRACTITIONER

## 2025-04-07 PROCEDURE — G8427 DOCREV CUR MEDS BY ELIG CLIN: HCPCS | Performed by: NURSE PRACTITIONER

## 2025-04-07 PROCEDURE — 3017F COLORECTAL CA SCREEN DOC REV: CPT | Performed by: NURSE PRACTITIONER

## 2025-04-07 PROCEDURE — G8419 CALC BMI OUT NRM PARAM NOF/U: HCPCS | Performed by: NURSE PRACTITIONER

## 2025-04-07 PROCEDURE — 4004F PT TOBACCO SCREEN RCVD TLK: CPT | Performed by: NURSE PRACTITIONER

## 2025-04-07 PROCEDURE — 93923 UPR/LXTR ART STDY 3+ LVLS: CPT

## 2025-04-08 ENCOUNTER — RESULTS FOLLOW-UP (OUTPATIENT)
Dept: INTERNAL MEDICINE | Age: 55
End: 2025-04-08

## 2025-04-08 NOTE — PROGRESS NOTES
Vonda Lutz (:  1970) is a 54 y.o. female,Established patient, here for evaluation of the following chief complaint(s):  Follow-up            SUBJECTIVE/OBJECTIVE:  Vonda has a history of peripheral vascular disease of the lower extremities.  She has had this for 1 - 5 years. Current treatment includes asa.  Vonda has not had new wounds. Recently, she reports claudication at a distance.  Vonda reports that the right leg is equal to the left.  She reports claudication is not changed and is mostly in the form of crampy type pain starting in the calves. She has a short recovery time. This is reproducible in nature. She reports ischemic rest pain 0 times per night.  She reports walking with cart does not help.    I have personally reviewed the following: problem list, current meds, allergies, PMH, PSH, family hx, and social hx  Vonda Lutz is a 54 y.o. female with the following history as recorded in [facility]:  Patient Active Problem List    Diagnosis Date Noted    Colles' fracture of right radius, initial encounter for closed fracture 2025    Atherosclerosis of native arteries of extremities with intermittent claudication, bilateral legs 2025    Major depressive disorder, recurrent, mild 2024    Protein calorie malnutrition 2024    Aorto-iliac disease 2024    Severe malnutrition 2024    Aortoiliac occlusive disease (HCC) 2024    Iron deficiency anemia secondary to inadequate dietary iron intake 2024    Iron malabsorption 2024    Peripheral vascular disease, unspecified 2024    Chronic ulcer of left foot, with fat layer exposed (HCC) 2024    Chronic ulcer of left leg, with fat layer exposed (HCC) 2024    Lymphedema 2024    PVD (peripheral vascular disease) 2024     Current Outpatient Medications   Medication Sig Dispense Refill    dorzolamide-timolol (COSOPT) 2-0.5 % ophthalmic solution INSTILL 1 DROP INTO LEFT EYE TWICE DAILY

## 2025-04-09 ENCOUNTER — TELEPHONE (OUTPATIENT)
Dept: ENT CLINIC | Age: 55
End: 2025-04-09

## 2025-04-13 LAB
VAS LEFT ABI: 1.05
VAS LEFT ARM BP: 128 MMHG
VAS LEFT CALF PRESSURE: 143 MMHG
VAS LEFT DORSALIS PEDIS BP: 126 MMHG
VAS LEFT HIGH THIGH PRESSURE: 149 MMHG
VAS LEFT LOW THIGH PRESSURE: 147 MMHG
VAS LEFT PTA BP: 136 MMHG
VAS LEFT TBI: 0.36
VAS LEFT TOE PRESSURE: 47 MMHG
VAS RIGHT ABI: 1.04
VAS RIGHT ARM BP: 130 MMHG
VAS RIGHT CALF PRESSURE: 132 MMHG
VAS RIGHT DORSALIS PEDIS BP: 135 MMHG
VAS RIGHT HIGH THIGH PRESSURE: 159 MMHG
VAS RIGHT LOW THIGH PRESSURE: 123 MMHG
VAS RIGHT PTA BP: 112 MMHG
VAS RIGHT TBI: 0.59
VAS RIGHT TOE PRESSURE: 77 MMHG

## 2025-04-14 ENCOUNTER — TELEPHONE (OUTPATIENT)
Dept: HEMATOLOGY | Age: 55
End: 2025-04-14

## 2025-04-14 ENCOUNTER — HOSPITAL ENCOUNTER (OUTPATIENT)
Dept: WOUND CARE | Age: 55
Discharge: HOME | End: 2025-04-14
Attending: SURGERY
Payer: MEDICARE

## 2025-04-14 VITALS
SYSTOLIC BLOOD PRESSURE: 112 MMHG | TEMPERATURE: 97.8 F | HEART RATE: 92 BPM | DIASTOLIC BLOOD PRESSURE: 74 MMHG | RESPIRATION RATE: 16 BRPM

## 2025-04-14 DIAGNOSIS — L97.522 CHRONIC ULCER OF LEFT FOOT, WITH FAT LAYER EXPOSED (HCC): ICD-10-CM

## 2025-04-14 DIAGNOSIS — L97.922 CHRONIC ULCER OF LEFT LEG, WITH FAT LAYER EXPOSED (HCC): ICD-10-CM

## 2025-04-14 DIAGNOSIS — I73.9 PVD (PERIPHERAL VASCULAR DISEASE): Primary | ICD-10-CM

## 2025-04-14 PROCEDURE — 99212 OFFICE O/P EST SF 10 MIN: CPT | Performed by: SURGERY

## 2025-04-14 PROCEDURE — 99212 OFFICE O/P EST SF 10 MIN: CPT

## 2025-04-14 RX ORDER — LIDOCAINE HYDROCHLORIDE 20 MG/ML
JELLY TOPICAL ONCE
Status: COMPLETED | OUTPATIENT
Start: 2025-04-14 | End: 2025-04-14

## 2025-04-14 RX ADMIN — LIDOCAINE HYDROCHLORIDE: 20 JELLY TOPICAL at 11:35

## 2025-04-14 NOTE — PROGRESS NOTES
[facility]   Progress Note and Procedure Note      Vonda Lutz  MEDICAL RECORD NUMBER:  [id number]  AGE: 54 y.o.   GENDER: female  : 1970  EPISODE DATE:  2025    Subjective:     Chief Complaint   Patient presents with    Wound Check     Left leg wound check         HISTORY of PRESENT ILLNESS HPI     Vonda Lutz is a 54 y.o. female who presents today for wound/ulcer evaluation.   History of Wound Context: Pt with LLE lesion here for eval/treat  Wound/Ulcer Pain Timing/Severity: none  Quality of pain: N/A  Severity:  0 / 10   Modifying Factors: None  Associated Signs/Symptoms: none    Ulcer Identification:  Ulcer Type: undetermined  Contributing Factors: edema    Wound:  unknown        PAST MEDICAL HISTORY        Diagnosis Date    Anemia     Anxiety     Ongoing    Arthritis     Closed fracture of left foot     Depression     Ongoing    Hip fracture (HCC)     x2    History of blood transfusion     Hyperlipidemia     Hypertension     Hyperthyroidism 2020    Negative 2021    Hypokalemia     pt states, \"it's not low now\"    Hypomagnesemia     Iron deficiency anemia secondary to inadequate dietary iron intake 2024    Iron malabsorption 2024    Kidney disease     pt denies; \"but they want me to see a nephrologist\"    Multiple fractures     multiple fractures over lifetime, 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

## 2025-04-14 NOTE — TELEPHONE ENCOUNTER

## 2025-04-14 NOTE — PATIENT INSTRUCTIONS
[facility]   Physician Orders and Discharge Instructions  [address] [place] [address] Drive  [address]  [place] [address]  Telephone: [phone]      FAX [phone]    NAME:  Vonda Lutz  YOB: 1970  MEDICAL RECORD NUMBER:  [id number]  DATE:  4/14/2025    Discharge condition: Stable    Discharge to: Home    Left via:Private automobile    Accompanied by: Self    [employer] [address] [place] [id number] f: [phone] f: [phone] p: [phone]  [email or web address]    Dressing Orders: Left Lower Ext Ulcer  Xeroform to open area, cover with adhesive bandage, change every other day  Wear compression stockings or ace wrap from base of the toes to just below the knee  Elevate feet to level or above heart 3-4 times daily and as needed to for 30 minutes to reduce swelling  Multi Vitamin, High Protein diet as tolerated  Dermatology appointment 4/21    [facility] follow up visit ___________3 weeks__________________  (Please note your next appointment above and if you are unable to keep, kindly give a 24 hour notice. Thank you.)    If you experience any of the following, please call the Wound Care Center during business hours:    * Increase in Pain  * Temperature over 101  * Increase in drainage from your wound  * Drainage with a foul odor  * Bleeding  * Increase in swelling  * Need for compression bandage changes due to slippage, breakthrough drainage.    If you need medical attention outside of the business hours of the Wound Care Centers please contact your PCP or go to the nearest emergency room.

## 2025-04-16 DIAGNOSIS — R70.0 ELEVATED SED RATE: ICD-10-CM

## 2025-04-16 DIAGNOSIS — R79.82 ELEVATED C-REACTIVE PROTEIN (CRP): ICD-10-CM

## 2025-04-16 DIAGNOSIS — E43 SEVERE MALNUTRITION: ICD-10-CM

## 2025-04-16 DIAGNOSIS — D50.8 IRON DEFICIENCY ANEMIA SECONDARY TO INADEQUATE DIETARY IRON INTAKE: Primary | ICD-10-CM

## 2025-04-16 DIAGNOSIS — R74.8 ELEVATED SERUM TRYPTASE: ICD-10-CM

## 2025-04-16 NOTE — PROGRESS NOTES
Progress note      Pt Name: Vonda uLtz  YOB: 1970  MRN: [id number]    Date of evaluation: 04/17/2025  History Obtained From:  patient, electronic medical record    CHIEF COMPLAINT:    Chief Complaint   Patient presents with    Follow-up     Iron deficiency anemia secondary to inadequate dietary iron intake  Patient does not have any new issues to discuss today     HISTORY OF PRESENT ILLNESS:    Vonda Lutz is a 54 y.o.  female who is currently being evaluated for chronic elevated serum tryptase with chronic pruritus with findings of iron deficiency without anemia and mild thrombocytosis associated with chronic inflammatory process.  Serology studies on 4/2/2024 indicated no findings for myeloproliferative disorder with a negative JAK2 with reflex, flow cytometry and peripheral blood smear were negative with no increased blasts.  A bone marrow aspiration biopsy completed on 4/29/2024 ruled out systemic mastocytosis and reported thrombocytosis suspected to be reactive.  She has received IV iron replacement for her iron deficiency and current recommendation is slow release iron 45 mg daily and conservative lab monitoring.  Encouraged to continue following with wound care for her chronic left foot/leg ulceration.  Vonda returns today in scheduled follow-up to review workup results and further treatment recommendations.   History of Present Illness  She reports experiencing fatigue, which she attributes to a pulse oximetry study conducted last night. She has not observed any signs of bleeding, such as blood in her stool. She recalls an episode of significant nosebleed, which she believes was due to dry sinuses. She expresses interest in dietary modifications to increase her iron intake. She is on a regimen of slow-release iron supplements, which she tolerates well.    She continues to smoke 4 to 6 cigarettes daily. She has not yet visited her allergist due to financial constraints but  aspiration biopsy completed on 4/29/2024 ruled out systemic mastocytosis    CRP of 2.79 on 06/04/2024         She has not yet visited her allergist due to financial constraints but plans to do so once she secures a supplemental insurance plan.    Sed rate of 38 and CRP of <0.30 on 01/09/2025    -Follow-up with Dr.Julie Covarrubias with the allergy and asthma clinic as recommended.    -Repeat tryptase  Assessment & Plan  2. Iron deficiency anemia secondary to inadequate dietary iron intake: Hemoglobin 10.1 with hematocrit of 32.0 today.  Her iron saturation levels were recorded at 28% on 1/9/2025, which is within the desired range of 20% or above.  Recommendation is to continue her regimen of slow-release iron supplements.     Denied any bleeding to include melena, epistaxis, hemoptysis, hematuria or hematochezia.    01/09/2025 Serology results  Iron 52  TIBC 189  Saturation 28%  Ferritin 417.2    Iron studies will be conducted today, and she will be contacted only if any concerns arise. Information on dietary sources of iron has been provided. If necessary, intravenous iron replacement therapy will be considered.     -Continue slow release iron 45 mg daily  -Continue to use Colace as needed for constipation  -CBC, CMP, iron panel and ferritin today      3.  Mild thrombocytosis suspect reactive: Resolved with a platelet count of 400,000 today    -Conservative monitoring    5. Chronic ulcer of left leg, with fat layer exposed (HCC)   She is chronically being treated by the wound clinic for a wound to her left lower extremity that occurred from her chronic itching in May 2023.    -Continue to follow with wound care as recommended    6.  Tobacco use: Currently smoking 5-6 cigarettes daily  We talked about the importance of quitting smoking for approximately 4-5 minutes. Specifically, we discussed the risk related tobacco including but not limited to carcinoma, cardiovascular disease, stroke, and financial loss. I advised

## 2025-04-17 ENCOUNTER — HOSPITAL ENCOUNTER (OUTPATIENT)
Dept: INFUSION THERAPY | Age: 55
Discharge: HOME | End: 2025-04-17
Payer: MEDICARE

## 2025-04-17 ENCOUNTER — OFFICE VISIT (OUTPATIENT)
Dept: HEMATOLOGY | Age: 55
End: 2025-04-17
Payer: MEDICARE

## 2025-04-17 VITALS
DIASTOLIC BLOOD PRESSURE: 84 MMHG | BODY MASS INDEX: 17.7 KG/M2 | TEMPERATURE: 98.8 F | SYSTOLIC BLOOD PRESSURE: 116 MMHG | HEIGHT: 59 IN | WEIGHT: 87.8 LBS | HEART RATE: 90 BPM | OXYGEN SATURATION: 97 %

## 2025-04-17 DIAGNOSIS — Z72.0 TOBACCO USE: ICD-10-CM

## 2025-04-17 DIAGNOSIS — D50.8 IRON DEFICIENCY ANEMIA SECONDARY TO INADEQUATE DIETARY IRON INTAKE: ICD-10-CM

## 2025-04-17 DIAGNOSIS — R74.8 ELEVATED SERUM TRYPTASE: ICD-10-CM

## 2025-04-17 DIAGNOSIS — L97.922 CHRONIC ULCER OF LEFT LEG, WITH FAT LAYER EXPOSED (HCC): ICD-10-CM

## 2025-04-17 DIAGNOSIS — R79.82 ELEVATED C-REACTIVE PROTEIN (CRP): ICD-10-CM

## 2025-04-17 DIAGNOSIS — D50.8 IRON DEFICIENCY ANEMIA SECONDARY TO INADEQUATE DIETARY IRON INTAKE: Primary | ICD-10-CM

## 2025-04-17 DIAGNOSIS — R70.0 ELEVATED SED RATE: ICD-10-CM

## 2025-04-17 DIAGNOSIS — E43 SEVERE MALNUTRITION: ICD-10-CM

## 2025-04-17 LAB
ALBUMIN SERPL-MCNC: 3.4 G/DL (ref 3.5–5.2)
ALP SERPL-CCNC: 144 U/L (ref 35–104)
ALT SERPL-CCNC: 13 U/L (ref 5–33)
ANION GAP SERPL CALCULATED.3IONS-SCNC: 11 MMOL/L (ref 7–19)
AST SERPL-CCNC: 22 U/L (ref 5–32)
BASOPHILS # BLD: 0.04 K/UL (ref 0–0.2)
BASOPHILS NFR BLD: 0.4 % (ref 0–1)
BILIRUB SERPL-MCNC: <0.2 MG/DL (ref 0–1.2)
BUN SERPL-MCNC: 18 MG/DL (ref 6–20)
CALCIUM SERPL-MCNC: 8.9 MG/DL (ref 8.6–10)
CHLORIDE SERPL-SCNC: 99 MMOL/L (ref 98–107)
CO2 SERPL-SCNC: 30 MMOL/L (ref 22–29)
CREAT SERPL-MCNC: 1.3 MG/DL (ref 0.5–0.9)
CRP SERPL-MCNC: 16 MG/L (ref 0–5)
EOSINOPHIL # BLD: 0.3 K/UL (ref 0–0.6)
EOSINOPHIL NFR BLD: 3.1 % (ref 0–5)
ERYTHROCYTE [DISTWIDTH] IN BLOOD BY AUTOMATED COUNT: 12.6 % (ref 11.5–14.5)
ERYTHROCYTE [SEDIMENTATION RATE] IN BLOOD BY WESTERGREN METHOD: 45 MM/HR (ref 0–25)
FERRITIN SERPL-MCNC: 314 NG/ML (ref 13–150)
GLUCOSE SERPL-MCNC: 96 MG/DL (ref 70–99)
HCT VFR BLD AUTO: 32 % (ref 37–47)
HGB BLD-MCNC: 10.1 G/DL (ref 12–16)
IRON SATN MFR SERPL: 16 % (ref 15–50)
IRON SERPL-MCNC: 29 UG/DL (ref 37–145)
LYMPHOCYTES # BLD: 2.7 K/UL (ref 1.1–4.5)
LYMPHOCYTES NFR BLD: 27.6 % (ref 20–40)
MCH RBC QN AUTO: 28.2 PG (ref 27–31)
MCHC RBC AUTO-ENTMCNC: 31.6 G/DL (ref 33–37)
MCV RBC AUTO: 89.4 FL (ref 81–99)
MONOCYTES # BLD: 1.08 K/UL (ref 0–0.9)
MONOCYTES NFR BLD: 11.1 % (ref 1–10)
NEUTROPHILS # BLD: 5.62 K/UL (ref 1.5–7.5)
NEUTS SEG NFR BLD: 57.5 % (ref 50–65)
PLATELET # BLD AUTO: 400 K/UL (ref 130–400)
PMV BLD AUTO: 9.6 FL (ref 9.4–12.3)
POTASSIUM SERPL-SCNC: 4.5 MMOL/L (ref 3.5–5.1)
PROT SERPL-MCNC: 6.6 G/DL (ref 6.4–8.3)
RBC # BLD AUTO: 3.58 M/UL (ref 4.2–5.4)
SODIUM SERPL-SCNC: 140 MMOL/L (ref 136–145)
TIBC SERPL-MCNC: 178 UG/DL (ref 250–400)
WBC # BLD AUTO: 9.77 K/UL (ref 4.8–10.8)

## 2025-04-17 PROCEDURE — G8427 DOCREV CUR MEDS BY ELIG CLIN: HCPCS | Performed by: NURSE PRACTITIONER

## 2025-04-17 PROCEDURE — 36415 COLL VENOUS BLD VENIPUNCTURE: CPT

## 2025-04-17 PROCEDURE — 99214 OFFICE O/P EST MOD 30 MIN: CPT | Performed by: NURSE PRACTITIONER

## 2025-04-17 PROCEDURE — G8419 CALC BMI OUT NRM PARAM NOF/U: HCPCS | Performed by: NURSE PRACTITIONER

## 2025-04-17 PROCEDURE — 82728 ASSAY OF FERRITIN: CPT

## 2025-04-17 PROCEDURE — 4004F PT TOBACCO SCREEN RCVD TLK: CPT | Performed by: NURSE PRACTITIONER

## 2025-04-17 PROCEDURE — 86140 C-REACTIVE PROTEIN: CPT

## 2025-04-17 PROCEDURE — 80053 COMPREHEN METABOLIC PANEL: CPT

## 2025-04-17 PROCEDURE — 83550 IRON BINDING TEST: CPT

## 2025-04-17 PROCEDURE — 85025 COMPLETE CBC W/AUTO DIFF WBC: CPT

## 2025-04-17 PROCEDURE — 3017F COLORECTAL CA SCREEN DOC REV: CPT | Performed by: NURSE PRACTITIONER

## 2025-04-17 PROCEDURE — 83540 ASSAY OF IRON: CPT

## 2025-04-17 PROCEDURE — 99213 OFFICE O/P EST LOW 20 MIN: CPT

## 2025-04-17 PROCEDURE — 83520 IMMUNOASSAY QUANT NOS NONAB: CPT

## 2025-04-17 PROCEDURE — 85652 RBC SED RATE AUTOMATED: CPT

## 2025-04-18 ENCOUNTER — RESULTS FOLLOW-UP (OUTPATIENT)
Dept: HEMATOLOGY | Age: 55
End: 2025-04-18

## 2025-04-18 RX ORDER — SODIUM CHLORIDE 9 MG/ML
5-250 INJECTION, SOLUTION INTRAVENOUS PRN
OUTPATIENT
Start: 2025-04-22

## 2025-04-18 RX ORDER — EPINEPHRINE 1 MG/ML
0.3 INJECTION, SOLUTION, CONCENTRATE INTRAVENOUS PRN
OUTPATIENT
Start: 2025-04-22

## 2025-04-18 RX ORDER — SODIUM CHLORIDE 0.9 % (FLUSH) 0.9 %
5-40 SYRINGE (ML) INJECTION PRN
OUTPATIENT
Start: 2025-04-22

## 2025-04-18 RX ORDER — ONDANSETRON 2 MG/ML
8 INJECTION INTRAMUSCULAR; INTRAVENOUS
OUTPATIENT
Start: 2025-04-22

## 2025-04-18 RX ORDER — HEPARIN SODIUM (PORCINE) LOCK FLUSH IV SOLN 100 UNIT/ML 100 UNIT/ML
500 SOLUTION INTRAVENOUS PRN
OUTPATIENT
Start: 2025-04-22

## 2025-04-18 RX ORDER — HYDROCORTISONE SODIUM SUCCINATE 100 MG/2ML
100 INJECTION INTRAMUSCULAR; INTRAVENOUS
OUTPATIENT
Start: 2025-04-22

## 2025-04-18 RX ORDER — FAMOTIDINE 10 MG/ML
20 INJECTION, SOLUTION INTRAVENOUS
OUTPATIENT
Start: 2025-04-22

## 2025-04-18 RX ORDER — ALBUTEROL SULFATE 90 UG/1
4 INHALANT RESPIRATORY (INHALATION) PRN
OUTPATIENT
Start: 2025-04-22

## 2025-04-18 RX ORDER — DIPHENHYDRAMINE HYDROCHLORIDE 50 MG/ML
50 INJECTION, SOLUTION INTRAMUSCULAR; INTRAVENOUS
OUTPATIENT
Start: 2025-04-22

## 2025-04-18 RX ORDER — SODIUM CHLORIDE 9 MG/ML
INJECTION, SOLUTION INTRAVENOUS CONTINUOUS
OUTPATIENT
Start: 2025-04-22

## 2025-04-18 RX ORDER — ACETAMINOPHEN 325 MG/1
650 TABLET ORAL
OUTPATIENT
Start: 2025-04-22

## 2025-04-18 NOTE — TELEPHONE ENCOUNTER
----- Message from Juliana PRICE sent at 4/18/2025  6:36 AM CDT -----  Please call and discuss the need for IV iron replacement    Please build supportive plan for Feraheme 510 mg mg IV weekly x2 doses.  Please contact patient once insurance approval has been obtained and appointments have been arranged    Repeat iron in 2 months and can keep 4-month follow-up appoint

## 2025-04-18 NOTE — TELEPHONE ENCOUNTER
Called patient and reviewed lab results, iron saturation of 16%.  Instructed that URSZULA Wise would like for her to get 2 doses of IV iron.  Instructed that she will get this at our outpatient infusion center and they will call her to set up appointments once the treatment has been approved with insurance. Patient v/u and is agreeable to plan.

## 2025-04-20 LAB — TRYPTASE SERPL-MCNC: 14.9 UG/L

## 2025-04-21 RX ORDER — LISINOPRIL 10 MG/1
10 TABLET ORAL DAILY
Qty: 90 TABLET | Refills: 1 | Status: SHIPPED | OUTPATIENT
Start: 2025-04-21

## 2025-04-23 ASSESSMENT — ENCOUNTER SYMPTOMS
COUGH: 0
SHORTNESS OF BREATH: 0
EYE REDNESS: 0
NAUSEA: 0
EYE PAIN: 0
DIARRHEA: 1
EYE DISCHARGE: 0
VOMITING: 0
WHEEZING: 0
BACK PAIN: 0
CONSTIPATION: 0
EYES NEGATIVE: 1
SORE THROAT: 0
RESPIRATORY NEGATIVE: 1
ABDOMINAL PAIN: 0
BLOOD IN STOOL: 0

## 2025-04-28 ENCOUNTER — OFFICE VISIT (OUTPATIENT)
Dept: ENT CLINIC | Age: 55
End: 2025-04-28
Payer: MEDICARE

## 2025-04-28 VITALS — BODY MASS INDEX: 17.54 KG/M2 | HEIGHT: 59 IN | WEIGHT: 87 LBS

## 2025-04-28 DIAGNOSIS — E04.2 NONTOXIC MULTINODULAR GOITER: Primary | ICD-10-CM

## 2025-04-28 DIAGNOSIS — R09.A2 GLOBUS SENSATION: ICD-10-CM

## 2025-04-28 DIAGNOSIS — R09.82 POSTNASAL DRIP: ICD-10-CM

## 2025-04-28 PROCEDURE — 31575 DIAGNOSTIC LARYNGOSCOPY: CPT | Performed by: NURSE PRACTITIONER

## 2025-04-28 PROCEDURE — 4004F PT TOBACCO SCREEN RCVD TLK: CPT | Performed by: NURSE PRACTITIONER

## 2025-04-28 PROCEDURE — G8419 CALC BMI OUT NRM PARAM NOF/U: HCPCS | Performed by: NURSE PRACTITIONER

## 2025-04-28 PROCEDURE — 99213 OFFICE O/P EST LOW 20 MIN: CPT | Performed by: NURSE PRACTITIONER

## 2025-04-28 PROCEDURE — G8427 DOCREV CUR MEDS BY ELIG CLIN: HCPCS | Performed by: NURSE PRACTITIONER

## 2025-04-28 PROCEDURE — 3017F COLORECTAL CA SCREEN DOC REV: CPT | Performed by: NURSE PRACTITIONER

## 2025-04-28 RX ORDER — IPRATROPIUM BROMIDE 21 UG/1
2 SPRAY, METERED NASAL EVERY 12 HOURS
Qty: 30 ML | Refills: 3 | Status: SHIPPED | OUTPATIENT
Start: 2025-04-28

## 2025-04-28 ASSESSMENT — ENCOUNTER SYMPTOMS
RESPIRATORY NEGATIVE: 1
EYES NEGATIVE: 1
TROUBLE SWALLOWING: 0
ALLERGIC/IMMUNOLOGIC NEGATIVE: 1

## 2025-04-28 NOTE — PROGRESS NOTES
2025    Vonda uLtz (:  1970) is a 54 y.o. female, Established patient, here for evaluation of the following chief complaint(s):  Follow-up (Thyroid )      Vitals:    25 1036   Weight: 39.5 kg (87 lb)   Height: 1.499 m (4' 11.02\")       Wt Readings from Last 3 Encounters:   25 39.5 kg (87 lb)   25 39.8 kg (87 lb 12.8 oz)   25 40.8 kg (90 lb)       BP Readings from Last 3 Encounters:   25 116/84   25 112/74   25 (!) 98/48         SUBJECTIVE/OBJECTIVE:    Patient seen today for follow up of her thyroid. She had her follow up thyroid ultrasound in March that showed stable nodules but the nodule in the left lobe lower pole has mildly increased in size. She denies difficulty swallowing, breathing, or pain in the area. She does complain of intermittent sensation of fullness in her throat. She does complain of heartburn. She is taking over the counter Nexium and famotidine as needed when she has heartburn. She is a smoker. She does complain of drainage down her throat as well.         Review of Systems   Constitutional: Negative.    HENT:  Positive for postnasal drip. Negative for trouble swallowing.         Intermittent globus sensation   Eyes: Negative.    Respiratory: Negative.     Cardiovascular: Negative.    Gastrointestinal:         Heartburn   Endocrine: Negative.    Musculoskeletal: Negative.    Skin: Negative.    Allergic/Immunologic: Negative.    Neurological: Negative.    Hematological: Negative.    Psychiatric/Behavioral: Negative.          Physical Exam  Vitals reviewed.   Constitutional:       Appearance: Normal appearance. She is normal weight.   HENT:      Head: Normocephalic and atraumatic.      Right Ear: Tympanic membrane, ear canal and external ear normal.      Left Ear: Tympanic membrane, ear canal and external ear normal.      Nose: Nose normal.      Mouth/Throat:      Mouth: Mucous membranes are moist.      Dentition: Has dentures.

## 2025-05-05 ENCOUNTER — TELEPHONE (OUTPATIENT)
Dept: INFUSION THERAPY | Age: 55
End: 2025-05-05

## 2025-05-05 NOTE — TELEPHONE ENCOUNTER
Patient called and left voicemail to cancel scheduled appointments. Cancelled scheduled appointments per patient request.  Returned call to patient at [phone]; left voicemail with callback number on patient voicemail to call and reschedule. Electronically signed by Juliana Anderson RN on 5/5/2025 at 8:52 AM

## 2025-05-06 ENCOUNTER — OFFICE VISIT (OUTPATIENT)
Age: 55
End: 2025-05-06
Payer: MEDICARE

## 2025-05-06 VITALS — WEIGHT: 87 LBS | BODY MASS INDEX: 17.54 KG/M2 | HEIGHT: 59 IN

## 2025-05-06 DIAGNOSIS — Z98.890 S/P ORIF (OPEN REDUCTION INTERNAL FIXATION) FRACTURE: Primary | ICD-10-CM

## 2025-05-06 DIAGNOSIS — S52.531D CLOSED COLLES' FRACTURE OF RIGHT RADIUS WITH ROUTINE HEALING, SUBSEQUENT ENCOUNTER: ICD-10-CM

## 2025-05-06 DIAGNOSIS — Z87.81 S/P ORIF (OPEN REDUCTION INTERNAL FIXATION) FRACTURE: Primary | ICD-10-CM

## 2025-05-06 DIAGNOSIS — Z09 SURGERY FOLLOW-UP: ICD-10-CM

## 2025-05-06 PROCEDURE — 29075 APPL CST ELBW FNGR SHORT ARM: CPT

## 2025-05-06 PROCEDURE — 99024 POSTOP FOLLOW-UP VISIT: CPT

## 2025-05-06 RX ORDER — DOXYCYCLINE 100 MG/1
CAPSULE ORAL
COMMUNITY
Start: 2025-04-30

## 2025-05-06 RX ORDER — GENTAMICIN SULFATE 1 MG/G
OINTMENT TOPICAL
COMMUNITY
Start: 2025-04-21

## 2025-05-06 RX ORDER — FLUOCINONIDE 0.5 MG/G
OINTMENT TOPICAL
COMMUNITY
Start: 2025-04-23

## 2025-05-06 NOTE — PROGRESS NOTES
Orthopaedic Clinic Note - Established Patient    NAME:  Vonda Lutz   : 1970  MRN: [id number]      2025      CHIEF COMPLAINT: Post op ORIF distal radius fracture      HISTORY OF PRESENT ILLNESS:   Patient is a pleasant 54-year-old female that presents the clinic today for postop follow-up.  Patient's initial injury was on 3/12/2025 when she fell on her outstretched right arm when trying to go the bathroom. Patient had ORIF of right extra-articular distal radius fracture with Dr. Paige on 3/20/25. Patient was placed in a short arm splint post op.  At 2-week follow-up patient was placed in removable wrist splint.  Patient presents today 6 weeks postop.  She reports that pain has been well-controlled and describes pain as mild in nature.  She denies any new or worsening symptoms today.  She reports she has not been compliant with removable wrist splint as she felt it was not needed.  She has been doing activity as she tolerates without any support to the right wrist.  Denies any new fall, trauma, or injury.      Past Medical History:        Diagnosis Date    Anemia     Anxiety     Ongoing    Arthritis     Closed fracture of left foot     Depression     Ongoing    Hip fracture (HCC)     x2    History of blood transfusion     Hyperlipidemia     Hypertension     Hyperthyroidism 2020    Negative 2021    Hypokalemia     pt states, \"it's not low now\"    Hypomagnesemia     Iron deficiency anemia secondary to inadequate dietary iron intake 2024    Iron malabsorption 2024    Kidney disease     pt denies; \"but they want me to see a nephrologist\"    Multiple fractures     multiple fractures over lifetime, 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

## 2025-05-16 ENCOUNTER — HOSPITAL ENCOUNTER (OUTPATIENT)
Dept: INFUSION THERAPY | Age: 55
Setting detail: INFUSION SERIES
Discharge: HOME | End: 2025-05-16
Payer: MEDICARE

## 2025-05-16 VITALS
RESPIRATION RATE: 17 BRPM | TEMPERATURE: 97.9 F | DIASTOLIC BLOOD PRESSURE: 65 MMHG | OXYGEN SATURATION: 100 % | SYSTOLIC BLOOD PRESSURE: 99 MMHG | HEART RATE: 85 BPM

## 2025-05-16 DIAGNOSIS — D50.8 IRON DEFICIENCY ANEMIA SECONDARY TO INADEQUATE DIETARY IRON INTAKE: Primary | ICD-10-CM

## 2025-05-16 DIAGNOSIS — K90.9 IRON MALABSORPTION: ICD-10-CM

## 2025-05-16 PROCEDURE — 2500000003 HC RX 250 WO HCPCS: Performed by: NURSE PRACTITIONER

## 2025-05-16 PROCEDURE — 2580000003 HC RX 258: Performed by: NURSE PRACTITIONER

## 2025-05-16 PROCEDURE — 6360000002 HC RX W HCPCS: Performed by: NURSE PRACTITIONER

## 2025-05-16 PROCEDURE — 96365 THER/PROPH/DIAG IV INF INIT: CPT

## 2025-05-16 RX ORDER — DIPHENHYDRAMINE HYDROCHLORIDE 50 MG/ML
50 INJECTION, SOLUTION INTRAMUSCULAR; INTRAVENOUS
OUTPATIENT
Start: 2025-05-23

## 2025-05-16 RX ORDER — SODIUM CHLORIDE 0.9 % (FLUSH) 0.9 %
5-40 SYRINGE (ML) INJECTION PRN
Status: DISCONTINUED | OUTPATIENT
Start: 2025-05-16 | End: 2025-05-17 | Stop reason: HOSPADM

## 2025-05-16 RX ORDER — SODIUM CHLORIDE 9 MG/ML
5-250 INJECTION, SOLUTION INTRAVENOUS PRN
Status: CANCELLED | OUTPATIENT
Start: 2025-05-23

## 2025-05-16 RX ORDER — HYDROCORTISONE SODIUM SUCCINATE 100 MG/2ML
100 INJECTION INTRAMUSCULAR; INTRAVENOUS
OUTPATIENT
Start: 2025-05-23

## 2025-05-16 RX ORDER — SODIUM CHLORIDE 9 MG/ML
5-250 INJECTION, SOLUTION INTRAVENOUS PRN
Status: DISCONTINUED | OUTPATIENT
Start: 2025-05-16 | End: 2025-05-17 | Stop reason: HOSPADM

## 2025-05-16 RX ORDER — ONDANSETRON 2 MG/ML
8 INJECTION INTRAMUSCULAR; INTRAVENOUS
OUTPATIENT
Start: 2025-05-23

## 2025-05-16 RX ORDER — ALBUTEROL SULFATE 90 UG/1
4 INHALANT RESPIRATORY (INHALATION) PRN
OUTPATIENT
Start: 2025-05-23

## 2025-05-16 RX ORDER — SODIUM CHLORIDE 9 MG/ML
INJECTION, SOLUTION INTRAVENOUS CONTINUOUS
OUTPATIENT
Start: 2025-05-23

## 2025-05-16 RX ORDER — SODIUM CHLORIDE 0.9 % (FLUSH) 0.9 %
5-40 SYRINGE (ML) INJECTION PRN
Status: CANCELLED | OUTPATIENT
Start: 2025-05-23

## 2025-05-16 RX ORDER — SODIUM CHLORIDE 9 MG/ML
5-250 INJECTION, SOLUTION INTRAVENOUS PRN
OUTPATIENT
Start: 2025-05-23

## 2025-05-16 RX ORDER — EPINEPHRINE 1 MG/ML
0.3 INJECTION, SOLUTION, CONCENTRATE INTRAVENOUS PRN
OUTPATIENT
Start: 2025-05-23

## 2025-05-16 RX ORDER — ACETAMINOPHEN 325 MG/1
650 TABLET ORAL
OUTPATIENT
Start: 2025-05-23

## 2025-05-16 RX ORDER — HEPARIN 100 UNIT/ML
500 SYRINGE INTRAVENOUS PRN
OUTPATIENT
Start: 2025-05-23

## 2025-05-16 RX ADMIN — FERUMOXYTOL 510 MG: 510 INJECTION INTRAVENOUS at 12:48

## 2025-05-16 RX ADMIN — SODIUM CHLORIDE, PRESERVATIVE FREE 10 ML: 5 INJECTION INTRAVENOUS at 13:48

## 2025-05-23 ENCOUNTER — HOSPITAL ENCOUNTER (OUTPATIENT)
Dept: INFUSION THERAPY | Age: 55
Setting detail: INFUSION SERIES
Discharge: HOME | End: 2025-05-23
Payer: MEDICARE

## 2025-05-23 VITALS
HEART RATE: 100 BPM | DIASTOLIC BLOOD PRESSURE: 70 MMHG | OXYGEN SATURATION: 99 % | TEMPERATURE: 97.7 F | SYSTOLIC BLOOD PRESSURE: 99 MMHG | RESPIRATION RATE: 18 BRPM

## 2025-05-23 DIAGNOSIS — D50.8 IRON DEFICIENCY ANEMIA SECONDARY TO INADEQUATE DIETARY IRON INTAKE: Primary | ICD-10-CM

## 2025-05-23 DIAGNOSIS — K90.9 IRON MALABSORPTION: ICD-10-CM

## 2025-05-23 PROCEDURE — 2580000003 HC RX 258: Performed by: NURSE PRACTITIONER

## 2025-05-23 PROCEDURE — 96365 THER/PROPH/DIAG IV INF INIT: CPT

## 2025-05-23 PROCEDURE — 6360000002 HC RX W HCPCS: Performed by: NURSE PRACTITIONER

## 2025-05-23 RX ORDER — EPINEPHRINE 1 MG/ML
0.3 INJECTION, SOLUTION, CONCENTRATE INTRAVENOUS PRN
OUTPATIENT
Start: 2025-05-23

## 2025-05-23 RX ORDER — SODIUM CHLORIDE 9 MG/ML
INJECTION, SOLUTION INTRAVENOUS CONTINUOUS
OUTPATIENT
Start: 2025-05-23

## 2025-05-23 RX ORDER — DIPHENHYDRAMINE HYDROCHLORIDE 50 MG/ML
50 INJECTION, SOLUTION INTRAMUSCULAR; INTRAVENOUS
OUTPATIENT
Start: 2025-05-23

## 2025-05-23 RX ORDER — SODIUM CHLORIDE 9 MG/ML
5-250 INJECTION, SOLUTION INTRAVENOUS PRN
Status: CANCELLED | OUTPATIENT
Start: 2025-05-23

## 2025-05-23 RX ORDER — SODIUM CHLORIDE 0.9 % (FLUSH) 0.9 %
5-40 SYRINGE (ML) INJECTION PRN
Status: DISCONTINUED | OUTPATIENT
Start: 2025-05-23 | End: 2025-05-24 | Stop reason: HOSPADM

## 2025-05-23 RX ORDER — HYDROCORTISONE SODIUM SUCCINATE 100 MG/2ML
100 INJECTION INTRAMUSCULAR; INTRAVENOUS
OUTPATIENT
Start: 2025-05-23

## 2025-05-23 RX ORDER — SODIUM CHLORIDE 0.9 % (FLUSH) 0.9 %
5-40 SYRINGE (ML) INJECTION PRN
OUTPATIENT
Start: 2025-05-23

## 2025-05-23 RX ORDER — SODIUM CHLORIDE 9 MG/ML
5-250 INJECTION, SOLUTION INTRAVENOUS PRN
Status: DISCONTINUED | OUTPATIENT
Start: 2025-05-23 | End: 2025-05-24 | Stop reason: HOSPADM

## 2025-05-23 RX ORDER — SODIUM CHLORIDE 9 MG/ML
5-250 INJECTION, SOLUTION INTRAVENOUS PRN
OUTPATIENT
Start: 2025-05-23

## 2025-05-23 RX ORDER — HEPARIN 100 UNIT/ML
500 SYRINGE INTRAVENOUS PRN
OUTPATIENT
Start: 2025-05-23

## 2025-05-23 RX ORDER — ALBUTEROL SULFATE 90 UG/1
4 INHALANT RESPIRATORY (INHALATION) PRN
OUTPATIENT
Start: 2025-05-23

## 2025-05-23 RX ORDER — ACETAMINOPHEN 325 MG/1
650 TABLET ORAL
OUTPATIENT
Start: 2025-05-23

## 2025-05-23 RX ORDER — ONDANSETRON 2 MG/ML
8 INJECTION INTRAMUSCULAR; INTRAVENOUS
OUTPATIENT
Start: 2025-05-23

## 2025-05-23 RX ADMIN — FERUMOXYTOL 510 MG: 510 INJECTION INTRAVENOUS at 14:24

## 2025-06-02 DIAGNOSIS — F32.89 OTHER DEPRESSION: ICD-10-CM

## 2025-06-02 RX ORDER — CITALOPRAM HYDROBROMIDE 40 MG/1
40 TABLET ORAL DAILY
Qty: 30 TABLET | Refills: 5 | Status: SHIPPED | OUTPATIENT
Start: 2025-06-02

## 2025-06-05 ENCOUNTER — OFFICE VISIT (OUTPATIENT)
Age: 55
End: 2025-06-05

## 2025-06-05 DIAGNOSIS — M25.531 RIGHT WRIST PAIN: Primary | ICD-10-CM

## 2025-06-05 PROCEDURE — 99024 POSTOP FOLLOW-UP VISIT: CPT | Performed by: PHYSICIAN ASSISTANT

## 2025-06-05 NOTE — PROGRESS NOTES
Orthopaedic Clinic Note - Established Patient    NAME:  Vonda Lutz   : 1970  MRN: [id number]      2025      CHIEF COMPLAINT: Post op ORIF distal radius fracture        HISTORY OF PRESENT ILLNESS:   Patient is a pleasant 54-year-old female that presents the clinic today for postop follow-up.  Patient's initial injury was on 3/12/2025 when she fell on her outstretched right arm when trying to go the bathroom. Patient had ORIF of right extra-articular distal radius fracture with Dr. Paige on 3/20/25. Patient was placed in a short arm splint post op.  Patient presents today 2 weeks postop.  She reports that pain has been well-controlled and describes as mild in nature.  She denies any new or worsening symptoms.  She is overall happy with surgical outcome so far.    The above note copied from date of service 2025.    Patient comes in for follow-up.  She has been compliant with her wrist brace.  She denies any pain.  States she is doing well.    Past Medical History:        Diagnosis Date    Anemia     Anxiety     Ongoing    Arthritis     Closed fracture of left foot     Depression     Ongoing    Hip fracture (HCC)     x2    History of blood transfusion     Hyperlipidemia     Hypertension     Hyperthyroidism 2020    Negative 2021    Hypokalemia     pt states, \"it's not low now\"    Hypomagnesemia     Iron deficiency anemia secondary to inadequate dietary iron intake 2024    Iron malabsorption 2024    Kidney disease     pt denies; \"but they want me to see a nephrologist\"    Multiple fractures     multiple fractures over lifetime, 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

## 2025-06-06 ENCOUNTER — HOSPITAL ENCOUNTER (OUTPATIENT)
Dept: CT IMAGING | Age: 55
Discharge: HOME | End: 2025-06-06
Attending: INTERNAL MEDICINE
Payer: MEDICARE

## 2025-06-06 DIAGNOSIS — Z87.891 PERSONAL HISTORY OF TOBACCO USE: ICD-10-CM

## 2025-06-06 DIAGNOSIS — Z12.31 ENCOUNTER FOR SCREENING MAMMOGRAM FOR MALIGNANT NEOPLASM OF BREAST: ICD-10-CM

## 2025-06-06 PROCEDURE — 71271 CT THORAX LUNG CANCER SCR C-: CPT

## 2025-06-12 ENCOUNTER — APPOINTMENT (OUTPATIENT)
Dept: WOMENS IMAGING | Age: 55
End: 2025-06-12
Attending: INTERNAL MEDICINE
Payer: MEDICARE

## 2025-06-15 ENCOUNTER — ANESTHESIA (OUTPATIENT)
Dept: OPERATING ROOM | Age: 55
End: 2025-06-15
Payer: MEDICARE

## 2025-06-15 ENCOUNTER — APPOINTMENT (OUTPATIENT)
Dept: GENERAL RADIOLOGY | Age: 55
DRG: 481 | End: 2025-06-15
Payer: MEDICARE

## 2025-06-15 ENCOUNTER — HOSPITAL ENCOUNTER (INPATIENT)
Age: 55
LOS: 3 days | Discharge: HOME HEALTH | DRG: 481 | End: 2025-06-18
Attending: EMERGENCY MEDICINE | Admitting: INTERNAL MEDICINE
Payer: MEDICARE

## 2025-06-15 ENCOUNTER — APPOINTMENT (OUTPATIENT)
Dept: CT IMAGING | Age: 55
DRG: 481 | End: 2025-06-15
Payer: MEDICARE

## 2025-06-15 ENCOUNTER — ANESTHESIA EVENT (OUTPATIENT)
Dept: OPERATING ROOM | Age: 55
End: 2025-06-15
Payer: MEDICARE

## 2025-06-15 DIAGNOSIS — S72.142A INTERTROCHANTERIC FRACTURE OF LEFT FEMUR, CLOSED, INITIAL ENCOUNTER (HCC): ICD-10-CM

## 2025-06-15 DIAGNOSIS — N18.9 ACUTE ON CHRONIC RENAL INSUFFICIENCY: ICD-10-CM
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
## 2025-06-15 DIAGNOSIS — S72.145A CLOSED NONDISPLACED INTERTROCHANTERIC FRACTURE OF LEFT FEMUR, INITIAL ENCOUNTER (HCC): ICD-10-CM

## 2025-06-15 DIAGNOSIS — N28.9 ACUTE ON CHRONIC RENAL INSUFFICIENCY: ICD-10-CM

## 2025-06-15 LAB
ABO/RH: NORMAL
ALBUMIN SERPL-MCNC: 3.5 G/DL (ref 3.5–5.2)
ALP SERPL-CCNC: 112 U/L (ref 35–104)
ALT SERPL-CCNC: 23 U/L (ref 10–35)
ANION GAP SERPL CALCULATED.3IONS-SCNC: 11 MMOL/L (ref 8–16)
ANTIBODY SCREEN: NORMAL
AST SERPL-CCNC: 31 U/L (ref 10–35)
BASOPHILS # BLD: 0 K/UL (ref 0–0.2)
BASOPHILS NFR BLD: 0.4 % (ref 0–1)
BILIRUB SERPL-MCNC: <0.2 MG/DL (ref 0.2–1.2)
BUN SERPL-MCNC: 24 MG/DL (ref 6–20)
CALCIUM SERPL-MCNC: 8.7 MG/DL (ref 8.6–10)
CHLORIDE SERPL-SCNC: 106 MMOL/L (ref 98–107)
CO2 SERPL-SCNC: 24 MMOL/L (ref 22–29)
CREAT SERPL-MCNC: 1.8 MG/DL (ref 0.5–0.9)
EOSINOPHIL # BLD: 0.3 K/UL (ref 0–0.6)
EOSINOPHIL NFR BLD: 3.7 % (ref 0–5)
ERYTHROCYTE [DISTWIDTH] IN BLOOD BY AUTOMATED COUNT: 12.8 % (ref 11.5–14.5)
GLUCOSE SERPL-MCNC: 136 MG/DL (ref 70–99)
HCT VFR BLD AUTO: 30.6 % (ref 37–47)
HGB BLD-MCNC: 9.7 G/DL (ref 12–16)
IMM GRANULOCYTES # BLD: 0.1 K/UL
INR PPP: 1.05 (ref 0.88–1.18)
IRON SATN MFR SERPL: 39 % (ref 15–50)
IRON SERPL-MCNC: 65 UG/DL (ref 37–145)
LYMPHOCYTES # BLD: 1.5 K/UL (ref 1.1–4.5)
LYMPHOCYTES NFR BLD: 16.2 % (ref 20–40)
MCH RBC QN AUTO: 29 PG (ref 27–31)
MCHC RBC AUTO-ENTMCNC: 31.7 G/DL (ref 33–37)
MCV RBC AUTO: 91.6 FL (ref 81–99)
MONOCYTES # BLD: 0.6 K/UL (ref 0–0.9)
MONOCYTES NFR BLD: 6.5 % (ref 0–10)
NEUTROPHILS # BLD: 6.7 K/UL (ref 1.5–7.5)
NEUTS SEG NFR BLD: 72.7 % (ref 50–65)
PLATELET # BLD AUTO: 308 K/UL (ref 130–400)
PMV BLD AUTO: 10.2 FL (ref 9.4–12.3)
POTASSIUM SERPL-SCNC: 4.7 MMOL/L (ref 3.5–5.1)
PROT SERPL-MCNC: 5.9 G/DL (ref 6.4–8.3)
PROTHROMBIN TIME: 13.7 SEC (ref 12–14.6)
RBC # BLD AUTO: 3.34 M/UL (ref 4.2–5.4)
SODIUM SERPL-SCNC: 141 MMOL/L (ref 136–145)
TIBC SERPL-MCNC: 165 UG/DL (ref 250–400)
WBC # BLD AUTO: 9.2 K/UL (ref 4.8–10.8)

## 2025-06-15 PROCEDURE — 86923 COMPATIBILITY TEST ELECTRIC: CPT

## 2025-06-15 PROCEDURE — 96375 TX/PRO/DX INJ NEW DRUG ADDON: CPT

## 2025-06-15 PROCEDURE — 6360000002 HC RX W HCPCS

## 2025-06-15 PROCEDURE — 1200000000 HC SEMI PRIVATE

## 2025-06-15 PROCEDURE — 2700000000 HC OXYGEN THERAPY PER DAY

## 2025-06-15 PROCEDURE — 3600000004 HC SURGERY LEVEL 4 BASE: Performed by: ORTHOPAEDIC SURGERY

## 2025-06-15 PROCEDURE — 3600000014 HC SURGERY LEVEL 4 ADDTL 15MIN: Performed by: ORTHOPAEDIC SURGERY

## 2025-06-15 PROCEDURE — 83540 ASSAY OF IRON: CPT

## 2025-06-15 PROCEDURE — 3700000000 HC ANESTHESIA ATTENDED CARE: Performed by: ORTHOPAEDIC SURGERY

## 2025-06-15 PROCEDURE — 3700000001 HC ADD 15 MINUTES (ANESTHESIA): Performed by: ORTHOPAEDIC SURGERY

## 2025-06-15 PROCEDURE — 2580000003 HC RX 258

## 2025-06-15 PROCEDURE — C1769 GUIDE WIRE: HCPCS | Performed by: ORTHOPAEDIC SURGERY

## 2025-06-15 PROCEDURE — 83550 IRON BINDING TEST: CPT

## 2025-06-15 PROCEDURE — 73502 X-RAY EXAM HIP UNI 2-3 VIEWS: CPT

## 2025-06-15 PROCEDURE — C1713 ANCHOR/SCREW BN/BN,TIS/BN: HCPCS | Performed by: ORTHOPAEDIC SURGERY

## 2025-06-15 PROCEDURE — 6370000000 HC RX 637 (ALT 250 FOR IP): Performed by: ORTHOPAEDIC SURGERY

## 2025-06-15 PROCEDURE — 0QS706Z REPOSITION LEFT UPPER FEMUR WITH INTRAMEDULLARY INTERNAL FIXATION DEVICE, OPEN APPROACH: ICD-10-PCS | Performed by: ORTHOPAEDIC SURGERY

## 2025-06-15 PROCEDURE — P9016 RBC LEUKOCYTES REDUCED: HCPCS

## 2025-06-15 PROCEDURE — 7100000000 HC PACU RECOVERY - FIRST 15 MIN: Performed by: ORTHOPAEDIC SURGERY

## 2025-06-15 PROCEDURE — 0QP704Z REMOVAL OF INTERNAL FIXATION DEVICE FROM LEFT UPPER FEMUR, OPEN APPROACH: ICD-10-PCS | Performed by: ORTHOPAEDIC SURGERY

## 2025-06-15 PROCEDURE — 27245 TREAT THIGH FRACTURE: CPT | Performed by: ORTHOPAEDIC SURGERY

## 2025-06-15 PROCEDURE — 80053 COMPREHEN METABOLIC PANEL: CPT

## 2025-06-15 PROCEDURE — 2709999900 HC NON-CHARGEABLE SUPPLY: Performed by: ORTHOPAEDIC SURGERY

## 2025-06-15 PROCEDURE — 85610 PROTHROMBIN TIME: CPT

## 2025-06-15 PROCEDURE — 36415 COLL VENOUS BLD VENIPUNCTURE: CPT

## 2025-06-15 PROCEDURE — 6360000002 HC RX W HCPCS: Performed by: EMERGENCY MEDICINE

## 2025-06-15 PROCEDURE — 30233N1 TRANSFUSION OF NONAUTOLOGOUS RED BLOOD CELLS INTO PERIPHERAL VEIN, PERCUTANEOUS APPROACH: ICD-10-PCS | Performed by: INTERNAL MEDICINE

## 2025-06-15 PROCEDURE — 94760 N-INVAS EAR/PLS OXIMETRY 1: CPT

## 2025-06-15 PROCEDURE — 86900 BLOOD TYPING SEROLOGIC ABO: CPT

## 2025-06-15 PROCEDURE — 2500000003 HC RX 250 WO HCPCS

## 2025-06-15 PROCEDURE — 85025 COMPLETE CBC W/AUTO DIFF WBC: CPT

## 2025-06-15 PROCEDURE — 7100000001 HC PACU RECOVERY - ADDTL 15 MIN: Performed by: ORTHOPAEDIC SURGERY

## 2025-06-15 PROCEDURE — 96374 THER/PROPH/DIAG INJ IV PUSH: CPT

## 2025-06-15 PROCEDURE — 2720000010 HC SURG SUPPLY STERILE: Performed by: ORTHOPAEDIC SURGERY

## 2025-06-15 PROCEDURE — 72192 CT PELVIS W/O DYE: CPT

## 2025-06-15 PROCEDURE — 99285 EMERGENCY DEPT VISIT HI MDM: CPT

## 2025-06-15 PROCEDURE — 86850 RBC ANTIBODY SCREEN: CPT

## 2025-06-15 PROCEDURE — 86901 BLOOD TYPING SEROLOGIC RH(D): CPT

## 2025-06-15 PROCEDURE — 71045 X-RAY EXAM CHEST 1 VIEW: CPT

## 2025-06-15 DEVICE — IMPLANTABLE DEVICE: Type: IMPLANTABLE DEVICE | Site: FEMUR | Status: FUNCTIONAL

## 2025-06-15 RX ORDER — MORPHINE SULFATE 2 MG/ML
2 INJECTION, SOLUTION INTRAMUSCULAR; INTRAVENOUS
Status: DISCONTINUED | OUTPATIENT
Start: 2025-06-15 | End: 2025-06-18 | Stop reason: HOSPADM

## 2025-06-15 RX ORDER — SODIUM CHLORIDE 9 MG/ML
INJECTION, SOLUTION INTRAVENOUS PRN
Status: DISCONTINUED | OUTPATIENT
Start: 2025-06-15 | End: 2025-06-15 | Stop reason: SDUPTHER

## 2025-06-15 RX ORDER — FENTANYL CITRATE 50 UG/ML
INJECTION, SOLUTION INTRAMUSCULAR; INTRAVENOUS
Status: DISCONTINUED | OUTPATIENT
Start: 2025-06-15 | End: 2025-06-15 | Stop reason: SDUPTHER

## 2025-06-15 RX ORDER — LABETALOL HYDROCHLORIDE 5 MG/ML
10 INJECTION, SOLUTION INTRAVENOUS
Status: DISCONTINUED | OUTPATIENT
Start: 2025-06-15 | End: 2025-06-15 | Stop reason: SDUPTHER

## 2025-06-15 RX ORDER — ROCURONIUM BROMIDE 10 MG/ML
INJECTION, SOLUTION INTRAVENOUS
Status: DISCONTINUED | OUTPATIENT
Start: 2025-06-15 | End: 2025-06-15 | Stop reason: SDUPTHER

## 2025-06-15 RX ORDER — SODIUM CHLORIDE 9 MG/ML
INJECTION, SOLUTION INTRAVENOUS PRN
Status: DISCONTINUED | OUTPATIENT
Start: 2025-06-15 | End: 2025-06-18 | Stop reason: HOSPADM

## 2025-06-15 RX ORDER — ONDANSETRON 2 MG/ML
4 INJECTION INTRAMUSCULAR; INTRAVENOUS EVERY 6 HOURS PRN
Status: DISCONTINUED | OUTPATIENT
Start: 2025-06-15 | End: 2025-06-18 | Stop reason: HOSPADM

## 2025-06-15 RX ORDER — HYDRALAZINE HYDROCHLORIDE 20 MG/ML
10 INJECTION INTRAMUSCULAR; INTRAVENOUS
Status: DISCONTINUED | OUTPATIENT
Start: 2025-06-15 | End: 2025-06-15 | Stop reason: SDUPTHER

## 2025-06-15 RX ORDER — SENNOSIDES 8.6 MG
325 CAPSULE ORAL 2 TIMES DAILY
Status: DISCONTINUED | OUTPATIENT
Start: 2025-06-15 | End: 2025-06-17

## 2025-06-15 RX ORDER — SODIUM CHLORIDE 0.9 % (FLUSH) 0.9 %
5-40 SYRINGE (ML) INJECTION PRN
Status: DISCONTINUED | OUTPATIENT
Start: 2025-06-15 | End: 2025-06-18 | Stop reason: HOSPADM

## 2025-06-15 RX ORDER — ONDANSETRON 2 MG/ML
4 INJECTION INTRAMUSCULAR; INTRAVENOUS EVERY 6 HOURS PRN
Status: DISCONTINUED | OUTPATIENT
Start: 2025-06-15 | End: 2025-06-15

## 2025-06-15 RX ORDER — SODIUM CHLORIDE 9 MG/ML
INJECTION, SOLUTION INTRAVENOUS
Status: DISCONTINUED | OUTPATIENT
Start: 2025-06-15 | End: 2025-06-15 | Stop reason: SDUPTHER

## 2025-06-15 RX ORDER — ACETAMINOPHEN 325 MG/1
650 TABLET ORAL EVERY 6 HOURS
Status: DISCONTINUED | OUTPATIENT
Start: 2025-06-15 | End: 2025-06-18 | Stop reason: HOSPADM

## 2025-06-15 RX ORDER — SODIUM CHLORIDE 0.9 % (FLUSH) 0.9 %
5-40 SYRINGE (ML) INJECTION EVERY 12 HOURS SCHEDULED
Status: DISCONTINUED | OUTPATIENT
Start: 2025-06-15 | End: 2025-06-15 | Stop reason: SDUPTHER

## 2025-06-15 RX ORDER — ACETAMINOPHEN 325 MG/1
650 TABLET ORAL EVERY 6 HOURS PRN
Status: DISCONTINUED | OUTPATIENT
Start: 2025-06-15 | End: 2025-06-18 | Stop reason: HOSPADM

## 2025-06-15 RX ORDER — OXYCODONE HYDROCHLORIDE 10 MG/1
10 TABLET ORAL EVERY 4 HOURS PRN
Status: DISCONTINUED | OUTPATIENT
Start: 2025-06-15 | End: 2025-06-18 | Stop reason: HOSPADM

## 2025-06-15 RX ORDER — ATORVASTATIN CALCIUM 10 MG/1
10 TABLET, FILM COATED ORAL DAILY
Status: DISCONTINUED | OUTPATIENT
Start: 2025-06-16 | End: 2025-06-18 | Stop reason: HOSPADM

## 2025-06-15 RX ORDER — SODIUM CHLORIDE 0.9 % (FLUSH) 0.9 %
5-40 SYRINGE (ML) INJECTION PRN
Status: DISCONTINUED | OUTPATIENT
Start: 2025-06-15 | End: 2025-06-15 | Stop reason: SDUPTHER

## 2025-06-15 RX ORDER — FENTANYL CITRATE 50 UG/ML
50 INJECTION, SOLUTION INTRAMUSCULAR; INTRAVENOUS ONCE
Status: COMPLETED | OUTPATIENT
Start: 2025-06-15 | End: 2025-06-15

## 2025-06-15 RX ORDER — MORPHINE SULFATE 4 MG/ML
4 INJECTION, SOLUTION INTRAMUSCULAR; INTRAVENOUS EVERY 4 HOURS PRN
Status: DISCONTINUED | OUTPATIENT
Start: 2025-06-15 | End: 2025-06-15

## 2025-06-15 RX ORDER — HYDROMORPHONE HYDROCHLORIDE 1 MG/ML
0.5 INJECTION, SOLUTION INTRAMUSCULAR; INTRAVENOUS; SUBCUTANEOUS EVERY 5 MIN PRN
Status: DISCONTINUED | OUTPATIENT
Start: 2025-06-15 | End: 2025-06-15 | Stop reason: SDUPTHER

## 2025-06-15 RX ORDER — DEXAMETHASONE SODIUM PHOSPHATE 10 MG/ML
INJECTION, SOLUTION INTRAMUSCULAR; INTRAVENOUS
Status: DISCONTINUED | OUTPATIENT
Start: 2025-06-15 | End: 2025-06-15 | Stop reason: SDUPTHER

## 2025-06-15 RX ORDER — POLYETHYLENE GLYCOL 3350 17 G/17G
17 POWDER, FOR SOLUTION ORAL DAILY PRN
Status: DISCONTINUED | OUTPATIENT
Start: 2025-06-15 | End: 2025-06-18 | Stop reason: HOSPADM

## 2025-06-15 RX ORDER — ONDANSETRON 2 MG/ML
4 INJECTION INTRAMUSCULAR; INTRAVENOUS ONCE
Status: COMPLETED | OUTPATIENT
Start: 2025-06-15 | End: 2025-06-15

## 2025-06-15 RX ORDER — PROPOFOL 10 MG/ML
INJECTION, EMULSION INTRAVENOUS
Status: DISCONTINUED | OUTPATIENT
Start: 2025-06-15 | End: 2025-06-15 | Stop reason: SDUPTHER

## 2025-06-15 RX ORDER — CEFAZOLIN SODIUM 1 G/3ML
INJECTION, POWDER, FOR SOLUTION INTRAMUSCULAR; INTRAVENOUS
Status: DISCONTINUED | OUTPATIENT
Start: 2025-06-15 | End: 2025-06-15 | Stop reason: SDUPTHER

## 2025-06-15 RX ORDER — HYDROMORPHONE HYDROCHLORIDE 1 MG/ML
0.25 INJECTION, SOLUTION INTRAMUSCULAR; INTRAVENOUS; SUBCUTANEOUS EVERY 5 MIN PRN
Status: DISCONTINUED | OUTPATIENT
Start: 2025-06-15 | End: 2025-06-15 | Stop reason: SDUPTHER

## 2025-06-15 RX ORDER — NALOXONE HYDROCHLORIDE 0.4 MG/ML
INJECTION, SOLUTION INTRAMUSCULAR; INTRAVENOUS; SUBCUTANEOUS PRN
Status: DISCONTINUED | OUTPATIENT
Start: 2025-06-15 | End: 2025-06-15 | Stop reason: SDUPTHER

## 2025-06-15 RX ORDER — ONDANSETRON 4 MG/1
4 TABLET, ORALLY DISINTEGRATING ORAL EVERY 8 HOURS PRN
Status: DISCONTINUED | OUTPATIENT
Start: 2025-06-15 | End: 2025-06-18 | Stop reason: HOSPADM

## 2025-06-15 RX ORDER — ACETAMINOPHEN 650 MG/1
650 SUPPOSITORY RECTAL EVERY 6 HOURS PRN
Status: DISCONTINUED | OUTPATIENT
Start: 2025-06-15 | End: 2025-06-18 | Stop reason: HOSPADM

## 2025-06-15 RX ORDER — M-VIT,TX,IRON,MINS/CALC/FOLIC 27MG-0.4MG
1 TABLET ORAL DAILY
Status: DISCONTINUED | OUTPATIENT
Start: 2025-06-16 | End: 2025-06-18 | Stop reason: HOSPADM

## 2025-06-15 RX ORDER — DIPHENHYDRAMINE HYDROCHLORIDE 50 MG/ML
12.5 INJECTION, SOLUTION INTRAMUSCULAR; INTRAVENOUS
Status: DISCONTINUED | OUTPATIENT
Start: 2025-06-15 | End: 2025-06-18 | Stop reason: HOSPADM

## 2025-06-15 RX ORDER — SODIUM CHLORIDE 0.9 % (FLUSH) 0.9 %
5-40 SYRINGE (ML) INJECTION EVERY 12 HOURS SCHEDULED
Status: DISCONTINUED | OUTPATIENT
Start: 2025-06-15 | End: 2025-06-18 | Stop reason: HOSPADM

## 2025-06-15 RX ORDER — LIDOCAINE HYDROCHLORIDE 10 MG/ML
INJECTION, SOLUTION INFILTRATION; PERINEURAL
Status: DISCONTINUED | OUTPATIENT
Start: 2025-06-15 | End: 2025-06-15 | Stop reason: SDUPTHER

## 2025-06-15 RX ORDER — OXYCODONE HYDROCHLORIDE 5 MG/1
5 TABLET ORAL EVERY 4 HOURS PRN
Status: DISCONTINUED | OUTPATIENT
Start: 2025-06-15 | End: 2025-06-18 | Stop reason: HOSPADM

## 2025-06-15 RX ORDER — MORPHINE SULFATE 4 MG/ML
4 INJECTION, SOLUTION INTRAMUSCULAR; INTRAVENOUS
Status: DISCONTINUED | OUTPATIENT
Start: 2025-06-15 | End: 2025-06-18 | Stop reason: HOSPADM

## 2025-06-15 RX ORDER — SODIUM CHLORIDE 9 MG/ML
INJECTION, SOLUTION INTRAVENOUS CONTINUOUS
Status: DISCONTINUED | OUTPATIENT
Start: 2025-06-15 | End: 2025-06-15

## 2025-06-15 RX ORDER — PROCHLORPERAZINE EDISYLATE 5 MG/ML
5 INJECTION INTRAMUSCULAR; INTRAVENOUS
Status: DISCONTINUED | OUTPATIENT
Start: 2025-06-15 | End: 2025-06-15 | Stop reason: SDUPTHER

## 2025-06-15 RX ADMIN — FENTANYL CITRATE 50 MCG: 50 INJECTION INTRAMUSCULAR; INTRAVENOUS at 12:30

## 2025-06-15 RX ADMIN — CEFAZOLIN 2 G: 1 INJECTION, POWDER, FOR SOLUTION INTRAMUSCULAR; INTRAVENOUS at 16:32

## 2025-06-15 RX ADMIN — DEXAMETHASONE SODIUM PHOSPHATE 10 MG: 10 INJECTION, SOLUTION INTRAMUSCULAR; INTRAVENOUS at 16:31

## 2025-06-15 RX ADMIN — FENTANYL CITRATE 50 MCG: 0.05 INJECTION, SOLUTION INTRAMUSCULAR; INTRAVENOUS at 17:00

## 2025-06-15 RX ADMIN — LIDOCAINE HYDROCHLORIDE 50 MG: 10 INJECTION, SOLUTION INFILTRATION; PERINEURAL at 16:23

## 2025-06-15 RX ADMIN — PHENYLEPHRINE HYDROCHLORIDE 100 MCG: 10 INJECTION INTRAVENOUS at 17:02

## 2025-06-15 RX ADMIN — FENTANYL CITRATE 25 MCG: 0.05 INJECTION, SOLUTION INTRAMUSCULAR; INTRAVENOUS at 17:20

## 2025-06-15 RX ADMIN — ONDANSETRON 4 MG: 2 INJECTION, SOLUTION INTRAMUSCULAR; INTRAVENOUS at 12:14

## 2025-06-15 RX ADMIN — ASPIRIN 325 MG: 325 TABLET, COATED ORAL at 22:25

## 2025-06-15 RX ADMIN — ROCURONIUM BROMIDE 50 MG: 10 INJECTION, SOLUTION INTRAVENOUS at 16:23

## 2025-06-15 RX ADMIN — PROPOFOL 100 MG: 10 INJECTION, EMULSION INTRAVENOUS at 16:23

## 2025-06-15 RX ADMIN — FENTANYL CITRATE 50 MCG: 0.05 INJECTION, SOLUTION INTRAMUSCULAR; INTRAVENOUS at 16:46

## 2025-06-15 RX ADMIN — FENTANYL CITRATE 25 MCG: 0.05 INJECTION, SOLUTION INTRAMUSCULAR; INTRAVENOUS at 16:24

## 2025-06-15 RX ADMIN — SODIUM CHLORIDE: 9 INJECTION, SOLUTION INTRAVENOUS at 16:21

## 2025-06-15 RX ADMIN — FENTANYL CITRATE 50 MCG: 0.05 INJECTION, SOLUTION INTRAMUSCULAR; INTRAVENOUS at 16:38

## 2025-06-15 ASSESSMENT — PAIN DESCRIPTION - ONSET
ONSET: PROGRESSIVE
ONSET: ON-GOING
ONSET: ON-GOING

## 2025-06-15 ASSESSMENT — PAIN DESCRIPTION - LOCATION
LOCATION: HIP

## 2025-06-15 ASSESSMENT — PAIN SCALES - GENERAL
PAINLEVEL_OUTOF10: 8
PAINLEVEL_OUTOF10: 7
PAINLEVEL_OUTOF10: 8
PAINLEVEL_OUTOF10: 7
PAINLEVEL_OUTOF10: 7
PAINLEVEL_OUTOF10: 3
PAINLEVEL_OUTOF10: 7

## 2025-06-15 ASSESSMENT — PAIN - FUNCTIONAL ASSESSMENT
PAIN_FUNCTIONAL_ASSESSMENT: 0-10

## 2025-06-15 ASSESSMENT — PAIN DESCRIPTION - DESCRIPTORS
DESCRIPTORS: SORE
DESCRIPTORS: SORE
DESCRIPTORS: DISCOMFORT
DESCRIPTORS: SHARP
DESCRIPTORS: ACHING;SORE

## 2025-06-15 ASSESSMENT — PAIN DESCRIPTION - PAIN TYPE
TYPE: SURGICAL PAIN

## 2025-06-15 ASSESSMENT — LIFESTYLE VARIABLES
HOW MANY STANDARD DRINKS CONTAINING ALCOHOL DO YOU HAVE ON A TYPICAL DAY: PATIENT DOES NOT DRINK
HOW OFTEN DO YOU HAVE A DRINK CONTAINING ALCOHOL: NEVER

## 2025-06-15 ASSESSMENT — PAIN DESCRIPTION - ORIENTATION
ORIENTATION: LEFT

## 2025-06-15 ASSESSMENT — PAIN DESCRIPTION - FREQUENCY
FREQUENCY: CONTINUOUS

## 2025-06-15 NOTE — ED NOTES
ED TO INPATIENT SBAR HANDOFF    Patient Name: Vonda Lutz   : 1970  55 y.o.   Family/Caregiver Present: No  Code Status Order: Prior    C-SSRS: Risk of Suicide: No Risk  Sitter   Restraints:         Situation  Chief Complaint:   Chief Complaint   Patient presents with    Fall    Hip Pain     Pt presents to ED with c/o left hip pain after ground level fall. Pt had 50mcg fentanyl and 4mg Zofran PTA      Patient Diagnosis: Intertrochanteric fracture of left femur, closed, initial encounter ([facility]) [Q80.047P]     Brief Description of Patient's Condition: Fall this morning in kitchen and landed on left hip, c/o left hip pain.  Xray suspicious, CT left hip shows intertrochanteric fracture.  Ortho consulted, possible surgery today.  Resting comfortable when not moving, fentanyl and zofran given in ED.  She is alert and oriented, on 2L d/t desat with narcotic but otherwise was normal on RA prior to that.  Mental Status: alert, coherent, logical, thought processes intact, and able to concentrate and follow conversation  Arrived from: home    Imaging:   CT PELVIS WO CONTRAST Additional Contrast? None   Final Result   There is suspicion for an acute intertrochanteric fracture of the proximal left femur.       There has been previous nailing of the femoral neck bilaterally.        All CT scans are performed using dose optimization techniques as appropriate to the performed exam and include    at least one of the following: Automated exposure control, adjustment of the mA and/or kV according to size, and the use of iterative reconstruction technique.        ______________________________________    Electronically signed by: WANG HDEZ M.D.   Date:     06/15/2025   Time:    14:18       XR HIP 2-3 VW W PELVIS LEFT   Final Result   There is an atypical appearance of the lesser trochanter of the proximal left femur and a fracture cannot be excluded.       Postoperative  changes seen within the femoral neck bilaterally as

## 2025-06-15 NOTE — OP NOTE
INTERTROCHANTERIC FEMORAL FRACTURE CM NAIL   OPERATIVE NOTE    NAME OF SURGEON / : PRANAV Ruby MD  PATIENT:   Vonda Lutz  Date: 6/15/2025        Time: 5:09 PM   Referring Physician: ________________________    PREOP DIAGNOSIS:  left displaced intertrochanteric femur fracture after previous left femoral neck fracture fixation with cannulated screw  POSTOP DIAGNOSIS:  Same     PROCEDURE:   Removal of cannulated screws left hip and left    Hip fracture open reduction and internal fixation with synthes cephalomedullary nail.  IMPLANTS: * No implants in log *    FINDINGS: None  ASSISTANT:none    ANESTHESIA:  General  EBL:  300 mL  FLUIDS: See anesthesia record  BLOOD PRODUCTS:  None  COMPLICATIONS:  None  SPECIMEN:  None        INDICATIONS:  Patient presents for the above procedure having failed conservative treatment.  Patient consents to the procedure above understanding the risks of bleeding, infection, anesthesia, nerve injury, stiffness, and blood clots.    Procedure in Detail:    The patient was brought into the operating room, general anesthesia given, and transferred to the HANA table.  The operative extremity was placed in light traction across a padded perineal post.  Reduction was confirmed on AP and lateral c-arm views.    An antibiotic was given IV.  The extremity was prepped with chlorhexidine and alcohol and draped sterilely. A shower curtain drape used.  A lateral incision was made over previous scar.  Fascia divided.  Screws removed as well as 1 washer.  The femoral neck fracture had healed.    A one inch incision was made over the lateral hip at the level of the ASIS and deepened through fascia.  A 3/16 inch threaded tessa pin was placed at the tip of the greater trochanter and advanced down the center of the femoral canal on both AP and Lateral c-arm views.  The starter drill was advanced over the pin and the drill/pin were removed.  The nail was advanced down the canal.  The

## 2025-06-15 NOTE — CONSULTS
Orthopaedic Inpatient Consultation    NAME:  Vonda Lutz   :    1970  MRN:    [id number]    6/15/2025 11:47 AM        CHIEF COMPLAINT: Left hip pain      HISTORY OF PRESENT ILLNESS:   The patient is a 55 y.o. female who presents with the above complaint after a fall on her left hip today.  Last ate at 9 AM.  On no blood thinners.  Multiple prior fractures.  Has had bilateral femoral neck fractures in the past.  Left hip was fixed a few years ago with cannulated screws.  She did okay after that surgery.  No other complaints.  Past Medical History:        Diagnosis Date    Anemia     Anxiety     Ongoing    Arthritis     Closed fracture of left foot     Depression     Ongoing    Hip fracture (HCC)     x2    History of blood transfusion     Hyperlipidemia     Hypertension     Hyperthyroidism 2020    Negative 2021    Hypokalemia     pt states, \"it's not low now\"    Hypomagnesemia     Iron deficiency anemia secondary to inadequate dietary iron intake 2024    Iron malabsorption 2024    Kidney disease     pt denies; \"but they want me to see a nephrologist\"    Multiple fractures     multiple fractures over lifetime, 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    BONE MARROW ASPIRATION BIOPSY performed by Juliana Rush APRN at [facility] OR    BREAST ENHANCEMENT SURGERY Bilateral     CARPAL TUNNEL RELEASE Bilateral      SECTION      CHOLECYSTECTOMY      CORNEAL TRANSPLANT      FOREARM SURGERY Right 2025    RIGHT DISTAL RADIUS OPEN REDUCTION INTERNAL FIXATION performed by Justin Paige MD at [facility] OR    HERNIA REPAIR      HIP SURGERY      history of osteoporosis;hip  change.   --------------------------    No acute cardiopulmonary process.      ______________________________________ Electronically signed by: BAMBI NICOLE M.D. Date:     06/15/2025 Time:    13:15     XR WRIST RIGHT (MIN 3 VIEWS)  Result Date: 6/5/2025  3 views of the right wrist are done today to that AP lateral bleak.  These were adequate and interpreted by me.  Patient status post ORIF right distal radius fracture.  Fracture appears to be healing well.  Alignment is stable       Assessment:   Left intertrochanteric femur fracture after previous left femoral neck fracture.    Plan: Plan will be to remove the cannulated screws and place a TFN nail.  May have to use cement in the femoral head.I explained the procedure of hip replacement in detail.  I also explained the recovery process.  Patient understands it would take several weeks and months to recover fully.  For example, it may be 2 to 3 months before returning to work.  Explained the risks of the procedure.  Those risk include,but are not limited to, bleeding, infection, wound healing problems, reactions to anesthesia, blood clots, stiffness, fracture, ligament/tendon injury, dislocation, and leg length discrepancy.  They voiced understanding of all this and consented to the procedure.        Provider: Christopher Ruby MD  Date: 6/15/2025

## 2025-06-15 NOTE — ED NOTES
Sats dropped after med admin, pain improving, pt placed on 2L via NC and encouraged to deep breathe, sats return to >90%

## 2025-06-15 NOTE — ED PROVIDER NOTES
[facility]  EMERGENCY DEPARTMENT ENCOUNTER      Pt Name: Vonda Lutz  MRN: [id number]  Birthdate 1970  Date of evaluation: 6/15/2025  Provider: Thomas Harry Jr, MD    CHIEF COMPLAINT       Chief Complaint   Patient presents with    Fall    Hip Pain     Pt presents to ED with c/o left hip pain after ground level fall. Pt had 50mcg fentanyl and 4mg Zofran PTA          HISTORY OF PRESENT ILLNESS   (Location/Symptom, Timing/Onset,Context/Setting, Quality, Duration, Modifying Factors, Severity)  Note limiting factors.   Vonda Lutz is a 55 y.o. female who presents to the emergency department for evaluation after fall onto her left hip.  States she was in the kitchen and tripped and fell.  Landed on the left hip which she has had surgery on before for prior fracture.  Denies any other injuries.  Did not hit her head.  Pain is isolated to the left hip.  Has history of osteoporosis and multiple prior fractures.    HPI    NursingNotes were reviewed.    REVIEW OF SYSTEMS    (2-9 systems for level 4, 10 or more for level 5)     Review of Systems   Constitutional: Negative.    HENT: Negative.     Eyes: Negative.    Respiratory: Negative.     Cardiovascular: Negative.    Gastrointestinal: Negative.    Genitourinary: Negative.    Musculoskeletal:  Positive for arthralgias.   Skin: Negative.    Neurological: Negative.    Hematological: Negative.    Psychiatric/Behavioral: Negative.         A complete review of systems was performed and is negative except as noted above in the HPI.       PAST MEDICAL HISTORY     Past Medical History:   Diagnosis Date    Anemia     Anxiety 1984    Ongoing    Arthritis     Closed fracture of left foot     Depression 1984    Ongoing    Hip fracture (HCC)     x2    History of blood transfusion     Hyperlipidemia     Hypertension     Hyperthyroidism March 2020    Negative 08/2021    Hypokalemia     pt states, \"it's not low now\"    Hypomagnesemia     Iron deficiency anemia

## 2025-06-15 NOTE — H&P
[facility]      Hospitalist - History & Physical      PCP: Luz Elena Fernandez MD    Date of Admission: 6/15/2025    Date of Service: 6/15/2025    Chief Complaint:  Fall    History Of Present Illness:   This patient is a 55 y.o. female with past medical history of anemia, anxiety, depression, hip surgery, hyperlipidemia, hypertension, hypothyroidism, iron deficiency anemia, osteoporosis, PID, multiple fractures who presents to [facility] ED for evaluation of fall.  Patient reports that she tripped on her own feet landing on her left foot.  She reported immediate pain and unable to ambulate afterward.  She did not hit her head or lose consciousness.  She denies pain elsewhere.  ED workup revealed x-ray pelvis with an atypical appearance of the lesser trochanter of the proximal left femur and fracture cannot be excluded.  Chest x-ray negative for acute findings.  CT pelvis revealed there is suspicion for an acute intertrochanteric fracture of the proximal left femur.  CMP reveals creatinine 1.8, BUN 24.  CBC with no leukocytosis, hemoglobin 9.7, hematocrit 30.6, platelets 308.  ED provider did speak with orthopedics who reports they will consult during hospitalization.  Patient will be admitted to hospitalist service with consult to orthopedic surgery.    Past Medical History:        Diagnosis Date    Anemia     Anxiety 1984    Ongoing    Arthritis     Closed fracture of left foot     Depression 1984    Ongoing    Hip fracture (HCC)     x2    History of blood transfusion     Hyperlipidemia     Hypertension     Hyperthyroidism March 2020    Negative 08/2021    Hypokalemia     pt states, \"it's not low now\"    Hypomagnesemia     Iron deficiency anemia secondary to inadequate dietary iron intake 06/06/2024    Iron malabsorption 06/06/2024    Kidney disease     pt denies; \"but they want me to see a nephrologist\"    Multiple fractures     multiple fractures over lifetime, 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  Postoperative  changes seen within the femoral neck bilaterally as described above.   ______________________________________ Electronically signed by: WANG HDEZ M.D. Date:     06/15/2025 Time:    13:12     XR CHEST PORTABLE  Result Date: 6/15/2025  EXAM:  CHEST ONE VIEW, FRONTAL VIEW ONLY.  HISTORY:  Chest trauma, fall.  COMPARISON:  06/19/2024.  FINDINGS:  The heart size is normal.  There is no pulmonary vascular congestion.  The lungs are clear save for calcified granulomatous changes.  No pleural effusion or pneumothorax is seen.  Bilateral breast prostheses noted.  No acute osseous abnormality is identified.  Abdominal aortic endovascular stent graft partially imaged.  Cholecystectomy clips present. Since the prior study, there has been no significant interval change.   --------------------------    No acute cardiopulmonary process.      ______________________________________ Electronically signed by: BAMBI NICOLE M.D. Date:     06/15/2025 Time:    13:15       Assessment/Plan:   Principal Problem:  Intertrochanteric fracture of left femur, closed, initial encounter (HCC)  -Orthopedic surgery consult  -NPO for now  -Strict bedrest for now  -Will need PT/OT consult  -Pain control  -Hold home ASA for now    TRACIE  -Creatinine 1.8, BUN 30  -Denies history of CKD, but noted decreased renal function on previous labs  -Start gentle IVF  -Hold home lisinopril for now    Iron deficiency anemia secondary to inadequate dietary iron intake  -Obtain iron panel  -Initiate IV Venofer if indicated    Hyperlipidemia  -Continue home statin    DVT Prophylaxis: SCDs     Discharge planning: TBD     Advance Directive: Prior    Diet: No diet orders on file     Consults Made:   IP CONSULT TO ORTHOPEDIC SURGERY    Further orders per clinical course/attending.     EMR Dragon/Transcription disclaimer:   Much of this encounter

## 2025-06-16 LAB
ANION GAP SERPL CALCULATED.3IONS-SCNC: 6 MMOL/L (ref 8–16)
BUN SERPL-MCNC: 20 MG/DL (ref 6–20)
CALCIUM SERPL-MCNC: 8.6 MG/DL (ref 8.6–10)
CHLORIDE SERPL-SCNC: 109 MMOL/L (ref 98–107)
CO2 SERPL-SCNC: 24 MMOL/L (ref 22–29)
CREAT SERPL-MCNC: 1.4 MG/DL (ref 0.5–0.9)
GLUCOSE SERPL-MCNC: 139 MG/DL (ref 70–99)
HCT VFR BLD AUTO: 25.2 % (ref 37–47)
HGB BLD-MCNC: 7.7 G/DL (ref 12–16)
POTASSIUM SERPL-SCNC: 5 MMOL/L (ref 3.5–5)
SODIUM SERPL-SCNC: 139 MMOL/L (ref 136–145)

## 2025-06-16 PROCEDURE — 97161 PT EVAL LOW COMPLEX 20 MIN: CPT

## 2025-06-16 PROCEDURE — 6370000000 HC RX 637 (ALT 250 FOR IP)

## 2025-06-16 PROCEDURE — 97530 THERAPEUTIC ACTIVITIES: CPT

## 2025-06-16 PROCEDURE — 97165 OT EVAL LOW COMPLEX 30 MIN: CPT

## 2025-06-16 PROCEDURE — 94760 N-INVAS EAR/PLS OXIMETRY 1: CPT

## 2025-06-16 PROCEDURE — 94640 AIRWAY INHALATION TREATMENT: CPT

## 2025-06-16 PROCEDURE — 6370000000 HC RX 637 (ALT 250 FOR IP): Performed by: ORTHOPAEDIC SURGERY

## 2025-06-16 PROCEDURE — 2500000003 HC RX 250 WO HCPCS: Performed by: ORTHOPAEDIC SURGERY

## 2025-06-16 PROCEDURE — 1200000000 HC SEMI PRIVATE

## 2025-06-16 PROCEDURE — 6360000002 HC RX W HCPCS: Performed by: ORTHOPAEDIC SURGERY

## 2025-06-16 PROCEDURE — 94150 VITAL CAPACITY TEST: CPT

## 2025-06-16 PROCEDURE — 85014 HEMATOCRIT: CPT

## 2025-06-16 PROCEDURE — 6370000000 HC RX 637 (ALT 250 FOR IP): Performed by: INTERNAL MEDICINE

## 2025-06-16 PROCEDURE — 85018 HEMOGLOBIN: CPT

## 2025-06-16 PROCEDURE — 80048 BASIC METABOLIC PNL TOTAL CA: CPT

## 2025-06-16 PROCEDURE — 36415 COLL VENOUS BLD VENIPUNCTURE: CPT

## 2025-06-16 PROCEDURE — 99024 POSTOP FOLLOW-UP VISIT: CPT

## 2025-06-16 PROCEDURE — 2700000000 HC OXYGEN THERAPY PER DAY

## 2025-06-16 RX ORDER — PANTOPRAZOLE SODIUM 40 MG/1
40 TABLET, DELAYED RELEASE ORAL
Status: DISCONTINUED | OUTPATIENT
Start: 2025-06-16 | End: 2025-06-17

## 2025-06-16 RX ORDER — CALCIUM CARBONATE 500 MG/1
1000 TABLET, CHEWABLE ORAL 3 TIMES DAILY PRN
Status: DISCONTINUED | OUTPATIENT
Start: 2025-06-16 | End: 2025-06-18 | Stop reason: HOSPADM

## 2025-06-16 RX ORDER — ALENDRONATE SODIUM 70 MG/1
70 TABLET ORAL
Qty: 4 TABLET | Refills: 3 | Status: SHIPPED | OUTPATIENT
Start: 2025-06-16

## 2025-06-16 RX ORDER — GENTAMICIN SULFATE 1 MG/G
OINTMENT TOPICAL 2 TIMES DAILY
Status: DISCONTINUED | OUTPATIENT
Start: 2025-06-16 | End: 2025-06-18 | Stop reason: HOSPADM

## 2025-06-16 RX ORDER — IPRATROPIUM BROMIDE AND ALBUTEROL SULFATE 2.5; .5 MG/3ML; MG/3ML
1 SOLUTION RESPIRATORY (INHALATION) EVERY 4 HOURS PRN
Status: DISCONTINUED | OUTPATIENT
Start: 2025-06-16 | End: 2025-06-18 | Stop reason: HOSPADM

## 2025-06-16 RX ADMIN — WATER 1000 MG: 1 INJECTION INTRAMUSCULAR; INTRAVENOUS; SUBCUTANEOUS at 08:46

## 2025-06-16 RX ADMIN — PANTOPRAZOLE SODIUM 40 MG: 40 TABLET, DELAYED RELEASE ORAL at 10:05

## 2025-06-16 RX ADMIN — ACETAMINOPHEN 650 MG: 325 TABLET ORAL at 14:19

## 2025-06-16 RX ADMIN — Medication 1 TABLET: at 10:05

## 2025-06-16 RX ADMIN — IPRATROPIUM BROMIDE AND ALBUTEROL SULFATE 1 DOSE: 2.5; .5 SOLUTION RESPIRATORY (INHALATION) at 10:18

## 2025-06-16 RX ADMIN — IPRATROPIUM BROMIDE AND ALBUTEROL SULFATE 1 DOSE: 2.5; .5 SOLUTION RESPIRATORY (INHALATION) at 19:16

## 2025-06-16 RX ADMIN — ACETAMINOPHEN 650 MG: 325 TABLET ORAL at 06:20

## 2025-06-16 RX ADMIN — Medication 1 TABLET: at 08:46

## 2025-06-16 RX ADMIN — ASPIRIN 325 MG: 325 TABLET, COATED ORAL at 21:35

## 2025-06-16 RX ADMIN — ASPIRIN 325 MG: 325 TABLET, COATED ORAL at 08:46

## 2025-06-16 RX ADMIN — WATER 1000 MG: 1 INJECTION INTRAMUSCULAR; INTRAVENOUS; SUBCUTANEOUS at 01:30

## 2025-06-16 RX ADMIN — ATORVASTATIN CALCIUM 10 MG: 10 TABLET, FILM COATED ORAL at 08:45

## 2025-06-16 RX ADMIN — GENTAMICIN SULFATE: 1 OINTMENT TOPICAL at 14:19

## 2025-06-16 RX ADMIN — ACETAMINOPHEN 650 MG: 325 TABLET ORAL at 21:35

## 2025-06-16 ASSESSMENT — PAIN SCALES - GENERAL: PAINLEVEL_OUTOF10: 3

## 2025-06-16 ASSESSMENT — PAIN DESCRIPTION - LOCATION: LOCATION: HIP

## 2025-06-16 ASSESSMENT — PAIN DESCRIPTION - ORIENTATION: ORIENTATION: LEFT

## 2025-06-16 ASSESSMENT — PAIN DESCRIPTION - DESCRIPTORS: DESCRIPTORS: ACHING

## 2025-06-16 NOTE — DISCHARGE INSTRUCTIONS
[facility] [place] [facility]  Dr. Jovi Ruby       Hip Fixation  Home Instructions     To prevent blood clots, you have been placed on the following medication:  Aspirin 81 mg twice a day for four weeks    Surgical Site Care:  Showering is permitted on post op day 2   No submersion in a bath, swimming pool, whirlpool, etc    Weight Bearing Status:  25% weight bearing to left leg  Use a walker    Precautions  Don't walk for exercise (The longer you are on your feet the more sore you will be)  Stay close to home    Pain Medications  You were given a prescription to fill at your pharmacy  Wean off pain medications as you deem appropriate as long as pain is under control  Take tylenol instead of the pain medicine as you improve                                                                                                                           FEVER of 101.5 or less  Please take a stool softener such as Colace to prevent constipation                   Tylenol x 2                                                                                                                                       Deep breath x 10  Do not drive until MD releases you                                                                        Cough, cough, cough  DO NOT SMOKE, VAPE OR CHEW!!!                                                                  Recheck in 1 - 11/2 hours    Cold packs  May be used every 2 hours for 15-30 minutes as necessary  Be sure to have a barrier (cloth, clothing, towel) between the site and the ice pack to prevent frostbite    Contact office if  Increased redness, swelling, drainage of any kind, and/or severe pain at surgery site.  As well as new onset fevers and or chills.  These could signify an infection.  Calf or thigh tenderness to touch as well as increased swelling or redness.  This could signify a clot.  Any rash appears, increased  or new onset nausea/vomiting occur.  This may

## 2025-06-17 LAB
ANION GAP SERPL CALCULATED.3IONS-SCNC: 8 MMOL/L (ref 8–16)
BLOOD BANK DISPENSE STATUS: NORMAL
BLOOD BANK PRODUCT CODE: NORMAL
BPU ID: NORMAL
BUN SERPL-MCNC: 23 MG/DL (ref 6–20)
CALCIUM SERPL-MCNC: 8.8 MG/DL (ref 8.6–10)
CHLORIDE SERPL-SCNC: 108 MMOL/L (ref 98–107)
CO2 SERPL-SCNC: 24 MMOL/L (ref 22–29)
CREAT SERPL-MCNC: 1.7 MG/DL (ref 0.5–0.9)
DESCRIPTION BLOOD BANK: NORMAL
GLUCOSE SERPL-MCNC: 126 MG/DL (ref 70–99)
HCT VFR BLD AUTO: 21.9 % (ref 37–47)
HCT VFR BLD AUTO: 22.4 % (ref 37–47)
HCT VFR BLD AUTO: 26.3 % (ref 37–47)
HCT VFR BLD AUTO: 27.1 % (ref 37–47)
HCT VFR BLD AUTO: 27.2 % (ref 37–47)
HGB BLD-MCNC: 6.7 G/DL (ref 12–16)
HGB BLD-MCNC: 6.8 G/DL (ref 12–16)
HGB BLD-MCNC: 8.2 G/DL (ref 12–16)
HGB BLD-MCNC: 8.2 G/DL (ref 12–16)
HGB BLD-MCNC: 8.4 G/DL (ref 12–16)
POTASSIUM SERPL-SCNC: 5 MMOL/L (ref 3.5–5)
SODIUM SERPL-SCNC: 140 MMOL/L (ref 136–145)

## 2025-06-17 PROCEDURE — 80048 BASIC METABOLIC PNL TOTAL CA: CPT

## 2025-06-17 PROCEDURE — 97530 THERAPEUTIC ACTIVITIES: CPT

## 2025-06-17 PROCEDURE — 6370000000 HC RX 637 (ALT 250 FOR IP)

## 2025-06-17 PROCEDURE — 6360000002 HC RX W HCPCS

## 2025-06-17 PROCEDURE — 85014 HEMATOCRIT: CPT

## 2025-06-17 PROCEDURE — 36415 COLL VENOUS BLD VENIPUNCTURE: CPT

## 2025-06-17 PROCEDURE — 1200000000 HC SEMI PRIVATE

## 2025-06-17 PROCEDURE — 94150 VITAL CAPACITY TEST: CPT

## 2025-06-17 PROCEDURE — 36430 TRANSFUSION BLD/BLD COMPNT: CPT

## 2025-06-17 PROCEDURE — 6360000002 HC RX W HCPCS: Performed by: INTERNAL MEDICINE

## 2025-06-17 PROCEDURE — 6370000000 HC RX 637 (ALT 250 FOR IP): Performed by: ORTHOPAEDIC SURGERY

## 2025-06-17 PROCEDURE — 85018 HEMOGLOBIN: CPT

## 2025-06-17 PROCEDURE — 2700000000 HC OXYGEN THERAPY PER DAY

## 2025-06-17 PROCEDURE — 6360000002 HC RX W HCPCS: Performed by: ORTHOPAEDIC SURGERY

## 2025-06-17 PROCEDURE — 2500000003 HC RX 250 WO HCPCS: Performed by: INTERNAL MEDICINE

## 2025-06-17 PROCEDURE — 6370000000 HC RX 637 (ALT 250 FOR IP): Performed by: INTERNAL MEDICINE

## 2025-06-17 PROCEDURE — 94760 N-INVAS EAR/PLS OXIMETRY 1: CPT

## 2025-06-17 PROCEDURE — 2500000003 HC RX 250 WO HCPCS: Performed by: ORTHOPAEDIC SURGERY

## 2025-06-17 RX ORDER — SODIUM CHLORIDE 9 MG/ML
INJECTION, SOLUTION INTRAVENOUS PRN
Status: DISCONTINUED | OUTPATIENT
Start: 2025-06-17 | End: 2025-06-18 | Stop reason: HOSPADM

## 2025-06-17 RX ADMIN — SODIUM CHLORIDE, PRESERVATIVE FREE 10 ML: 5 INJECTION INTRAVENOUS at 09:34

## 2025-06-17 RX ADMIN — GENTAMICIN SULFATE: 1 OINTMENT TOPICAL at 13:09

## 2025-06-17 RX ADMIN — SODIUM ZIRCONIUM CYCLOSILICATE 10 G: 5 POWDER, FOR SUSPENSION ORAL at 09:34

## 2025-06-17 RX ADMIN — GENTAMICIN SULFATE: 1 OINTMENT TOPICAL at 05:09

## 2025-06-17 RX ADMIN — ACETAMINOPHEN 650 MG: 325 TABLET ORAL at 13:07

## 2025-06-17 RX ADMIN — OXYCODONE 5 MG: 5 TABLET ORAL at 09:33

## 2025-06-17 RX ADMIN — SODIUM ZIRCONIUM CYCLOSILICATE 10 G: 5 POWDER, FOR SUSPENSION ORAL at 20:19

## 2025-06-17 RX ADMIN — MORPHINE SULFATE 4 MG: 4 INJECTION, SOLUTION INTRAMUSCULAR; INTRAVENOUS at 15:21

## 2025-06-17 RX ADMIN — MORPHINE SULFATE 4 MG: 4 INJECTION, SOLUTION INTRAMUSCULAR; INTRAVENOUS at 11:05

## 2025-06-17 RX ADMIN — ACETAMINOPHEN 650 MG: 325 TABLET ORAL at 05:05

## 2025-06-17 RX ADMIN — ACETAMINOPHEN 650 MG: 325 TABLET ORAL at 17:24

## 2025-06-17 RX ADMIN — ATORVASTATIN CALCIUM 10 MG: 10 TABLET, FILM COATED ORAL at 09:33

## 2025-06-17 RX ADMIN — Medication 1 TABLET: at 09:33

## 2025-06-17 RX ADMIN — ONDANSETRON 4 MG: 2 INJECTION INTRAMUSCULAR; INTRAVENOUS at 11:05

## 2025-06-17 RX ADMIN — SODIUM CHLORIDE, PRESERVATIVE FREE 10 ML: 5 INJECTION INTRAVENOUS at 20:24

## 2025-06-17 RX ADMIN — SODIUM CHLORIDE, PRESERVATIVE FREE 40 MG: 5 INJECTION INTRAVENOUS at 09:33

## 2025-06-17 RX ADMIN — OXYCODONE 5 MG: 5 TABLET ORAL at 05:20

## 2025-06-17 RX ADMIN — PANTOPRAZOLE SODIUM 40 MG: 40 TABLET, DELAYED RELEASE ORAL at 05:06

## 2025-06-17 RX ADMIN — SODIUM CHLORIDE, PRESERVATIVE FREE 40 MG: 5 INJECTION INTRAVENOUS at 20:19

## 2025-06-17 ASSESSMENT — PAIN SCALES - GENERAL
PAINLEVEL_OUTOF10: 8
PAINLEVEL_OUTOF10: 7
PAINLEVEL_OUTOF10: 6
PAINLEVEL_OUTOF10: 7
PAINLEVEL_OUTOF10: 7
PAINLEVEL_OUTOF10: 8
PAINLEVEL_OUTOF10: 8
PAINLEVEL_OUTOF10: 3
PAINLEVEL_OUTOF10: 7
PAINLEVEL_OUTOF10: 7
PAINLEVEL_OUTOF10: 5
PAINLEVEL_OUTOF10: 7

## 2025-06-17 ASSESSMENT — PAIN DESCRIPTION - DESCRIPTORS
DESCRIPTORS: ACHING

## 2025-06-17 ASSESSMENT — PAIN DESCRIPTION - PAIN TYPE: TYPE: SURGICAL PAIN

## 2025-06-17 ASSESSMENT — PAIN - FUNCTIONAL ASSESSMENT
PAIN_FUNCTIONAL_ASSESSMENT: PREVENTS OR INTERFERES SOME ACTIVE ACTIVITIES AND ADLS
PAIN_FUNCTIONAL_ASSESSMENT: PREVENTS OR INTERFERES SOME ACTIVE ACTIVITIES AND ADLS

## 2025-06-17 ASSESSMENT — PAIN DESCRIPTION - LOCATION
LOCATION: LEG
LOCATION: HIP;LEG
LOCATION: LEG

## 2025-06-17 ASSESSMENT — PAIN DESCRIPTION - ORIENTATION
ORIENTATION: LEFT

## 2025-06-17 ASSESSMENT — PAIN DESCRIPTION - FREQUENCY: FREQUENCY: CONTINUOUS

## 2025-06-17 ASSESSMENT — PAIN SCALES - WONG BAKER
WONGBAKER_NUMERICALRESPONSE: HURTS A LITTLE BIT

## 2025-06-17 NOTE — CARE COORDINATION
[facility]  Notification of Admission Decision      [] Patient has been accepted for admit to [facility] on :       Please write discharge orders and summary prior to discharge.    [] Patient acceptance to Rehab pending the following :    [x] Eval in progress       [] Patient determined to be ineligible for services at [facility] because :       We recommend you consider        Thank you for your referral, we appreciate you. If you have any questions, please feel   free to contact me at [phone].  Electronically Signed by Em Yen, Admissions Coordinator 6/17/2025 2:11 PM

## 2025-06-17 NOTE — CONSENT
Informed Consent for Blood Component Transfusion Note    I have discussed with the patient the rationale for blood component transfusion; its benefits in treating or preventing fatigue, organ damage, or death; and its risk which includes mild transfusion reactions, rare risk of blood borne infection, or more serious but rare reactions. I have discussed the alternatives to transfusion, including the risk and consequences of not receiving transfusion. The patient had an opportunity to ask questions and had agreed to proceed with transfusion of blood components.    Electronically signed by Ivon Reyna MD on 6/17/25 at 7:57 AM [email or web address]

## 2025-06-17 NOTE — CARE COORDINATION
Case Management Assessment  Initial Evaluation    Date/Time of Evaluation: 6/17/2025 2:18 PM  Assessment Completed by: Darshan Alfred RN, BSN    If patient is discharged prior to next notation, then this note serves as note for discharge by case management.    Patient Name: Vonda Lutz                   YOB: 1970  Diagnosis: Acute on chronic renal insufficiency [N28.9, N18.9]  Closed nondisplaced intertrochanteric fracture of left femur, initial encounter ([facility]) [S72.145A]  Fall from standing, initial encounter [W19.XXXA]  Intertrochanteric fracture of left femur, closed, initial encounter ([facility]) [S72.142A]                   Date / Time: 6/15/2025 11:47 AM    Patient Admission Status: Inpatient   Readmission Risk (Low < 19, Mod (19-27), High > 27): Readmission Risk Score: 18.4    Current PCP: Luz Elena Fernandez MD  PCP verified by CM? Yes    Chart Reviewed: Yes      History Provided by: Patient, Medical Record  Patient Orientation: Alert and Oriented    Patient Cognition: Alert    Hospitalization in the last 30 days (Readmission):  No    If yes, Readmission Assessment in  Navigator will be completed.    Advance Directives:      Code Status: Full Code   Patient's Primary Decision Maker is: Patient Declined (Legal Next of Kin Remains as Decision Maker)      Discharge Planning:    Patient lives with: (P) Alone Type of Home: (P) Acute Rehab  Primary Care Giver: Self  Patient Support Systems include: Family Members   Current Financial resources: Medicare  Current community resources: None  Current services prior to admission: (P) None            Current DME:              Type of Home Care services:  (P) None    ADLS  Prior functional level: Independent in ADLs/IADLs  Current functional level: Assistance with the following:, Mobility, Toileting    PT AM-PAC:   /24  OT AM-PAC:   /24    Family can provide assistance at DC: No  Would you like Case Management to discuss the discharge plan with any other family

## 2025-06-17 NOTE — PLAN OF CARE
SUBJECTIVE:    Hb < 7      OBJECTIVE:    /68   Pulse 90   Temp 98.2 °F (36.8 °C) (Temporal)   Resp 16   Ht 1.499 m (4' 11\")   Wt 38.6 kg (85 lb)   SpO2 97%   BMI 17.17 kg/m²       ASSESSMENTS & PLANS:    Anemia:  Stat Type & Screen  Stat H&H  If repeat Hb <7 would consider to give a unit PRBC if pt agrees

## 2025-06-18 VITALS
DIASTOLIC BLOOD PRESSURE: 87 MMHG | RESPIRATION RATE: 16 BRPM | OXYGEN SATURATION: 95 % | TEMPERATURE: 99.1 F | BODY MASS INDEX: 17.14 KG/M2 | SYSTOLIC BLOOD PRESSURE: 139 MMHG | WEIGHT: 85 LBS | HEART RATE: 98 BPM | HEIGHT: 59 IN

## 2025-06-18 LAB
ALBUMIN SERPL-MCNC: 2.9 G/DL (ref 3.5–5.2)
ANION GAP SERPL CALCULATED.3IONS-SCNC: 6 MMOL/L (ref 8–16)
BASOPHILS # BLD: 0 K/UL (ref 0–0.2)
BASOPHILS NFR BLD: 0.1 % (ref 0–1)
BUN SERPL-MCNC: 24 MG/DL (ref 6–20)
CALCIUM SERPL-MCNC: 8.9 MG/DL (ref 8.6–10)
CHLORIDE SERPL-SCNC: 104 MMOL/L (ref 98–107)
CO2 SERPL-SCNC: 28 MMOL/L (ref 22–29)
CREAT SERPL-MCNC: 1.7 MG/DL (ref 0.5–0.9)
EOSINOPHIL # BLD: 0.2 K/UL (ref 0–0.6)
EOSINOPHIL NFR BLD: 2.4 % (ref 0–5)
ERYTHROCYTE [DISTWIDTH] IN BLOOD BY AUTOMATED COUNT: 14.9 % (ref 11.5–14.5)
GLUCOSE SERPL-MCNC: 121 MG/DL (ref 70–99)
HCT VFR BLD AUTO: 26.4 % (ref 37–47)
HGB BLD-MCNC: 8.1 G/DL (ref 12–16)
IMM GRANULOCYTES # BLD: 0.1 K/UL
LYMPHOCYTES # BLD: 1.5 K/UL (ref 1.1–4.5)
LYMPHOCYTES NFR BLD: 17 % (ref 20–40)
MCH RBC QN AUTO: 27.7 PG (ref 27–31)
MCHC RBC AUTO-ENTMCNC: 30.7 G/DL (ref 33–37)
MCV RBC AUTO: 90.4 FL (ref 81–99)
MONOCYTES # BLD: 0.8 K/UL (ref 0–0.9)
MONOCYTES NFR BLD: 8.7 % (ref 0–10)
NEUTROPHILS # BLD: 6.2 K/UL (ref 1.5–7.5)
NEUTS SEG NFR BLD: 71.1 % (ref 50–65)
PHOSPHATE SERPL-MCNC: 3.4 MG/DL (ref 2.5–4.5)
PLATELET # BLD AUTO: 225 K/UL (ref 130–400)
PMV BLD AUTO: 11 FL (ref 9.4–12.3)
POTASSIUM SERPL-SCNC: 5.1 MMOL/L (ref 3.5–5.1)
RBC # BLD AUTO: 2.92 M/UL (ref 4.2–5.4)
SODIUM SERPL-SCNC: 138 MMOL/L (ref 136–145)
WBC # BLD AUTO: 8.7 K/UL (ref 4.8–10.8)

## 2025-06-18 PROCEDURE — 94150 VITAL CAPACITY TEST: CPT

## 2025-06-18 PROCEDURE — 6360000002 HC RX W HCPCS: Performed by: INTERNAL MEDICINE

## 2025-06-18 PROCEDURE — 6370000000 HC RX 637 (ALT 250 FOR IP): Performed by: INTERNAL MEDICINE

## 2025-06-18 PROCEDURE — 2700000000 HC OXYGEN THERAPY PER DAY

## 2025-06-18 PROCEDURE — 85025 COMPLETE CBC W/AUTO DIFF WBC: CPT

## 2025-06-18 PROCEDURE — 80069 RENAL FUNCTION PANEL: CPT

## 2025-06-18 PROCEDURE — 94760 N-INVAS EAR/PLS OXIMETRY 1: CPT

## 2025-06-18 PROCEDURE — 2500000003 HC RX 250 WO HCPCS: Performed by: ORTHOPAEDIC SURGERY

## 2025-06-18 PROCEDURE — 94618 PULMONARY STRESS TESTING: CPT

## 2025-06-18 PROCEDURE — 97530 THERAPEUTIC ACTIVITIES: CPT

## 2025-06-18 PROCEDURE — 6370000000 HC RX 637 (ALT 250 FOR IP)

## 2025-06-18 PROCEDURE — 99024 POSTOP FOLLOW-UP VISIT: CPT

## 2025-06-18 PROCEDURE — 2500000003 HC RX 250 WO HCPCS: Performed by: INTERNAL MEDICINE

## 2025-06-18 PROCEDURE — 94761 N-INVAS EAR/PLS OXIMETRY MLT: CPT

## 2025-06-18 PROCEDURE — 97535 SELF CARE MNGMENT TRAINING: CPT

## 2025-06-18 PROCEDURE — 6370000000 HC RX 637 (ALT 250 FOR IP): Performed by: ORTHOPAEDIC SURGERY

## 2025-06-18 PROCEDURE — 36415 COLL VENOUS BLD VENIPUNCTURE: CPT

## 2025-06-18 RX ORDER — PANTOPRAZOLE SODIUM 20 MG/1
20 TABLET, DELAYED RELEASE ORAL
Qty: 30 TABLET | Refills: 0 | Status: SHIPPED | OUTPATIENT
Start: 2025-06-18

## 2025-06-18 RX ORDER — OXYCODONE HYDROCHLORIDE 5 MG/1
5 TABLET ORAL EVERY 6 HOURS PRN
Qty: 20 TABLET | Refills: 0 | Status: SHIPPED | OUTPATIENT
Start: 2025-06-18 | End: 2025-06-23

## 2025-06-18 RX ORDER — SENNA AND DOCUSATE SODIUM 50; 8.6 MG/1; MG/1
1 TABLET, FILM COATED ORAL 2 TIMES DAILY
Qty: 30 TABLET | Refills: 0 | Status: SHIPPED | OUTPATIENT
Start: 2025-06-18 | End: 2025-07-03

## 2025-06-18 RX ADMIN — SODIUM CHLORIDE, PRESERVATIVE FREE 40 MG: 5 INJECTION INTRAVENOUS at 08:09

## 2025-06-18 RX ADMIN — SODIUM CHLORIDE, PRESERVATIVE FREE 10 ML: 5 INJECTION INTRAVENOUS at 08:09

## 2025-06-18 RX ADMIN — ACETAMINOPHEN 650 MG: 325 TABLET ORAL at 05:34

## 2025-06-18 RX ADMIN — ATORVASTATIN CALCIUM 10 MG: 10 TABLET, FILM COATED ORAL at 08:09

## 2025-06-18 RX ADMIN — Medication 1 TABLET: at 08:09

## 2025-06-18 RX ADMIN — OXYCODONE 5 MG: 5 TABLET ORAL at 05:34

## 2025-06-18 RX ADMIN — APIXABAN 2.5 MG: 2.5 TABLET, FILM COATED ORAL at 08:09

## 2025-06-18 RX ADMIN — GENTAMICIN SULFATE: 1 OINTMENT TOPICAL at 08:10

## 2025-06-18 ASSESSMENT — PAIN DESCRIPTION - DESCRIPTORS
DESCRIPTORS: ACHING

## 2025-06-18 ASSESSMENT — PAIN SCALES - GENERAL
PAINLEVEL_OUTOF10: 5
PAINLEVEL_OUTOF10: 3
PAINLEVEL_OUTOF10: 5
PAINLEVEL_OUTOF10: 6

## 2025-06-18 ASSESSMENT — PAIN SCALES - WONG BAKER
WONGBAKER_NUMERICALRESPONSE: HURTS A LITTLE BIT

## 2025-06-18 ASSESSMENT — PAIN DESCRIPTION - PAIN TYPE
TYPE: SURGICAL PAIN
TYPE: SURGICAL PAIN

## 2025-06-18 ASSESSMENT — PAIN - FUNCTIONAL ASSESSMENT
PAIN_FUNCTIONAL_ASSESSMENT: PREVENTS OR INTERFERES SOME ACTIVE ACTIVITIES AND ADLS

## 2025-06-18 ASSESSMENT — PAIN DESCRIPTION - LOCATION
LOCATION: HIP

## 2025-06-18 ASSESSMENT — PAIN DESCRIPTION - FREQUENCY
FREQUENCY: INTERMITTENT
FREQUENCY: INTERMITTENT

## 2025-06-18 ASSESSMENT — PAIN DESCRIPTION - ONSET: ONSET: ON-GOING

## 2025-06-18 ASSESSMENT — PAIN DESCRIPTION - ORIENTATION
ORIENTATION: LEFT

## 2025-06-18 NOTE — CARE COORDINATION
[facility]  Notification of Admission Decision      [] Patient has been accepted for admit to [facility] on :       Please write discharge orders and summary prior to discharge.    [] Patient acceptance to Rehab pending the following :    [] Eval in progress       [x] Patient determined to be ineligible for services at [facility] because : patient doesn't meet medical necessity for acute Rehab. Discussed with patient, she would like to go home with assist of daughter but as she is not ambulating discussed with her the possibility of short-term SNF placement. She gave permission for KAUSHAL to call and discuss with her daughter. Notified KAUSHAL Dee to call her dtr to discuss options.       We recommend you consider: as above.       Thank you for your referral, we appreciate you. If you have any questions, please feel   free to contact me at [phone].  Electronically Signed by Em Yen Admissions Coordinator 6/18/2025 8:53 AM

## 2025-06-18 NOTE — CARE COORDINATION
Patient has requested [facility] after reviewing the choice list. [facility] accepted. Please fax DC Summary and AVS upon discharge.  [facility]   P  [phone]  Electronically signed by Darshan Alfred RN, BSN on 6/18/2025 at 11:15 AM

## 2025-06-18 NOTE — CARE COORDINATION
[facility]  Notification of Admission Decision      [] Patient has been accepted for admit to [facility] on :       Please write discharge orders and summary prior to discharge.    [] Patient acceptance to Rehab pending the following :    [] Eval in progress       [x] Patient determined to be ineligible for services at [facility] because : Received call back from       We recommend you consider        Thank you for your referral, we appreciate you. If you have any questions, please feel   free to contact me at [phone].

## 2025-06-18 NOTE — CARE COORDINATION
SW sent updated clinicals for DME for Home O2 to [facility].    [facility]   Phone  [phone] Fax   Electronically signed by Solitario Wilson on 2025 at 3:16 PM      [id number]        [facility]                                    Req/Control # [Problem retrieving Specimen ID]                                   Order Date:  2025  [id number]                                          Patient Information      Name:  Vonda Lutz  :  1970  Age:  55 y.o.   Address:  [address] [place] [address]   Zip:  [address]  PCP: Luz Elena Fernandez MD Sex:  F  SSN: [id number]  Home Phone: [phone]  Work Phone:    Patient MRN:  [id number]    Alt Patient ID:  [id number]  PCP Phone: [phone]       Authorizing Provider Information       AUTHORIZING PROVIDER: Vonda Murguia MD  Physician ID: [id number]  NPI:  [id number]  Site:   Address: [address] [place] [address] Zip: [place] [address]  Phone: [phone]  Fax: [phone]             EQUIPMENT ORDERED  DME Order for Home Oxygen as OP [STB503] (ORD   #:   [id number]) Priority  Routine Class  Hospital Performed        Associated Diagnosis:          Comments:   You must complete the order parameters below and add the medical necessity documentation for this DME in a separate note.     Stationary Oxygen Concentrator at 2 lpm via Nasal Cannula     Stationary Prescribed at:  Continuous     Portable Gaseous O2 System and contents at 2 lpm via Nasal Cannula     Non Applicable     Diagnosis: COPD  Length of need: 3 Months            Scheduling Instructions:                                 Specimen Source             Collection Date    Collection Time    Order Status    Expected Date                 Electronically Signed By  Vonda Murguia MD  NPI:  [id number] Date  2025  1:50 PM               Responsible Party /Guarantor Information     Guar-Act[employer]   Relationship Account Type Home Phone   VONDA LUTZ - [phone] [id number] [place] [address]

## 2025-06-18 NOTE — DISCHARGE SUMMARY
Discharge Summary    Date:6/18/2025        Patient Name:Vonda Lutz     YOB: 1970     Age:55 y.o.    Admit Date:6/15/2025   Admission Condition:fair   Discharged Condition:fair  Discharge Date: 06/18/25       Discharge Diagnoses   Principal Problem:    Intertrochanteric fracture of left femur, closed, initial encounter ([facility])  Active Problems:    Iron deficiency anemia secondary to inadequate dietary iron intake  Resolved Problems:    * No resolved hospital problems. *      Hospital Stay   Narrative of Hospital Course:     55 y.o. female with past medical history of anemia, anxiety, depression, hip surgery, hyperlipidemia, hypertension, hypothyroidism, iron deficiency anemia, osteoporosis, PID, multiple fractures who presents to [facility] ED for evaluation of fall. She reported immediate pain and unable to ambulate afterward. ED workup revealed x-ray pelvis with an atypical appearance of the lesser trochanter of the proximal left femur and fracture cannot be excluded. CT pelvis revealed there is suspicion for an acute intertrochanteric fracture of the proximal left femur.  Admitted in house, seen by orthopedic surgeon and s/p removal of cannulated screws left hip and left hip fracture open reduction and internal fixation with synthes cephalomedullary nail. Required 1 unit PRBC transfusion for post op anemia, seen by therapy in house and will need rehab. Was declined IPR, recc for SNF however patient daughter insisted on taking patient home. Off note, patient did not do well at all with therapy assessment in house. This was relayed again to patient daughter and she insisted on discharge home with . Home oxygen eval completed and requires 2L which was requested through medical equipments company prior to discharge and discharged home wit .      Physical Examination:  General: sleepy but arousable, no acute distress lying comfortably in bed.  HEENT: Atraumatic normocephalic, range of motion normal, no  PROTONIX  Take 1 tablet by mouth every morning (before breakfast)     sennosides-docusate sodium 8.6-50 MG tablet  Commonly known as: SENOKOT-S  Take 1 tablet by mouth 2 times daily for 15 days            CONTINUE taking these medications      aspirin 81 MG EC tablet     atorvastatin 10 MG tablet  Commonly known as: LIPITOR  Take 1 tablet by mouth daily     Calcium Carbonate-Vitamin D 600-5 MG-MCG Caps     citalopram 40 MG tablet  Commonly known as: CELEXA  Take 1 tablet by mouth once daily     fluocinonide 0.05 % ointment  Commonly known as: LIDEX     gentamicin 0.1 % ointment  Commonly known as: GARAMYCIN     ipratropium 0.03 % nasal spray  Commonly known as: ATROVENT  2 sprays by Each Nostril route in the morning and 2 sprays in the evening.     lisinopril 10 MG tablet  Commonly known as: PRINIVIL;ZESTRIL  TAKE ONE TABLET BY MOUTH ONCE A DAY     Magnesium 500 MG Tabs     Slow Release Iron 45 MG Tbcr  Generic drug: Ferrous Sulfate Dried ER  Take 1 tablet by mouth daily     therapeutic multivitamin-minerals tablet            STOP taking these medications      doxycycline monohydrate 100 MG capsule  Commonly known as: MONODOX     ibuprofen 600 MG tablet  Commonly known as: ADVIL;MOTRIN               Where to Get Your Medications        These medications were sent to [id number] - [place] [address] -  [phone] - F [phone]  [address] [place] [address]      Phone: [phone]   alendronate 70 MG tablet  apixaban 2.5 MG Tabs tablet  oxyCODONE 5 MG immediate release tablet  pantoprazole 20 MG tablet  sennosides-docusate sodium 8.6-50 MG tablet         Electronically signed by Vonda Murguia MD on 6/18/25 at 2:02 PM CDT

## 2025-06-18 NOTE — PLAN OF CARE
Problem: Discharge Planning  Goal: Discharge to home or other facility with appropriate resources  6/18/2025 1429 by Emma Villela RN  Outcome: Completed  Flowsheets (Taken 6/18/2025 0810)  Discharge to home or other facility with appropriate resources: Identify barriers to discharge with patient and caregiver  6/18/2025 0744 by Emma Villela, RN  Outcome: Progressing     Problem: Pain  Goal: Verbalizes/displays adequate comfort level or baseline comfort level  6/18/2025 1429 by Emma Villela, RN  Outcome: Completed  6/18/2025 0744 by Emma Villela RN  Outcome: Progressing     Problem: Safety - Adult  Goal: Free from fall injury  6/18/2025 1429 by Emma Villela RN  Outcome: Completed  6/18/2025 0744 by Emma Villela, RN  Outcome: Progressing

## 2025-06-18 NOTE — PLAN OF CARE
Problem: Discharge Planning  Goal: Discharge to home or other facility with appropriate resources  6/18/2025 0744 by Emma Villela RN  Outcome: Progressing  6/17/2025 2320 by Sinan Ross RN  Outcome: Progressing     Problem: Pain  Goal: Verbalizes/displays adequate comfort level or baseline comfort level  6/18/2025 0744 by Emma Villela RN  Outcome: Progressing  6/17/2025 2320 by Sinan Ross RN  Outcome: Progressing     Problem: Safety - Adult  Goal: Free from fall injury  6/18/2025 0744 by Emma Villela RN  Outcome: Progressing  6/17/2025 2320 by Sinan Ross RN  Outcome: Progressing

## 2025-06-18 NOTE — DISCHARGE INSTR - DIET
Good nutrition is important when healing from an illness, injury, or surgery.  Follow any nutrition recommendations given to you during your hospital stay.   If you were given an oral nutrition supplement while in the hospital, continue to take this supplement at home.  You can take it with meals, in-between meals, and/or before bedtime. These supplements can be purchased at most local grocery stores, pharmacies, and chain [email or web address]-stores.   If you have any questions about your diet or nutrition, call the hospital and ask for the dietitian.    ADULT DIET; Regular; 3 carb choices (45 gm/meal)

## 2025-06-18 NOTE — CARE COORDINATION
[facility] requested [facility]  after reviewing the choice list. Referral sent.  [facility]   P  [phone]  Electronically signed by Darshan Alfred RN, BSN on 6/18/2025 at 9:11 AM

## 2025-06-18 NOTE — CARE COORDINATION
KAUSHAL met with Pt and had her daughter Thu on speakerphone. Pt does not qualify for acute rehab at the hospital. Pt's daughter Thu stated she prefers to take Pt home. SW explained Pt is not walking at this time and will need 24/7 care. Thu stated she would be home 24/7 and she is used to being w Pt when she has been unable to walk.     Pt has requested a BSC and HH services. [facility]    P:[phone]  F:[phone]    Electronically signed by Solitario Wilson on 6/18/25 at 9:17 AM CDT       [facility] is not in network w her insurance. KAUSHAL has sent the BSC referral to [facility].    [facility]   Phone  [phone] Fax   Electronically signed by Solitario Wilson on 6/18/2025 at 9:43 AM

## 2025-06-19 ENCOUNTER — TELEPHONE (OUTPATIENT)
Dept: PRIMARY CARE CLINIC | Age: 55
End: 2025-06-19

## 2025-06-19 NOTE — TELEPHONE ENCOUNTER
Care Transitions Initial Follow Up Call    Outreach made within 2 business days of discharge: Yes    Patient: Vonda Lutz   Patient : 1970   MRN: [id number]    Reason for Admission: Intertrochanteric fracture of left femur, closed,   Discharge Date: 25       Spoke with: Daughter and patient    Discharge department/facility: [facility] Interactive Patient Contact:  Was patient able to fill all prescriptions: Yes  Was patient instructed to bring all medications to the follow-up visit: Yes  Is patient taking all medications as directed in the discharge summary? Yes  Does patient understand their discharge instructions: Yes  Does patient have questions or concerns that need addressed prior to 7-14 day follow up office visit: no    Additional needs identified to be addressed with provider  No needs identified             Scheduled appointment with PCP within 7-14 days    Spoke with Vonda and her daughter Thu. Thu states Vonda refuses to eat. She tries to get her to drink Ensure but she doesn't like it. Vonda is not able to ambulate and she refused SNF placement. Her pain level is 8/10. We reviewed discharge medications and she states they have them all. She asked if she should change her bandage. I told her since it was changed yesterday to keep the current bandage on there until Home Health visits to reduce the risk of infection. She denies any fever, chills or signs of infection. She states she has normal bladder function and regular bowel movements. She voices no needs or concerns at this time. Daughter was given instructions for the VV scheduled with Dr. Fernandez next Wednesday.     Follow Up  Future Appointments   Date Time Provider Department Center   2025 11:30 AM [facility]   2025  9:30 AM Christopher Ruby MD [place] [facility]   2025 11:15 AM [facility]   2025  1:30 PM Juliana Rush APRN [place]

## 2025-06-20 ENCOUNTER — TELEPHONE (OUTPATIENT)
Age: 55
End: 2025-06-20

## 2025-06-20 NOTE — TELEPHONE ENCOUNTER
Returned patient call. Patient had question about a medication that was prescribed by another Dr. I advise patient to call that Dr. Patient voiced understanding. SD

## 2025-06-23 ENCOUNTER — TELEPHONE (OUTPATIENT)
Age: 55
End: 2025-06-23

## 2025-06-23 DIAGNOSIS — Z98.890 S/P ORIF (OPEN REDUCTION INTERNAL FIXATION) FRACTURE: ICD-10-CM

## 2025-06-23 DIAGNOSIS — R26.81 UNSTEADY GAIT WHEN WALKING: Primary | ICD-10-CM

## 2025-06-23 DIAGNOSIS — R26.81 UNSTEADY GAIT WHEN WALKING: ICD-10-CM

## 2025-06-23 DIAGNOSIS — Z87.81 S/P ORIF (OPEN REDUCTION INTERNAL FIXATION) FRACTURE: ICD-10-CM

## 2025-06-23 NOTE — PROGRESS NOTES
Returned call to Lara from [facility]. Informed her to contact patients PCP concerning blood pressure and if patient should continue to take blood pressure medications.  Shower chair and standard walker order sent to [facility] per carmela.

## 2025-06-23 NOTE — TELEPHONE ENCOUNTER
Patient willl be seen three times a week for 6 weeks for therapy and she wasn't sure if she should continue to take her BP meds. Please call back in regards to this. She also wants to see if patient can get a standard walker order and shower chair sent to [facility]

## 2025-06-24 NOTE — PROGRESS NOTES
Hospitalist Progress Note        PCP: Luz Elena Fernandez MD    Date of Admission: 6/15/2025    Length of Stay: 1    Chief Complaint: left hip pain.     Pt with hx of COPD, life long smoker, Hx of osteopenia and osteoporosis on Ca and VidD supplement (reports insurance denied her Bisphosphonate medication), Hx of PAD with procedures in June 2024, recent R wrist surgery for carpal tunnel, prior Hx of bilateral hip arthroplasties repair for traumatic fractures, presented with Left hip pain after a fall at home.   Pt denies any syncope. She lost her balance and fell in the kitchen at home.       She is now s/p OR with orthopedics and had undergone removal of cannulated screws left hip and ORIF with synthes cephalomedullary nail.       Subjective:     Pain is controlled this morning.         Medications:  Reviewed    Infusion Medications    sodium chloride       Scheduled Medications    oyster shell calcium w/D  1 tablet Oral Daily    pantoprazole  40 mg Oral QAM AC    atorvastatin  10 mg Oral Daily    therapeutic multivitamin-minerals  1 tablet Oral Daily    sodium chloride flush  5-40 mL IntraVENous 2 times per day    acetaminophen  650 mg Oral Q6H    aspirin  325 mg Oral BID     PRN Meds: ipratropium 0.5 mg-albuterol 2.5 mg, calcium carbonate, ondansetron **OR** ondansetron, polyethylene glycol, acetaminophen **OR** acetaminophen, sodium chloride flush, sodium chloride, oxyCODONE **OR** oxyCODONE, morphine **OR** morphine, diphenhydrAMINE      Intake/Output Summary (Last 24 hours) at 6/16/2025 1127  Last data filed at 6/16/2025 1104  Gross per 24 hour   Intake 750 ml   Output 800 ml   Net -50 ml       Physical Exam Performed:    BP (!) 93/56   Pulse 85   Temp 98.4 °F (36.9 °C) (Oral)   Resp 17   Ht 1.499 m (4' 11\")   Wt 38.6 kg (85 lb)   SpO2 100%   BMI 17.17 kg/m²     General appearance: No apparent distress, appears stated age and cooperative.  HEENT: Pupils equal, round, and reactive to light. 
    Hospitalist Progress Note        PCP: Luz Elena Fernandez MD    Date of Admission: 6/15/2025    Length of Stay: 2    Chief Complaint: left hip pain.     Pt with hx of COPD, life long smoker, Hx of osteopenia and osteoporosis on Ca and VidD supplement (reports insurance denied her Bisphosphonate medication), Hx of PAD with procedures in June 2024, recent R wrist surgery for carpal tunnel, prior Hx of bilateral hip arthroplasties repair for traumatic fractures, presented with Left hip pain after a fall at home.   Pt denies any syncope. She lost her balance and fell in the kitchen at home.       She is now s/p OR with orthopedics and had undergone removal of cannulated screws left hip and ORIF with synthes cephalomedullary nail.       Subjective:     Pain is controlled this morning.     Hgb dropped to 6.7 (confirmed 6.8) this am.   Pt denies any BM habit changes, denies any melena or tarry stool, no emesis. GERD better today.           Medications:  Reviewed    Infusion Medications    sodium chloride      sodium chloride       Scheduled Medications    pantoprazole (PROTONIX) 40 mg in sodium chloride (PF) 0.9 % 10 mL injection  40 mg IntraVENous Q12H    sodium zirconium cyclosilicate  10 g Oral BID    [START ON 6/18/2025] apixaban  2.5 mg Oral BID    oyster shell calcium w/D  1 tablet Oral Daily    gentamicin   Topical BID    atorvastatin  10 mg Oral Daily    therapeutic multivitamin-minerals  1 tablet Oral Daily    sodium chloride flush  5-40 mL IntraVENous 2 times per day    acetaminophen  650 mg Oral Q6H     PRN Meds: sodium chloride, ipratropium 0.5 mg-albuterol 2.5 mg, calcium carbonate, ondansetron **OR** ondansetron, polyethylene glycol, acetaminophen **OR** acetaminophen, sodium chloride flush, sodium chloride, oxyCODONE **OR** oxyCODONE, morphine **OR** morphine, diphenhydrAMINE      Intake/Output Summary (Last 24 hours) at 6/17/2025 1444  Last data filed at 6/17/2025 1208  Gross per 24 hour   Intake 340 ml 
   06/18/25 1020   Resting (Room Air)   SpO2 88   Resting (On O2)   SpO2 93   O2 Device Nasal cannula   O2 Flow Rate (l/min) 2 l/min   During Walk (Room Air)   SpO2 87   Walk/Assistance Device Walker   Rate of Dyspnea 0   During Walk (On O2)   SpO2 92   O2 Device Nasal cannula   O2 Flow Rate (l/min) 2 l/min   Need Additional O2 Flow Rate Rows No   Walk/Assistance Device Walker   After Walk   SpO2 93   O2 Device Nasal cannula   O2 Flow Rate (l/min) 2 l/min   Does the Patient Qualify for Home O2 Yes   Liter Flow at Rest 2   Liter Flow on Exertion 2   Does the Patient Need Portable Oxygen Tanks Yes       
  Physician Progress Note      PATIENT:               VONDA PITTMAN  CSN #:                  [id number]  :                       1970  ADMIT DATE:       6/15/2025 12:47 PM  DISCH DATE:        2025 6:50 PM  RESPONDING  PROVIDER #:        Vonda Murguia MD          QUERY TEXT:    Please clarify whether the Left intertrochanteric femur fracture documented    H&P 6/15 is related to a traumatic or non-traumatic cause or following a minor   injury that would not routinely break a healthy bone:    The clinical indicators include:  --Fall, Osteopenia, Osteoporosis, Age 55 female  --ED 6/15 who presents to the emergency department for evaluation after fall   onto her left hip.  States she was in the kitchen and tripped and fell.  --H&P 6/15 Intertrochanteric fracture of left femur.  --OP NOTE 6/15  left displaced intertrochanteric femur fracture after previous   left femoral neck fracture fixation with cannulated screw  --IM PN   Hx of osteopenia and osteoporosis on Ca and VidD supplement   .Acute traumatic closed left femoral fracture.  Osteoporosis. DEXA from ,  and  reviewed.  --CT pelvis 6/15 The bones are diffusely osteopenic.  --Orthopedic surgery consult, S/P left femoral neck fracture fixation with   cannulated screw, Calcium Carbonate-Vitamin D 600-5 MG-MCG CAPS, X-Ray    Thank You  Tiesha Wolf [facility] CDS  Options provided:  -- Traumatic Left intertrochanteric femur fracture  -- Pathological Left intertrochanteric femur fracture related to osteoporosis  -- Pathological Left intertrochanteric femur fracture related to osteopenia  -- Other - I will add my own diagnosis  -- Disagree - Not applicable / Not valid  -- Disagree - Clinically unable to determine / Unknown  -- Refer to Clinical Documentation Reviewer    PROVIDER RESPONSE TEXT:    The patient has a Traumatic Left intertrochanteric femur fracture    Query created by: Tiesha Ledbetter on 2025 1:45 
4 Eyes Skin Assessment     NAME:  Vonda Lutz  YOB: 1970  MEDICAL RECORD NUMBER:  [id number]    The patient is being assessed for  Admission    I agree that at least one RN has performed a thorough Head to Toe Skin Assessment on the patient. ALL assessment sites listed below have been assessed.      Areas assessed by both nurses:    Head, Face, Ears, Shoulders, Back, Chest, Arms, Elbows, Hands, Sacrum. Buttock, Coccyx, Ischium, Legs. Feet and Heels, and Under Medical Devices         Does the Patient have a Wound? Yes wound(s) were present on assessment. LDA wound assessment was Initiated and completed by RN       Jagjit Prevention initiated by RN: Yes  Wound Care Orders initiated by RN: Yes    Pressure Injury (Stage 3,4, Unstageable, DTI, NWPT, and Complex wounds) if present, place Wound referral order by RN under : No    New Ostomies, if present place, Ostomy referral order under : No     Nurse 1 eSignature: Electronically signed by Salina Lancaster RN on 6/16/25 at 4:20 AM CDT    **SHARE this note so that the co-signing nurse can place an eSignature**    Nurse 2 eSignature: Electronically signed by Emma Villela RN on 6/18/25 at 6:44 AM CDT   
Delivered Alendronate to patient bedside. No copay  
Delivered RX (Eliquis 2.5mg, Pantoprazole 20mg, Stimulant Laxative 8.6-50mg tab, Oxycodone hcl 5mg) to patients bedside. Copay paid with cash   
Facility/Department: [facility] SURG SERVICES  Daily Treatment Note  NAME: Vonda Lutz  : 1970  MRN: [id number]    Date of Service: 2025    Discharge Recommendations:  24 hour supervision or assist  OT Equipment Recommendations  Other: Patient may want to consider wheelchair rental (ingrid height, narrow wheelchair with swing away footrests) or purchasing a transport wheelchair, educated on TTBench, AE for dressing, BSC, and RW vs Rollator      Assessment   Assessment: Patient states plans for discharge home with 24/7 care from daughter.  Overall requires min A for various aspects.  Provided AE/DME training, performed toileting task, and addressed transfers, bed mobility, and positioning.    Activity Tolerance  Activity Tolerance: Patient Tolerated treatment well         Patient Diagnosis(es): The primary encounter diagnosis was Fall from standing, initial encounter. Diagnoses of Closed nondisplaced intertrochanteric fracture of left femur, initial encounter (HCC) and Acute on chronic renal insufficiency were also pertinent to this visit.    has a past medical history of Anemia, Anxiety, Arthritis, Closed fracture of left foot, Depression, Hip fracture (HCC), History of blood transfusion, Hyperlipidemia, Hypertension, Hyperthyroidism, Hypokalemia, Hypomagnesemia, Iron deficiency anemia secondary to inadequate dietary iron intake, Iron malabsorption, Kidney disease, Multiple fractures, Osteoporosis, PAD (peripheral artery disease), Patella fracture, Post-menopausal, Radial fracture, Shoulder fracture, Thyroid disease, and Wears dentures.   has a past surgical history that includes Cholecystectomy; Breast enhancement surgery (Bilateral); hip surgery; Mouth surgery; Tubal ligation;  section; hernia repair; other surgical history; Corneal transplant; Carpal tunnel release (Bilateral); bone marrow biopsy (Right, 2024); vascular surgery (Bilateral, 2024); vascular surgery (Bilateral, 2024); 
Occupational Therapy  Facility/Department: [facility] SURG SERVICES  Occupational Therapy Initial Assessment    Name: Vonda Lutz  : 1970  MRN: [id number]  Date of Service: 2025    Discharge Recommendations:             Patient Diagnosis(es): The primary encounter diagnosis was Fall from standing, initial encounter. Diagnoses of Closed nondisplaced intertrochanteric fracture of left femur, initial encounter (HCC) and Acute on chronic renal insufficiency were also pertinent to this visit.  Past Medical History:  has a past medical history of Anemia, Anxiety, Arthritis, Closed fracture of left foot, Depression, Hip fracture (HCC), History of blood transfusion, Hyperlipidemia, Hypertension, Hyperthyroidism, Hypokalemia, Hypomagnesemia, Iron deficiency anemia secondary to inadequate dietary iron intake, Iron malabsorption, Kidney disease, Multiple fractures, Osteoporosis, PAD (peripheral artery disease), Patella fracture, Post-menopausal, Radial fracture, Shoulder fracture, Thyroid disease, and Wears dentures.  Past Surgical History:  has a past surgical history that includes Cholecystectomy; Breast enhancement surgery (Bilateral); hip surgery; Mouth surgery; Tubal ligation;  section; hernia repair; other surgical history; Corneal transplant; Carpal tunnel release (Bilateral); bone marrow biopsy (Right, 2024); vascular surgery (Bilateral, 2024); vascular surgery (Bilateral, 2024); vascular surgery (01/15/2025); Forearm surgery (Right, 2025); and Wrist surgery (Right, 2025).    Treatment Diagnosis: L femur fx., s/p screw fixation      Assessment  Performance deficits / Impairments: Decreased functional mobility ;Decreased endurance;Decreased ADL status;Decreased balance  Assessment: Will progress as tolerated.  With partial WB, will likely require SNF placement until full WB  Treatment Diagnosis: L femur fx., s/p screw fixation  Prognosis: Good  Decision Making: Low 
PHYSICAL THERAPY    Pt has an active bedrest order. Please discontinue when appropriate to begin mobility assessment.    Electronically signed by Vinita Sanchez PT on 6/16/2025 at 6:51 AM      
Physical Therapy     06/16/25 1500   Subjective   Subjective attempted PM tx; pt declined stating she just now got her lunch after a mix up and wants to eat. will continue to follow up.     Electronically signed by Ryder Doan PTA on 6/16/2025 at 3:03 PM     
Physical Therapy  Facility/Department: [facility] SURG SERVICES  Physical Therapy Initial Assessment    Name: Vonda Lutz  : 1970  MRN: [id number]  Date of Service: 2025    Discharge Recommendations:  Continue to assess pending progress, Patient would benefit from continued therapy after discharge (ANTICIPATE DC TO FACILITY FOR REHAB)          Patient Diagnosis(es): The primary encounter diagnosis was Fall from standing, initial encounter. Diagnoses of Closed nondisplaced intertrochanteric fracture of left femur, initial encounter (HCC) and Acute on chronic renal insufficiency were also pertinent to this visit.  Past Medical History:  has a past medical history of Anemia, Anxiety, Arthritis, Closed fracture of left foot, Depression, Hip fracture (HCC), History of blood transfusion, Hyperlipidemia, Hypertension, Hyperthyroidism, Hypokalemia, Hypomagnesemia, Iron deficiency anemia secondary to inadequate dietary iron intake, Iron malabsorption, Kidney disease, Multiple fractures, Osteoporosis, PAD (peripheral artery disease), Patella fracture, Post-menopausal, Radial fracture, Shoulder fracture, Thyroid disease, and Wears dentures.  Past Surgical History:  has a past surgical history that includes Cholecystectomy; Breast enhancement surgery (Bilateral); hip surgery; Mouth surgery; Tubal ligation;  section; hernia repair; other surgical history; Corneal transplant; Carpal tunnel release (Bilateral); bone marrow biopsy (Right, 2024); vascular surgery (Bilateral, 2024); vascular surgery (Bilateral, 2024); vascular surgery (01/15/2025); Forearm surgery (Right, 2025); and Wrist surgery (Right, 2025).    Assessment  Body Structures, Functions, Activity Limitations Requiring Skilled Therapeutic Intervention: Decreased functional mobility ;Decreased ROM;Decreased strength;Decreased endurance;Increased pain  Assessment: pt WOULD BENEFIT FROM SKILLED PT IN THIS SETTING FOR POST OP TKR L 
Physical Therapy  Name: Vonda Lutz  MRN:  [id number]  Date of service:  6/18/2025 06/18/25 0934   Restrictions/Precautions   Restrictions/Precautions Weight Bearing   Lower Extremity Weight Bearing Restrictions   Left Lower Extremity Weight Bearing Partial Weight Bearing   Partial Weight Bearing Percentage Or Pounds 25%   Subjective   Subjective Pt agreed to therapy.   Pain Assessment   Pain Assessment 0-10   Pain Level 6   Pain Location Hip   Pain Orientation Left   Pain Descriptors Aching   Functional Pain Assessment Prevents or interferes some active activities and ADLs   Pain Type Surgical pain   Pain Frequency Intermittent   Non-Pharmaceutical Pain Intervention(s) Ambulation/Increased Activity;Repositioned;Rest   Response to Pain Intervention Patient satisfied   Oxygen Therapy   O2 Device None (Room air)  (nurse notified of pt at 95% without O2 on, nurse states O2 can be left off)   Bed Mobility   Comment pt sitting EOB working with OT after session   Transfers   Sit to Stand Contact guard assistance;Minimal Assistance   Stand to Sit Contact guard assistance   Stand Pivot Transfers Minimal Assistance;Contact guard assistance  (stand pivot chair to BSC to bed with RW; pt demonstrates NWB mostly with transfer, maintaining PWB precautions)   Comment v/c's for hand placement with transfers   Other Activities   Comment recommended RW for transfers and w/c for further mobility, due to PWB status have not attempted amb at this time and recommend transfers only with assist, RW and gait belt   Short Term Goals   Time Frame for Short Term Goals 2 WKS   Short Term Goal 1 AMB 15 FT WITH RW, CGA   Short Term Goal 2 TF'S WITH RW, CGA   Conditions Requiring Skilled Therapeutic Intervention   Body Structures, Functions, Activity Limitations Requiring Skilled Therapeutic Intervention Decreased functional mobility ;Decreased ROM;Decreased strength;Decreased endurance;Increased pain   Assessment Pt able to progress with 
Physical Therapy  Name: Vonda Lutz  MRN:  [id number]  Date of service:  6/17/2025 06/17/25 0074   Restrictions/Precautions   Restrictions/Precautions Weight Bearing   Lower Extremity Weight Bearing Restrictions   Left Lower Extremity Weight Bearing Partial Weight Bearing   Partial Weight Bearing Percentage Or Pounds 25%   Subjective   Subjective Pt agreed to therapy. Up in chair already. Nurse states she had tylenol.   Pain Assessment   Pain Assessment 0-10   Pain Level 8   Pain Location Leg   Pain Orientation Left   Pain Descriptors Aching   Functional Pain Assessment Prevents or interferes some active activities and ADLs   Pain Type Surgical pain   Pain Frequency Continuous   Non-Pharmaceutical Pain Intervention(s) Ambulation/Increased Activity;Repositioned;Rest   Response to Pain Intervention Patient satisfied  (nurse checking to see if pt can have something else for pain)   Oxygen Therapy   O2 Device Nasal cannula   Transfers   Sit to Stand Partial/Moderate assistance   Stand to Sit Minimal Assistance   Comment stood in front of chair with RW 1x a short time; v/c's for sit to/from stand technique   Exercises   Knee Long Arc Quad x3 AAROM LLE x3 RLE   Ankle Pumps x5 BLE   Short Term Goals   Time Frame for Short Term Goals 2 WKS   Short Term Goal 1 AMB 15 FT WITH RW, CGA   Short Term Goal 2 TF'S WITH RW, CGA   Conditions Requiring Skilled Therapeutic Intervention   Body Structures, Functions, Activity Limitations Requiring Skilled Therapeutic Intervention Decreased functional mobility ;Decreased ROM;Decreased strength;Decreased endurance;Increased pain   Assessment Pt moves very slowly due to pain and needs frequent reminders to not hold breath. Able to work on standing for short time and maintained PWB, mostly NWB demonstrated. Assisted back to chair with all needs in reach.   Activity Tolerance   Activity Tolerance Patient tolerated treatment well   PT Plan of Care   Tuesday X   Safety Devices   Type of 
Physical Therapy  Name: Vonda Lutz  MRN:  [id number]  Date of service:  6/17/2025 06/17/25 1023   Subjective   Subjective Attempt: Pt receiving blood this morning, will continue to follow.           Electronically signed by Erin Rush PTA on 6/17/2025 at 10:26 AM    
Subjective:     Post-Operative Day: 1 Status Post left ORIF and CMN  Systemic or Specific Complaints:No Complaints  no nausea Pain 4    Patient sitting up in chair this am. A&Ox3. We discussed weight bearing status. Patient feels she would like to work towards going home at [place].     Objective:     Patient Vitals for the past 24 hrs:   BP Temp Temp src Pulse Resp SpO2 Height Weight   06/16/25 0842 -- -- -- 80 17 99 % -- --   06/16/25 0817 (!) 86/50 98.1 °F (36.7 °C) Oral 83 16 100 % -- --   06/16/25 0602 104/66 98.1 °F (36.7 °C) Temporal 81 16 94 % -- --   06/16/25 0134 115/68 97 °F (36.1 °C) Temporal 80 14 100 % -- --   06/15/25 2334 113/74 97 °F (36.1 °C) Temporal 83 12 99 % -- --   06/15/25 2135 125/75 97.2 °F (36.2 °C) Temporal 87 14 97 % -- --   06/15/25 2058 115/71 97 °F (36.1 °C) Temporal 86 14 100 % -- --   06/15/25 1946 108/64 97.2 °F (36.2 °C) Temporal 86 14 100 % -- --   06/15/25 1911 125/76 97 °F (36.1 °C) Temporal 89 14 99 % -- --   06/15/25 1848 -- -- -- -- -- 96 % -- --   06/15/25 1833 (!) 145/82 97.7 °F (36.5 °C) Temporal 92 16 97 % -- --   06/15/25 1825 139/80 -- -- 88 15 96 % -- --   06/15/25 1820 -- -- -- 87 -- -- -- --   06/15/25 1806 128/83 97 °F (36.1 °C) Temporal 87 13 96 % -- --   06/15/25 1801 118/72 -- -- 88 15 96 % -- --   06/15/25 1759 -- -- -- 87 -- -- -- --   06/15/25 1756 117/69 97.1 °F (36.2 °C) Temporal 87 14 100 % -- --   06/15/25 1740 127/69 97.6 °F (36.4 °C) Temporal 87 12 (!) 89 % -- --   06/15/25 1502 101/82 -- -- -- -- -- -- --   06/15/25 1501 -- -- -- -- -- 90 % -- --   06/15/25 1455 109/69 -- -- -- -- 100 % -- --   06/15/25 1317 106/62 -- -- -- -- 94 % -- --   06/15/25 1302 95/69 -- -- -- -- 95 % -- --   06/15/25 1247 95/67 -- -- -- -- 96 % -- --   06/15/25 1242 -- -- -- -- -- 92 % -- --   06/15/25 1240 -- -- -- -- -- (!) 83 % -- --   06/15/25 1230 -- -- -- -- 20 -- -- --   06/15/25 1215 94/66 -- -- -- 20 93 % -- --   06/15/25 1148 103/72 98.6 °F (37 °C) -- 83 18 94 % 1.499 m 
Subjective:     Post-Operative Day: 3 Status Post left ORIF and CMN  Systemic or Specific Complaints:No Complaints  no nausea Pain 4    Patient sitting up in chair this am. A&Ox3. We discussed weight bearing status. Patient requested placement to 8th floor for extended rehab and was denied. She feels ready to go home and plans to go with home health.     Objective:     Patient Vitals for the past 24 hrs:   BP Temp Temp src Pulse Resp SpO2   06/18/25 1108 139/87 99.1 °F (37.3 °C) Temporal 100 18 100 %   06/18/25 0918 -- -- -- 88 16 98 %   06/18/25 0711 -- -- -- 90 15 98 %   06/18/25 0707 126/78 99.7 °F (37.6 °C) Temporal 96 16 98 %   06/18/25 0604 -- -- -- -- 16 --   06/18/25 0554 109/71 98.6 °F (37 °C) Temporal 100 14 99 %   06/18/25 0534 -- -- -- -- 18 --   06/18/25 0041 103/68 98.6 °F (37 °C) Temporal 94 16 98 %   06/17/25 2005 107/69 99.5 °F (37.5 °C) Temporal 90 14 98 %   06/17/25 1739 96/60 97.7 °F (36.5 °C) -- 98 18 98 %   06/17/25 1551 -- -- -- -- 14 --       General: alert, appears stated age, and cooperative   Exam: Incision clean, dry, and intact, no evidence of infection. No new bleeding or drainage   Neurovascular: Exam normal. Cap refill < 2 seconds in all extremities      Data Review:  Recent Labs     06/17/25  2233 06/18/25  0558   HGB 8.2* 8.1*     Recent Labs     06/18/25  0558      K 5.1   CREATININE 1.7*     Recent Labs     06/18/25  0558   LABGLOM 35*           Assessment:     Status Post left ORIF and CMN. Doing well postoperatively.     Plan:     - Continues current post-op course  - Partial WB (25%) to LLE  - PT/OT daily   - patient goal to DC home with home health  - continue current pain control   - post op follow up in 2 weeks with orthopedics    Patient is stable from orthopedic standpoint to discharge.     Electronically signed by URSZULA Hernandez CNP on 6/18/2025 at 2:09 PM            
TABLET BY MOUTH ONCE A DAY 90 tablet 1    atorvastatin (LIPITOR) 10 MG tablet Take 1 tablet by mouth daily 30 tablet 5    Multiple Vitamins-Minerals (THERAPEUTIC MULTIVITAMIN-MINERALS) tablet Take 1 tablet by mouth daily      aspirin 81 MG EC tablet Take 1 tablet by mouth daily      Ferrous Sulfate Dried ER (SLOW RELEASE IRON) 45 MG TBCR Take 1 tablet by mouth daily 30 tablet 5    ibuprofen (ADVIL;MOTRIN) 600 MG tablet Take 200-600 mg by mouth every 6 hours as needed for Pain      Calcium Carbonate-Vitamin D 600-5 MG-MCG CAPS Take 600 mg by mouth daily      Magnesium 500 MG TABS Take 1 tablet by mouth daily         Objective    BP (!) 93/56   Pulse 85   Temp 98.4 °F (36.9 °C) (Oral)   Resp 17   Ht 1.499 m (4' 11\")   Wt 38.6 kg (85 lb)   SpO2 100%   BMI 17.17 kg/m²     LABS:  WBC:    Lab Results   Component Value Date/Time    WBC 9.2 06/15/2025 12:10 PM     H/H:    Lab Results   Component Value Date/Time    HGB 7.7 06/16/2025 02:40 AM    HCT 25.2 06/16/2025 02:40 AM     PTT:    Lab Results   Component Value Date/Time    APTT 35.2 06/19/2024 10:00 AM   [APTT}  PT/INR:    Lab Results   Component Value Date/Time    PROTIME 13.7 06/15/2025 12:10 PM    INR 1.05 06/15/2025 12:10 PM     HgBA1c:    Lab Results   Component Value Date/Time    LABA1C 5.5 08/18/2023 12:44 PM       Assessment   Jagjit Risk Score: Jagjit Scale Score: 19    Patient Active Problem List   Diagnosis Code    Chronic ulcer of left foot, with fat layer exposed ([facility]) L97.522    Chronic ulcer of left leg, with fat layer exposed ([facility]) L97.922    Lymphedema I89.0    PVD (peripheral vascular disease) I73.9    Peripheral vascular disease, unspecified I73.9    Iron deficiency anemia secondary to inadequate dietary iron intake D50.8    Iron malabsorption K90.9    Aortoiliac occlusive disease (HCC) I74.09    Aorto-iliac disease I77.9    Severe malnutrition E43    Major depressive disorder, recurrent, mild F33.0    Protein calorie malnutrition E46

## 2025-06-25 ENCOUNTER — TELEPHONE (OUTPATIENT)
Age: 55
End: 2025-06-25

## 2025-06-25 ENCOUNTER — TELEMEDICINE (OUTPATIENT)
Dept: INTERNAL MEDICINE | Age: 55
End: 2025-06-25

## 2025-06-25 DIAGNOSIS — D64.9 POSTOPERATIVE ANEMIA: ICD-10-CM

## 2025-06-25 DIAGNOSIS — S72.142A INTERTROCHANTERIC FRACTURE OF LEFT FEMUR, CLOSED, INITIAL ENCOUNTER (HCC): Primary | ICD-10-CM

## 2025-06-25 DIAGNOSIS — Z09 SURGERY FOLLOW-UP: Primary | ICD-10-CM

## 2025-06-25 DIAGNOSIS — I95.9 HYPOTENSION, UNSPECIFIED HYPOTENSION TYPE: ICD-10-CM

## 2025-06-25 NOTE — PROGRESS NOTES
2025    TELEHEALTH EVALUATION -- Audio/Visual    HPI:    Vonda Lutz (:  1970) has requested an audio/video evaluation for the following concern(s):    Ms. Lutz  is a 55-year-old woman who presents virtually for follow-up after sustaining a fracture to the proximal femur.  She had fallen at home prior to her arrival in the emergency department.  She was admitted to the hospitalist service and seen in consultation by orthopedic surgery.  She underwent a left hip fracture open reduction and internal fixation.  She required a cephalomedullary nail.  She did quite well with a blood postoperatively for postop anemia.  She was declined inpatient rehabilitation.  She was recommended to go to a nursing home for rehabilitation.  However, her daughter insisted on taking her home despite the fact that she did not do well with therapy while hospitalized.  She was started on 2 L of oxygen per nasal cannula during her hospitalization.  However, she is not wearing her cannula today during this visit.  She states that she is not having any difficulty with her breathing.    She states that her blood pressure has been quite low.  She states this been running about 93 systolically.  Her blood pressure medications are currently on hold.  She states that her pain control is stable on her current dose of oxycodone.  However, she states that this is the worst pain that she has ever had.  She is getting physical therapy.  She also has a nurses coming to her.  She is also getting physical therapy.  She goes back to see her orthopedic surgeon on .    I cannot evaluate the leg wound today because this is a virtual visit.  She states that there is no redness or swelling noted.  She denies any complaints of fever or chills.  She states that her incisions look good.    Review of Systems   Constitutional:  Negative for activity change, appetite change, chills, diaphoresis, fatigue, fever and unexpected weight change.   HENT:

## 2025-06-26 RX ORDER — OXYCODONE HYDROCHLORIDE 5 MG/1
5 TABLET ORAL EVERY 4 HOURS PRN
Qty: 40 TABLET | Refills: 0 | Status: SHIPPED | OUTPATIENT
Start: 2025-06-26 | End: 2025-07-06

## 2025-06-27 ENCOUNTER — TELEPHONE (OUTPATIENT)
Age: 55
End: 2025-06-27
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
## 2025-06-27 DIAGNOSIS — Z87.81 S/P ORIF (OPEN REDUCTION INTERNAL FIXATION) FRACTURE: ICD-10-CM

## 2025-06-27 DIAGNOSIS — Z74.09 DECREASED AMBULATION STATUS: Primary | ICD-10-CM

## 2025-06-27 DIAGNOSIS — S72.142A INTERTROCHANTERIC FRACTURE OF LEFT FEMUR, CLOSED, INITIAL ENCOUNTER (HCC): ICD-10-CM

## 2025-06-27 DIAGNOSIS — R26.89 NEED FOR ASSISTANCE DUE TO UNSTEADY GAIT: ICD-10-CM

## 2025-06-27 DIAGNOSIS — Z98.890 S/P ORIF (OPEN REDUCTION INTERNAL FIXATION) FRACTURE: ICD-10-CM

## 2025-06-27 NOTE — TELEPHONE ENCOUNTER
Patient was seen for homehealth late on Wednesday and states her pin levels were high and she was out of oxycodone and brandon is wanting to know if she could get a refill

## 2025-06-27 NOTE — TELEPHONE ENCOUNTER
Returned call [facility] and they must have an updated office note which patient will be seen on Wednesday 7/02/2025 informed them we would fax updated office note to them

## 2025-06-27 NOTE — TELEPHONE ENCOUNTER
Called pt to let her know that Dr Ruby had sent he medication in yesterday. Also pt requested to possibly move her appointment back a couple days due to no transportation. Pt is now seeing Tung on Wednesday 7/02 @1:20

## 2025-06-27 NOTE — TELEPHONE ENCOUNTER
Nora from [facility] is requesting an order for a 16 inch wheelchair sent to [facility]     She will be seen by occupational therapy

## 2025-06-28 ASSESSMENT — ENCOUNTER SYMPTOMS
ABDOMINAL PAIN: 0
CHEST TIGHTNESS: 0
VOMITING: 0
RHINORRHEA: 0
SHORTNESS OF BREATH: 0
CONSTIPATION: 0
FACIAL SWELLING: 0
NAUSEA: 0
BACK PAIN: 0
BLOOD IN STOOL: 0
COLOR CHANGE: 0
EYE ITCHING: 0
ANAL BLEEDING: 0
SORE THROAT: 0
SINUS PRESSURE: 0
ABDOMINAL DISTENTION: 0
CHOKING: 0
RECTAL PAIN: 0
EYE DISCHARGE: 0
WHEEZING: 0
DIARRHEA: 0
EYE REDNESS: 0
PHOTOPHOBIA: 0
VOICE CHANGE: 0
TROUBLE SWALLOWING: 0
EYE PAIN: 0
COUGH: 0

## 2025-07-02 ENCOUNTER — TELEPHONE (OUTPATIENT)
Age: 55
End: 2025-07-02

## 2025-07-02 ENCOUNTER — OFFICE VISIT (OUTPATIENT)
Age: 55
End: 2025-07-02

## 2025-07-02 VITALS — WEIGHT: 85 LBS | BODY MASS INDEX: 17.14 KG/M2 | HEIGHT: 59 IN

## 2025-07-02 DIAGNOSIS — M89.8X5 PAIN OF LEFT FEMUR: Primary | ICD-10-CM

## 2025-07-02 DIAGNOSIS — S72.142A DISPLACED INTERTROCHANTERIC FRACTURE OF LEFT FEMUR, INITIAL ENCOUNTER FOR CLOSED FRACTURE (HCC): ICD-10-CM

## 2025-07-02 PROCEDURE — 99024 POSTOP FOLLOW-UP VISIT: CPT | Performed by: ORTHOPAEDIC SURGERY

## 2025-07-02 NOTE — PROGRESS NOTES
[facility]  [facility]  [address]  [facility] [id number] [phone]         Vonda Lutz (: 1970) is a 55 y.o. female, patient, here for evaluation of the following chief complaint(s): femur pain (Left (nail/soraya)/SX: 06/15/25)  .         Patient's PCP: Luz Elena Fernandez MD     Patient's Last Appointment in this Department was on 2025      Subjective:     Chief Complaint   Patient presents with    femur pain     Left (nail/soraya)  SX: 06/15/25        History of Present Illness  The patient presents for a postoperative follow-up.    She underwent surgery a few weeks ago, during which her old screws were removed and replaced with a soraya and new screws. She has been adhering to the 25% toe touch weight-bearing restriction. She has had several follow-up visits and was able to take a few steps on Monday. She has been receiving physical therapy at home.              Medications  Current Outpatient Medications   Medication Sig Dispense Refill    oxyCODONE (ROXICODONE) 5 MG immediate release tablet Take 1 tablet by mouth every 4 hours as needed for Pain for up to 10 days. Max Daily Amount: 30 mg 40 tablet 0    apixaban (ELIQUIS) 2.5 MG TABS tablet Take 1 tablet by mouth 2 times daily for 28 days 56 tablet 0    pantoprazole (PROTONIX) 20 MG tablet Take 1 tablet by mouth every morning (before breakfast) 30 tablet 0    alendronate (FOSAMAX) 70 MG tablet Take 1 tablet by mouth every 7 days Take with a full glass of water upon arising for the day. Consume on an empty stomach at least 30 minutes before the first food, beverage, or medication. Stay upright (do not lie down) for at least 30 minutes. Do not crush or break. 4 tablet 3    citalopram (CELEXA) 40 MG tablet Take 1 tablet by mouth once daily 30 tablet 5    gentamicin (GARAMYCIN) 0.1 % ointment APPLY A THIN LAYER TO AFFECTED AREA OF LEFT LOWER LEG AND RIGHT FOOT TWICE A DAY

## 2025-07-03 ENCOUNTER — TELEPHONE (OUTPATIENT)
Age: 55
End: 2025-07-03

## 2025-07-03 NOTE — TELEPHONE ENCOUNTER
Suellen wanted to know if they could do a couple more weeks of nursing for her. Suellen was notified that is okay. Suellen verbalized understanding.

## 2025-07-03 NOTE — TELEPHONE ENCOUNTER
Suellen from [facility] would like to speak with clinic regarding patient  [phone]

## 2025-07-17 ENCOUNTER — TELEPHONE (OUTPATIENT)
Age: 55
End: 2025-07-17

## 2025-07-22 ENCOUNTER — OFFICE VISIT (OUTPATIENT)
Dept: INTERNAL MEDICINE | Age: 55
End: 2025-07-22
Payer: MEDICARE

## 2025-07-22 VITALS
DIASTOLIC BLOOD PRESSURE: 80 MMHG | SYSTOLIC BLOOD PRESSURE: 124 MMHG | RESPIRATION RATE: 20 BRPM | HEART RATE: 88 BPM | HEIGHT: 59 IN | OXYGEN SATURATION: 98 % | BODY MASS INDEX: 17.54 KG/M2 | WEIGHT: 87 LBS

## 2025-07-22 DIAGNOSIS — R73.9 HYPERGLYCEMIA: ICD-10-CM

## 2025-07-22 DIAGNOSIS — K21.9 GASTROESOPHAGEAL REFLUX DISEASE WITHOUT ESOPHAGITIS: ICD-10-CM

## 2025-07-22 DIAGNOSIS — F33.0 MAJOR DEPRESSIVE DISORDER, RECURRENT, MILD: ICD-10-CM

## 2025-07-22 DIAGNOSIS — Z91.09 ENVIRONMENTAL ALLERGIES: ICD-10-CM

## 2025-07-22 DIAGNOSIS — R53.83 OTHER FATIGUE: ICD-10-CM

## 2025-07-22 DIAGNOSIS — E55.9 VITAMIN D DEFICIENCY: ICD-10-CM

## 2025-07-22 DIAGNOSIS — M81.0 OSTEOPOROSIS, UNSPECIFIED OSTEOPOROSIS TYPE, UNSPECIFIED PATHOLOGICAL FRACTURE PRESENCE: ICD-10-CM

## 2025-07-22 DIAGNOSIS — I10 PRIMARY HYPERTENSION: ICD-10-CM

## 2025-07-22 DIAGNOSIS — N28.9 RENAL INSUFFICIENCY: ICD-10-CM

## 2025-07-22 DIAGNOSIS — E78.5 HYPERLIPIDEMIA, UNSPECIFIED HYPERLIPIDEMIA TYPE: ICD-10-CM

## 2025-07-22 DIAGNOSIS — D50.8 IRON DEFICIENCY ANEMIA SECONDARY TO INADEQUATE DIETARY IRON INTAKE: Primary | ICD-10-CM

## 2025-07-22 LAB
25(OH)D3 SERPL-MCNC: 47.6 NG/ML
ALBUMIN SERPL-MCNC: 3.7 G/DL (ref 3.5–5.2)
ALP SERPL-CCNC: 152 U/L (ref 35–104)
ALT SERPL-CCNC: 17 U/L (ref 10–35)
ANION GAP SERPL CALCULATED.3IONS-SCNC: 9 MMOL/L (ref 8–16)
AST SERPL-CCNC: 32 U/L (ref 10–35)
BASOPHILS # BLD: 0.1 K/UL (ref 0–0.2)
BASOPHILS NFR BLD: 0.8 % (ref 0–1)
BILIRUB SERPL-MCNC: <0.2 MG/DL (ref 0.2–1.2)
BUN SERPL-MCNC: 15 MG/DL (ref 6–20)
CALCIUM SERPL-MCNC: 9.4 MG/DL (ref 8.6–10)
CHLORIDE SERPL-SCNC: 101 MMOL/L (ref 98–107)
CHOLEST SERPL-MCNC: 130 MG/DL (ref 0–199)
CO2 SERPL-SCNC: 29 MMOL/L (ref 22–29)
CREAT SERPL-MCNC: 1.4 MG/DL (ref 0.5–0.9)
EOSINOPHIL # BLD: 0.6 K/UL (ref 0–0.6)
EOSINOPHIL NFR BLD: 7.6 % (ref 0–5)
ERYTHROCYTE [DISTWIDTH] IN BLOOD BY AUTOMATED COUNT: 13.8 % (ref 11.5–14.5)
GLUCOSE SERPL-MCNC: 100 MG/DL (ref 70–99)
HBA1C MFR BLD: 5.1 % (ref 4–5.6)
HCT VFR BLD AUTO: 29.4 % (ref 37–47)
HDLC SERPL-MCNC: 39 MG/DL (ref 40–60)
HGB BLD-MCNC: 8.9 G/DL (ref 12–16)
IMM GRANULOCYTES # BLD: 0 K/UL
LDLC SERPL CALC-MCNC: 42 MG/DL
LYMPHOCYTES # BLD: 2.6 K/UL (ref 1.1–4.5)
LYMPHOCYTES NFR BLD: 34.9 % (ref 20–40)
MCH RBC QN AUTO: 27.7 PG (ref 27–31)
MCHC RBC AUTO-ENTMCNC: 30.3 G/DL (ref 33–37)
MCV RBC AUTO: 91.6 FL (ref 81–99)
MONOCYTES # BLD: 0.7 K/UL (ref 0–0.9)
MONOCYTES NFR BLD: 9.6 % (ref 0–10)
NEUTROPHILS # BLD: 3.5 K/UL (ref 1.5–7.5)
NEUTS SEG NFR BLD: 46.8 % (ref 50–65)
PLATELET # BLD AUTO: 381 K/UL (ref 130–400)
PMV BLD AUTO: 10.4 FL (ref 9.4–12.3)
POTASSIUM SERPL-SCNC: 4.1 MMOL/L (ref 3.5–5.1)
PROT SERPL-MCNC: 6.5 G/DL (ref 6.4–8.3)
RBC # BLD AUTO: 3.21 M/UL (ref 4.2–5.4)
SODIUM SERPL-SCNC: 139 MMOL/L (ref 136–145)
TRIGL SERPL-MCNC: 245 MG/DL (ref 0–149)
TSH SERPL DL<=0.005 MIU/L-ACNC: 0.85 UIU/ML (ref 0.27–4.2)
WBC # BLD AUTO: 7.5 K/UL (ref 4.8–10.8)

## 2025-07-22 PROCEDURE — 3017F COLORECTAL CA SCREEN DOC REV: CPT | Performed by: INTERNAL MEDICINE

## 2025-07-22 PROCEDURE — G8419 CALC BMI OUT NRM PARAM NOF/U: HCPCS | Performed by: INTERNAL MEDICINE

## 2025-07-22 PROCEDURE — 3074F SYST BP LT 130 MM HG: CPT | Performed by: INTERNAL MEDICINE

## 2025-07-22 PROCEDURE — 3079F DIAST BP 80-89 MM HG: CPT | Performed by: INTERNAL MEDICINE

## 2025-07-22 PROCEDURE — G8427 DOCREV CUR MEDS BY ELIG CLIN: HCPCS | Performed by: INTERNAL MEDICINE

## 2025-07-22 PROCEDURE — 4004F PT TOBACCO SCREEN RCVD TLK: CPT | Performed by: INTERNAL MEDICINE

## 2025-07-22 PROCEDURE — 99214 OFFICE O/P EST MOD 30 MIN: CPT | Performed by: INTERNAL MEDICINE

## 2025-07-22 NOTE — PROGRESS NOTES
Chief Complaint   Patient presents with    Follow-up     Femur fx in June. Healing well. Holding BP meds at this time.        HPI: Vonda Lutz is a 55 y.o. female is here for follow-up of hyperlipidemia, recent femur fracture, hypertension, acid reflux, depression and osteoporosis.    She did fracture her left femur in June.  She is now getting physical therapy and Occupational Therapy.  She is now weightbearing.  The pain has improved significantly.  She is doing much better overall.  She is now back on Fosamax therapy.  She was taken off of it at 1 point due to the fact that she was told that it was not working.  We had tried to get her into see rheumatology quite sometime ago due to the fact that she has had multiple fractures in the past.  I am concerned about the possibility of osteogenesis imperfecta.  However, she was never able to get into see a rheumatologist.    She does have a history of iron deficiency anemia.  She is taking her ferrous sulfate as prescribed.  She is tolerating her Lipitor without difficulty.  A lipid panel is currently pending.    Her blood pressure is well-controlled.  She denies any complaints of chest pain, chest pressure or shortness of breath.  She is currently not taking her lisinopril due to hypotension.  Her blood pressure is currently well-controlled at 124/80.    Her acid reflux is well-controlled on Protonix.  Her depression is stable on Celexa.      Past Medical History:   Diagnosis Date    Anemia     Anxiety 1984    Ongoing    Arthritis     Closed fracture of left foot     Depression 1984    Ongoing    Hip fracture (HCC)     x2    History of blood transfusion     Hyperlipidemia     Hypertension     Hyperthyroidism March 2020    Negative 08/2021    Hypokalemia     pt states, \"it's not low now\"    Hypomagnesemia     Iron deficiency anemia secondary to inadequate dietary iron intake 06/06/2024    Iron malabsorption 06/06/2024    Kidney disease     pt denies; \"but they want

## 2025-07-23 SDOH — ECONOMIC STABILITY: FOOD INSECURITY: WITHIN THE PAST 12 MONTHS, THE FOOD YOU BOUGHT JUST DIDN'T LAST AND YOU DIDN'T HAVE MONEY TO GET MORE.: NEVER TRUE

## 2025-07-23 SDOH — ECONOMIC STABILITY: FOOD INSECURITY: WITHIN THE PAST 12 MONTHS, YOU WORRIED THAT YOUR FOOD WOULD RUN OUT BEFORE YOU GOT MONEY TO BUY MORE.: NEVER TRUE

## 2025-07-23 ASSESSMENT — ENCOUNTER SYMPTOMS
CONSTIPATION: 0
BLOOD IN STOOL: 0
SINUS PRESSURE: 0
EYE PAIN: 0
RHINORRHEA: 0
EYE DISCHARGE: 0
SHORTNESS OF BREATH: 0
ANAL BLEEDING: 0
ABDOMINAL PAIN: 0
TROUBLE SWALLOWING: 0
EYE ITCHING: 0
CHOKING: 0
COUGH: 0
CHEST TIGHTNESS: 0
BACK PAIN: 0
DIARRHEA: 0
VOICE CHANGE: 0
VOMITING: 0
ABDOMINAL DISTENTION: 0
WHEEZING: 0
SORE THROAT: 0
RECTAL PAIN: 0
PHOTOPHOBIA: 0
FACIAL SWELLING: 0
COLOR CHANGE: 0
NAUSEA: 0
EYE REDNESS: 0

## 2025-07-23 ASSESSMENT — PATIENT HEALTH QUESTIONNAIRE - PHQ9
8. MOVING OR SPEAKING SO SLOWLY THAT OTHER PEOPLE COULD HAVE NOTICED. OR THE OPPOSITE, BEING SO FIGETY OR RESTLESS THAT YOU HAVE BEEN MOVING AROUND A LOT MORE THAN USUAL: NOT AT ALL
SUM OF ALL RESPONSES TO PHQ QUESTIONS 1-9: 6
7. TROUBLE CONCENTRATING ON THINGS, SUCH AS READING THE NEWSPAPER OR WATCHING TELEVISION: SEVERAL DAYS
2. FEELING DOWN, DEPRESSED OR HOPELESS: NOT AT ALL
SUM OF ALL RESPONSES TO PHQ QUESTIONS 1-9: 6
1. LITTLE INTEREST OR PLEASURE IN DOING THINGS: SEVERAL DAYS
10. IF YOU CHECKED OFF ANY PROBLEMS, HOW DIFFICULT HAVE THESE PROBLEMS MADE IT FOR YOU TO DO YOUR WORK, TAKE CARE OF THINGS AT HOME, OR GET ALONG WITH OTHER PEOPLE: SOMEWHAT DIFFICULT
9. THOUGHTS THAT YOU WOULD BE BETTER OFF DEAD, OR OF HURTING YOURSELF: NOT AT ALL
3. TROUBLE FALLING OR STAYING ASLEEP: SEVERAL DAYS
4. FEELING TIRED OR HAVING LITTLE ENERGY: SEVERAL DAYS
SUM OF ALL RESPONSES TO PHQ QUESTIONS 1-9: 6
SUM OF ALL RESPONSES TO PHQ QUESTIONS 1-9: 6
5. POOR APPETITE OR OVEREATING: SEVERAL DAYS
6. FEELING BAD ABOUT YOURSELF - OR THAT YOU ARE A FAILURE OR HAVE LET YOURSELF OR YOUR FAMILY DOWN: SEVERAL DAYS

## 2025-08-03 DIAGNOSIS — D50.8 IRON DEFICIENCY ANEMIA SECONDARY TO INADEQUATE DIETARY IRON INTAKE: ICD-10-CM

## 2025-08-04 RX ORDER — GUAIFENESIN 600 MG
1 TABLET, EXTENDED RELEASE 12 HR ORAL DAILY
Qty: 30 TABLET | Refills: 5 | Status: SHIPPED | OUTPATIENT
Start: 2025-08-04

## 2025-08-08 ENCOUNTER — TELEPHONE (OUTPATIENT)
Dept: HEMATOLOGY | Age: 55
End: 2025-08-08

## 2025-08-13 ENCOUNTER — OFFICE VISIT (OUTPATIENT)
Dept: HEMATOLOGY | Age: 55
End: 2025-08-13
Payer: MEDICARE

## 2025-08-13 ENCOUNTER — OFFICE VISIT (OUTPATIENT)
Age: 55
End: 2025-08-13

## 2025-08-13 ENCOUNTER — HOSPITAL ENCOUNTER (OUTPATIENT)
Dept: INFUSION THERAPY | Age: 55
Discharge: HOME | End: 2025-08-13
Payer: MEDICARE

## 2025-08-13 VITALS — HEIGHT: 59 IN | WEIGHT: 87 LBS | BODY MASS INDEX: 17.54 KG/M2

## 2025-08-13 VITALS
WEIGHT: 88.2 LBS | OXYGEN SATURATION: 98 % | HEIGHT: 59 IN | DIASTOLIC BLOOD PRESSURE: 68 MMHG | BODY MASS INDEX: 17.78 KG/M2 | TEMPERATURE: 98.3 F | SYSTOLIC BLOOD PRESSURE: 124 MMHG | HEART RATE: 78 BPM

## 2025-08-13 DIAGNOSIS — Z87.81 S/P ORIF (OPEN REDUCTION INTERNAL FIXATION) FRACTURE: ICD-10-CM

## 2025-08-13 DIAGNOSIS — Z72.0 TOBACCO USE: ICD-10-CM

## 2025-08-13 DIAGNOSIS — D50.8 IRON DEFICIENCY ANEMIA SECONDARY TO INADEQUATE DIETARY IRON INTAKE: ICD-10-CM

## 2025-08-13 DIAGNOSIS — D50.8 IRON DEFICIENCY ANEMIA SECONDARY TO INADEQUATE DIETARY IRON INTAKE: Primary | ICD-10-CM

## 2025-08-13 DIAGNOSIS — M21.70 LEG LENGTH DISCREPANCY: Primary | ICD-10-CM

## 2025-08-13 DIAGNOSIS — D75.838 REACTIVE THROMBOCYTOSIS: ICD-10-CM

## 2025-08-13 DIAGNOSIS — Z98.890 S/P ORIF (OPEN REDUCTION INTERNAL FIXATION) FRACTURE: ICD-10-CM

## 2025-08-13 DIAGNOSIS — R74.8 ELEVATED SERUM TRYPTASE: ICD-10-CM

## 2025-08-13 DIAGNOSIS — E87.6 HYPOKALEMIA: ICD-10-CM

## 2025-08-13 LAB
ALBUMIN SERPL-MCNC: 3.9 G/DL (ref 3.5–5.2)
ALP SERPL-CCNC: 119 U/L (ref 35–104)
ALT SERPL-CCNC: 14 U/L (ref 5–33)
ANION GAP SERPL CALCULATED.3IONS-SCNC: 12 MMOL/L (ref 7–19)
AST SERPL-CCNC: 28 U/L (ref 5–32)
BASOPHILS # BLD: 0.09 K/UL (ref 0–0.2)
BASOPHILS NFR BLD: 1.1 % (ref 0–1)
BILIRUB SERPL-MCNC: <0.2 MG/DL (ref 0–1.2)
BUN SERPL-MCNC: 17 MG/DL (ref 6–20)
CALCIUM SERPL-MCNC: 9.4 MG/DL (ref 8.6–10)
CHLORIDE SERPL-SCNC: 102 MMOL/L (ref 98–107)
CO2 SERPL-SCNC: 26 MMOL/L (ref 22–29)
CREAT SERPL-MCNC: 1.3 MG/DL (ref 0.5–0.9)
EOSINOPHIL # BLD: 0.32 K/UL (ref 0–0.6)
EOSINOPHIL NFR BLD: 3.9 % (ref 0–5)
ERYTHROCYTE [DISTWIDTH] IN BLOOD BY AUTOMATED COUNT: 13.2 % (ref 11.5–14.5)
FERRITIN SERPL-MCNC: 981 NG/ML (ref 13–150)
GLUCOSE SERPL-MCNC: 80 MG/DL (ref 70–99)
HCT VFR BLD AUTO: 34.2 % (ref 37–47)
HGB BLD-MCNC: 10.9 G/DL (ref 12–16)
IRON SATN MFR SERPL: 28 % (ref 15–50)
IRON SERPL-MCNC: 54 UG/DL (ref 37–145)
LYMPHOCYTES # BLD: 3.14 K/UL (ref 1.1–4.5)
LYMPHOCYTES NFR BLD: 38.2 % (ref 20–40)
MCH RBC QN AUTO: 28.5 PG (ref 27–31)
MCHC RBC AUTO-ENTMCNC: 31.9 G/DL (ref 33–37)
MCV RBC AUTO: 89.3 FL (ref 81–99)
MONOCYTES # BLD: 0.84 K/UL (ref 0–0.9)
MONOCYTES NFR BLD: 10.2 % (ref 1–10)
NEUTROPHILS # BLD: 3.81 K/UL (ref 1.5–7.5)
NEUTS SEG NFR BLD: 46.2 % (ref 50–65)
PLATELET # BLD AUTO: 422 K/UL (ref 130–400)
PMV BLD AUTO: 9.9 FL (ref 9.4–12.3)
POTASSIUM SERPL-SCNC: 4.1 MMOL/L (ref 3.5–5.1)
PROT SERPL-MCNC: 6.8 G/DL (ref 6.4–8.3)
RBC # BLD AUTO: 3.83 M/UL (ref 4.2–5.4)
SODIUM SERPL-SCNC: 140 MMOL/L (ref 136–145)
TIBC SERPL-MCNC: 190 UG/DL (ref 250–400)
WBC # BLD AUTO: 8.23 K/UL (ref 4.8–10.8)

## 2025-08-13 PROCEDURE — G8419 CALC BMI OUT NRM PARAM NOF/U: HCPCS | Performed by: NURSE PRACTITIONER

## 2025-08-13 PROCEDURE — 99212 OFFICE O/P EST SF 10 MIN: CPT

## 2025-08-13 PROCEDURE — 83550 IRON BINDING TEST: CPT

## 2025-08-13 PROCEDURE — 3017F COLORECTAL CA SCREEN DOC REV: CPT | Performed by: NURSE PRACTITIONER

## 2025-08-13 PROCEDURE — 83520 IMMUNOASSAY QUANT NOS NONAB: CPT

## 2025-08-13 PROCEDURE — 99024 POSTOP FOLLOW-UP VISIT: CPT | Performed by: PHYSICIAN ASSISTANT

## 2025-08-13 PROCEDURE — 99214 OFFICE O/P EST MOD 30 MIN: CPT | Performed by: NURSE PRACTITIONER

## 2025-08-13 PROCEDURE — G8427 DOCREV CUR MEDS BY ELIG CLIN: HCPCS | Performed by: NURSE PRACTITIONER

## 2025-08-13 PROCEDURE — 85025 COMPLETE CBC W/AUTO DIFF WBC: CPT

## 2025-08-13 PROCEDURE — 4004F PT TOBACCO SCREEN RCVD TLK: CPT | Performed by: NURSE PRACTITIONER

## 2025-08-13 PROCEDURE — 80053 COMPREHEN METABOLIC PANEL: CPT

## 2025-08-13 PROCEDURE — 36415 COLL VENOUS BLD VENIPUNCTURE: CPT

## 2025-08-13 PROCEDURE — 83540 ASSAY OF IRON: CPT

## 2025-08-13 PROCEDURE — 82728 ASSAY OF FERRITIN: CPT

## 2025-08-13 ASSESSMENT — ENCOUNTER SYMPTOMS
RESPIRATORY NEGATIVE: 1
COUGH: 0
EYES NEGATIVE: 1
SHORTNESS OF BREATH: 0
BACK PAIN: 0
ABDOMINAL PAIN: 0
EYE REDNESS: 0
EYE PAIN: 0
BLOOD IN STOOL: 0
NAUSEA: 0
SHORTNESS OF BREATH: 0
SORE THROAT: 0
CONSTIPATION: 0
DIARRHEA: 1
EYE DISCHARGE: 0
VOMITING: 0
WHEEZING: 0

## 2025-08-16 LAB — TRYPTASE SERPL-MCNC: 15.9 UG/L

## 2025-08-18 DIAGNOSIS — E04.2 NONTOXIC MULTINODULAR GOITER: Primary | ICD-10-CM

## 2025-08-20 ASSESSMENT — ENCOUNTER SYMPTOMS
COUGH: 0
WHEEZING: 0
EYES NEGATIVE: 1
NAUSEA: 0
BACK PAIN: 0
RESPIRATORY NEGATIVE: 1
DIARRHEA: 0
EYE PAIN: 0
EYE DISCHARGE: 0
SORE THROAT: 0
VOMITING: 0
SHORTNESS OF BREATH: 0
EYE REDNESS: 0
GASTROINTESTINAL NEGATIVE: 1
CONSTIPATION: 0
ABDOMINAL PAIN: 0
BLOOD IN STOOL: 0

## (undated) DEVICE — 1LYRTR 16FR10ML100%SIL UMS SNP: Brand: MEDLINE INDUSTRIES, INC.

## (undated) DEVICE — GUIDEWIRE ORTH L400MM DIA3.2MM FOR TFN

## (undated) DEVICE — RADIFOCUS GLIDEWIRE ADVANTAGE GUIDEWIRE: Brand: GLIDEWIRE ADVANTAGE

## (undated) DEVICE — BIT DRL L L266MM DIA16MM FEM FLX CANN QUIK CPL

## (undated) DEVICE — INFLATION DEVICE: Brand: ENCORE™ 26

## (undated) DEVICE — HANDLE LT FOR NLC C SECT RM ST/5

## (undated) DEVICE — Device

## (undated) DEVICE — DRAPE SHEET: Brand: UNBRANDED

## (undated) DEVICE — 6617 IOBAN II PATIENT ISOLATION DRAPE 5/BX,4BX/CS: Brand: STERI-DRAPE™ IOBAN™ 2

## (undated) DEVICE — 4-PORT MANIFOLD: Brand: NEPTUNE 2

## (undated) DEVICE — PK HIP ORIF FX TABL 30

## (undated) DEVICE — GOWN,PREVENTION PLUS,XLONG/XLARGE,STRL: Brand: MEDLINE

## (undated) DEVICE — GLV SURG TRIUMPH GREEN W/ALOE PF LTX 8.5 STRL

## (undated) DEVICE — SPNG GZ WOVN 4X4IN 12PLY 10/BX STRL

## (undated) DEVICE — TBG PENCL TELESCP MEGADYNE SMOKE EVAC 10FT

## (undated) DEVICE — ATRIEVE™ VASCULAR SNARE KIT 7F: Brand: ATRIEVE™ VASCULAR SNARE KIT

## (undated) DEVICE — TUBE ET 7MM NSL ORAL BASIC CUF INTMED MURPHY EYE RADPQ MRK

## (undated) DEVICE — GLOVE SURG SZ 85 L12IN FNGR ORTHO 126MIL CRM LTX FREE

## (undated) DEVICE — TUBING ANGIO L72IN 900PSI CLR PVC M TO FEM AIRLESS ROT ADPT

## (undated) DEVICE — SHEET,DRAPE,53X77,STERILE: Brand: MEDLINE

## (undated) DEVICE — NG KIT, 21 GA, 10 PACK: Brand: SITE-RITE

## (undated) DEVICE — WRAP AROUND LENS-STERLIE

## (undated) DEVICE — GLIDESHEATH BASIC HYDROPHILIC COATED INTRODUCER SHEATH: Brand: GLIDESHEATH

## (undated) DEVICE — NEPTUNE E-SEP SMOKE EVACUATION PENCIL, COATED, 70MM BLADE, ROCKER SWITCH: Brand: NEPTUNE E-SEP

## (undated) DEVICE — SUTURE PERMAHAND SZ 3-0 L30IN NONABSORBABLE BLK SILK BRAID A304H

## (undated) DEVICE — 3M™ IOBAN™ 2 ANTIMICROBIAL INCISE DRAPE 6650EZ: Brand: IOBAN™ 2

## (undated) DEVICE — STCKNT IMPERV 9X36IN STRL

## (undated) DEVICE — GW THRD TROC/PT 2.8X300MM

## (undated) DEVICE — PK TURNOVER RM ADV

## (undated) DEVICE — PINNACLE INTRODUCER SHEATH: Brand: PINNACLE

## (undated) DEVICE — BIT DRL L300MM DIA10MM CANN TAPR L QUIK CPL FOR DH DC TFN

## (undated) DEVICE — SUTURE PERMA-HAND SZ 2-0 L30IN NONABSORBABLE BLK L26MM SH K833H

## (undated) DEVICE — OPTIFOAM GENTLE SA, POSTOP, 4X8: Brand: MEDLINE

## (undated) DEVICE — DRAPE, MULTI FENESTRATED, HYBRID OR, STE: Brand: MEDLINE

## (undated) DEVICE — SPK10183 ORTHOPEDIC FRACTURE AND TRAUMA KIT: Brand: SPK10183 ORTHOPEDIC FRACTURE AND TRAUMA KIT

## (undated) DEVICE — GLV SURG TRIUMPH ORTHO W/ALOE PF LTX 8.5 STRL

## (undated) DEVICE — GLV SURG DERMASSURE GRN LF PF 8.0

## (undated) DEVICE — SUTURE VICRYL + SZ 3-0 L27IN ABSRB UD L26MM SH 1/2 CIR VCP416H

## (undated) DEVICE — PROVE COVER: Brand: UNBRANDED

## (undated) DEVICE — BANDAGE COMPR W4INXL15FT BGE E SGL LAYERED CLP CLSR

## (undated) DEVICE — CURAVIEW LED LARYNGOSCOPE BLADE & HANDLE,DISPOSABLE,MILLER 2: Brand: CURAPLEX

## (undated) DEVICE — SURGICAL PROCEDURE PACK VASC LOURDES HOSP

## (undated) DEVICE — ANTIBACTERIAL UNDYED BRAIDED (POLYGLACTIN 910), SYNTHETIC ABSORBABLE SUTURE: Brand: COATED VICRYL

## (undated) DEVICE — SHORT LENS-STERILE

## (undated) DEVICE — SYS SKIN CLS DERMABOND PRINEO W/22CM MESH TP

## (undated) DEVICE — RADIFOCUS GLIDECATH: Brand: GLIDECATH

## (undated) DEVICE — PACK,UNIVERSAL,NO GOWNS: Brand: MEDLINE

## (undated) DEVICE — DRSNG BRDR MEPILEXLITE SLFADHR SIL 2X5

## (undated) DEVICE — GLOVE SURG SZ 85 CRM LTX FREE POLYISOPRENE POLYMER BEAD ANTI

## (undated) DEVICE — ANGIOGRAPHY PK

## (undated) DEVICE — BIT DRL L110MM DIA1.8MM QUIK CPL CALIB W/O STP REUSE

## (undated) DEVICE — INTRODUCER SHTH CO CHROM EPTFE LUMN DIL HEMSTAS VLV SIDEPRT

## (undated) DEVICE — TRAP FLD MINIVAC MEGADYNE 100ML

## (undated) DEVICE — DRSNG WND SIL OPTIFOAM GENTLE BRDR ADHS W/SA 4X4IN

## (undated) DEVICE — SUTURE PERMAHAND SZ 2-0 L30IN NONABSORBABLE BLK SILK W/O A305H

## (undated) DEVICE — SUTURE VICRYL + SZ 0 L27IN ABSRB UD CT-1 L36MM 1/2 CIR TAPR VCP260H

## (undated) DEVICE — SOLUTION IV 500ML 0.9% SOD CHL PH 5 INJ USP VIAFLX PLAS

## (undated) DEVICE — GLV SURG SENSICARE PI ORTHO SZ8 LF STRL

## (undated) DEVICE — BNDG ELAS CO-FLEX SLF ADHR 6IN 5YD LF STRL

## (undated) DEVICE — ADHS SKIN PREMIERPRO EXOFIN TOPICAL HI/VISC .5ML

## (undated) DEVICE — BIT DRL L500MM DIA6X9MM CANN STP L QUIK CPL FOR DH DC TFN

## (undated) DEVICE — BLANKET WRM W29.9XL79.1IN UP BODY FORC AIR MISTRAL-AIR

## (undated) DEVICE — GLOVE SURG SZ 8 L12IN FNGR THK79MIL GRN LTX FREE

## (undated) DEVICE — GUIDEWIRE VASC L260CM DIA0.035IN COIL L15CM FLX TIP L4CM

## (undated) DEVICE — PERCLOSE PROGLIDE™ SUTURE-MEDIATED CLOSURE SYSTEM: Brand: PERCLOSE PROGLIDE™

## (undated) DEVICE — PTA BALLOON DILATATION CATHETER: Brand: CHARGER™

## (undated) DEVICE — IMMOBILIZER SHLDR M L15IN D8IN UNIV POLY COT W/ FOAM STRP

## (undated) DEVICE — IMMOBILIZER SLING: Brand: DEROYAL

## (undated) DEVICE — DRESSING TRNSPAR W5XL4.5IN FLM SHT SEMIPERMEABLE WIND

## (undated) DEVICE — SOLUTION IV 1000ML 0.9% SOD CHL PH 5 INJ USP VIAFLX PLAS

## (undated) DEVICE — PENCIL BTTN S S CAUT TIP W HOLSTER 25 50

## (undated) DEVICE — LIQUIBAND RAPID ADHESIVE 36/CS 0.8ML: Brand: MEDLINE

## (undated) DEVICE — GUIDEWIRE VASC L180CM DIA0.035IN L15CM STR TIP PTFE S STL

## (undated) DEVICE — COVER POS PERINL POST NS 082501

## (undated) DEVICE — CODA, LP BALLOON CATHETER: Brand: CODA

## (undated) DEVICE — DRAPE,U/ SHT,SPLIT,PLAS,STERIL: Brand: MEDLINE

## (undated) DEVICE — NEEDLE HYPO 18GA L1.5IN PNK POLYPR HUB S STL REG BVL STR

## (undated) DEVICE — GOWN, ORBIS, XLARGE, STERILE: Brand: MEDLINE

## (undated) DEVICE — GLOVE SURG SZ 8 CRM LTX FREE POLYISOPRENE POLYMER BEAD ANTI

## (undated) DEVICE — SUTURE VICRYL + SZ 2-0 L27IN ABSRB WHT SH 1/2 CIR TAPERCUT VCP417H

## (undated) DEVICE — UNDERGLOVE SURG SZ 8 FNGR THK0.21MIL GRN LTX BEAD CUF

## (undated) DEVICE — CATHETER KIT 5 FR 21 GAX7 CM MICROINTRODUCER GUIDEWIRE STIFF

## (undated) DEVICE — SUTURE PERMAHAND SZ 4-0 L12X30IN NONABSORBABLE BLK SILK A303H

## (undated) DEVICE — SUTURE VICRYL + SZ 2-0 L36IN ABSRB UD L36MM CT-1 1/2 CIR VCP945H

## (undated) DEVICE — GLOVE SURG SZ 7 L12IN FNGR THK79MIL GRN LTX FREE

## (undated) DEVICE — DRAPE C ARM W42XL120IN W POLY STRP W OUT LENS

## (undated) DEVICE — SOLUTION IV 250ML 0.9% SOD CHL PH 5 INJ USP VIAFLX PLAS

## (undated) DEVICE — SUTURE MONOCRYL + SZ 4-0 L18IN ABSRB UD L19MM PS-2 3/8 CIR MCP496G

## (undated) DEVICE — C-ARMOR C-ARM EQUIPMENT COVERS CLEAR STERILE UNIVERSAL FIT 12 PER CASE: Brand: C-ARMOR

## (undated) DEVICE — CANNULA NSL AD L7FT DIV O2 CO2 W/ M LUERLOCK TRMPT CONN

## (undated) DEVICE — SYRINGE MED 10ML LUERLOCK TIP W/O SFTY DISP

## (undated) DEVICE — ZIMMER® STERILE DISPOSABLE TOURNIQUET CUFF WITH PLC, DUAL PORT, SINGLE BLADDER, 18 IN. (46 CM)

## (undated) DEVICE — TOTAL TRAY, 16FR 10ML SIL FOLEY, URN: Brand: MEDLINE

## (undated) DEVICE — APPL CHLORAPREP W/TINT 26ML ORNG

## (undated) DEVICE — 3M™ STERI-DRAPE™ U-DRAPE 1015: Brand: STERI-DRAPE™

## (undated) DEVICE — GLOVE SURG SZ 85 L12IN FNGR THK79MIL GRN LTX FREE

## (undated) DEVICE — ADAPTER HEMSTAS VLV HVA

## (undated) DEVICE — SYSTEM SKIN CLSR 22CM DERMBND PRINEO

## (undated) DEVICE — ROYAL SILK SURGICAL GOWN, XXL: Brand: CONVERTORS

## (undated) DEVICE — SYRINGE 20ML LL S/C 50

## (undated) DEVICE — BIT DRL L330MM DIA4.2MM CALIB 100MM 3 FLUT QUIK CPL

## (undated) DEVICE — GLOVE SURG SZ 7 CRM LTX FREE POLYISOPRENE POLYMER BEAD ANTI

## (undated) DEVICE — TOWEL,OR,DSP,ST,BLUE,DLX,4/PK,20PK/CS: Brand: MEDLINE

## (undated) DEVICE — C-ARM: Brand: UNBRANDED

## (undated) DEVICE — CATHETER ANGIO 5FR L70CM GWIRE 0.035IN 1CM SPC OMNI FLSH 21